# Patient Record
Sex: FEMALE | Race: WHITE | Employment: UNEMPLOYED | URBAN - METROPOLITAN AREA
[De-identification: names, ages, dates, MRNs, and addresses within clinical notes are randomized per-mention and may not be internally consistent; named-entity substitution may affect disease eponyms.]

---

## 2020-07-28 ENCOUNTER — VIRTUAL VISIT (OUTPATIENT)
Dept: PRIMARY CARE CLINIC | Age: 45
End: 2020-07-28
Payer: MEDICAID

## 2020-07-28 DIAGNOSIS — E11.9 CONTROLLED TYPE 2 DIABETES MELLITUS WITHOUT COMPLICATION, WITHOUT LONG-TERM CURRENT USE OF INSULIN (HCC): ICD-10-CM

## 2020-07-28 DIAGNOSIS — J45.909 MODERATE ASTHMA, UNSPECIFIED WHETHER COMPLICATED, UNSPECIFIED WHETHER PERSISTENT: Primary | ICD-10-CM

## 2020-07-28 DIAGNOSIS — R26.89 LOSS OF BALANCE: ICD-10-CM

## 2020-07-28 PROCEDURE — 99214 OFFICE O/P EST MOD 30 MIN: CPT | Performed by: NURSE PRACTITIONER

## 2020-07-28 RX ORDER — ALBUTEROL SULFATE 2.5 MG/.5ML
2.5 SOLUTION RESPIRATORY (INHALATION)
Qty: 30 ML | Refills: 1 | Status: SHIPPED | OUTPATIENT
Start: 2020-07-28 | End: 2022-03-29 | Stop reason: SDUPTHER

## 2020-07-28 RX ORDER — FLUTICASONE PROPIONATE AND SALMETEROL 500; 50 UG/1; UG/1
500 POWDER RESPIRATORY (INHALATION) 2 TIMES DAILY
COMMUNITY
Start: 2020-07-25

## 2020-07-28 RX ORDER — PEN NEEDLE, DIABETIC 31 GX3/16"
1 NEEDLE, DISPOSABLE MISCELLANEOUS DAILY
Qty: 90 PEN NEEDLE | Refills: 1 | Status: SHIPPED | OUTPATIENT
Start: 2020-07-28 | End: 2020-09-08 | Stop reason: SDUPTHER

## 2020-07-28 RX ORDER — LORATADINE 10 MG/1
10 TABLET ORAL ONCE
COMMUNITY
Start: 2020-07-25 | End: 2020-10-26 | Stop reason: SDUPTHER

## 2020-07-28 RX ORDER — TIOTROPIUM BROMIDE 18 UG/1
18 CAPSULE ORAL; RESPIRATORY (INHALATION) ONCE
COMMUNITY
Start: 2020-07-25

## 2020-07-28 RX ORDER — LIRAGLUTIDE 6 MG/ML
0.6 INJECTION SUBCUTANEOUS ONCE
Qty: 3 ADJUSTABLE DOSE PRE-FILLED PEN SYRINGE | Refills: 1 | Status: SHIPPED | OUTPATIENT
Start: 2020-07-28 | End: 2020-07-28

## 2020-07-28 RX ORDER — ALBUTEROL SULFATE 2.5 MG/.5ML
2.5 SOLUTION RESPIRATORY (INHALATION) ONCE
COMMUNITY
End: 2020-07-28 | Stop reason: SDUPTHER

## 2020-07-28 RX ORDER — LIRAGLUTIDE 6 MG/ML
0.6 INJECTION SUBCUTANEOUS ONCE
COMMUNITY
Start: 2020-06-07 | End: 2020-07-28 | Stop reason: SDUPTHER

## 2020-07-28 RX ORDER — PEN NEEDLE, DIABETIC 31 GX3/16"
NEEDLE, DISPOSABLE MISCELLANEOUS
COMMUNITY
End: 2020-07-28 | Stop reason: SDUPTHER

## 2020-07-28 NOTE — PROGRESS NOTES
HISTORY OF PRESENT ILLNESS  Peyman Centeno is a 39 y.o. female presents via telemedicine for medication refill. 1. Diabetes:  Patients last A1c was 7%. Patient uses [facility] for her diabetes. Patient used metformin in the past with GI side effects. She also stopped Januvia at that time, unsure it was causing GI side effects as well. Last eye exam was 2019, due this year. Note mild neuropathy. 2. Asthma:  Patient has moderate asthma, sees Dr. Aditi Garcia for this. Next appointment with him is on August 20th. Patient asking for refill of her nebulizer solution today. Patient takes spiriva and Belvin Buster from pulmonologist.      3. Loss of balance:  Patient notes that she has been having balance issues and stability issues. Patient reported that she uses a cane. Patient reported that she needs a handicap parking pass extended. Patient reported that she hasn't follow up with neurology that she was referred to by Darvin Pascal x6 months ago. She did make an appointment with ENT however and sees them next week. There were no vitals filed for this visit. Patient Active Problem List   Diagnosis Code    Moderate asthma J45.909    Controlled type 2 diabetes mellitus without complication, without long-term current use of insulin ([facility] [address] E11.9     Patient Active Problem List    Diagnosis Date Noted    Moderate asthma 07/28/2020    Controlled type 2 diabetes mellitus without complication, without long-term [facility] 07/28/2020     Current Outpatient Medications   Medication Sig Dispense Refill    Wixela Inhub 500-50 mcg/dose diskus inhaler Take 500 Puffs by mouth two (2) times a day.  loratadine (CLARITIN) 10 mg tablet Take 10 mg by mouth once.  Spiriva with HandiHaler 18 mcg inhalation capsule Take 18 mcg by mouth once.  Insulin Needles, Disposable, (Tanya Pen Needle) 32 gauge x 5/32\" ndle 1 Pen Needle by Does Not Apply route daily.  90 Pen Needle 1    Victoza 2-Pradeep 0.6 mg/0.1 mL (18 mg/3 mL) pnij 0.6 mg by SubCUTAneous route once for 1 dose. 3 Adjustable Dose Pre-filled Pen Syringe 1    albuterol sulfate (PROVENTIL;VENTOLIN) 2.5 mg/0.5 mL nebu nebulizer solution 0.5 mL by Nebulization route every four (4) hours as needed for Wheezing. 30 mL 1     Allergies   Allergen Reactions    Latex, Natural Rubber Swelling    Codeine Anxiety    Penicillins Hives     Past Medical History:   Diagnosis Date    Allergy     Asthma     Diabetes ([address]      Past Surgical History:   Procedure Laterality Date    HX  SECTION      X 2    HX GYN      novasure, csection scar revision     Family History   Problem Relation Age of Onset    Cancer Mother         cervical ca    Hypertension Mother     Diabetes Mother     Thyroid Disease Mother     Cancer Father         neck ca    Hypertension Father     Cancer Other         aunt with colon ca    Heart Attack Neg Hx     Stroke Neg Hx      Social History     Tobacco Use    Smoking status: Never Smoker    Smokeless tobacco: Never Used   Substance Use Topics    Alcohol use: No           Review of Systems   Constitutional: Negative for malaise/fatigue and weight loss. Eyes: Negative for blurred vision, double vision and pain. Respiratory: Positive for cough and shortness of breath. Cardiovascular: Negative for chest pain, palpitations, orthopnea and leg swelling. Gastrointestinal: Negative for heartburn and nausea. Genitourinary: Negative for frequency. Musculoskeletal: Negative for joint pain and myalgias. Skin: Negative for itching and rash. Neurological: Negative for dizziness, tingling, loss of consciousness, weakness and headaches. Endo/Heme/Allergies: Does not bruise/bleed easily. Physical Exam  Constitutional:       Appearance: Normal appearance. HENT:      Head: Normocephalic. Eyes:      Conjunctiva/sclera: Conjunctivae normal.      Pupils: Pupils are equal, round, and reactive to light.    Pulmonary: Effort: Pulmonary effort is normal.   Skin:     General: Skin is warm and dry. Neurological:      Mental Status: She is alert. ASSESSMENT and PLAN  Diagnoses and all orders for this visit:    1. Moderate asthma, unspecified whether complicated, unspecified whether persistent  -     CBC WITH AUTOMATED DIFF  -     albuterol sulfate (PROVENTIL;VENTOLIN) 2.5 mg/0.5 mL nebu nebulizer solution; 0.5 mL by Nebulization route every four (4) hours as needed for Wheezing. 2. Controlled type 2 diabetes mellitus without complication, without long-term current use of insulin ([facility])  -     METABOLIC PANEL, COMPREHENSIVE  -     CBC WITH AUTOMATED DIFF  -     LIPID PANEL  -     HEMOGLOBIN A1C WITH EAG  -     Insulin Needles, Disposable, (Tanya Pen Needle) 32 gauge x 5/32\" ndle; 1 Pen Needle by Does Not Apply route daily. -     Victoza 2-Pradeep 0.6 mg/0.1 mL (18 mg/3 mL) pnij; 0.6 mg by SubCUTAneous route once for 1 dose. 3. Loss of balance  Comments:  Encouraged her to follow up with neurology regarding this. Vashti Deshpande, who was evaluated through a synchronous (real-time) audio-video encounter, and/or her healthcare decision maker, is aware that it is a billable service, with coverage as determined by her insurance carrier. She provided verbal consent to proceed: Yes, and patient identification was verified. It was conducted pursuant to the emergency declaration under the [facility], [address] [place] [address] authority and the [employer] Act. A caregiver was present when appropriate. Ability to conduct physical exam was limited. I was at home. The patient was at home.         Aric Sosa NP

## 2020-07-30 DIAGNOSIS — E11.9 CONTROLLED TYPE 2 DIABETES MELLITUS WITHOUT COMPLICATION, WITHOUT LONG-TERM CURRENT USE OF INSULIN (HCC): Primary | ICD-10-CM

## 2020-07-30 LAB
ALBUMIN SERPL-MCNC: 4 G/DL (ref 3.8–4.8)
ALBUMIN/GLOB SERPL: 1.3 {RATIO} (ref 1.2–2.2)
ALP SERPL-CCNC: 160 IU/L (ref 39–117)
ALT SERPL-CCNC: 27 IU/L (ref 0–32)
AST SERPL-CCNC: 23 IU/L (ref 0–40)
BASOPHILS # BLD AUTO: 0.1 X10E3/UL (ref 0–0.2)
BASOPHILS NFR BLD AUTO: 1 %
BILIRUB SERPL-MCNC: 0.4 MG/DL (ref 0–1.2)
BUN SERPL-MCNC: 7 MG/DL (ref 6–24)
BUN/CREAT SERPL: 11 (ref 9–23)
CALCIUM SERPL-MCNC: 9.2 MG/DL (ref 8.7–10.2)
CHLORIDE SERPL-SCNC: 99 MMOL/L (ref 96–106)
CHOLEST SERPL-MCNC: 189 MG/DL (ref 100–199)
CO2 SERPL-SCNC: 27 MMOL/L (ref 20–29)
CREAT SERPL-MCNC: 0.62 MG/DL (ref 0.57–1)
EOSINOPHIL # BLD AUTO: 0.3 X10E3/UL (ref 0–0.4)
EOSINOPHIL NFR BLD AUTO: 3 %
ERYTHROCYTE [DISTWIDTH] IN BLOOD BY AUTOMATED COUNT: 15.4 % (ref 11.7–15.4)
EST. AVERAGE GLUCOSE BLD GHB EST-MCNC: 186 MG/DL
GLOBULIN SER CALC-MCNC: 3 G/DL (ref 1.5–4.5)
GLUCOSE SERPL-MCNC: 146 MG/DL (ref 65–99)
HBA1C MFR BLD: 8.1 % (ref 4.8–5.6)
HCT VFR BLD AUTO: 48.1 % (ref 34–46.6)
HDLC SERPL-MCNC: 55 MG/DL
HGB BLD-MCNC: 15.2 G/DL (ref 11.1–15.9)
IMM GRANULOCYTES # BLD AUTO: 0 X10E3/UL (ref 0–0.1)
IMM GRANULOCYTES NFR BLD AUTO: 0 %
LDLC SERPL CALC-MCNC: 105 MG/DL (ref 0–99)
LYMPHOCYTES # BLD AUTO: 3.9 X10E3/UL (ref 0.7–3.1)
LYMPHOCYTES NFR BLD AUTO: 32 %
MCH RBC QN AUTO: 27.2 PG (ref 26.6–33)
MCHC RBC AUTO-ENTMCNC: 31.6 G/DL (ref 31.5–35.7)
MCV RBC AUTO: 86 FL (ref 79–97)
MONOCYTES # BLD AUTO: 0.8 X10E3/UL (ref 0.1–0.9)
MONOCYTES NFR BLD AUTO: 7 %
NEUTROPHILS # BLD AUTO: 7.2 X10E3/UL (ref 1.4–7)
NEUTROPHILS NFR BLD AUTO: 57 %
PLATELET # BLD AUTO: 335 X10E3/UL (ref 150–450)
POTASSIUM SERPL-SCNC: 4.8 MMOL/L (ref 3.5–5.2)
PROT SERPL-MCNC: 7 G/DL (ref 6–8.5)
RBC # BLD AUTO: 5.58 X10E6/UL (ref 3.77–5.28)
SODIUM SERPL-SCNC: 144 MMOL/L (ref 134–144)
TRIGL SERPL-MCNC: 146 MG/DL (ref 0–149)
VLDLC SERPL CALC-MCNC: 29 MG/DL (ref 5–40)
WBC # BLD AUTO: 12.3 X10E3/UL (ref 3.4–10.8)

## 2020-07-30 NOTE — PROGRESS NOTES
A1c is not controlled on victoza alone. She is at 8.1%. I would like to add on a medication to the victoza to help bring this level down and then have her follow up in the office in 3 months for an a1c recheck. Rest of lab work looks good.

## 2020-09-08 ENCOUNTER — TELEPHONE (OUTPATIENT)
Dept: PRIMARY CARE CLINIC | Age: 45
End: 2020-09-08

## 2020-09-08 DIAGNOSIS — E11.9 CONTROLLED TYPE 2 DIABETES MELLITUS WITHOUT COMPLICATION, WITHOUT LONG-TERM CURRENT USE OF INSULIN (HCC): ICD-10-CM

## 2020-09-08 RX ORDER — PEN NEEDLE, DIABETIC 31 GX3/16"
1 NEEDLE, DISPOSABLE MISCELLANEOUS DAILY
Qty: 90 PEN NEEDLE | Refills: 1 | Status: SHIPPED | OUTPATIENT
Start: 2020-09-08 | End: 2020-09-21

## 2020-09-21 DIAGNOSIS — E11.9 CONTROLLED TYPE 2 DIABETES MELLITUS WITHOUT COMPLICATION, WITHOUT LONG-TERM CURRENT USE OF INSULIN (HCC): ICD-10-CM

## 2020-09-21 RX ORDER — PEN NEEDLE, DIABETIC 32GX 5/32"
NEEDLE, DISPOSABLE MISCELLANEOUS
Qty: 100 PEN NEEDLE | Refills: 1 | Status: SHIPPED | OUTPATIENT
Start: 2020-09-21 | End: 2020-10-26 | Stop reason: SDUPTHER

## 2020-10-21 PROBLEM — E66.9 OBESITY: Status: ACTIVE | Noted: 2020-10-21

## 2020-10-26 ENCOUNTER — OFFICE VISIT (OUTPATIENT)
Dept: PRIMARY CARE CLINIC | Age: 45
End: 2020-10-26
Payer: MEDICAID

## 2020-10-26 VITALS
DIASTOLIC BLOOD PRESSURE: 72 MMHG | OXYGEN SATURATION: 93 % | TEMPERATURE: 98.6 F | HEIGHT: 67 IN | BODY MASS INDEX: 42.38 KG/M2 | RESPIRATION RATE: 18 BRPM | WEIGHT: 270 LBS | SYSTOLIC BLOOD PRESSURE: 110 MMHG | HEART RATE: 73 BPM

## 2020-10-26 DIAGNOSIS — Z91.09 ENVIRONMENTAL ALLERGIES: ICD-10-CM

## 2020-10-26 DIAGNOSIS — R29.898 WEAKNESS OF BOTH LEGS: Primary | ICD-10-CM

## 2020-10-26 DIAGNOSIS — E66.01 OBESITY, MORBID (HCC): ICD-10-CM

## 2020-10-26 DIAGNOSIS — E11.9 CONTROLLED TYPE 2 DIABETES MELLITUS WITHOUT COMPLICATION, WITHOUT LONG-TERM CURRENT USE OF INSULIN (HCC): ICD-10-CM

## 2020-10-26 PROCEDURE — 99214 OFFICE O/P EST MOD 30 MIN: CPT | Performed by: NURSE PRACTITIONER

## 2020-10-26 RX ORDER — PEN NEEDLE, DIABETIC 31 GX3/16"
NEEDLE, DISPOSABLE MISCELLANEOUS
Qty: 100 PEN NEEDLE | Refills: 1 | Status: SHIPPED | OUTPATIENT
Start: 2020-10-26 | End: 2021-04-08 | Stop reason: ALTCHOICE

## 2020-10-26 RX ORDER — LIRAGLUTIDE 6 MG/ML
INJECTION SUBCUTANEOUS
COMMUNITY
Start: 2020-09-20 | End: 2020-10-26

## 2020-10-26 RX ORDER — DULAGLUTIDE 0.75 MG/.5ML
0.75 INJECTION, SOLUTION SUBCUTANEOUS
Qty: 13 PEN | Refills: 1 | Status: SHIPPED | OUTPATIENT
Start: 2020-10-26 | End: 2020-12-18 | Stop reason: ALTCHOICE

## 2020-10-26 RX ORDER — LORATADINE 10 MG/1
10 TABLET ORAL ONCE
Qty: 1 TAB | Refills: 0 | Status: SHIPPED | OUTPATIENT
Start: 2020-10-26 | End: 2020-10-26

## 2020-10-26 RX ORDER — LORATADINE 10 MG/1
10 TABLET ORAL ONCE
Qty: 90 TAB | Refills: 5 | Status: SHIPPED | OUTPATIENT
Start: 2020-10-26 | End: 2020-10-26

## 2020-10-26 NOTE — PATIENT INSTRUCTIONS
Low Back Pain: Exercises Introduction Here are some examples of exercises for you to try. The exercises may be suggested for a condition or for rehabilitation. Start each exercise slowly. Ease off the exercises if you start to have pain. You will be told when to start these exercises and which ones will work best for you. How to do the exercises Press-up 1. Lie on your stomach, supporting your body with your forearms. 2. Press your elbows down into the floor to raise your upper back. As you do this, relax your stomach muscles and allow your back to arch without using your back muscles. As your press up, do not let your hips or pelvis come off the floor. 3. Hold for 15 to 30 seconds, then relax. 4. Repeat 2 to 4 times. Alternate arm and leg (bird dog) exercise Do this exercise slowly. Try to keep your body straight at all times, and do not let one hip drop lower than the other. 1. Start on the floor, on your hands and knees. 2. Tighten your belly muscles. 3. Raise one leg off the floor, and hold it straight out behind you. Be careful not to let your hip drop down, because that will twist your trunk. 4. Hold for about 6 seconds, then lower your leg and switch to the other leg. 5. Repeat 8 to 12 times on each leg. 6. Over time, work up to holding for 10 to 30 seconds each time. 7. If you feel stable and secure with your leg raised, try raising the opposite arm straight out in front of you at the same time. Knee-to-chest exercise 1. Lie on your back with your knees bent and your feet flat on the floor. 2. Bring one knee to your chest, keeping the other foot flat on the floor (or keeping the other leg straight, whichever feels better on your lower back). 3. Keep your lower back pressed to the floor. Hold for at least 15 to 30 seconds. 4. Relax, and lower the knee to the starting position. 5. Repeat with the other leg. Repeat 2 to 4 times with each leg. 6. To get more stretch, put your other leg flat on the floor while pulling your knee to your chest.   
Curl-ups 1. Lie on the floor on your back with your knees bent at a 90-degree angle. Your feet should be flat on the floor, about 12 inches from your buttocks. 2. Cross your arms over your chest. If this bothers your neck, try putting your hands behind your neck (not your head), with your elbows spread apart. 3. Slowly tighten your belly muscles and raise your shoulder blades off the floor. 4. Keep your head in line with your body, and do not press your chin to your chest. 
5. Hold this position for 1 or 2 seconds, then slowly lower yourself back down to the floor. 6. Repeat 8 to 12 times. Pelvic tilt exercise 1. Lie on your back with your knees bent. 2. \"Brace\" your stomach. This means to tighten your muscles by pulling in and imagining your belly button moving toward your spine. You should feel like your back is pressing to the floor and your hips and pelvis are rocking back. 3. Hold for about 6 seconds while you breathe smoothly. 4. Repeat 8 to 12 times. Heel dig bridging 1. Lie on your back with both knees bent and your ankles bent so that only your heels are digging into the floor. Your knees should be bent about 90 degrees. 2. Then push your heels into the floor, squeeze your buttocks, and lift your hips off the floor until your shoulders, hips, and knees are all in a straight line. 3. Hold for about 6 seconds as you continue to breathe normally, and then slowly lower your hips back down to the floor and rest for up to 10 seconds. 4. Do 8 to 12 repetitions. Hamstring stretch in doorway 1. Lie on your back in a doorway, with one leg through the open door. 2. Slide your leg up the wall to straighten your knee. You should feel a gentle stretch down the back of your leg. 3. Hold the stretch for at least 15 to 30 seconds.  Do not arch your back, point your toes, or bend either knee. Keep one heel touching the floor and the other heel touching the wall. 4. Repeat with your other leg. 5. Do 2 to 4 times for each leg. Hip flexor stretch 1. Kneel on the floor with one knee bent and one leg behind you. Place your forward knee over your foot. Keep your other knee touching the floor. 2. Slowly push your hips forward until you feel a stretch in the upper thigh of your rear leg. 3. Hold the stretch for at least 15 to 30 seconds. Repeat with your other leg. 4. Do 2 to 4 times on each side. Wall sit 1. Stand with your back 10 to 12 inches away from a wall. 2. Lean into the wall until your back is flat against it. 3. Slowly slide down until your knees are slightly bent, pressing your lower back into the wall. 4. Hold for about 6 seconds, then slide back up the wall. 5. Repeat 8 to 12 times. Follow-up care is a key part of your treatment and safety. Be sure to make and go to all appointments, and call your doctor if you are having problems. It's also a good idea to know your test results and keep a list of the medicines you take. Where can you learn more? Go to http://www.gray.com/ Enter J783 in the search box to learn more about \"Low Back Pain: Exercises. \" Current as of: March 2, 2020               Content Version: 12.6 © 0099-4400 [employer]. Care instructions adapted under license by [employer] (which disclaims liability or warranty for this information). If you have questions about a medical condition or this instruction, always ask your healthcare professional. [place] [address] any warranty or liability for your use of this information. Diabetes Foot Health: Care Instructions Your Care Instructions When you have diabetes, your feet need extra care and attention.  Diabetes can damage the nerve endings and blood vessels in your feet, making you less likely to notice when your feet are injured. Diabetes also limits your body's ability to fight infection and get blood to areas that need it. If you get a minor foot injury, it could become an ulcer or a serious infection. With good foot care, you can prevent most of these problems. Caring for your feet can be quick and easy. Most of the care can be done when you are bathing or getting ready for bed. Follow-up care is a key part of your treatment and safety. Be sure to make and go to all appointments, and call your doctor if you are having problems. It's also a good idea to know your test results and keep a list of the medicines you take. How can you care for yourself at home? · Keep your blood sugar close to normal by watching what and how much you eat, monitoring blood sugar, taking medicines if prescribed, and getting regular exercise. · Do not smoke. Smoking affects blood flow and can make foot problems worse. If you need help quitting, talk to your doctor about stop-smoking programs and medicines. These can increase your chances of quitting for good. · Eat a diet that is low in fats. High fat intake can cause fat to build up in your blood vessels and decrease blood flow. · Inspect your feet daily for blisters, cuts, cracks, or sores. If you cannot see well, use a mirror or have someone help you. · Take care of your feet: 
? Wash your feet every day. Use warm (not hot) water. Check the water temperature with your wrists or other part of your body, not your feet. ? Dry your feet well. Pat them dry. Do not rub the skin on your feet too hard. Dry well between your toes. If the skin on your feet stays moist, bacteria or a fungus can grow, which can lead to infection. ? Keep your skin soft. Use moisturizing skin cream to keep the skin on your feet soft and prevent calluses and cracks. But do not put the cream between your toes, and stop using any cream that causes a rash. ? Clean underneath your toenails carefully. Do not use a sharp object to clean underneath your toenails. Use the blunt end of a nail file or other rounded tool. ? Trim and file your toenails straight across to prevent ingrown toenails. Use a nail clipper, not scissors. Use an emery board to smooth the edges. · Change socks daily. Socks without seams are best, because seams often rub the feet. You can find socks for people with diabetes from specialty catalogs. · Look inside your shoes every day for things like gravel or torn linings, which could cause blisters or sores. · Buy shoes that fit well: 
? Look for shoes that have plenty of space around the toes. This helps prevent bunions and blisters. ? Try on shoes while wearing the kind of socks you will usually wear with the shoes. ? Avoid plastic shoes. They may rub your feet and cause blisters. Good shoes should be made of materials that are flexible and breathable, such as leather or cloth. ? Break in new shoes slowly by wearing them for no more than an hour a day for several days. Take extra time to check your feet for red areas, blisters, or other problems after you wear new shoes. · Do not go barefoot. Do not wear sandals, and do not wear shoes with very thin soles. Thin soles are easy to puncture. They also do not protect your feet from hot pavement or cold weather. · Have your doctor check your feet during each visit. If you have a foot problem, see your doctor. Do not try to treat an early foot problem at home. Home remedies or treatments that you can buy without a prescription (such as corn removers) can be harmful. · Always get early treatment for foot problems. A minor irritation can lead to a major problem if not properly cared for early. When should you call for help?  
 Call your doctor now or seek immediate medical care if: 
  · You have a foot sore, an ulcer or break in the skin that is not healing after 4 days, bleeding corns or calluses, or an ingrown toenail.  
  · You have blue or black areas, which can mean bruising or blood flow problems.  
  · You have peeling skin or tiny blisters between your toes or cracking or oozing of the skin.  
  · You have a fever for more than 24 hours and a foot sore.  
  · You have new numbness or tingling in your feet that does not go away after you move your feet or change positions.  
  · You have unexplained or unusual swelling of the foot or ankle. Watch closely for changes in your health, and be sure to contact your doctor if: 
  · You cannot do proper foot care. Where can you learn more? Go to http://www.gray.com/ Enter A739 in the search box to learn more about \"Diabetes Foot Health: Care Instructions. \" Current as of: December 20, 2019               Content Version: 12.6 © 4553-7993 [employer]. Care instructions adapted under license by [employer] (which disclaims liability or warranty for this information). If you have questions about a medical condition or this instruction, always ask your healthcare professional. [place] [address] any warranty or liability for your use of this information. Learning About Tests When You Have Diabetes Why do you need regular tests? Diabetes can lead to other health problems if it's not well controlled. You'll need tests to monitor how well your diabetes is controlled and to check for other things like high cholesterol or kidney problems. Having tests on a regular schedule can help your doctor find problems early, when it's easier to manage them. What tests do you need? These are the tests you may need and how often you should have them. A1c blood test.  
This test shows the average level of blood sugar over the past 2 to 3 months. It helps your doctor see whether blood sugar levels have been staying within your target range. How often: Every 3 to 6 months Goal: A blood sugar level in your target range Blood pressure test.  
This test measures the pressure of blood flow in the arteries. Controlling blood pressure can help prevent damage to nerves and blood vessels. How often: Every 3 to 6 months Goal: A blood pressure level in your target range Cholesterol test.  
This test measures the amount of a type of fat in the blood. It is common for people with diabetes to also have high cholesterol. Too much cholesterol in the blood can build up inside the blood vessels and raise the risk for heart attack and stroke. How often: At the time of your diabetes diagnosis, and as often as your doctor recommends after that Goal: A cholesterol level in your target range Albumin-creatinine ratio test.  
This test checks for kidney damage by looking for the protein albumin (say \"al-BYOO-jaskaran\") in the urine. Albumin is normally found in the blood. Kidney damage can let small amounts of it (microalbumin) leak into the urine. How often: Once a year Goal: No protein in the urine Blood creatinine test/estimated glomerular filtration (eGFR). The blood creatinine (say \"tarm-QT-qs-neen\") level shows how well your kidneys are working. Creatinine is a waste product that muscles release into the blood. Blood creatinine is used to estimate the glomerular filtration rate. A high level of creatinine and/or a low eGFR may mean your kidneys are not working as well as they should. How often: Once a year Goal: Normal level of creatinine in the blood. The eGFR goal is greater than 60 mL/min/1.73 m². Complete foot exam.  
The doctor checks for foot sores and whether any sensation has been lost. 
How often: Once a year Goal: Healthy feet with no foot ulcers or loss of feeling Dental exam and cleaning. The dentist checks for gum disease and tooth decay. People with high blood sugar are more likely to have these problems. How often: Every 6 months Goal: Healthy teeth and gums Complete eye exam.  
High blood sugar levels can damage the eyes. This exam is done by an ophthalmologist or optometrist. It includes a dilated eye exam. The exam shows whether there's damage to the back of the eye (diabetic retinopathy). How often: Once a year. If you don't have any signs of diabetic retinopathy, your doctor may recommend an exam every 2 years. Goal: No damage to the back of the eye Thyroid-stimulating hormone (TSH) blood test.  
This test checks for thyroid disease. Too little thyroid hormone can cause some medicines (like insulin) to stay in the body longer. This can cause low blood sugar. You may be tested if you have high cholesterol or are a woman over 48years old. How often: As part of your diabetes diagnosis, and as often as your doctor recommends after that Goal: Normal level of TSH in the blood Follow-up care is a key part of your treatment and safety. Be sure to make and go to all appointments, and call your doctor if you are having problems. It's also a good idea to know your test results and keep a list of the medicines you take. Where can you learn more? Go to http://www.gray.com/ Enter [id number] in the search box to learn more about \"Learning About Tests When You Have Diabetes. \" Current as of: December 20, 2019               Content Version: 12.6 © 4011-4438 [employer]. Care instructions adapted under license by [employer] (which disclaims liability or warranty for this information). If you have questions about a medical condition or this instruction, always ask your healthcare professional. [place] [address] any warranty or liability for your use of this information.

## 2020-10-26 NOTE — PROGRESS NOTES
HISTORY OF PRESENT ILLNESS  Nieves Mcfadden is a 39 y.o. female presents to the office for medication refill and lab work      . Diabetes: Last A1c was   Lab Results   Component Value Date/Time    Hemoglobin A1c 8.1 (H) 07/29/2020 09:35 AM    . Diabetes well controlled on jardiance and victoza  But every time she takes victoza she gets a bad headache . Denies neuropathy. denies palpitations chest pain excessive thirst sleeping difficulties, bowel movement issues that are new    Complains of back pain (chronically) and bilateral weakness in both legs. No numbness  Vitals:    10/26/20 1020   BP: 110/72   Pulse: 73   Resp: 18   Temp: 98.6 °F (37 °C)   TempSrc: Temporal   SpO2: 93%   Weight: 270 lb (122.5 kg)   Height: 5' 7\" (1.702 m)     Patient Active Problem List   Diagnosis Code    Moderate asthma J45.909    Controlled type 2 diabetes mellitus without complication, without long-term current use of insulin ([facility]) E11.9    Obesity E66.9    Obesity, morbid ([facility]) E66.01     Patient Active Problem List    Diagnosis Date Noted    Obesity, morbid ([facility] [address] 10/26/2020    Obesity 10/21/2020    Moderate asthma 07/28/2020    Controlled type 2 diabetes mellitus without complication, without long-term current use of insulin ([facility]) 07/28/2020     Current Outpatient Medications   Medication Sig Dispense Refill    dulaglutide (Trulicity) 2.28 PX/7.4 mL sub-q pen 0.5 mL by SubCUTAneous route every seven (7) days. 13 Pen 1    empagliflozin (Jardiance) 25 mg tablet Take 1 Tab by mouth daily. 90 Tab 0    loratadine (CLARITIN) 10 mg tablet Take 1 Tab by mouth once for 1 dose. 1 Tab 0    Insulin Needles, Disposable, (Tanya Pen Needle) 32 gauge x 5/32\" ndle USE AS DIRECTED ONCE DAILY 100 Pen Needle 1    Wixela Inhub 500-50 mcg/dose diskus inhaler Take 500 Puffs by mouth two (2) times a day.  Spiriva with HandiHaler 18 mcg inhalation capsule Take 18 mcg by mouth once.       albuterol sulfate (PROVENTIL;VENTOLIN) 2.5 mg/0.5 mL nebu nebulizer solution 0.5 mL by Nebulization route every four (4) hours as needed for Wheezing. 30 mL 1     Allergies   Allergen Reactions    Latex, Natural Rubber Swelling    Codeine Anxiety    Penicillins Hives     Past Medical History:   Diagnosis Date    Allergy     Asthma     Diabetes ([address]      Past Surgical History:   Procedure Laterality Date    HX  SECTION      X 2    HX GYN      novasure, csection scar revision     Family History   Problem Relation Age of Onset    Cancer Mother         cervical ca    Hypertension Mother     Diabetes Mother     Thyroid Disease Mother     Cancer Father         neck ca    Hypertension Father     Cancer Other         aunt with colon ca    Heart Attack Neg Hx     Stroke Neg Hx      Social History     Tobacco Use    Smoking status: Never Smoker    Smokeless tobacco: Never Used   Substance Use Topics    Alcohol use: No           Review of Systems   Constitutional: Negative for fever and weight loss. HENT: Negative for sore throat and tinnitus. Eyes: Negative for blurred vision and double vision. Respiratory: Negative for shortness of breath. Cardiovascular: Negative for chest pain, palpitations and leg swelling. Gastrointestinal: Negative for constipation and diarrhea. Musculoskeletal: Positive for back pain. Skin: Negative for itching and rash. Neurological: Positive for weakness. Negative for dizziness. Endo/Heme/Allergies: Does not bruise/bleed easily. Psychiatric/Behavioral: Negative for depression. The patient is not nervous/anxious and does not have insomnia. Physical Exam  Vitals signs reviewed. Constitutional:       Appearance: Normal appearance. She is obese. HENT:      Head: Normocephalic. Nose: Nose normal.      Mouth/Throat:      Mouth: Mucous membranes are moist.   Eyes:      Extraocular Movements: Extraocular movements intact. Pupils: Pupils are equal, round, and reactive to light.    Neck: Musculoskeletal: Normal range of motion and neck supple. Cardiovascular:      Rate and Rhythm: Normal rate and regular rhythm. Pulses: Normal pulses. Heart sounds: Normal heart sounds. Pulmonary:      Effort: Pulmonary effort is normal.      Breath sounds: Normal breath sounds. Musculoskeletal: Normal range of motion. Skin:     General: Skin is warm and dry. Neurological:      General: No focal deficit present. Mental Status: She is alert and oriented to person, place, and time. Comments: Walking with cane   Psychiatric:         Attention and Perception: Attention and perception normal.         Mood and Affect: Affect normal.         Speech: Speech normal.         Behavior: Behavior normal. Behavior is cooperative. Thought Content: Thought content normal.         Cognition and Memory: Cognition and memory normal.         Judgment: Judgment normal.           ASSESSMENT and PLAN    1. Obesity, morbid ([facility] [address]  Try trulicity if we can get it through the insurance  - dulaglutide (Trulicity) 4.56 NS/5.9 mL sub-q pen; 0.5 mL by SubCUTAneous route every seven (7) days. Dispense: 13 Pen; Refill: 1  - Insulin Needles, Disposable, (Tanya Pen Needle) 32 gauge x 5/32\" ndle; USE AS DIRECTED ONCE DAILY  Dispense: 100 Pen Needle; Refill: 1  - REFERRAL TO PHYSICAL THERAPY    2. Controlled type 2 diabetes mellitus without complication, without long-term current use of insulin ([facility])  trulicity  - empagliflozin (Jardiance) 25 mg tablet; Take 1 Tab by mouth daily. Dispense: 90 Tab; Refill: 0  - Insulin Needles, Disposable, (Tanya Pen Needle) 32 gauge x 5/32\" ndle; USE AS DIRECTED ONCE DAILY  Dispense: 100 Pen Needle; Refill: 1    3. Weakness of both legs  Will likely end up at neurology  - REFERRAL TO PHYSICAL THERAPY    4. Environmental allergies  refills  - loratadine (CLARITIN) 10 mg tablet; Take 1 Tab by mouth once for 1 dose. Dispense: 1 Tab;  Refill: 0        Dave Rincon NP

## 2020-10-27 ENCOUNTER — TELEPHONE (OUTPATIENT)
Dept: PRIMARY CARE CLINIC | Age: 45
End: 2020-10-27

## 2020-12-18 ENCOUNTER — OFFICE VISIT (OUTPATIENT)
Dept: ENDOCRINOLOGY | Age: 45
End: 2020-12-18
Payer: MEDICAID

## 2020-12-18 VITALS
HEART RATE: 101 BPM | DIASTOLIC BLOOD PRESSURE: 100 MMHG | HEIGHT: 67 IN | WEIGHT: 270.4 LBS | TEMPERATURE: 96.8 F | BODY MASS INDEX: 42.44 KG/M2 | OXYGEN SATURATION: 91 % | SYSTOLIC BLOOD PRESSURE: 163 MMHG

## 2020-12-18 DIAGNOSIS — E11.65 TYPE 2 DIABETES MELLITUS WITH HYPERGLYCEMIA, WITHOUT LONG-TERM CURRENT USE OF INSULIN (HCC): Primary | ICD-10-CM

## 2020-12-18 DIAGNOSIS — E11.9 CONTROLLED TYPE 2 DIABETES MELLITUS WITHOUT COMPLICATION, WITHOUT LONG-TERM CURRENT USE OF INSULIN (HCC): ICD-10-CM

## 2020-12-18 DIAGNOSIS — R29.898 WEAKNESS OF BOTH LEGS: ICD-10-CM

## 2020-12-18 PROBLEM — R03.0 ELEVATED BP WITHOUT DIAGNOSIS OF HYPERTENSION: Status: ACTIVE | Noted: 2020-12-18

## 2020-12-18 PROBLEM — E78.2 MIXED HYPERLIPIDEMIA: Status: ACTIVE | Noted: 2020-12-18

## 2020-12-18 LAB
GLUCOSE POC: 181 MG/DL
HBA1C MFR BLD HPLC: 8.3 %

## 2020-12-18 PROCEDURE — 83036 HEMOGLOBIN GLYCOSYLATED A1C: CPT | Performed by: INTERNAL MEDICINE

## 2020-12-18 PROCEDURE — 82962 GLUCOSE BLOOD TEST: CPT | Performed by: INTERNAL MEDICINE

## 2020-12-18 PROCEDURE — 3052F HG A1C>EQUAL 8.0%<EQUAL 9.0%: CPT | Performed by: INTERNAL MEDICINE

## 2020-12-18 PROCEDURE — 99204 OFFICE O/P NEW MOD 45 MIN: CPT | Performed by: INTERNAL MEDICINE

## 2020-12-18 RX ORDER — LANCETS
EACH MISCELLANEOUS
Qty: 50 EACH | Refills: 5 | Status: SHIPPED | OUTPATIENT
Start: 2020-12-18 | End: 2021-04-08 | Stop reason: ALTCHOICE

## 2020-12-18 RX ORDER — BUDESONIDE 180 UG/1
AEROSOL, POWDER RESPIRATORY (INHALATION)
COMMUNITY
Start: 2020-12-03

## 2020-12-18 RX ORDER — LORATADINE 10 MG/1
TABLET ORAL
COMMUNITY
Start: 2020-12-16 | End: 2022-07-19

## 2020-12-18 RX ORDER — PREDNISONE 10 MG/1
TABLET ORAL
COMMUNITY
Start: 2020-12-03 | End: 2021-04-08 | Stop reason: ALTCHOICE

## 2020-12-18 NOTE — PROGRESS NOTES
History and Physical    Patient: Marti Cedillo MRN: [id number]  SSN: [id number]    YOB: 1975  Age: 39 y.o. Sex: female      Subjective:      Marti Cedillo is a 39 y.o. female with past medical history of severe asthma is here to establish care for type 2 diabetes mellitus. She is in primary care of Huma Velarde NP. Patient was diagnosed with type 2 diabetes mellitus in 2016. She was initially on Metformin but that was causing upset stomach, so she stopped taking it. She was on Victoza but it was giving her headaches so she could not tolerate it. Primary care provider sent prescription for Trulicity but her insurance did not approve it. She is currently taking Jardiance 25 mg daily. She denies any urinary tract infections or vaginal yeast infections. She eats 2 meals per day. She snacks a lot in between meals, on potato chips, snack cakes, drinks a lot of Diet Coke every day. She has weakness in her leg muscles because of which she is unable to do any exercise or go anywhere. Proximal muscle weakness: Started around 1 year back. When she stands up she feels like her body is just going to collapse. She has to use a cane to from sitting position even from a chair. She denies easy bruising, bone fractures. She has hair loss which got slowly worse. No history of hypokalemia. Currently she is on prednisone 10 mg daily started 2 weeks back by pulmonologist because of severe asthma. Next appointment is in February 2021. Prior to that she does not have history of recurrent prednisone treatments or steroid injections in her joints. Glucometer reading: Does not check blood glucose much.   Did not bring glucometer    · Diagnosis: 2016  · Current treatment: Jardiance 25 mg every day,   · Past treatment: metformin (upset stomach), Victoza (headaches), Trulicity ([place])  · Glucose checks: weekly, 160-220   · Hyperglycemia: 200s  · Hypoglycemia: no  · Meals per day: 3, breakfast: skips, lunch: sandwich, dinner: meat, veg and starch, snacks: potato chips, snack cakes, diet coke  · Exercise: no severe asthma  · DM related hospitalizations: no    Complications of DM:  · CAD: no  · CVA: no  · PVD: no  · Amputations: no   · Retinopathy: no; last exam was   · Gastropathy: no  · Nephropathy: no  · Neuropathy: yes    Medications:  · Statin: no  · ACE-I: no  · ASA: no    · Diabetes education: no    Past Medical History:   Diagnosis Date    Allergy     Asthma     Diabetes ([address]      Past Surgical History:   Procedure Laterality Date    HX  SECTION      X 2    HX GYN      novasure, csection scar revision      Family History   Problem Relation Age of Onset    Cancer Mother         cervical ca    Hypertension Mother     Diabetes Mother     Thyroid Disease Mother     Cancer Father         neck ca    Hypertension Father     Cancer Other         aunt with colon ca    Heart Attack Neg Hx     Stroke Neg Hx      Social History     Tobacco Use    Smoking status: Never Smoker    Smokeless tobacco: Never Used   Substance Use Topics    Alcohol use: No      Prior to Admission medications    Medication Sig Start Date End Date Taking? Authorizing Provider   Pulmicort Flexhaler 180 mcg/actuation aepb inhaler TAKE 1 PUFF BY MOUTH TWICE A DAY 12/3/20  Yes Provider, Historical   loratadine (CLARITIN) 10 mg tablet  20  Yes Provider, Historical   predniSONE (DELTASONE) 10 mg tablet TAKE 1 TABLET BY MOUTH EVERY DAY 12/3/20  Yes Provider, Historical   exenatide microspheres (Bydureon) 2 mg serr 2 mg by SubCUTAneous route every seven (7) days for 28 days. 12/18/20 1/15/21 Yes Alfonso Bennett MD   empagliflozin (Jardiance) 25 mg tablet Take 1 Tab by mouth daily.  20  Yes Alfonso Bennett MD   glucose blood VI test strips (ASCENSIA AUTODISC VI, ONE TOUCH ULTRA TEST VI) strip Once a day 20  Yes Alfonso Bennett MD   lancets misc Once a day 20  Yes Alfonso Bennett MD   Insulin [place], Disposable, (Tanya Pen Needle) 32 gauge x 5/32\" ndle USE AS DIRECTED ONCE DAILY 10/26/20  Yes Rui Garcia NP   Wixela Inhub 500-50 mcg/dose diskus inhaler Take 500 Puffs by mouth two (2) times a day. 7/25/20  Yes Provider, Historical   Spiriva with HandiHaler 18 mcg inhalation capsule Take 18 mcg by mouth once. 7/25/20  Yes Provider, Historical   albuterol sulfate (PROVENTIL;VENTOLIN) 2.5 mg/0.5 mL nebu nebulizer solution 0.5 mL by Nebulization route every four (4) hours as needed for Wheezing. 7/28/20  Yes Marta Lcokhart NP        Allergies   Allergen Reactions    Latex, Natural Rubber Swelling    Codeine Anxiety    Penicillins Hives       Review of Systems:  ROS    A comprehensive review of systems was preformed and it is negative except mentioned in HPI    Objective:     Vitals:    12/18/20 1247   BP: (!) 163/100   Pulse: (!) 101   Temp: 96.8 °F (36 °C)   TempSrc: Temporal   SpO2: 91%   Weight: 270 lb 6.4 oz (122.7 kg)   Height: 5' 7\" (1.702 m)        Physical Exam:    Physical Exam  Vitals signs and nursing note reviewed. Constitutional:       Appearance: She is obese. HENT:      Head: Normocephalic and atraumatic. Eyes:      Extraocular Movements: Extraocular movements intact. Neck:      Musculoskeletal: Normal range of motion and neck supple. Cardiovascular:      Rate and Rhythm: Normal rate and regular rhythm. Pulmonary:      Effort: Pulmonary effort is normal.      Breath sounds: Normal breath sounds. Abdominal:      General: Bowel sounds are normal.      Palpations: Abdomen is soft. Musculoskeletal: Normal range of motion. General: No swelling. Skin:     General: Skin is warm and dry. Comments: Pale stretch marks on the abdomen and some pink months, no bruising, sparse hair loss diffuse, no acne or hirsutism   Neurological:      General: No focal deficit present. Mental Status: She is alert and oriented to person, place, and time.    Psychiatric:         Mood and Affect: Mood normal.         Behavior: Behavior normal.          Labs and Imaging:  Results for Ara Prasad (MRN [id number]) as of 12/18/2020 13:01   Ref. Range 7/29/2020 09:35   Sodium Latest Ref Range: 134 - 144 mmol/L 144   Potassium Latest Ref Range: 3.5 - 5.2 mmol/L 4.8   Chloride Latest Ref Range: 96 - 106 mmol/L 99   CO2 Latest Ref Range: 20 - 29 mmol/L 27   Glucose Latest Ref Range: 65 - 99 mg/dL 146 (H)   BUN Latest Ref Range: 6 - 24 mg/dL 7   Creatinine Latest Ref Range: 0.57 - 1.00 mg/dL 0.62   BUN/Creatinine ratio Latest Ref Range: 9 - 23  11   Calcium Latest Ref Range: 8.7 - 10.2 mg/dL 9.2   GFR est non-AA Latest Ref Range: >59 mL/min/1.73 109   GFR est AA Latest Ref Range: >59 mL/min/1.73 126   Bilirubin, total Latest Ref Range: 0.0 - 1.2 mg/dL 0.4   Protein, total Latest Ref Range: 6.0 - 8.5 g/dL 7.0   Albumin Latest Ref Range: 3.8 - 4.8 g/dL 4.0   A-G Ratio Latest Ref Range: 1.2 - 2.2  1.3   ALT Latest Ref Range: 0 - 32 IU/L 27   AST Latest Ref Range: 0 - 40 IU/L 23   Alk.  phosphatase Latest Ref Range: 39 - 117 IU/L 160 (H)   Triglyceride Latest Ref Range: 0 - 149 mg/dL 146   Cholesterol, total Latest Ref Range: 100 - 199 mg/dL 189   HDL Cholesterol Latest Ref Range: >39 mg/dL 55   VLDL, calculated Latest Ref Range: 5 - 40 mg/dL 29   LDL, calculated Latest Ref Range: 0 - 99 mg/dL 105 (H)   Hemoglobin A1c, (calculated) Latest Ref Range: 4.8 - 5.6 % 8.1 (H)   Estimated average glucose Latest Units: mg/dL 186     Last 3 Recorded Weights in this Encounter    12/18/20 1247   Weight: 270 lb 6.4 oz (122.7 kg)        Lab Results   Component Value Date/Time    Hemoglobin A1c 8.1 (H) 07/29/2020 09:35 AM        Assessment:     Patient Active Problem List   Diagnosis Code    Moderate asthma J45.909    Controlled type 2 diabetes mellitus without complication, without long-term current use of insulin ([facility]) E11.9    Obesity E66.9    Obesity, morbid ([facility]) E66.01    Elevated BP without diagnosis of hypertension R03.0    Mixed hyperlipidemia E78.2           Plan:     Type 2 diabetes mellitus, uncontrolled:  I reviewed progress note and labs from the referring provider's office. Hemoglobin A1c is 8.3% today. It was 8.1% on 7-. Fingerstick blood glucose is 181 mg/dL in my office today. Up to date with diabetes related annual labs: Except TSH and urine microalbumin/creatinine ratio  Up to date with diabetic eye exam: Yes  Plan:  Discussed with patient importance of eating regular meals, cutting down on snacking, healthy snacking if needed. Decrease sugary beverage intake. Continue Jardiance 25 mg daily. Start Bydureon 2 mg weekly. Discussed common side effects. Start checking blood glucose once a day and bring glucometer to next visit in 2 months. Check urine microalbumin/creatinine ratio and TSH prior to next visit. Elevated blood pressure without diagnosis of hypertension:  Blood pressure is elevated today which patient is attributing to being on prednisone. She does not have a diagnosis of hypertension and generally her blood pressure runs normal according to the chart. Proximal muscle weakness:  Potassium was normal at 4.8 in July 2020. She has some symptoms of Cushing syndrome including obesity, diabetes, proximal muscle weakness, hair loss. However I am unable to check at this time because she is on prednisone, which will make the results inconclusive. I will do a work-up when she is off prednisone. Mixed hyperlipidemia:  LDL was 105 in July 2020. Patient is not on a statin. I will consider starting a statin at next visit.     Orders Placed This Encounter    TSH RFX ON ABNORMAL TO FREE T4     Standing Status:   Future     Standing Expiration Date:   6/18/2021    MICROALBUMIN, UR, RAND W/ MICROALB/CREAT RATIO     Standing Status:   Future     Standing Expiration Date:   6/18/2021    AMB POC HEMOGLOBIN A1C    AMB POC GLUCOSE BLOOD, BY GLUCOSE MONITORING DEVICE    exenatide microspheres (Bydureon) 2 mg serr     Si mg by SubCUTAneous route every seven (7) days for 28 days. Dispense:  4 Each     Refill:  3    empagliflozin (Jardiance) 25 mg tablet     Sig: Take 1 Tab by mouth daily.      Dispense:  90 Tab     Refill:  0    glucose blood VI test strips (ASCENSIA AUTODISC VI, ONE TOUCH ULTRA TEST VI) strip     Sig: Once a day     Dispense:  50 Strip     Refill:  5    lancets misc     Sig: Once a day     Dispense:  50 Each     Refill:  5        Signed By: Areli Nunes MD     2020      Return to clinic 2 months

## 2021-01-11 ENCOUNTER — TELEPHONE (OUTPATIENT)
Dept: PRIMARY CARE CLINIC | Age: 46
End: 2021-01-11

## 2021-01-13 NOTE — TELEPHONE ENCOUNTER
Called patient and informed her Helen Grew would fill out DMV form - stated she would drop off the form

## 2021-01-22 ENCOUNTER — TELEPHONE (OUTPATIENT)
Dept: PRIMARY CARE CLINIC | Age: 46
End: 2021-01-22

## 2021-02-18 DIAGNOSIS — E11.65 TYPE 2 DIABETES MELLITUS WITH HYPERGLYCEMIA, WITHOUT LONG-TERM CURRENT USE OF INSULIN (HCC): ICD-10-CM

## 2021-04-06 LAB
SPECIMEN STATUS REPORT, ROLRST: NORMAL
TSH SERPL DL<=0.005 MIU/L-ACNC: 1.86 UIU/ML (ref 0.45–4.5)

## 2021-04-08 ENCOUNTER — OFFICE VISIT (OUTPATIENT)
Dept: ENDOCRINOLOGY | Age: 46
End: 2021-04-08
Payer: MEDICAID

## 2021-04-08 VITALS
TEMPERATURE: 74 F | HEIGHT: 67 IN | WEIGHT: 272 LBS | HEART RATE: 69 BPM | OXYGEN SATURATION: 95 % | DIASTOLIC BLOOD PRESSURE: 93 MMHG | SYSTOLIC BLOOD PRESSURE: 139 MMHG | BODY MASS INDEX: 42.69 KG/M2

## 2021-04-08 DIAGNOSIS — E11.65 TYPE 2 DIABETES MELLITUS WITH HYPERGLYCEMIA, WITHOUT LONG-TERM CURRENT USE OF INSULIN (HCC): Primary | ICD-10-CM

## 2021-04-08 DIAGNOSIS — E66.01 CLASS 3 SEVERE OBESITY WITH SERIOUS COMORBIDITY AND BODY MASS INDEX (BMI) OF 40.0 TO 44.9 IN ADULT, UNSPECIFIED OBESITY TYPE (HCC): ICD-10-CM

## 2021-04-08 LAB — GLUCOSE POC: 160 MG/DL

## 2021-04-08 PROCEDURE — 82962 GLUCOSE BLOOD TEST: CPT | Performed by: INTERNAL MEDICINE

## 2021-04-08 PROCEDURE — 99214 OFFICE O/P EST MOD 30 MIN: CPT | Performed by: INTERNAL MEDICINE

## 2021-04-08 RX ORDER — LANCETS 33 GAUGE
EACH MISCELLANEOUS
Qty: 50 LANCET | Refills: 5 | Status: SHIPPED | OUTPATIENT
Start: 2021-04-08 | End: 2021-06-29

## 2021-04-08 RX ORDER — LANCETS 33 GAUGE
EACH MISCELLANEOUS
COMMUNITY
Start: 2021-03-03 | End: 2021-04-08 | Stop reason: SDUPTHER

## 2021-04-08 RX ORDER — DEXAMETHASONE 0.5 MG/1
TABLET ORAL
Qty: 2 TAB | Refills: 0 | Status: SHIPPED | OUTPATIENT
Start: 2021-04-08 | End: 2021-06-29

## 2021-04-08 RX ORDER — EXENATIDE 2 MG/.65ML
INJECTION, SUSPENSION, EXTENDED RELEASE SUBCUTANEOUS
COMMUNITY
Start: 2021-03-14 | End: 2021-04-08 | Stop reason: SDUPTHER

## 2021-04-08 RX ORDER — EXENATIDE 2 MG/.65ML
2 INJECTION, SUSPENSION, EXTENDED RELEASE SUBCUTANEOUS
Qty: 4 ADJUSTABLE DOSE PRE-FILLED PEN SYRINGE | Refills: 3 | Status: SHIPPED | OUTPATIENT
Start: 2021-04-08 | End: 2021-04-14 | Stop reason: ALTCHOICE

## 2021-04-08 NOTE — PATIENT INSTRUCTIONS
Take dexamethasone 1 mg at 11 pm and get labs drawn before 8 am next morning (fasting, OK to drink water)

## 2021-04-08 NOTE — PROGRESS NOTES
History and Physical    Patient: Nicole Flowers MRN: [id number]  SSN: [id number]    YOB: 1975  Age: 39 y.o. Sex: female      Subjective:      Nicole Flowers is a 39 y.o. female with past medical history of severe asthma is here for follow up of type 2 diabetes mellitus. She is in primary care of Amira Mart NP. At the last visit we continue Jardiance 25 mg daily, Bydureon 2 mg weekly was started. She is taking the 2 medications regularly and tolerating it well. She has made a lot of changes in her eating habits. She is not eating cakes, potato chips anymore, cutting back on soda. Checking blood glucose twice a day. She tells me her morning readings generally run in the 80s, in the evening it runs in mid 100s. She did not bring her glucometer today. .  No urinary tract infection or vaginal yeast infections. She has weakness in her leg muscles because of which she is unable to do any exercise or go anywhere. Proximal muscle weakness: Started around 1 year back. When she stands up she feels like her body is just going to collapse. She has to use a cane to from sitting position even from a chair. She denies easy bruising, bone fractures. She has hair loss which got slowly worse. No history of hypokalemia. At the last visit she was on prednisone for asthma. She has been off of prednisone for the past month and a half. Glucometer reading: Patient did not bring glucometer today.     Updated diabetes history:  · Diagnosis: 2016  · Current treatment: Jardiance 25 mg every day, Bydureon 2 mg weekly  · Past treatment: metformin (upset stomach), Victoza (headaches), Trulicity (PA)  · Glucose checks: weekly, 160-220   · Hyperglycemia: 200s  · Hypoglycemia: no  · Meals per day: 3, breakfast: skips, lunch: sandwich, dinner: meat, veg and starch, snacks: potato chips, snack cakes, diet coke  · Exercise: no severe asthma  · DM related hospitalizations: no    Complications of DM:  · CAD: no  · CVA: no  · PVD: no  · Amputations: no   · Retinopathy: no; last exam was   · Gastropathy: no  · Nephropathy: no  · Neuropathy: yes    Medications:  · Statin: no  · ACE-I: no  · ASA: no    · Diabetes education: no    Past Medical History:   Diagnosis Date    Allergy     Asthma     Diabetes ([facility] [address]      Past Surgical History:   Procedure Laterality Date    HX  SECTION      X 2    HX GYN      novasure, csection scar revision      Family History   Problem Relation Age of Onset    Cancer Mother         cervical ca    Hypertension Mother     Diabetes Mother     Thyroid Disease Mother     Cancer Father         neck ca    Hypertension Father     Cancer Other         aunt with colon ca    Heart Attack Neg Hx     Stroke Neg Hx      Social History     Tobacco Use    Smoking status: Never Smoker    Smokeless tobacco: Never Used   Substance Use Topics    Alcohol use: No      Prior to Admission medications    Medication Sig Start Date End Date Taking? Authorizing Provider   dexAMETHasone (DECADRON) 0.5 mg tablet Take 2 tabs at 11 pm the night before blood draw 21  Yes Shirlene Cano MD   Bydureon 2 mg/0.65 mL pnij 0.65 mL by SubCUTAneous route every seven (7) days for 28 days. 21 Yes Shirlene Cano MD   One Touch Delica 33 gauge misc Once a day 21  Yes Shirlene Cano MD   glucose blood VI test strips (ASCENSIA AUTODISC VI, ONE TOUCH ULTRA TEST VI) strip Once a day 21  Yes Shirlene Cano MD   empagliflozin (Jardiance) 25 mg tablet Take 1 Tab by mouth daily. 21  Yes Shirlene Cano MD   Pulmicort Flexhaler 180 mcg/actuation aepb inhaler TAKE 1 PUFF BY MOUTH TWICE A DAY 12/3/20  Yes Provider, Historical   loratadine (CLARITIN) 10 mg tablet  20  Yes Provider, Historical   Wixela Inhub 500-50 mcg/dose diskus inhaler Take 500 Puffs by mouth two (2) times a day.  20  Yes Provider, Historical   Spiriva with HandiHaler 18 mcg inhalation capsule Take 18 mcg by mouth once. 7/25/20  Yes Provider, Historical   albuterol sulfate (PROVENTIL;VENTOLIN) 2.5 mg/0.5 mL nebu nebulizer solution 0.5 mL by Nebulization route every four (4) hours as needed for Wheezing. 7/28/20  Yes Phillip Mcknightel, NP        Allergies   Allergen Reactions    Latex, Natural Rubber Swelling    Codeine Anxiety    Penicillins Hives       Review of Systems:  ROS    A comprehensive review of systems was preformed and it is negative except mentioned in HPI    Objective:     Vitals:    04/08/21 1305 04/08/21 1319   BP: (!) 139/100 (!) 139/93   Pulse: 71 69   Temp: (!) 74 °F (23.3 °C)    TempSrc: Temporal    SpO2: 95%    Weight: 272 lb (123.4 kg)    Height: 5' 7\" (1.702 m)         Physical Exam:    Physical Exam  Vitals signs and nursing note reviewed. Constitutional:       Appearance: She is obese. HENT:      Head: Normocephalic and atraumatic. Cardiovascular:      Rate and Rhythm: Normal rate and regular rhythm. Pulmonary:      Effort: Pulmonary effort is normal.      Breath sounds: Normal breath sounds. Skin:     Comments: Pale stretch marks on the abdomen and some pink months, no bruising, sparse hair loss diffuse, no acne or hirsutism   Neurological:      Mental Status: She is alert. Labs and Imaging:  Results for Jean Saldana (MRN [id number]) as of 12/18/2020 13:01   Ref.  Range 7/29/2020 09:35   Sodium Latest Ref Range: 134 - 144 mmol/L 144   Potassium Latest Ref Range: 3.5 - 5.2 mmol/L 4.8   Chloride Latest Ref Range: 96 - 106 mmol/L 99   CO2 Latest Ref Range: 20 - 29 mmol/L 27   Glucose Latest Ref Range: 65 - 99 mg/dL 146 (H)   BUN Latest Ref Range: 6 - 24 mg/dL 7   Creatinine Latest Ref Range: 0.57 - 1.00 mg/dL 0.62   BUN/Creatinine ratio Latest Ref Range: 9 - 23  11   Calcium Latest Ref Range: 8.7 - 10.2 mg/dL 9.2   GFR est non-AA Latest Ref Range: >59 mL/min/1.73 109   GFR est AA Latest Ref Range: >59 mL/min/1.73 126   Bilirubin, total Latest Ref Range: 0.0 - 1.2 mg/dL 0.4   Protein, total Latest Ref Range: 6.0 - 8.5 g/dL 7.0   Albumin Latest Ref Range: 3.8 - 4.8 g/dL 4.0   A-G Ratio Latest Ref Range: 1.2 - 2.2  1.3   ALT Latest Ref Range: 0 - 32 IU/L 27   AST Latest Ref Range: 0 - 40 IU/L 23   Alk. phosphatase Latest Ref Range: 39 - 117 IU/L 160 (H)   Triglyceride Latest Ref Range: 0 - 149 mg/dL 146   Cholesterol, total Latest Ref Range: 100 - 199 mg/dL 189   HDL Cholesterol Latest Ref Range: >39 mg/dL 55   VLDL, calculated Latest Ref Range: 5 - 40 mg/dL 29   LDL, calculated Latest Ref Range: 0 - 99 mg/dL 105 (H)   Hemoglobin A1c, (calculated) Latest Ref Range: 4.8 - 5.6 % 8.1 (H)   Estimated average glucose Latest Units: mg/dL 186   Results for Samy Spaulding (MRN [id number]) as of 4/8/2021 12:46   Ref. Range 4/5/2021 12:29   TSH Latest Ref Range: 0.450 - 4.500 uIU/mL 1.860     Last 3 Recorded Weights in this Encounter    04/08/21 1305   Weight: 272 lb (123.4 kg)        Lab Results   Component Value Date/Time    Hemoglobin A1c 8.1 (H) 07/29/2020 09:35 AM        Assessment:     Patient Active Problem List   Diagnosis Code    Moderate asthma J45.909    Controlled type 2 diabetes mellitus without complication, without long-term current use of insulin ([facility]) E11.9    Obesity E66.9    Obesity, morbid ([facility]) E66.01    Elevated BP without diagnosis of hypertension R03.0    Mixed hyperlipidemia E78.2    Type 2 diabetes mellitus with hyperglycemia, without long-term current use of insulin ([facility] E11.65           Plan:     Type 2 diabetes mellitus, uncontrolled:  Hemoglobin A1c was 8.1% on 5078763, 8.3% on 12-, 8.1% today. Fingerstick blood glucose is 160 mg/dL in my office today. Up to date with diabetes related annual labs: 7-2020  Up to date with diabetic eye exam: Yes  Plan:  Encourage patient to continue to make changes in her eating habits. Continue Jardiance 25 mg daily and Bydureon 2 mg weekly.   Start checking blood glucose once a day and bring glucometer to next visit in 3 months. Elevated blood pressure without diagnosis of hypertension:  Blood pressure was elevated at the last visit, it is much better today. I will continue to monitor. She is not on any medications, does not have a diagnosis of hypertension. Proximal muscle weakness:  Potassium was normal at 4.8 in 2020. She has some symptoms of Cushing syndrome including obesity, diabetes, proximal muscle weakness, hair loss. Screen for Cushing syndrome by checking 1 mg dexamethasone suppression test.    Mixed hyperlipidemia:  LDL was 105 in 2020. Patient is not on a statin. I will consider starting a statin at next visit. Orders Placed This Encounter    DEXAMETHASONE    CORTISOL, AM    ACTH    AMB POC GLUCOSE BLOOD, BY GLUCOSE MONITORING DEVICE    AMB POC HEMOGLOBIN A1C    dexAMETHasone (DECADRON) 0.5 mg tablet     Sig: Take 2 tabs at 11 pm the night before blood draw     Dispense:  2 Tab     Refill:  0    Bydureon 2 mg/0.65 mL pnij     Si.65 mL by SubCUTAneous route every seven (7) days for 28 days. Dispense:  4 Adjustable Dose Pre-filled Pen Syringe     Refill:  3    One Touch Delica 33 gauge misc     Sig: Once a day     Dispense:  50 Lancet     Refill:  5    glucose blood VI test strips (ASCENSIA AUTODISC VI, ONE TOUCH ULTRA TEST VI) strip     Sig: Once a day     Dispense:  50 Strip     Refill:  5    empagliflozin (Jardiance) 25 mg tablet     Sig: Take 1 Tab by mouth daily.      Dispense:  90 Tab     Refill:  0        Signed By: Ana Mccullough MD     2021      Return to clinic 3 months

## 2021-04-08 NOTE — LETTER
4/8/2021 Patient: Ivana López YOB: 1975 Date of Visit: 4/8/2021 Iraj Brown NP 
[facility] [id number] [place] [address] [address] [employer] Dear Iraj Brown NP, Thank you for referring Ms. Pierre Reddy to [address] [place] [address] for evaluation. My notes for this consultation are attached. If you have questions, please do not hesitate to call me. I look forward to following your patient along with you. Sincerely, Santos Hurtado MD

## 2021-04-14 ENCOUNTER — TELEPHONE (OUTPATIENT)
Dept: ENDOCRINOLOGY | Age: 46
End: 2021-04-14

## 2021-04-14 DIAGNOSIS — E11.65 TYPE 2 DIABETES MELLITUS WITH HYPERGLYCEMIA, WITHOUT LONG-TERM CURRENT USE OF INSULIN (HCC): Primary | ICD-10-CM

## 2021-04-14 RX ORDER — EXENATIDE 2 MG/.85ML
2 INJECTION, SUSPENSION, EXTENDED RELEASE SUBCUTANEOUS
Qty: 4 SYRINGE | Refills: 3 | Status: SHIPPED | OUTPATIENT
Start: 2021-04-14 | End: 2021-04-23 | Stop reason: ALTCHOICE

## 2021-04-14 NOTE — TELEPHONE ENCOUNTER
Patient called in stated that the Bydureon that you prescribed has been discontinued and if you could prescribe something else since that medication has been discontinued

## 2021-04-16 ENCOUNTER — TELEPHONE (OUTPATIENT)
Dept: ENDOCRINOLOGY | Age: 46
End: 2021-04-16

## 2021-04-16 NOTE — TELEPHONE ENCOUNTER
Patient needs a new medication as the bydureon is being discontinued per her [facility] pharmacy.

## 2021-04-16 NOTE — TELEPHONE ENCOUNTER
This was switched to [facility] on 4-. Did they not receive it?

## 2021-04-16 NOTE — TELEPHONE ENCOUNTER
Please inform patient that we will be doing prior authorization. She needs to give us a few days because it is already Friday.

## 2021-04-23 ENCOUNTER — TELEPHONE (OUTPATIENT)
Dept: ENDOCRINOLOGY | Age: 46
End: 2021-04-23

## 2021-04-23 DIAGNOSIS — E11.65 TYPE 2 DIABETES MELLITUS WITH HYPERGLYCEMIA, WITHOUT LONG-TERM CURRENT USE OF INSULIN (HCC): Primary | ICD-10-CM

## 2021-04-23 NOTE — TELEPHONE ENCOUNTER
Patient called in stated that her insurance denied her for the [facility], but they will cover Bygatta if you prescribe it for her

## 2021-04-23 NOTE — TELEPHONE ENCOUNTER
Zanedee is twice a day injection. If she is okay with this, I will send prescription. Otherwise we were planning to work on prior authorization for iFit.

## 2021-04-23 NOTE — TELEPHONE ENCOUNTER
It appears that insurance will not approve Bydureon Bcise until patient will try Byetta. Some sending prescription to the pharmacy. Please inform patient.

## 2021-06-03 ENCOUNTER — TELEPHONE (OUTPATIENT)
Dept: ENDOCRINOLOGY | Age: 46
End: 2021-06-03

## 2021-06-03 DIAGNOSIS — E11.65 TYPE 2 DIABETES MELLITUS WITH HYPERGLYCEMIA, WITHOUT LONG-TERM CURRENT USE OF INSULIN (HCC): Primary | ICD-10-CM

## 2021-06-03 RX ORDER — PEN NEEDLE, DIABETIC 30 GX3/16"
NEEDLE, DISPOSABLE MISCELLANEOUS
Qty: 1 PACKAGE | Refills: 3 | Status: SHIPPED | OUTPATIENT
Start: 2021-06-03 | End: 2021-06-29

## 2021-06-03 NOTE — TELEPHONE ENCOUNTER
Patient called in stated that she has ran out of her pen needles and would like for you to send in a refill prescription for them

## 2021-06-14 LAB
ACTH PLAS-MCNC: <1.5 PG/ML (ref 7.2–63.3)
CORTIS AM PEAK SERPL-MCNC: 0.8 UG/DL (ref 6.2–19.4)
DEXAMETHASONE SERPL-MCNC: 714 NG/DL

## 2021-06-29 ENCOUNTER — OFFICE VISIT (OUTPATIENT)
Dept: PRIMARY CARE CLINIC | Age: 46
End: 2021-06-29
Payer: MEDICAID

## 2021-06-29 VITALS
SYSTOLIC BLOOD PRESSURE: 134 MMHG | DIASTOLIC BLOOD PRESSURE: 74 MMHG | BODY MASS INDEX: 41.59 KG/M2 | HEART RATE: 69 BPM | TEMPERATURE: 97.8 F | WEIGHT: 265 LBS | RESPIRATION RATE: 16 BRPM | HEIGHT: 67 IN | OXYGEN SATURATION: 91 %

## 2021-06-29 DIAGNOSIS — E11.65 TYPE 2 DIABETES MELLITUS WITH HYPERGLYCEMIA, WITHOUT LONG-TERM CURRENT USE OF INSULIN (HCC): Chronic | ICD-10-CM

## 2021-06-29 DIAGNOSIS — M54.50 BILATERAL LOW BACK PAIN, UNSPECIFIED CHRONICITY, UNSPECIFIED WHETHER SCIATICA PRESENT: Chronic | ICD-10-CM

## 2021-06-29 DIAGNOSIS — F41.8 MIXED ANXIETY AND DEPRESSIVE DISORDER: Chronic | ICD-10-CM

## 2021-06-29 DIAGNOSIS — I10 ESSENTIAL HYPERTENSION: Primary | Chronic | ICD-10-CM

## 2021-06-29 DIAGNOSIS — M79.605 PAIN IN BOTH LOWER EXTREMITIES: Chronic | ICD-10-CM

## 2021-06-29 DIAGNOSIS — M79.604 PAIN IN BOTH LOWER EXTREMITIES: Chronic | ICD-10-CM

## 2021-06-29 PROCEDURE — 99214 OFFICE O/P EST MOD 30 MIN: CPT | Performed by: NURSE PRACTITIONER

## 2021-06-29 RX ORDER — LOSARTAN POTASSIUM 25 MG/1
25 TABLET ORAL DAILY
Qty: 90 TABLET | Refills: 1 | Status: SHIPPED | OUTPATIENT
Start: 2021-06-29 | End: 2022-02-07

## 2021-06-29 RX ORDER — DULOXETIN HYDROCHLORIDE 20 MG/1
20 CAPSULE, DELAYED RELEASE ORAL DAILY
Qty: 30 CAPSULE | Refills: 2 | Status: SHIPPED | OUTPATIENT
Start: 2021-06-29 | End: 2021-11-02 | Stop reason: SDUPTHER

## 2021-06-29 NOTE — PROGRESS NOTES
1. Have you been to the ER, urgent care clinic since your last visit? Hospitalized since your last visit? No    2. Have you seen or consulted any other health care providers outside of the [address] [place] [address] since your last visit? Include any pap smears or colon screening.  No  Visit Vitals  /74 (BP 1 Location: Right arm, BP Patient Position: Sitting)   Pulse 69   Temp 97.8 °F (36.6 °C) (Oral)   Resp 16   Ht 5' 7\" (1.702 m)   Wt 265 lb (120.2 kg)   SpO2 91%   BMI 41.50 kg/m²     Chief Complaint   Patient presents with    Hypertension

## 2021-06-29 NOTE — PATIENT INSTRUCTIONS
High Blood Pressure: Care Instructions  Overview     It's normal for blood pressure to go up and down throughout the day. But if it stays up, you have high blood pressure. Another name for high blood pressure is hypertension. Despite what a lot of people think, high blood pressure usually doesn't cause headaches or make you feel dizzy or lightheaded. It usually has no symptoms. But it does increase your risk of stroke, heart attack, and other problems. You and your doctor will talk about your risks of these problems based on your blood pressure. Your doctor will give you a goal for your blood pressure. Your goal will be based on your health and your age. Lifestyle changes, such as eating healthy and being active, are always important to help lower blood pressure. You might also take medicine to reach your blood pressure goal.  Follow-up care is a key part of your treatment and safety. Be sure to make and go to all appointments, and call your doctor if you are having problems. It's also a good idea to know your test results and keep a list of the medicines you take. How can you care for yourself at home? Medical treatment  · If you stop taking your medicine, your blood pressure will go back up. You may take one or more types of medicine to lower your blood pressure. Be safe with medicines. Take your medicine exactly as prescribed. Call your doctor if you think you are having a problem with your medicine. · Talk to your doctor before you start taking aspirin every day. Aspirin can help certain people lower their risk of a heart attack or stroke. But taking aspirin isn't right for everyone, because it can cause serious bleeding. · See your doctor regularly. You may need to see the doctor more often at first or until your blood pressure comes down. · If you are taking blood pressure medicine, talk to your doctor before you take decongestants or anti-inflammatory medicine, such as ibuprofen.  Some of these medicines can raise blood pressure. · Learn how to check your blood pressure at home. Lifestyle changes  · Stay at a healthy weight. This is especially important if you put on weight around the waist. Losing even 10 pounds can help you lower your blood pressure. · If your doctor recommends it, get more exercise. Walking is a good choice. Bit by bit, increase the amount you walk every day. Try for at least 30 minutes on most days of the week. You also may want to swim, bike, or do other activities. · Avoid or limit alcohol. Talk to your doctor about whether you can drink any alcohol. · Try to limit how much sodium you eat to less than 2,300 milligrams (mg) a day. Your doctor may ask you to try to eat less than 1,500 mg a day. · Eat plenty of fruits (such as bananas and oranges), vegetables, legumes, whole grains, and low-fat dairy products. · Lower the amount of saturated fat in your diet. Saturated fat is found in animal products such as milk, cheese, and meat. Limiting these foods may help you lose weight and also lower your risk for heart disease. · Do not smoke. Smoking increases your risk for heart attack and stroke. If you need help quitting, talk to your doctor about stop-smoking programs and medicines. These can increase your chances of quitting for good. When should you call for help? Call  911 anytime you think you may need emergency care. This may mean having symptoms that suggest that your blood pressure is causing a serious heart or blood vessel problem. Your blood pressure may be over 180/120. For example, call 911 if:    · You have symptoms of a heart attack. These may include:  ? Chest pain or pressure, or a strange feeling in the chest.  ? Sweating. ? Shortness of breath. ? Nausea or vomiting. ? Pain, pressure, or a strange feeling in the back, neck, jaw, or upper belly or in one or both shoulders or arms. ? Lightheadedness or sudden weakness.   ? A fast or irregular heartbeat.     · You have symptoms of a stroke. These may include:  ? Sudden numbness, tingling, weakness, or loss of movement in your face, arm, or leg, especially on only one side of your body. ? Sudden vision changes. ? Sudden trouble speaking. ? Sudden confusion or trouble understanding simple statements. ? Sudden problems with walking or balance. ? A sudden, severe headache that is different from past headaches.     · You have severe back or belly pain. Do not wait until your blood pressure comes down on its own. Get help right away. Call your doctor now or seek immediate care if:    · Your blood pressure is much higher than normal (such as 180/120 or higher), but you don't have symptoms.     · You think high blood pressure is causing symptoms, such as:  ? Severe headache.  ? Blurry vision. Watch closely for changes in your health, and be sure to contact your doctor if:    · Your blood pressure measures higher than your doctor recommends at least 2 times. That means the top number is higher or the bottom number is higher, or both.     · You think you may be having side effects from your blood pressure medicine. Where can you learn more? Go to http://www.gray.com/  Enter [id number] in the search box to learn more about \"High Blood Pressure: Care Instructions. \"  Current as of: August 31, 2020               Content Version: 12.8  © 2006-2021 [employer]. Care instructions adapted under license by [employer] (which disclaims liability or warranty for this information). If you have questions about a medical condition or this instruction, always ask your healthcare professional. [place] [address] any warranty or liability for your use of this information.

## 2021-06-29 NOTE — PROGRESS NOTES
HISTORY OF PRESENT ILLNESS  Winsome Flannery is a 55 y.o. female presents for   Chief Complaint   Patient presents with    Hypertension   several Medical visits with increased BP hydration to 160s over 100 today she presents and is here with blood pressures in the 130s over 70s patient is a diabetic and is not on a ACE or ARB    Patient also complains of depression and or anxiety due to 1 mother dying 3 months ago to brother dying 2 months ago 1. taking in nephew was 21 and autistic and mental retardation for daughter is leaving for college    Patient complains of lower leg pains low back pain all associated with possible diabetes and or neuropathy and also having trouble with balance is scheduled to go see the neuropathy center in [place] next month    Vitals:    06/29/21 1526   BP: 134/74   BP 1 Location: Right arm   BP Patient Position: Sitting   Pulse: 69   Temp: 97.8 °F (36.6 °C)   TempSrc: Oral   Resp: 16   Height: 5' 7\" (1.702 m)   Weight: 265 lb (120.2 kg)   SpO2: 91%      Patient Active Problem List   Diagnosis Code    Moderate asthma J45.909    Controlled type 2 diabetes mellitus without complication, without long-term current use of insulin ([facility]) E11.9    Obesity E66.9    Obesity, morbid ([address] E66.01    Elevated BP without diagnosis of hypertension R03.0    Mixed hyperlipidemia E78.2    Type 2 diabetes mellitus with hyperglycemia, without long-term current use of insulin ([address] E11.65    Essential hypertension I10     Patient Active Problem List    Diagnosis Date Noted    Essential hypertension 06/29/2021    Type 2 diabetes mellitus with hyperglycemia, without long-term current use of insulin ([address] 04/08/2021    Elevated BP without diagnosis of hypertension 12/18/2020    Mixed hyperlipidemia 12/18/2020    Obesity, morbid ([address] 10/26/2020    Obesity 10/21/2020    Moderate asthma 07/28/2020    Controlled type 2 diabetes mellitus without complication, without long-term current use of insulin ([facility] 2020     Current Outpatient Medications   Medication Sig Dispense Refill    losartan (COZAAR) 25 mg tablet Take 1 Tablet by mouth daily. 90 Tablet 1    DULoxetine (CYMBALTA) 20 mg capsule Take 1 Capsule by mouth daily. 30 Capsule 2    empagliflozin (Jardiance) 25 mg tablet Take 1 Tab by mouth daily. 90 Tab 0    Pulmicort Flexhaler 180 mcg/actuation aepb inhaler TAKE 1 PUFF BY MOUTH TWICE A DAY      loratadine (CLARITIN) 10 mg tablet       Wixela Inhub 500-50 mcg/dose diskus inhaler Take 500 Puffs by mouth two (2) times a day.  Spiriva with HandiHaler 18 mcg inhalation capsule Take 18 mcg by mouth once.  albuterol sulfate (PROVENTIL;VENTOLIN) 2.5 mg/0.5 mL nebu nebulizer solution 0.5 mL by Nebulization route every four (4) hours as needed for Wheezing. 30 mL 1     Allergies   Allergen Reactions    Latex, Natural Rubber Swelling    Codeine Anxiety    Penicillins Hives     Past Medical History:   Diagnosis Date    Allergy     Asthma     Diabetes ([facility]      Past Surgical History:   Procedure Laterality Date    HX  SECTION      X 2    HX GYN      novasure, csection scar revision     Family History   Problem Relation Age of Onset    Cancer Mother         cervical ca    Hypertension Mother     Diabetes Mother     Thyroid Disease Mother     Cancer Father         neck ca    Hypertension Father     Cancer Other         aunt with colon ca    Heart Attack Neg Hx     Stroke Neg Hx      Social History     Tobacco Use    Smoking status: Never Smoker    Smokeless tobacco: Never Used   Substance Use Topics    Alcohol use: No           Review of Systems   Constitutional: Positive for malaise/fatigue. Negative for fever and weight loss. HENT: Negative for sore throat and tinnitus. Eyes: Negative for blurred vision and double vision. Respiratory: Negative for shortness of breath. Cardiovascular: Negative for chest pain, palpitations and leg swelling.    Gastrointestinal: Negative for constipation and diarrhea. Musculoskeletal: Positive for back pain, joint pain and myalgias. Skin: Negative for itching and rash. Neurological: Positive for sensory change. Negative for dizziness. Balance issues   Endo/Heme/Allergies: Does not bruise/bleed easily. Psychiatric/Behavioral: Positive for depression and memory loss. The patient is nervous/anxious. The patient does not have insomnia. Physical Exam  Vitals (Central obesity) reviewed. Constitutional:       Appearance: Normal appearance. She is obese. HENT:      Head: Normocephalic. Nose: Nose normal.      Mouth/Throat:      Mouth: Mucous membranes are moist.   Eyes:      Extraocular Movements: Extraocular movements intact. Pupils: Pupils are equal, round, and reactive to light. Cardiovascular:      Rate and Rhythm: Normal rate and regular rhythm. Pulses: Normal pulses. Heart sounds: Normal heart sounds. Pulmonary:      Effort: Pulmonary effort is normal.      Breath sounds: Normal breath sounds. Musculoskeletal:         General: Normal range of motion. Cervical back: Normal range of motion and neck supple. Skin:     General: Skin is warm and dry. Neurological:      General: No focal deficit present. Mental Status: She is alert and oriented to person, place, and time. Psychiatric:         Attention and Perception: Attention and perception normal.         Mood and Affect: Affect normal.         Speech: Speech normal.         Behavior: Behavior normal. Behavior is cooperative. Thought Content: Thought content normal.         Cognition and Memory: Cognition and memory normal.         Judgment: Judgment normal.           ASSESSMENT and PLAN  Diagnoses and all orders for this visit:    1. Essential hypertension  Comments:  several reading s high. will treat today despite good BP range with ARB RE: DM  Orders:  -     losartan (COZAAR) 25 mg tablet;  Take 1 Tablet by mouth daily.    2. Mixed anxiety and depressive disorder  Comments:  stress from family  Orders:  -     DULoxetine (CYMBALTA) 20 mg capsule; Take 1 Capsule by mouth daily. 3. Pain in both lower extremities  Comments:  start cymbalta  Orders:  -     DULoxetine (CYMBALTA) 20 mg capsule; Take 1 Capsule by mouth daily. 4. Bilateral low back pain, unspecified chronicity, unspecified whether sciatica present  Comments:  cymbalta   Orders:  -     DULoxetine (CYMBALTA) 20 mg capsule; Take 1 Capsule by mouth daily. 5. Type 2 diabetes mellitus with hyperglycemia, without long-term current use of insulin ([facility])  Comments:  start losartan  Orders:  -     losartan (COZAAR) 25 mg tablet; Take 1 Tablet by mouth daily.             Thu Ibrahim NP

## 2021-09-22 ENCOUNTER — VIRTUAL VISIT (OUTPATIENT)
Dept: PRIMARY CARE CLINIC | Age: 46
End: 2021-09-22
Payer: MEDICAID

## 2021-09-22 DIAGNOSIS — J01.00 ACUTE NON-RECURRENT MAXILLARY SINUSITIS: Primary | ICD-10-CM

## 2021-09-22 PROCEDURE — 99213 OFFICE O/P EST LOW 20 MIN: CPT | Performed by: FAMILY MEDICINE

## 2021-09-22 RX ORDER — BENZONATATE 100 MG/1
CAPSULE ORAL
Qty: 40 CAPSULE | Refills: 0 | Status: SHIPPED | OUTPATIENT
Start: 2021-09-22

## 2021-09-22 RX ORDER — DOXYCYCLINE 100 MG/1
100 CAPSULE ORAL 2 TIMES DAILY
Qty: 20 CAPSULE | Refills: 0 | Status: SHIPPED | OUTPATIENT
Start: 2021-09-22 | End: 2021-10-02

## 2021-09-22 NOTE — PROGRESS NOTES
There were no vitals taken for this visit. Chief Complaint   Patient presents with    Cold Symptoms     One week, cough, sinus pressure, congestion with productive cough. 09/16/2021 (-) Covid test and Strep (-) @ [facility]      1. Have you been to the ER, urgent care clinic since your last visit? Hospitalized since your last visit? No    2. Have you seen or consulted any other health care providers outside of the [address] [place] [address] since your last visit? Include any pap smears or colon screening.  No

## 2021-09-22 NOTE — PROGRESS NOTES
HPI     Chief Complaint   Patient presents with    Cold Symptoms     One week, cough, sinus pressure, congestion with productive cough. 09/16/2021 (-) Covid test and Strep (-) @ [facility]         HPI:  Verneta Goodpasture is a 55 y.o. female who has one week of sinus congestion and pressure along with cough. She went to  and COVID and Strep negative. She has tried Mucinex DM and sudafed along with robitussin and honey which only helps minimal.  Takes claritin daily for allergies. Review of Systems   Constitutional: Negative for chills and fever. HENT: Positive for congestion and sinus pain. Respiratory: Positive for cough and sputum production. Negative for shortness of breath. Cardiovascular: Negative for chest pain and palpitations. Reviewed PmHx, FmHx, SocHx as well as meds and allergies, updated and dated in the chart. Objective     Vital Signs: (As obtained by patient/caregiver at home)  There were no vitals taken for this visit.    Patient-Reported Vitals 7/28/2020   Patient-Reported Weight 276   Patient-Reported Height 5'7       [INSTRUCTIONS:  \"[x]\" Indicates a positive item  \"[]\" Indicates a negative item  -- DELETE ALL ITEMS NOT EXAMINED]    Constitutional: [x] Appears well-developed and well-nourished [x] No apparent distress      [] Abnormal -     Mental status: [x] Alert and awake  [x] Oriented to person/place/time [x] Able to follow commands    [] Abnormal -     Eyes:   EOM    [x]  Normal    [] Abnormal -   Sclera  [x]  Normal    [] Abnormal -          Discharge [x]  None visible   [] Abnormal -     HENT: [x] Normocephalic, atraumatic  [] Abnormal -   [x] Mouth/Throat: Mucous membranes are moist    External Ears [x] Normal  [] Abnormal -    Neck: [x] No visualized mass [] Abnormal -     Pulmonary/Chest: [x] Respiratory effort normal   [x] No visualized signs of difficulty breathing or respiratory distress        [] Abnormal -      Musculoskeletal:   [] Normal gait with no signs of ataxia         [x] Normal range of motion of neck        [] Abnormal -     Neurological:        [x] No Facial Asymmetry (Cranial nerve 7 motor function) (limited exam due to video visit)          [x] No gaze palsy        [] Abnormal -          Skin:        [x] No significant exanthematous lesions or discoloration noted on facial skin         [] Abnormal -            Psychiatric:       [x] Normal Affect [] Abnormal -        [x] No Hallucinations    Other pertinent observable physical exam findings:- Talks in full sentences. Assessment and Plan     Diagnoses and all orders for this visit:    1. Acute non-recurrent maxillary sinusitis: COVID negative and vaccinated. Given duration, concerns for ABRS. Supportive care and warning signs discussed. Other orders  -     doxycycline (VIBRAMYCIN) 100 mg capsule; Take 1 Capsule by mouth two (2) times a day for 10 days. -     benzonatate (TESSALON) 100 mg capsule; Take 1-2 capsules TID prn cough. Mathieu Rolon is being evaluated by a Virtual Visit (video visit) encounter to address concerns as mentioned above. A caregiver was present when appropriate. Due to this being a TeleHealth encounter (During [facility] public health emergency), evaluation of the following organ systems was limited: Vitals/Constitutional/EENT/Resp/CV/GI//MS/Neuro/Skin/Heme-Lymph-Imm. Pursuant to the emergency declaration under the [address] [place] [address], [address] [place] [address] authority and the [employer] Act, this Virtual Visit was conducted with patient's (and/or legal guardian's) consent, to reduce the patient's risk of exposure to COVID-19 and provide necessary medical care. The patient (and/or legal guardian) has also been advised to contact this office for worsening conditions or problems, and seek emergency medical treatment and/or call 911 if deemed necessary.     Patient identification was verified at the start of the visit: YES    Services were provided through a video synchronous discussion virtually to substitute for in-person clinic visit. Patient was located at home and provider was located in office or at home.        Romayne Marshal, MD  [address] [place] [address]

## 2021-09-23 ENCOUNTER — TELEPHONE (OUTPATIENT)
Dept: PRIMARY CARE CLINIC | Age: 46
End: 2021-09-23

## 2021-10-25 DIAGNOSIS — E11.65 TYPE 2 DIABETES MELLITUS WITH HYPERGLYCEMIA, WITHOUT LONG-TERM CURRENT USE OF INSULIN (HCC): ICD-10-CM

## 2021-10-25 RX ORDER — EXENATIDE 250 UG/ML
5 INJECTION SUBCUTANEOUS 2 TIMES DAILY WITH MEALS
Qty: 3.6 ML | Refills: 3 | Status: SHIPPED | OUTPATIENT
Start: 2021-10-25 | End: 2022-01-23

## 2021-11-02 DIAGNOSIS — M54.50 BILATERAL LOW BACK PAIN, UNSPECIFIED CHRONICITY, UNSPECIFIED WHETHER SCIATICA PRESENT: Chronic | ICD-10-CM

## 2021-11-02 DIAGNOSIS — M79.604 PAIN IN BOTH LOWER EXTREMITIES: Chronic | ICD-10-CM

## 2021-11-02 DIAGNOSIS — M79.605 PAIN IN BOTH LOWER EXTREMITIES: Chronic | ICD-10-CM

## 2021-11-02 DIAGNOSIS — F41.8 MIXED ANXIETY AND DEPRESSIVE DISORDER: Chronic | ICD-10-CM

## 2021-11-03 RX ORDER — DULOXETIN HYDROCHLORIDE 20 MG/1
20 CAPSULE, DELAYED RELEASE ORAL DAILY
Qty: 30 CAPSULE | Refills: 2 | Status: SHIPPED | OUTPATIENT
Start: 2021-11-03 | End: 2022-03-11

## 2021-12-20 ENCOUNTER — OFFICE VISIT (OUTPATIENT)
Dept: PRIMARY CARE CLINIC | Age: 46
End: 2021-12-20
Payer: MEDICAID

## 2021-12-20 VITALS
WEIGHT: 254 LBS | RESPIRATION RATE: 16 BRPM | HEART RATE: 68 BPM | OXYGEN SATURATION: 95 % | DIASTOLIC BLOOD PRESSURE: 88 MMHG | SYSTOLIC BLOOD PRESSURE: 139 MMHG | BODY MASS INDEX: 39.87 KG/M2 | HEIGHT: 67 IN

## 2021-12-20 DIAGNOSIS — Z11.59 NEED FOR HEPATITIS C SCREENING TEST: ICD-10-CM

## 2021-12-20 DIAGNOSIS — Z12.11 SCREEN FOR COLON CANCER: ICD-10-CM

## 2021-12-20 DIAGNOSIS — Z23 ENCOUNTER FOR IMMUNIZATION: Primary | ICD-10-CM

## 2021-12-20 DIAGNOSIS — Z13.6 ENCOUNTER FOR LIPID SCREENING FOR CARDIOVASCULAR DISEASE: ICD-10-CM

## 2021-12-20 DIAGNOSIS — E11.9 CONTROLLED TYPE 2 DIABETES MELLITUS WITHOUT COMPLICATION, WITHOUT LONG-TERM CURRENT USE OF INSULIN (HCC): ICD-10-CM

## 2021-12-20 DIAGNOSIS — Z13.220 ENCOUNTER FOR LIPID SCREENING FOR CARDIOVASCULAR DISEASE: ICD-10-CM

## 2021-12-20 DIAGNOSIS — M54.40 ACUTE LEFT-SIDED LOW BACK PAIN WITH SCIATICA, SCIATICA LATERALITY UNSPECIFIED: ICD-10-CM

## 2021-12-20 DIAGNOSIS — Z12.4 ENCOUNTER FOR SCREENING FOR CERVICAL CANCER: ICD-10-CM

## 2021-12-20 PROBLEM — E66.9 OBESITY: Status: RESOLVED | Noted: 2020-10-21 | Resolved: 2021-12-20

## 2021-12-20 PROCEDURE — 99214 OFFICE O/P EST MOD 30 MIN: CPT

## 2021-12-20 PROCEDURE — 90715 TDAP VACCINE 7 YRS/> IM: CPT

## 2021-12-20 PROCEDURE — 90686 IIV4 VACC NO PRSV 0.5 ML IM: CPT

## 2021-12-20 RX ORDER — CYCLOBENZAPRINE HCL 10 MG
20 TABLET ORAL
Qty: 40 TABLET | Refills: 1 | Status: SHIPPED | OUTPATIENT
Start: 2021-12-20 | End: 2022-07-19

## 2021-12-20 NOTE — PATIENT INSTRUCTIONS
Learning About Obesity  What is obesity? Obesity means having an unhealthy amount of body fat. This puts your health in danger. It can lead to other health problems, such as type 2 diabetes and high blood pressure. How do you know if your weight is in the obesity range? To know if your weight is in the obesity range, your doctor looks at your body mass index (BMI) and waist size. BMI is a number that is calculated from your weight and your height. To figure out your BMI for yourself, you can use an online tool, such as http://www.[employer]X.com/ on the [employer] of [employer]. If your BMI is 30.0 or higher, it falls within the obesity range. Keep in mind that BMI and waist size are only guides. They are not tools to determine your ideal body weight. What causes obesity? When you take in more calories than you burn off, you gain weight. How you eat, how active you are, and other things affect how your body uses calories and whether you gain weight. If you have family members who have too much body fat, you may have inherited a tendency to gain weight. And your family also helps form your eating and lifestyle habits, which can lead to obesity. Also, our busy lives make it harder to plan and cook healthy meals. For many of us, it's easier to reach for prepared foods, go out to eat, or go to the drive-through. But these foods are often high in saturated fat and calories. Portions are often too large. What can you do to reach a healthy weight? Focus on health, not diets. Diets are hard to stay on and don't work in the long run. It is very hard to stay with a diet that includes lots of big changes in your eating habits. Instead of a diet, focus on lifestyle changes that will improve your health and achieve the right balance of energy and calories. To lose weight, you need to burn more calories than you take in.  You can do it by eating healthy foods in reasonable amounts and becoming more active, even a little bit every day. Making small changes over time can add up to a lot. Make a plan for change. Many people have found that naming their reasons for change and staying focused on their plan can make a big difference. Work with your doctor to create a plan that is right for you. · Ask yourself: Relda Lips are my personal, most powerful reasons for wanting this change? What will my life look like when I've made the change? \"  · Set your long-term goal. Make it specific, such as \"I will lose x pounds. \"  · Break your long-term goal into smaller, short-term goals. Make these small steps specific and within your reach, things you know you can do. These steps are what keep you going from day to day. Talk with your doctor about other weight-loss options. If you have a BMI in a certain range and have not been able to lose weight with diet and exercise, medicine or surgery may be an option for you. Before your doctor will prescribe medicines or surgery, he or she will probably want you to be more active and follow your healthy eating plan for a period of time. These habits are key lifelong changes for managing your weight, with or without other medical treatment. And these changes can help you avoid weight-related health problems. How can you stay on your plan for change? Be ready. Choose to start during a time when there are few events like holidays, social events, and high-stress periods. These events might trigger slip-ups. Decide on your first few steps. Most people have more success when they make small changes, one step at a time. For example, you might switch a daily candy bar to a piece of fruit, walk 10 minutes more, or add more vegetables to a meal.  Line up your support people. Make sure you're not going to be alone as you make this change. Connect with people who understand how important it is to you.  Ask family members and friends for help in keeping with your plan. And think about who could make it harder for you, and how to handle them. Try tracking. People who keep track of what they eat, feel, and do are better at losing weight. Try writing down things like:  · What and how much you eat. · How you feel before and after each meal.  · Details about each meal (like eating out or at home, eating alone, or with friends or family). · What you do to be active. Look and plan. As you track, look for patterns that you may want to change. Take note of:  · When you eat and whether you skip meals. · How often you eat out. · How many fruits and vegetables you eat. · When you eat beyond feeling full. · When and why you eat for reasons other than being hungry. When you stray from your plan, don't get upset. Figure out what made you slip up and how you can fix it. Can you take medicines or have surgery to lose weight? If you have a BMI in a certain range and have not been able to lose weight with diet and exercise, medicine or surgery may be an option for you. If you have a BMI of at least 30.0 (or a BMI of at least 27.0 and another health problem related to your weight), ask your doctor about weight-loss medicines. They work by making you feel less hungry, making you feel full more quickly, or changing how you digest fat. Medicines are used along with diet changes and more physical activity to help you make lasting changes. If you have a BMI of 40.0 or more (or a BMI of 35.0 or more and another health problem related to your weight), your doctor may talk with you about surgery. Weight-loss surgery has risks, and you will need to work with your doctor to compare the risk of having obesity with the risks of surgery. With any option you choose, you will still need to eat a healthy diet and get regular exercise. Follow-up care is a key part of your treatment and safety. Be sure to make and go to all appointments, and call your doctor if you are having problems.  It's also a good idea to know your test results and keep a list of the medicines you take. Where can you learn more? Go to http://www.gray.com/  Enter N111 in the search box to learn more about \"Learning About Obesity. \"  Current as of: March 17, 2021               Content Version: 13.0  © 6740-9681 Healthwise, Inc[employer]. Care instructions adapted under license by [employer] (which disclaims liability or warranty for this information). If you have questions about a medical condition or this instruction, always ask your healthcare professional. [place] [address] any warranty or liability for your use of this information.

## 2021-12-20 NOTE — PROGRESS NOTES
Piotr Hampton is a 55 y.o. female     Chief Complaint   Patient presents with    Back Pain     radiating to left leg, can not stand for long periods of time        Health Maintenance Due   Topic Date Due    Hepatitis C Screening  Never done    Foot Exam Q1  Never done    MICROALBUMIN Q1  Never done    DTaP/Tdap/Td series (2 - Td or Tdap) 03/28/2000    Cervical cancer screen  Never done    Colorectal Cancer Screening Combo  Never done    Lipid Screen  07/29/2021    Flu Vaccine (1) Never done    COVID-19 Vaccine (3 - Booster for Pfizer series) 11/19/2021    A1C test (Diabetic or Prediabetic)  12/18/2021       Visit Vitals  /88 (BP 1 Location: Left upper arm, BP Patient Position: Sitting)   Pulse 68   Resp 16   Ht 5' 7\" (1.702 m)   Wt 254 lb (115.2 kg)   SpO2 95%   BMI 39.78 kg/m²       3 most recent PHQ Screens 12/20/2021   PHQ Not Done -   Little interest or pleasure in doing things Not at all   Feeling down, depressed, irritable, or hopeless Not at all   Total Score PHQ 2 0   Trouble falling or staying asleep, or sleeping too much -   Feeling tired or having little energy -   Poor appetite, weight loss, or overeating -   Feeling bad about yourself - or that you are a failure or have let yourself or your family down -   Trouble concentrating on things such as school, work, reading, or watching TV -   Moving or speaking so slowly that other people could have noticed; or the opposite being so fidgety that others notice -   Thoughts of being better off dead, or hurting yourself in some way -   PHQ 9 Score -   How difficult have these problems made it for you to do your work, take care of your home and get along with others -       No flowsheet data found. Abuse Screening Questionnaire 12/20/2021   Do you ever feel afraid of your partner? N   Are you in a relationship with someone who physically or mentally threatens you? N   Is it safe for you to go home?  Y         1. Have you been to the ER, urgent care clinic since your last visit? Hospitalized since your last visit? yes urgent care a month ago for cough     2. Have you seen or consulted any other health care providers outside of the [address] [place] [address] since your last visit? Include any pap smears or colon screening. no

## 2021-12-20 NOTE — PROGRESS NOTES
HPI     Chief Complaint   Patient presents with    Back Pain     radiating to left leg, can not stand for long periods of time         HPI:  Willis Lacey is a 55 y.o. female who presents to the office today to establish care with a new provider. Patient complaining of left-sided lumbar sacral back pain, left leg pain. Patient has a longstanding history of the same, previously treated with Cymbalta without relief. Previously treated with physical therapy focused on primarily the knee. Patient states she has what she describes as debilitating pain in the left lumbar back area described as a tightness, denies trauma, or existing diagnosis for the same. Patient states the pain is unrelieved by NSAIDs or positioning. Patient states that the pain is to the point where she cannot walk in the grocery store, or perform ADLs at home. Controlled type 2 diabetes mellitus without complications, without long-term use of insulin: Compliant with medications. +No hypoglycemic events. Fasting blood glucose <130   No complaints of foot pain or paresthesias. ? Acute back pain: Pain described as tightness. Exacerbated by bending. Not Relieved by rest.   No assoc sx like: fever, bowel/bladder incontinence, and neurologic deficits, saddle anesthesia. Denies any recent trauma or occupational injury. Obesity: Patient monitors caloric intake unable to exercise secondary to back pain and left leg pain. Current BMI is 39.78. Hypertension: Patient taking losartan. Does not report headaches blurred vision, dizziness, chest pain, shortness of breath, or palpitations. Patient follows a low-sodium diet. Decreased exercise secondary to back pain and leg pain.         Allergies   Allergen Reactions    Latex, Natural Rubber Swelling    Codeine Anxiety    Penicillins Hives       Current Outpatient Medications   Medication Sig    cyclobenzaprine (Flexeril) 10 mg tablet Take 2 Tablets by mouth nightly as needed for Muscle Spasm(s).  DULoxetine (CYMBALTA) 20 mg capsule Take 1 Capsule by mouth daily.  exenatide (Byetta) 5 mcg/dose (250 mcg/mL) 1.2 mL injection 0.02 mL by SubCUTAneous route two (2) times daily (with meals) for 90 days.  OneTouch Ultra Test strip TEST ONCE A DAY    benzonatate (TESSALON) 100 mg capsule Take 1-2 capsules TID prn cough.  Jardiance 25 mg tablet TAKE 1 TABLET BY MOUTH EVERY DAY    losartan (COZAAR) 25 mg tablet Take 1 Tablet by mouth daily.  Pulmicort Flexhaler 180 mcg/actuation aepb inhaler TAKE 1 PUFF BY MOUTH TWICE A DAY    loratadine (CLARITIN) 10 mg tablet     Wixela Inhub 500-50 mcg/dose diskus inhaler Take 500 Puffs by mouth two (2) times a day.  Spiriva with HandiHaler 18 mcg inhalation capsule Take 18 mcg by mouth once.  albuterol sulfate (PROVENTIL;VENTOLIN) 2.5 mg/0.5 mL nebu nebulizer solution 0.5 mL by Nebulization route every four (4) hours as needed for Wheezing. No current facility-administered medications for this visit. Last PDMP [facility] as Reviewed:  Review User Review Instant Review Result   Riverlogan Seanmaximiliano 12/20/2021  7:17 PM Reviewed PDMP [1]         Review of Systems   Constitutional: Negative for malaise/fatigue and weight loss. Eyes: Negative for blurred vision and double vision. Respiratory: Negative for cough and shortness of breath. Cardiovascular: Negative for chest pain, palpitations and leg swelling. Gastrointestinal: Negative for heartburn and nausea. Musculoskeletal: Positive for back pain (Lumbar back pain radiates to the left leg, described as a tightness without tingling or neuropathy). Negative for joint pain and myalgias. Skin: Negative for itching and rash. Neurological: Negative for dizziness, tingling, loss of consciousness, weakness and headaches. Endo/Heme/Allergies: Does not bruise/bleed easily. Psychiatric/Behavioral: Negative for depression. The patient is not nervous/anxious.     All other systems reviewed and are negative. Reviewed PmHx, FmHx, SocHx as well as meds and allergies, updated and dated in the chart. Objective     Visit Vitals  /88 (BP 1 Location: Left upper arm, BP Patient Position: Sitting)   Pulse 68   Resp 16   Ht 5' 7\" (1.702 m)   Wt 254 lb (115.2 kg)   SpO2 95%   BMI 39.78 kg/m²     Physical Exam  Constitutional:       General: She is not in acute distress. Appearance: Normal appearance. She is normal weight. HENT:      Head: Normocephalic and atraumatic. Neck:      Thyroid: No thyroid mass or thyromegaly. Cardiovascular:      Rate and Rhythm: Normal rate and regular rhythm. Heart sounds: No murmur heard. Pulmonary:      Effort: Pulmonary effort is normal. No respiratory distress. Breath sounds: Normal breath sounds. No wheezing. Musculoskeletal:        Arms:       Cervical back: Neck supple. No muscular tenderness. Right lower leg: No edema. Left lower leg: No edema. Legs:       Comments: Lower left back pain without radiation to the front. No tenderness noted. Tightness over location. Feet:      Right foot:      Protective Sensation: 10 sites tested. 10 sites sensed. Skin integrity: Callus and dry skin present. No ulcer, blister, skin breakdown or fissure. Toenail Condition: Right toenails are normal.      Left foot:      Protective Sensation: 10 sites tested. 10 sites sensed. Skin integrity: Dry skin present. No ulcer, blister, skin breakdown, callus or fissure. Toenail Condition: Left toenails are normal.   Lymphadenopathy:      Cervical: No cervical adenopathy. Skin:     General: Skin is warm and dry. Neurological:      Mental Status: She is alert and oriented to person, place, and time. Mental status is at baseline.    Psychiatric:         Attention and Perception: Attention and perception normal.         Mood and Affect: Mood and affect normal.         Speech: Speech normal.         Behavior: Behavior normal. Assessment and Plan     Diagnoses and all orders for this visit:    1. Encounter for immunization  Assessment & Plan:       Orders:  -     TETANUS, DIPHTHERIA TOXOIDS AND ACELLULAR PERTUSSIS VACCINE (TDAP), IN INDIVIDS. >=7, IM  -     INFLUENZA VIRUS VAC QUAD,SPLIT,PRESV FREE SYRINGE IM    2. Encounter for screening for cervical cancer    3. Controlled type 2 diabetes mellitus without complication, without long-term current use of insulin ([facility] [address]  Assessment & Plan:   well controlled, continue current medications pending work up below    Orders:  -     [address]; Future  -     CBC W/O DIFF; Future  -     HEMOGLOBIN A1C WITH EAG; Future  -     MICROALBUMIN, UR, RAND W/ MICROALB/CREAT RATIO; Future    4. Encounter for lipid screening for cardiovascular disease  -     LIPID PANEL; Future    5. Acute left-sided low back pain with sciatica, sciatica laterality unspecified  -     cyclobenzaprine (Flexeril) 10 mg tablet; Take 2 Tablets by mouth nightly as needed for Muscle Spasm(s). -     REFERRAL TO ORTHOPEDICS    6. Need for hepatitis C screening test  -     HEPATITIS C AB    7. Screen for colon cancer  -     REFERRAL TO GASTROENTEROLOGY      Medication Side Effects and Warnings were discussed with patient. Patient Labs were reviewed and or requested. Patient Past Records were reviewed and or requested. Follow-up and Dispositions    Return in about 2 months (around 2/20/2022), or if symptoms worsen or fail to improve. I have discussed the diagnosis with the patient and the intended plan as seen in the above orders. The patient has received an after-visit summary and questions were answered concerning future plans. I have discussed medication side effects and warnings with the patient as well. Marquis Grace, [address] [facility]  [address]

## 2021-12-21 LAB
ALBUMIN SERPL-MCNC: 3.8 G/DL (ref 3.8–4.8)
ALBUMIN/CREAT UR: 12 MG/G CREAT (ref 0–29)
ALBUMIN/GLOB SERPL: 1.1 {RATIO} (ref 1.2–2.2)
ALP SERPL-CCNC: 171 IU/L (ref 44–121)
ALT SERPL-CCNC: 39 IU/L (ref 0–32)
AST SERPL-CCNC: 36 IU/L (ref 0–40)
BILIRUB SERPL-MCNC: 0.5 MG/DL (ref 0–1.2)
BUN SERPL-MCNC: 7 MG/DL (ref 6–24)
BUN/CREAT SERPL: 12 (ref 9–23)
CALCIUM SERPL-MCNC: 9.2 MG/DL (ref 8.7–10.2)
CHLORIDE SERPL-SCNC: 99 MMOL/L (ref 96–106)
CHOLEST SERPL-MCNC: 213 MG/DL (ref 100–199)
CO2 SERPL-SCNC: 28 MMOL/L (ref 20–29)
CREAT SERPL-MCNC: 0.59 MG/DL (ref 0.57–1)
CREAT UR-MCNC: 87.9 MG/DL
ERYTHROCYTE [DISTWIDTH] IN BLOOD BY AUTOMATED COUNT: 14.7 % (ref 11.7–15.4)
EST. AVERAGE GLUCOSE BLD GHB EST-MCNC: 180 MG/DL
GLOBULIN SER CALC-MCNC: 3.6 G/DL (ref 1.5–4.5)
GLUCOSE SERPL-MCNC: 136 MG/DL (ref 65–99)
HBA1C MFR BLD: 7.9 % (ref 4.8–5.6)
HCT VFR BLD AUTO: 47.3 % (ref 34–46.6)
HCV AB S/CO SERPL IA: <0.1 S/CO RATIO (ref 0–0.9)
HDLC SERPL-MCNC: 56 MG/DL
HGB BLD-MCNC: 15.3 G/DL (ref 11.1–15.9)
LDLC SERPL CALC-MCNC: 129 MG/DL (ref 0–99)
MCH RBC QN AUTO: 28.1 PG (ref 26.6–33)
MCHC RBC AUTO-ENTMCNC: 32.3 G/DL (ref 31.5–35.7)
MCV RBC AUTO: 87 FL (ref 79–97)
MICROALBUMIN UR-MCNC: 10.4 UG/ML
PLATELET # BLD AUTO: 352 X10E3/UL (ref 150–450)
POTASSIUM SERPL-SCNC: 4.5 MMOL/L (ref 3.5–5.2)
PROT SERPL-MCNC: 7.4 G/DL (ref 6–8.5)
RBC # BLD AUTO: 5.44 X10E6/UL (ref 3.77–5.28)
SODIUM SERPL-SCNC: 142 MMOL/L (ref 134–144)
TRIGL SERPL-MCNC: 161 MG/DL (ref 0–149)
VLDLC SERPL CALC-MCNC: 28 MG/DL (ref 5–40)
WBC # BLD AUTO: 11 X10E3/UL (ref 3.4–10.8)

## 2022-02-07 ENCOUNTER — TELEPHONE (OUTPATIENT)
Dept: PRIMARY CARE CLINIC | Age: 47
End: 2022-02-07

## 2022-02-07 DIAGNOSIS — I10 ESSENTIAL HYPERTENSION: Primary | ICD-10-CM

## 2022-02-07 RX ORDER — LOSARTAN POTASSIUM 25 MG/1
25 TABLET ORAL DAILY
Qty: 90 TABLET | Refills: 1 | Status: SHIPPED | OUTPATIENT
Start: 2022-02-07 | End: 2022-08-23

## 2022-03-08 DIAGNOSIS — F41.8 MIXED ANXIETY AND DEPRESSIVE DISORDER: Chronic | ICD-10-CM

## 2022-03-08 DIAGNOSIS — M79.605 PAIN IN BOTH LOWER EXTREMITIES: Chronic | ICD-10-CM

## 2022-03-08 DIAGNOSIS — E11.65 TYPE 2 DIABETES MELLITUS WITH HYPERGLYCEMIA, WITHOUT LONG-TERM CURRENT USE OF INSULIN (HCC): ICD-10-CM

## 2022-03-08 DIAGNOSIS — M54.50 BILATERAL LOW BACK PAIN, UNSPECIFIED CHRONICITY, UNSPECIFIED WHETHER SCIATICA PRESENT: Chronic | ICD-10-CM

## 2022-03-08 DIAGNOSIS — M79.604 PAIN IN BOTH LOWER EXTREMITIES: Chronic | ICD-10-CM

## 2022-03-08 RX ORDER — EMPAGLIFLOZIN 25 MG/1
TABLET, FILM COATED ORAL
Qty: 90 TABLET | Refills: 1 | Status: SHIPPED | OUTPATIENT
Start: 2022-03-08 | End: 2022-04-19 | Stop reason: SDUPTHER

## 2022-03-11 RX ORDER — DULOXETIN HYDROCHLORIDE 20 MG/1
CAPSULE, DELAYED RELEASE ORAL
Qty: 30 CAPSULE | Refills: 2 | Status: SHIPPED | OUTPATIENT
Start: 2022-03-11

## 2022-03-18 PROBLEM — E11.65 TYPE 2 DIABETES MELLITUS WITH HYPERGLYCEMIA, WITHOUT LONG-TERM CURRENT USE OF INSULIN (HCC): Status: ACTIVE | Noted: 2021-04-08

## 2022-03-19 PROBLEM — E78.2 MIXED HYPERLIPIDEMIA: Status: ACTIVE | Noted: 2020-12-18

## 2022-03-19 PROBLEM — J45.909 MODERATE ASTHMA: Status: ACTIVE | Noted: 2020-07-28

## 2022-03-19 PROBLEM — E11.9 CONTROLLED TYPE 2 DIABETES MELLITUS WITHOUT COMPLICATION, WITHOUT LONG-TERM CURRENT USE OF INSULIN (HCC): Status: ACTIVE | Noted: 2020-07-28

## 2022-03-19 PROBLEM — I10 ESSENTIAL HYPERTENSION: Status: ACTIVE | Noted: 2021-06-29

## 2022-03-19 PROBLEM — Z23 ENCOUNTER FOR IMMUNIZATION: Status: ACTIVE | Noted: 2021-12-20

## 2022-03-20 PROBLEM — E66.01 OBESITY, MORBID (HCC): Status: ACTIVE | Noted: 2020-10-26

## 2022-03-20 PROBLEM — R03.0 ELEVATED BP WITHOUT DIAGNOSIS OF HYPERTENSION: Status: ACTIVE | Noted: 2020-12-18

## 2022-03-29 DIAGNOSIS — J45.909 MODERATE ASTHMA, UNSPECIFIED WHETHER COMPLICATED, UNSPECIFIED WHETHER PERSISTENT: ICD-10-CM

## 2022-03-29 RX ORDER — ALBUTEROL SULFATE 2.5 MG/.5ML
2.5 SOLUTION RESPIRATORY (INHALATION)
Qty: 30 ML | Refills: 1 | Status: SHIPPED | OUTPATIENT
Start: 2022-03-29

## 2022-04-19 ENCOUNTER — OFFICE VISIT (OUTPATIENT)
Dept: ENDOCRINOLOGY | Age: 47
End: 2022-04-19
Payer: MEDICAID

## 2022-04-19 VITALS
RESPIRATION RATE: 18 BRPM | OXYGEN SATURATION: 93 % | HEIGHT: 67 IN | SYSTOLIC BLOOD PRESSURE: 138 MMHG | BODY MASS INDEX: 39.1 KG/M2 | TEMPERATURE: 98.9 F | WEIGHT: 249.1 LBS | HEART RATE: 67 BPM | DIASTOLIC BLOOD PRESSURE: 93 MMHG

## 2022-04-19 DIAGNOSIS — E78.2 MIXED HYPERLIPIDEMIA: ICD-10-CM

## 2022-04-19 DIAGNOSIS — L65.9 FALLING HAIR: ICD-10-CM

## 2022-04-19 DIAGNOSIS — R29.898 WEAKNESS OF BOTH LEGS: ICD-10-CM

## 2022-04-19 DIAGNOSIS — E11.65 TYPE 2 DIABETES MELLITUS WITH HYPERGLYCEMIA, WITHOUT LONG-TERM CURRENT USE OF INSULIN (HCC): Primary | ICD-10-CM

## 2022-04-19 LAB
GLUCOSE POC: 180 MG/DL
HBA1C MFR BLD HPLC: 8.3 %

## 2022-04-19 PROCEDURE — 82962 GLUCOSE BLOOD TEST: CPT | Performed by: INTERNAL MEDICINE

## 2022-04-19 PROCEDURE — 99214 OFFICE O/P EST MOD 30 MIN: CPT | Performed by: INTERNAL MEDICINE

## 2022-04-19 PROCEDURE — 83036 HEMOGLOBIN GLYCOSYLATED A1C: CPT | Performed by: INTERNAL MEDICINE

## 2022-04-19 PROCEDURE — 3052F HG A1C>EQUAL 8.0%<EQUAL 9.0%: CPT | Performed by: INTERNAL MEDICINE

## 2022-04-19 RX ORDER — DULAGLUTIDE 0.75 MG/.5ML
0.75 INJECTION, SOLUTION SUBCUTANEOUS
Qty: 2 ML | Refills: 3 | Status: SHIPPED | OUTPATIENT
Start: 2022-04-19 | End: 2022-05-19

## 2022-04-19 RX ORDER — ATORVASTATIN CALCIUM 10 MG/1
10 TABLET, FILM COATED ORAL
Qty: 30 TABLET | Refills: 3 | Status: SHIPPED | OUTPATIENT
Start: 2022-04-19 | End: 2022-07-27 | Stop reason: SDUPTHER

## 2022-04-19 RX ORDER — BLOOD SUGAR DIAGNOSTIC
STRIP MISCELLANEOUS
Qty: 50 STRIP | Refills: 5 | Status: SHIPPED | OUTPATIENT
Start: 2022-04-19 | End: 2022-07-19 | Stop reason: SDUPTHER

## 2022-04-19 RX ORDER — LANCETS 33 GAUGE
EACH MISCELLANEOUS
Qty: 50 EACH | Refills: 5 | Status: SHIPPED | OUTPATIENT
Start: 2022-04-19 | End: 2022-07-19 | Stop reason: SDUPTHER

## 2022-04-19 RX ORDER — INSULIN PUMP SYRINGE, 3 ML
EACH MISCELLANEOUS
Qty: 1 KIT | Refills: 0 | Status: SHIPPED | OUTPATIENT
Start: 2022-04-19

## 2022-04-19 NOTE — PROGRESS NOTES
History and Physical    Patient: Faisal Bond MRN: [id number]  SSN: [id number]    YOB: 1975  Age: 55 y.o. Sex: female      Subjective:      Faisal Bond is a 55 y.o. female with past medical history of severe asthma is here for follow up of type 2 diabetes mellitus. She is in primary care of Charisma Stafford NP. I am seeing this patient after 1 year. Last year her mother and brother passed away, so she was unable to come for follow-up appointment. She is taking care of 60-year-old autistic nephew. Since the last visit she has lost more than 20 pounds. Trying to avoid sugary foods. Continues to take Jardiance 25 mg daily and Bydureon 2 mg weekly. Not checking blood glucose much, needs a new glucometer. Up-to-date with diabetic eye exam, it was around May or June 2021, she was referred to a retina specialist but she did not get a chance to make appointment no urinary tract infection or vaginal yeast infections. She has weakness in her leg muscles because of which she is unable to do any exercise or go anywhere. Proximal muscle weakness: Started around 2 year back. When she stands up she feels like her body is just going to collapse. She has to use a cane to from sitting position even from a chair. She denies easy bruising, bone fractures. She has hair loss which got slowly worse. No history of hypokalemia. She continues to be off prednisone which she gets for asthma. But her asthma is poorly controlled and she is going to see her pulmonologist soon. She had menopause at 39years of age, no hormone replacement therapy. Has a lot of heat intolerance, difficulty in sleeping, moodiness, depression. She has a tender spot on the heel of left foot. Requesting referral to podiatry.     Glucometer reading: Not checking blood glucose at home    Updated diabetes history:  · Diagnosis: 2016  · Current treatment: Jardiance 25 mg every day, Bydureon 2 mg weekly  · Past treatment: metformin (upset stomach), Victoza (headaches), Trulicity (PA)  · Glucose checks: weekly, 160-220   · Hyperglycemia: 200s  · Hypoglycemia: no  · Meals per day: 3, breakfast: skips, lunch: sandwich, dinner: meat, veg and starch, snacks: potato chips, snack cakes, diet coke  · Exercise: no severe asthma  · DM related hospitalizations: no    Complications of DM:  · CAD: no  · CVA: no  · PVD: no  · Amputations: no   · Retinopathy: no; last exam was   · Gastropathy: no  · Nephropathy: no  · Neuropathy: yes    Medications:  · Statin: no  · ACE-I: no  · ASA: no    · Diabetes education: no    Past Medical History:   Diagnosis Date    Allergy     Asthma     Diabetes ([address]      Past Surgical History:   Procedure Laterality Date    HX  SECTION      X 2    HX GYN      novasure, csection scar revision      Family History   Problem Relation Age of Onset    Cancer Mother         cervical ca    Hypertension Mother     Diabetes Mother     Thyroid Disease Mother     Cancer Father         neck ca    Hypertension Father     Cancer Other         aunt with colon ca    Heart Attack Neg Hx     Stroke Neg Hx      Social History     Tobacco Use    Smoking status: Never Smoker    Smokeless tobacco: Never Used   Substance Use Topics    Alcohol use: No      Prior to Admission medications    Medication Sig Start Date End Date Taking? Authorizing Provider   dulaglutide (Trulicity) 0.87 VI/7.5 mL sub-q pen 0.5 mL by SubCUTAneous route every seven (7) days for 30 days. 22 Yes Misty Gan MD   empagliflozin (Jardiance) 25 mg tablet Take 1 Tablet by mouth daily for 90 days. 22 Yes Misty Gan MD   atorvastatin (LIPITOR) 10 mg tablet Take 1 Tablet by mouth nightly for 30 days.  22 Yes Misty Gan MD   Blood-Glucose Meter (OneTouch Ultra2 Meter) monitoring kit Check glucose once a day 22  Yes Misty Gan MD   glucose blood VI test strips (OneTouch Ultra Test) strip Check glucose once a day 4/19/22  Yes Nancy Mcgraw MD   lancets (One Touch Delica) 33 gauge misc Check glucose once a day 4/19/22  Yes Nancy Mcgraw MD   albuterol sulfate (PROVENTIL;VENTOLIN) 2.5 mg/0.5 mL nebu nebulizer solution 0.5 mL by Nebulization route every four (4) hours as needed for Wheezing. 3/29/22  Yes Billy Lancaster, CHERYL   DULoxetine (CYMBALTA) 20 mg capsule TAKE 1 CAPSULE BY MOUTH EVERY DAY 3/11/22  Yes Billy Lancaster, CHERYL   losartan (COZAAR) 25 mg tablet Take 1 Tablet by mouth daily. 2/7/22  Yes Billy Lancaster NP   cyclobenzaprine (Flexeril) 10 mg tablet Take 2 Tablets by mouth nightly as needed for Muscle Spasm(s). 12/20/21  Yes Billy Lancaster NP   OneTouch Ultra Test strip TEST ONCE A DAY 10/4/21  Yes Nancy Mcgraw MD   benzonatate (TESSALON) 100 mg capsule Take 1-2 capsules TID prn cough. 9/22/21  Yes Jewels Grace MD   Pulmicort Flexhaler 180 mcg/actuation aepb inhaler TAKE 1 PUFF BY MOUTH TWICE A DAY 12/3/20  Yes Provider, Historical   loratadine (CLARITIN) 10 mg tablet  12/16/20  Yes Provider, Historical   Wixela Inhub 500-50 mcg/dose diskus inhaler Take 500 Puffs by mouth two (2) times a day. 7/25/20  Yes Provider, Historical   Spiriva with HandiHaler 18 mcg inhalation capsule Take 18 mcg by mouth once. 7/25/20  Yes Provider, Historical        Allergies   Allergen Reactions    Latex, Natural Rubber Swelling    Codeine Anxiety    Penicillins Hives       Review of Systems:  ROS    A comprehensive review of systems was preformed and it is negative except mentioned in HPI    Objective:     Vitals:    04/19/22 1329   BP: (!) 138/93   Pulse: 67   Resp: 18   Temp: 98.9 °F (37.2 °C)   TempSrc: Oral   SpO2: 93%   Weight: 249 lb 1.6 oz (113 kg)   Height: 5' 7\" (1.702 m)        Physical Exam:    Physical Exam  Vitals and nursing note reviewed. Constitutional:       Appearance: She is obese. HENT:      Head: Normocephalic and atraumatic.    Cardiovascular:      Rate and Rhythm: Normal rate and regular rhythm. Pulmonary:      Effort: Pulmonary effort is normal.      Breath sounds: Normal breath sounds. Skin:     Comments: Pale stretch marks on the abdomen and some pink months, no bruising, sparse hair loss diffuse, no acne or hirsutism   Neurological:      Mental Status: She is alert. diabetic foot exam:  Bilateral diabetic foot exam was performed today. Dorsalis pedis pulses 2+ bilaterally. Monofilament sensation normal bilaterally. No ulcers or skin breakdown. Tender firm spot on the heel of left foot, bilateral calluses present. Labs and Imaging:  Results for Theresa Ibarra (MRN [id number]) as of 12/18/2020 13:01   Ref. Range 7/29/2020 09:35   Sodium Latest Ref Range: 134 - 144 mmol/L 144   Potassium Latest Ref Range: 3.5 - 5.2 mmol/L 4.8   Chloride Latest Ref Range: 96 - 106 mmol/L 99   CO2 Latest Ref Range: 20 - 29 mmol/L 27   Glucose Latest Ref Range: 65 - 99 mg/dL 146 (H)   BUN Latest Ref Range: 6 - 24 mg/dL 7   Creatinine Latest Ref Range: 0.57 - 1.00 mg/dL 0.62   BUN/Creatinine ratio Latest Ref Range: 9 - 23  11   Calcium Latest Ref Range: 8.7 - 10.2 mg/dL 9.2   GFR est non-AA Latest Ref Range: >59 mL/min/1.73 109   GFR est AA Latest Ref Range: >59 mL/min/1.73 126   Bilirubin, total Latest Ref Range: 0.0 - 1.2 mg/dL 0.4   Protein, total Latest Ref Range: 6.0 - 8.5 g/dL 7.0   Albumin Latest Ref Range: 3.8 - 4.8 g/dL 4.0   A-G Ratio Latest Ref Range: 1.2 - 2.2  1.3   ALT Latest Ref Range: 0 - 32 IU/L 27   AST Latest Ref Range: 0 - 40 IU/L 23   Alk.  phosphatase Latest Ref Range: 39 - 117 IU/L 160 (H)   Triglyceride Latest Ref Range: 0 - 149 mg/dL 146   Cholesterol, total Latest Ref Range: 100 - 199 mg/dL 189   HDL Cholesterol Latest Ref Range: >39 mg/dL 55   VLDL, calculated Latest Ref Range: 5 - 40 mg/dL 29   LDL, calculated Latest Ref Range: 0 - 99 mg/dL 105 (H)   Hemoglobin A1c, (calculated) Latest Ref Range: 4.8 - 5.6 % 8.1 (H)   Estimated average glucose Latest Units: mg/dL 186 Results for Hailey Self (MRN [id number]) as of 4/8/2021 12:46   Ref. Range 4/5/2021 12:29   TSH Latest Ref Range: 0.450 - 4.500 uIU/mL 1.860     Last 3 Recorded Weights in this Encounter    04/19/22 1329   Weight: 249 lb 1.6 oz (113 kg)        Lab Results   Component Value Date/Time    Hemoglobin A1c 7.9 (H) 12/20/2021 02:08 PM    Hemoglobin A1c 8.1 (H) 07/29/2020 09:35 AM        Assessment:     Patient Active Problem List   Diagnosis Code    Moderate asthma J45.909    Controlled type 2 diabetes mellitus without complication, without long-term current use of insulin ([address] E11.9    Obesity, morbid ([address] E66.01    Elevated BP without diagnosis of hypertension R03.0    Mixed hyperlipidemia E78.2    Type 2 diabetes mellitus with hyperglycemia, without long-term current use of insulin ([address] E11.65    Essential hypertension I10    Encounter for immunization Z23           Plan:     Type 2 diabetes mellitus, uncontrolled:  Hemoglobin A1c was 8.1% on 7-, 8.3% on 12-, 8.1% on 4-8-2021, 7.9% on 12-, 8.3% today. Fingerstick blood glucose is 180 mg/dL in my office today. Up to date with diabetes related annual labs: December 2021 except TSH  Up to date with diabetic eye exam: Yes May/June 2021  Plan:  Encourage patient to continue to make changes in her eating habits. Continue Jardiance 25 mg daily, switching from Bydureon to Trulicity 2.76 mg weekly for inadequate response. Start checking blood glucose once a day and bring glucometer to next visit in 3 months. Sending new glucometer to her pharmacy. Essential hypertension:  Blood pressure well controlled on current medications. No microalbuminuria last checked in December 21. Proximal muscle weakness:  Potassium was normal at 4.8 in July 2020. She has some symptoms of Cushing syndrome including obesity, diabetes, proximal muscle weakness, hair loss.    6-7-2021: 1 mg dexamethasone suppression test normal with morning cortisol suppressed at 0.8 this rules out Cushing's syndrome. Mixed hyperlipidemia:  LDL was 105 in 2020, : Total cholesterol 213, triglycerides 161, . Start atorvastatin 10 mg daily at bedtime. Hair fall:  Check testosterone, DHEA-S and 17 hydroxyprogesterone    Orders Placed This Encounter    TESTOSTERONE, FREE & TOTAL    DHEA SULFATE    17-OH PROGESTERONE LCMS    TSH RFX ON ABNORMAL TO FREE T4    REFERRAL TO PODIATRY     Referral Priority:   Routine     Referral Type:   Consultation     Referral Reason:   Specialty Services Required     Referred to Provider:   Judge Brenna DPM     Requested Specialty:   Podiatry     Number of Visits Requested:   1    AMB POC GLUCOSE BLOOD, BY GLUCOSE MONITORING DEVICE    AMB POC HEMOGLOBIN A1C    dulaglutide (Trulicity) 5.80 ZT/8.0 mL sub-q pen     Si.5 mL by SubCUTAneous route every seven (7) days for 30 days. Dispense:  2 mL     Refill:  3     DC Bydureon    empagliflozin (Jardiance) 25 mg tablet     Sig: Take 1 Tablet by mouth daily for 90 days. Dispense:  90 Tablet     Refill:  2    atorvastatin (LIPITOR) 10 mg tablet     Sig: Take 1 Tablet by mouth nightly for 30 days.      Dispense:  30 Tablet     Refill:  3    Blood-Glucose Meter (OneTouch Ultra2 Meter) monitoring kit     Sig: Check glucose once a day     Dispense:  1 Kit     Refill:  0    glucose blood VI test strips (OneTouch Ultra Test) strip     Sig: Check glucose once a day     Dispense:  50 Strip     Refill:  5    lancets (One Touch Delica) 33 gauge misc     Sig: Check glucose once a day     Dispense:  50 Each     Refill:  5        Signed By: Erma Escamilla MD     2022      Return to clinic 3 months

## 2022-04-19 NOTE — PROGRESS NOTES
1. \"Have you been to the ER, urgent care clinic since your last visit? Hospitalized since your last visit? \" No    2. \"Have you seen or consulted any other health care providers outside of the [address] [place] [address] since your last visit? \" No     3. For patients aged 39-70: Has the patient had a colonoscopy / FIT/ Cologuard? No      If the patient is female:    4. For patients aged 41-77: Has the patient had a mammogram within the past 2 years? Yes - Care Gap present. Most recent result on file      5. For patients aged 21-65: Has the patient had a pap smear?  No

## 2022-04-19 NOTE — LETTER
4/19/2022    Patient: Ignacio Pinedo   YOB: 1975   Date of Visit: 4/19/2022     Se Rojas NP  [facility] [id number]  [address] [facility] [address]    Dear Se Rojas NP,      Thank you for referring Ms. Anoop Cruz to [address] [place] [address] for evaluation. My notes for this consultation are attached. If you have questions, please do not hesitate to call me. I look forward to following your patient along with you.       Sincerely,    Suleiman Goldsmith MD

## 2022-06-07 LAB — TSH SERPL DL<=0.005 MIU/L-ACNC: 3.26 UIU/ML (ref 0.45–4.5)

## 2022-06-09 LAB
17OHP SERPL-MCNC: 52 NG/DL
DHEA-S SERPL-MCNC: 22.9 UG/DL (ref 41.2–243.7)
TESTOST FREE SERPL-MCNC: 0.8 PG/ML (ref 0–4.2)
TESTOST SERPL-MCNC: 15 NG/DL (ref 4–50)

## 2022-06-10 DIAGNOSIS — Q89.1: Primary | ICD-10-CM

## 2022-06-22 ENCOUNTER — OFFICE VISIT (OUTPATIENT)
Dept: PODIATRY | Age: 47
End: 2022-06-22
Payer: MEDICAID

## 2022-06-22 VITALS
WEIGHT: 256.4 LBS | HEIGHT: 67 IN | BODY MASS INDEX: 40.24 KG/M2 | HEART RATE: 65 BPM | RESPIRATION RATE: 16 BRPM | OXYGEN SATURATION: 98 % | TEMPERATURE: 97.1 F | SYSTOLIC BLOOD PRESSURE: 152 MMHG | DIASTOLIC BLOOD PRESSURE: 88 MMHG

## 2022-06-22 DIAGNOSIS — M79.672 LEFT FOOT PAIN: Primary | ICD-10-CM

## 2022-06-22 DIAGNOSIS — E11.9 CONTROLLED TYPE 2 DIABETES MELLITUS WITHOUT COMPLICATION, WITHOUT LONG-TERM CURRENT USE OF INSULIN (HCC): ICD-10-CM

## 2022-06-22 PROCEDURE — 3051F HG A1C>EQUAL 7.0%<8.0%: CPT | Performed by: PODIATRIST

## 2022-06-22 PROCEDURE — 99203 OFFICE O/P NEW LOW 30 MIN: CPT | Performed by: PODIATRIST

## 2022-06-22 RX ORDER — DICLOFENAC SODIUM 10 MG/G
4 GEL TOPICAL 4 TIMES DAILY
Qty: 350 G | Refills: 2 | Status: SHIPPED | OUTPATIENT
Start: 2022-06-22

## 2022-06-22 NOTE — PROGRESS NOTES
1. \"Have you been to the ER, urgent care clinic since your last visit? Hospitalized since your last visit? \" No    2. \"Have you seen or consulted any other health care providers outside of the [address] [place] [address] since your last visit? \" No     3. For patients aged 39-70: Has the patient had a colonoscopy / FIT/ Cologuard? No      If the patient is female:    4. For patients aged 41-77: Has the patient had a mammogram within the past 2 years? Yes - Care Gap present. Most recent result on file      5. For patients aged 21-65: Has the patient had a pap smear?  No       Chief Complaint   Patient presents with    Diabetic Foot Exam    Foot Pain       Visit Vitals  BP (!) 152/88 (BP 1 Location: Left upper arm, BP Patient Position: Sitting, BP Cuff Size: Adult)   Pulse 65   Temp 97.1 °F (36.2 °C) (Temporal)   Resp 16   Ht 5' 7\" (1.702 m)   Wt 256 lb 6.4 oz (116.3 kg)   SpO2 98%   BMI 40.16 kg/m²

## 2022-07-19 ENCOUNTER — OFFICE VISIT (OUTPATIENT)
Dept: ENDOCRINOLOGY | Age: 47
End: 2022-07-19
Payer: MEDICAID

## 2022-07-19 VITALS
DIASTOLIC BLOOD PRESSURE: 88 MMHG | HEART RATE: 56 BPM | BODY MASS INDEX: 39.94 KG/M2 | TEMPERATURE: 97.3 F | SYSTOLIC BLOOD PRESSURE: 134 MMHG | HEIGHT: 67 IN | WEIGHT: 254.5 LBS | OXYGEN SATURATION: 93 %

## 2022-07-19 DIAGNOSIS — E11.65 TYPE 2 DIABETES MELLITUS WITH HYPERGLYCEMIA, WITHOUT LONG-TERM CURRENT USE OF INSULIN (HCC): Primary | ICD-10-CM

## 2022-07-19 DIAGNOSIS — R29.898 WEAKNESS OF BOTH LEGS: ICD-10-CM

## 2022-07-19 DIAGNOSIS — E78.2 MIXED HYPERLIPIDEMIA: ICD-10-CM

## 2022-07-19 DIAGNOSIS — E66.01 CLASS 3 SEVERE OBESITY WITH SERIOUS COMORBIDITY AND BODY MASS INDEX (BMI) OF 40.0 TO 44.9 IN ADULT, UNSPECIFIED OBESITY TYPE (HCC): ICD-10-CM

## 2022-07-19 LAB
GLUCOSE POC: 121 MG/DL
HBA1C MFR BLD HPLC: 7.9 %

## 2022-07-19 PROCEDURE — 99214 OFFICE O/P EST MOD 30 MIN: CPT | Performed by: INTERNAL MEDICINE

## 2022-07-19 PROCEDURE — 83036 HEMOGLOBIN GLYCOSYLATED A1C: CPT | Performed by: INTERNAL MEDICINE

## 2022-07-19 PROCEDURE — 3051F HG A1C>EQUAL 7.0%<8.0%: CPT | Performed by: INTERNAL MEDICINE

## 2022-07-19 PROCEDURE — 82962 GLUCOSE BLOOD TEST: CPT | Performed by: INTERNAL MEDICINE

## 2022-07-19 RX ORDER — BLOOD SUGAR DIAGNOSTIC
STRIP MISCELLANEOUS
Qty: 50 STRIP | Refills: 5 | Status: SHIPPED | OUTPATIENT
Start: 2022-07-19

## 2022-07-19 RX ORDER — LANCETS 33 GAUGE
EACH MISCELLANEOUS
Qty: 50 EACH | Refills: 5 | Status: SHIPPED | OUTPATIENT
Start: 2022-07-19

## 2022-07-19 RX ORDER — DULAGLUTIDE 1.5 MG/.5ML
1.5 INJECTION, SOLUTION SUBCUTANEOUS
Qty: 2 ML | Refills: 5 | Status: SHIPPED | OUTPATIENT
Start: 2022-07-19 | End: 2022-08-18

## 2022-07-19 RX ORDER — ATORVASTATIN CALCIUM 10 MG/1
TABLET, FILM COATED ORAL
COMMUNITY
Start: 2022-07-16

## 2022-07-19 RX ORDER — DULAGLUTIDE 0.75 MG/.5ML
INJECTION, SOLUTION SUBCUTANEOUS
COMMUNITY
Start: 2022-07-07 | End: 2022-07-19 | Stop reason: DRUGHIGH

## 2022-07-19 RX ORDER — BLOOD-GLUCOSE METER
EACH MISCELLANEOUS
COMMUNITY
Start: 2022-04-19

## 2022-07-19 NOTE — PROGRESS NOTES
History and Physical    Patient: Junior Hammer MRN: [id number]  SSN: [id number]    YOB: 1975  Age: 52 y.o. Sex: female      Subjective:      Junior Hammer is a 52 y.o. female with past medical history of severe asthma is here for follow up of type 2 diabetes mellitus. She is in primary care of Thea Abarca NP. Since the last visit she has lost more than 20 pounds. Trying to avoid sugary foods. Last eye exam was around May or June 2021, she was referred to a retina specialist but she did not get a chance to make appointment. She has appointment with ophthalmology coming up soon for 1 year follow-up. At the last visit I switched her from Bydureon to Trulicity 0.88 mg weekly and we continue Jardiance 25 mg daily. Reporting compliance with medications. No urinary tract infection or vaginal yeast infections. She has started checking blood glucose once a day every day. Did not bring glucometer today. She tells me her readings are running high, from high 100s to low 200s. She has weakness in her leg muscles because of which she is unable to do any exercise or go anywhere. She was started on atorvastatin 10 mg at the last visit. She is taking it regularly and tolerating it well. She continues to be off of prednisone. Proximal muscle weakness:   After the last visit she had labs to screen for Cushing syndrome which came back looking normal.  But DHEA-sulfate was low, so I ordered labs to screen for adrenal insufficiency given history of being on prednisone recurrently. Patient did not get a chance to get this done. Started around 2 year back. When she stands up she feels like her body is just going to collapse. She has to use a cane to from sitting position even from a chair. She denies easy bruising, bone fractures. She has hair loss which got slowly worse. No history of hypokalemia. She continues to be off prednisone which she gets for asthma.   But her asthma is poorly controlled and she is going to see her pulmonologist soon. She had menopause at 39years of age, no hormone replacement therapy. Has a lot of heat intolerance, difficulty in sleeping, moodiness, depression. She has a tender spot on the heel of left foot. Requesting referral to podiatry. Glucometer reading: Did not bring glucometer today    Updated diabetes history:  · Diagnosis: 2016  · Current treatment: Jardiance 25 mg every day, Trulicity 7.84 mg weekly  · Past treatment: metformin (upset stomach), Victoza (headaches), Bydureon  · Glucose checks: weekly, 160-220   · Hyperglycemia: 200s  · Hypoglycemia: no  · Meals per day: 3, breakfast: skips, lunch: sandwich, dinner: meat, veg and starch, snacks: potato chips, snack cakes, diet coke  · Exercise: no severe asthma  · DM related hospitalizations: no    Complications of DM:  · CAD: no  · CVA: no  · PVD: no  · Amputations: no   · Retinopathy: no; last exam was   · Gastropathy: no  · Nephropathy: no  · Neuropathy: yes    Medications:  · Statin:  Atorvastatin 10 mg  · ACE-I: no  · ASA: no    · Diabetes education: no    Past Medical History:   Diagnosis Date    Allergy     Asthma     Diabetes ([facility] [address]      Past Surgical History:   Procedure Laterality Date    HX  SECTION      X 2    HX GYN      novasure, csection scar revision      Family History   Problem Relation Age of Onset    Cancer Mother         cervical ca    Hypertension Mother     Diabetes Mother     Thyroid Disease Mother     Cancer Father         neck ca    Hypertension Father     Cancer Other         aunt with colon ca    Heart Attack Neg Hx     Stroke Neg Hx      Social History     Tobacco Use    Smoking status: Never Smoker    Smokeless tobacco: Never Used   Substance Use Topics    Alcohol use: No      Prior to Admission medications    Medication Sig Start Date End Date Taking?  Authorizing Provider   atorvastatin (LIPITOR) 10 mg tablet  22  Yes Provider, Historical   OneTouch Ultra2 Meter misc CHECK GLUCOSE ONCE A DAY 4/19/22  Yes Provider, Historical   dulaglutide (Trulicity) 1.5 QM/4.4 mL sub-q pen 0.5 mL by SubCUTAneous route every seven (7) days for 30 days. 7/19/22 8/18/22 Yes Tabatha Carrasco MD   empagliflozin (Jardiance) 25 mg tablet Take 1 Tablet by mouth daily for 90 days. 7/19/22 10/17/22 Yes Tabatha Carrasco MD   glucose blood VI test strips (OneTouch Ultra Test) strip Check glucose once a day 7/19/22  Yes Tabatha Carrasco MD   lancets (One Touch Delica) 33 gauge misc Check glucose once a day 7/19/22  Yes Tabatha Carrasco MD   diclofenac (VOLTAREN) 1 % gel Apply 4 g to affected area four (4) times daily. 6/22/22  Yes Alexandru Baron DPM   Blood-Glucose Meter (OneTouch Ultra2 Meter) monitoring kit Check glucose once a day 4/19/22  Yes Tabatha Carrasco MD   albuterol sulfate (PROVENTIL;VENTOLIN) 2.5 mg/0.5 mL nebu nebulizer solution 0.5 mL by Nebulization route every four (4) hours as needed for Wheezing. 3/29/22  Yes Jonas Lefort, NP   DULoxetine (CYMBALTA) 20 mg capsule TAKE 1 CAPSULE BY MOUTH EVERY DAY 3/11/22  Yes Jonas Lefort, NP   losartan (COZAAR) 25 mg tablet Take 1 Tablet by mouth daily. 2/7/22  Yes Jonas Lefort, NP   OneTouch Ultra Test strip TEST ONCE A DAY 10/4/21  Yes Tabatha Carrasco MD   benzonatate (TESSALON) 100 mg capsule Take 1-2 capsules TID prn cough. 9/22/21  Yes Saint Rear, MD   Pulmicort Flexhaler 180 mcg/actuation aepb inhaler TAKE 1 PUFF BY MOUTH TWICE A DAY 12/3/20  Yes Provider, Historical   Wixela Inhub 500-50 mcg/dose diskus inhaler Take 500 Puffs by mouth two (2) times a day. 7/25/20  Yes Provider, Historical   Spiriva with HandiHaler 18 mcg inhalation capsule Take 18 mcg by mouth once.  7/25/20  Yes Provider, Historical        Allergies   Allergen Reactions    Latex, Natural Rubber Swelling    Penicillins Hives    Codeine Anxiety       Review of Systems:  ROS    A comprehensive review of systems was preformed and it is negative except mentioned in HPI    Objective:     Vitals:    07/19/22 1150   BP: 134/88   Pulse: (!) 56   Temp: 97.3 °F (36.3 °C)   TempSrc: Temporal   SpO2: 93%   Weight: 254 lb 8 oz (115.4 kg)   Height: 5' 7\" (1.702 m)        Physical Exam:    Physical Exam  Vitals and nursing note reviewed. Constitutional:       Appearance: She is obese. HENT:      Head: Normocephalic and atraumatic. Cardiovascular:      Rate and Rhythm: Normal rate and regular rhythm. Pulmonary:      Effort: Pulmonary effort is normal.      Breath sounds: Normal breath sounds. Skin:     Comments: Pale stretch marks on the abdomen and some pink months, no bruising, sparse hair loss diffuse, no acne or hirsutism   Neurological:      Mental Status: She is alert. 4-:  diabetic foot exam:  Bilateral diabetic foot exam was performed today. Dorsalis pedis pulses 2+ bilaterally. Monofilament sensation normal bilaterally. No ulcers or skin breakdown. Tender firm spot on the heel of left foot, bilateral calluses present. Labs and Imaging:  Results for Racquel Banuelos (MRN [id number]) as of 12/18/2020 13:01   Ref.  Range 7/29/2020 09:35   Sodium Latest Ref Range: 134 - 144 mmol/L 144   Potassium Latest Ref Range: 3.5 - 5.2 mmol/L 4.8   Chloride Latest Ref Range: 96 - 106 mmol/L 99   CO2 Latest Ref Range: 20 - 29 mmol/L 27   Glucose Latest Ref Range: 65 - 99 mg/dL 146 (H)   BUN Latest Ref Range: 6 - 24 mg/dL 7   Creatinine Latest Ref Range: 0.57 - 1.00 mg/dL 0.62   BUN/Creatinine ratio Latest Ref Range: 9 - 23  11   Calcium Latest Ref Range: 8.7 - 10.2 mg/dL 9.2   GFR est non-AA Latest Ref Range: >59 mL/min/1.73 109   GFR est AA Latest Ref Range: >59 mL/min/1.73 126   Bilirubin, total Latest Ref Range: 0.0 - 1.2 mg/dL 0.4   Protein, total Latest Ref Range: 6.0 - 8.5 g/dL 7.0   Albumin Latest Ref Range: 3.8 - 4.8 g/dL 4.0   A-G Ratio Latest Ref Range: 1.2 - 2.2  1.3   ALT Latest Ref Range: 0 - 32 IU/L 27   AST Latest Ref Range: 0 - 40 IU/L 23   Alk. phosphatase Latest Ref Range: 39 - 117 IU/L 160 (H)   Triglyceride Latest Ref Range: 0 - 149 mg/dL 146   Cholesterol, total Latest Ref Range: 100 - 199 mg/dL 189   HDL Cholesterol Latest Ref Range: >39 mg/dL 55   VLDL, calculated Latest Ref Range: 5 - 40 mg/dL 29   LDL, calculated Latest Ref Range: 0 - 99 mg/dL 105 (H)   Hemoglobin A1c, (calculated) Latest Ref Range: 4.8 - 5.6 % 8.1 (H)   Estimated average glucose Latest Units: mg/dL 186   Results for Alvin Carmichael (MRN [id number]) as of 4/8/2021 12:46   Ref. Range 4/5/2021 12:29   TSH Latest Ref Range: 0.450 - 4.500 uIU/mL 1.860     Last 3 Recorded Weights in this Encounter    07/19/22 1150   Weight: 254 lb 8 oz (115.4 kg)        Lab Results   Component Value Date/Time    Hemoglobin A1c 7.9 (H) 12/20/2021 02:08 PM    Hemoglobin A1c 8.1 (H) 07/29/2020 09:35 AM        Assessment:     Patient Active Problem List   Diagnosis Code    Moderate asthma J45.909    Controlled type 2 diabetes mellitus without complication, without long-term current use of insulin ([facility]) E11.9    Obesity, morbid ([facility]) E66.01    Elevated BP without diagnosis of hypertension R03.0    Mixed hyperlipidemia E78.2    Type 2 diabetes mellitus with hyperglycemia, without long-term current use of insulin [facility] E11.65    Essential hypertension I10    Encounter for immunization Z23           Plan:     Type 2 diabetes mellitus, uncontrolled:  Hemoglobin A1c was 8.1% on 7-, 8.3% on 12-, 8.1% on 4-8-2021, 7.9% on 12-, 8.3% on 4-, 7.9% today. Fingerstick blood glucose is 121 mg/dL in my office today. Up to date with diabetes related annual labs: December 2021 except TSH  Up to date with diabetic eye exam: Yes May/June 2021  Plan:  Encourage patient to continue to make changes in her eating habits. Continue Jardiance 25 mg daily, increase Trulicity to 1.5 mg weekly. Check blood glucose once a day every day and follow-up with PCP in 3 months.     Essential hypertension:  Blood pressure well controlled on current medications. No microalbuminuria last checked in . Proximal muscle weakness:  Potassium was normal at 4.8 in 2020. She has some symptoms of Cushing syndrome including obesity, diabetes, proximal muscle weakness, hair loss. 2021: 1 mg dexamethasone suppression test normal with morning cortisol suppressed at 0.8 this rules out Cushing's syndrome. Mixed hyperlipidemia:  LDL was 105 in 2020, : Total cholesterol 213, triglycerides 161, . Started atorvastatin 10 mg daily at bedtime in 2022. Check lipid profile fasting. Hair fall:  2022: TSH normal at 3.26, DHEA-sulfate low at 22.9, total testosterone normal at 15, free testosterone normal at 0.8  17 hydroxyprogesterone normal at 52. Because of low DHEA-sulfate check for adrenal insufficiency by checking 8 AM cortisol, ACTH and metabolic panel. Orders Placed This Encounter    LIPID PANEL    AMB POC GLUCOSE BLOOD, BY GLUCOSE MONITORING DEVICE    AMB POC HEMOGLOBIN A1C    dulaglutide (Trulicity) 1.5 BE/0.1 mL sub-q pen     Si.5 mL by SubCUTAneous route every seven (7) days for 30 days. Dispense:  2 mL     Refill:  5     DC trulicity 4.76 mg    empagliflozin (Jardiance) 25 mg tablet     Sig: Take 1 Tablet by mouth daily for 90 days.      Dispense:  90 Tablet     Refill:  3    glucose blood VI test strips (OneTouch Ultra Test) strip     Sig: Check glucose once a day     Dispense:  50 Strip     Refill:  5    lancets (One Touch Delica) 33 gauge misc     Sig: Check glucose once a day     Dispense:  50 Each     Refill:  5        Signed By: Lucille Shin MD     2022      Return to clinic

## 2022-07-19 NOTE — LETTER
7/19/2022    Patient: Josh Carreno   YOB: 1975   Date of Visit: 7/19/2022     Maykel Melendrez NP  [facility] [phone] [id number]  Via Fax: [phone]    Dear Maykel Melendrez NP,      Thank you for referring Ms. Toni Urena to [address] [place] [address] for evaluation. My notes for this consultation are attached. If you have questions, please do not hesitate to call me. I look forward to following your patient along with you.       Sincerely,    Jamarcus Villa MD

## 2022-07-21 NOTE — PROGRESS NOTES
[facility]    Subjective:         Patient is a 52 y.o. female who is being seen as a new pt for diabetic pedal exam and with an acute complaint of left foot pain. Patient states she has close follow-up with her endocrinologist Phuong Gross MD). She states her last office visit with her PCP was 2022. She describes her diabetes as being poorly controlled, noting her last hemoglobin A1c was 8.3%. She admits to pedal pain, rising to the level of 10 out of 10 to the left foot. She describes the pain as sharp in nature and nonradiating. She denies any recent pedal trauma. She denies any local/systemic signs of infection. She denies any other lower extremity complaints    Past Medical History:   Diagnosis Date    Allergy     Asthma     Diabetes ([address]      Past Surgical History:   Procedure Laterality Date    HX  SECTION      X 2    HX GYN      novasure, csection scar revision       Family History   Problem Relation Age of Onset    Cancer Mother         cervical ca    Hypertension Mother     Diabetes Mother     Thyroid Disease Mother     Cancer Father         neck ca    Hypertension Father     Cancer Other         aunt with colon ca    Heart Attack Neg Hx     Stroke Neg Hx       Social History     Tobacco Use    Smoking status: Never    Smokeless tobacco: Never   Substance Use Topics    Alcohol use: No     Allergies   Allergen Reactions    Latex, Natural Rubber Swelling    Penicillins Hives    Codeine Anxiety     Prior to Admission medications    Medication Sig Start Date End Date Taking? Authorizing Provider   diclofenac (VOLTAREN) 1 % gel Apply 4 g to affected area four (4) times daily.  22  Yes Norm Baron DPM   Blood-Glucose Meter (OneTouch Ultra2 Meter) monitoring kit Check glucose once a day 22  Yes Cici Devine MD   albuterol sulfate (PROVENTIL;VENTOLIN) 2.5 mg/0.5 mL nebu nebulizer solution 0.5 mL by Nebulization route every four (4) hours as needed for Wheezing. 3/29/22  Yes Calos Kirkland, CHERYL   DULoxetine (CYMBALTA) 20 mg capsule TAKE 1 CAPSULE BY MOUTH EVERY DAY 3/11/22  Yes Calos Kirkland, CHERYL   losartan (COZAAR) 25 mg tablet Take 1 Tablet by mouth daily. 2/7/22  Yes Calos Kirkland NP   OneTouch Ultra Test strip TEST ONCE A DAY 10/4/21  Yes Patricia Rust MD   benzonatate (TESSALON) 100 mg capsule Take 1-2 capsules TID prn cough. 9/22/21  Yes Daniel Blair MD   Pulmicort Flexhaler 180 mcg/actuation aepb inhaler TAKE 1 PUFF BY MOUTH TWICE A DAY 12/3/20  Yes Provider, Historical   Wixela Inhub 500-50 mcg/dose diskus inhaler Take 500 Puffs by mouth two (2) times a day. 7/25/20  Yes Provider, Historical   Spiriva with HandiHaler 18 mcg inhalation capsule Take 18 mcg by mouth once. 7/25/20  Yes Provider, Historical   atorvastatin (LIPITOR) 10 mg tablet  7/16/22   Provider, Historical   OneTouch Ultra2 Meter misc CHECK GLUCOSE ONCE A DAY 4/19/22   Provider, Historical   dulaglutide (Trulicity) 1.5 RT/7.0 mL sub-q pen 0.5 mL by SubCUTAneous route every seven (7) days for 30 days. 7/19/22 8/18/22  Patricia Rust MD   empagliflozin (Jardiance) 25 mg tablet Take 1 Tablet by mouth daily for 90 days. 7/19/22 10/17/22  Patricia Rust MD   glucose blood VI test strips (OneTouch Ultra Test) strip Check glucose once a day 7/19/22   Patricia Rust MD   lancets (One Touch Delica) 33 gauge misc Check glucose once a day 7/19/22   Patricia Rust MD       Review of Systems   Constitutional: Negative. HENT: Negative. Eyes: Negative. Respiratory: Negative. Cardiovascular: Negative. Gastrointestinal: Negative. Endocrine: Negative. Genitourinary: Negative. Musculoskeletal:  Positive for arthralgias. Skin: Negative. Allergic/Immunologic: Positive for immunocompromised state. Neurological:  Positive for numbness. Hematological: Negative. Psychiatric/Behavioral: Negative. All other systems reviewed and are negative.     Objective:     Visit Vitals  BP (!) 152/88 (BP 1 Location: Left upper arm, BP Patient Position: Sitting, BP Cuff Size: Adult)   Pulse 65   Temp 97.1 °F (36.2 °C) (Temporal)   Resp 16   Ht 5' 7\" (1.702 m)   Wt 256 lb 6.4 oz (116.3 kg)   SpO2 98%   BMI 40.16 kg/m²       Physical Exam  Vitals reviewed. Constitutional:       Appearance: She is morbidly obese. Cardiovascular:      Pulses:           Dorsalis pedis pulses are 2+ on the right side and 2+ on the left side. Posterior tibial pulses are 2+ on the right side and 2+ on the left side. Pulmonary:      Effort: Pulmonary effort is normal.   Musculoskeletal:      Right lower leg: No edema. Left lower leg: No edema. Right foot: Normal range of motion. No deformity or bunion. Left foot: Decreased range of motion. No deformity or bunion. Feet:      Right foot:      Protective Sensation: 10 sites tested. 10 sites sensed. Skin integrity: Skin integrity normal.      Toenail Condition: Right toenails are normal.      Left foot:      Protective Sensation: 10 sites tested. 10 sites sensed. Skin integrity: Skin integrity normal.      Toenail Condition: Left toenails are normal.   Lymphadenopathy:      Lower Body: No right inguinal adenopathy. No left inguinal adenopathy. Skin:     General: Skin is warm. Capillary Refill: Capillary refill takes 2 to 3 seconds. Neurological:      Mental Status: She is alert and oriented to person, place, and time. Psychiatric:         Mood and Affect: Mood and affect normal.       Data Review: No results found for this or any previous visit (from the past 24 hour(s)). Impression:       ICD-10-CM ICD-9-CM    1. Left foot pain  M79.672 729.5 XR FOOT LT MIN 3 V      2.  Controlled type 2 diabetes mellitus without complication, without long-term current use of insulin (HCC)  E11.9 250.00             Recommendation:     Patient seen and evaluated in the office  Discussed and educated patient regarding their current medical condition at it pertains to her diabetes and her overall pedal health. Instructed patient to monitor their feet daily for possible wound formation, be compliant with all medications and wear supportive shoe gear for wound prevention. Reviewed and discussed most recent lab work, specifically as it pertains to her diabetes. Pt verbalized understanding of all discussed and reviewed  Patient was given for x-rays to be performed of the left foot to interrogate the osseous structures and evaluate for deformity. She was also given a prescription for diclofenac 1% topical gel to be applied up to 4 times daily for symptomatic relief. Once the x-rays have been performed, will discuss treatment options in more depth        Henry Howell, [facility], [address] [place] [address] and [facility]  [address] [place] [address] , [facility]  O: [phone]  F: [phone]  C: [phone]

## 2022-07-27 DIAGNOSIS — E78.2 MIXED HYPERLIPIDEMIA: ICD-10-CM

## 2022-07-27 LAB
CHOLEST SERPL-MCNC: 141 MG/DL (ref 100–199)
HDLC SERPL-MCNC: 50 MG/DL
LDLC SERPL CALC-MCNC: 72 MG/DL (ref 0–99)
TRIGL SERPL-MCNC: 102 MG/DL (ref 0–149)
VLDLC SERPL CALC-MCNC: 19 MG/DL (ref 5–40)

## 2022-07-27 RX ORDER — ATORVASTATIN CALCIUM 10 MG/1
10 TABLET, FILM COATED ORAL
Qty: 90 TABLET | Refills: 3 | Status: SHIPPED | OUTPATIENT
Start: 2022-07-27 | End: 2022-10-25

## 2022-11-24 DIAGNOSIS — I10 ESSENTIAL HYPERTENSION: ICD-10-CM

## 2022-11-28 RX ORDER — LOSARTAN POTASSIUM 25 MG/1
TABLET ORAL
Qty: 90 TABLET | Refills: 0 | Status: SHIPPED | OUTPATIENT
Start: 2022-11-28

## 2023-05-25 RX ORDER — BENZONATATE 100 MG/1
CAPSULE ORAL
COMMUNITY
Start: 2021-09-22

## 2023-05-25 RX ORDER — DULOXETIN HYDROCHLORIDE 20 MG/1
1 CAPSULE, DELAYED RELEASE ORAL DAILY
COMMUNITY
Start: 2022-03-11

## 2023-05-25 RX ORDER — LOSARTAN POTASSIUM 25 MG/1
1 TABLET ORAL DAILY
COMMUNITY
Start: 2022-11-28

## 2023-05-25 RX ORDER — TIOTROPIUM BROMIDE 18 UG/1
18 CAPSULE ORAL; RESPIRATORY (INHALATION) ONCE
COMMUNITY
Start: 2020-07-25

## 2023-05-25 RX ORDER — FLUTICASONE PROPIONATE AND SALMETEROL 500; 50 UG/1; UG/1
500 POWDER RESPIRATORY (INHALATION) 2 TIMES DAILY
COMMUNITY
Start: 2020-07-25

## 2023-05-25 RX ORDER — ATORVASTATIN CALCIUM 10 MG/1
TABLET, FILM COATED ORAL
COMMUNITY
Start: 2022-07-16

## 2025-01-12 ENCOUNTER — APPOINTMENT (OUTPATIENT)
Facility: HOSPITAL | Age: 50
DRG: 004 | End: 2025-01-12
Payer: COMMERCIAL

## 2025-01-12 ENCOUNTER — HOSPITAL ENCOUNTER (INPATIENT)
Facility: HOSPITAL | Age: 50
LOS: 61 days | Discharge: NURSING, REHAB OR LONG-TERM CARE | DRG: 004 | End: 2025-03-14
Attending: STUDENT IN AN ORGANIZED HEALTH CARE EDUCATION/TRAINING PROGRAM | Admitting: STUDENT IN AN ORGANIZED HEALTH CARE EDUCATION/TRAINING PROGRAM
Payer: COMMERCIAL

## 2025-01-12 DIAGNOSIS — I48.92 ATRIAL FLUTTER, UNSPECIFIED TYPE (HCC): ICD-10-CM

## 2025-01-12 DIAGNOSIS — I48.19 PERSISTENT ATRIAL FIBRILLATION (HCC): ICD-10-CM

## 2025-01-12 DIAGNOSIS — I42.9 CARDIOMYOPATHY: ICD-10-CM

## 2025-01-12 DIAGNOSIS — I44.2 COMPLETE HEART BLOCK (HCC): ICD-10-CM

## 2025-01-12 DIAGNOSIS — J96.21 ACUTE ON CHRONIC HYPOXIC RESPIRATORY FAILURE: ICD-10-CM

## 2025-01-12 DIAGNOSIS — I50.9 CONGESTIVE HEART FAILURE, UNSPECIFIED HF CHRONICITY, UNSPECIFIED HEART FAILURE TYPE (HCC): ICD-10-CM

## 2025-01-12 DIAGNOSIS — I50.82 BIVENTRICULAR CONGESTIVE HEART FAILURE (HCC): ICD-10-CM

## 2025-01-12 DIAGNOSIS — J96.01 ACUTE HYPOXIC RESPIRATORY FAILURE: ICD-10-CM

## 2025-01-12 DIAGNOSIS — I48.91 ATRIAL FIBRILLATION, UNSPECIFIED TYPE (HCC): Primary | ICD-10-CM

## 2025-01-12 DIAGNOSIS — I21.4 NSTEMI (NON-ST ELEVATED MYOCARDIAL INFARCTION) (HCC): ICD-10-CM

## 2025-01-12 LAB
ABO + RH BLD: NORMAL
ALBUMIN SERPL-MCNC: 3 G/DL (ref 3.5–5)
ALBUMIN/GLOB SERPL: 0.8 (ref 1.1–2.2)
ALP SERPL-CCNC: 135 U/L (ref 45–117)
ALT SERPL-CCNC: 30 U/L (ref 12–78)
ANION GAP SERPL CALC-SCNC: 5 MMOL/L (ref 2–12)
APPEARANCE UR: ABNORMAL
APTT PPP: 25.8 SEC (ref 21.2–34.1)
AST SERPL W P-5'-P-CCNC: 33 U/L (ref 15–37)
BACTERIA URNS QL MICRO: ABNORMAL /HPF
BASOPHILS # BLD: 0.04 K/UL (ref 0–0.1)
BASOPHILS NFR BLD: 0.4 % (ref 0–1)
BILIRUB SERPL-MCNC: 0.9 MG/DL (ref 0.2–1)
BILIRUB UR QL: NEGATIVE
BLOOD GROUP ANTIBODIES SERPL: NEGATIVE
BNP SERPL-MCNC: 6682 PG/ML
BUN SERPL-MCNC: 20 MG/DL (ref 6–20)
BUN/CREAT SERPL: 17 (ref 12–20)
CA-I BLD-MCNC: 1.28 MMOL/L (ref 1.12–1.32)
CA-I BLD-MCNC: 9.1 MG/DL (ref 8.5–10.1)
CHLORIDE BLD-SCNC: 99 MMOL/L (ref 98–107)
CHLORIDE SERPL-SCNC: 102 MMOL/L (ref 97–108)
CO2 SERPL-SCNC: 28 MMOL/L (ref 21–32)
COLOR UR: ABNORMAL
CREAT SERPL-MCNC: 1.18 MG/DL (ref 0.55–1.02)
D DIMER PPP FEU-MCNC: 2.09 UG/ML(FEU)
DIFFERENTIAL METHOD BLD: ABNORMAL
ECHO AO ASC DIAM: 2.8 CM
ECHO AO ASCENDING AORTA INDEX: 1.27 CM/M2
ECHO AO ROOT DIAM: 2.5 CM
ECHO AO ROOT INDEX: 1.14 CM/M2
ECHO AV AREA PEAK VELOCITY: 1.3 CM2
ECHO AV AREA VTI: 1.3 CM2
ECHO AV AREA/BSA PEAK VELOCITY: 0.6 CM2/M2
ECHO AV AREA/BSA VTI: 0.6 CM2/M2
ECHO AV MEAN GRADIENT: 4 MMHG
ECHO AV MEAN VELOCITY: 0.9 M/S
ECHO AV PEAK GRADIENT: 7 MMHG
ECHO AV PEAK VELOCITY: 1.3 M/S
ECHO AV VELOCITY RATIO: 0.54
ECHO AV VTI: 25.2 CM
ECHO BSA: 2.29 M2
ECHO LA AREA 4C: 26.9 CM2
ECHO LA DIAMETER INDEX: 2.23 CM/M2
ECHO LA DIAMETER: 4.9 CM
ECHO LA MAJOR AXIS: 6.5 CM
ECHO LA TO AORTIC ROOT RATIO: 1.96
ECHO LA VOL MOD A4C: 90 ML (ref 22–52)
ECHO LA VOLUME INDEX MOD A4C: 41 ML/M2 (ref 16–34)
ECHO LV EDV A2C: 124 ML
ECHO LV EDV A4C: 144 ML
ECHO LV EDV INDEX A4C: 65 ML/M2
ECHO LV EDV NDEX A2C: 56 ML/M2
ECHO LV EJECTION FRACTION A2C: 30 %
ECHO LV EJECTION FRACTION A4C: 30 %
ECHO LV EJECTION FRACTION BIPLANE: 29 % (ref 55–100)
ECHO LV ESV A2C: 87 ML
ECHO LV ESV A4C: 101 ML
ECHO LV ESV INDEX A2C: 40 ML/M2
ECHO LV ESV INDEX A4C: 46 ML/M2
ECHO LV FRACTIONAL SHORTENING: 21 % (ref 28–44)
ECHO LV INTERNAL DIMENSION DIASTOLE INDEX: 2.41 CM/M2
ECHO LV INTERNAL DIMENSION DIASTOLIC: 5.3 CM (ref 3.9–5.3)
ECHO LV INTERNAL DIMENSION SYSTOLIC INDEX: 1.91 CM/M2
ECHO LV INTERNAL DIMENSION SYSTOLIC: 4.2 CM
ECHO LV IVSD: 1.1 CM (ref 0.6–0.9)
ECHO LV MASS 2D: 227.7 G (ref 67–162)
ECHO LV MASS INDEX 2D: 103.5 G/M2 (ref 43–95)
ECHO LV POSTERIOR WALL DIASTOLIC: 1.1 CM (ref 0.6–0.9)
ECHO LV RELATIVE WALL THICKNESS RATIO: 0.42
ECHO LVOT AREA: 2.5 CM2
ECHO LVOT AV VTI INDEX: 0.51
ECHO LVOT DIAM: 1.8 CM
ECHO LVOT MEAN GRADIENT: 1 MMHG
ECHO LVOT PEAK GRADIENT: 2 MMHG
ECHO LVOT PEAK VELOCITY: 0.7 M/S
ECHO LVOT STROKE VOLUME INDEX: 14.9 ML/M2
ECHO LVOT SV: 32.8 ML
ECHO LVOT VTI: 12.9 CM
ECHO MV AREA VTI: 0.9 CM2
ECHO MV LVOT VTI INDEX: 2.8
ECHO MV MAX VELOCITY: 0.9 M/S
ECHO MV MEAN GRADIENT: 1 MMHG
ECHO MV MEAN VELOCITY: 0.4 M/S
ECHO MV PEAK GRADIENT: 3 MMHG
ECHO MV VTI: 36.1 CM
ECHO PV MAX VELOCITY: 0.5 M/S
ECHO PV MEAN GRADIENT: 1 MMHG
ECHO PV MEAN VELOCITY: 0.4 M/S
ECHO PV PEAK GRADIENT: 1 MMHG
ECHO PV VTI: 9.5 CM
ECHO RA AREA 4C: 15.7 CM2
ECHO RA END SYSTOLIC VOLUME APICAL 4 CHAMBER INDEX BSA: 19 ML/M2
ECHO RA VOLUME: 42 ML
ECHO RV BASAL DIMENSION: 4.3 CM
ECHO RV FREE WALL PEAK S': 8.6 CM/S
ECHO RV TAPSE: 1.6 CM (ref 1.7–?)
ECHO TV REGURGITANT MAX VELOCITY: 2.58 M/S
ECHO TV REGURGITANT PEAK GRADIENT: 27 MMHG
EKG ATRIAL RATE: 256 BPM
EKG ATRIAL RATE: 258 BPM
EKG DIAGNOSIS: NORMAL
EKG DIAGNOSIS: NORMAL
EKG P AXIS: -81 DEGREES
EKG Q-T INTERVAL: 600 MS
EKG Q-T INTERVAL: 648 MS
EKG QRS DURATION: 148 MS
EKG QRS DURATION: 152 MS
EKG QTC CALCULATION (BAZETT): 536 MS
EKG QTC CALCULATION (BAZETT): 554 MS
EKG R AXIS: -49 DEGREES
EKG R AXIS: -51 DEGREES
EKG T AXIS: 134 DEGREES
EKG T AXIS: 141 DEGREES
EKG VENTRICULAR RATE: 44 BPM
EKG VENTRICULAR RATE: 48 BPM
EOSINOPHIL # BLD: 0 K/UL (ref 0–0.4)
EOSINOPHIL NFR BLD: 0 % (ref 0–7)
EPITH CASTS URNS QL MICRO: ABNORMAL /LPF
ERYTHROCYTE [DISTWIDTH] IN BLOOD BY AUTOMATED COUNT: 16.1 % (ref 11.5–14.5)
FLUAV RNA SPEC QL NAA+PROBE: NOT DETECTED
FLUBV RNA SPEC QL NAA+PROBE: NOT DETECTED
GLOBULIN SER CALC-MCNC: 4 G/DL (ref 2–4)
GLUCOSE BLD STRIP.AUTO-MCNC: 120 MG/DL (ref 65–100)
GLUCOSE BLD STRIP.AUTO-MCNC: 133 MG/DL (ref 65–100)
GLUCOSE BLD STRIP.AUTO-MCNC: 267 MG/DL (ref 65–100)
GLUCOSE SERPL-MCNC: 238 MG/DL (ref 65–100)
GLUCOSE UR STRIP.AUTO-MCNC: 50 MG/DL
HCT VFR BLD AUTO: 47.5 % (ref 35–47)
HGB BLD-MCNC: 14 G/DL (ref 11.5–16)
HGB UR QL STRIP: NEGATIVE
IMM GRANULOCYTES # BLD AUTO: 0.13 K/UL (ref 0–0.04)
IMM GRANULOCYTES NFR BLD AUTO: 1.3 % (ref 0–0.5)
INR PPP: 1.1 (ref 0.9–1.1)
KETONES UR QL STRIP.AUTO: NEGATIVE MG/DL
LACTATE SERPL-SCNC: 0.8 MMOL/L (ref 0.4–2)
LACTATE SERPL-SCNC: 1.6 MMOL/L (ref 0.4–2)
LACTATE SERPL-SCNC: 2.6 MMOL/L (ref 0.4–2)
LEUKOCYTE ESTERASE UR QL STRIP.AUTO: ABNORMAL
LYMPHOCYTES # BLD: 0.96 K/UL (ref 0.8–3.5)
LYMPHOCYTES NFR BLD: 9.4 % (ref 12–49)
MAGNESIUM SERPL-MCNC: 2.3 MG/DL (ref 1.6–2.4)
MCH RBC QN AUTO: 29 PG (ref 26–34)
MCHC RBC AUTO-ENTMCNC: 29.5 G/DL (ref 30–36.5)
MCV RBC AUTO: 98.3 FL (ref 80–99)
MONOCYTES # BLD: 0.76 K/UL (ref 0–1)
MONOCYTES NFR BLD: 7.4 % (ref 5–13)
MRSA DNA SPEC QL NAA+PROBE: NOT DETECTED
NEUTS SEG # BLD: 8.33 K/UL (ref 1.8–8)
NEUTS SEG NFR BLD: 81.5 % (ref 32–75)
NITRITE UR QL STRIP.AUTO: NEGATIVE
NRBC # BLD: 0 K/UL (ref 0–0.01)
NRBC BLD-RTO: 0 PER 100 WBC
PERFORMED BY:: ABNORMAL
PERFORMED BY:: ABNORMAL
PH UR STRIP: 5 (ref 5–8)
PLATELET # BLD AUTO: 269 K/UL (ref 150–400)
PMV BLD AUTO: 10 FL (ref 8.9–12.9)
POTASSIUM BLD-SCNC: 4.9 MMOL/L (ref 3.5–5.5)
POTASSIUM SERPL-SCNC: 5 MMOL/L (ref 3.5–5.1)
PROCALCITONIN SERPL-MCNC: 0.13 NG/ML
PROT SERPL-MCNC: 7 G/DL (ref 6.4–8.2)
PROT UR STRIP-MCNC: NEGATIVE MG/DL
PROTHROMBIN TIME: 14.2 SEC (ref 11.9–14.6)
RBC # BLD AUTO: 4.83 M/UL (ref 3.8–5.2)
RBC #/AREA URNS HPF: ABNORMAL /HPF (ref 0–5)
SARS-COV-2 RNA RESP QL NAA+PROBE: NOT DETECTED
SODIUM BLD-SCNC: 137 MMOL/L (ref 136–145)
SODIUM SERPL-SCNC: 135 MMOL/L (ref 136–145)
SP GR UR REFRACTOMETRY: 1.01 (ref 1–1.03)
SPECIMEN EXP DATE BLD: NORMAL
THERAPEUTIC RANGE: NORMAL SEC (ref 82–109)
TROPONIN I SERPL HS-MCNC: 303 NG/L (ref 0–51)
TROPONIN I SERPL HS-MCNC: 405 NG/L (ref 0–51)
TROPONIN I SERPL HS-MCNC: 555 NG/L (ref 0–51)
UFH PPP CHRO-ACNC: 0.3 IU/ML
UFH PPP CHRO-ACNC: <0.1 IU/ML
UFH PPP CHRO-ACNC: <0.1 IU/ML
URINE CULTURE IF INDICATED: ABNORMAL
UROBILINOGEN UR QL STRIP.AUTO: 0.1 EU/DL (ref 0.1–1)
WBC # BLD AUTO: 10.2 K/UL (ref 3.6–11)
WBC URNS QL MICRO: ABNORMAL /HPF (ref 0–4)

## 2025-01-12 PROCEDURE — 6370000000 HC RX 637 (ALT 250 FOR IP): Performed by: HOSPITALIST

## 2025-01-12 PROCEDURE — 86901 BLOOD TYPING SEROLOGIC RH(D): CPT

## 2025-01-12 PROCEDURE — 86850 RBC ANTIBODY SCREEN: CPT

## 2025-01-12 PROCEDURE — 93005 ELECTROCARDIOGRAM TRACING: CPT | Performed by: STUDENT IN AN ORGANIZED HEALTH CARE EDUCATION/TRAINING PROGRAM

## 2025-01-12 PROCEDURE — 6370000000 HC RX 637 (ALT 250 FOR IP): Performed by: STUDENT IN AN ORGANIZED HEALTH CARE EDUCATION/TRAINING PROGRAM

## 2025-01-12 PROCEDURE — 87636 SARSCOV2 & INF A&B AMP PRB: CPT

## 2025-01-12 PROCEDURE — 81001 URINALYSIS AUTO W/SCOPE: CPT

## 2025-01-12 PROCEDURE — 82962 GLUCOSE BLOOD TEST: CPT

## 2025-01-12 PROCEDURE — 2500000003 HC RX 250 WO HCPCS: Performed by: STUDENT IN AN ORGANIZED HEALTH CARE EDUCATION/TRAINING PROGRAM

## 2025-01-12 PROCEDURE — C8929 TTE W OR WO FOL WCON,DOPPLER: HCPCS

## 2025-01-12 PROCEDURE — 99285 EMERGENCY DEPT VISIT HI MDM: CPT

## 2025-01-12 PROCEDURE — 85610 PROTHROMBIN TIME: CPT

## 2025-01-12 PROCEDURE — 6370000000 HC RX 637 (ALT 250 FOR IP): Performed by: NURSE PRACTITIONER

## 2025-01-12 PROCEDURE — 94640 AIRWAY INHALATION TREATMENT: CPT

## 2025-01-12 PROCEDURE — 84439 ASSAY OF FREE THYROXINE: CPT

## 2025-01-12 PROCEDURE — 2000000000 HC ICU R&B

## 2025-01-12 PROCEDURE — 83735 ASSAY OF MAGNESIUM: CPT

## 2025-01-12 PROCEDURE — 83605 ASSAY OF LACTIC ACID: CPT

## 2025-01-12 PROCEDURE — 85520 HEPARIN ASSAY: CPT

## 2025-01-12 PROCEDURE — 84484 ASSAY OF TROPONIN QUANT: CPT

## 2025-01-12 PROCEDURE — 5A0945A ASSISTANCE WITH RESPIRATORY VENTILATION, 24-96 CONSECUTIVE HOURS, HIGH NASAL FLOW/VELOCITY: ICD-10-PCS | Performed by: STUDENT IN AN ORGANIZED HEALTH CARE EDUCATION/TRAINING PROGRAM

## 2025-01-12 PROCEDURE — 84443 ASSAY THYROID STIM HORMONE: CPT

## 2025-01-12 PROCEDURE — 6360000004 HC RX CONTRAST MEDICATION

## 2025-01-12 PROCEDURE — 80047 BASIC METABLC PNL IONIZED CA: CPT

## 2025-01-12 PROCEDURE — 87641 MR-STAPH DNA AMP PROBE: CPT

## 2025-01-12 PROCEDURE — 85730 THROMBOPLASTIN TIME PARTIAL: CPT

## 2025-01-12 PROCEDURE — 83880 ASSAY OF NATRIURETIC PEPTIDE: CPT

## 2025-01-12 PROCEDURE — 87040 BLOOD CULTURE FOR BACTERIA: CPT

## 2025-01-12 PROCEDURE — 96374 THER/PROPH/DIAG INJ IV PUSH: CPT

## 2025-01-12 PROCEDURE — 6360000002 HC RX W HCPCS: Performed by: HOSPITALIST

## 2025-01-12 PROCEDURE — 71045 X-RAY EXAM CHEST 1 VIEW: CPT

## 2025-01-12 PROCEDURE — 6360000002 HC RX W HCPCS: Performed by: STUDENT IN AN ORGANIZED HEALTH CARE EDUCATION/TRAINING PROGRAM

## 2025-01-12 PROCEDURE — 82533 TOTAL CORTISOL: CPT

## 2025-01-12 PROCEDURE — 86900 BLOOD TYPING SEROLOGIC ABO: CPT

## 2025-01-12 PROCEDURE — 93970 EXTREMITY STUDY: CPT

## 2025-01-12 PROCEDURE — 6360000002 HC RX W HCPCS: Performed by: NURSE PRACTITIONER

## 2025-01-12 PROCEDURE — 85025 COMPLETE CBC W/AUTO DIFF WBC: CPT

## 2025-01-12 PROCEDURE — 85379 FIBRIN DEGRADATION QUANT: CPT

## 2025-01-12 PROCEDURE — 80053 COMPREHEN METABOLIC PANEL: CPT

## 2025-01-12 PROCEDURE — 84145 PROCALCITONIN (PCT): CPT

## 2025-01-12 RX ORDER — TRAZODONE HYDROCHLORIDE 50 MG/1
50 TABLET ORAL DAILY
Status: DISCONTINUED | OUTPATIENT
Start: 2025-01-12 | End: 2025-01-24

## 2025-01-12 RX ORDER — ONDANSETRON 2 MG/ML
4 INJECTION INTRAMUSCULAR; INTRAVENOUS EVERY 6 HOURS PRN
Status: DISCONTINUED | OUTPATIENT
Start: 2025-01-12 | End: 2025-01-13

## 2025-01-12 RX ORDER — ACETAMINOPHEN 650 MG/1
650 SUPPOSITORY RECTAL EVERY 6 HOURS PRN
Status: DISCONTINUED | OUTPATIENT
Start: 2025-01-12 | End: 2025-01-20

## 2025-01-12 RX ORDER — LOSARTAN POTASSIUM 25 MG/1
25 TABLET ORAL DAILY
Status: DISCONTINUED | OUTPATIENT
Start: 2025-01-12 | End: 2025-01-12

## 2025-01-12 RX ORDER — MAGNESIUM SULFATE IN WATER 40 MG/ML
2000 INJECTION, SOLUTION INTRAVENOUS PRN
Status: DISCONTINUED | OUTPATIENT
Start: 2025-01-12 | End: 2025-03-14 | Stop reason: HOSPADM

## 2025-01-12 RX ORDER — TRAZODONE HYDROCHLORIDE 50 MG/1
1 TABLET ORAL DAILY
Status: ON HOLD | COMMUNITY
Start: 2024-11-10 | End: 2025-03-14 | Stop reason: HOSPADM

## 2025-01-12 RX ORDER — BUPROPION HYDROCHLORIDE 150 MG/1
1 TABLET ORAL DAILY
Status: ON HOLD | COMMUNITY
End: 2025-03-14 | Stop reason: HOSPADM

## 2025-01-12 RX ORDER — ASPIRIN 81 MG/1
81 TABLET ORAL DAILY
Status: DISCONTINUED | OUTPATIENT
Start: 2025-01-12 | End: 2025-01-21

## 2025-01-12 RX ORDER — DULOXETIN HYDROCHLORIDE 30 MG/1
30 CAPSULE, DELAYED RELEASE ORAL DAILY
Status: ON HOLD | COMMUNITY
Start: 2025-01-09 | End: 2025-03-14 | Stop reason: HOSPADM

## 2025-01-12 RX ORDER — SODIUM CHLORIDE 0.9 % (FLUSH) 0.9 %
5-40 SYRINGE (ML) INJECTION EVERY 12 HOURS SCHEDULED
Status: DISCONTINUED | OUTPATIENT
Start: 2025-01-12 | End: 2025-03-14 | Stop reason: HOSPADM

## 2025-01-12 RX ORDER — POTASSIUM CHLORIDE 7.45 MG/ML
10 INJECTION INTRAVENOUS PRN
Status: DISCONTINUED | OUTPATIENT
Start: 2025-01-12 | End: 2025-03-14 | Stop reason: HOSPADM

## 2025-01-12 RX ORDER — FUROSEMIDE 10 MG/ML
40 INJECTION INTRAMUSCULAR; INTRAVENOUS 2 TIMES DAILY
Status: DISCONTINUED | OUTPATIENT
Start: 2025-01-12 | End: 2025-01-13

## 2025-01-12 RX ORDER — ATORVASTATIN CALCIUM 20 MG/1
20 TABLET, FILM COATED ORAL NIGHTLY
Status: DISCONTINUED | OUTPATIENT
Start: 2025-01-12 | End: 2025-01-20

## 2025-01-12 RX ORDER — BUDESONIDE 0.5 MG/2ML
0.5 INHALANT ORAL
Status: DISCONTINUED | OUTPATIENT
Start: 2025-01-12 | End: 2025-03-14 | Stop reason: HOSPADM

## 2025-01-12 RX ORDER — HEPARIN SODIUM 1000 [USP'U]/ML
2000 INJECTION, SOLUTION INTRAVENOUS; SUBCUTANEOUS PRN
Status: DISCONTINUED | OUTPATIENT
Start: 2025-01-12 | End: 2025-01-16

## 2025-01-12 RX ORDER — ENOXAPARIN SODIUM 100 MG/ML
30 INJECTION SUBCUTANEOUS 2 TIMES DAILY
Status: DISCONTINUED | OUTPATIENT
Start: 2025-01-12 | End: 2025-01-12

## 2025-01-12 RX ORDER — INSULIN LISPRO 100 [IU]/ML
0-4 INJECTION, SOLUTION INTRAVENOUS; SUBCUTANEOUS
Status: DISCONTINUED | OUTPATIENT
Start: 2025-01-12 | End: 2025-01-14

## 2025-01-12 RX ORDER — HEPARIN SODIUM 1000 [USP'U]/ML
4000 INJECTION, SOLUTION INTRAVENOUS; SUBCUTANEOUS ONCE
Status: COMPLETED | OUTPATIENT
Start: 2025-01-12 | End: 2025-01-12

## 2025-01-12 RX ORDER — POLYETHYLENE GLYCOL 3350 17 G/17G
17 POWDER, FOR SOLUTION ORAL DAILY
Status: DISCONTINUED | OUTPATIENT
Start: 2025-01-12 | End: 2025-01-20

## 2025-01-12 RX ORDER — SENNOSIDES A AND B 8.6 MG/1
1 TABLET, FILM COATED ORAL DAILY PRN
Status: DISCONTINUED | OUTPATIENT
Start: 2025-01-12 | End: 2025-02-13

## 2025-01-12 RX ORDER — GLUCAGON 1 MG/ML
1 KIT INJECTION PRN
Status: DISCONTINUED | OUTPATIENT
Start: 2025-01-12 | End: 2025-02-08

## 2025-01-12 RX ORDER — ETODOLAC 400 MG/1
400 TABLET, FILM COATED ORAL 2 TIMES DAILY
Status: ON HOLD | COMMUNITY
End: 2025-03-14 | Stop reason: HOSPADM

## 2025-01-12 RX ORDER — SODIUM CHLORIDE 0.9 % (FLUSH) 0.9 %
5-40 SYRINGE (ML) INJECTION PRN
Status: DISCONTINUED | OUTPATIENT
Start: 2025-01-12 | End: 2025-03-14 | Stop reason: HOSPADM

## 2025-01-12 RX ORDER — LOSARTAN POTASSIUM 25 MG/1
25 TABLET ORAL DAILY
Status: DISCONTINUED | OUTPATIENT
Start: 2025-01-12 | End: 2025-02-04

## 2025-01-12 RX ORDER — HEPARIN SODIUM 10000 [USP'U]/100ML
5-30 INJECTION, SOLUTION INTRAVENOUS CONTINUOUS
Status: DISCONTINUED | OUTPATIENT
Start: 2025-01-12 | End: 2025-01-16

## 2025-01-12 RX ORDER — POTASSIUM CHLORIDE 29.8 MG/ML
20 INJECTION INTRAVENOUS PRN
Status: DISCONTINUED | OUTPATIENT
Start: 2025-01-12 | End: 2025-03-14 | Stop reason: HOSPADM

## 2025-01-12 RX ORDER — ONDANSETRON 4 MG/1
4 TABLET, ORALLY DISINTEGRATING ORAL EVERY 8 HOURS PRN
Status: DISCONTINUED | OUTPATIENT
Start: 2025-01-12 | End: 2025-01-13

## 2025-01-12 RX ORDER — DULOXETIN HYDROCHLORIDE 30 MG/1
30 CAPSULE, DELAYED RELEASE ORAL DAILY
Status: DISCONTINUED | OUTPATIENT
Start: 2025-01-13 | End: 2025-01-27

## 2025-01-12 RX ORDER — DEXTROSE MONOHYDRATE 100 MG/ML
INJECTION, SOLUTION INTRAVENOUS CONTINUOUS PRN
Status: DISCONTINUED | OUTPATIENT
Start: 2025-01-12 | End: 2025-03-14 | Stop reason: HOSPADM

## 2025-01-12 RX ORDER — BUPROPION HYDROCHLORIDE 150 MG/1
150 TABLET ORAL DAILY
Status: DISCONTINUED | OUTPATIENT
Start: 2025-01-13 | End: 2025-01-24

## 2025-01-12 RX ORDER — IOPAMIDOL 755 MG/ML
100 INJECTION, SOLUTION INTRAVASCULAR
Status: DISCONTINUED | OUTPATIENT
Start: 2025-01-12 | End: 2025-01-30 | Stop reason: HOSPADM

## 2025-01-12 RX ORDER — ACETAMINOPHEN 325 MG/1
650 TABLET ORAL EVERY 6 HOURS PRN
Status: DISCONTINUED | OUTPATIENT
Start: 2025-01-12 | End: 2025-01-20

## 2025-01-12 RX ORDER — HEPARIN SODIUM 1000 [USP'U]/ML
4000 INJECTION, SOLUTION INTRAVENOUS; SUBCUTANEOUS PRN
Status: DISCONTINUED | OUTPATIENT
Start: 2025-01-12 | End: 2025-01-16

## 2025-01-12 RX ORDER — SODIUM CHLORIDE 9 MG/ML
INJECTION, SOLUTION INTRAVENOUS PRN
Status: DISCONTINUED | OUTPATIENT
Start: 2025-01-12 | End: 2025-03-14 | Stop reason: HOSPADM

## 2025-01-12 RX ORDER — FUROSEMIDE 10 MG/ML
40 INJECTION INTRAMUSCULAR; INTRAVENOUS ONCE
Status: COMPLETED | OUTPATIENT
Start: 2025-01-12 | End: 2025-01-12

## 2025-01-12 RX ADMIN — ATORVASTATIN CALCIUM 20 MG: 20 TABLET, FILM COATED ORAL at 20:18

## 2025-01-12 RX ADMIN — FUROSEMIDE 40 MG: 10 INJECTION, SOLUTION INTRAMUSCULAR; INTRAVENOUS at 10:38

## 2025-01-12 RX ADMIN — FUROSEMIDE 40 MG: 10 INJECTION, SOLUTION INTRAMUSCULAR; INTRAVENOUS at 17:56

## 2025-01-12 RX ADMIN — ONDANSETRON 4 MG: 2 INJECTION INTRAMUSCULAR; INTRAVENOUS at 19:02

## 2025-01-12 RX ADMIN — HEPARIN SODIUM 4000 UNITS: 1000 INJECTION INTRAVENOUS; SUBCUTANEOUS at 12:31

## 2025-01-12 RX ADMIN — BUDESONIDE 500 MCG: 0.5 INHALANT ORAL at 20:32

## 2025-01-12 RX ADMIN — HEPARIN SODIUM 8 UNITS/KG/HR: 10000 INJECTION, SOLUTION INTRAVENOUS at 12:35

## 2025-01-12 RX ADMIN — IPRATROPIUM BROMIDE 0.5 MG: 0.5 SOLUTION RESPIRATORY (INHALATION) at 20:32

## 2025-01-12 RX ADMIN — PERFLUTREN 2 ML: 6.52 INJECTION, SUSPENSION INTRAVENOUS at 12:56

## 2025-01-12 RX ADMIN — SODIUM CHLORIDE, PRESERVATIVE FREE 10 ML: 5 INJECTION INTRAVENOUS at 20:19

## 2025-01-12 RX ADMIN — ASPIRIN 81 MG: 81 TABLET, COATED ORAL at 12:41

## 2025-01-12 RX ADMIN — POLYETHYLENE GLYCOL 3350 17 G: 17 POWDER, FOR SOLUTION ORAL at 12:41

## 2025-01-12 RX ADMIN — SODIUM CHLORIDE, PRESERVATIVE FREE 10 ML: 5 INJECTION INTRAVENOUS at 12:41

## 2025-01-12 RX ADMIN — LOSARTAN POTASSIUM 25 MG: 25 TABLET, FILM COATED ORAL at 17:56

## 2025-01-12 ASSESSMENT — PAIN - FUNCTIONAL ASSESSMENT: PAIN_FUNCTIONAL_ASSESSMENT: NONE - DENIES PAIN

## 2025-01-12 ASSESSMENT — PAIN SCALES - GENERAL: PAINLEVEL_OUTOF10: 0

## 2025-01-12 NOTE — ED TRIAGE NOTES
Gcs 15, ems called for sob, sats ra 59%, 6LNC applied, sats 92%, speaking in clear sentences, hx of asthma

## 2025-01-12 NOTE — CONSULTS
CARDIOLOGY CONSULTATION    REASON FOR CONSULT: Aflutter    REQUESTING PROVIDER: Tee Suarez MD     CHIEF COMPLAINT:  Shortness of breath, hypoxia    HISTORY OF PRESENT ILLNESS:  Nida Graves is a 49 y.o. year-old female with past medical history significant for diabetes, asthma who was evaluated today due to atrial flutter, SVR.  assists w/ history. Her Mounjaro was recently increased to 7.5mg, since that time has been short of breath but this has progressively worsened. Over the last couple of days has been weak, unable to get up by herself. A friend who is a nurse checked her O2 sat, noted to be 59, HR in the 40s. EMS was called. She required 6L to maintain sat 92%. In the ED, work up ongoing. Troponin 303, lactic acid 2.6, proBNP 6682, glu 238,  creat 1.18. CXR w/ small bilateral pleural effusions, bilat lower lobe atelectasis. EKG w/ atrial flutter, variable, HR 40-50s. On exam, she denies chest pain. Reports ongoing SOB.     Records from hospital admission course thus far reviewed.      Telemetry reviewed.     INPATIENT MEDICATIONS:  Home medications reviewed.    Current Facility-Administered Medications:     iopamidol (ISOVUE-370) 76 % injection 100 mL, 100 mL, IntraVENous, ONCE PRN, Tee Suarez MD    sodium chloride flush 0.9 % injection 5-40 mL, 5-40 mL, IntraVENous, 2 times per day, Álvaro, Aalok R, DO    sodium chloride flush 0.9 % injection 5-40 mL, 5-40 mL, IntraVENous, PRN, Álvaro, Aalok R, DO    0.9 % sodium chloride infusion, , IntraVENous, PRN, Álvaro, Aalok R, DO    potassium chloride 20 mEq/50 mL IVPB (Central Line), 20 mEq, IntraVENous, PRN **OR** potassium chloride 10 mEq/100 mL IVPB (Peripheral Line), 10 mEq, IntraVENous, PRN, Álvaro, Aalok R, DO    magnesium sulfate 2000 mg in 50 mL IVPB premix, 2,000 mg, IntraVENous, PRN, Álvaro, Aalok R, DO    ondansetron (ZOFRAN-ODT) disintegrating tablet 4 mg, 4 mg, Oral, Q8H PRN **OR** ondansetron (ZOFRAN) injection 4 mg, 4 mg,  involving us in the care of this patient.  Please do not hesitate to call me or Dr. Brush if additional questions arise.    XIMENA Perez  1/12/2025

## 2025-01-12 NOTE — ED PROVIDER NOTES
EMERGENCY DEPARTMENT HISTORY AND PHYSICAL EXAM      Date: 2025  Patient Name: Nida Graves  MRN: [id number]  YOB: 1975  Date of evaluation: 2025  Provider: Tee Suarez MD     History of Present Illness     Chief Complaint   Patient presents with    Shortness of Breath       History Provided By: Patient, EMS    HPI: Nida Graves, 49 y.o. female with past medical history as listed and reviewed below presenting to the ED for shortness of breath and hypoxia.  She notes worsening shortness of breath over the last few days.  Per EMS was found to be about 59% on room air.  Had improvement with 6 L nasal cannula to 92%.  Patient speaking full sentences.  She denies any significant past medical history but has poor follow-up overall.  She denies any chest pain.  He denies any recent leg swelling, denies any alcohol or drug abuse.  She denies any recent fever or chills.    Medical History     Past Medical History:  Past Medical History:   Diagnosis Date    Allergy     Asthma     Diabetes (HCC)        Past Surgical History:  Past Surgical History:   Procedure Laterality Date     SECTION      X 2    GYN      novasure, csection scar revision       Family History:  Family History   Problem Relation Age of Onset    Cancer Other         aunt with colon ca    Stroke Neg Hx     Heart Attack Neg Hx     Hypertension Father     Cancer Father         neck ca    Thyroid Disease Mother     Diabetes Mother     Hypertension Mother     Cancer Mother         cervical ca       Social History:  Social History     Tobacco Use    Smoking status: Never    Smokeless tobacco: Never   Substance Use Topics    Alcohol use: No    Drug use: No       Allergies:  Allergies   Allergen Reactions    Latex Swelling    Penicillins Hives    Codeine Anxiety       PCP: Luis Lees, APRN - CNP    Current Medications:   Current Facility-Administered Medications   Medication Dose Route Frequency Provider Last Rate Last Admin     Hematocrit 47.5 (H) 35.0 - 47.0 %    MCV 98.3 80.0 - 99.0 FL    MCH 29.0 26.0 - 34.0 PG    MCHC 29.5 (L) 30.0 - 36.5 g/dL    RDW 16.1 (H) 11.5 - 14.5 %    Platelets 269 150 - 400 K/uL    MPV 10.0 8.9 - 12.9 FL    Nucleated RBCs 0.0 0.0  WBC    nRBC 0.00 0.00 - 0.01 K/uL    Neutrophils % 81.5 (H) 32.0 - 75.0 %    Lymphocytes % 9.4 (L) 12.0 - 49.0 %    Monocytes % 7.4 5.0 - 13.0 %    Eosinophils % 0.0 0.0 - 7.0 %    Basophils % 0.4 0.0 - 1.0 %    Immature Granulocytes % 1.3 (H) 0 - 0.5 %    Neutrophils Absolute 8.33 (H) 1.80 - 8.00 K/UL    Lymphocytes Absolute 0.96 0.80 - 3.50 K/UL    Monocytes Absolute 0.76 0.00 - 1.00 K/UL    Eosinophils Absolute 0.00 0.00 - 0.40 K/UL    Basophils Absolute 0.04 0.00 - 0.10 K/UL    Immature Granulocytes Absolute 0.13 (H) 0.00 - 0.04 K/UL    Differential Type AUTOMATED     Comprehensive Metabolic Panel    Collection Time: 01/12/25  9:10 AM   Result Value Ref Range    Sodium 135 (L) 136 - 145 mmol/L    Potassium 5.0 3.5 - 5.1 mmol/L    Chloride 102 97 - 108 mmol/L    CO2 28 21 - 32 mmol/L    Anion Gap 5 2 - 12 mmol/L    Glucose 238 (H) 65 - 100 mg/dL    BUN 20 6 - 20 mg/dL    Creatinine 1.18 (H) 0.55 - 1.02 mg/dL    BUN/Creatinine Ratio 17 12 - 20      Est, Glom Filt Rate 57 (L) >60 ml/min/1.73m2    Calcium 9.1 8.5 - 10.1 mg/dL    Total Bilirubin 0.9 0.2 - 1.0 mg/dL    AST 33 15 - 37 U/L    ALT 30 12 - 78 U/L    Alk Phosphatase 135 (H) 45 - 117 U/L    Total Protein 7.0 6.4 - 8.2 g/dL    Albumin 3.0 (L) 3.5 - 5.0 g/dL    Globulin 4.0 2.0 - 4.0 g/dL    Albumin/Globulin Ratio 0.8 (L) 1.1 - 2.2     COVID-19 & Influenza Combo    Collection Time: 01/12/25  9:10 AM    Specimen: Nasopharyngeal   Result Value Ref Range    SARS-CoV-2, PCR Not Detected Not Detected      Rapid Influenza A By PCR Not Detected Not Detected      Rapid Influenza B By PCR Not Detected Not Detected     Lactate, Sepsis    Collection Time: 01/12/25  9:10 AM   Result Value Ref Range    Lactic Acid, Sepsis 2.6 (HH)

## 2025-01-13 LAB
ALBUMIN SERPL-MCNC: 3.2 G/DL (ref 3.5–5)
ALBUMIN/GLOB SERPL: 0.8 (ref 1.1–2.2)
ALP SERPL-CCNC: 128 U/L (ref 45–117)
ALT SERPL-CCNC: 31 U/L (ref 12–78)
ANION GAP SERPL CALC-SCNC: 4 MMOL/L (ref 2–12)
AST SERPL W P-5'-P-CCNC: 35 U/L (ref 15–37)
BASOPHILS # BLD: 0.05 K/UL (ref 0–0.1)
BASOPHILS NFR BLD: 0.3 % (ref 0–1)
BILIRUB SERPL-MCNC: 0.9 MG/DL (ref 0.2–1)
BUN SERPL-MCNC: 17 MG/DL (ref 6–20)
BUN/CREAT SERPL: 18 (ref 12–20)
CA-I BLD-MCNC: 8.7 MG/DL (ref 8.5–10.1)
CHLORIDE SERPL-SCNC: 101 MMOL/L (ref 97–108)
CO2 SERPL-SCNC: 34 MMOL/L (ref 21–32)
CREAT SERPL-MCNC: 0.93 MG/DL (ref 0.55–1.02)
DIFFERENTIAL METHOD BLD: ABNORMAL
ECHO BSA: 2.29 M2
EOSINOPHIL # BLD: 0.17 K/UL (ref 0–0.4)
EOSINOPHIL NFR BLD: 1.1 % (ref 0–7)
ERYTHROCYTE [DISTWIDTH] IN BLOOD BY AUTOMATED COUNT: 15.8 % (ref 11.5–14.5)
EST. AVERAGE GLUCOSE BLD GHB EST-MCNC: 123 MG/DL
GLOBULIN SER CALC-MCNC: 4 G/DL (ref 2–4)
GLUCOSE BLD STRIP.AUTO-MCNC: 141 MG/DL (ref 65–100)
GLUCOSE BLD STRIP.AUTO-MCNC: 143 MG/DL (ref 65–100)
GLUCOSE BLD STRIP.AUTO-MCNC: 145 MG/DL (ref 65–100)
GLUCOSE BLD STRIP.AUTO-MCNC: 146 MG/DL (ref 65–100)
GLUCOSE BLD STRIP.AUTO-MCNC: 147 MG/DL (ref 65–100)
GLUCOSE SERPL-MCNC: 142 MG/DL (ref 65–100)
HBA1C MFR BLD: 5.9 % (ref 4–5.6)
HCT VFR BLD AUTO: 48.4 % (ref 35–47)
HGB BLD-MCNC: 14.5 G/DL (ref 11.5–16)
IMM GRANULOCYTES # BLD AUTO: 0.09 K/UL (ref 0–0.04)
IMM GRANULOCYTES NFR BLD AUTO: 0.6 % (ref 0–0.5)
LYMPHOCYTES # BLD: 2.42 K/UL (ref 0.8–3.5)
LYMPHOCYTES NFR BLD: 15.9 % (ref 12–49)
MAGNESIUM SERPL-MCNC: 2.1 MG/DL (ref 1.6–2.4)
MCH RBC QN AUTO: 28.8 PG (ref 26–34)
MCHC RBC AUTO-ENTMCNC: 30 G/DL (ref 30–36.5)
MCV RBC AUTO: 96 FL (ref 80–99)
MONOCYTES # BLD: 1.28 K/UL (ref 0–1)
MONOCYTES NFR BLD: 8.4 % (ref 5–13)
NEUTS SEG # BLD: 11.19 K/UL (ref 1.8–8)
NEUTS SEG NFR BLD: 73.7 % (ref 32–75)
NRBC # BLD: 0 K/UL (ref 0–0.01)
NRBC BLD-RTO: 0 PER 100 WBC
PERFORMED BY:: ABNORMAL
PHOSPHATE SERPL-MCNC: 3.3 MG/DL (ref 2.6–4.7)
PLATELET # BLD AUTO: 264 K/UL (ref 150–400)
PMV BLD AUTO: 9.7 FL (ref 8.9–12.9)
POTASSIUM SERPL-SCNC: 3.8 MMOL/L (ref 3.5–5.1)
PROT SERPL-MCNC: 7.2 G/DL (ref 6.4–8.2)
RBC # BLD AUTO: 5.04 M/UL (ref 3.8–5.2)
SODIUM SERPL-SCNC: 139 MMOL/L (ref 136–145)
T4 FREE SERPL-MCNC: 1.4 NG/DL (ref 0.8–1.5)
TROPONIN I SERPL HS-MCNC: 552 NG/L (ref 0–51)
TROPONIN I SERPL HS-MCNC: 631 NG/L (ref 0–51)
TROPONIN I SERPL HS-MCNC: 632 NG/L (ref 0–51)
TSH SERPL DL<=0.05 MIU/L-ACNC: 1.04 UIU/ML (ref 0.36–3.74)
UFH PPP CHRO-ACNC: 0.15 IU/ML
UFH PPP CHRO-ACNC: 0.36 IU/ML
UFH PPP CHRO-ACNC: 0.41 IU/ML
WBC # BLD AUTO: 15.2 K/UL (ref 3.6–11)

## 2025-01-13 PROCEDURE — 84100 ASSAY OF PHOSPHORUS: CPT

## 2025-01-13 PROCEDURE — 82962 GLUCOSE BLOOD TEST: CPT

## 2025-01-13 PROCEDURE — 2500000003 HC RX 250 WO HCPCS: Performed by: STUDENT IN AN ORGANIZED HEALTH CARE EDUCATION/TRAINING PROGRAM

## 2025-01-13 PROCEDURE — 2000000000 HC ICU R&B

## 2025-01-13 PROCEDURE — 6360000002 HC RX W HCPCS: Performed by: STUDENT IN AN ORGANIZED HEALTH CARE EDUCATION/TRAINING PROGRAM

## 2025-01-13 PROCEDURE — 93970 EXTREMITY STUDY: CPT | Performed by: SURGERY

## 2025-01-13 PROCEDURE — 6370000000 HC RX 637 (ALT 250 FOR IP): Performed by: INTERNAL MEDICINE

## 2025-01-13 PROCEDURE — 6370000000 HC RX 637 (ALT 250 FOR IP): Performed by: PHYSICIAN ASSISTANT

## 2025-01-13 PROCEDURE — 80053 COMPREHEN METABOLIC PANEL: CPT

## 2025-01-13 PROCEDURE — 94761 N-INVAS EAR/PLS OXIMETRY MLT: CPT

## 2025-01-13 PROCEDURE — 83036 HEMOGLOBIN GLYCOSYLATED A1C: CPT

## 2025-01-13 PROCEDURE — 82533 TOTAL CORTISOL: CPT

## 2025-01-13 PROCEDURE — 84443 ASSAY THYROID STIM HORMONE: CPT

## 2025-01-13 PROCEDURE — 94640 AIRWAY INHALATION TREATMENT: CPT

## 2025-01-13 PROCEDURE — 6360000002 HC RX W HCPCS: Performed by: HOSPITALIST

## 2025-01-13 PROCEDURE — 2700000000 HC OXYGEN THERAPY PER DAY

## 2025-01-13 PROCEDURE — 6370000000 HC RX 637 (ALT 250 FOR IP): Performed by: STUDENT IN AN ORGANIZED HEALTH CARE EDUCATION/TRAINING PROGRAM

## 2025-01-13 PROCEDURE — 6370000000 HC RX 637 (ALT 250 FOR IP): Performed by: HOSPITALIST

## 2025-01-13 PROCEDURE — 6360000002 HC RX W HCPCS: Performed by: NURSE PRACTITIONER

## 2025-01-13 PROCEDURE — 36415 COLL VENOUS BLD VENIPUNCTURE: CPT

## 2025-01-13 PROCEDURE — 6370000000 HC RX 637 (ALT 250 FOR IP): Performed by: NURSE PRACTITIONER

## 2025-01-13 PROCEDURE — 84439 ASSAY OF FREE THYROXINE: CPT

## 2025-01-13 PROCEDURE — 85520 HEPARIN ASSAY: CPT

## 2025-01-13 PROCEDURE — 85025 COMPLETE CBC W/AUTO DIFF WBC: CPT

## 2025-01-13 PROCEDURE — 83735 ASSAY OF MAGNESIUM: CPT

## 2025-01-13 PROCEDURE — 6360000002 HC RX W HCPCS: Performed by: PHYSICIAN ASSISTANT

## 2025-01-13 RX ORDER — KETOROLAC TROMETHAMINE 15 MG/ML
15 INJECTION, SOLUTION INTRAMUSCULAR; INTRAVENOUS ONCE
Status: COMPLETED | OUTPATIENT
Start: 2025-01-13 | End: 2025-01-13

## 2025-01-13 RX ORDER — PANTOPRAZOLE SODIUM 40 MG/1
40 TABLET, DELAYED RELEASE ORAL
Status: DISCONTINUED | OUTPATIENT
Start: 2025-01-14 | End: 2025-01-20

## 2025-01-13 RX ORDER — FUROSEMIDE 10 MG/ML
80 INJECTION INTRAMUSCULAR; INTRAVENOUS 2 TIMES DAILY
Status: DISCONTINUED | OUTPATIENT
Start: 2025-01-13 | End: 2025-01-16

## 2025-01-13 RX ORDER — ALBUTEROL SULFATE 2.5 MG/.5ML
2.5 SOLUTION RESPIRATORY (INHALATION)
Status: DISCONTINUED | OUTPATIENT
Start: 2025-01-13 | End: 2025-01-13

## 2025-01-13 RX ORDER — IPRATROPIUM BROMIDE AND ALBUTEROL SULFATE 2.5; .5 MG/3ML; MG/3ML
1 SOLUTION RESPIRATORY (INHALATION)
Status: DISCONTINUED | OUTPATIENT
Start: 2025-01-13 | End: 2025-01-14

## 2025-01-13 RX ORDER — ONDANSETRON 2 MG/ML
4 INJECTION INTRAMUSCULAR; INTRAVENOUS EVERY 4 HOURS PRN
Status: DISCONTINUED | OUTPATIENT
Start: 2025-01-13 | End: 2025-03-11

## 2025-01-13 RX ORDER — SPIRONOLACTONE 25 MG/1
25 TABLET ORAL DAILY
Status: DISCONTINUED | OUTPATIENT
Start: 2025-01-13 | End: 2025-02-04

## 2025-01-13 RX ORDER — ONDANSETRON 4 MG/1
4 TABLET, ORALLY DISINTEGRATING ORAL EVERY 8 HOURS PRN
Status: DISCONTINUED | OUTPATIENT
Start: 2025-01-13 | End: 2025-03-11

## 2025-01-13 RX ADMIN — ONDANSETRON 4 MG: 2 INJECTION INTRAMUSCULAR; INTRAVENOUS at 03:54

## 2025-01-13 RX ADMIN — IPRATROPIUM BROMIDE 0.5 MG: 0.5 SOLUTION RESPIRATORY (INHALATION) at 08:43

## 2025-01-13 RX ADMIN — LOSARTAN POTASSIUM 25 MG: 50 TABLET, FILM COATED ORAL at 16:50

## 2025-01-13 RX ADMIN — IPRATROPIUM BROMIDE AND ALBUTEROL SULFATE 1 DOSE: 2.5; .5 SOLUTION RESPIRATORY (INHALATION) at 15:32

## 2025-01-13 RX ADMIN — HEPARIN SODIUM 2000 UNITS: 1000 INJECTION INTRAVENOUS; SUBCUTANEOUS at 01:02

## 2025-01-13 RX ADMIN — ASPIRIN 81 MG: 81 TABLET, COATED ORAL at 09:25

## 2025-01-13 RX ADMIN — BUDESONIDE 500 MCG: 0.5 INHALANT ORAL at 19:54

## 2025-01-13 RX ADMIN — FUROSEMIDE 80 MG: 10 INJECTION, SOLUTION INTRAMUSCULAR; INTRAVENOUS at 19:25

## 2025-01-13 RX ADMIN — DULOXETINE HYDROCHLORIDE 30 MG: 30 CAPSULE, DELAYED RELEASE ORAL at 09:26

## 2025-01-13 RX ADMIN — POLYETHYLENE GLYCOL 3350 17 G: 17 POWDER, FOR SOLUTION ORAL at 09:25

## 2025-01-13 RX ADMIN — ONDANSETRON 4 MG: 2 INJECTION INTRAMUSCULAR; INTRAVENOUS at 14:03

## 2025-01-13 RX ADMIN — ONDANSETRON 4 MG: 2 INJECTION INTRAMUSCULAR; INTRAVENOUS at 23:08

## 2025-01-13 RX ADMIN — ATORVASTATIN CALCIUM 20 MG: 20 TABLET, FILM COATED ORAL at 21:04

## 2025-01-13 RX ADMIN — BUDESONIDE 500 MCG: 0.5 INHALANT ORAL at 08:43

## 2025-01-13 RX ADMIN — ACETAMINOPHEN 650 MG: 325 TABLET ORAL at 03:13

## 2025-01-13 RX ADMIN — FUROSEMIDE 40 MG: 10 INJECTION, SOLUTION INTRAMUSCULAR; INTRAVENOUS at 09:25

## 2025-01-13 RX ADMIN — KETOROLAC TROMETHAMINE 15 MG: 15 INJECTION, SOLUTION INTRAMUSCULAR; INTRAVENOUS at 05:23

## 2025-01-13 RX ADMIN — IPRATROPIUM BROMIDE AND ALBUTEROL SULFATE 1 DOSE: 2.5; .5 SOLUTION RESPIRATORY (INHALATION) at 19:54

## 2025-01-13 RX ADMIN — HEPARIN SODIUM 10 UNITS/KG/HR: 10000 INJECTION, SOLUTION INTRAVENOUS at 13:55

## 2025-01-13 RX ADMIN — ONDANSETRON 4 MG: 2 INJECTION INTRAMUSCULAR; INTRAVENOUS at 19:25

## 2025-01-13 RX ADMIN — SODIUM CHLORIDE, PRESERVATIVE FREE 10 ML: 5 INJECTION INTRAVENOUS at 09:26

## 2025-01-13 RX ADMIN — SODIUM CHLORIDE, PRESERVATIVE FREE 10 ML: 5 INJECTION INTRAVENOUS at 21:04

## 2025-01-13 RX ADMIN — Medication 2.5 MG: at 21:04

## 2025-01-13 RX ADMIN — IPRATROPIUM BROMIDE AND ALBUTEROL SULFATE 1 DOSE: 2.5; .5 SOLUTION RESPIRATORY (INHALATION) at 11:39

## 2025-01-13 RX ADMIN — SPIRONOLACTONE 25 MG: 25 TABLET ORAL at 16:50

## 2025-01-13 RX ADMIN — BUPROPION HYDROCHLORIDE 150 MG: 150 TABLET, EXTENDED RELEASE ORAL at 12:23

## 2025-01-13 ASSESSMENT — PAIN - FUNCTIONAL ASSESSMENT
PAIN_FUNCTIONAL_ASSESSMENT: ACTIVITIES ARE NOT PREVENTED
PAIN_FUNCTIONAL_ASSESSMENT: ACTIVITIES ARE NOT PREVENTED

## 2025-01-13 ASSESSMENT — PAIN SCALES - GENERAL
PAINLEVEL_OUTOF10: 0
PAINLEVEL_OUTOF10: 6
PAINLEVEL_OUTOF10: 3
PAINLEVEL_OUTOF10: 0

## 2025-01-13 ASSESSMENT — PAIN DESCRIPTION - ORIENTATION
ORIENTATION: UPPER;POSTERIOR
ORIENTATION: UPPER;POSTERIOR

## 2025-01-13 ASSESSMENT — PAIN DESCRIPTION - DESCRIPTORS
DESCRIPTORS: ACHING
DESCRIPTORS: ACHING

## 2025-01-13 ASSESSMENT — PAIN DESCRIPTION - LOCATION
LOCATION: BACK
LOCATION: BACK

## 2025-01-13 NOTE — PROGRESS NOTES
CARDIOLOGY PROGRESS NOTE    REASON FOR CONSULT: Aflutter    REQUESTING PROVIDER: Tee Suarez MD     CHIEF COMPLAINT:  Shortness of breath, hypoxia    HISTORY OF PRESENT ILLNESS:  Nida Graves is a 49 y.o. year-old female with past medical history significant for diabetes, asthma who was evaluated today due to atrial flutter, SVR.  assists w/ history. Her Mounjaro was recently increased to 7.5mg, since that time has been short of breath but this has progressively worsened. Over the last couple of days has been weak, unable to get up by herself. A friend who is a nurse checked her O2 sat, noted to be 59, HR in the 40s. EMS was called. She required 6L to maintain sat 92%. In the ED, work up ongoing. Troponin 303, lactic acid 2.6, proBNP 6682, glu 238,  creat 1.18. CXR w/ small bilateral pleural effusions, bilat lower lobe atelectasis. EKG w/ atrial flutter, variable, HR 40-50s.     Seen and examined. Continues with SOB, on HiFlow. No CP. Slow AFL on telem      Records from hospital admission course thus far reviewed.      Telemetry reviewed.     INPATIENT MEDICATIONS:  Home medications reviewed.    Current Facility-Administered Medications:     ondansetron (ZOFRAN-ODT) disintegrating tablet 4 mg, 4 mg, Oral, Q8H PRN **OR** ondansetron (ZOFRAN) injection 4 mg, 4 mg, IntraVENous, Q4H PRN, Ted Fletcher, APRN - NP, 4 mg at 01/13/25 1403    [START ON 1/14/2025] pantoprazole (PROTONIX) tablet 40 mg, 40 mg, Oral, QAM AC, Heike Shahid MD    ipratropium 0.5 mg-albuterol 2.5 mg (DUONEB) nebulizer solution 1 Dose, 1 Dose, Inhalation, 4x Daily RT, Heike Shahid MD, 1 Dose at 01/13/25 1139    iopamidol (ISOVUE-370) 76 % injection 100 mL, 100 mL, IntraVENous, ONCE PRN, Tee Suarez MD    sodium chloride flush 0.9 % injection 5-40 mL, 5-40 mL, IntraVENous, 2 times per day, Suyapa Rea DO, 10 mL at 01/13/25 0926    sodium chloride flush 0.9 % injection 5-40 mL, 5-40 mL, IntraVENous, PRN, Álvaro,  92%.   Concern for PE, pt unable to tolerate CTA.   Ddimer 2.09. Venous dopplers negative.  Echo complete - EF 25/30%  on heparin per above.   Appreciated intensivist input, consider pulmonary consult.   Elevated troponin: 303, repeat 405  Likely myocardial injury secondary to severe hypoxia, aflutter.   Trend to peak.  Echo and heparin drip per above.   Consider ischemic eval pending clinical course. Pt unable to lay flat 2/2 SOB  Start aspirin 81mg daily  HFrEF  - EF 25/30% on echo. RV with reduced systolic function. Mod TR. LA dilation  - Elevated NT proBNP: 6682, small bilat pleural effusion.   - Continue Lasix 40mg IV BID  - will initiate GDMT - start Aldactone and resume Losartan  - avoid BB with denita  Hypertension: BP currently acceptable. Monitor closely.  Diabetes: tx plan per primary team    Thank you for involving us in the care of this patient.  Please do not hesitate to call me or Dr. Prajapati if additional questions arise.    Tony Schmitt PA-C  1/13/2025

## 2025-01-13 NOTE — PROGRESS NOTES
1000: Pt states she is unable to tolerate chest CT. States she is unable to lie flat due to back pain and shortness of breath. She states this is her baseline even at home. Questioned if pain control would help with tolerating CT, pt stated that would not help. Dr. Shahid made aware.

## 2025-01-13 NOTE — H&P
CRITICAL CARE ADMISSION NOTE      Name: Nida Graves   : 1975   MRN: [id number]   Date: 2025      Reason for ICU Admission: aflutter, respiratory failure         HPI / Hospital Course:  Ms. Graves is a 49-year-old female with PMH chronic disability and bedbound status, diabetes, asthma, obesity, who presented for shortness of breath.  Shortness of breath worsening over the past few days.  Upon presentation ED patient found to be hypoxic necessitating O2 via NC.  Otherwise workup significant for arrhythmia.  Concerns for slow conducting A-fib.  Cardiology subsequently consulted and noted overall concern for A-fib with possible complete heart block versus slow ventricular response.  Patient supplemented to ICU for further evaluation and management.    Assessment:  A flutter with slow ventricular response  Possible complete heart block  Acute hypoxic respiratory failure  Possible NSTEMI  Acute HFrEF  Asthma  Diabetes    Plan:  - Troponins mildly increasing, repeat troponin pending  - Hyperlipidemia ongoing for A-flutter  - Cardiology following, further evaluation by EP pending  - Lasix 40 mg IV twice daily initiated for mild fluid overload-increasing respiratory support, now on HFNC.  - Of note PE is on the differential and did attempt to obtain CTA chest for assessment however patient declined due to inability to lie flat due to baseline back pain and respiratory distress when lying flat.  Given patient's bedbound status, positive D-dimer, and elevated BNP and troponin, would prefer to rule out PE.  Regardless patient is on anticoagulation for a flutter.  If patient significantly decompensates low threshold for thrombolytics  - Aspirin and statin  - Continue home Wellbutrin and Cymbalta  - Budesonide and Atrovent nebulizer therapy  - Will hold home trazodone in light of arrhythmia and trazodone to arrhythmogenic nature; melatonin nightly ordered  - Home losartan currently held      ICU DAILY

## 2025-01-13 NOTE — CARE COORDINATION
01/13/25 1037   Service Assessment   Patient Orientation Alert and Oriented   Cognition Alert   History Provided By Patient   Primary Caregiver Self   Support Systems Spouse/Significant Other;Children;Family Members;Friends/Neighbors   Patient's Healthcare Decision Maker is: Legal Next of Kin   PCP Verified by CM Yes   Last Visit to PCP Within last 3 months   Prior Functional Level Independent in ADLs/IADLs   Current Functional Level Other (see comment)  (TBD)   Can patient return to prior living arrangement Unknown at present   Ability to make needs known: Good   Family able to assist with home care needs: Yes   Would you like for me to discuss the discharge plan with any other family members/significant others, and if so, who? No   Financial Resources None   Community Resources None   Social/Functional History   Lives With Spouse;Family   Home Equipment Rollator;Wheelchair - Manual   Services At/After Discharge   Mode of Transport at Discharge Other (see comment)   Confirm Follow Up Transport Family     CM met f/f with Pt and her daughter, Pt confirmed that the information on the face sheet is correct. Pt stated that she lives with her , daughter and nephew.    No HH, has a Rolator, and wheelchair and is independent with ADL.    Send RX to [facility] on the [facility]    PCP Dr. Guilherme Quach    Family will transport Pt when D/C.    CM dispo: TBD    Advance Care Planning     General Advance Care Planning (ACP) Conversation    Date of Conversation: 1/13/2025      Healthcare Decision Maker: No healthcare decision makers have been documented.       Content/Action Overview:    Reviewed DNR/DNI and patient elects Full Code (Attempt Resuscitation)

## 2025-01-13 NOTE — PROGRESS NOTES
diagnostic test intended for the qualitative detection of nucleic acids from SARS-CoV-2, Influenza A, and Influenza B in nasopharyngeal and nasal swab specimens for use under the FDA's Emergency Use Authorization (EUA) only.   Fact sheet for Patients: https://www.fda.gov/media/619969/download Fact sheet   for Healthcare Providers: https://www.fda.fov/media/964908/download                  ABG No results found for: \"LPMO2\", \"FIO2\", \"PEEP\", \"PH1\", \"PCO2\", \"PO2\", \"HCO3\", \"TCO2\", \"MODE\", \"DRAWSITE\"     Liver Enzymes Lab Results   Component Value Date    ALKPHOS 128 (H) 01/13/2025    ALT 31 01/13/2025    AST 35 01/13/2025    BILITOT 0.9 01/13/2025         Cardiac Enzymes Lab Results   Component Value Date    TROPHS 631 (HH) 01/13/2025    TROPHS 632 (HH) 01/12/2025    TROPHS 555 (HH) 01/12/2025    TROPHS 405 (HH) 01/12/2025    TROPHS 303 (HH) 01/12/2025        BNP No results found for: \"BNP\"      Coagulation Lab Results   Component Value Date    PROTIME 14.2 01/12/2025    INR 1.1 01/12/2025      Lab Results   Component Value Date    APTT 25.8 01/12/2025        Thyroid  Lab Results   Component Value Date    TSH 3.260 06/06/2022          Lipid Panel Lab Results   Component Value Date    CHOL 141 07/26/2022    HDL 50 07/26/2022    LDL 72 07/26/2022    VLDL 19 07/26/2022          Urinalysis Lab Results   Component Value Date    COLORU Yellow/Straw 01/12/2025    PHUR 5.0 01/12/2025    WBCUA 5-10 01/12/2025    RBCUA 0-5 01/12/2025    BACTERIA 1+ (A) 01/12/2025    LEUKOCYTESUR Small (A) 01/12/2025    UROBILINOGEN 0.1 01/12/2025    BILIRUBINUR Negative 01/12/2025    BLOODU Negative 01/12/2025    GLUCOSEU 50 (A) 01/12/2025        XR (Most Recent). CXR reviewed by me and compared with previous CXR XR CHEST PORTABLE 01/12/2025    Narrative  EXAM:  XR CHEST PORTABLE    INDICATION: Shortness of breath    COMPARISON: 10/21/2009    TECHNIQUE: portable chest AP view    FINDINGS: The cardiac silhouette is within normal limits. The  dilated.    Signed by: Sushil Brush MD on 1/12/2025  2:38 PM           CRITICAL CARE DOCUMENTATION  I had a face to face encounter with the patient, reviewed and interpreted patient data including clinical events, labs, images, vital signs, I/O's, and examined patient.  I have discussed the case and the plan and management of the patient's care with the consulting services, the bedside nurses and the respiratory therapist.      NOTE OF PERSONAL INVOLVEMENT IN CARE   This patient has a high probability of imminent, clinically significant deterioration, which requires the highest level of preparedness to intervene urgently. I participated in the decision-making and personally managed or directed the management of the following life and organ supporting interventions that required my frequent assessment to treat or prevent imminent deterioration.    I personally spent 40 minutes of critical care time.  This is time spent at this critically ill patient's bedside actively involved in patient care as well as the coordination of care.  This does not include any procedural time which has been billed separately.        Heike Shahid M.D.  Pulmonary Critical Care & Sleep Medicine

## 2025-01-13 NOTE — PROGRESS NOTES
1900 - patient c/o acute nausea and removed oxygen.  O2 sats decreased to 82%.  Mask reapplied and increased to 8l without noted improvement.  MD notified, patient placed on 100%NRB.  MD to beside to eval patient.  New orders received for HiFlow O2.  Patient's  updated by phone.    Patient placed on HiFlow.  Oxygen sats 94% at 1949.

## 2025-01-14 ENCOUNTER — APPOINTMENT (OUTPATIENT)
Facility: HOSPITAL | Age: 50
DRG: 004 | End: 2025-01-14
Payer: COMMERCIAL

## 2025-01-14 LAB
ALBUMIN SERPL-MCNC: 3 G/DL (ref 3.5–5)
ALBUMIN/GLOB SERPL: 0.8 (ref 1.1–2.2)
ALP SERPL-CCNC: 118 U/L (ref 45–117)
ALT SERPL-CCNC: 27 U/L (ref 12–78)
ANION GAP SERPL CALC-SCNC: 6 MMOL/L (ref 2–12)
AST SERPL W P-5'-P-CCNC: 22 U/L (ref 15–37)
BASOPHILS # BLD: 0.02 K/UL (ref 0–0.1)
BASOPHILS NFR BLD: 0.1 % (ref 0–1)
BILIRUB SERPL-MCNC: 1 MG/DL (ref 0.2–1)
BUN SERPL-MCNC: 19 MG/DL (ref 6–20)
BUN/CREAT SERPL: 22 (ref 12–20)
CA-I BLD-MCNC: 8.5 MG/DL (ref 8.5–10.1)
CHLORIDE SERPL-SCNC: 95 MMOL/L (ref 97–108)
CO2 SERPL-SCNC: 38 MMOL/L (ref 21–32)
CREAT SERPL-MCNC: 0.88 MG/DL (ref 0.55–1.02)
DIFFERENTIAL METHOD BLD: ABNORMAL
EOSINOPHIL # BLD: 0 K/UL (ref 0–0.4)
EOSINOPHIL NFR BLD: 0 % (ref 0–7)
ERYTHROCYTE [DISTWIDTH] IN BLOOD BY AUTOMATED COUNT: 15.1 % (ref 11.5–14.5)
GLOBULIN SER CALC-MCNC: 4 G/DL (ref 2–4)
GLUCOSE BLD STRIP.AUTO-MCNC: 116 MG/DL (ref 65–100)
GLUCOSE BLD STRIP.AUTO-MCNC: 128 MG/DL (ref 65–100)
GLUCOSE BLD STRIP.AUTO-MCNC: 135 MG/DL (ref 65–100)
GLUCOSE BLD STRIP.AUTO-MCNC: 136 MG/DL (ref 65–100)
GLUCOSE BLD STRIP.AUTO-MCNC: 144 MG/DL (ref 65–100)
GLUCOSE SERPL-MCNC: 162 MG/DL (ref 65–100)
HCT VFR BLD AUTO: 49.5 % (ref 35–47)
HGB BLD-MCNC: 14.9 G/DL (ref 11.5–16)
IMM GRANULOCYTES # BLD AUTO: 0.05 K/UL (ref 0–0.04)
IMM GRANULOCYTES NFR BLD AUTO: 0.4 % (ref 0–0.5)
LYMPHOCYTES # BLD: 1.26 K/UL (ref 0.8–3.5)
LYMPHOCYTES NFR BLD: 9.2 % (ref 12–49)
MAGNESIUM SERPL-MCNC: 2 MG/DL (ref 1.6–2.4)
MCH RBC QN AUTO: 28.6 PG (ref 26–34)
MCHC RBC AUTO-ENTMCNC: 30.1 G/DL (ref 30–36.5)
MCV RBC AUTO: 95 FL (ref 80–99)
MONOCYTES # BLD: 0.85 K/UL (ref 0–1)
MONOCYTES NFR BLD: 6.2 % (ref 5–13)
NEUTS SEG # BLD: 11.5 K/UL (ref 1.8–8)
NEUTS SEG NFR BLD: 84.1 % (ref 32–75)
NRBC # BLD: 0 K/UL (ref 0–0.01)
NRBC BLD-RTO: 0 PER 100 WBC
PERFORMED BY:: ABNORMAL
PHOSPHATE SERPL-MCNC: 2.7 MG/DL (ref 2.6–4.7)
PLATELET # BLD AUTO: 280 K/UL (ref 150–400)
PMV BLD AUTO: 9.6 FL (ref 8.9–12.9)
POTASSIUM SERPL-SCNC: 2.9 MMOL/L (ref 3.5–5.1)
POTASSIUM SERPL-SCNC: 4.7 MMOL/L (ref 3.5–5.1)
PROT SERPL-MCNC: 7 G/DL (ref 6.4–8.2)
RBC # BLD AUTO: 5.21 M/UL (ref 3.8–5.2)
SODIUM SERPL-SCNC: 139 MMOL/L (ref 136–145)
UFH PPP CHRO-ACNC: 0.25 IU/ML
UFH PPP CHRO-ACNC: 0.45 IU/ML
WBC # BLD AUTO: 13.7 K/UL (ref 3.6–11)

## 2025-01-14 PROCEDURE — 6360000002 HC RX W HCPCS: Performed by: PHYSICIAN ASSISTANT

## 2025-01-14 PROCEDURE — 94640 AIRWAY INHALATION TREATMENT: CPT

## 2025-01-14 PROCEDURE — 6370000000 HC RX 637 (ALT 250 FOR IP): Performed by: HOSPITALIST

## 2025-01-14 PROCEDURE — 6370000000 HC RX 637 (ALT 250 FOR IP): Performed by: INTERNAL MEDICINE

## 2025-01-14 PROCEDURE — 6370000000 HC RX 637 (ALT 250 FOR IP): Performed by: STUDENT IN AN ORGANIZED HEALTH CARE EDUCATION/TRAINING PROGRAM

## 2025-01-14 PROCEDURE — 71045 X-RAY EXAM CHEST 1 VIEW: CPT

## 2025-01-14 PROCEDURE — 2500000003 HC RX 250 WO HCPCS: Performed by: STUDENT IN AN ORGANIZED HEALTH CARE EDUCATION/TRAINING PROGRAM

## 2025-01-14 PROCEDURE — 36415 COLL VENOUS BLD VENIPUNCTURE: CPT

## 2025-01-14 PROCEDURE — 2500000003 HC RX 250 WO HCPCS: Performed by: INTERNAL MEDICINE

## 2025-01-14 PROCEDURE — 82962 GLUCOSE BLOOD TEST: CPT

## 2025-01-14 PROCEDURE — 83735 ASSAY OF MAGNESIUM: CPT

## 2025-01-14 PROCEDURE — 84100 ASSAY OF PHOSPHORUS: CPT

## 2025-01-14 PROCEDURE — 85025 COMPLETE CBC W/AUTO DIFF WBC: CPT

## 2025-01-14 PROCEDURE — 6360000002 HC RX W HCPCS: Performed by: STUDENT IN AN ORGANIZED HEALTH CARE EDUCATION/TRAINING PROGRAM

## 2025-01-14 PROCEDURE — 6370000000 HC RX 637 (ALT 250 FOR IP): Performed by: PHYSICIAN ASSISTANT

## 2025-01-14 PROCEDURE — 85520 HEPARIN ASSAY: CPT

## 2025-01-14 PROCEDURE — 94761 N-INVAS EAR/PLS OXIMETRY MLT: CPT

## 2025-01-14 PROCEDURE — 2580000003 HC RX 258: Performed by: INTERNAL MEDICINE

## 2025-01-14 PROCEDURE — 84132 ASSAY OF SERUM POTASSIUM: CPT

## 2025-01-14 PROCEDURE — 6370000000 HC RX 637 (ALT 250 FOR IP): Performed by: NURSE PRACTITIONER

## 2025-01-14 PROCEDURE — 6360000002 HC RX W HCPCS: Performed by: INTERNAL MEDICINE

## 2025-01-14 PROCEDURE — 2000000000 HC ICU R&B

## 2025-01-14 PROCEDURE — 2700000000 HC OXYGEN THERAPY PER DAY

## 2025-01-14 PROCEDURE — 6360000002 HC RX W HCPCS: Performed by: NURSE PRACTITIONER

## 2025-01-14 PROCEDURE — 80053 COMPREHEN METABOLIC PANEL: CPT

## 2025-01-14 RX ORDER — GLUCAGON 1 MG/ML
1 KIT INJECTION PRN
Status: DISCONTINUED | OUTPATIENT
Start: 2025-01-14 | End: 2025-01-14 | Stop reason: SDUPTHER

## 2025-01-14 RX ORDER — HYDROCORTISONE SODIUM SUCCINATE 100 MG/2ML
50 INJECTION INTRAMUSCULAR; INTRAVENOUS EVERY 8 HOURS
Status: DISCONTINUED | OUTPATIENT
Start: 2025-01-14 | End: 2025-01-16

## 2025-01-14 RX ORDER — IPRATROPIUM BROMIDE AND ALBUTEROL SULFATE 2.5; .5 MG/3ML; MG/3ML
1 SOLUTION RESPIRATORY (INHALATION)
Status: DISCONTINUED | OUTPATIENT
Start: 2025-01-14 | End: 2025-02-20

## 2025-01-14 RX ORDER — DEXTROSE MONOHYDRATE 100 MG/ML
INJECTION, SOLUTION INTRAVENOUS CONTINUOUS PRN
Status: DISCONTINUED | OUTPATIENT
Start: 2025-01-14 | End: 2025-01-14 | Stop reason: SDUPTHER

## 2025-01-14 RX ORDER — INSULIN LISPRO 100 [IU]/ML
0-8 INJECTION, SOLUTION INTRAVENOUS; SUBCUTANEOUS
Status: DISCONTINUED | OUTPATIENT
Start: 2025-01-14 | End: 2025-01-24

## 2025-01-14 RX ADMIN — HEPARIN SODIUM 2000 UNITS: 1000 INJECTION INTRAVENOUS; SUBCUTANEOUS at 04:52

## 2025-01-14 RX ADMIN — POTASSIUM CHLORIDE 10 MEQ: 7.45 INJECTION INTRAVENOUS at 11:43

## 2025-01-14 RX ADMIN — DULOXETINE HYDROCHLORIDE 30 MG: 30 CAPSULE, DELAYED RELEASE ORAL at 08:13

## 2025-01-14 RX ADMIN — POTASSIUM CHLORIDE 10 MEQ: 7.45 INJECTION INTRAVENOUS at 10:39

## 2025-01-14 RX ADMIN — ATORVASTATIN CALCIUM 20 MG: 20 TABLET, FILM COATED ORAL at 21:13

## 2025-01-14 RX ADMIN — ASPIRIN 81 MG: 81 TABLET, COATED ORAL at 08:13

## 2025-01-14 RX ADMIN — POTASSIUM CHLORIDE 10 MEQ: 7.45 INJECTION INTRAVENOUS at 06:21

## 2025-01-14 RX ADMIN — Medication 2.5 MG: at 21:13

## 2025-01-14 RX ADMIN — HEPARIN SODIUM 12 UNITS/KG/HR: 10000 INJECTION, SOLUTION INTRAVENOUS at 10:38

## 2025-01-14 RX ADMIN — FUROSEMIDE 80 MG: 10 INJECTION, SOLUTION INTRAMUSCULAR; INTRAVENOUS at 17:55

## 2025-01-14 RX ADMIN — IPRATROPIUM BROMIDE AND ALBUTEROL SULFATE 1 DOSE: 2.5; .5 SOLUTION RESPIRATORY (INHALATION) at 09:37

## 2025-01-14 RX ADMIN — HYDROCORTISONE SODIUM SUCCINATE 50 MG: 100 INJECTION, POWDER, FOR SOLUTION INTRAMUSCULAR; INTRAVENOUS at 20:39

## 2025-01-14 RX ADMIN — CEFTRIAXONE SODIUM 1000 MG: 1 INJECTION, POWDER, FOR SOLUTION INTRAMUSCULAR; INTRAVENOUS at 12:15

## 2025-01-14 RX ADMIN — POTASSIUM CHLORIDE 10 MEQ: 7.45 INJECTION INTRAVENOUS at 08:13

## 2025-01-14 RX ADMIN — SODIUM CHLORIDE, PRESERVATIVE FREE 10 ML: 5 INJECTION INTRAVENOUS at 21:13

## 2025-01-14 RX ADMIN — AZITHROMYCIN MONOHYDRATE 500 MG: 500 INJECTION, POWDER, LYOPHILIZED, FOR SOLUTION INTRAVENOUS at 12:14

## 2025-01-14 RX ADMIN — SPIRONOLACTONE 25 MG: 25 TABLET ORAL at 08:13

## 2025-01-14 RX ADMIN — HYDROCORTISONE SODIUM SUCCINATE 50 MG: 100 INJECTION, POWDER, FOR SOLUTION INTRAMUSCULAR; INTRAVENOUS at 12:15

## 2025-01-14 RX ADMIN — SODIUM CHLORIDE, PRESERVATIVE FREE 10 ML: 5 INJECTION INTRAVENOUS at 12:17

## 2025-01-14 RX ADMIN — BUPROPION HYDROCHLORIDE 150 MG: 150 TABLET, EXTENDED RELEASE ORAL at 08:14

## 2025-01-14 RX ADMIN — PANTOPRAZOLE SODIUM 40 MG: 40 TABLET, DELAYED RELEASE ORAL at 08:14

## 2025-01-14 RX ADMIN — IPRATROPIUM BROMIDE AND ALBUTEROL SULFATE 1 DOSE: 2.5; .5 SOLUTION RESPIRATORY (INHALATION) at 14:41

## 2025-01-14 RX ADMIN — POTASSIUM CHLORIDE 10 MEQ: 7.45 INJECTION INTRAVENOUS at 04:26

## 2025-01-14 RX ADMIN — BUDESONIDE 500 MCG: 0.5 INHALANT ORAL at 19:56

## 2025-01-14 RX ADMIN — BUDESONIDE 500 MCG: 0.5 INHALANT ORAL at 09:37

## 2025-01-14 RX ADMIN — POTASSIUM CHLORIDE 10 MEQ: 7.45 INJECTION INTRAVENOUS at 05:16

## 2025-01-14 RX ADMIN — IPRATROPIUM BROMIDE AND ALBUTEROL SULFATE 1 DOSE: 2.5; .5 SOLUTION RESPIRATORY (INHALATION) at 19:56

## 2025-01-14 ASSESSMENT — PAIN SCALES - GENERAL: PAINLEVEL_OUTOF10: 0

## 2025-01-14 NOTE — PROGRESS NOTES
CARDIOLOGY PROGRESS NOTE    REASON FOR CONSULT: Aflutter    REQUESTING PROVIDER: Tee Suarez MD     CHIEF COMPLAINT:  Shortness of breath, hypoxia    HISTORY OF PRESENT ILLNESS:  Nida Graves is a 49 y.o. year-old female with past medical history significant for diabetes, asthma who was evaluated today due to atrial flutter, SVR.  assists w/ history. Her Mounjaro was recently increased to 7.5mg, since that time has been short of breath but this has progressively worsened. Over the last couple of days has been weak, unable to get up by herself. A friend who is a nurse checked her O2 sat, noted to be 59, HR in the 40s. EMS was called. She required 6L to maintain sat 92%. In the ED, work up ongoing. Troponin 303, lactic acid 2.6, proBNP 6682, glu 238,  creat 1.18. CXR w/ small bilateral pleural effusions, bilat lower lobe atelectasis. EKG w/ atrial flutter, variable, HR 40-50s.     Seen and examined. Continues with SOB, on HiFlow. No CP. AFL on telem, HR to mid 60s at times. Pt feeling better.      Records from hospital admission course thus far reviewed.      Telemetry reviewed.     INPATIENT MEDICATIONS:  Home medications reviewed.    Current Facility-Administered Medications:     hydrocortisone sodium succinate PF (SOLU-CORTEF) injection 50 mg, 50 mg, IntraVENous, Q8H, Heike Shahid MD, 50 mg at 01/14/25 1215    insulin lispro (HUMALOG,ADMELOG) injection vial 0-8 Units, 0-8 Units, SubCUTAneous, 4x Daily AC & HS, Heike Shahid MD    cefTRIAXone (ROCEPHIN) 1,000 mg in sterile water 10 mL IV syringe, 1,000 mg, IntraVENous, Q24H, Heike Shahid MD, 1,000 mg at 01/14/25 1215    azithromycin (ZITHROMAX) 500 mg in sodium chloride 0.9 % 250 mL IVPB ([id number]), 500 mg, IntraVENous, Q24H, Heike Shahid MD, Stopped at 01/14/25 1411    ipratropium 0.5 mg-albuterol 2.5 mg (DUONEB) nebulizer solution 1 Dose, 1 Dose, Inhalation, Q6H WA RT, Heike Shahid MD    ondansetron (ZOFRAN-ODT) disintegrating tablet  negative.    PHYSICAL EXAMINATION:    General:  Chronically ill appearing, obese  Cardiovascular:  irregular rhythm, bradycardic rate, no murmur  Respiratory:  Lungs are diminished  Abdomen:  soft, nontender  Extremities:   no lower extremity edema  Skin:  Dry, warm  Psych:  Normal affect      Vitals:    01/14/25 1100   BP: 127/77   Pulse: 54   Resp: 19   Temp: 97.8 °F (36.6 °C)   SpO2: 94%       Recent labs results and imaging reviewed.     Discussed case with Dr. Prajapati and our impression and recommendations are as follows:    Atrial flutter, slow ventricular response: no known history.   CHADVASC at least 3. Continue on heparin drip.   Will be difficult to control rhythm w/ HR 40-50, consider AUGUSTIN/DCCV, once SOB improved/compensated.  HR improving with rate up to mid 60s at times  Hypoxia:   home sats 50s, requiring 6L to maintain 92%.   Concern for PE, pt unable to tolerate CTA.   Ddimer 2.09. Venous dopplers negative.  Echo complete - EF 25/30%  on heparin per above.   Appreciated intensivist input, consider pulmonary consult.   Elevated troponin: 303, repeat 405  Likely myocardial injury secondary to severe hypoxia, aflutter.   Trend to peak.  Echo and heparin drip per above.   Consider ischemic eval pending clinical course. Pt unable to lay flat 2/2 SOB  Start aspirin 81mg daily  HFrEF  - EF 25/30% on echo. RV with reduced systolic function. Mod TR. LA dilation  - Elevated NT proBNP: 6682, small bilat pleural effusion.   - Continue Lasix at 80mg IV BID - Cr stable and good UOP  - will initiate GDMT - start Aldactone and resume Losartan  - avoid BB with denita  Hypertension: BP currently acceptable. Monitor closely.  Diabetes: tx plan per primary team    Thank you for involving us in the care of this patient.  Please do not hesitate to call me or Dr. Prajapati if additional questions arise.    Tony Schmitt PA-C  1/14/2025       I have reviewed the history and exam and performed the MDM in its entirety.

## 2025-01-14 NOTE — PROGRESS NOTES
CRITICAL CARE ADMISSION NOTE      Name: Nida Graves   : 1975   MRN: [id number]   Date: 2025      Reason for ICU Admission: aflutter, respiratory failure         HPI / Hospital Course:  Ms. Graves is a 49-year-old female with PMH chronic disability and bedbound status, diabetes, asthma, obesity, who presented for shortness of breath.  Shortness of breath worsening over the past few days.  Upon presentation ED patient found to be hypoxic necessitating O2 via NC.  Otherwise workup significant for arrhythmia.  Concerns for slow conducting A-fib.  Cardiology subsequently consulted and noted overall concern for A-fib with possible complete heart block versus slow ventricular response.  Patient supplemented to ICU for further evaluation and management.    DVT study is negative    24-hour events:  2025: Doing well no major overnight event, still on high flow above 70%, labs are acceptable, troponin is mildly elevated, chest x-ray consistent with bilateral pulm pleural effusion  2025: Overnight episode of hypoxia, currently on 60 L 45% oxygen, feels better slowly getting better chest x-ray still showing significant increase in interstitial edema and lower lobe airspace disease  Potassium is 2.9 and replaced      Assessment:  A flutter with slow ventricular response-on a heparin, Lasix  Possible complete heart block  Acute hypoxic respiratory failure-Lasix plus steroids plus antibiotics  Possible aspiration pneumonia  Possible NSTEMI-cardiology planning to do workup later on  Acute HFrEF-Lasix  Asthma  Diabetes    Plan: Cardiology evaluation appreciated  -Increase Lasix to 80 twice daily by cardiology  -Further management and related to heart block per cardiology  -Concern for aspiration/aspiration pneumonia  -Will add cefepime, check sputum culture, allergic to penicillin, vancomycin 1 dose given adjust per culture  -Add hydrocortisone 50 every 8 per severe/pneumonia guidelines  -Add sliding  (Unconfirmed)  Right bundle branch block is now Present  Criteria for Lateral infarct are no longer Present  Confirmed by CELSO Christian Shaival ([id number]) on 1/12/2025 7:13:33 PM          ECHO 01/12/25    ECHO (TTE) COMPLETE (PRN CONTRAST/BUBBLE/STRAIN/3D) 01/12/2025  2:38 PM (Final)    Interpretation Summary    Image quality is technically difficult. Contrast used: Definity. Technically difficult study with poor endocardial visualization and procedure performed with the patient in a sitting position.    Left Ventricle: Reduced left ventricular systolic function with a visually estimated EF of 25 - 30%. Left ventricle size is normal. Increased wall thickness. There are regional wall motion abnormalities.    Right Ventricle: Reduced systolic function. TAPSE is 1.6 cm. RV Peak S' is 8.6 cm/s.    Tricuspid Valve: Moderate regurgitation.    Left Atrium: Left atrium is dilated.    Signed by: Sushil Brush MD on 1/12/2025  2:38 PM           CRITICAL CARE DOCUMENTATION  I had a face to face encounter with the patient, reviewed and interpreted patient data including clinical events, labs, images, vital signs, I/O's, and examined patient.  I have discussed the case and the plan and management of the patient's care with the consulting services, the bedside nurses and the respiratory therapist.      NOTE OF PERSONAL INVOLVEMENT IN CARE   This patient has a high probability of imminent, clinically significant deterioration, which requires the highest level of preparedness to intervene urgently. I participated in the decision-making and personally managed or directed the management of the following life and organ supporting interventions that required my frequent assessment to treat or prevent imminent deterioration.    I personally spent 40 minutes of critical care time.  This is time spent at this critically ill patient's bedside actively involved in patient care as well as the coordination of care.  This does not include any

## 2025-01-15 LAB
ALBUMIN SERPL-MCNC: 3.1 G/DL (ref 3.5–5)
ALBUMIN/GLOB SERPL: 0.7 (ref 1.1–2.2)
ALP SERPL-CCNC: 120 U/L (ref 45–117)
ALT SERPL-CCNC: 22 U/L (ref 12–78)
ANION GAP SERPL CALC-SCNC: 3 MMOL/L (ref 2–12)
AST SERPL W P-5'-P-CCNC: 16 U/L (ref 15–37)
BASOPHILS # BLD: 0.01 K/UL (ref 0–0.1)
BASOPHILS NFR BLD: 0.1 % (ref 0–1)
BILIRUB SERPL-MCNC: 1 MG/DL (ref 0.2–1)
BUN SERPL-MCNC: 22 MG/DL (ref 6–20)
BUN/CREAT SERPL: 25 (ref 12–20)
CA-I BLD-MCNC: 9.2 MG/DL (ref 8.5–10.1)
CHLORIDE SERPL-SCNC: 93 MMOL/L (ref 97–108)
CO2 SERPL-SCNC: 39 MMOL/L (ref 21–32)
CREAT SERPL-MCNC: 0.87 MG/DL (ref 0.55–1.02)
DIFFERENTIAL METHOD BLD: ABNORMAL
EOSINOPHIL # BLD: 0 K/UL (ref 0–0.4)
EOSINOPHIL NFR BLD: 0 % (ref 0–7)
ERYTHROCYTE [DISTWIDTH] IN BLOOD BY AUTOMATED COUNT: 15.2 % (ref 11.5–14.5)
EST. AVERAGE GLUCOSE BLD GHB EST-MCNC: 131 MG/DL
GLOBULIN SER CALC-MCNC: 4.3 G/DL (ref 2–4)
GLUCOSE BLD STRIP.AUTO-MCNC: 137 MG/DL (ref 65–100)
GLUCOSE BLD STRIP.AUTO-MCNC: 139 MG/DL (ref 65–100)
GLUCOSE BLD STRIP.AUTO-MCNC: 142 MG/DL (ref 65–100)
GLUCOSE BLD STRIP.AUTO-MCNC: 148 MG/DL (ref 65–100)
GLUCOSE SERPL-MCNC: 141 MG/DL (ref 65–100)
HBA1C MFR BLD: 6.2 % (ref 4–5.6)
HCT VFR BLD AUTO: 49.2 % (ref 35–47)
HGB BLD-MCNC: 15 G/DL (ref 11.5–16)
IMM GRANULOCYTES # BLD AUTO: 0.02 K/UL (ref 0–0.04)
IMM GRANULOCYTES NFR BLD AUTO: 0.2 % (ref 0–0.5)
LYMPHOCYTES # BLD: 1.66 K/UL (ref 0.8–3.5)
LYMPHOCYTES NFR BLD: 17.9 % (ref 12–49)
MAGNESIUM SERPL-MCNC: 2.2 MG/DL (ref 1.6–2.4)
MCH RBC QN AUTO: 29.1 PG (ref 26–34)
MCHC RBC AUTO-ENTMCNC: 30.5 G/DL (ref 30–36.5)
MCV RBC AUTO: 95.3 FL (ref 80–99)
MONOCYTES # BLD: 0.53 K/UL (ref 0–1)
MONOCYTES NFR BLD: 5.7 % (ref 5–13)
NEUTS SEG # BLD: 7.06 K/UL (ref 1.8–8)
NEUTS SEG NFR BLD: 76.1 % (ref 32–75)
NRBC # BLD: 0 K/UL (ref 0–0.01)
NRBC BLD-RTO: 0 PER 100 WBC
PERFORMED BY:: ABNORMAL
PHOSPHATE SERPL-MCNC: 3.1 MG/DL (ref 2.6–4.7)
PLATELET # BLD AUTO: 276 K/UL (ref 150–400)
PMV BLD AUTO: 9.6 FL (ref 8.9–12.9)
POTASSIUM SERPL-SCNC: 3.8 MMOL/L (ref 3.5–5.1)
PROT SERPL-MCNC: 7.4 G/DL (ref 6.4–8.2)
RBC # BLD AUTO: 5.16 M/UL (ref 3.8–5.2)
SODIUM SERPL-SCNC: 135 MMOL/L (ref 136–145)
UFH PPP CHRO-ACNC: 0.38 IU/ML
WBC # BLD AUTO: 9.3 K/UL (ref 3.6–11)

## 2025-01-15 PROCEDURE — 2700000000 HC OXYGEN THERAPY PER DAY

## 2025-01-15 PROCEDURE — 6370000000 HC RX 637 (ALT 250 FOR IP): Performed by: PHYSICIAN ASSISTANT

## 2025-01-15 PROCEDURE — 6370000000 HC RX 637 (ALT 250 FOR IP): Performed by: HOSPITALIST

## 2025-01-15 PROCEDURE — 6370000000 HC RX 637 (ALT 250 FOR IP): Performed by: INTERNAL MEDICINE

## 2025-01-15 PROCEDURE — 2500000003 HC RX 250 WO HCPCS: Performed by: INTERNAL MEDICINE

## 2025-01-15 PROCEDURE — 84100 ASSAY OF PHOSPHORUS: CPT

## 2025-01-15 PROCEDURE — 2000000000 HC ICU R&B

## 2025-01-15 PROCEDURE — 83735 ASSAY OF MAGNESIUM: CPT

## 2025-01-15 PROCEDURE — 82962 GLUCOSE BLOOD TEST: CPT

## 2025-01-15 PROCEDURE — 85520 HEPARIN ASSAY: CPT

## 2025-01-15 PROCEDURE — 6360000002 HC RX W HCPCS: Performed by: NURSE PRACTITIONER

## 2025-01-15 PROCEDURE — 83036 HEMOGLOBIN GLYCOSYLATED A1C: CPT

## 2025-01-15 PROCEDURE — 2500000003 HC RX 250 WO HCPCS: Performed by: STUDENT IN AN ORGANIZED HEALTH CARE EDUCATION/TRAINING PROGRAM

## 2025-01-15 PROCEDURE — 6360000002 HC RX W HCPCS: Performed by: PHYSICIAN ASSISTANT

## 2025-01-15 PROCEDURE — 6360000002 HC RX W HCPCS: Performed by: INTERNAL MEDICINE

## 2025-01-15 PROCEDURE — 6370000000 HC RX 637 (ALT 250 FOR IP): Performed by: STUDENT IN AN ORGANIZED HEALTH CARE EDUCATION/TRAINING PROGRAM

## 2025-01-15 PROCEDURE — 80053 COMPREHEN METABOLIC PANEL: CPT

## 2025-01-15 PROCEDURE — 94761 N-INVAS EAR/PLS OXIMETRY MLT: CPT

## 2025-01-15 PROCEDURE — 36415 COLL VENOUS BLD VENIPUNCTURE: CPT

## 2025-01-15 PROCEDURE — 85025 COMPLETE CBC W/AUTO DIFF WBC: CPT

## 2025-01-15 PROCEDURE — 94640 AIRWAY INHALATION TREATMENT: CPT

## 2025-01-15 PROCEDURE — 2580000003 HC RX 258: Performed by: INTERNAL MEDICINE

## 2025-01-15 PROCEDURE — 6370000000 HC RX 637 (ALT 250 FOR IP): Performed by: NURSE PRACTITIONER

## 2025-01-15 PROCEDURE — 6360000002 HC RX W HCPCS: Performed by: STUDENT IN AN ORGANIZED HEALTH CARE EDUCATION/TRAINING PROGRAM

## 2025-01-15 RX ADMIN — FUROSEMIDE 80 MG: 10 INJECTION, SOLUTION INTRAMUSCULAR; INTRAVENOUS at 16:55

## 2025-01-15 RX ADMIN — Medication 2.5 MG: at 20:09

## 2025-01-15 RX ADMIN — IPRATROPIUM BROMIDE AND ALBUTEROL SULFATE 1 DOSE: 2.5; .5 SOLUTION RESPIRATORY (INHALATION) at 08:29

## 2025-01-15 RX ADMIN — HYDROCORTISONE SODIUM SUCCINATE 50 MG: 100 INJECTION, POWDER, FOR SOLUTION INTRAMUSCULAR; INTRAVENOUS at 03:59

## 2025-01-15 RX ADMIN — IPRATROPIUM BROMIDE AND ALBUTEROL SULFATE 1 DOSE: 2.5; .5 SOLUTION RESPIRATORY (INHALATION) at 13:21

## 2025-01-15 RX ADMIN — HYDROCORTISONE SODIUM SUCCINATE 50 MG: 100 INJECTION, POWDER, FOR SOLUTION INTRAMUSCULAR; INTRAVENOUS at 20:06

## 2025-01-15 RX ADMIN — LOSARTAN POTASSIUM 25 MG: 50 TABLET, FILM COATED ORAL at 08:11

## 2025-01-15 RX ADMIN — SODIUM CHLORIDE, PRESERVATIVE FREE 10 ML: 5 INJECTION INTRAVENOUS at 08:17

## 2025-01-15 RX ADMIN — ATORVASTATIN CALCIUM 20 MG: 20 TABLET, FILM COATED ORAL at 20:08

## 2025-01-15 RX ADMIN — PANTOPRAZOLE SODIUM 40 MG: 40 TABLET, DELAYED RELEASE ORAL at 08:11

## 2025-01-15 RX ADMIN — CEFTRIAXONE SODIUM 1000 MG: 1 INJECTION, POWDER, FOR SOLUTION INTRAMUSCULAR; INTRAVENOUS at 11:35

## 2025-01-15 RX ADMIN — BUPROPION HYDROCHLORIDE 150 MG: 150 TABLET, EXTENDED RELEASE ORAL at 08:11

## 2025-01-15 RX ADMIN — BUDESONIDE 500 MCG: 0.5 INHALANT ORAL at 08:29

## 2025-01-15 RX ADMIN — DULOXETINE HYDROCHLORIDE 30 MG: 30 CAPSULE, DELAYED RELEASE ORAL at 08:11

## 2025-01-15 RX ADMIN — SODIUM CHLORIDE, PRESERVATIVE FREE 10 ML: 5 INJECTION INTRAVENOUS at 19:58

## 2025-01-15 RX ADMIN — SPIRONOLACTONE 25 MG: 25 TABLET ORAL at 08:11

## 2025-01-15 RX ADMIN — AZITHROMYCIN MONOHYDRATE 500 MG: 500 INJECTION, POWDER, LYOPHILIZED, FOR SOLUTION INTRAVENOUS at 11:34

## 2025-01-15 RX ADMIN — HYDROCORTISONE SODIUM SUCCINATE 50 MG: 100 INJECTION, POWDER, FOR SOLUTION INTRAMUSCULAR; INTRAVENOUS at 11:51

## 2025-01-15 RX ADMIN — BUDESONIDE 500 MCG: 0.5 INHALANT ORAL at 20:18

## 2025-01-15 RX ADMIN — HEPARIN SODIUM 12 UNITS/KG/HR: 10000 INJECTION, SOLUTION INTRAVENOUS at 05:42

## 2025-01-15 RX ADMIN — FUROSEMIDE 80 MG: 10 INJECTION, SOLUTION INTRAMUSCULAR; INTRAVENOUS at 08:11

## 2025-01-15 RX ADMIN — ASPIRIN 81 MG: 81 TABLET, COATED ORAL at 08:11

## 2025-01-15 RX ADMIN — IPRATROPIUM BROMIDE AND ALBUTEROL SULFATE 1 DOSE: 2.5; .5 SOLUTION RESPIRATORY (INHALATION) at 20:18

## 2025-01-15 ASSESSMENT — PAIN SCALES - GENERAL: PAINLEVEL_OUTOF10: 0

## 2025-01-15 NOTE — PROGRESS NOTES
CRITICAL CARE ADMISSION NOTE      Name: Nida Graves   : 1975   MRN: [id number]   Date: 1/15/2025      Reason for ICU Admission: aflutter, respiratory failure         HPI / Hospital Course:  Ms. Graves is a 49-year-old female with PMH chronic disability and bedbound status, diabetes, asthma, obesity, who presented for shortness of breath.  Shortness of breath worsening over the past few days.  Upon presentation ED patient found to be hypoxic necessitating O2 via NC.  Otherwise workup significant for arrhythmia.  Concerns for slow conducting A-fib.  Cardiology subsequently consulted and noted overall concern for A-fib with possible complete heart block versus slow ventricular response.  Patient supplemented to ICU for further evaluation and management.    DVT study is negative  Echo showed EF of 29% with moderate TR    24-hour events:  2025: Doing well no major overnight event, still on high flow above 70%, labs are acceptable, troponin is mildly elevated, chest x-ray consistent with bilateral pulm pleural effusion  2025: Started on steroids and antibiotics  1/15/2025: Still requiring high flow with 50 L 70%, feels better slowly getting better chest x-ray still showing significant increase in interstitial edema and lower lobe airspace disease        Assessment:  A flutter with slow ventricular response-on a heparin, Lasix  Possible complete heart block  Acute hypoxic respiratory failure-Lasix plus steroids plus antibiotics  Possible aspiration pneumonia  Possible NSTEMI-cardiology planning to do workup later on  Acute HFrEF-Lasix  Asthma  Diabetes    Plan: Taper oxygen as tolerated  -Mild elevation of WBC could be related to steroids  -Recheck x-ray tomorrow  -Incentive spirometry and Pep therapy  -OT and PT  -Aggressive diuresis with Lasix 80 twice daily  -Further management and related to heart block per cardiology  -Concern for aspiration/aspiration pneumonia  - Antibiotics adjusted to  is time spent at this critically ill patient's bedside actively involved in patient care as well as the coordination of care.  This does not include any procedural time which has been billed separately.        Heike Shahid M.D.  Pulmonary Critical Care & Sleep Medicine

## 2025-01-15 NOTE — CARE COORDINATION
CM reviewed Pt medicals, Pt lives with her  and daughter and Nephew.    Pt IND with ADL    PT/OT ordered, will wait for eval/recommendation.     Per IDR    Pt admitted SOB, hypoxic, no x-ray this morning.     No pain, weaning 02.    A&O    PT/OT ordered.     Pt is mostly bed bound at home

## 2025-01-15 NOTE — PROGRESS NOTES
CARDIOLOGY PROGRESS NOTE    REASON FOR CONSULT: Aflutter    REQUESTING PROVIDER: Tee Suarez MD     CHIEF COMPLAINT:  Shortness of breath, hypoxia    HISTORY OF PRESENT ILLNESS:  Nida Graves is a 49 y.o. year-old female with past medical history significant for diabetes, asthma who was evaluated today due to atrial flutter, SVR.  assists w/ history. Her Mounjaro was recently increased to 7.5mg, since that time has been short of breath but this has progressively worsened. Over the last couple of days has been weak, unable to get up by herself. A friend who is a nurse checked her O2 sat, noted to be 59, HR in the 40s. EMS was called. She required 6L to maintain sat 92%. In the ED, work up ongoing. Troponin 303, lactic acid 2.6, proBNP 6682, glu 238,  creat 1.18. CXR w/ small bilateral pleural effusions, bilat lower lobe atelectasis. EKG w/ atrial flutter, variable, HR 40-50s.     Seen and examined. Continues with SOB, on HiFlow. No CP. AFL on telem, HR upper 40s to mid 60s at times. Pt feeling better.      Records from hospital admission course thus far reviewed.      Telemetry reviewed.     INPATIENT MEDICATIONS:  Home medications reviewed.    Current Facility-Administered Medications:     hydrocortisone sodium succinate PF (SOLU-CORTEF) injection 50 mg, 50 mg, IntraVENous, Q8H, Heike Shahid MD, 50 mg at 01/15/25 1151    insulin lispro (HUMALOG,ADMELOG) injection vial 0-8 Units, 0-8 Units, SubCUTAneous, 4x Daily AC & HS, Heike Shahid MD    cefTRIAXone (ROCEPHIN) 1,000 mg in sterile water 10 mL IV syringe, 1,000 mg, IntraVENous, Q24H, Heike Shahid MD, 1,000 mg at 01/15/25 1135    azithromycin (ZITHROMAX) 500 mg in sodium chloride 0.9 % 250 mL IVPB ([id number]), 500 mg, IntraVENous, Q24H, Heike Shahid MD, Stopped at 01/15/25 1146    ipratropium 0.5 mg-albuterol 2.5 mg (DUONEB) nebulizer solution 1 Dose, 1 Dose, Inhalation, Q6H WA RT, Heike Shahid MD, 1 Dose at 01/15/25 1321    ondansetron  exam and performed the MDM in its entirety.

## 2025-01-16 ENCOUNTER — APPOINTMENT (OUTPATIENT)
Facility: HOSPITAL | Age: 50
DRG: 004 | End: 2025-01-16
Payer: COMMERCIAL

## 2025-01-16 PROBLEM — Z51.5 PALLIATIVE CARE BY SPECIALIST: Status: ACTIVE | Noted: 2025-01-16

## 2025-01-16 PROBLEM — I50.9 CONGESTIVE HEART FAILURE (HCC): Status: ACTIVE | Noted: 2025-01-16

## 2025-01-16 PROBLEM — M54.50 CHRONIC MIDLINE LOW BACK PAIN: Status: ACTIVE | Noted: 2025-01-16

## 2025-01-16 PROBLEM — G89.29 CHRONIC MIDLINE LOW BACK PAIN: Status: ACTIVE | Noted: 2025-01-16

## 2025-01-16 PROBLEM — I48.92 ATRIAL FLUTTER (HCC): Status: ACTIVE | Noted: 2025-01-16

## 2025-01-16 PROBLEM — R53.81 DEBILITY: Status: ACTIVE | Noted: 2025-01-16

## 2025-01-16 LAB
ALBUMIN SERPL-MCNC: 3.2 G/DL (ref 3.5–5)
ALBUMIN/GLOB SERPL: 0.7 (ref 1.1–2.2)
ALP SERPL-CCNC: 112 U/L (ref 45–117)
ALT SERPL-CCNC: 20 U/L (ref 12–78)
ANION GAP SERPL CALC-SCNC: 4 MMOL/L (ref 2–12)
AST SERPL W P-5'-P-CCNC: 15 U/L (ref 15–37)
BACTERIA SPEC CULT: NORMAL
BASOPHILS # BLD: 0.02 K/UL (ref 0–0.1)
BASOPHILS NFR BLD: 0.2 % (ref 0–1)
BILIRUB SERPL-MCNC: 0.9 MG/DL (ref 0.2–1)
BUN SERPL-MCNC: 23 MG/DL (ref 6–20)
BUN/CREAT SERPL: 26 (ref 12–20)
CA-I BLD-MCNC: 9.6 MG/DL (ref 8.5–10.1)
CHLORIDE SERPL-SCNC: 90 MMOL/L (ref 97–108)
CO2 SERPL-SCNC: 41 MMOL/L (ref 21–32)
CORTIS AM PEAK SERPL-MCNC: 52.1 UG/DL (ref 4.3–22.45)
CREAT SERPL-MCNC: 0.88 MG/DL (ref 0.55–1.02)
DIFFERENTIAL METHOD BLD: ABNORMAL
EOSINOPHIL # BLD: 0 K/UL (ref 0–0.4)
EOSINOPHIL NFR BLD: 0 % (ref 0–7)
ERYTHROCYTE [DISTWIDTH] IN BLOOD BY AUTOMATED COUNT: 15.3 % (ref 11.5–14.5)
GLOBULIN SER CALC-MCNC: 4.3 G/DL (ref 2–4)
GLUCOSE BLD STRIP.AUTO-MCNC: 134 MG/DL (ref 65–100)
GLUCOSE BLD STRIP.AUTO-MCNC: 135 MG/DL (ref 65–100)
GLUCOSE BLD STRIP.AUTO-MCNC: 137 MG/DL (ref 65–100)
GLUCOSE BLD STRIP.AUTO-MCNC: 142 MG/DL (ref 65–100)
GLUCOSE BLD STRIP.AUTO-MCNC: 142 MG/DL (ref 65–100)
GLUCOSE BLD STRIP.AUTO-MCNC: 149 MG/DL (ref 65–100)
GLUCOSE SERPL-MCNC: 143 MG/DL (ref 65–100)
HCT VFR BLD AUTO: 49.9 % (ref 35–47)
HGB BLD-MCNC: 15.5 G/DL (ref 11.5–16)
IMM GRANULOCYTES # BLD AUTO: 0.04 K/UL (ref 0–0.04)
IMM GRANULOCYTES NFR BLD AUTO: 0.4 % (ref 0–0.5)
LYMPHOCYTES # BLD: 1.96 K/UL (ref 0.8–3.5)
LYMPHOCYTES NFR BLD: 18.5 % (ref 12–49)
Lab: NORMAL
MAGNESIUM SERPL-MCNC: 2.3 MG/DL (ref 1.6–2.4)
MCH RBC QN AUTO: 29 PG (ref 26–34)
MCHC RBC AUTO-ENTMCNC: 31.1 G/DL (ref 30–36.5)
MCV RBC AUTO: 93.4 FL (ref 80–99)
MONOCYTES # BLD: 0.92 K/UL (ref 0–1)
MONOCYTES NFR BLD: 8.7 % (ref 5–13)
NEUTS SEG # BLD: 7.63 K/UL (ref 1.8–8)
NEUTS SEG NFR BLD: 72.2 % (ref 32–75)
NRBC # BLD: 0 K/UL (ref 0–0.01)
NRBC BLD-RTO: 0 PER 100 WBC
PERFORMED BY:: ABNORMAL
PHOSPHATE SERPL-MCNC: 3 MG/DL (ref 2.6–4.7)
PLATELET # BLD AUTO: 282 K/UL (ref 150–400)
PMV BLD AUTO: 10 FL (ref 8.9–12.9)
POTASSIUM SERPL-SCNC: 3.1 MMOL/L (ref 3.5–5.1)
PROT SERPL-MCNC: 7.5 G/DL (ref 6.4–8.2)
RBC # BLD AUTO: 5.34 M/UL (ref 3.8–5.2)
SODIUM SERPL-SCNC: 135 MMOL/L (ref 136–145)
UFH PPP CHRO-ACNC: 0.49 IU/ML
WBC # BLD AUTO: 10.6 K/UL (ref 3.6–11)

## 2025-01-16 PROCEDURE — 6360000002 HC RX W HCPCS: Performed by: NURSE PRACTITIONER

## 2025-01-16 PROCEDURE — 80053 COMPREHEN METABOLIC PANEL: CPT

## 2025-01-16 PROCEDURE — 6360000002 HC RX W HCPCS: Performed by: INTERNAL MEDICINE

## 2025-01-16 PROCEDURE — 97162 PT EVAL MOD COMPLEX 30 MIN: CPT

## 2025-01-16 PROCEDURE — 6370000000 HC RX 637 (ALT 250 FOR IP): Performed by: HOSPITALIST

## 2025-01-16 PROCEDURE — 71045 X-RAY EXAM CHEST 1 VIEW: CPT

## 2025-01-16 PROCEDURE — 82962 GLUCOSE BLOOD TEST: CPT

## 2025-01-16 PROCEDURE — 6370000000 HC RX 637 (ALT 250 FOR IP): Performed by: INTERNAL MEDICINE

## 2025-01-16 PROCEDURE — 36415 COLL VENOUS BLD VENIPUNCTURE: CPT

## 2025-01-16 PROCEDURE — 6360000002 HC RX W HCPCS: Performed by: STUDENT IN AN ORGANIZED HEALTH CARE EDUCATION/TRAINING PROGRAM

## 2025-01-16 PROCEDURE — 94761 N-INVAS EAR/PLS OXIMETRY MLT: CPT

## 2025-01-16 PROCEDURE — 6370000000 HC RX 637 (ALT 250 FOR IP): Performed by: STUDENT IN AN ORGANIZED HEALTH CARE EDUCATION/TRAINING PROGRAM

## 2025-01-16 PROCEDURE — 94640 AIRWAY INHALATION TREATMENT: CPT

## 2025-01-16 PROCEDURE — 85025 COMPLETE CBC W/AUTO DIFF WBC: CPT

## 2025-01-16 PROCEDURE — 97166 OT EVAL MOD COMPLEX 45 MIN: CPT

## 2025-01-16 PROCEDURE — 2580000003 HC RX 258: Performed by: INTERNAL MEDICINE

## 2025-01-16 PROCEDURE — 2700000000 HC OXYGEN THERAPY PER DAY

## 2025-01-16 PROCEDURE — 2500000003 HC RX 250 WO HCPCS: Performed by: STUDENT IN AN ORGANIZED HEALTH CARE EDUCATION/TRAINING PROGRAM

## 2025-01-16 PROCEDURE — 94667 MNPJ CHEST WALL 1ST: CPT

## 2025-01-16 PROCEDURE — 6370000000 HC RX 637 (ALT 250 FOR IP): Performed by: PHYSICIAN ASSISTANT

## 2025-01-16 PROCEDURE — 2500000003 HC RX 250 WO HCPCS: Performed by: INTERNAL MEDICINE

## 2025-01-16 PROCEDURE — 83735 ASSAY OF MAGNESIUM: CPT

## 2025-01-16 PROCEDURE — 6370000000 HC RX 637 (ALT 250 FOR IP): Performed by: NURSE PRACTITIONER

## 2025-01-16 PROCEDURE — 6360000002 HC RX W HCPCS: Performed by: PHYSICIAN ASSISTANT

## 2025-01-16 PROCEDURE — 97530 THERAPEUTIC ACTIVITIES: CPT

## 2025-01-16 PROCEDURE — 84100 ASSAY OF PHOSPHORUS: CPT

## 2025-01-16 PROCEDURE — 99222 1ST HOSP IP/OBS MODERATE 55: CPT | Performed by: INTERNAL MEDICINE

## 2025-01-16 PROCEDURE — 1100000000 HC RM PRIVATE

## 2025-01-16 PROCEDURE — 85520 HEPARIN ASSAY: CPT

## 2025-01-16 RX ORDER — POTASSIUM CHLORIDE 1500 MG/1
20 TABLET, EXTENDED RELEASE ORAL DAILY
Status: DISCONTINUED | OUTPATIENT
Start: 2025-01-17 | End: 2025-01-20

## 2025-01-16 RX ORDER — FUROSEMIDE 10 MG/ML
40 INJECTION INTRAMUSCULAR; INTRAVENOUS 2 TIMES DAILY
Status: DISCONTINUED | OUTPATIENT
Start: 2025-01-16 | End: 2025-01-16

## 2025-01-16 RX ORDER — BISACODYL 10 MG
10 SUPPOSITORY, RECTAL RECTAL DAILY PRN
Status: DISCONTINUED | OUTPATIENT
Start: 2025-01-16 | End: 2025-01-20

## 2025-01-16 RX ORDER — POLYETHYLENE GLYCOL 3350 17 G/17G
17 POWDER, FOR SOLUTION ORAL DAILY
Status: DISCONTINUED | OUTPATIENT
Start: 2025-01-16 | End: 2025-01-20 | Stop reason: SDUPTHER

## 2025-01-16 RX ORDER — HYDROCORTISONE SODIUM SUCCINATE 100 MG/2ML
50 INJECTION INTRAMUSCULAR; INTRAVENOUS EVERY 12 HOURS
Status: DISCONTINUED | OUTPATIENT
Start: 2025-01-17 | End: 2025-01-24

## 2025-01-16 RX ORDER — POTASSIUM CHLORIDE 1500 MG/1
40 TABLET, EXTENDED RELEASE ORAL ONCE
Status: COMPLETED | OUTPATIENT
Start: 2025-01-16 | End: 2025-01-16

## 2025-01-16 RX ORDER — FUROSEMIDE 10 MG/ML
80 INJECTION INTRAMUSCULAR; INTRAVENOUS 2 TIMES DAILY
Status: DISCONTINUED | OUTPATIENT
Start: 2025-01-16 | End: 2025-01-18

## 2025-01-16 RX ORDER — SENNOSIDES A AND B 8.6 MG/1
1 TABLET, FILM COATED ORAL 2 TIMES DAILY
Status: DISCONTINUED | OUTPATIENT
Start: 2025-01-16 | End: 2025-01-20

## 2025-01-16 RX ADMIN — PANTOPRAZOLE SODIUM 40 MG: 40 TABLET, DELAYED RELEASE ORAL at 08:17

## 2025-01-16 RX ADMIN — DULOXETINE HYDROCHLORIDE 30 MG: 30 CAPSULE, DELAYED RELEASE ORAL at 08:17

## 2025-01-16 RX ADMIN — POTASSIUM CHLORIDE 40 MEQ: 1500 TABLET, EXTENDED RELEASE ORAL at 14:52

## 2025-01-16 RX ADMIN — FUROSEMIDE 80 MG: 10 INJECTION, SOLUTION INTRAMUSCULAR; INTRAVENOUS at 08:17

## 2025-01-16 RX ADMIN — BUPROPION HYDROCHLORIDE 150 MG: 150 TABLET, EXTENDED RELEASE ORAL at 08:17

## 2025-01-16 RX ADMIN — IPRATROPIUM BROMIDE AND ALBUTEROL SULFATE 1 DOSE: 2.5; .5 SOLUTION RESPIRATORY (INHALATION) at 10:05

## 2025-01-16 RX ADMIN — IPRATROPIUM BROMIDE AND ALBUTEROL SULFATE 1 DOSE: 2.5; .5 SOLUTION RESPIRATORY (INHALATION) at 14:13

## 2025-01-16 RX ADMIN — SENNOSIDES 8.6 MG: 8.6 TABLET, FILM COATED ORAL at 12:46

## 2025-01-16 RX ADMIN — BUDESONIDE 500 MCG: 0.5 INHALANT ORAL at 20:18

## 2025-01-16 RX ADMIN — HYDROCORTISONE SODIUM SUCCINATE 50 MG: 100 INJECTION, POWDER, FOR SOLUTION INTRAMUSCULAR; INTRAVENOUS at 03:29

## 2025-01-16 RX ADMIN — ASPIRIN 81 MG: 81 TABLET, COATED ORAL at 08:17

## 2025-01-16 RX ADMIN — LOSARTAN POTASSIUM 25 MG: 50 TABLET, FILM COATED ORAL at 08:17

## 2025-01-16 RX ADMIN — AZITHROMYCIN MONOHYDRATE 500 MG: 500 INJECTION, POWDER, LYOPHILIZED, FOR SOLUTION INTRAVENOUS at 11:01

## 2025-01-16 RX ADMIN — CEFTRIAXONE SODIUM 1000 MG: 1 INJECTION, POWDER, FOR SOLUTION INTRAMUSCULAR; INTRAVENOUS at 11:05

## 2025-01-16 RX ADMIN — BUDESONIDE 500 MCG: 0.5 INHALANT ORAL at 10:05

## 2025-01-16 RX ADMIN — APIXABAN 5 MG: 5 TABLET, FILM COATED ORAL at 12:45

## 2025-01-16 RX ADMIN — HYDROCORTISONE SODIUM SUCCINATE 50 MG: 100 INJECTION, POWDER, FOR SOLUTION INTRAMUSCULAR; INTRAVENOUS at 10:59

## 2025-01-16 RX ADMIN — POTASSIUM CHLORIDE 10 MEQ: 7.45 INJECTION INTRAVENOUS at 06:12

## 2025-01-16 RX ADMIN — SPIRONOLACTONE 25 MG: 25 TABLET ORAL at 08:17

## 2025-01-16 RX ADMIN — SODIUM CHLORIDE, PRESERVATIVE FREE 10 ML: 5 INJECTION INTRAVENOUS at 08:17

## 2025-01-16 RX ADMIN — POLYETHYLENE GLYCOL 3350 17 G: 17 POWDER, FOR SOLUTION ORAL at 08:16

## 2025-01-16 RX ADMIN — HEPARIN SODIUM 12 UNITS/KG/HR: 10000 INJECTION, SOLUTION INTRAVENOUS at 00:12

## 2025-01-16 RX ADMIN — ATORVASTATIN CALCIUM 20 MG: 20 TABLET, FILM COATED ORAL at 21:25

## 2025-01-16 RX ADMIN — SENNOSIDES 8.6 MG: 8.6 TABLET, FILM COATED ORAL at 21:25

## 2025-01-16 RX ADMIN — FUROSEMIDE 80 MG: 10 INJECTION, SOLUTION INTRAMUSCULAR; INTRAVENOUS at 18:19

## 2025-01-16 RX ADMIN — IPRATROPIUM BROMIDE AND ALBUTEROL SULFATE 1 DOSE: 2.5; .5 SOLUTION RESPIRATORY (INHALATION) at 20:18

## 2025-01-16 RX ADMIN — EMPAGLIFLOZIN 10 MG: 10 TABLET, FILM COATED ORAL at 14:52

## 2025-01-16 RX ADMIN — Medication 2.5 MG: at 21:25

## 2025-01-16 RX ADMIN — SODIUM CHLORIDE, PRESERVATIVE FREE 10 ML: 5 INJECTION INTRAVENOUS at 21:26

## 2025-01-16 ASSESSMENT — PAIN SCALES - GENERAL: PAINLEVEL_OUTOF10: 0

## 2025-01-16 NOTE — CONSULTS
Palliative Medicine  Patient Name: Nida Graves  YOB: 1975  MRN: [id number]  Age: 49 y.o.  Gender: female    Date of Initial Consult: 2025  Date of Service: 2025  Time: 10:29 AM  Provider: Mee Godoy MD  Hospital Day: 5  Admit Date: 2025  Referring Provider: Dr. Shahid       Reasons for Consultation:  Goals of Care    HISTORY OF PRESENT ILLNESS (HPI):   Nida Graves is a 49 y.o. female with a past medical history of obesity was 270# now down to 240#/ BMI 37),DM with peripheral neuropathy, asthma (previously followed by Dr. Johnson with [facility]), chronic back pain, debility, who was admitted on 2025 from home  with a diagnosis of Shortness of breath, hypoxia.   CXR: LLL airspace disease, interstitial edema  ECHO : EF 25-30%, decrease RV function, mod TR, LA dilation    Psychosocial: patient is  to spouse Nannette Valle [phone]  She has a dtr age 21 who is graduating from [employer] this Spring.  Her nephew lives with her (autism, age 27)  Patient's spouse does all the cooking, cleaning, household chores and works full time.   Patient has chronic debility, ambulates with walker for past 3 years, able to get around house, watches TV., independent with dressing/ bathing but fall risk., (spouse just purchased walk in tub)   Patient's mother and brother both  in .   No AMD documents: patient would trust her spouse to make medical decisions if unable.     PALLIATIVE DIAGNOSES:    Acute on chronic respiratory failure, multifactorial  Aspiration, asthma, pulmonary edema, weak muscles/ deconditioned  Suspected PE, on tx (unable to lie flat for CTA)   HFrEF (EF 25-30%)   Aflutter with slow ventricular response  Possible NSTEMI  Obesity, diabetes, peripheral neuropathy, chronic Bilateral LE below knee leg pain  Chronic back pain  Mood disorder, on medication  Palliative medicine encounter.   Care goals discussion     ASSESSMENT AND PLAN:   Discussed with ICU  No edema  [] Other:    Wt Readings from Last 15 Encounters:   01/16/25 104.2 kg (229 lb 11.5 oz)   07/19/22 115.4 kg (254 lb 8 oz)   06/22/22 116.3 kg (256 lb 6.4 oz)   04/19/22 113 kg (249 lb 1.6 oz)   12/20/21 115.2 kg (254 lb)   06/29/21 120.2 kg (265 lb)   04/08/21 123.4 kg (272 lb)        Current Diet: ADULT DIET; Regular; 5 carb choices (75 gm/meal)       PSYCHOSOCIAL/SPIRITUAL SCREENING:   Palliative IDT has assessed this patient for cultural preferences / practices and a referral made as appropriate to needs (Cultural Services, Patient Advocacy, Ethics, etc.)    Spiritual Affiliation: Mandaeism    Any spiritual / Hindu concerns:  [] Yes /  [x] No   If \"Yes\" to discuss with pastoral care during IDT     Does caregiver feel burdened by caring for their loved one:   [] Yes /  [x] No /  [] No Caregiver Present/Available [] No Caregiver [] Pt Lives at Facility  If \"Yes\" to discuss with social work during IDT    Anticipatory grief assessment:   [x] Normal  / [] Maladaptive     If \"Maladaptive\" to discuss with social work during IDT    ESAS Anxiety: Anxiety Score: 3    ESAS Depression: Depression Score: Not depressed        LAB AND IMAGING FINDINGS:   Objective data reviewed:  labs, images, records, medication use, vitals, and chart     FINAL COMMENTS   Thank you for allowing Palliative Medicine to participate in the care of Nida Graves.    Only check if applicable and billing time based rather than MDM  [] The total encounter time on this service date was ____ minutes which was spent performing a face-to-face encounter and personally completing the provider-level activities documented in the note. This includes time spent prior to the visit and after the visit in direct care of the patient. This time does not include time spent in any separately reportable services.    Electronically signed by   Mee Godoy MD  Palliative Care Team  on 1/16/2025 at 10:29 AM

## 2025-01-16 NOTE — PLAN OF CARE
OCCUPATIONAL THERAPY EVALUATION  Patient: Nida Graves (49 y.o. female)  Date: 1/16/2025  Primary Diagnosis: Atrial fibrillation, unspecified type (HCC) [I48.91]  Atrial flutter, unspecified type (HCC) [I48.92]  Congestive heart failure, unspecified HF chronicity, unspecified heart failure type (HCC) [I50.9]  Acute hypoxic respiratory failure [J96.01]       Precautions: Fall Risk, General Precautions                Recommendations for nursing mobility: Frequent repositioning to prevent skin breakdown, Use of BSC for toileting , Assist x2, and when assisting OOB    In place during session:Peripheral IV, High Flow 45L/min 45%, External Catheter, EKG/telemetry , and Pulse ox  ASSESSMENT  Ms Graves is a 49 y.o. female was admitted 1/14/2025 and currently being treated for Afib.She was received semi-supine in bed upon arrival, A & O x 4, and agreeable to OT/PT evaluation.     Based on current observations, she presents with decreased  functional mobility, independence in ADLs, endurance, balance (see below for objective details and assist levels).     Overall, she tolerated session with assistance X 2 for standing activities, rest breaks, and adhering to verbal cues. She was cooperative and engaged from start to completion and currently requires assistance for the following: Bed Mobility, Transfers, Lower Extremity Dressing, and Toileting. She will benefit from continued skilled OT services to address current impairments and improve IND and safety with self cares and functional transfers/mobility. Current OT d/c recommendation High intensity and comprehensive skilled occupational therapy in a multidisciplinary setting as patient is working towards tolerating up to 3 hours of therapy/day x 5-7 days/week once medically appropriate.     Start of Session End of Session   SPO2 (%) 92 91   Heart Rate (BPM) 58 59     GOALS:    Problem: Occupational Therapy - Adult  Goal: By Discharge: Performs self-care activities at highest

## 2025-01-16 NOTE — PLAN OF CARE
PHYSICAL THERAPY EVALUATION  Patient: Nida Graves (49 y.o. female)  Date: 1/16/2025  Primary Diagnosis: Atrial fibrillation, unspecified type (HCC) [I48.91]  Atrial flutter, unspecified type (HCC) [I48.92]  Congestive heart failure, unspecified HF chronicity, unspecified heart failure type (HCC) [I50.9]  Acute hypoxic respiratory failure [J96.01]       Precautions: Fall Risk, General Precautions                      Recommendations for nursing mobility: Encourage HEP in prep for ADLs/mobility; see handout for details, Frequent repositioning to prevent skin breakdown, Use of bed/chair alarm for safety, and LE elevation for management of edema    In place during session: Peripheral IV and EKG/telemetry     ASSESSMENT  Pt is a 49 y.o. female admitted on 1/12/2025 for Afib; pt currently being treated for Acute hypoxic resp failure . Pt sitting at eob with OT working with the patient, upon PT arrival, agreeable to evaluation. Pt A&O x 4.      Based on the objective data described below, the patient currently presents with impaired ability to perform high-level IADLs, impaired strength, poor body mechanics, decreased activity tolerance, poor safety awareness, impaired balance, and impaired posture. (See below for objective details and assist levels).     Overall pt tolerated session fair today with therapy. Pt required mod A for bed mobility and sit to supine transfers. Attempted sitto stand ,patient unable to stand with max assist of 2,gait belt and RW, demonstrates fear of falling,resistive with sit to stand due to fear.Patient reported she has a lift chair at home,so it is difficult to get up from bed. Pt will benefit from continued skilled PT to address above deficits and return to PLOF. Current PT DC recommendation Continue to assess pending progress once medically appropriate.      GOALS:    Problem: Physical Therapy - Adult  Goal: By Discharge: Performs mobility at highest level of function for planned discharge  assistance;Additional time  Supine to Sit: Minimum assistance;Additional time;Assist X1  Sit to Supine: Additional time;Assist X1;Moderate assistance;Minimum assistance;Assist X2  Scooting: Additional time;Assist X1;Moderate assistance;Assist X2  Transfers:     Transfer Training  Transfer Training: Yes  Overall Level of Assistance: Minimum assistance;Adaptive equipment;Additional time;Assist X2;Moderate assistance  Interventions: Verbal cues;Tactile cues;Manual cues  Sit to Stand: Adaptive equipment;Additional time;Assist X2;Maximum assistance  Stand to Sit: Adaptive equipment;Assist X2;Moderate assistance  Balance:               Balance  Sitting: Intact  Standing: Impaired  Standing - Static: Poor;Constant support;Other (comment) (unable to acquire full standing/clear the bed surface)  Wheelchair Mobility:        Ambulation/Gait Training:                                Gait  Gait Training: No                   Therapeutic Intervention provided:   bed mobility , EOB transfers, OOB transfers, amb with AD, LE therapeutic exercises, seated dynamic balance, and standing dynamic balance    Functional Measure:  [facility]-PAC™ “6 Clicks”         Basic Mobility Inpatient Short Form  How much difficulty does the patient currently have... Unable A Lot A Little None   1.  Turning over in bed (including adjusting bedclothes, sheets and blankets)?   [] 1   [] 2   [x] 3   [] 4   2.  Sitting down on and standing up from a chair with arms ( e.g., wheelchair, bedside commode, etc.)   [x] 1   [] 2   [] 3   [] 4   3.  Moving from lying on back to sitting on the side of the bed?   [] 1   [x] 2   [] 3   [] 4          How much help from another person does the patient currently need... Total A Lot A Little None   4.  Moving to and from a bed to a chair (including a wheelchair)?   [x] 1   [] 2   [] 3   [] 4   5.  Need to walk in hospital room?   [x] 1   [] 2   [] 3   [] 4   6.  Climbing 3-5 steps with a railing?   9 1   [] 2

## 2025-01-16 NOTE — PROGRESS NOTES
CARDIOLOGY PROGRESS NOTE    REASON FOR CONSULT: Aflutter    REQUESTING PROVIDER: Tee Suarez MD     CHIEF COMPLAINT:  Shortness of breath, hypoxia    HISTORY OF PRESENT ILLNESS:  Nida Graves is a 49 y.o. year-old female with past medical history significant for diabetes, asthma who was evaluated today due to atrial flutter, SVR.  assists w/ history. Her Mounjaro was recently increased to 7.5mg, since that time has been short of breath but this has progressively worsened. Over the last couple of days has been weak, unable to get up by herself. A friend who is a nurse checked her O2 sat, noted to be 59, HR in the 40s. EMS was called. She required 6L to maintain sat 92%. In the ED, work up ongoing. Troponin 303, lactic acid 2.6, proBNP 6682, glu 238,  creat 1.18. CXR w/ small bilateral pleural effusions, bilat lower lobe atelectasis. EKG w/ atrial flutter, variable, HR 40-50s.     Seen and examined. Continues with SOB, primarily on exertion, on regular nasal cannula. No CP. AFL on telem, HR upper 40s to mid 60s at times. Pt feeling better.      Records from hospital admission course thus far reviewed.      Telemetry reviewed.     INPATIENT MEDICATIONS:  Home medications reviewed.    Current Facility-Administered Medications:     [Held by provider] apixaban (ELIQUIS) tablet 5 mg, 5 mg, Oral, BID, Heike Shahid MD, 5 mg at 01/16/25 1245    polyethylene glycol (GLYCOLAX) packet 17 g, 17 g, Oral, Daily, Heike Shahid MD    senna (SENOKOT) tablet 8.6 mg, 1 tablet, Oral, BID, Heike Shahid MD, 8.6 mg at 01/16/25 1246    magnesium hydroxide (MILK OF MAGNESIA) 400 MG/5ML suspension 30 mL, 30 mL, Oral, Q6H PRN, Heike Shahid MD    bisacodyl (DULCOLAX) suppository 10 mg, 10 mg, Rectal, Daily PRN, Heike Shahid MD    empagliflozin (JARDIANCE) tablet 10 mg, 10 mg, Oral, Daily, Shanice Maloney, FNP, 10 mg at 01/16/25 1452    furosemide (LASIX) injection 80 mg, 80 mg, IntraVENous, BID, Shanice Maloney,    Volume improved. Reduce lasix IV to 40mg BID  GDMT continue Aldactone and Losartan. Avoid BB with denita rates. Start Jardiance.  Daily wt and strict I&O  Hypertension: BP currently acceptable. Monitor closely.  Diabetes: tx plan per primary team    Thank you for involving us in the care of this patient.  Please do not hesitate to call me or Dr. Prajapati if additional questions arise.    Shanice Maloney, XIMENA  1/16/2025       I have reviewed the history and exam and performed the MDM in its entirety.

## 2025-01-16 NOTE — PROGRESS NOTES
Received Order for Telemetry     Nida Graves   1975   [id number]   Atrial fibrillation, unspecified type (HCC) [I48.91]  Atrial flutter, unspecified type (HCC) [I48.92]  Congestive heart failure, unspecified HF chronicity, unspecified heart failure type (HCC) [I50.9]  Acute hypoxic respiratory failure [J96.01]   Conner Dave MD     Tele Box # [id number] placed on patient at  1611 pm  ER Room # 283  Admitting to Room 413  Verified with Primary Nurse MICHELLE at  1611 pm

## 2025-01-16 NOTE — CARE COORDINATION
CM reviewed Pt medicals, Pt lives with her  and daughter and Nephew.     Pt IND with ADL          PT/OT ordered.    Per IDR    Pt did work with PT/OT, recommendation a IRF    New onset heart failure.     Mainly bed boud at home, not sure why Pt is so weak      Pt will transfer to the floor.    2:00  CM met f/f with Pt and her daughter, discussed D/C planning with them.     Pt and Pt daughter asked CM to send a referral to [facility].

## 2025-01-16 NOTE — PLAN OF CARE
Problem: Skin/Tissue Integrity  Goal: Absence of new skin breakdown  Description: 1.  Monitor for areas of redness and/or skin breakdown  2.  Assess vascular access sites hourly  3.  Every 4-6 hours minimum:  Change oxygen saturation probe site  4.  Every 4-6 hours:  If on nasal continuous positive airway pressure, respiratory therapy assess nares and determine need for appliance change or resting period.  Outcome: Progressing     Problem: Safety - Adult  Goal: Free from fall injury  Outcome: Progressing     Problem: Respiratory - Adult  Goal: Achieves optimal ventilation and oxygenation  Outcome: Progressing     Problem: Cardiovascular - Adult  Goal: Maintains optimal cardiac output and hemodynamic stability  Outcome: Progressing     Problem: Genitourinary - Adult  Goal: Absence of urinary retention  Outcome: Progressing     Problem: Skin/Tissue Integrity - Adult  Goal: Skin integrity remains intact  Outcome: Progressing     Problem: Pain  Goal: Verbalizes/displays adequate comfort level or baseline comfort level  Outcome: Progressing

## 2025-01-16 NOTE — PROGRESS NOTES
CRITICAL CARE ADMISSION NOTE      Name: Nida Graves   : 1975   MRN: [id number]   Date: 2025      Reason for ICU Admission: aflutter, respiratory failure         HPI / Hospital Course:  Ms. Graves is a 49-year-old female with PMH chronic disability and bedbound status, diabetes, asthma, obesity, who presented for shortness of breath.  Shortness of breath worsening over the past few days.  Upon presentation ED patient found to be hypoxic necessitating O2 via NC.  Otherwise workup significant for arrhythmia.  Concerns for slow conducting A-fib.  Cardiology subsequently consulted and noted overall concern for A-fib with possible complete heart block versus slow ventricular response.  Patient supplemented to ICU for further evaluation and management.    DVT study is negative  Echo showed EF of 29% with moderate TR    24-hour events:  2025: Doing well no major overnight event, still on high flow above 70%, labs are acceptable, troponin is mildly elevated, chest x-ray consistent with bilateral pulm pleural effusion  2025: Started on steroids and antibiotics  1/15/2025: Still requiring high flow with 50 L 70%, feels better slowly getting better chest x-ray still showing significant increase in interstitial edema and lower lobe airspace disease  2025 patient continued to do well with aggressive diuresis steroids and antibiotics, was transitioned to 35 L 45% high flow, now on 6 L oxygen and doing well, cardiology and palliative care following      Assessment:  A flutter with slow ventricular response-on a heparin, Lasix  Possible complete heart block  Acute hypoxic respiratory failure-Lasix plus steroids plus antibiotics  Possible aspiration pneumonia  Possible NSTEMI-cardiology planning to do workup later on  Acute HFrEF-Lasix  Asthma  Diabetes    Plan: Doing well on 6 L oxygen continue taper as tolerated  -Mild elevation of WBC could be related to steroids  - X-ray showing effusion on  Exam  Vitals reviewed.   Constitutional:       General: She is in acute distress.      Appearance: She is obese. She is ill-appearing.   HENT:      Head: Normocephalic and atraumatic.      Nose: Nose normal.      Mouth/Throat:      Mouth: Mucous membranes are dry.   Eyes:      Extraocular Movements: Extraocular movements intact.      Pupils: Pupils are equal, round, and reactive to light.   Cardiovascular:      Rate and Rhythm: Normal rate and regular rhythm.      Pulses: Normal pulses.      Heart sounds: Normal heart sounds. No murmur heard.  Pulmonary:      Effort: Respiratory distress present.      Breath sounds: Normal breath sounds. No wheezing, rhonchi or rales.   Abdominal:      General: Abdomen is flat. Bowel sounds are normal.      Palpations: Abdomen is soft.   Musculoskeletal:         General: No deformity or signs of injury. Normal range of motion.      Cervical back: Normal range of motion. No rigidity.   Skin:     General: Skin is warm and dry.   Neurological:      Mental Status: She is alert and oriented to person, place, and time. Mental status is at baseline.   Psychiatric:         Mood and Affect: Mood normal.         Behavior: Behavior normal.         /80   Pulse 56   Temp 98.2 °F (36.8 °C) (Oral)   Resp 18   Ht 1.702 m (5' 7\")   Wt 104.2 kg (229 lb 11.5 oz)   SpO2 93%   BMI 35.98 kg/m²      Temp (24hrs), Av.2 °F (36.8 °C), Min:97.9 °F (36.6 °C), Max:98.8 °F (37.1 °C)           Intake/Output:     Intake/Output Summary (Last 24 hours) at 2025 1419  Last data filed at 2025 1400  Gross per 24 hour   Intake 572 ml   Output 2100 ml   Net -1528 ml       Imaging    No valid procedures specified.     CBC w/Diff Recent Labs     25  0245 01/15/25  0400 25  0315   WBC 13.7* 9.3 10.6   RBC 5.21* 5.16 5.34*   HGB 14.9 15.0 15.5   HCT 49.5* 49.2* 49.9*    276 282           Chemistry Recent Labs     25  0245 25  1800 01/15/25  0400 25  0315

## 2025-01-16 NOTE — PROGRESS NOTES
Patient and all belongings transferred to room 413, on telebox [id number]. , Nannette, called and updated with patient status and notified of transfer.

## 2025-01-16 NOTE — PROGRESS NOTES
4 Eyes Skin Assessment     NAME:  Nida Graves  YOB: 1975  MEDICAL RECORD NUMBER:  [id number]    The patient is being assessed for  Transfer to New Unit    I agree that at least one RN has performed a thorough Head to Toe Skin Assessment on the patient. ALL assessment sites listed below have been assessed.      Areas assessed by both nurses:    Head, Face, Ears, Shoulders, Back, Chest, Arms, Elbows, Hands, Sacrum. Buttock, Coccyx, Ischium, and Legs. Feet and Heels        Does the Patient have a Wound? No noted wound(s)       Quintin Prevention initiated by RN: Yes  Wound Care Orders initiated by RN: No    Pressure Injury (Stage 3,4, Unstageable, DTI, NWPT, and Complex wounds) if present, place Wound referral order by RN under : No    New Ostomies, if present place, Ostomy referral order under : No     Nurse 1 eSignature: Electronically signed by Alba Morris RN on 1/16/25 at 4:54 PM EST    **SHARE this note so that the co-signing nurse can place an eSignature**    Nurse 2 eSignature: Electronically signed by Rosenda Cleveland RN on 1/16/25 at 5:24 PM EST

## 2025-01-17 LAB
ALBUMIN SERPL-MCNC: 3.6 G/DL (ref 3.5–5)
ALBUMIN/GLOB SERPL: 0.8 (ref 1.1–2.2)
ALP SERPL-CCNC: 114 U/L (ref 45–117)
ALT SERPL-CCNC: 16 U/L (ref 12–78)
ANION GAP SERPL CALC-SCNC: 8 MMOL/L (ref 2–12)
ANION GAP SERPL CALC-SCNC: 8 MMOL/L (ref 2–12)
AST SERPL W P-5'-P-CCNC: 15 U/L (ref 15–37)
BASOPHILS # BLD: 0.02 K/UL (ref 0–0.1)
BASOPHILS NFR BLD: 0.1 % (ref 0–1)
BILIRUB SERPL-MCNC: 1.2 MG/DL (ref 0.2–1)
BUN SERPL-MCNC: 24 MG/DL (ref 6–20)
BUN SERPL-MCNC: 26 MG/DL (ref 6–20)
BUN/CREAT SERPL: 28 (ref 12–20)
BUN/CREAT SERPL: 32 (ref 12–20)
CA-I BLD-MCNC: 10 MG/DL (ref 8.5–10.1)
CA-I BLD-MCNC: 10.2 MG/DL (ref 8.5–10.1)
CHLORIDE SERPL-SCNC: 90 MMOL/L (ref 97–108)
CHLORIDE SERPL-SCNC: 93 MMOL/L (ref 97–108)
CO2 SERPL-SCNC: 39 MMOL/L (ref 21–32)
CO2 SERPL-SCNC: 41 MMOL/L (ref 21–32)
CREAT SERPL-MCNC: 0.81 MG/DL (ref 0.55–1.02)
CREAT SERPL-MCNC: 0.86 MG/DL (ref 0.55–1.02)
DIFFERENTIAL METHOD BLD: ABNORMAL
EOSINOPHIL # BLD: 0.01 K/UL (ref 0–0.4)
EOSINOPHIL NFR BLD: 0.1 % (ref 0–7)
ERYTHROCYTE [DISTWIDTH] IN BLOOD BY AUTOMATED COUNT: 15.5 % (ref 11.5–14.5)
GLOBULIN SER CALC-MCNC: 4.3 G/DL (ref 2–4)
GLUCOSE BLD STRIP.AUTO-MCNC: 116 MG/DL (ref 65–100)
GLUCOSE BLD STRIP.AUTO-MCNC: 129 MG/DL (ref 65–100)
GLUCOSE BLD STRIP.AUTO-MCNC: 149 MG/DL (ref 65–100)
GLUCOSE SERPL-MCNC: 127 MG/DL (ref 65–100)
GLUCOSE SERPL-MCNC: 151 MG/DL (ref 65–100)
HCT VFR BLD AUTO: 55.8 % (ref 35–47)
HGB BLD-MCNC: 16.9 G/DL (ref 11.5–16)
IMM GRANULOCYTES # BLD AUTO: 0.09 K/UL (ref 0–0.04)
IMM GRANULOCYTES NFR BLD AUTO: 0.7 % (ref 0–0.5)
LYMPHOCYTES # BLD: 1.03 K/UL (ref 0.8–3.5)
LYMPHOCYTES NFR BLD: 7.5 % (ref 12–49)
MAGNESIUM SERPL-MCNC: 2.3 MG/DL (ref 1.6–2.4)
MCH RBC QN AUTO: 28.7 PG (ref 26–34)
MCHC RBC AUTO-ENTMCNC: 30.3 G/DL (ref 30–36.5)
MCV RBC AUTO: 94.7 FL (ref 80–99)
MONOCYTES # BLD: 1.15 K/UL (ref 0–1)
MONOCYTES NFR BLD: 8.4 % (ref 5–13)
NEUTS SEG # BLD: 11.39 K/UL (ref 1.8–8)
NEUTS SEG NFR BLD: 83.2 % (ref 32–75)
NRBC # BLD: 0 K/UL (ref 0–0.01)
NRBC BLD-RTO: 0 PER 100 WBC
PERFORMED BY:: ABNORMAL
PHOSPHATE SERPL-MCNC: 3.5 MG/DL (ref 2.6–4.7)
PLATELET # BLD AUTO: 255 K/UL (ref 150–400)
PMV BLD AUTO: 10 FL (ref 8.9–12.9)
POTASSIUM SERPL-SCNC: 3 MMOL/L (ref 3.5–5.1)
POTASSIUM SERPL-SCNC: 3.7 MMOL/L (ref 3.5–5.1)
PROT SERPL-MCNC: 7.9 G/DL (ref 6.4–8.2)
RBC # BLD AUTO: 5.89 M/UL (ref 3.8–5.2)
SODIUM SERPL-SCNC: 139 MMOL/L (ref 136–145)
SODIUM SERPL-SCNC: 140 MMOL/L (ref 136–145)
WBC # BLD AUTO: 13.7 K/UL (ref 3.6–11)

## 2025-01-17 PROCEDURE — 6370000000 HC RX 637 (ALT 250 FOR IP): Performed by: NURSE PRACTITIONER

## 2025-01-17 PROCEDURE — 94640 AIRWAY INHALATION TREATMENT: CPT

## 2025-01-17 PROCEDURE — 85025 COMPLETE CBC W/AUTO DIFF WBC: CPT

## 2025-01-17 PROCEDURE — 6360000002 HC RX W HCPCS

## 2025-01-17 PROCEDURE — 6370000000 HC RX 637 (ALT 250 FOR IP): Performed by: INTERNAL MEDICINE

## 2025-01-17 PROCEDURE — 36415 COLL VENOUS BLD VENIPUNCTURE: CPT

## 2025-01-17 PROCEDURE — 80048 BASIC METABOLIC PNL TOTAL CA: CPT

## 2025-01-17 PROCEDURE — 94761 N-INVAS EAR/PLS OXIMETRY MLT: CPT

## 2025-01-17 PROCEDURE — 6360000002 HC RX W HCPCS: Performed by: NURSE PRACTITIONER

## 2025-01-17 PROCEDURE — 84100 ASSAY OF PHOSPHORUS: CPT

## 2025-01-17 PROCEDURE — 2500000003 HC RX 250 WO HCPCS: Performed by: STUDENT IN AN ORGANIZED HEALTH CARE EDUCATION/TRAINING PROGRAM

## 2025-01-17 PROCEDURE — 6360000002 HC RX W HCPCS: Performed by: INTERNAL MEDICINE

## 2025-01-17 PROCEDURE — 6370000000 HC RX 637 (ALT 250 FOR IP): Performed by: STUDENT IN AN ORGANIZED HEALTH CARE EDUCATION/TRAINING PROGRAM

## 2025-01-17 PROCEDURE — 6370000000 HC RX 637 (ALT 250 FOR IP): Performed by: HOSPITALIST

## 2025-01-17 PROCEDURE — 6360000002 HC RX W HCPCS: Performed by: STUDENT IN AN ORGANIZED HEALTH CARE EDUCATION/TRAINING PROGRAM

## 2025-01-17 PROCEDURE — 80053 COMPREHEN METABOLIC PANEL: CPT

## 2025-01-17 PROCEDURE — 2580000003 HC RX 258: Performed by: INTERNAL MEDICINE

## 2025-01-17 PROCEDURE — 83735 ASSAY OF MAGNESIUM: CPT

## 2025-01-17 PROCEDURE — 2500000003 HC RX 250 WO HCPCS: Performed by: INTERNAL MEDICINE

## 2025-01-17 PROCEDURE — 2700000000 HC OXYGEN THERAPY PER DAY

## 2025-01-17 PROCEDURE — 82962 GLUCOSE BLOOD TEST: CPT

## 2025-01-17 PROCEDURE — 2060000000 HC ICU INTERMEDIATE R&B

## 2025-01-17 PROCEDURE — 6370000000 HC RX 637 (ALT 250 FOR IP): Performed by: PHYSICIAN ASSISTANT

## 2025-01-17 RX ORDER — ENOXAPARIN SODIUM 150 MG/ML
1 INJECTION SUBCUTANEOUS 2 TIMES DAILY
Status: DISCONTINUED | OUTPATIENT
Start: 2025-01-17 | End: 2025-01-21

## 2025-01-17 RX ADMIN — SODIUM CHLORIDE, PRESERVATIVE FREE 10 ML: 5 INJECTION INTRAVENOUS at 22:04

## 2025-01-17 RX ADMIN — POLYETHYLENE GLYCOL 3350 17 G: 17 POWDER, FOR SOLUTION ORAL at 08:57

## 2025-01-17 RX ADMIN — FUROSEMIDE 80 MG: 10 INJECTION, SOLUTION INTRAMUSCULAR; INTRAVENOUS at 18:06

## 2025-01-17 RX ADMIN — SENNOSIDES 8.6 MG: 8.6 TABLET, FILM COATED ORAL at 08:56

## 2025-01-17 RX ADMIN — BUPROPION HYDROCHLORIDE 150 MG: 150 TABLET, EXTENDED RELEASE ORAL at 08:56

## 2025-01-17 RX ADMIN — IPRATROPIUM BROMIDE AND ALBUTEROL SULFATE 1 DOSE: 2.5; .5 SOLUTION RESPIRATORY (INHALATION) at 08:40

## 2025-01-17 RX ADMIN — EMPAGLIFLOZIN 10 MG: 10 TABLET, FILM COATED ORAL at 08:57

## 2025-01-17 RX ADMIN — LOSARTAN POTASSIUM 25 MG: 50 TABLET, FILM COATED ORAL at 08:57

## 2025-01-17 RX ADMIN — POTASSIUM CHLORIDE 10 MEQ: 7.45 INJECTION INTRAVENOUS at 16:05

## 2025-01-17 RX ADMIN — HYDROCORTISONE SODIUM SUCCINATE 50 MG: 100 INJECTION, POWDER, FOR SOLUTION INTRAMUSCULAR; INTRAVENOUS at 00:08

## 2025-01-17 RX ADMIN — AZITHROMYCIN MONOHYDRATE 500 MG: 500 INJECTION, POWDER, LYOPHILIZED, FOR SOLUTION INTRAVENOUS at 13:40

## 2025-01-17 RX ADMIN — ATORVASTATIN CALCIUM 20 MG: 20 TABLET, FILM COATED ORAL at 22:03

## 2025-01-17 RX ADMIN — ENOXAPARIN SODIUM 105 MG: 150 INJECTION SUBCUTANEOUS at 22:04

## 2025-01-17 RX ADMIN — POTASSIUM CHLORIDE 10 MEQ: 7.45 INJECTION INTRAVENOUS at 14:19

## 2025-01-17 RX ADMIN — POTASSIUM CHLORIDE 10 MEQ: 7.45 INJECTION INTRAVENOUS at 09:07

## 2025-01-17 RX ADMIN — SODIUM CHLORIDE, PRESERVATIVE FREE 10 ML: 5 INJECTION INTRAVENOUS at 08:57

## 2025-01-17 RX ADMIN — BUDESONIDE 500 MCG: 0.5 INHALANT ORAL at 21:07

## 2025-01-17 RX ADMIN — HYDROCORTISONE SODIUM SUCCINATE 50 MG: 100 INJECTION, POWDER, FOR SOLUTION INTRAMUSCULAR; INTRAVENOUS at 13:23

## 2025-01-17 RX ADMIN — CEFTRIAXONE SODIUM 1000 MG: 1 INJECTION, POWDER, FOR SOLUTION INTRAMUSCULAR; INTRAVENOUS at 13:29

## 2025-01-17 RX ADMIN — ASPIRIN 81 MG: 81 TABLET, COATED ORAL at 08:56

## 2025-01-17 RX ADMIN — BUDESONIDE 500 MCG: 0.5 INHALANT ORAL at 08:40

## 2025-01-17 RX ADMIN — FUROSEMIDE 80 MG: 10 INJECTION, SOLUTION INTRAMUSCULAR; INTRAVENOUS at 08:57

## 2025-01-17 RX ADMIN — HYDROCORTISONE SODIUM SUCCINATE 50 MG: 100 INJECTION, POWDER, FOR SOLUTION INTRAMUSCULAR; INTRAVENOUS at 22:05

## 2025-01-17 RX ADMIN — POTASSIUM CHLORIDE 10 MEQ: 7.45 INJECTION INTRAVENOUS at 10:58

## 2025-01-17 RX ADMIN — PANTOPRAZOLE SODIUM 40 MG: 40 TABLET, DELAYED RELEASE ORAL at 05:19

## 2025-01-17 RX ADMIN — IPRATROPIUM BROMIDE AND ALBUTEROL SULFATE 1 DOSE: 2.5; .5 SOLUTION RESPIRATORY (INHALATION) at 13:42

## 2025-01-17 RX ADMIN — Medication 2.5 MG: at 22:03

## 2025-01-17 RX ADMIN — IPRATROPIUM BROMIDE AND ALBUTEROL SULFATE 1 DOSE: 2.5; .5 SOLUTION RESPIRATORY (INHALATION) at 21:08

## 2025-01-17 RX ADMIN — SENNOSIDES 8.6 MG: 8.6 TABLET, FILM COATED ORAL at 22:03

## 2025-01-17 RX ADMIN — SPIRONOLACTONE 25 MG: 25 TABLET ORAL at 08:56

## 2025-01-17 RX ADMIN — ENOXAPARIN SODIUM 105 MG: 150 INJECTION SUBCUTANEOUS at 13:30

## 2025-01-17 RX ADMIN — POTASSIUM CHLORIDE 10 MEQ: 7.45 INJECTION INTRAVENOUS at 06:31

## 2025-01-17 RX ADMIN — DULOXETINE HYDROCHLORIDE 30 MG: 30 CAPSULE, DELAYED RELEASE ORAL at 08:56

## 2025-01-17 RX ADMIN — POTASSIUM CHLORIDE 10 MEQ: 7.45 INJECTION INTRAVENOUS at 13:23

## 2025-01-17 ASSESSMENT — PAIN SCALES - GENERAL: PAINLEVEL_OUTOF10: 0

## 2025-01-17 NOTE — PROGRESS NOTES
CARDIOLOGY PROGRESS NOTE    REASON FOR CONSULT: Aflutter    REQUESTING PROVIDER: Tee Suarez MD     CHIEF COMPLAINT:  Shortness of breath, hypoxia    HISTORY OF PRESENT ILLNESS:  Nida Graves is a 49 y.o. year-old female with past medical history significant for diabetes, asthma who was evaluated today due to atrial flutter, SVR.  assists w/ history. Her Mounjaro was recently increased to 7.5mg, since that time has been short of breath but this has progressively worsened. Over the last couple of days has been weak, unable to get up by herself. A friend who is a nurse checked her O2 sat, noted to be 59, HR in the 40s. EMS was called. She required 6L to maintain sat 92%. In the ED, work up ongoing. Troponin 303, lactic acid 2.6, proBNP 6682, glu 238,  creat 1.18. CXR w/ small bilateral pleural effusions, bilat lower lobe atelectasis. EKG w/ atrial flutter, variable, HR 40-50s.     Seen and examined. Continues with SOB, primarily on exertion, on regular nasal cannula. No CP. AFL on telem, HR upper 40s to mid 60s at times. Pt feeling better.    Records from hospital admission course thus far reviewed.      Telemetry reviewed.     INPATIENT MEDICATIONS:  Home medications reviewed.    Current Facility-Administered Medications:     [Held by provider] apixaban (ELIQUIS) tablet 5 mg, 5 mg, Oral, BID, Heike Shahid MD, 5 mg at 01/16/25 1245    polyethylene glycol (GLYCOLAX) packet 17 g, 17 g, Oral, Daily, Heike Shahid MD, 17 g at 01/17/25 0857    senna (SENOKOT) tablet 8.6 mg, 1 tablet, Oral, BID, Heike Shahid MD, 8.6 mg at 01/17/25 0856    magnesium hydroxide (MILK OF MAGNESIA) 400 MG/5ML suspension 30 mL, 30 mL, Oral, Q6H PRN, Heike Shahid MD    bisacodyl (DULCOLAX) suppository 10 mg, 10 mg, Rectal, Daily PRN, Heike Shahid MD    empagliflozin (JARDIANCE) tablet 10 mg, 10 mg, Oral, Daily, Shanice Maloney FNP, 10 mg at 01/17/25 0857    furosemide (LASIX) injection 80 mg, 80 mg, IntraVENous, BID,  Jardiance.  Avoid BB with denita rates.   Daily wt and strict I&O  Hypertension: BP currently acceptable. Monitor closely.  Diabetes: tx plan per primary team    Thank you for involving us in the care of this patient.  Please do not hesitate to call me or Dr. Prajapati if additional questions arise.    KEENAN MUNOZ, APRN - NP  1/17/2025

## 2025-01-17 NOTE — PROGRESS NOTES
Hospitalist Progress Note               Daily Progress Note: 1/17/2025      Hospital Day: 6     Chief complaint:   Chief Complaint   Patient presents with    Shortness of Breath        Subjective:   Hospital course to date:  49-year-old female with PMH significant for anxiety, chronic disability and bedbound status, diabetes, insomnia, hyperlipidemia, asthma presented with shortness of breath.  In ED patient was found to be hypoxic requiring oxygen via nasal cannula.  Workup in ER was significant for arrhythmia, concerning for slowing conducting A-fib.  Cardiology was consulted as there was concern for A-fib with complete heart block versus slow ventricular response.  DVT study negative, echo EF 29% with moderate TR.    1/13 patient's chest x-ray consistent with bilateral pulmonary pleural effusion  1/14 initiated on steroids and antibiotics  1/16 patient transition from 45% high flow to 6 L oxygen, cardiology and palliative care following and transferred to floor service.  1/17 Cardiac cath expected on Monday 1/20.       Medications reviewed  Current Facility-Administered Medications   Medication Dose Route Frequency    [Held by provider] apixaban (ELIQUIS) tablet 5 mg  5 mg Oral BID    polyethylene glycol (GLYCOLAX) packet 17 g  17 g Oral Daily    senna (SENOKOT) tablet 8.6 mg  1 tablet Oral BID    magnesium hydroxide (MILK OF MAGNESIA) 400 MG/5ML suspension 30 mL  30 mL Oral Q6H PRN    bisacodyl (DULCOLAX) suppository 10 mg  10 mg Rectal Daily PRN    empagliflozin (JARDIANCE) tablet 10 mg  10 mg Oral Daily    furosemide (LASIX) injection 80 mg  80 mg IntraVENous BID    potassium chloride (KLOR-CON M) extended release tablet 20 mEq  20 mEq Oral Daily    hydrocortisone sodium succinate PF (SOLU-CORTEF) injection 50 mg  50 mg IntraVENous Q12H    azithromycin (ZITHROMAX) 500 mg in sodium chloride 0.9 % 250 mL IVPB ([id number])  500 mg IntraVENous Q24H    insulin lispro (HUMALOG,ADMELOG) injection vial 0-8 Units   adverse drug reaction  [] Other -   [] Change in code status:    [] Decision to escalate care:    [] Major surgery/procedure with associated risk factors:    ----------------------------------------------------------------------  C. Data (any 2)  [] Discussed current management and discharge planning options with Case Management.  [] Discussed management of the case with:    [] Telemetry personally reviewed and interpreted as documented above    [] Imaging personally reviewed and interpreted, includes:    [x] Data Review (any 3)  [x] All available Consultant notes from yesterday/today were reviewed  [x] All current labs were reviewed and interpreted for clinical significance   [x] Appropriate follow-up labs were ordered  [] Collateral history obtained from:               Assessment and Plan:    Acute hypoxic respiratory failure likely 2/2 pneumonia and HFrEF  Possible aspiration pneumonia left-sided pleural effusion  History of asthma  X-ray significant for left side lower lobe airspace disease  Status post thoracentesis  Incentive spirometry  On IV ceftriaxone and azithromycin  On hydrocortisone 50 mg every 12 hourly for pneumonia guideline(ICU), high-dose of steroid for 7 days followed by tapering dose 8 to 14 days  Pulmonology consult    Acute HFrEF  NSTEMI  Possible complete heart block  A flutter with slow ventricular response  Status post heparin drip, currently on Eliquis which is currently held  IV Lasix 80 mg twice daily  Cardiology/EP follow-up  Cardiology on board  I/O: -1.03L    Chronic medical conditions  Anxiety  Insomnia  Peripheral neuropathy  Hypertension  On bupropion, trazodone, duloxetine and losartan      Conner Dave MD

## 2025-01-17 NOTE — PROGRESS NOTES
Hospitalist Progress Note               Daily Progress Note: 1/17/2025      Hospital Day: 6     Chief complaint:   Chief Complaint   Patient presents with    Shortness of Breath        Subjective:   Hospital course to date:  49-year-old female with PMH significant for anxiety, chronic disability and bedbound status, diabetes, insomnia, hyperlipidemia, asthma presented with shortness of breath.  In ED patient was found to be hypoxic requiring oxygen via nasal cannula.  Workup in ER was significant for arrhythmia, concerning for slowing conducting A-fib.  Cardiology was consulted as there was concern for A-fib with complete heart block versus slow ventricular response.  DVT study negative, echo EF 29% with moderate TR.    1/13 patient's chest x-ray consistent with bilateral pulmonary pleural effusion  1/14 initiated on steroids and antibiotics  1/16 patient transition from 45% high flow to 6 L oxygen, cardiology and palliative care following and transferred to floor service.        Medications reviewed  Current Facility-Administered Medications   Medication Dose Route Frequency    [Held by provider] apixaban (ELIQUIS) tablet 5 mg  5 mg Oral BID    polyethylene glycol (GLYCOLAX) packet 17 g  17 g Oral Daily    senna (SENOKOT) tablet 8.6 mg  1 tablet Oral BID    magnesium hydroxide (MILK OF MAGNESIA) 400 MG/5ML suspension 30 mL  30 mL Oral Q6H PRN    bisacodyl (DULCOLAX) suppository 10 mg  10 mg Rectal Daily PRN    empagliflozin (JARDIANCE) tablet 10 mg  10 mg Oral Daily    furosemide (LASIX) injection 80 mg  80 mg IntraVENous BID    potassium chloride (KLOR-CON M) extended release tablet 20 mEq  20 mEq Oral Daily    hydrocortisone sodium succinate PF (SOLU-CORTEF) injection 50 mg  50 mg IntraVENous Q12H    azithromycin (ZITHROMAX) 500 mg in sodium chloride 0.9 % 250 mL IVPB ([id number])  500 mg IntraVENous Q24H    insulin lispro (HUMALOG,ADMELOG) injection vial 0-8 Units  0-8 Units SubCUTAneous 4x Daily AC & HS     Change in code status:    [] Decision to escalate care:    [] Major surgery/procedure with associated risk factors:    ----------------------------------------------------------------------  C. Data (any 2)  [] Discussed current management and discharge planning options with Case Management.  [] Discussed management of the case with:    [] Telemetry personally reviewed and interpreted as documented above    [] Imaging personally reviewed and interpreted, includes:    [x] Data Review (any 3)  [x] All available Consultant notes from yesterday/today were reviewed  [x] All current labs were reviewed and interpreted for clinical significance   [x] Appropriate follow-up labs were ordered  [] Collateral history obtained from:               Assessment and Plan:    Acute hypoxic respiratory failure likely 2/2 pneumonia and HFrEF  Possible aspiration pneumonia left-sided pleural effusion  History of asthma  X-ray significant for left side lower lobe airspace disease  Status post thoracentesis  Incentive spirometry  On IV ceftriaxone and azithromycin  On hydrocortisone 50 mg every 12 hourly for pneumonia guideline(ICU), high-dose of steroid for 7 days followed by tapering dose 8 to 14 days  Pulmonology consult    Acute HFrEF  NSTEMI  Possible complete heart block  A flutter with slow ventricular response  Status post heparin drip, currently on Eliquis  IV Lasix 80 mg twice daily  Cardiology/EP follow-up  Cardiology on board    Chronic medical conditions  Anxiety  Insomnia  Peripheral neuropathy  Hypertension  On bupropion, trazodone, duloxetine and losartan      Conner Dave MD

## 2025-01-17 NOTE — PLAN OF CARE
Patient in bed, presenting with general malaise, still on o2, awaits cardiac review.  Problem: ABCDS Injury Assessment  Goal: Absence of physical injury  1/17/2025 0922 by Alba Morris RN  Outcome: Progressing  1/17/2025 0057 by Niecy Mast RN  Outcome: Progressing     Problem: Skin/Tissue Integrity  Goal: Absence of new skin breakdown  Description: 1.  Monitor for areas of redness and/or skin breakdown  2.  Assess vascular access sites hourly  3.  Every 4-6 hours minimum:  Change oxygen saturation probe site  4.  Every 4-6 hours:  If on nasal continuous positive airway pressure, respiratory therapy assess nares and determine need for appliance change or resting period.  1/17/2025 0922 by Alba Morris RN  Outcome: Progressing  1/17/2025 0057 by Niecy Mast RN  Outcome: Progressing     Problem: Chronic Conditions and Co-morbidities  Goal: Patient's chronic conditions and co-morbidity symptoms are monitored and maintained or improved  1/17/2025 0922 by Alba Morris RN  Outcome: Progressing  1/17/2025 0057 by Niecy Mast RN  Outcome: Progressing     Problem: Discharge Planning  Goal: Discharge to home or other facility with appropriate resources  Outcome: Progressing     Problem: Safety - Adult  Goal: Free from fall injury  Outcome: Progressing

## 2025-01-17 NOTE — CARE COORDINATION
DCP: [facility] reviewing  SELAM: >48 hours    Pt awaiting cardiology clearance, cardiac cath on Monday, wean O2, and on IV abx. CM will continue following.

## 2025-01-18 LAB
ALBUMIN SERPL-MCNC: 3.3 G/DL (ref 3.5–5)
ALBUMIN/GLOB SERPL: 0.7 (ref 1.1–2.2)
ALP SERPL-CCNC: 107 U/L (ref 45–117)
ALT SERPL-CCNC: 14 U/L (ref 12–78)
ANION GAP SERPL CALC-SCNC: 8 MMOL/L (ref 2–12)
AST SERPL W P-5'-P-CCNC: 13 U/L (ref 15–37)
BACTERIA SPEC CULT: NORMAL
BACTERIA SPEC CULT: NORMAL
BASOPHILS # BLD: 0.03 K/UL (ref 0–0.1)
BASOPHILS NFR BLD: 0.2 % (ref 0–1)
BILIRUB SERPL-MCNC: 1.1 MG/DL (ref 0.2–1)
BUN SERPL-MCNC: 35 MG/DL (ref 6–20)
BUN/CREAT SERPL: 38 (ref 12–20)
CA-I BLD-MCNC: 10.4 MG/DL (ref 8.5–10.1)
CHLORIDE SERPL-SCNC: 93 MMOL/L (ref 97–108)
CHOLEST SERPL-MCNC: 188 MG/DL
CO2 SERPL-SCNC: 41 MMOL/L (ref 21–32)
CORTIS AM PEAK SERPL-MCNC: 37 UG/DL (ref 4.3–22.45)
CREAT SERPL-MCNC: 0.91 MG/DL (ref 0.55–1.02)
DIFFERENTIAL METHOD BLD: ABNORMAL
EOSINOPHIL # BLD: 0.01 K/UL (ref 0–0.4)
EOSINOPHIL NFR BLD: 0.1 % (ref 0–7)
ERYTHROCYTE [DISTWIDTH] IN BLOOD BY AUTOMATED COUNT: 15.8 % (ref 11.5–14.5)
GLOBULIN SER CALC-MCNC: 4.8 G/DL (ref 2–4)
GLUCOSE BLD STRIP.AUTO-MCNC: 125 MG/DL (ref 65–100)
GLUCOSE BLD STRIP.AUTO-MCNC: 129 MG/DL (ref 65–100)
GLUCOSE BLD STRIP.AUTO-MCNC: 136 MG/DL (ref 65–100)
GLUCOSE SERPL-MCNC: 149 MG/DL (ref 65–100)
HCT VFR BLD AUTO: 55.1 % (ref 35–47)
HDLC SERPL-MCNC: 86 MG/DL
HDLC SERPL: 2.2 (ref 0–5)
HGB BLD-MCNC: 16.6 G/DL (ref 11.5–16)
IMM GRANULOCYTES # BLD AUTO: 0.11 K/UL (ref 0–0.04)
IMM GRANULOCYTES NFR BLD AUTO: 0.6 % (ref 0–0.5)
LDLC SERPL CALC-MCNC: 82.6 MG/DL (ref 0–100)
LIPID PANEL: NORMAL
LYMPHOCYTES # BLD: 1.36 K/UL (ref 0.8–3.5)
LYMPHOCYTES NFR BLD: 7.7 % (ref 12–49)
Lab: NORMAL
Lab: NORMAL
MAGNESIUM SERPL-MCNC: 2.6 MG/DL (ref 1.6–2.4)
MCH RBC QN AUTO: 28.9 PG (ref 26–34)
MCHC RBC AUTO-ENTMCNC: 30.1 G/DL (ref 30–36.5)
MCV RBC AUTO: 95.8 FL (ref 80–99)
MONOCYTES # BLD: 1.19 K/UL (ref 0–1)
MONOCYTES NFR BLD: 6.7 % (ref 5–13)
NEUTS SEG # BLD: 14.96 K/UL (ref 1.8–8)
NEUTS SEG NFR BLD: 84.7 % (ref 32–75)
NRBC # BLD: 0 K/UL (ref 0–0.01)
NRBC BLD-RTO: 0 PER 100 WBC
PERFORMED BY:: ABNORMAL
PHOSPHATE SERPL-MCNC: 2.8 MG/DL (ref 2.6–4.7)
PLATELET # BLD AUTO: 245 K/UL (ref 150–400)
PMV BLD AUTO: 10.6 FL (ref 8.9–12.9)
POTASSIUM SERPL-SCNC: 3.1 MMOL/L (ref 3.5–5.1)
PROT SERPL-MCNC: 8.1 G/DL (ref 6.4–8.2)
RBC # BLD AUTO: 5.75 M/UL (ref 3.8–5.2)
SODIUM SERPL-SCNC: 142 MMOL/L (ref 136–145)
TRIGL SERPL-MCNC: 97 MG/DL
VLDLC SERPL CALC-MCNC: 19.4 MG/DL
WBC # BLD AUTO: 17.7 K/UL (ref 3.6–11)

## 2025-01-18 PROCEDURE — 2500000003 HC RX 250 WO HCPCS: Performed by: INTERNAL MEDICINE

## 2025-01-18 PROCEDURE — 6370000000 HC RX 637 (ALT 250 FOR IP): Performed by: STUDENT IN AN ORGANIZED HEALTH CARE EDUCATION/TRAINING PROGRAM

## 2025-01-18 PROCEDURE — 6360000002 HC RX W HCPCS: Performed by: INTERNAL MEDICINE

## 2025-01-18 PROCEDURE — 6370000000 HC RX 637 (ALT 250 FOR IP): Performed by: INTERNAL MEDICINE

## 2025-01-18 PROCEDURE — 84100 ASSAY OF PHOSPHORUS: CPT

## 2025-01-18 PROCEDURE — 80061 LIPID PANEL: CPT

## 2025-01-18 PROCEDURE — 94669 MECHANICAL CHEST WALL OSCILL: CPT

## 2025-01-18 PROCEDURE — 82962 GLUCOSE BLOOD TEST: CPT

## 2025-01-18 PROCEDURE — 85025 COMPLETE CBC W/AUTO DIFF WBC: CPT

## 2025-01-18 PROCEDURE — 2700000000 HC OXYGEN THERAPY PER DAY

## 2025-01-18 PROCEDURE — 80053 COMPREHEN METABOLIC PANEL: CPT

## 2025-01-18 PROCEDURE — 2500000003 HC RX 250 WO HCPCS: Performed by: STUDENT IN AN ORGANIZED HEALTH CARE EDUCATION/TRAINING PROGRAM

## 2025-01-18 PROCEDURE — 6360000002 HC RX W HCPCS: Performed by: STUDENT IN AN ORGANIZED HEALTH CARE EDUCATION/TRAINING PROGRAM

## 2025-01-18 PROCEDURE — 6370000000 HC RX 637 (ALT 250 FOR IP): Performed by: NURSE PRACTITIONER

## 2025-01-18 PROCEDURE — 2060000000 HC ICU INTERMEDIATE R&B

## 2025-01-18 PROCEDURE — 6360000002 HC RX W HCPCS: Performed by: NURSE PRACTITIONER

## 2025-01-18 PROCEDURE — 94664 DEMO&/EVAL PT USE INHALER: CPT

## 2025-01-18 PROCEDURE — 6360000002 HC RX W HCPCS

## 2025-01-18 PROCEDURE — 94640 AIRWAY INHALATION TREATMENT: CPT

## 2025-01-18 PROCEDURE — 2580000003 HC RX 258: Performed by: INTERNAL MEDICINE

## 2025-01-18 PROCEDURE — 94761 N-INVAS EAR/PLS OXIMETRY MLT: CPT

## 2025-01-18 PROCEDURE — 36415 COLL VENOUS BLD VENIPUNCTURE: CPT

## 2025-01-18 PROCEDURE — 6370000000 HC RX 637 (ALT 250 FOR IP): Performed by: HOSPITALIST

## 2025-01-18 PROCEDURE — 6370000000 HC RX 637 (ALT 250 FOR IP): Performed by: PHYSICIAN ASSISTANT

## 2025-01-18 PROCEDURE — 83735 ASSAY OF MAGNESIUM: CPT

## 2025-01-18 RX ORDER — FUROSEMIDE 10 MG/ML
40 INJECTION INTRAMUSCULAR; INTRAVENOUS 2 TIMES DAILY
Status: DISCONTINUED | OUTPATIENT
Start: 2025-01-19 | End: 2025-01-24

## 2025-01-18 RX ADMIN — SODIUM CHLORIDE, PRESERVATIVE FREE 10 ML: 5 INJECTION INTRAVENOUS at 10:37

## 2025-01-18 RX ADMIN — POLYETHYLENE GLYCOL 3350 17 G: 17 POWDER, FOR SOLUTION ORAL at 10:35

## 2025-01-18 RX ADMIN — Medication 2.5 MG: at 21:55

## 2025-01-18 RX ADMIN — BUDESONIDE 500 MCG: 0.5 INHALANT ORAL at 08:52

## 2025-01-18 RX ADMIN — ASPIRIN 81 MG: 81 TABLET, COATED ORAL at 10:36

## 2025-01-18 RX ADMIN — ATORVASTATIN CALCIUM 20 MG: 20 TABLET, FILM COATED ORAL at 21:55

## 2025-01-18 RX ADMIN — POTASSIUM BICARBONATE 40 MEQ: 782 TABLET, EFFERVESCENT ORAL at 21:56

## 2025-01-18 RX ADMIN — FUROSEMIDE 80 MG: 10 INJECTION, SOLUTION INTRAMUSCULAR; INTRAVENOUS at 10:36

## 2025-01-18 RX ADMIN — SODIUM CHLORIDE, PRESERVATIVE FREE 10 ML: 5 INJECTION INTRAVENOUS at 23:58

## 2025-01-18 RX ADMIN — ACETAMINOPHEN 650 MG: 325 TABLET ORAL at 21:54

## 2025-01-18 RX ADMIN — SPIRONOLACTONE 25 MG: 25 TABLET ORAL at 10:36

## 2025-01-18 RX ADMIN — SENNOSIDES 8.6 MG: 8.6 TABLET, FILM COATED ORAL at 21:55

## 2025-01-18 RX ADMIN — AZITHROMYCIN MONOHYDRATE 500 MG: 500 INJECTION, POWDER, LYOPHILIZED, FOR SOLUTION INTRAVENOUS at 11:59

## 2025-01-18 RX ADMIN — LOSARTAN POTASSIUM 25 MG: 50 TABLET, FILM COATED ORAL at 10:36

## 2025-01-18 RX ADMIN — BUPROPION HYDROCHLORIDE 150 MG: 150 TABLET, EXTENDED RELEASE ORAL at 10:36

## 2025-01-18 RX ADMIN — HYDROCORTISONE SODIUM SUCCINATE 50 MG: 100 INJECTION, POWDER, FOR SOLUTION INTRAMUSCULAR; INTRAVENOUS at 23:58

## 2025-01-18 RX ADMIN — POTASSIUM CHLORIDE 20 MEQ: 1500 TABLET, EXTENDED RELEASE ORAL at 10:36

## 2025-01-18 RX ADMIN — FUROSEMIDE 80 MG: 10 INJECTION, SOLUTION INTRAMUSCULAR; INTRAVENOUS at 17:21

## 2025-01-18 RX ADMIN — SENNOSIDES 8.6 MG: 8.6 TABLET, FILM COATED ORAL at 10:36

## 2025-01-18 RX ADMIN — EMPAGLIFLOZIN 10 MG: 10 TABLET, FILM COATED ORAL at 10:36

## 2025-01-18 RX ADMIN — IPRATROPIUM BROMIDE AND ALBUTEROL SULFATE 1 DOSE: 2.5; .5 SOLUTION RESPIRATORY (INHALATION) at 08:52

## 2025-01-18 RX ADMIN — PANTOPRAZOLE SODIUM 40 MG: 40 TABLET, DELAYED RELEASE ORAL at 06:07

## 2025-01-18 RX ADMIN — BUDESONIDE 500 MCG: 0.5 INHALANT ORAL at 19:47

## 2025-01-18 RX ADMIN — ENOXAPARIN SODIUM 105 MG: 150 INJECTION SUBCUTANEOUS at 10:37

## 2025-01-18 RX ADMIN — HYDROCORTISONE SODIUM SUCCINATE 50 MG: 100 INJECTION, POWDER, FOR SOLUTION INTRAMUSCULAR; INTRAVENOUS at 10:39

## 2025-01-18 RX ADMIN — POTASSIUM BICARBONATE 40 MEQ: 782 TABLET, EFFERVESCENT ORAL at 21:54

## 2025-01-18 RX ADMIN — CEFTRIAXONE SODIUM 1000 MG: 1 INJECTION, POWDER, FOR SOLUTION INTRAMUSCULAR; INTRAVENOUS at 11:55

## 2025-01-18 RX ADMIN — IPRATROPIUM BROMIDE AND ALBUTEROL SULFATE 1 DOSE: 2.5; .5 SOLUTION RESPIRATORY (INHALATION) at 13:53

## 2025-01-18 RX ADMIN — ENOXAPARIN SODIUM 105 MG: 150 INJECTION SUBCUTANEOUS at 21:55

## 2025-01-18 RX ADMIN — IPRATROPIUM BROMIDE AND ALBUTEROL SULFATE 1 DOSE: 2.5; .5 SOLUTION RESPIRATORY (INHALATION) at 19:47

## 2025-01-18 RX ADMIN — DULOXETINE HYDROCHLORIDE 30 MG: 30 CAPSULE, DELAYED RELEASE ORAL at 10:36

## 2025-01-18 NOTE — PROGRESS NOTES
CARDIOLOGY PROGRESS NOTE    REASON FOR CONSULT: Aflutter    REQUESTING PROVIDER: Tee Suarez MD     CHIEF COMPLAINT:  Shortness of breath, hypoxia    HISTORY OF PRESENT ILLNESS:  Nida Graves is a 49 y.o. year-old female with past medical history significant for diabetes, asthma who was evaluated today due to atrial flutter, SVR.  assists w/ history. Her Mounjaro was recently increased to 7.5mg, since that time has been short of breath but this has progressively worsened. Over the last couple of days has been weak, unable to get up by herself. A friend who is a nurse checked her O2 sat, noted to be 59, HR in the 40s. EMS was called. She required 6L to maintain sat 92%. In the ED, work up ongoing. Troponin 303, lactic acid 2.6, proBNP 6682, glu 238,  creat 1.18. CXR w/ small bilateral pleural effusions, bilat lower lobe atelectasis. EKG w/ atrial flutter, variable, HR 40-50s.     Seen and examined. Continues with SOB, primarily on exertion, on regular nasal cannula. No CP. AFL on telem, HR upper 40s to mid 60s at times. Pt feeling better.    1/18: pt seen and examined, still requiring supplemental oxygen on 5L via NC, needs continued diuresis, unable to lay flat. She appears decompensated on exam. Labs notable for K 3.1, recommend repletion. Obtain labs post IV KCl. She is without acute complaints, remains short of breath. Denies chest pain, reports chest pressure with inspiration.      Records from hospital admission course thus far reviewed.      Telemetry reviewed.     INPATIENT MEDICATIONS:  Home medications reviewed.    Current Facility-Administered Medications:     enoxaparin (LOVENOX) injection 105 mg, 1 mg/kg, SubCUTAneous, BID, Sridhar Noel APRN - NP, 105 mg at 01/18/25 1037    polyethylene glycol (GLYCOLAX) packet 17 g, 17 g, Oral, Daily, Heike Shahid MD, 17 g at 01/18/25 1035    senna (SENOKOT) tablet 8.6 mg, 1 tablet, Oral, BID, Heike Shahid MD, 8.6 mg at 01/18/25 1036     time. Possible cath once closer to euvolemia  Optimize GDMT, continue Aldactone, Losartan, and Jardiance  Ideally transition to entresto pending discharge   Avoid BB given bradycardia   Daily wt and strict I&O  Hypokalemia   K 3.1, give 2 runs 10 mEq/100 mLs IV KCl   Monitor tele  Obtain mag  Keep K >4, Mg>2  Hypertension: BP currently acceptable. Monitor closely.  HLD  Obtain lipid panel  Continue atorva 20 mg/qhs   Diabetes: tx plan per primary team    Thank you for involving us in the care of this patient.  Please do not hesitate to call me or Dr. Prajapati if additional questions arise.    Brittany Allen, APRN - CNP  1/18/2025

## 2025-01-18 NOTE — PLAN OF CARE
Problem: ABCDS Injury Assessment  Goal: Absence of physical injury  Outcome: Progressing  Flowsheets (Taken 1/18/2025 1500)  Absence of Physical Injury: Implement safety measures based on patient assessment     Problem: Skin/Tissue Integrity  Goal: Absence of new skin breakdown  Description: 1.  Monitor for areas of redness and/or skin breakdown  2.  Assess vascular access sites hourly  3.  Every 4-6 hours minimum:  Change oxygen saturation probe site  4.  Every 4-6 hours:  If on nasal continuous positive airway pressure, respiratory therapy assess nares and determine need for appliance change or resting period.  Outcome: Progressing     Problem: Chronic Conditions and Co-morbidities  Goal: Patient's chronic conditions and co-morbidity symptoms are monitored and maintained or improved  Outcome: Progressing  Flowsheets (Taken 1/18/2025 1500)  Care Plan - Patient's Chronic Conditions and Co-Morbidity Symptoms are Monitored and Maintained or Improved:   Collaborate with multidisciplinary team to address chronic and comorbid conditions and prevent exacerbation or deterioration   Monitor and assess patient's chronic conditions and comorbid symptoms for stability, deterioration, or improvement   Update acute care plan with appropriate goals if chronic or comorbid symptoms are exacerbated and prevent overall improvement and discharge     Problem: Discharge Planning  Goal: Discharge to home or other facility with appropriate resources  Outcome: Progressing  Flowsheets (Taken 1/18/2025 1500)  Discharge to home or other facility with appropriate resources:   Identify barriers to discharge with patient and caregiver   Arrange for interpreters to assist at discharge as needed   Arrange for needed discharge resources and transportation as appropriate     Problem: Safety - Adult  Goal: Free from fall injury  Outcome: Progressing  Flowsheets (Taken 1/18/2025 1500)  Free From Fall Injury:   Instruct family/caregiver on patient  Progressing  Goal: Maintains or returns to baseline bowel function  Outcome: Progressing  Goal: Maintains adequate nutritional intake  Outcome: Progressing     Problem: Genitourinary - Adult  Goal: Absence of urinary retention  Outcome: Progressing     Problem: Metabolic/Fluid and Electrolytes - Adult  Goal: Electrolytes maintained within normal limits  Outcome: Progressing  Goal: Hemodynamic stability and optimal renal function maintained  Outcome: Progressing     Problem: Musculoskeletal - Adult  Goal: Return mobility to safest level of function  Outcome: Progressing  Goal: Maintain proper alignment of affected body part  Outcome: Progressing  Goal: Return ADL status to a safe level of function  Outcome: Progressing     Problem: Spiritual Care  Goal: Pt/Family able to move forward in process of forgiving self, others, and/or higher power  Description: INTERVENTIONS:  1. Assist patient/family to overcome blocks to healing by use of spiritual practices (prayer, meditation, guided imagery, reiki, breath work, etc).  2. De-myth guilt and help patient/family identify possible irrational spiritual/cultural beliefs and values.  3. Explore possibilities of making amends & reconciliation with self, others, and/or a greater power.  4. Guide patient/family in identifying painful feelings of guilt.  5. Help patient/famiy explore and identify spiritual beliefs, cultural understandings or values that may help or hinder letting go of issue.  6. Help patient/family explore feelings of anger, bitterness, resentment.  7. Help patient/family identify and examine the situation in which these feelings are experienced.  8. Help patient/family identify destructive displacement of feelings onto other individuals.  9. Invite use of sacraments/rituals/ceremonies as appropriate (e.g. - confession, anointing, smudging).  10. Refer patient/family to formal counseling and/or to [facility] community for further support work.  Outcome: Progressing

## 2025-01-18 NOTE — PROGRESS NOTES
Hospitalist Progress Note               Daily Progress Note: 1/18/2025      Hospital Day: 7     Chief complaint:   Chief Complaint   Patient presents with    Shortness of Breath        Subjective:   Hospital course to date:  49-year-old female with PMH significant for anxiety, chronic disability and bedbound status, diabetes, insomnia, hyperlipidemia, asthma presented with shortness of breath.  In ED patient was found to be hypoxic requiring oxygen via nasal cannula.  Workup in ER was significant for arrhythmia, concerning for slowing conducting A-fib.  Cardiology was consulted as there was concern for A-fib with complete heart block versus slow ventricular response.  DVT study negative, echo EF 29% with moderate TR.    1/13 patient's chest x-ray consistent with bilateral pulmonary pleural effusion  1/14 initiated on steroids and antibiotics  1/16 patient transition from 45% high flow to 6 L oxygen, cardiology and palliative care following and transferred to floor service.  1/17-18 Cardiac cath expected on Monday 1/20.       Medications reviewed  Current Facility-Administered Medications   Medication Dose Route Frequency    enoxaparin (LOVENOX) injection 105 mg  1 mg/kg SubCUTAneous BID    polyethylene glycol (GLYCOLAX) packet 17 g  17 g Oral Daily    senna (SENOKOT) tablet 8.6 mg  1 tablet Oral BID    magnesium hydroxide (MILK OF MAGNESIA) 400 MG/5ML suspension 30 mL  30 mL Oral Q6H PRN    bisacodyl (DULCOLAX) suppository 10 mg  10 mg Rectal Daily PRN    empagliflozin (JARDIANCE) tablet 10 mg  10 mg Oral Daily    furosemide (LASIX) injection 80 mg  80 mg IntraVENous BID    potassium chloride (KLOR-CON M) extended release tablet 20 mEq  20 mEq Oral Daily    hydrocortisone sodium succinate PF (SOLU-CORTEF) injection 50 mg  50 mg IntraVENous Q12H    insulin lispro (HUMALOG,ADMELOG) injection vial 0-8 Units  0-8 Units SubCUTAneous 4x Daily AC & HS    cefTRIAXone (ROCEPHIN) 1,000 mg in sterile water 10 mL IV syringe  by provider] traZODone (DESYREL) tablet 50 mg  50 mg Oral Daily    melatonin tablet 2.5 mg  2.5 mg Oral Nightly    budesonide (PULMICORT) nebulizer suspension 500 mcg  0.5 mg Nebulization BID RT       Review of Systems:   A comprehensive review of systems was negative.    Objective:   Physical Exam:     BP (!) 110/99   Pulse 62   Temp 97.5 °F (36.4 °C)   Resp 18   Ht 1.702 m (5' 7\")   Wt 98.8 kg (217 lb 13 oz)   SpO2 93%   BMI 34.11 kg/m²  O2 Flow Rate (L/min): 5 L/min O2 Device: Nasal cannula    Temp (24hrs), Av.1 °F (36.7 °C), Min:97.5 °F (36.4 °C), Max:99 °F (37.2 °C)    701 - 1900  In: -   Out: 800 [Urine:800]   1901 -  0700  In: 180 [P.O.:170; I.V.:10]  Out: 2100 [Urine:2100]    Mode Rate TV Press PEEP FiO2 PIP Min. Vent               45 %              General Alert  Cardiovascular RRR on 6 L oxygen via nasal cannula  Respiratory normal chest expansion bilaterally  Abdomen soft, nontender nondistended  Extremities no pedal edema  Neuro alert, speech intact          Data Review:       Recent Days:  Recent Labs     25  0315 25  0430 25  0328   WBC 10.6 13.7* 17.7*   HGB 15.5 16.9* 16.6*   HCT 49.9* 55.8* 55.1*    255 245     Recent Labs     25  0315 25  0430 25  1810 25  0328   * 139 140 142   K 3.1* 3.0* 3.7 3.1*   CL 90* 90* 93* 93*   CO2 41* 41* 39* 41*   GLUCOSE 143* 151* 127* 149*   BUN 23* 24* 26* 35*   CREATININE 0.88 0.86 0.81 0.91   CALCIUM 9.6 10.2* 10.0 10.4*   MG 2.3 2.3  --  2.6*   PHOS 3.0 3.5  --  2.8   BILITOT 0.9 1.2*  --  1.1*   AST 15 15  --  13*   ALT 20 16  --  14     No results for input(s): \"PHART\", \"SUQ3CNQ\", \"PO2ART\", \"HHB1AGP\", \"BEART\", \"REN2RYET\", \"HGBART\", \"QM4ZAHZPI\", \"FIO2A\", \"I1DMOVVX\", \"OXYHEM\", \"CARBOXHGBART\", \"METHGBART\", \"Z3CYELGZY\", \"PHCORART\", \"TEMP\" in the last 72 hours.    Invalid input(s): \"BXW1RHPLM\"    24 Hour Results:  Recent Results (from the past 24 hour(s))   Basic Metabolic

## 2025-01-19 ENCOUNTER — APPOINTMENT (OUTPATIENT)
Facility: HOSPITAL | Age: 50
DRG: 004 | End: 2025-01-19
Payer: COMMERCIAL

## 2025-01-19 LAB
ALBUMIN SERPL-MCNC: 3.1 G/DL (ref 3.5–5)
ALBUMIN/GLOB SERPL: 0.6 (ref 1.1–2.2)
ALP SERPL-CCNC: 99 U/L (ref 45–117)
ALT SERPL-CCNC: 12 U/L (ref 12–78)
ANION GAP SERPL CALC-SCNC: ABNORMAL MMOL/L (ref 2–12)
APPEARANCE UR: ABNORMAL
ARTERIAL PATENCY WRIST A: YES
AST SERPL W P-5'-P-CCNC: 14 U/L (ref 15–37)
BACTERIA URNS QL MICRO: ABNORMAL /HPF
BASE EXCESS BLDA CALC-SCNC: 16.2 MMOL/L (ref 0–3)
BASE EXCESS BLDA CALC-SCNC: 16.4 MMOL/L (ref 0–3)
BASE EXCESS BLDA CALC-SCNC: 17.7 MMOL/L (ref 0–3)
BASE EXCESS BLDA CALC-SCNC: 18.3 MMOL/L (ref 0–3)
BASE EXCESS BLDA CALC-SCNC: 19.3 MMOL/L (ref 0–3)
BASOPHILS # BLD: 0.04 K/UL (ref 0–0.1)
BASOPHILS NFR BLD: 0.2 % (ref 0–1)
BDY SITE: ABNORMAL
BILIRUB SERPL-MCNC: 0.9 MG/DL (ref 0.2–1)
BILIRUB UR QL: NEGATIVE
BODY TEMPERATURE: 98
BODY TEMPERATURE: 98.9
BODY TEMPERATURE: 99.1
BUN SERPL-MCNC: 60 MG/DL (ref 6–20)
BUN/CREAT SERPL: 61 (ref 12–20)
CA-I BLD-MCNC: 10.7 MG/DL (ref 8.5–10.1)
CHLORIDE SERPL-SCNC: 96 MMOL/L (ref 97–108)
CO2 SERPL-SCNC: >45 MMOL/L (ref 21–32)
COHGB MFR BLD: 0.4 % (ref 1–2)
COHGB MFR BLD: 0.4 % (ref 1–2)
COHGB MFR BLD: 0.5 % (ref 1–2)
COHGB MFR BLD: 0.6 % (ref 1–2)
COHGB MFR BLD: 0.8 % (ref 1–2)
COLOR UR: ABNORMAL
CREAT SERPL-MCNC: 0.98 MG/DL (ref 0.55–1.02)
DIFFERENTIAL METHOD BLD: ABNORMAL
EOSINOPHIL # BLD: 0.17 K/UL (ref 0–0.4)
EOSINOPHIL NFR BLD: 0.8 % (ref 0–7)
EPITH CASTS URNS QL MICRO: ABNORMAL /LPF
ERYTHROCYTE [DISTWIDTH] IN BLOOD BY AUTOMATED COUNT: 15.4 % (ref 11.5–14.5)
FIO2 ON VENT: 100 %
FIO2 ON VENT: 44 %
FIO2 ON VENT: 60 %
GAS FLOW.O2 O2 DELIVERY SYS: 6 L/MIN
GAS FLOW.O2 SETTING OXYMISER: 12
GAS FLOW.O2 SETTING OXYMISER: 16
GLOBULIN SER CALC-MCNC: 4.9 G/DL (ref 2–4)
GLUCOSE BLD STRIP.AUTO-MCNC: 143 MG/DL (ref 65–100)
GLUCOSE BLD STRIP.AUTO-MCNC: 153 MG/DL (ref 65–100)
GLUCOSE BLD STRIP.AUTO-MCNC: 161 MG/DL (ref 65–100)
GLUCOSE BLD STRIP.AUTO-MCNC: 172 MG/DL (ref 65–100)
GLUCOSE BLD STRIP.AUTO-MCNC: 182 MG/DL (ref 65–100)
GLUCOSE SERPL-MCNC: 148 MG/DL (ref 65–100)
GLUCOSE UR STRIP.AUTO-MCNC: >500 MG/DL
HCO3 BLDA-SCNC: 38 MMOL/L (ref 22–26)
HCO3 BLDA-SCNC: 41 MMOL/L (ref 22–26)
HCO3 BLDA-SCNC: 42 MMOL/L (ref 22–26)
HCO3 BLDA-SCNC: 42 MMOL/L (ref 22–26)
HCO3 BLDA-SCNC: 46 MMOL/L (ref 22–26)
HCT VFR BLD AUTO: 55 % (ref 35–47)
HGB BLD-MCNC: 16.3 G/DL (ref 11.5–16)
HGB UR QL STRIP: ABNORMAL
IMM GRANULOCYTES # BLD AUTO: 0.11 K/UL (ref 0–0.04)
IMM GRANULOCYTES NFR BLD AUTO: 0.5 % (ref 0–0.5)
IPAP/PIP: 16
IPAP/PIP: 16
KETONES UR QL STRIP.AUTO: 5 MG/DL
LACTATE SERPL-SCNC: 1.8 MMOL/L (ref 0.4–2)
LACTATE SERPL-SCNC: 2 MMOL/L (ref 0.4–2)
LACTATE SERPL-SCNC: 2.3 MMOL/L (ref 0.4–2)
LACTATE SERPL-SCNC: 2.8 MMOL/L (ref 0.4–2)
LEUKOCYTE ESTERASE UR QL STRIP.AUTO: NEGATIVE
LYMPHOCYTES # BLD: 2.06 K/UL (ref 0.8–3.5)
LYMPHOCYTES NFR BLD: 10.1 % (ref 12–49)
MAGNESIUM SERPL-MCNC: 2.8 MG/DL (ref 1.6–2.4)
MCH RBC QN AUTO: 28.8 PG (ref 26–34)
MCHC RBC AUTO-ENTMCNC: 29.6 G/DL (ref 30–36.5)
MCV RBC AUTO: 97.2 FL (ref 80–99)
METHGB MFR BLD: 0.1 % (ref 0–1.4)
METHGB MFR BLD: 0.2 % (ref 0–1.4)
METHGB MFR BLD: 0.2 % (ref 0–1.4)
METHGB MFR BLD: 0.3 % (ref 0–1.4)
METHGB MFR BLD: 0.4 % (ref 0–1.4)
MONOCYTES # BLD: 1.77 K/UL (ref 0–1)
MONOCYTES NFR BLD: 8.7 % (ref 5–13)
MUCOUS THREADS URNS QL MICRO: ABNORMAL /LPF
NEUTS SEG # BLD: 16.19 K/UL (ref 1.8–8)
NEUTS SEG NFR BLD: 79.7 % (ref 32–75)
NITRITE UR QL STRIP.AUTO: NEGATIVE
NRBC # BLD: 0 K/UL (ref 0–0.01)
NRBC BLD-RTO: 0 PER 100 WBC
OXYHGB MFR BLD: 78 % (ref 95–99)
OXYHGB MFR BLD: 87.2 % (ref 95–99)
OXYHGB MFR BLD: 89.1 % (ref 95–99)
OXYHGB MFR BLD: 91.5 % (ref 95–99)
OXYHGB MFR BLD: 93.4 % (ref 95–99)
PCO2 BLDA: 35 MMHG (ref 35–45)
PCO2 BLDA: 35 MMHG (ref 35–45)
PCO2 BLDA: 43 MMHG (ref 35–45)
PCO2 BLDA: 45 MMHG (ref 35–45)
PCO2 BLDA: 75 MMHG (ref 35–45)
PEEP RESPIRATORY: 5
PEEP RESPIRATORY: 6
PEEP RESPIRATORY: 6
PEEP RESPIRATORY: 7
PERFORMED BY:: ABNORMAL
PH BLDA: 7.41 (ref 7.35–7.45)
PH BLDA: 7.59 (ref 7.35–7.45)
PH BLDA: 7.61 (ref 7.35–7.45)
PH BLDA: 7.65 (ref 7.35–7.45)
PH BLDA: 7.69 (ref 7.35–7.45)
PH UR STRIP: 5 (ref 5–8)
PHOSPHATE SERPL-MCNC: 2.1 MG/DL (ref 2.6–4.7)
PLATELET # BLD AUTO: 264 K/UL (ref 150–400)
PMV BLD AUTO: 10.8 FL (ref 8.9–12.9)
PO2 BLDA: 35 MMHG (ref 80–100)
PO2 BLDA: 44 MMHG (ref 80–100)
PO2 BLDA: 49 MMHG (ref 80–100)
PO2 BLDA: 56 MMHG (ref 80–100)
PO2 BLDA: 58 MMHG (ref 80–100)
POTASSIUM SERPL-SCNC: 5 MMOL/L (ref 3.5–5.1)
PROCALCITONIN SERPL-MCNC: 0.91 NG/ML
PROT SERPL-MCNC: 8 G/DL (ref 6.4–8.2)
PROT UR STRIP-MCNC: NEGATIVE MG/DL
RBC # BLD AUTO: 5.66 M/UL (ref 3.8–5.2)
RBC #/AREA URNS HPF: ABNORMAL /HPF (ref 0–5)
SAO2 % BLD: 79 % (ref 95–99)
SAO2 % BLD: 88 % (ref 95–99)
SAO2 % BLD: 90 % (ref 95–99)
SAO2 % BLD: 92 % (ref 95–99)
SAO2 % BLD: 94 % (ref 95–99)
SAO2% DEVICE SAO2% SENSOR NAME: ABNORMAL
SERVICE CMNT-IMP: ABNORMAL
SODIUM SERPL-SCNC: 144 MMOL/L (ref 136–145)
SP GR UR REFRACTOMETRY: 1.02 (ref 1–1.03)
SPECIMEN SITE: ABNORMAL
TROPONIN I SERPL HS-MCNC: 293 NG/L (ref 0–51)
URINE CULTURE IF INDICATED: ABNORMAL
UROBILINOGEN UR QL STRIP.AUTO: 0.1 EU/DL (ref 0.1–1)
VENTILATION MODE VENT: ABNORMAL
VT SETTING VENT: 450
VT SETTING VENT: 450
WBC # BLD AUTO: 20.3 K/UL (ref 3.6–11)
WBC URNS QL MICRO: ABNORMAL /HPF (ref 0–4)

## 2025-01-19 PROCEDURE — 36556 INSERT NON-TUNNEL CV CATH: CPT

## 2025-01-19 PROCEDURE — 83605 ASSAY OF LACTIC ACID: CPT

## 2025-01-19 PROCEDURE — 84484 ASSAY OF TROPONIN QUANT: CPT

## 2025-01-19 PROCEDURE — 6370000000 HC RX 637 (ALT 250 FOR IP): Performed by: STUDENT IN AN ORGANIZED HEALTH CARE EDUCATION/TRAINING PROGRAM

## 2025-01-19 PROCEDURE — 2500000003 HC RX 250 WO HCPCS: Performed by: STUDENT IN AN ORGANIZED HEALTH CARE EDUCATION/TRAINING PROGRAM

## 2025-01-19 PROCEDURE — 94761 N-INVAS EAR/PLS OXIMETRY MLT: CPT

## 2025-01-19 PROCEDURE — 5A1955Z RESPIRATORY VENTILATION, GREATER THAN 96 CONSECUTIVE HOURS: ICD-10-PCS | Performed by: STUDENT IN AN ORGANIZED HEALTH CARE EDUCATION/TRAINING PROGRAM

## 2025-01-19 PROCEDURE — 36600 WITHDRAWAL OF ARTERIAL BLOOD: CPT

## 2025-01-19 PROCEDURE — 2000000000 HC ICU R&B

## 2025-01-19 PROCEDURE — 82962 GLUCOSE BLOOD TEST: CPT

## 2025-01-19 PROCEDURE — 83735 ASSAY OF MAGNESIUM: CPT

## 2025-01-19 PROCEDURE — 94660 CPAP INITIATION&MGMT: CPT

## 2025-01-19 PROCEDURE — 0BH17EZ INSERTION OF ENDOTRACHEAL AIRWAY INTO TRACHEA, VIA NATURAL OR ARTIFICIAL OPENING: ICD-10-PCS | Performed by: STUDENT IN AN ORGANIZED HEALTH CARE EDUCATION/TRAINING PROGRAM

## 2025-01-19 PROCEDURE — 80053 COMPREHEN METABOLIC PANEL: CPT

## 2025-01-19 PROCEDURE — 84145 PROCALCITONIN (PCT): CPT

## 2025-01-19 PROCEDURE — 93005 ELECTROCARDIOGRAM TRACING: CPT | Performed by: STUDENT IN AN ORGANIZED HEALTH CARE EDUCATION/TRAINING PROGRAM

## 2025-01-19 PROCEDURE — 2580000003 HC RX 258: Performed by: NURSE PRACTITIONER

## 2025-01-19 PROCEDURE — 85025 COMPLETE CBC W/AUTO DIFF WBC: CPT

## 2025-01-19 PROCEDURE — 82803 BLOOD GASES ANY COMBINATION: CPT

## 2025-01-19 PROCEDURE — 6360000002 HC RX W HCPCS: Performed by: STUDENT IN AN ORGANIZED HEALTH CARE EDUCATION/TRAINING PROGRAM

## 2025-01-19 PROCEDURE — 6360000002 HC RX W HCPCS: Performed by: INTERNAL MEDICINE

## 2025-01-19 PROCEDURE — 6360000002 HC RX W HCPCS

## 2025-01-19 PROCEDURE — 81001 URINALYSIS AUTO W/SCOPE: CPT

## 2025-01-19 PROCEDURE — 6360000002 HC RX W HCPCS: Performed by: NURSE PRACTITIONER

## 2025-01-19 PROCEDURE — 6370000000 HC RX 637 (ALT 250 FOR IP): Performed by: INTERNAL MEDICINE

## 2025-01-19 PROCEDURE — 71045 X-RAY EXAM CHEST 1 VIEW: CPT

## 2025-01-19 PROCEDURE — 94002 VENT MGMT INPAT INIT DAY: CPT

## 2025-01-19 PROCEDURE — 2700000000 HC OXYGEN THERAPY PER DAY

## 2025-01-19 PROCEDURE — 5A09357 ASSISTANCE WITH RESPIRATORY VENTILATION, LESS THAN 24 CONSECUTIVE HOURS, CONTINUOUS POSITIVE AIRWAY PRESSURE: ICD-10-PCS | Performed by: NURSE PRACTITIONER

## 2025-01-19 PROCEDURE — 94668 MNPJ CHEST WALL SBSQ: CPT

## 2025-01-19 PROCEDURE — 03HY32Z INSERTION OF MONITORING DEVICE INTO UPPER ARTERY, PERCUTANEOUS APPROACH: ICD-10-PCS | Performed by: STUDENT IN AN ORGANIZED HEALTH CARE EDUCATION/TRAINING PROGRAM

## 2025-01-19 PROCEDURE — 02HV33Z INSERTION OF INFUSION DEVICE INTO SUPERIOR VENA CAVA, PERCUTANEOUS APPROACH: ICD-10-PCS | Performed by: STUDENT IN AN ORGANIZED HEALTH CARE EDUCATION/TRAINING PROGRAM

## 2025-01-19 PROCEDURE — 94640 AIRWAY INHALATION TREATMENT: CPT

## 2025-01-19 PROCEDURE — 87040 BLOOD CULTURE FOR BACTERIA: CPT

## 2025-01-19 PROCEDURE — P9047 ALBUMIN (HUMAN), 25%, 50ML: HCPCS | Performed by: STUDENT IN AN ORGANIZED HEALTH CARE EDUCATION/TRAINING PROGRAM

## 2025-01-19 PROCEDURE — 36415 COLL VENOUS BLD VENIPUNCTURE: CPT

## 2025-01-19 PROCEDURE — 2500000003 HC RX 250 WO HCPCS

## 2025-01-19 PROCEDURE — 2580000003 HC RX 258: Performed by: STUDENT IN AN ORGANIZED HEALTH CARE EDUCATION/TRAINING PROGRAM

## 2025-01-19 PROCEDURE — 84100 ASSAY OF PHOSPHORUS: CPT

## 2025-01-19 PROCEDURE — 87086 URINE CULTURE/COLONY COUNT: CPT

## 2025-01-19 PROCEDURE — 6370000000 HC RX 637 (ALT 250 FOR IP): Performed by: HOSPITALIST

## 2025-01-19 PROCEDURE — 5A0935A ASSISTANCE WITH RESPIRATORY VENTILATION, LESS THAN 24 CONSECUTIVE HOURS, HIGH NASAL FLOW/VELOCITY: ICD-10-PCS | Performed by: NURSE PRACTITIONER

## 2025-01-19 RX ORDER — LEVOFLOXACIN 5 MG/ML
750 INJECTION, SOLUTION INTRAVENOUS EVERY 24 HOURS
Status: DISCONTINUED | OUTPATIENT
Start: 2025-01-19 | End: 2025-01-19

## 2025-01-19 RX ORDER — ALBUMIN (HUMAN) 12.5 G/50ML
25 SOLUTION INTRAVENOUS ONCE
Status: COMPLETED | OUTPATIENT
Start: 2025-01-19 | End: 2025-01-19

## 2025-01-19 RX ORDER — 0.9 % SODIUM CHLORIDE 0.9 %
1000 INTRAVENOUS SOLUTION INTRAVENOUS ONCE
Status: COMPLETED | OUTPATIENT
Start: 2025-01-19 | End: 2025-01-19

## 2025-01-19 RX ORDER — SODIUM CHLORIDE 9 MG/ML
INJECTION, SOLUTION INTRAVENOUS CONTINUOUS
Status: DISCONTINUED | OUTPATIENT
Start: 2025-01-19 | End: 2025-01-22

## 2025-01-19 RX ORDER — ROCURONIUM BROMIDE 10 MG/ML
1 INJECTION, SOLUTION INTRAVENOUS ONCE
Status: COMPLETED | OUTPATIENT
Start: 2025-01-19 | End: 2025-01-19

## 2025-01-19 RX ORDER — PROPOFOL 10 MG/ML
5-50 INJECTION, EMULSION INTRAVENOUS CONTINUOUS
Status: DISCONTINUED | OUTPATIENT
Start: 2025-01-19 | End: 2025-01-26

## 2025-01-19 RX ORDER — NOREPINEPHRINE BITARTRATE 0.06 MG/ML
1-100 INJECTION, SOLUTION INTRAVENOUS CONTINUOUS
Status: DISCONTINUED | OUTPATIENT
Start: 2025-01-19 | End: 2025-01-24

## 2025-01-19 RX ORDER — ETOMIDATE 2 MG/ML
0.3 INJECTION INTRAVENOUS ONCE
Status: COMPLETED | OUTPATIENT
Start: 2025-01-19 | End: 2025-01-19

## 2025-01-19 RX ORDER — NOREPINEPHRINE BITARTRATE 0.06 MG/ML
INJECTION, SOLUTION INTRAVENOUS
Status: COMPLETED
Start: 2025-01-19 | End: 2025-01-19

## 2025-01-19 RX ADMIN — ATORVASTATIN CALCIUM 20 MG: 20 TABLET, FILM COATED ORAL at 20:40

## 2025-01-19 RX ADMIN — SODIUM CHLORIDE 1000 ML: 9 INJECTION, SOLUTION INTRAVENOUS at 12:43

## 2025-01-19 RX ADMIN — VANCOMYCIN HYDROCHLORIDE 2500 MG: 1.25 INJECTION, POWDER, LYOPHILIZED, FOR SOLUTION INTRAVENOUS at 14:57

## 2025-01-19 RX ADMIN — ENOXAPARIN SODIUM 105 MG: 150 INJECTION SUBCUTANEOUS at 08:52

## 2025-01-19 RX ADMIN — SODIUM CHLORIDE, PRESERVATIVE FREE 10 ML: 5 INJECTION INTRAVENOUS at 08:42

## 2025-01-19 RX ADMIN — PIPERACILLIN AND TAZOBACTAM 3375 MG: 3; .375 INJECTION, POWDER, LYOPHILIZED, FOR SOLUTION INTRAVENOUS at 13:57

## 2025-01-19 RX ADMIN — ROCURONIUM BROMIDE 99 MG: 10 INJECTION, SOLUTION INTRAVENOUS at 10:00

## 2025-01-19 RX ADMIN — BUDESONIDE 500 MCG: 0.5 INHALANT ORAL at 08:10

## 2025-01-19 RX ADMIN — PROPOFOL 30 MCG/KG/MIN: 10 INJECTION, EMULSION INTRAVENOUS at 22:14

## 2025-01-19 RX ADMIN — HYDROCORTISONE SODIUM SUCCINATE 50 MG: 100 INJECTION, POWDER, FOR SOLUTION INTRAMUSCULAR; INTRAVENOUS at 13:07

## 2025-01-19 RX ADMIN — SENNOSIDES 8.6 MG: 8.6 TABLET, FILM COATED ORAL at 20:40

## 2025-01-19 RX ADMIN — SODIUM CHLORIDE, PRESERVATIVE FREE 10 ML: 5 INJECTION INTRAVENOUS at 22:00

## 2025-01-19 RX ADMIN — ENOXAPARIN SODIUM 105 MG: 150 INJECTION SUBCUTANEOUS at 20:43

## 2025-01-19 RX ADMIN — SODIUM CHLORIDE: 9 INJECTION, SOLUTION INTRAVENOUS at 16:43

## 2025-01-19 RX ADMIN — Medication 5 MCG/MIN: at 14:47

## 2025-01-19 RX ADMIN — ALBUMIN (HUMAN) 25 G: 0.25 INJECTION, SOLUTION INTRAVENOUS at 09:35

## 2025-01-19 RX ADMIN — PROPOFOL 20 MCG/KG/MIN: 10 INJECTION, EMULSION INTRAVENOUS at 11:10

## 2025-01-19 RX ADMIN — BUDESONIDE 500 MCG: 0.5 INHALANT ORAL at 19:49

## 2025-01-19 RX ADMIN — PIPERACILLIN AND TAZOBACTAM 3375 MG: 3; .375 INJECTION, POWDER, LYOPHILIZED, FOR SOLUTION INTRAVENOUS at 20:42

## 2025-01-19 RX ADMIN — INSULIN LISPRO 2 UNITS: 100 INJECTION, SOLUTION INTRAVENOUS; SUBCUTANEOUS at 16:45

## 2025-01-19 RX ADMIN — ETOMIDATE 29.6 MG: 2 INJECTION, SOLUTION INTRAVENOUS at 09:59

## 2025-01-19 RX ADMIN — DEXTROSE AND SODIUM CHLORIDE 500 ML: 5; 450 INJECTION, SOLUTION INTRAVENOUS at 04:08

## 2025-01-19 RX ADMIN — LEVOFLOXACIN 750 MG: 750 INJECTION, SOLUTION INTRAVENOUS at 04:09

## 2025-01-19 RX ADMIN — IPRATROPIUM BROMIDE AND ALBUTEROL SULFATE 1 DOSE: 2.5; .5 SOLUTION RESPIRATORY (INHALATION) at 19:49

## 2025-01-19 RX ADMIN — IPRATROPIUM BROMIDE AND ALBUTEROL SULFATE 1 DOSE: 2.5; .5 SOLUTION RESPIRATORY (INHALATION) at 13:42

## 2025-01-19 RX ADMIN — IPRATROPIUM BROMIDE AND ALBUTEROL SULFATE 1 DOSE: 2.5; .5 SOLUTION RESPIRATORY (INHALATION) at 08:10

## 2025-01-19 ASSESSMENT — PAIN SCALES - GENERAL
PAINLEVEL_OUTOF10: 0

## 2025-01-19 NOTE — PLAN OF CARE
Problem: ABCDS Injury Assessment  Goal: Absence of physical injury  1/19/2025 0449 by Sj Huffman RN  Outcome: Progressing  1/18/2025 1500 by Octavio Morales RN  Outcome: Progressing  Flowsheets (Taken 1/18/2025 1500)  Absence of Physical Injury: Implement safety measures based on patient assessment     Problem: Skin/Tissue Integrity  Goal: Absence of new skin breakdown  Description: 1.  Monitor for areas of redness and/or skin breakdown  2.  Assess vascular access sites hourly  3.  Every 4-6 hours minimum:  Change oxygen saturation probe site  4.  Every 4-6 hours:  If on nasal continuous positive airway pressure, respiratory therapy assess nares and determine need for appliance change or resting period.  1/19/2025 0449 by Sj Huffman RN  Outcome: Progressing  1/18/2025 1500 by Octavio Morales RN  Outcome: Progressing     Problem: Chronic Conditions and Co-morbidities  Goal: Patient's chronic conditions and co-morbidity symptoms are monitored and maintained or improved  1/19/2025 0449 by Sj Huffman, RN  Outcome: Progressing  1/18/2025 1500 by Octavio Morales RN  Outcome: Progressing  Flowsheets (Taken 1/18/2025 1500)  Care Plan - Patient's Chronic Conditions and Co-Morbidity Symptoms are Monitored and Maintained or Improved:   Collaborate with multidisciplinary team to address chronic and comorbid conditions and prevent exacerbation or deterioration   Monitor and assess patient's chronic conditions and comorbid symptoms for stability, deterioration, or improvement   Update acute care plan with appropriate goals if chronic or comorbid symptoms are exacerbated and prevent overall improvement and discharge     Problem: Discharge Planning  Goal: Discharge to home or other facility with appropriate resources  1/19/2025 0449 by Sj Huffman, RN  Outcome: Progressing  1/18/2025 1500 by Octavio Morales RN  Outcome: Progressing  Flowsheets (Taken 1/18/2025 1500)  Discharge to home or other facility with

## 2025-01-19 NOTE — PROCEDURES
PROCEDURE NOTE  Date: 1/19/2025   Name: Nida Graves  YOB: 1975    Intubation    Date/Time: 1/19/2025 11:10 AM    Performed by: Mary Estevez MD  Authorized by: Mary Estevez MD  Consent: Verbal consent obtained. Written consent obtained.  Risks and benefits: risks, benefits and alternatives were discussed  Consent given by: spouse  Patient understanding: patient states understanding of the procedure being performed  Patient consent: the patient's understanding of the procedure matches consent given  Procedure consent: procedure consent matches procedure scheduled  Relevant documents: relevant documents present and verified  Test results: test results available and properly labeled  Site marked: the operative site was marked  Imaging studies: imaging studies available  Required items: required blood products, implants, devices, and special equipment available  Patient identity confirmed: arm band and hospital-assigned identification number  Time out: Immediately prior to procedure a \"time out\" was called to verify the correct patient, procedure, equipment, support staff and site/side marked as required.  Indications: respiratory distress, respiratory failure, hypoxemia and hypercapnia  Intubation method: direct  Patient status: paralyzed (RSI)  Preoxygenation: BVM  Pretreatment medications: none  Sedatives: etomidate  Paralytic: rocuronium  Laryngoscope size: Guerrero 3  Tube size: 7.5 mm  Tube type: cuffed  Number of attempts: 1  Cricoid pressure: no  Cords visualized: yes  Post-procedure assessment: chest rise and ETCO2 monitor  Breath sounds: equal  Cuff inflated: yes  ETT to lip: 23 cm  ETT to teeth: 22 cm  Tube secured with: ETT chicas  Chest x-ray interpreted by me.  Chest x-ray findings: endotracheal tube in appropriate position  Patient tolerance: patient tolerated the procedure well with no immediate complications

## 2025-01-19 NOTE — PROGRESS NOTES
Orders received for bedside swallow evaluation. Per nsg, patient not appropriate for eval at this time. Will attempt on a later date.     Update: patient intubated. Will d/c ST orders. Please re-consult if/as medically indicated.

## 2025-01-19 NOTE — SIGNIFICANT EVENT
Patient is a bedbound 49-year-old female with a past medical history of anxiety, type II DM, insomnia, hyperlipidemia and asthma who was admitted on 1/12/2025 for shortness of breath.  Notified by nursing staff patient extremely lethargic and not acting herself.  Evaluated patient at bedside and as nursing staff reported patient is lethargic and unable to participate much in conversation.  Also due to patient's lungs and experiencing decreased breath sounds bilaterally so stat ABG complete revealing pH 7.41, pCO2 75, pO2 58 and bicarb 46.  Also, stat CXR revealed low lung volumes with left basilar atelectasis.  Spoke with ICU provider and ask if they would evaluated patient as she would likely benefit from a high flow and/or BiPAP, which they have agreed to accept patient at this time and will be transferred for further monitoring and management.

## 2025-01-19 NOTE — PROCEDURES
PROCEDURE NOTE  Date: 1/19/2025   Name: Nida Graves  YOB: 1975    Insert Arterial Line    Date/Time: 1/19/2025 10:06 AM    Performed by: Mary Estevez MD  Authorized by: Mary Estevez MD  Consent: Verbal consent obtained. Written consent obtained.  Risks and benefits: risks, benefits and alternatives were discussed  Consent given by: spouse  Patient understanding: patient states understanding of the procedure being performed  Patient consent: the patient's understanding of the procedure matches consent given  Procedure consent: procedure consent matches procedure scheduled  Relevant documents: relevant documents present and verified  Test results: test results available and properly labeled  Site marked: the operative site was marked  Imaging studies: imaging studies available  Required items: required blood products, implants, devices, and special equipment available  Patient identity confirmed: arm band and hospital-assigned identification number  Time out: Immediately prior to procedure a \"time out\" was called to verify the correct patient, procedure, equipment, support staff and site/side marked as required.  Preparation: Patient was prepped and draped in the usual sterile fashion.  Indications: multiple ABGs, respiratory failure and hemodynamic monitoring  Location: right radial    Sedation:  Patient sedated: yes  Sedatives: see MAR for details  Analgesia: see MAR for details  Vitals: Vital signs were monitored during sedation.    Sarabjit's test normal: yes  Needle gauge: 22  Seldinger technique: Seldinger technique used  Number of attempts: 1  Post-procedure: line sutured and dressing applied  Post-procedure CMS: normal  Patient tolerance: patient tolerated the procedure well with no immediate complications

## 2025-01-19 NOTE — PROGRESS NOTES
CRITICAL CARE ADMISSION NOTE    Name: Nida Graves   : 1975   MRN: [id number]   Date: 2025        HISTORY OF PRESENT ILLNESS:   Ms. Graves is a 49-year-old female with PMH chronic disability and bedbound status, diabetes, asthma, obesity, who presented for shortness of breath.  Shortness of breath worsening over the past few days.  Upon presentation ED patient found to be hypoxic necessitating O2 via NC.  Otherwise workup significant for arrhythmia.  Concerns for slow conducting A-fib.  Cardiology subsequently consulted and noted overall concern for A-fib with possible complete heart block versus slow ventricular response.  Patient supplemented to ICU for further evaluation and management. She was transferred out of ICU on . Early morning  ICU contacted for hypoxia, hypercarbia, and encephalopathy. She was hypoxic on 6L NC, had fever 100.5, and ABG revealed pCO2 in the 70's. Patient transferred back to ICU for further evaluation and management.      DVT study is negative  Echo showed EF of 29% with moderate TR     24-hour events:  2025: Doing well no major overnight event, still on high flow above 70%, labs are acceptable, troponin is mildly elevated, chest x-ray consistent with bilateral pulm pleural effusion  2025: Started on steroids and antibiotics  1/15/2025: Still requiring high flow with 50 L 70%, feels better slowly getting better chest x-ray still showing significant increase in interstitial edema and lower lobe airspace disease  2025 patient continued to do well with aggressive diuresis steroids and antibiotics, was transitioned to 35 L 45% high flow, now on 6 L oxygen and doing well, cardiology and palliative care following  : transferred to ICU, placed on NIV. Abx broadened to levaquin, blood cx drawn, 500 mL bolus given due to concern volume depletion/contraction alkalosis in the setting of diuresis and decreased po intake.         Assessment:  A flutter

## 2025-01-19 NOTE — PROGRESS NOTES
blood cx drawn, 500 mL bolus given due to concern volume depletion/contraction alkalosis in the setting of diuresis and decreased po intake.     1/19 Day updates: Patient continued to have increased agitation, hypoxia and hypercapnia despite BIPAP increased support. Decision made to intubate, place a central line for CVP monitoring and arterial line for hemodynamic monitoring and frequent ABGs    ROS negative except as otherwise documented.     Assessment and plan:     NEUROLOGICAL:    Analgesia / sedation  Anxiety  - Intubated sedated  - RASS goal 0 negative 1  - propofol infusion    PULMONOLOGY:   Acute hypoxemic respiratory failure  -Goal SpO2>=92, pH>=7.30  -Full mechanical support  -Daily SBT  -VAP Px  -Trend ABG / CXR  -Albuterol / Atrovent Q4hr    CARDIOVASCULAR:   HFrEF  Atrial flutter  Pulmonary edema  -Goal MAP>=65  -Trend troponin / lactate  - LVEF 20-30%, right ventricle overload, moderate TR   -EKG PRN    GASTROINTESTINAL:   Nutrition: NPO  OGT  Bowel regimen  -Colace 100mg po daily  -bisacodyl PRN  GI Px  -Pepcid 20mg IV Q12hr (D/c once taking po or TF at goal)     RENAL/ELECTROLYTE:   No acute renal dysfunction  -Goal K>=4, Mg>=2, Phos >3  -Trend BMP, Mg, P  -Replace lytes per protocol  - NS@75ml/hr    ENDOCRINE:   Diabetes mellitus  -Glucose POCT  -ISS    HEMATOLOGY/ONCOLOGY:   No acute concerns  VTE Px  -Goal Hb>=7  -Trend CBC,PTT/INR  -Enoxaparin, AC/AP   -SCDs    ID/MICRO:   Leukocytosis  -Trend fevers, WBC  -Blood cx x2, Urine Cx, Sputum Cx done  -Vanco, zosyn  -discontinued levaquin in setting of worsening hypotension and hypoxia    ICU DAILY CHECKLIST     Code Status:full  DVT Prophylaxis:lovenox  T/L/D: PIV, RIJ TLC, right radial  Diet: NPO, NGT  Activity Level:bedrest  ABCDEF Bundle/Checklist Completed:Yes  Disposition: Stay in ICU  Multidisciplinary Rounds Completed:  Yes    OBJECTIVE:     Blood pressure 90/66, pulse 63, temperature 98.8 °F (37.1 °C), temperature source Axillary, resp. rate  12, height 1.702 m (5' 7\"), weight 98.8 kg (217 lb 13 oz), SpO2 96%.  Physical Exam  Gen:intubated, sedated  HEENT: NC/AT, supple  Cardiac: Regular rate and rhythm, no murmurs  Pulm: coarse breath sounds throughout  ABD: Soft, mildly distended, non tender, normal bowel sounds. Morbid obesity  Extremities: edema to lower extremities bilaterally  Neuro: intubated, sedated    Labs and Data: Reviewed 01/19/25  Medications: Reviewed 01/19/25  Imaging: Reviewed 01/19/25    Intake/Output:     Intake/Output Summary (Last 24 hours) at 1/19/2025 1236  Last data filed at 1/18/2025 2234  Gross per 24 hour   Intake --   Output 1000 ml   Net -1000 ml       CRITICAL CARE DOCUMENTATION  I had a face to face encounter with the patient, reviewed and interpreted patient data including clinical events, labs, images, vital signs, I/O's, and examined patient.  I have discussed the case and the plan and management of the patient's care with the consulting services, the bedside nurses and the respiratory therapist.      NOTE OF PERSONAL INVOLVEMENT IN CARE   This patient has a high probability of imminent, clinically significant deterioration, which requires the highest level of preparedness to intervene urgently. I participated in the decision-making and personally managed or directed the management of the following life and organ supporting interventions that required my frequent assessment to treat or prevent imminent deterioration.    I personally spent 65 minutes of critical care time.  This is time spent at this critically ill patient's bedside actively involved in patient care as well as the coordination of care.  This does not include any procedural time which has been billed separately.    Shilpa Estevez MD  Critical Care Medicine  [facility], [place] [facility]

## 2025-01-19 NOTE — PROGRESS NOTES
Vancomycin Dosing Consult  Nida Graves is a 49 y.o. female with pneumonia. Pharmacy was consulted by Dr. Mary Estevez to dose and monitor vancomycin. Today is day 1.    Antibiotic regimen: Vancomycin + Zosyn    Temp (24hrs), Av.5 °F (37.5 °C), Min:98.8 °F (37.1 °C), Max:100.8 °F (38.2 °C)    Recent Labs     25  0430 25  1810 25  0328 25  0146   WBC 13.7*  --  17.7* 20.3*   CREATININE 0.86 0.81 0.91 0.98   BUN 24* 26* 35* 60*     Est CrCl: 84 mL/min  Concomitant nephrotoxic drugs: NSAIDs and Loop diuretics    Cultures:    Blood x 2: NGTD   Blood x 2: in process    MRSA Swab: Not detected 25    Target range: AUC/DUNG 400-600    Recent level history:  Date/Time Dose & Interval Measured Level (mcg/mL) Associated AUC/DUNG              Assessment/Plan:   Febrile, current leukocytosis; empiric vancomycin + zosyn   Renally stable; proceed with AUC based dosing at this time  LD vancomycin 2500 mg x once . MD dosing 1000 mg Q12H (associated ) starting  @0300.   Level scheduled for  @1300  Antimicrobial stop date 7 days

## 2025-01-19 NOTE — PROGRESS NOTES
CARDIOLOGY PROGRESS NOTE    REASON FOR CONSULT: Aflutter    REQUESTING PROVIDER: Tee Suarez MD     CHIEF COMPLAINT:  Shortness of breath, hypoxia    HISTORY OF PRESENT ILLNESS:  Nida Graves is a 49 y.o. year-old female with past medical history significant for diabetes, asthma who was evaluated today due to atrial flutter, SVR.  assists w/ history. Her Mounjaro was recently increased to 7.5mg, since that time has been short of breath but this has progressively worsened. Over the last couple of days has been weak, unable to get up by herself. A friend who is a nurse checked her O2 sat, noted to be 59, HR in the 40s. EMS was called. She required 6L to maintain sat 92%. In the ED, work up ongoing. Troponin 303, lactic acid 2.6, proBNP 6682, glu 238,  creat 1.18. CXR w/ small bilateral pleural effusions, bilat lower lobe atelectasis. EKG w/ atrial flutter, variable, HR 40-50s.     Worsening respiratory status after initial improvement with diuresis. Hypoxic hypercarbic respiratory failure. Initially successfully weaned from HFNC to NC and stable, but progressive worsening yesterday, with reduced respiratory effort leading to hypoxemic hypercapnic respiratory failure. Chest X ray with poor inflation bilaterally.  Moved to ICU, intubated.    Telemetry reviewed.     INPATIENT MEDICATIONS:  Home medications reviewed.    Current Facility-Administered Medications:     propofol infusion, 5-50 mcg/kg/min, IntraVENous, Continuous, Mary Estevez MD, Last Rate: 17.8 mL/hr at 01/19/25 1306, 30 mcg/kg/min at 01/19/25 1306    sodium chloride 0.9 % bolus 1,000 mL, 1,000 mL, IntraVENous, Once, Mary Estevez MD, Last Rate: 495.9 mL/hr at 01/19/25 1243, 1,000 mL at 01/19/25 1243    piperacillin-tazobactam (ZOSYN) 3,375 mg in sodium chloride 0.9 % 50 mL IVPB (mini-bag), 3,375 mg, IntraVENous, Q8H, Mary Estevez MD    [Held by provider] furosemide (LASIX) injection 40 mg, 40 mg, IntraVENous, BID,  primarily respiratory failure    Thank you for involving us in the care of this patient.  Please do not hesitate to call me if additional questions arise.    Joyce Prajapati MD  1/19/2025

## 2025-01-19 NOTE — PLAN OF CARE
Problem: Skin/Tissue Integrity  Goal: Absence of new skin breakdown  Description: 1.  Monitor for areas of redness and/or skin breakdown  2.  Assess vascular access sites hourly  3.  Every 4-6 hours minimum:  Change oxygen saturation probe site  4.  Every 4-6 hours:  If on nasal continuous positive airway pressure, respiratory therapy assess nares and determine need for appliance change or resting period.  1/19/2025 1803 by Gracy London RN  Outcome: Progressing  1/19/2025 0449 by Sj Huffman RN  Outcome: Progressing     Problem: Skin/Tissue Integrity - Adult  Goal: Skin integrity remains intact  1/19/2025 1803 by Gracy London RN  Outcome: Progressing  Flowsheets  Taken 1/19/2025 1200  Skin Integrity Remains Intact: Monitor for areas of redness and/or skin breakdown  Taken 1/19/2025 0800  Skin Integrity Remains Intact: Monitor for areas of redness and/or skin breakdown  1/19/2025 0449 by Sj Huffman RN  Outcome: Progressing  Flowsheets (Taken 1/18/2025 2000 by Jovana Man RN)  Skin Integrity Remains Intact: Monitor for areas of redness and/or skin breakdown  Goal: Incisions, wounds, or drain sites healing without S/S of infection  Recent Flowsheet Documentation  Taken 1/19/2025 0800 by Gracy London RN  Incisions, Wounds, or Drain Sites Healing Without Sign and Symptoms of Infection: ADMISSION and DAILY: Assess and document risk factors for pressure ulcer development  1/19/2025 0449 by Sj Huffman RN  Outcome: Progressing  Goal: Oral mucous membranes remain intact  Recent Flowsheet Documentation  Taken 1/19/2025 0800 by Gracy London RN  Oral Mucous Membranes Remain Intact: Assess oral mucosa and hygiene practices  1/19/2025 0449 by Sj Huffman RN  Outcome: Progressing     Problem: Safety - Medical Restraint  Goal: Remains free of injury from restraints (Restraint for Interference with Medical Device)  Description: INTERVENTIONS:  1. Determine  that other, less restrictive measures have been tried or would not be effective before applying the restraint  2. Evaluate the patient's condition at the time of restraint application  3. Inform patient/family regarding the reason for restraint  4. Q2H: Monitor safety, psychosocial status, comfort, nutrition and hydration  Outcome: Progressing  Flowsheets (Taken 1/19/2025 1300)  Remains free of injury from restraints (restraint for interference with medical device): Determine that other, less restrictive measures have been tried or would not be effective before applying the restraint

## 2025-01-19 NOTE — PROCEDURES
PROCEDURE NOTE  Date: 1/19/2025   Name: Nida Graves  YOB: 1975    CENTRAL LINE    Date/Time: 1/19/2025 10:15 AM    Performed by: Mary Estevez MD  Authorized by: Mary Estevez MD  Consent: Verbal consent obtained. Written consent obtained.  Risks and benefits: risks, benefits and alternatives were discussed  Consent given by: spouse  Patient understanding: patient states understanding of the procedure being performed  Patient consent: the patient's understanding of the procedure matches consent given  Procedure consent: procedure consent matches procedure scheduled  Relevant documents: relevant documents present and verified  Test results: test results available and properly labeled  Site marked: the operative site was marked  Imaging studies: imaging studies available  Required items: required blood products, implants, devices, and special equipment available  Patient identity confirmed: arm band and hospital-assigned identification number  Time out: Immediately prior to procedure a \"time out\" was called to verify the correct patient, procedure, equipment, support staff and site/side marked as required.  Indications: central pressure monitoring and vascular access    Sedation:  Patient sedated: yes  Sedatives: see MAR for details  Analgesia: see MAR for details  Vitals: Vital signs were monitored during sedation.    Preparation: skin prepped with 2% chlorhexidine  Skin prep agent dried: skin prep agent completely dried prior to procedure  Sterile barriers: all five maximum sterile barriers used - cap, mask, sterile gown, sterile gloves, and large sterile sheet  Hand hygiene: hand hygiene performed prior to central venous catheter insertion  Location details: right internal jugular  Patient position: Trendelenburg  Catheter type: triple lumen  Catheter size: 8.5 Fr  Pre-procedure: landmarks identified  Ultrasound guidance: yes  Sterile ultrasound techniques: sterile gel and sterile

## 2025-01-20 LAB
ANION GAP SERPL CALC-SCNC: 6 MMOL/L (ref 2–12)
ARTERIAL PATENCY WRIST A: ABNORMAL
ARTERIAL PATENCY WRIST A: YES
ARTERIAL PATENCY WRIST A: YES
BASE EXCESS BLDA CALC-SCNC: 14.2 MMOL/L (ref 0–3)
BASE EXCESS BLDA CALC-SCNC: 7.1 MMOL/L (ref 0–3)
BASE EXCESS BLDA CALC-SCNC: 9.2 MMOL/L (ref 0–3)
BASOPHILS # BLD: 0.04 K/UL (ref 0–0.1)
BASOPHILS NFR BLD: 0.2 % (ref 0–1)
BDY SITE: ABNORMAL
BODY TEMPERATURE: 101
BODY TEMPERATURE: 99
BODY TEMPERATURE: 99.3
BUN SERPL-MCNC: 70 MG/DL (ref 6–20)
BUN/CREAT SERPL: 45 (ref 12–20)
CA-I BLD-MCNC: 10.2 MG/DL (ref 8.5–10.1)
CHLORIDE SERPL-SCNC: 107 MMOL/L (ref 97–108)
CO2 SERPL-SCNC: 35 MMOL/L (ref 21–32)
COHGB MFR BLD: 0.3 % (ref 1–2)
CREAT SERPL-MCNC: 1.54 MG/DL (ref 0.55–1.02)
DATE LAST DOSE: NORMAL
DIFFERENTIAL METHOD BLD: ABNORMAL
DOSE AMOUNT: NORMAL UNITS
EKG ATRIAL RATE: 308 BPM
EKG DIAGNOSIS: NORMAL
EKG P AXIS: 81 DEGREES
EKG Q-T INTERVAL: 574 MS
EKG QRS DURATION: 128 MS
EKG QTC CALCULATION (BAZETT): 623 MS
EKG R AXIS: -54 DEGREES
EKG T AXIS: 113 DEGREES
EKG VENTRICULAR RATE: 71 BPM
EOSINOPHIL # BLD: 0.38 K/UL (ref 0–0.4)
EOSINOPHIL NFR BLD: 2.1 % (ref 0–7)
ERYTHROCYTE [DISTWIDTH] IN BLOOD BY AUTOMATED COUNT: 15.5 % (ref 11.5–14.5)
FIO2 ON VENT: 70 %
FIO2 ON VENT: 70 %
FIO2 ON VENT: 80 %
GAS FLOW.O2 SETTING OXYMISER: 12
GLUCOSE BLD STRIP.AUTO-MCNC: 158 MG/DL (ref 65–100)
GLUCOSE BLD STRIP.AUTO-MCNC: 172 MG/DL (ref 65–100)
GLUCOSE BLD STRIP.AUTO-MCNC: 175 MG/DL (ref 65–100)
GLUCOSE BLD STRIP.AUTO-MCNC: 196 MG/DL (ref 65–100)
GLUCOSE SERPL-MCNC: 203 MG/DL (ref 65–100)
HCO3 BLDA-SCNC: 30 MMOL/L (ref 22–26)
HCO3 BLDA-SCNC: 32 MMOL/L (ref 22–26)
HCO3 BLDA-SCNC: 37 MMOL/L (ref 22–26)
HCT VFR BLD AUTO: 42.4 % (ref 35–47)
HGB BLD-MCNC: 13.6 G/DL (ref 11.5–16)
IMM GRANULOCYTES # BLD AUTO: 0.13 K/UL (ref 0–0.04)
IMM GRANULOCYTES NFR BLD AUTO: 0.7 % (ref 0–0.5)
LACTATE SERPL-SCNC: 1.9 MMOL/L (ref 0.4–2)
LYMPHOCYTES # BLD: 2.7 K/UL (ref 0.8–3.5)
LYMPHOCYTES NFR BLD: 15 % (ref 12–49)
MAGNESIUM SERPL-MCNC: 2.3 MG/DL (ref 1.6–2.4)
MCH RBC QN AUTO: 30.6 PG (ref 26–34)
MCHC RBC AUTO-ENTMCNC: 32.1 G/DL (ref 30–36.5)
MCV RBC AUTO: 95.5 FL (ref 80–99)
METHGB MFR BLD: 0.3 % (ref 0–1.4)
METHGB MFR BLD: 0.4 % (ref 0–1.4)
METHGB MFR BLD: 0.5 % (ref 0–1.4)
MONOCYTES # BLD: 2.16 K/UL (ref 0–1)
MONOCYTES NFR BLD: 12 % (ref 5–13)
NEUTS SEG # BLD: 12.6 K/UL (ref 1.8–8)
NEUTS SEG NFR BLD: 70 % (ref 32–75)
NRBC # BLD: 0 K/UL (ref 0–0.01)
NRBC BLD-RTO: 0 PER 100 WBC
OXYHGB MFR BLD: 94.4 % (ref 95–99)
OXYHGB MFR BLD: 94.5 % (ref 95–99)
OXYHGB MFR BLD: 95.2 % (ref 95–99)
PCO2 BLDA: 38 MMHG (ref 35–45)
PCO2 BLDA: 39 MMHG (ref 35–45)
PCO2 BLDA: 40 MMHG (ref 35–45)
PEEP RESPIRATORY: 7
PERFORMED BY:: ABNORMAL
PH BLDA: 7.52 (ref 7.35–7.45)
PH BLDA: 7.54 (ref 7.35–7.45)
PH BLDA: 7.59 (ref 7.35–7.45)
PHOSPHATE SERPL-MCNC: 2.4 MG/DL (ref 2.6–4.7)
PHOSPHATE SERPL-MCNC: 3.3 MG/DL (ref 2.6–4.7)
PHOSPHATE SERPL-MCNC: <0.5 MG/DL (ref 2.6–4.7)
PLATELET # BLD AUTO: 291 K/UL (ref 150–400)
PMV BLD AUTO: 11.3 FL (ref 8.9–12.9)
PO2 BLDA: 68 MMHG (ref 80–100)
PO2 BLDA: 73 MMHG (ref 80–100)
PO2 BLDA: 88 MMHG (ref 80–100)
POTASSIUM SERPL-SCNC: 3 MMOL/L (ref 3.5–5.1)
POTASSIUM SERPL-SCNC: 3.4 MMOL/L (ref 3.5–5.1)
POTASSIUM SERPL-SCNC: 4.2 MMOL/L (ref 3.5–5.1)
RBC # BLD AUTO: 4.44 M/UL (ref 3.8–5.2)
RBC MORPH BLD: ABNORMAL
SAO2 % BLD: 95 % (ref 95–99)
SAO2 % BLD: 95 % (ref 95–99)
SAO2 % BLD: 96 % (ref 95–99)
SAO2% DEVICE SAO2% SENSOR NAME: ABNORMAL
SERVICE CMNT-IMP: ABNORMAL
SODIUM SERPL-SCNC: 148 MMOL/L (ref 136–145)
SPECIMEN SITE: ABNORMAL
VANCOMYCIN SERPL-MCNC: 29.8 UG/ML
VENTILATION MODE VENT: ABNORMAL
VENTILATION MODE VENT: ABNORMAL
VT SETTING VENT: 450
WBC # BLD AUTO: 18 K/UL (ref 3.6–11)

## 2025-01-20 PROCEDURE — 6370000000 HC RX 637 (ALT 250 FOR IP): Performed by: STUDENT IN AN ORGANIZED HEALTH CARE EDUCATION/TRAINING PROGRAM

## 2025-01-20 PROCEDURE — 36415 COLL VENOUS BLD VENIPUNCTURE: CPT

## 2025-01-20 PROCEDURE — 2000000000 HC ICU R&B

## 2025-01-20 PROCEDURE — 83735 ASSAY OF MAGNESIUM: CPT

## 2025-01-20 PROCEDURE — 6360000002 HC RX W HCPCS: Performed by: STUDENT IN AN ORGANIZED HEALTH CARE EDUCATION/TRAINING PROGRAM

## 2025-01-20 PROCEDURE — 6370000000 HC RX 637 (ALT 250 FOR IP): Performed by: INTERNAL MEDICINE

## 2025-01-20 PROCEDURE — 82803 BLOOD GASES ANY COMBINATION: CPT

## 2025-01-20 PROCEDURE — 84132 ASSAY OF SERUM POTASSIUM: CPT

## 2025-01-20 PROCEDURE — 85025 COMPLETE CBC W/AUTO DIFF WBC: CPT

## 2025-01-20 PROCEDURE — 2500000003 HC RX 250 WO HCPCS: Performed by: STUDENT IN AN ORGANIZED HEALTH CARE EDUCATION/TRAINING PROGRAM

## 2025-01-20 PROCEDURE — 82962 GLUCOSE BLOOD TEST: CPT

## 2025-01-20 PROCEDURE — 94668 MNPJ CHEST WALL SBSQ: CPT

## 2025-01-20 PROCEDURE — 2580000003 HC RX 258: Performed by: STUDENT IN AN ORGANIZED HEALTH CARE EDUCATION/TRAINING PROGRAM

## 2025-01-20 PROCEDURE — 2580000003 HC RX 258: Performed by: NURSE PRACTITIONER

## 2025-01-20 PROCEDURE — 2700000000 HC OXYGEN THERAPY PER DAY

## 2025-01-20 PROCEDURE — 6360000002 HC RX W HCPCS

## 2025-01-20 PROCEDURE — 6360000002 HC RX W HCPCS: Performed by: NURSE PRACTITIONER

## 2025-01-20 PROCEDURE — 2500000003 HC RX 250 WO HCPCS: Performed by: NURSE PRACTITIONER

## 2025-01-20 PROCEDURE — 94761 N-INVAS EAR/PLS OXIMETRY MLT: CPT

## 2025-01-20 PROCEDURE — 84100 ASSAY OF PHOSPHORUS: CPT

## 2025-01-20 PROCEDURE — 36600 WITHDRAWAL OF ARTERIAL BLOOD: CPT

## 2025-01-20 PROCEDURE — 94003 VENT MGMT INPAT SUBQ DAY: CPT

## 2025-01-20 PROCEDURE — 6360000002 HC RX W HCPCS: Performed by: INTERNAL MEDICINE

## 2025-01-20 PROCEDURE — 80202 ASSAY OF VANCOMYCIN: CPT

## 2025-01-20 PROCEDURE — 6370000000 HC RX 637 (ALT 250 FOR IP): Performed by: NURSE PRACTITIONER

## 2025-01-20 PROCEDURE — 94640 AIRWAY INHALATION TREATMENT: CPT

## 2025-01-20 PROCEDURE — 99233 SBSQ HOSP IP/OBS HIGH 50: CPT

## 2025-01-20 PROCEDURE — 80048 BASIC METABOLIC PNL TOTAL CA: CPT

## 2025-01-20 RX ORDER — ACETAMINOPHEN 160 MG/5ML
650 LIQUID ORAL EVERY 6 HOURS PRN
Status: DISCONTINUED | OUTPATIENT
Start: 2025-01-20 | End: 2025-02-13

## 2025-01-20 RX ORDER — PANTOPRAZOLE SODIUM 40 MG/10ML
40 INJECTION, POWDER, LYOPHILIZED, FOR SOLUTION INTRAVENOUS 2 TIMES DAILY
Status: DISCONTINUED | OUTPATIENT
Start: 2025-01-20 | End: 2025-01-22

## 2025-01-20 RX ORDER — BUPROPION HYDROCHLORIDE 75 MG/1
75 TABLET ORAL 2 TIMES DAILY
Status: DISCONTINUED | OUTPATIENT
Start: 2025-01-21 | End: 2025-02-13

## 2025-01-20 RX ORDER — POTASSIUM CHLORIDE 29.8 MG/ML
20 INJECTION INTRAVENOUS
Status: ACTIVE | OUTPATIENT
Start: 2025-01-20 | End: 2025-01-20

## 2025-01-20 RX ORDER — ASPIRIN 81 MG/1
81 TABLET, CHEWABLE ORAL DAILY
Status: DISCONTINUED | OUTPATIENT
Start: 2025-01-21 | End: 2025-02-13

## 2025-01-20 RX ORDER — BISACODYL 10 MG
10 SUPPOSITORY, RECTAL RECTAL DAILY PRN
Status: DISCONTINUED | OUTPATIENT
Start: 2025-01-20 | End: 2025-03-14 | Stop reason: HOSPADM

## 2025-01-20 RX ORDER — ACETAMINOPHEN 650 MG/1
650 SUPPOSITORY RECTAL EVERY 6 HOURS PRN
Status: DISCONTINUED | OUTPATIENT
Start: 2025-01-20 | End: 2025-02-13

## 2025-01-20 RX ORDER — POTASSIUM CHLORIDE 29.8 MG/ML
20 INJECTION INTRAVENOUS
Status: COMPLETED | OUTPATIENT
Start: 2025-01-20 | End: 2025-01-20

## 2025-01-20 RX ORDER — ATORVASTATIN CALCIUM 20 MG/1
20 TABLET, FILM COATED ORAL NIGHTLY
Status: DISCONTINUED | OUTPATIENT
Start: 2025-01-20 | End: 2025-02-13

## 2025-01-20 RX ORDER — POLYETHYLENE GLYCOL 3350 17 G/17G
17 POWDER, FOR SOLUTION ORAL DAILY
Status: DISCONTINUED | OUTPATIENT
Start: 2025-01-21 | End: 2025-02-04

## 2025-01-20 RX ORDER — SENNOSIDES A AND B 8.6 MG/1
1 TABLET, FILM COATED ORAL 2 TIMES DAILY
Status: DISCONTINUED | OUTPATIENT
Start: 2025-01-20 | End: 2025-02-04

## 2025-01-20 RX ADMIN — ENOXAPARIN SODIUM 105 MG: 150 INJECTION SUBCUTANEOUS at 21:02

## 2025-01-20 RX ADMIN — PROPOFOL 30 MCG/KG/MIN: 10 INJECTION, EMULSION INTRAVENOUS at 18:20

## 2025-01-20 RX ADMIN — SODIUM CHLORIDE, PRESERVATIVE FREE 10 ML: 5 INJECTION INTRAVENOUS at 21:20

## 2025-01-20 RX ADMIN — IPRATROPIUM BROMIDE AND ALBUTEROL SULFATE 1 DOSE: 2.5; .5 SOLUTION RESPIRATORY (INHALATION) at 19:19

## 2025-01-20 RX ADMIN — SODIUM PHOSPHATE, MONOBASIC, MONOHYDRATE AND SODIUM PHOSPHATE, DIBASIC, ANHYDROUS 30 MMOL: 276; 142 INJECTION, SOLUTION INTRAVENOUS at 04:38

## 2025-01-20 RX ADMIN — POTASSIUM CHLORIDE 20 MEQ: 29.8 INJECTION, SOLUTION INTRAVENOUS at 05:09

## 2025-01-20 RX ADMIN — POTASSIUM CHLORIDE 20 MEQ: 29.8 INJECTION, SOLUTION INTRAVENOUS at 06:22

## 2025-01-20 RX ADMIN — POTASSIUM CHLORIDE 20 MEQ: 29.8 INJECTION, SOLUTION INTRAVENOUS at 13:30

## 2025-01-20 RX ADMIN — PROPOFOL 30 MCG/KG/MIN: 10 INJECTION, EMULSION INTRAVENOUS at 13:00

## 2025-01-20 RX ADMIN — INSULIN LISPRO 2 UNITS: 100 INJECTION, SOLUTION INTRAVENOUS; SUBCUTANEOUS at 18:01

## 2025-01-20 RX ADMIN — SODIUM CHLORIDE, PRESERVATIVE FREE 10 ML: 5 INJECTION INTRAVENOUS at 07:59

## 2025-01-20 RX ADMIN — POTASSIUM PHOSPHATE, MONOBASIC POTASSIUM PHOSPHATE, DIBASIC 15 MMOL: 224; 236 INJECTION, SOLUTION, CONCENTRATE INTRAVENOUS at 13:45

## 2025-01-20 RX ADMIN — PROPOFOL 30 MCG/KG/MIN: 10 INJECTION, EMULSION INTRAVENOUS at 23:46

## 2025-01-20 RX ADMIN — ACETAMINOPHEN 650 MG: 325 TABLET ORAL at 14:11

## 2025-01-20 RX ADMIN — HYDROCORTISONE SODIUM SUCCINATE 50 MG: 100 INJECTION, POWDER, FOR SOLUTION INTRAMUSCULAR; INTRAVENOUS at 11:25

## 2025-01-20 RX ADMIN — IPRATROPIUM BROMIDE AND ALBUTEROL SULFATE 1 DOSE: 2.5; .5 SOLUTION RESPIRATORY (INHALATION) at 07:04

## 2025-01-20 RX ADMIN — POLYETHYLENE GLYCOL 3350 17 G: 17 POWDER, FOR SOLUTION ORAL at 07:58

## 2025-01-20 RX ADMIN — PIPERACILLIN AND TAZOBACTAM 3375 MG: 3; .375 INJECTION, POWDER, LYOPHILIZED, FOR SOLUTION INTRAVENOUS at 13:40

## 2025-01-20 RX ADMIN — HYDROCORTISONE SODIUM SUCCINATE 50 MG: 100 INJECTION, POWDER, FOR SOLUTION INTRAMUSCULAR; INTRAVENOUS at 23:48

## 2025-01-20 RX ADMIN — PROPOFOL 30 MCG/KG/MIN: 10 INJECTION, EMULSION INTRAVENOUS at 07:33

## 2025-01-20 RX ADMIN — SODIUM CHLORIDE: 9 INJECTION, SOLUTION INTRAVENOUS at 09:30

## 2025-01-20 RX ADMIN — ATORVASTATIN CALCIUM 20 MG: 20 TABLET, FILM COATED ORAL at 21:02

## 2025-01-20 RX ADMIN — SODIUM CHLORIDE: 9 INJECTION, SOLUTION INTRAVENOUS at 10:00

## 2025-01-20 RX ADMIN — BUDESONIDE 500 MCG: 0.5 INHALANT ORAL at 07:04

## 2025-01-20 RX ADMIN — PIPERACILLIN AND TAZOBACTAM 3375 MG: 3; .375 INJECTION, POWDER, LYOPHILIZED, FOR SOLUTION INTRAVENOUS at 06:21

## 2025-01-20 RX ADMIN — POTASSIUM CHLORIDE 20 MEQ: 29.8 INJECTION, SOLUTION INTRAVENOUS at 04:06

## 2025-01-20 RX ADMIN — HYDROCORTISONE SODIUM SUCCINATE 50 MG: 100 INJECTION, POWDER, FOR SOLUTION INTRAMUSCULAR; INTRAVENOUS at 00:28

## 2025-01-20 RX ADMIN — SENNOSIDES 8.6 MG: 8.6 TABLET, FILM COATED ORAL at 08:07

## 2025-01-20 RX ADMIN — VANCOMYCIN HYDROCHLORIDE 1000 MG: 1 INJECTION, POWDER, LYOPHILIZED, FOR SOLUTION INTRAVENOUS at 03:42

## 2025-01-20 RX ADMIN — PROPOFOL 30 MCG/KG/MIN: 10 INJECTION, EMULSION INTRAVENOUS at 02:01

## 2025-01-20 RX ADMIN — PANTOPRAZOLE SODIUM 40 MG: 40 INJECTION, POWDER, FOR SOLUTION INTRAVENOUS at 21:02

## 2025-01-20 RX ADMIN — ENOXAPARIN SODIUM 105 MG: 150 INJECTION SUBCUTANEOUS at 07:57

## 2025-01-20 RX ADMIN — POTASSIUM CHLORIDE 20 MEQ: 29.8 INJECTION, SOLUTION INTRAVENOUS at 11:50

## 2025-01-20 RX ADMIN — IPRATROPIUM BROMIDE AND ALBUTEROL SULFATE 1 DOSE: 2.5; .5 SOLUTION RESPIRATORY (INHALATION) at 13:12

## 2025-01-20 RX ADMIN — SENNOSIDES 8.6 MG: 8.6 TABLET, COATED ORAL at 21:02

## 2025-01-20 RX ADMIN — BUPROPION HYDROCHLORIDE 150 MG: 150 TABLET, EXTENDED RELEASE ORAL at 07:57

## 2025-01-20 RX ADMIN — PIPERACILLIN AND TAZOBACTAM 3375 MG: 3; .375 INJECTION, POWDER, LYOPHILIZED, FOR SOLUTION INTRAVENOUS at 21:03

## 2025-01-20 RX ADMIN — PANTOPRAZOLE SODIUM 40 MG: 40 INJECTION, POWDER, FOR SOLUTION INTRAVENOUS at 11:22

## 2025-01-20 RX ADMIN — ASPIRIN 81 MG: 81 TABLET, COATED ORAL at 07:57

## 2025-01-20 RX ADMIN — BUDESONIDE 500 MCG: 0.5 INHALANT ORAL at 19:19

## 2025-01-20 ASSESSMENT — PULMONARY FUNCTION TESTS
PIF_VALUE: 21
PIF_VALUE: 21
PIF_VALUE: 20
PIF_VALUE: 24
PIF_VALUE: 21
PIF_VALUE: 22
PIF_VALUE: 20
PIF_VALUE: 21
PIF_VALUE: 20
PIF_VALUE: 21
PIF_VALUE: 20
PIF_VALUE: 19
PIF_VALUE: 20
PIF_VALUE: 21
PIF_VALUE: 20
PIF_VALUE: 20
PIF_VALUE: 19
PIF_VALUE: 21
PIF_VALUE: 19
PIF_VALUE: 21
PIF_VALUE: 20

## 2025-01-20 ASSESSMENT — PAIN SCALES - GENERAL
PAINLEVEL_OUTOF10: 0

## 2025-01-20 NOTE — CARE COORDINATION
Transferred from 4E to ICU. Vented. SBT when medically appropriate.    Cardiac cath post-poned.  Currently on IV ABX (Vanc & Zosyn). Echo pending. IV ABX at d/c TBD.      Palliative following, and seen earlier today.     Pending PT/OT re-eval d/t transfer to ICU.  Initial recommendation was for high intensity and comprehensive skilled occupational therapy in a multidisciplinary setting as patient is working towards tolerating up to 3 hours of therapy/day x 5-7 days/week.  Offered IRF while on the floor. [facility] is following the patient.    Will need cardiology clearance.      Patient is from home w/spouse.      CARLOS Grissom

## 2025-01-20 NOTE — PROGRESS NOTES
CARDIOLOGY PROGRESS NOTE    REASON FOR CONSULT: Aflutter    REQUESTING PROVIDER: Tee Suarez MD     CHIEF COMPLAINT:  Shortness of breath, hypoxia    HISTORY OF PRESENT ILLNESS:  Nida Graves is a 49 y.o. year-old female with past medical history significant for diabetes, asthma who was evaluated today due to atrial flutter, SVR.  assists w/ history. Her Mounjaro was recently increased to 7.5mg, since that time has been short of breath but this has progressively worsened. Over the last couple of days has been weak, unable to get up by herself. A friend who is a nurse checked her O2 sat, noted to be 59, HR in the 40s. EMS was called. She required 6L to maintain sat 92%. In the ED, work up ongoing. Troponin 303, lactic acid 2.6, proBNP 6682, glu 238,  creat 1.18. CXR w/ small bilateral pleural effusions, bilat lower lobe atelectasis. EKG w/ atrial flutter, variable, HR 40-50s.     1/19: Worsening respiratory status after initial improvement with diuresis. Hypoxic hypercarbic respiratory failure. Initially successfully weaned from HFNC to NC and stable, but progressive worsening yesterday, with reduced respiratory effort leading to hypoxemic hypercapnic respiratory failure. Chest X ray with poor inflation bilaterally.  Moved to ICU, intubated.    1/20: Remains in ICU, intubated.  at bedside.     Telemetry reviewed.     INPATIENT MEDICATIONS:  Home medications reviewed.    Current Facility-Administered Medications:     pantoprazole (PROTONIX) injection 40 mg, 40 mg, IntraVENous, BID, Mary Estevez MD, 40 mg at 01/20/25 1122    bisacodyl (DULCOLAX) suppository 10 mg, 10 mg, Rectal, Daily PRN, Mary Estevez MD    potassium phosphate 15 mmol in sodium chloride 0.9 % 250 mL IVPB, 15 mmol, IntraVENous, Once, Mary Estevez MD, Last Rate: 62.5 mL/hr at 01/20/25 1345, 15 mmol at 01/20/25 1345    propofol infusion, 5-50 mcg/kg/min, IntraVENous, Continuous, Mary Estevez MD,

## 2025-01-20 NOTE — PROGRESS NOTES
Vancomycin Dosing Consult  Nida Graves is a 49 y.o. female with pneumonia. Pharmacy was consulted by Dr. Mary Estevez to dose and monitor vancomycin. Today is day 2.    Antibiotic regimen: Vancomycin + pip/tazo    Temp (24hrs), Av.4 °F (38 °C), Min:99 °F (37.2 °C), Max:102 °F (38.9 °C)    Recent Labs     25  0328 25  0146 25  0255   WBC 17.7* 20.3* 18.0*   CREATININE 0.91 0.98 1.54*   BUN 35* 60* 70*     Est CrCl: 53 mL/min  Concomitant nephrotoxic drugs: None    Cultures:    blood x 2: NGTD, prelim   blood: NGTD, prelim   urine: pending    MRSA Swab: Not detected 25    Target range: AUC/DUNG 400-600    Recent level history:  Date/Time Dose & Interval Measured Level (mcg/mL) Associated AUC/DUNG    1325 2500 mg x 1  1000 mg q12h (only rec'd 1 dose) 28.9  >600        Assessment/Plan:   Patient with decreasing pressor requirement and improvement in fever curve.  Creatinine elevated today. Switch to pulse dosing.  Vancomycin level is not ready for re-dosing. Repeat level is scheduled for  with AM labs.  Antimicrobial stop date      Brittany Ley, PharmD, BCPS, BCCCP  Clinical Pharmacist   [phone]

## 2025-01-20 NOTE — PROGRESS NOTES
CRITICAL CARE PROGRESS NOTE    Name: Nida Graves   : 1975   MRN: [id number]   Date: 2025      Diagnoses/problem list:   Acute hypoxic and hypercapnic respiratory failure  Atrial flutter  Possible NSTEMI  Acute HFrEF  Diabetes  Morbid obesity  Acute metabolic/septic encephalopathy    24-hour events:   Ms. Graves is a 49-year-old female with PMH chronic disability and bedbound status, diabetes, asthma, obesity, who presented for shortness of breath.  Shortness of breath worsening over the past few days.  Upon presentation ED patient found to be hypoxic necessitating O2 via NC.  Otherwise workup significant for arrhythmia.  Concerns for slow conducting A-fib.  Cardiology subsequently consulted and noted overall concern for A-fib with possible complete heart block versus slow ventricular response.  Patient supplemented to ICU for further evaluation and management. She was transferred out of ICU on . Early morning  ICU contacted for hypoxia, hypercarbia, and encephalopathy. She was hypoxic on 6L NC, had fever 100.5, and ABG revealed pCO2 in the 70's. Patient transferred back to ICU for further evaluation and management.      DVT study is negative  Echo showed EF of 29% with moderate TR     24-hour events:  2025: Doing well no major overnight event, still on high flow above 70%, labs are acceptable, troponin is mildly elevated, chest x-ray consistent with bilateral pulm pleural effusion  2025: Started on steroids and antibiotics  1/15/2025: Still requiring high flow with 50 L 70%, feels better slowly getting better chest x-ray still showing significant increase in interstitial edema and lower lobe airspace disease  2025 patient continued to do well with aggressive diuresis steroids and antibiotics, was transitioned to 35 L 45% high flow, now on 6 L oxygen and doing well, cardiology and palliative care following  : transferred to ICU, placed on NIV. Abx broadened to levaquin,

## 2025-01-20 NOTE — PROGRESS NOTES
Palliative Medicine      Code Status: Full Code    Advance Care Planning:    Primary Decision Maker: Nannette Valle - Spouse - [phone]    Pt does not have AMD on file, is currently unable to complete.  In absence of verified Medical POA,  Nannette Valle is legal NOK and surrogate decision maker.      Patient / Family Encounter Documentation    Participants (names):   Nannette, Palliative Medicine (NP Deyvi, LCSW Margo)    Narrative: Met with  at bedside; pt is currently intubated/sedated, unable to engage.   shared that he and pt have been together/ since , reports pt has two children from her first marriage.  Pt's son lives out of state, does not have contact with pt.  Dtr Sabra lives in the home, as does pt's adult nephew with special needs (Rolando).  Pt's mother Kelly and brother Otis  within a week of each other in ; father is also .  shared that pt's brother  at 49 yrs of age, had known diagnosis of Lupus but  shared that mother and brother were both secretive re: their medical conditions.   shared that pt has been sedentary for years despite his encouragement to be more mobile, did not like to leave the house, has had numerous falls, attributed all of her issues to asthma.        Psychosocial Issues Identified/ Resilience Factors: Coping with pt's medical issues and related stressors, including how to care for nephew moving forward (can't be home alone), shared that dtr has placed a call to Dept of  for guidance.  No spiritual concerns identified; Chaplains are available for support as needed.     Caregiver [place]: Low to moderate  Does the caregiver feel confident administering medication? Pt was able to self administer meds prior to hospitalization.   Does the caregiver need any help connecting with community resources? Not at this time  Does the caregiver feel confident assisting with activities of daily

## 2025-01-20 NOTE — CONSULTS
Palliative Medicine  Patient Name: Nida Graves  YOB: 1975  MRN: [id number]  Age: 49 y.o.  Gender: female    Date of Initial Consult: 2025  Date of Service: 2025  Time: 11:39 AM  Provider: GINNY Chaparro CNP  Hospital Day: 9  Admit Date: 2025  Referring Provider: Dr. Shahid       Reasons for Consultation:  Goals of Care    HISTORY OF PRESENT ILLNESS (HPI):   Nida Graves is a 49 y.o. female with a past medical history of obesity was 270# now down to 240#/ BMI 37),DM with peripheral neuropathy, asthma (previously followed by Dr. [facility] with [facility]), chronic back pain, debility, who was admitted on 2025 from home  with a diagnosis of Shortness of breath, hypoxia.   CXR: LLL airspace disease, interstitial edema  ECHO : EF 25-30%, decrease RV function, mod TR, LA dilation.     25:   Assessment midmorning. Pt with significant decline within the last 24 hours - now intubated given respiratory distress and increasing oxygen requirements, hypotensive and needed CVC and arterial line placement, on levophed gtt. , Nannette, at bedside.     Psychosocial: patient is  to spouse Nannette Valle [phone]  She has a dtr age 21 who is graduating from [employer] this Spring.  Her nephew lives with her (autism, age 27)  Patient's spouse does all the cooking, cleaning, household chores and works full time.   Patient has chronic debility, ambulates with walker for past 3 years, able to get around house, watches TV., independent with dressing/ bathing but fall risk., (spouse just purchased walk in tub)   Patient's mother and brother both  in .   No AMD documents: patient would trust her spouse to make medical decisions if unable.     PALLIATIVE DIAGNOSES:    Acute on chronic respiratory failure, multifactorial  Aspiration, asthma, pulmonary edema, weak muscles/ deconditioned  Suspected PE, on tx (unable to lie flat for CTA)   HFrEF (EF 25-30%)   Aflutter with  performing a face-to-face encounter and personally completing the provider-level activities documented in the note. This includes time spent prior to the visit and after the visit in direct care of the patient. This time does not include time spent in any separately reportable services.    Electronically signed by   GINNY Chaparro - CNP  Palliative Care Team  on 1/20/2025 at 11:39 AM

## 2025-01-20 NOTE — PLAN OF CARE
Problem: ABCDS Injury Assessment  Goal: Absence of physical injury  Outcome: Progressing     Problem: Skin/Tissue Integrity  Goal: Absence of new skin breakdown  Description: 1.  Monitor for areas of redness and/or skin breakdown  2.  Assess vascular access sites hourly  3.  Every 4-6 hours minimum:  Change oxygen saturation probe site  4.  Every 4-6 hours:  If on nasal continuous positive airway pressure, respiratory therapy assess nares and determine need for appliance change or resting period.  1/20/2025 0022 by Sj Huffman RN  Outcome: Progressing  1/19/2025 1803 by Gracy London RN  Outcome: Progressing     Problem: Chronic Conditions and Co-morbidities  Goal: Patient's chronic conditions and co-morbidity symptoms are monitored and maintained or improved  Outcome: Progressing     Problem: Discharge Planning  Goal: Discharge to home or other facility with appropriate resources  Outcome: Progressing     Problem: Safety - Adult  Goal: Free from fall injury  1/20/2025 0022 by Sj Huffman RN  Outcome: Progressing  1/19/2025 1803 by Gracy London RN  Outcome: Progressing     Problem: Neurosensory - Adult  Goal: Achieves stable or improved neurological status  Outcome: Progressing  Goal: Achieves maximal functionality and self care  Outcome: Progressing     Problem: Respiratory - Adult  Goal: Achieves optimal ventilation and oxygenation  Outcome: Progressing     Problem: Cardiovascular - Adult  Goal: Maintains optimal cardiac output and hemodynamic stability  Outcome: Progressing  Flowsheets (Taken 1/19/2025 1200 by Gracy London, RN)  Maintains optimal cardiac output and hemodynamic stability:   Monitor blood pressure and heart rate   Monitor urine output and notify Licensed Independent Practitioner for values outside of normal range  Goal: Absence of cardiac dysrhythmias or at baseline  Outcome: Progressing  Flowsheets (Taken 1/19/2025 1200 by Gracy [place]od[place],

## 2025-01-20 NOTE — ACP (ADVANCE CARE PLANNING)
Code Status: Full Code     Advance Care Planning:    Primary Decision Maker: Nannette Valle - Spouse - [phone]     Pt does not have AMD on file, is currently unable to complete.  In absence of verified Medical POA,  Nannette Valle is legal NOK and surrogate decision maker.

## 2025-01-20 NOTE — PROGRESS NOTES
Patient currently on PT/OT caseload, however patient transferred to ICU from 4W due to medical decline. Pt currently placed on hold and current PT/OT orders will be discontinued at this time. Will need new PT/OT evaluation order once pt medically stable for (re)assessment. Thank you.

## 2025-01-21 LAB
ANION GAP SERPL CALC-SCNC: 4 MMOL/L (ref 2–12)
ARTERIAL PATENCY WRIST A: NO
ARTERIAL PATENCY WRIST A: NO
ARTERIAL PATENCY WRIST A: YES
ARTERIAL PATENCY WRIST A: YES
BACTERIA SPEC CULT: NORMAL
BASE EXCESS BLDA CALC-SCNC: 4.7 MMOL/L (ref 0–3)
BASE EXCESS BLDA CALC-SCNC: 6.2 MMOL/L (ref 0–3)
BASE EXCESS BLDA CALC-SCNC: 7.4 MMOL/L (ref 0–3)
BASE EXCESS BLDA CALC-SCNC: 7.7 MMOL/L (ref 0–3)
BASOPHILS # BLD: 0.04 K/UL (ref 0–0.1)
BASOPHILS NFR BLD: 0.2 % (ref 0–1)
BDY SITE: ABNORMAL
BODY TEMPERATURE: 101.4
BODY TEMPERATURE: 101.6
BODY TEMPERATURE: 98.7
BODY TEMPERATURE: 99
BUN SERPL-MCNC: 68 MG/DL (ref 6–20)
BUN/CREAT SERPL: 48 (ref 12–20)
CA-I BLD-MCNC: 9.2 MG/DL (ref 8.5–10.1)
CHLORIDE SERPL-SCNC: 120 MMOL/L (ref 97–108)
CO2 SERPL-SCNC: 30 MMOL/L (ref 21–32)
COHGB MFR BLD: 0 % (ref 1–2)
COHGB MFR BLD: 0.2 % (ref 1–2)
COHGB MFR BLD: 0.3 % (ref 1–2)
COHGB MFR BLD: 0.3 % (ref 1–2)
CREAT SERPL-MCNC: 1.41 MG/DL (ref 0.55–1.02)
DATE LAST DOSE: NORMAL
DIFFERENTIAL METHOD BLD: ABNORMAL
DOSE AMOUNT: NORMAL UNITS
EOSINOPHIL # BLD: 0.27 K/UL (ref 0–0.4)
EOSINOPHIL NFR BLD: 1.7 % (ref 0–7)
ERYTHROCYTE [DISTWIDTH] IN BLOOD BY AUTOMATED COUNT: 16.5 % (ref 11.5–14.5)
FIO2 ON VENT: 45 %
FIO2 ON VENT: 70 %
GAS FLOW.O2 SETTING OXYMISER: 12
GAS FLOW.O2 SETTING OXYMISER: 12
GAS FLOW.O2 SETTING OXYMISER: 14
GLUCOSE BLD STRIP.AUTO-MCNC: 156 MG/DL (ref 65–100)
GLUCOSE BLD STRIP.AUTO-MCNC: 193 MG/DL (ref 65–100)
GLUCOSE BLD STRIP.AUTO-MCNC: 205 MG/DL (ref 65–100)
GLUCOSE BLD STRIP.AUTO-MCNC: 242 MG/DL (ref 65–100)
GLUCOSE SERPL-MCNC: 243 MG/DL (ref 65–100)
HCO3 BLDA-SCNC: 28 MMOL/L (ref 22–26)
HCO3 BLDA-SCNC: 29 MMOL/L (ref 22–26)
HCO3 BLDA-SCNC: 30 MMOL/L (ref 22–26)
HCO3 BLDA-SCNC: 31 MMOL/L (ref 22–26)
HCT VFR BLD AUTO: 33.5 % (ref 35–47)
HGB BLD-MCNC: 10.1 G/DL (ref 11.5–16)
IMM GRANULOCYTES # BLD AUTO: 0.18 K/UL (ref 0–0.04)
IMM GRANULOCYTES NFR BLD AUTO: 1.1 % (ref 0–0.5)
LYMPHOCYTES # BLD: 2.99 K/UL (ref 0.8–3.5)
LYMPHOCYTES NFR BLD: 18.7 % (ref 12–49)
Lab: NORMAL
MAGNESIUM SERPL-MCNC: 2.2 MG/DL (ref 1.6–2.4)
MCH RBC QN AUTO: 28.9 PG (ref 26–34)
MCHC RBC AUTO-ENTMCNC: 30.1 G/DL (ref 30–36.5)
MCV RBC AUTO: 95.7 FL (ref 80–99)
METHGB MFR BLD: 0.3 % (ref 0–1.4)
METHGB MFR BLD: 0.4 % (ref 0–1.4)
METHGB MFR BLD: 0.5 % (ref 0–1.4)
METHGB MFR BLD: 0.7 % (ref 0–1.4)
MONOCYTES # BLD: 1.6 K/UL (ref 0–1)
MONOCYTES NFR BLD: 10 % (ref 5–13)
NEUTS SEG # BLD: 10.94 K/UL (ref 1.8–8)
NEUTS SEG NFR BLD: 68.3 % (ref 32–75)
NRBC # BLD: 0 K/UL (ref 0–0.01)
NRBC BLD-RTO: 0 PER 100 WBC
OXYHGB MFR BLD: 91.9 % (ref 95–99)
OXYHGB MFR BLD: 92.1 % (ref 95–99)
OXYHGB MFR BLD: 95.7 % (ref 95–99)
OXYHGB MFR BLD: 96.7 % (ref 95–99)
PCO2 BLDA: 35 MMHG (ref 35–45)
PCO2 BLDA: 37 MMHG (ref 35–45)
PCO2 BLDA: 38 MMHG (ref 35–45)
PCO2 BLDA: 41 MMHG (ref 35–45)
PEEP RESPIRATORY: 7
PERFORMED BY:: ABNORMAL
PH BLDA: 7.5 (ref 7.35–7.45)
PH BLDA: 7.5 (ref 7.35–7.45)
PH BLDA: 7.52 (ref 7.35–7.45)
PH BLDA: 7.54 (ref 7.35–7.45)
PHOSPHATE SERPL-MCNC: 2.8 MG/DL (ref 2.6–4.7)
PLATELET # BLD AUTO: 225 K/UL (ref 150–400)
PMV BLD AUTO: 11.5 FL (ref 8.9–12.9)
PO2 BLDA: 114 MMHG (ref 80–100)
PO2 BLDA: 67 MMHG (ref 80–100)
PO2 BLDA: 68 MMHG (ref 80–100)
PO2 BLDA: 92 MMHG (ref 80–100)
POTASSIUM SERPL-SCNC: 3.6 MMOL/L (ref 3.5–5.1)
RBC # BLD AUTO: 3.5 M/UL (ref 3.8–5.2)
SAO2 % BLD: 93 % (ref 95–99)
SAO2 % BLD: 93 % (ref 95–99)
SAO2 % BLD: 96 % (ref 95–99)
SAO2 % BLD: 97 % (ref 95–99)
SAO2% DEVICE SAO2% SENSOR NAME: ABNORMAL
SODIUM SERPL-SCNC: 154 MMOL/L (ref 136–145)
SPECIMEN SITE: ABNORMAL
VANCOMYCIN SERPL-MCNC: 16.6 UG/ML
VENTILATION MODE VENT: ABNORMAL
VT SETTING VENT: 450
WBC # BLD AUTO: 16 K/UL (ref 3.6–11)

## 2025-01-21 PROCEDURE — 82803 BLOOD GASES ANY COMBINATION: CPT

## 2025-01-21 PROCEDURE — 84100 ASSAY OF PHOSPHORUS: CPT

## 2025-01-21 PROCEDURE — 94761 N-INVAS EAR/PLS OXIMETRY MLT: CPT

## 2025-01-21 PROCEDURE — 2500000003 HC RX 250 WO HCPCS: Performed by: STUDENT IN AN ORGANIZED HEALTH CARE EDUCATION/TRAINING PROGRAM

## 2025-01-21 PROCEDURE — 94640 AIRWAY INHALATION TREATMENT: CPT

## 2025-01-21 PROCEDURE — 6370000000 HC RX 637 (ALT 250 FOR IP): Performed by: STUDENT IN AN ORGANIZED HEALTH CARE EDUCATION/TRAINING PROGRAM

## 2025-01-21 PROCEDURE — 6370000000 HC RX 637 (ALT 250 FOR IP): Performed by: INTERNAL MEDICINE

## 2025-01-21 PROCEDURE — 83735 ASSAY OF MAGNESIUM: CPT

## 2025-01-21 PROCEDURE — 94668 MNPJ CHEST WALL SBSQ: CPT

## 2025-01-21 PROCEDURE — 6360000002 HC RX W HCPCS: Performed by: STUDENT IN AN ORGANIZED HEALTH CARE EDUCATION/TRAINING PROGRAM

## 2025-01-21 PROCEDURE — 2580000003 HC RX 258: Performed by: STUDENT IN AN ORGANIZED HEALTH CARE EDUCATION/TRAINING PROGRAM

## 2025-01-21 PROCEDURE — 6360000002 HC RX W HCPCS: Performed by: INTERNAL MEDICINE

## 2025-01-21 PROCEDURE — 6360000002 HC RX W HCPCS

## 2025-01-21 PROCEDURE — 2000000000 HC ICU R&B

## 2025-01-21 PROCEDURE — 80048 BASIC METABOLIC PNL TOTAL CA: CPT

## 2025-01-21 PROCEDURE — 82962 GLUCOSE BLOOD TEST: CPT

## 2025-01-21 PROCEDURE — 80202 ASSAY OF VANCOMYCIN: CPT

## 2025-01-21 PROCEDURE — 94003 VENT MGMT INPAT SUBQ DAY: CPT

## 2025-01-21 PROCEDURE — 85025 COMPLETE CBC W/AUTO DIFF WBC: CPT

## 2025-01-21 PROCEDURE — 36415 COLL VENOUS BLD VENIPUNCTURE: CPT

## 2025-01-21 PROCEDURE — 2700000000 HC OXYGEN THERAPY PER DAY

## 2025-01-21 PROCEDURE — 36600 WITHDRAWAL OF ARTERIAL BLOOD: CPT

## 2025-01-21 RX ORDER — ENOXAPARIN SODIUM 100 MG/ML
1 INJECTION SUBCUTANEOUS 2 TIMES DAILY
Status: DISCONTINUED | OUTPATIENT
Start: 2025-01-21 | End: 2025-02-04

## 2025-01-21 RX ORDER — FUROSEMIDE 10 MG/ML
40 INJECTION INTRAMUSCULAR; INTRAVENOUS ONCE
Status: COMPLETED | OUTPATIENT
Start: 2025-01-21 | End: 2025-01-21

## 2025-01-21 RX ADMIN — SODIUM CHLORIDE: 9 INJECTION, SOLUTION INTRAVENOUS at 01:56

## 2025-01-21 RX ADMIN — PROPOFOL 25 MCG/KG/MIN: 10 INJECTION, EMULSION INTRAVENOUS at 21:49

## 2025-01-21 RX ADMIN — PIPERACILLIN AND TAZOBACTAM 3375 MG: 3; .375 INJECTION, POWDER, LYOPHILIZED, FOR SOLUTION INTRAVENOUS at 05:30

## 2025-01-21 RX ADMIN — INSULIN LISPRO 2 UNITS: 100 INJECTION, SOLUTION INTRAVENOUS; SUBCUTANEOUS at 07:41

## 2025-01-21 RX ADMIN — SODIUM CHLORIDE: 9 INJECTION, SOLUTION INTRAVENOUS at 14:43

## 2025-01-21 RX ADMIN — SENNOSIDES 8.6 MG: 8.6 TABLET, COATED ORAL at 07:37

## 2025-01-21 RX ADMIN — SODIUM CHLORIDE, PRESERVATIVE FREE 10 ML: 5 INJECTION INTRAVENOUS at 20:41

## 2025-01-21 RX ADMIN — PIPERACILLIN AND TAZOBACTAM 3375 MG: 3; .375 INJECTION, POWDER, LYOPHILIZED, FOR SOLUTION INTRAVENOUS at 21:09

## 2025-01-21 RX ADMIN — ACETAMINOPHEN 650 MG: 650 SOLUTION ORAL at 00:16

## 2025-01-21 RX ADMIN — BUDESONIDE 500 MCG: 0.5 INHALANT ORAL at 19:47

## 2025-01-21 RX ADMIN — PROPOFOL 30 MCG/KG/MIN: 10 INJECTION, EMULSION INTRAVENOUS at 04:00

## 2025-01-21 RX ADMIN — PROPOFOL 30 MCG/KG/MIN: 10 INJECTION, EMULSION INTRAVENOUS at 09:47

## 2025-01-21 RX ADMIN — POLYETHYLENE GLYCOL 3350 17 G: 17 POWDER, FOR SOLUTION ORAL at 07:53

## 2025-01-21 RX ADMIN — IPRATROPIUM BROMIDE AND ALBUTEROL SULFATE 1 DOSE: 2.5; .5 SOLUTION RESPIRATORY (INHALATION) at 07:30

## 2025-01-21 RX ADMIN — PANTOPRAZOLE SODIUM 40 MG: 40 INJECTION, POWDER, FOR SOLUTION INTRAVENOUS at 20:40

## 2025-01-21 RX ADMIN — IPRATROPIUM BROMIDE AND ALBUTEROL SULFATE 1 DOSE: 2.5; .5 SOLUTION RESPIRATORY (INHALATION) at 19:47

## 2025-01-21 RX ADMIN — INSULIN LISPRO 2 UNITS: 100 INJECTION, SOLUTION INTRAVENOUS; SUBCUTANEOUS at 17:21

## 2025-01-21 RX ADMIN — ENOXAPARIN SODIUM 100 MG: 100 INJECTION SUBCUTANEOUS at 20:41

## 2025-01-21 RX ADMIN — BUDESONIDE 500 MCG: 0.5 INHALANT ORAL at 07:30

## 2025-01-21 RX ADMIN — INSULIN LISPRO 2 UNITS: 100 INJECTION, SOLUTION INTRAVENOUS; SUBCUTANEOUS at 20:40

## 2025-01-21 RX ADMIN — HYDROCORTISONE SODIUM SUCCINATE 50 MG: 100 INJECTION, POWDER, FOR SOLUTION INTRAMUSCULAR; INTRAVENOUS at 11:29

## 2025-01-21 RX ADMIN — SODIUM CHLORIDE, PRESERVATIVE FREE 10 ML: 5 INJECTION INTRAVENOUS at 07:39

## 2025-01-21 RX ADMIN — SODIUM CHLORIDE: 9 INJECTION, SOLUTION INTRAVENOUS at 01:58

## 2025-01-21 RX ADMIN — FUROSEMIDE 40 MG: 10 INJECTION, SOLUTION INTRAMUSCULAR; INTRAVENOUS at 10:57

## 2025-01-21 RX ADMIN — ENOXAPARIN SODIUM 105 MG: 150 INJECTION SUBCUTANEOUS at 07:37

## 2025-01-21 RX ADMIN — BUPROPION HYDROCHLORIDE 75 MG: 75 TABLET, FILM COATED ORAL at 07:37

## 2025-01-21 RX ADMIN — PIPERACILLIN AND TAZOBACTAM 3375 MG: 3; .375 INJECTION, POWDER, LYOPHILIZED, FOR SOLUTION INTRAVENOUS at 13:08

## 2025-01-21 RX ADMIN — VANCOMYCIN HYDROCHLORIDE 1500 MG: 750 INJECTION, POWDER, LYOPHILIZED, FOR SOLUTION INTRAVENOUS at 18:29

## 2025-01-21 RX ADMIN — ATORVASTATIN CALCIUM 20 MG: 20 TABLET, FILM COATED ORAL at 20:40

## 2025-01-21 RX ADMIN — PANTOPRAZOLE SODIUM 40 MG: 40 INJECTION, POWDER, FOR SOLUTION INTRAVENOUS at 07:37

## 2025-01-21 RX ADMIN — IPRATROPIUM BROMIDE AND ALBUTEROL SULFATE 1 DOSE: 2.5; .5 SOLUTION RESPIRATORY (INHALATION) at 13:18

## 2025-01-21 RX ADMIN — BUPROPION HYDROCHLORIDE 75 MG: 75 TABLET, FILM COATED ORAL at 20:40

## 2025-01-21 RX ADMIN — ASPIRIN 81 MG 81 MG: 81 TABLET ORAL at 07:37

## 2025-01-21 RX ADMIN — PROPOFOL 25 MCG/KG/MIN: 10 INJECTION, EMULSION INTRAVENOUS at 15:57

## 2025-01-21 RX ADMIN — Medication 6 MCG/MIN: at 02:00

## 2025-01-21 RX ADMIN — SENNOSIDES 8.6 MG: 8.6 TABLET, COATED ORAL at 20:40

## 2025-01-21 ASSESSMENT — PULMONARY FUNCTION TESTS
PIF_VALUE: 21
PIF_VALUE: 21
PIF_VALUE: 20
PIF_VALUE: 27
PIF_VALUE: 21
PIF_VALUE: 23
PIF_VALUE: 20
PIF_VALUE: 22
PIF_VALUE: 21
PIF_VALUE: 20
PIF_VALUE: 20
PIF_VALUE: 21
PIF_VALUE: 19
PIF_VALUE: 21
PIF_VALUE: 36
PIF_VALUE: 20
PIF_VALUE: 19
PIF_VALUE: 20
PIF_VALUE: 21
PIF_VALUE: 21
PIF_VALUE: 20
PIF_VALUE: 19
PIF_VALUE: 22
PIF_VALUE: 28
PIF_VALUE: 22
PIF_VALUE: 30
PIF_VALUE: 20
PIF_VALUE: 25
PIF_VALUE: 21
PIF_VALUE: 20
PIF_VALUE: 20
PIF_VALUE: 22
PIF_VALUE: 21
PIF_VALUE: 20
PIF_VALUE: 20
PIF_VALUE: 21
PIF_VALUE: 21
PIF_VALUE: 22
PIF_VALUE: 20
PIF_VALUE: 21
PIF_VALUE: 22
PIF_VALUE: 20
PIF_VALUE: 21
PIF_VALUE: 22
PIF_VALUE: 20

## 2025-01-21 ASSESSMENT — PAIN SCALES - GENERAL
PAINLEVEL_OUTOF10: 0

## 2025-01-21 NOTE — PROGRESS NOTES
25 mg at 01/18/25 1036    iopamidol (ISOVUE-370) 76 % injection 100 mL, 100 mL, IntraVENous, ONCE PRN, Tee Suarez MD    sodium chloride flush 0.9 % injection 5-40 mL, 5-40 mL, IntraVENous, 2 times per day, Álvaro, Aalok R, DO, 10 mL at 01/21/25 0739    sodium chloride flush 0.9 % injection 5-40 mL, 5-40 mL, IntraVENous, PRN, Álvaro, Aalok R, DO    0.9 % sodium chloride infusion, , IntraVENous, PRN, Álvaro, Aalok R, DO, Stopped at 01/21/25 0530    potassium chloride 20 mEq/50 mL IVPB (Central Line), 20 mEq, IntraVENous, PRN, Last Rate: 50 mL/hr at 01/20/25 1330, 20 mEq at 01/20/25 1330 **OR** potassium chloride 10 mEq/100 mL IVPB (Peripheral Line), 10 mEq, IntraVENous, PRN, Álvaro, Aalok R, DO, Stopped at 01/17/25 1608    magnesium sulfate 2000 mg in 50 mL IVPB premix, 2,000 mg, IntraVENous, PRN, Álvaro, Aalok R, DO    senna (SENOKOT) tablet 8.6 mg, 1 tablet, Oral, Daily PRN, Álvaro, Aalok R, DO    ipratropium (ATROVENT) 0.02 % nebulizer solution 0.5 mg, 0.5 mg, Nebulization, Q6H PRN, Álvaro, Aalok R, DO    glucose chewable tablet 16 g, 4 tablet, Oral, PRN, Álvaro, Aalok R, DO    dextrose bolus 10% 125 mL, 125 mL, IntraVENous, PRN **OR** dextrose bolus 10% 250 mL, 250 mL, IntraVENous, PRN, Álvaro, Aalok R, DO    glucagon injection 1 mg, 1 mg, SubCUTAneous, PRN, Álvaro, Aalok R, DO    dextrose 10 % infusion, , IntraVENous, Continuous PRN, Álvaro, Aalok R, DO    [Held by provider] losartan (COZAAR) tablet 25 mg, 25 mg, Oral, Daily, Tony Schmitt PA-C, 25 mg at 01/18/25 1036    [Held by provider] buPROPion (WELLBUTRIN XL) extended release tablet 150 mg, 150 mg, Oral, Daily, Suyapa Rea DO, 150 mg at 01/20/25 0757    [Held by provider] DULoxetine (CYMBALTA) extended release capsule 30 mg, 30 mg, Oral, Daily, Suyapa Rea DO, 30 mg at 01/18/25 1036    [Held by provider] traZODone (DESYREL) tablet 50 mg, 50 mg, Oral, Daily, Suyapa Rea DO    budesonide (PULMICORT) nebulizer suspension 500 mcg, 0.5  mg, Nebulization, BID RT, Suyapa Rea R, DO, 500 mcg at 01/21/25 0730     ALLERGIES:  Allergies reviewed with the patient,  Allergies   Allergen Reactions    Latex Swelling    Penicillins Hives    Codeine Anxiety    .      FAMILY HISTORY:  Family history reviewed.       SOCIAL HISTORY:  Notable for no tobacco use, no heavy alcohol or illicit drug use.      REVIEW OF SYSTEMS:  Complete review of systems performed, pertinents noted above, all other systems are negative.    PHYSICAL EXAMINATION:    General:  Chronically ill appearing, obese  Cardiovascular:  irregular rhythm, regular rate, no murmur  Respiratory:  Lung sounds are diminished, bibasilar rales   Abdomen:  soft, nontender  Extremities:   no lower extremity edema  Skin:  Dry, warm      Vitals:    01/21/25 1324   BP:    Pulse: 68   Resp: 11   Temp:    SpO2: 98%       Recent labs results and imaging reviewed.     Case discussed with Dr. Prajapati and our impression and recommendations are as follows:  Acute hypoxic hypercapnic respiratory failure  Atrial flutter with variable AV block.   Acute combined systolic and diastolic heart failure with reduced ejection fraction, unknown etiology  EF 25-30%  Hypokalemia   Hypertension, acceptable blood pressure  Hyperlipidemia, on statin therapy  Diabetes mellitus: tx plan per primary team    Hypoxic hypercapnic respiratory failure appears to be more likely from respiratory fatigue/poor effort   CVP measurements today 14-16, agree with gentle diuresis   Continue anticoagulation as able,currently on full dose Lovenox   Will initiate GDMT once able, currently requiring pressor support     Thank you for involving us in the care of this patient.  Please do not hesitate to call me if additional questions arise.    KEENAN MUNOZ, APRN - NP  1/21/2025

## 2025-01-21 NOTE — PROGRESS NOTES
Comprehensive Nutrition Assessment    Type and Reason for Visit:  Initial, Nutrition support    Nutrition Recommendations/Plan:   Once OGT position confirmed appropriate, initiate TF w/ Promote (Peptide-based HP Substitute) @ 10 ml/hr and advance 10 ml/hr every 6 hours to a goal rate of 30 ml/hr + 2 ProSource BID. Administer 200 ml free water every 4 hours.  TF @ goal provides: 720 kcals, 46 g protein, and 598 ml free water  ProSource provides: +320 kcals, +80 g protein   Propofol @ 17.8ml/hr: +470 kcals  TOTAL: 1510 kcals (25 kcals/kgIBW/74% EEE), 126 g protein (2.1 g/kg IBW/100%), 1798 ml free water  Monitor lytes and replete as needed  Monitor TF admin, weight, fluid status, labs, and bowel fxn/GI status - rec escalate bowel regimen if no BM today     Malnutrition Assessment:  Malnutrition Status:  Mild malnutrition (01/21/25 1213)    Context:  Chronic Illness     Findings of the 6 clinical characteristics of malnutrition:  Energy Intake:  Unable to assess  Weight Loss:  Mild weight loss (hx significant wt loss on mounjaro (-10%x4mos))     Body Fat Loss:  No body fat loss     Muscle Mass Loss:  Mild muscle mass loss (vs) Temples (temporalis)  Fluid Accumulation:  Mild (chronic illness > nutr status)     Strength:  Not Performed    Nutrition Assessment:    Presented w/ SOB, arrhythmia. Admitted to ICU 1/13-1/16. Returned from floor 1/19 d/t hypoxia, hypercarbia, and encephalopathy. Persistent hypoxia on biPAP, intubated. Pt currently on vent w/ sedation. Per discussion in MDRs, plans to initiate TF today, wean propofol as able, and continue diuresing. EF 30%. Labs: , Na 154, BUN 8, Cr 1.41, h/h 10.1/33.5. Meds: aspirin, lipitor, pulmicort, wellbutrin, lovenox, solu-cortef, SSI, duoneb, melatonin, protonix, zosyn, glycolax, senna, vacnomycin, NS @ 75 ml/hr, levo @ 4 mcg/min (VDE=4), propofol @ 17.8 ml/hr (470 kcals/day)    Nutrition Related Findings:    NFPE w/ possible mild muscle wasting. No reproted

## 2025-01-21 NOTE — PLAN OF CARE
Problem: Skin/Tissue Integrity  Goal: Absence of new skin breakdown  Description: 1.  Monitor for areas of redness and/or skin breakdown  2.  Assess vascular access sites hourly  3.  Every 4-6 hours minimum:  Change oxygen saturation probe site  4.  Every 4-6 hours:  If on nasal continuous positive airway pressure, respiratory therapy assess nares and determine need for appliance change or resting period.  1/21/2025 1139 by Gracy London RN  Outcome: Progressing  1/20/2025 2311 by Sj Huffman RN  Outcome: Progressing     Problem: Safety - Adult  Goal: Free from fall injury  1/21/2025 1139 by Gracy London RN  Outcome: Progressing  1/20/2025 2311 by Sj Huffman RN  Outcome: Progressing     Problem: Skin/Tissue Integrity - Adult  Goal: Skin integrity remains intact  1/21/2025 1139 by Gracy London RN  Outcome: Progressing  Flowsheets  Taken 1/21/2025 1000  Skin Integrity Remains Intact: Monitor for areas of redness and/or skin breakdown  Taken 1/21/2025 0839  Skin Integrity Remains Intact: Monitor for areas of redness and/or skin breakdown  Taken 1/21/2025 0800  Skin Integrity Remains Intact:   Monitor for areas of redness and/or skin breakdown   Assess vascular access sites hourly  1/20/2025 2311 by Sj Huffman RN  Outcome: Progressing  Goal: Incisions, wounds, or drain sites healing without S/S of infection  Recent Flowsheet Documentation  Taken 1/21/2025 1000 by Gracy London RN  Incisions, Wounds, or Drain Sites Healing Without Sign and Symptoms of Infection: ADMISSION and DAILY: Assess and document risk factors for pressure ulcer development  Taken 1/21/2025 0839 by Gracy London RN  Incisions, Wounds, or Drain Sites Healing Without Sign and Symptoms of Infection: ADMISSION and DAILY: Assess and document risk factors for pressure ulcer development  Taken 1/21/2025 0800 by Gracy London RN  Incisions, Wounds, or Drain Sites  Healing Without Sign and Symptoms of Infection: ADMISSION and DAILY: Assess and document risk factors for pressure ulcer development  1/20/2025 2311 by Sj Huffman RN  Outcome: Progressing  Goal: Oral mucous membranes remain intact  Recent Flowsheet Documentation  Taken 1/21/2025 1000 by Gracy London RN  Oral Mucous Membranes Remain Intact: Assess oral mucosa and hygiene practices  Taken 1/21/2025 0839 by Gracy London RN  Oral Mucous Membranes Remain Intact: Assess oral mucosa and hygiene practices  Taken 1/21/2025 0800 by Gracy London RN  Oral Mucous Membranes Remain Intact: Assess oral mucosa and hygiene practices  1/20/2025 2311 by Sj Huffman RN  Outcome: Progressing     Problem: Safety - Medical Restraint  Goal: Remains free of injury from restraints (Restraint for Interference with Medical Device)  Description: INTERVENTIONS:  1. Determine that other, less restrictive measures have been tried or would not be effective before applying the restraint  2. Evaluate the patient's condition at the time of restraint application  3. Inform patient/family regarding the reason for restraint  4. Q2H: Monitor safety, psychosocial status, comfort, nutrition and hydration  1/21/2025 1139 by Gracy London RN  Outcome: Progressing  1/20/2025 2311 by Sj Huffman RN  Outcome: Progressing  Flowsheets  Taken 1/20/2025 1400 by Gracy London RN  Remains free of injury from restraints (restraint for interference with medical device): Determine that other, less restrictive measures have been tried or would not be effective before applying the restraint  Taken 1/20/2025 1200 by Gracy London RN  Remains free of injury from restraints (restraint for interference with medical device): Determine that other, less restrictive measures have been tried or would not be effective before applying the restraint

## 2025-01-21 NOTE — PROGRESS NOTES
CRITICAL CARE PROGRESS NOTE    Name: Nida Graves   : 1975   MRN: [id number]   Date: 2025      Diagnoses/problem list:   Acute hypoxic and hypercapnic respiratory failure  Atrial flutter  Possible NSTEMI  Acute HFrEF  Diabetes  Morbid obesity  Acute metabolic/septic encephalopathy    24-hour events:   Ms. Graves is a 49-year-old female with PMH chronic disability and bedbound status, diabetes, asthma, obesity, who presented for shortness of breath.  Shortness of breath worsening over the past few days.  Upon presentation ED patient found to be hypoxic necessitating O2 via NC.  Otherwise workup significant for arrhythmia.  Concerns for slow conducting A-fib.  Cardiology subsequently consulted and noted overall concern for A-fib with possible complete heart block versus slow ventricular response.  Patient supplemented to ICU for further evaluation and management. She was transferred out of ICU on . Early morning  ICU contacted for hypoxia, hypercarbia, and encephalopathy. She was hypoxic on 6L NC, had fever 100.5, and ABG revealed pCO2 in the 70's. Patient transferred back to ICU for further evaluation and management.      DVT study is negative  Echo showed EF of 29% with moderate TR     24-hour events:  2025: Doing well no major overnight event, still on high flow above 70%, labs are acceptable, troponin is mildly elevated, chest x-ray consistent with bilateral pulm pleural effusion  2025: Started on steroids and antibiotics  1/15/2025: Still requiring high flow with 50 L 70%, feels better slowly getting better chest x-ray still showing significant increase in interstitial edema and lower lobe airspace disease  2025 patient continued to do well with aggressive diuresis steroids and antibiotics, was transitioned to 35 L 45% high flow, now on 6 L oxygen and doing well, cardiology and palliative care following  : transferred to ICU, placed on NIV. Abx broadened to levaquin,

## 2025-01-21 NOTE — PROGRESS NOTES
Vancomycin Dosing Consult  Nida Graves is a 49 y.o. female with pneumonia. Pharmacy was consulted by Dr. Mary Estevez to dose and monitor vancomycin. Today is day 3.    Antibiotic regimen: Vancomycin + pip/tazo    Temp (24hrs), Av.5 °F (38.6 °C), Min:100.7 °F (38.2 °C), Max:102 °F (38.9 °C)    Recent Labs     25  0146 25  0255 25  0240   WBC 20.3* 18.0* 16.0*   CREATININE 0.98 1.54* 1.41*   BUN 60* 70* 68*     Est CrCl: 58 mL/min  Concomitant nephrotoxic drugs: None    Cultures:    blood x 2: negative, final   blood: NGTD, prelim   urine: negative, final    MRSA Swab: Not detected 25    Target range: AUC/DUNG 400-600    Recent level history:  Date/Time Dose & Interval Measured Level (mcg/mL) Associated AUC/DUNG    1325 2500 mg x 1  1000 mg q12h (only rec'd 1 dose) 28.9  >600    0945 held 16.6 N/a        Assessment/Plan:   Patient was febrile overnight. Continue current antibiotics.   Creatinine stable but above baseline. Continue pulse dosing.  Level is anticipated to be ready for re-dosing this evening. Vancomycin 1500 mg x 1 ordered.  Repeat level is scheduled for  with AM labs.  Antimicrobial stop date      Brittany Ley, PharmD, BCPS, BCCCP  Clinical Pharmacist   [phone]

## 2025-01-21 NOTE — PLAN OF CARE
Problem: ABCDS Injury Assessment  Goal: Absence of physical injury  Outcome: Progressing     Problem: Skin/Tissue Integrity  Goal: Absence of new skin breakdown  Description: 1.  Monitor for areas of redness and/or skin breakdown  2.  Assess vascular access sites hourly  3.  Every 4-6 hours minimum:  Change oxygen saturation probe site  4.  Every 4-6 hours:  If on nasal continuous positive airway pressure, respiratory therapy assess nares and determine need for appliance change or resting period.  Outcome: Progressing     Problem: Chronic Conditions and Co-morbidities  Goal: Patient's chronic conditions and co-morbidity symptoms are monitored and maintained or improved  Outcome: Progressing     Problem: Discharge Planning  Goal: Discharge to home or other facility with appropriate resources  Outcome: Progressing     Problem: Safety - Adult  Goal: Free from fall injury  Outcome: Progressing     Problem: Neurosensory - Adult  Goal: Achieves stable or improved neurological status  Outcome: Progressing  Goal: Achieves maximal functionality and self care  Outcome: Progressing     Problem: Respiratory - Adult  Goal: Achieves optimal ventilation and oxygenation  Outcome: Progressing     Problem: Cardiovascular - Adult  Goal: Maintains optimal cardiac output and hemodynamic stability  Outcome: Progressing  Goal: Absence of cardiac dysrhythmias or at baseline  Outcome: Progressing     Problem: Gastrointestinal - Adult  Goal: Minimal or absence of nausea and vomiting  Outcome: Progressing  Goal: Maintains or returns to baseline bowel function  Outcome: Progressing  Goal: Maintains adequate nutritional intake  Outcome: Progressing     Problem: Genitourinary - Adult  Goal: Absence of urinary retention  Outcome: Progressing     Problem: Metabolic/Fluid and Electrolytes - Adult  Goal: Electrolytes maintained within normal limits  Outcome: Progressing  Goal: Hemodynamic stability and optimal renal function maintained  Outcome:

## 2025-01-22 ENCOUNTER — APPOINTMENT (OUTPATIENT)
Facility: HOSPITAL | Age: 50
DRG: 004 | End: 2025-01-22
Payer: COMMERCIAL

## 2025-01-22 LAB
ALBUMIN SERPL-MCNC: 2.2 G/DL (ref 3.5–5)
ALBUMIN/GLOB SERPL: 0.7 (ref 1.1–2.2)
ALP SERPL-CCNC: 60 U/L (ref 45–117)
ALT SERPL-CCNC: 10 U/L (ref 12–78)
ANION GAP SERPL CALC-SCNC: 4 MMOL/L (ref 2–12)
ANION GAP SERPL CALC-SCNC: 5 MMOL/L (ref 2–12)
ANION GAP SERPL CALC-SCNC: 5 MMOL/L (ref 2–12)
ARTERIAL PATENCY WRIST A: ABNORMAL
ARTERIAL PATENCY WRIST A: NO
ARTERIAL PATENCY WRIST A: NO
ARTERIAL PATENCY WRIST A: YES
AST SERPL W P-5'-P-CCNC: 19 U/L (ref 15–37)
BASE EXCESS BLDA CALC-SCNC: 1.1 MMOL/L (ref 0–3)
BASE EXCESS BLDA CALC-SCNC: 4.1 MMOL/L (ref 0–3)
BASE EXCESS BLDA CALC-SCNC: 4.8 MMOL/L (ref 0–3)
BASE EXCESS BLDA CALC-SCNC: 5.5 MMOL/L (ref 0–3)
BASOPHILS # BLD: 0 K/UL (ref 0–0.1)
BASOPHILS # BLD: 0.03 K/UL (ref 0–0.1)
BASOPHILS NFR BLD: 0 % (ref 0–1)
BASOPHILS NFR BLD: 0.2 % (ref 0–1)
BDY SITE: ABNORMAL
BILIRUB SERPL-MCNC: 0.5 MG/DL (ref 0.2–1)
BODY TEMPERATURE: 97.9
BODY TEMPERATURE: 98
BODY TEMPERATURE: 99.1
BODY TEMPERATURE: 99.8
BUN SERPL-MCNC: 54 MG/DL (ref 6–20)
BUN SERPL-MCNC: 55 MG/DL (ref 6–20)
BUN SERPL-MCNC: 59 MG/DL (ref 6–20)
BUN/CREAT SERPL: 42 (ref 12–20)
BUN/CREAT SERPL: 42 (ref 12–20)
BUN/CREAT SERPL: 43 (ref 12–20)
CA-I BLD-MCNC: 1.24 MMOL/L (ref 1.13–1.32)
CA-I BLD-MCNC: 8.4 MG/DL (ref 8.5–10.1)
CA-I BLD-MCNC: 8.7 MG/DL (ref 8.5–10.1)
CA-I BLD-MCNC: 9.4 MG/DL (ref 8.5–10.1)
CHLORIDE SERPL-SCNC: 125 MMOL/L (ref 97–108)
CHLORIDE SERPL-SCNC: 126 MMOL/L (ref 97–108)
CHLORIDE SERPL-SCNC: 127 MMOL/L (ref 97–108)
CO2 SERPL-SCNC: 28 MMOL/L (ref 21–32)
CO2 SERPL-SCNC: 28 MMOL/L (ref 21–32)
CO2 SERPL-SCNC: 29 MMOL/L (ref 21–32)
COHGB MFR BLD: 0.2 % (ref 1–2)
COHGB MFR BLD: 0.3 % (ref 1–2)
COLLECT DATE STL: ABNORMAL
CREAT SERPL-MCNC: 1.3 MG/DL (ref 0.55–1.02)
CREAT SERPL-MCNC: 1.31 MG/DL (ref 0.55–1.02)
CREAT SERPL-MCNC: 1.36 MG/DL (ref 0.55–1.02)
DIFFERENTIAL METHOD BLD: ABNORMAL
DIFFERENTIAL METHOD BLD: ABNORMAL
EOSINOPHIL # BLD: 0 K/UL (ref 0–0.4)
EOSINOPHIL # BLD: 0.45 K/UL (ref 0–0.4)
EOSINOPHIL NFR BLD: 0 % (ref 0–7)
EOSINOPHIL NFR BLD: 2.4 % (ref 0–7)
ERYTHROCYTE [DISTWIDTH] IN BLOOD BY AUTOMATED COUNT: 16.4 % (ref 11.5–14.5)
ERYTHROCYTE [DISTWIDTH] IN BLOOD BY AUTOMATED COUNT: 16.7 % (ref 11.5–14.5)
ERYTHROCYTE [DISTWIDTH] IN BLOOD BY AUTOMATED COUNT: 16.9 % (ref 11.5–14.5)
FIO2 ON VENT: 45 %
FIO2 ON VENT: 55 %
FIO2 ON VENT: 70 %
FIO2 ON VENT: 70 %
GAS FLOW.O2 SETTING OXYMISER: 12
GLOBULIN SER CALC-MCNC: 3.1 G/DL (ref 2–4)
GLUCOSE BLD STRIP.AUTO-MCNC: 207 MG/DL (ref 65–100)
GLUCOSE BLD STRIP.AUTO-MCNC: 246 MG/DL (ref 65–100)
GLUCOSE BLD STRIP.AUTO-MCNC: 272 MG/DL (ref 65–100)
GLUCOSE BLD STRIP.AUTO-MCNC: 287 MG/DL (ref 65–100)
GLUCOSE SERPL-MCNC: 204 MG/DL (ref 65–100)
GLUCOSE SERPL-MCNC: 220 MG/DL (ref 65–100)
GLUCOSE SERPL-MCNC: 299 MG/DL (ref 65–100)
HCO3 BLDA-SCNC: 24 MMOL/L (ref 22–26)
HCO3 BLDA-SCNC: 28 MMOL/L (ref 22–26)
HCO3 BLDA-SCNC: 28 MMOL/L (ref 22–26)
HCO3 BLDA-SCNC: 29 MMOL/L (ref 22–26)
HCT VFR BLD AUTO: 23.9 % (ref 35–47)
HCT VFR BLD AUTO: 25.1 % (ref 35–47)
HCT VFR BLD AUTO: 26.6 % (ref 35–47)
HCT VFR BLD AUTO: 26.7 % (ref 35–47)
HEMOCCULT SP1 STL QL: POSITIVE
HGB BLD-MCNC: 7.2 G/DL (ref 11.5–16)
HGB BLD-MCNC: 7.3 G/DL (ref 11.5–16)
HGB BLD-MCNC: 7.9 G/DL (ref 11.5–16)
HGB BLD-MCNC: 8 G/DL (ref 11.5–16)
IMM GRANULOCYTES # BLD AUTO: 0 K/UL
IMM GRANULOCYTES # BLD AUTO: 0.32 K/UL (ref 0–0.04)
IMM GRANULOCYTES NFR BLD AUTO: 0 %
IMM GRANULOCYTES NFR BLD AUTO: 1.7 % (ref 0–0.5)
LYMPHOCYTES # BLD: 2.84 K/UL (ref 0.8–3.5)
LYMPHOCYTES # BLD: 5.16 K/UL (ref 0.8–3.5)
LYMPHOCYTES NFR BLD: 18 % (ref 12–49)
LYMPHOCYTES NFR BLD: 27.5 % (ref 12–49)
MAGNESIUM SERPL-MCNC: 1.8 MG/DL (ref 1.6–2.4)
MAGNESIUM SERPL-MCNC: 2.9 MG/DL (ref 1.6–2.4)
MCH RBC QN AUTO: 28.2 PG (ref 26–34)
MCH RBC QN AUTO: 28.8 PG (ref 26–34)
MCH RBC QN AUTO: 29.3 PG (ref 26–34)
MCHC RBC AUTO-ENTMCNC: 29.1 G/DL (ref 30–36.5)
MCHC RBC AUTO-ENTMCNC: 30.1 G/DL (ref 30–36.5)
MCHC RBC AUTO-ENTMCNC: 30.1 G/DL (ref 30–36.5)
MCV RBC AUTO: 95.7 FL (ref 80–99)
MCV RBC AUTO: 96.9 FL (ref 80–99)
MCV RBC AUTO: 97.2 FL (ref 80–99)
METAMYELOCYTES NFR BLD MANUAL: 4 %
METHGB MFR BLD: 0.4 % (ref 0–1.4)
METHGB MFR BLD: 0.5 % (ref 0–1.4)
METHGB MFR BLD: 0.7 % (ref 0–1.4)
METHGB MFR BLD: 0.7 % (ref 0–1.4)
MONOCYTES # BLD: 0 K/UL (ref 0–1)
MONOCYTES # BLD: 1.82 K/UL (ref 0–1)
MONOCYTES NFR BLD: 0 % (ref 5–13)
MONOCYTES NFR BLD: 9.7 % (ref 5–13)
NEUTS BAND NFR BLD MANUAL: 1 % (ref 0–6)
NEUTS SEG # BLD: 10.99 K/UL (ref 1.8–8)
NEUTS SEG # BLD: 12.32 K/UL (ref 1.8–8)
NEUTS SEG NFR BLD: 58.5 % (ref 32–75)
NEUTS SEG NFR BLD: 77 % (ref 32–75)
NRBC # BLD: 0.06 K/UL (ref 0–0.01)
NRBC # BLD: 0.1 K/UL (ref 0–0.01)
NRBC # BLD: 0.11 K/UL (ref 0–0.01)
NRBC BLD-RTO: 0.3 PER 100 WBC
NRBC BLD-RTO: 0.5 PER 100 WBC
NRBC BLD-RTO: 0.7 PER 100 WBC
OXYHGB MFR BLD: 94.1 % (ref 95–99)
OXYHGB MFR BLD: 95.3 % (ref 95–99)
OXYHGB MFR BLD: 95.4 % (ref 95–99)
OXYHGB MFR BLD: 96 % (ref 95–99)
PCO2 BLDA: 34 MMHG (ref 35–45)
PCO2 BLDA: 36 MMHG (ref 35–45)
PCO2 BLDA: 37 MMHG (ref 35–45)
PCO2 BLDA: 38 MMHG (ref 35–45)
PEEP RESPIRATORY: 7
PERFORMED BY:: ABNORMAL
PH BLDA: 7.47 (ref 7.35–7.45)
PH BLDA: 7.49 (ref 7.35–7.45)
PH BLDA: 7.5 (ref 7.35–7.45)
PH BLDA: 7.51 (ref 7.35–7.45)
PHOSPHATE SERPL-MCNC: 2.4 MG/DL (ref 2.6–4.7)
PLATELET # BLD AUTO: 209 K/UL (ref 150–400)
PLATELET # BLD AUTO: 215 K/UL (ref 150–400)
PLATELET # BLD AUTO: 220 K/UL (ref 150–400)
PMV BLD AUTO: 11.8 FL (ref 8.9–12.9)
PMV BLD AUTO: 12.4 FL (ref 8.9–12.9)
PMV BLD AUTO: 12.4 FL (ref 8.9–12.9)
PO2 BLDA: 102 MMHG (ref 80–100)
PO2 BLDA: 79 MMHG (ref 80–100)
PO2 BLDA: 89 MMHG (ref 80–100)
PO2 BLDA: 94 MMHG (ref 80–100)
POTASSIUM SERPL-SCNC: 2.8 MMOL/L (ref 3.5–5.1)
POTASSIUM SERPL-SCNC: 3.8 MMOL/L (ref 3.5–5.1)
POTASSIUM SERPL-SCNC: 3.9 MMOL/L (ref 3.5–5.1)
POTASSIUM SERPL-SCNC: 4.3 MMOL/L (ref 3.5–5.1)
PROT SERPL-MCNC: 5.3 G/DL (ref 6.4–8.2)
RBC # BLD AUTO: 2.46 M/UL (ref 3.8–5.2)
RBC # BLD AUTO: 2.59 M/UL (ref 3.8–5.2)
RBC # BLD AUTO: 2.78 M/UL (ref 3.8–5.2)
RBC MORPH BLD: ABNORMAL
SAO2 % BLD: 95 % (ref 95–99)
SAO2 % BLD: 96 % (ref 95–99)
SAO2 % BLD: 96 % (ref 95–99)
SAO2 % BLD: 97 % (ref 95–99)
SAO2% DEVICE SAO2% SENSOR NAME: ABNORMAL
SODIUM SERPL-SCNC: 158 MMOL/L (ref 136–145)
SODIUM SERPL-SCNC: 159 MMOL/L (ref 136–145)
SODIUM SERPL-SCNC: 160 MMOL/L (ref 136–145)
SPECIMEN SITE: ABNORMAL
VENTILATION MODE VENT: ABNORMAL
VT SETTING VENT: 450
WBC # BLD AUTO: 15.8 K/UL (ref 3.6–11)
WBC # BLD AUTO: 18.8 K/UL (ref 3.6–11)
WBC # BLD AUTO: 19.5 K/UL (ref 3.6–11)

## 2025-01-22 PROCEDURE — 86850 RBC ANTIBODY SCREEN: CPT

## 2025-01-22 PROCEDURE — 2500000003 HC RX 250 WO HCPCS: Performed by: STUDENT IN AN ORGANIZED HEALTH CARE EDUCATION/TRAINING PROGRAM

## 2025-01-22 PROCEDURE — 84100 ASSAY OF PHOSPHORUS: CPT

## 2025-01-22 PROCEDURE — 85025 COMPLETE CBC W/AUTO DIFF WBC: CPT

## 2025-01-22 PROCEDURE — 2700000000 HC OXYGEN THERAPY PER DAY

## 2025-01-22 PROCEDURE — 94640 AIRWAY INHALATION TREATMENT: CPT

## 2025-01-22 PROCEDURE — 6360000002 HC RX W HCPCS: Performed by: STUDENT IN AN ORGANIZED HEALTH CARE EDUCATION/TRAINING PROGRAM

## 2025-01-22 PROCEDURE — 85014 HEMATOCRIT: CPT

## 2025-01-22 PROCEDURE — 6360000002 HC RX W HCPCS: Performed by: NURSE PRACTITIONER

## 2025-01-22 PROCEDURE — 6370000000 HC RX 637 (ALT 250 FOR IP): Performed by: INTERNAL MEDICINE

## 2025-01-22 PROCEDURE — 2000000000 HC ICU R&B

## 2025-01-22 PROCEDURE — 2580000003 HC RX 258: Performed by: STUDENT IN AN ORGANIZED HEALTH CARE EDUCATION/TRAINING PROGRAM

## 2025-01-22 PROCEDURE — 6370000000 HC RX 637 (ALT 250 FOR IP): Performed by: STUDENT IN AN ORGANIZED HEALTH CARE EDUCATION/TRAINING PROGRAM

## 2025-01-22 PROCEDURE — 2500000003 HC RX 250 WO HCPCS

## 2025-01-22 PROCEDURE — 80053 COMPREHEN METABOLIC PANEL: CPT

## 2025-01-22 PROCEDURE — 94761 N-INVAS EAR/PLS OXIMETRY MLT: CPT

## 2025-01-22 PROCEDURE — 6360000004 HC RX CONTRAST MEDICATION: Performed by: STUDENT IN AN ORGANIZED HEALTH CARE EDUCATION/TRAINING PROGRAM

## 2025-01-22 PROCEDURE — 94003 VENT MGMT INPAT SUBQ DAY: CPT

## 2025-01-22 PROCEDURE — 82803 BLOOD GASES ANY COMBINATION: CPT

## 2025-01-22 PROCEDURE — 86923 COMPATIBILITY TEST ELECTRIC: CPT

## 2025-01-22 PROCEDURE — 86901 BLOOD TYPING SEROLOGIC RH(D): CPT

## 2025-01-22 PROCEDURE — 80048 BASIC METABOLIC PNL TOTAL CA: CPT

## 2025-01-22 PROCEDURE — 76856 US EXAM PELVIC COMPLETE: CPT

## 2025-01-22 PROCEDURE — 85027 COMPLETE CBC AUTOMATED: CPT

## 2025-01-22 PROCEDURE — 74174 CTA ABD&PLVS W/CONTRAST: CPT

## 2025-01-22 PROCEDURE — 86900 BLOOD TYPING SEROLOGIC ABO: CPT

## 2025-01-22 PROCEDURE — 84132 ASSAY OF SERUM POTASSIUM: CPT

## 2025-01-22 PROCEDURE — 85018 HEMOGLOBIN: CPT

## 2025-01-22 PROCEDURE — 83735 ASSAY OF MAGNESIUM: CPT

## 2025-01-22 PROCEDURE — 71250 CT THORAX DX C-: CPT

## 2025-01-22 PROCEDURE — 82962 GLUCOSE BLOOD TEST: CPT

## 2025-01-22 PROCEDURE — 82330 ASSAY OF CALCIUM: CPT

## 2025-01-22 PROCEDURE — 82272 OCCULT BLD FECES 1-3 TESTS: CPT

## 2025-01-22 PROCEDURE — 6360000002 HC RX W HCPCS: Performed by: INTERNAL MEDICINE

## 2025-01-22 RX ORDER — MAGNESIUM SULFATE IN WATER 40 MG/ML
2000 INJECTION, SOLUTION INTRAVENOUS ONCE
Status: COMPLETED | OUTPATIENT
Start: 2025-01-22 | End: 2025-01-22

## 2025-01-22 RX ORDER — IOPAMIDOL 755 MG/ML
100 INJECTION, SOLUTION INTRAVASCULAR
Status: COMPLETED | OUTPATIENT
Start: 2025-01-22 | End: 2025-01-22

## 2025-01-22 RX ORDER — CALCIUM GLUCONATE 20 MG/ML
2000 INJECTION, SOLUTION INTRAVENOUS ONCE
Status: COMPLETED | OUTPATIENT
Start: 2025-01-22 | End: 2025-01-22

## 2025-01-22 RX ORDER — FUROSEMIDE 10 MG/ML
40 INJECTION INTRAMUSCULAR; INTRAVENOUS ONCE
Status: COMPLETED | OUTPATIENT
Start: 2025-01-22 | End: 2025-01-22

## 2025-01-22 RX ORDER — DEXTROSE MONOHYDRATE, SODIUM CHLORIDE, AND POTASSIUM CHLORIDE 50; 1.49; 4.5 G/1000ML; G/1000ML; G/1000ML
INJECTION, SOLUTION INTRAVENOUS CONTINUOUS
Status: DISCONTINUED | OUTPATIENT
Start: 2025-01-22 | End: 2025-01-24

## 2025-01-22 RX ORDER — SODIUM CHLORIDE 9 MG/ML
INJECTION, SOLUTION INTRAVENOUS PRN
Status: DISCONTINUED | OUTPATIENT
Start: 2025-01-22 | End: 2025-02-18 | Stop reason: ALTCHOICE

## 2025-01-22 RX ORDER — PANTOPRAZOLE SODIUM 40 MG/10ML
40 INJECTION, POWDER, LYOPHILIZED, FOR SOLUTION INTRAVENOUS 2 TIMES DAILY
Status: COMPLETED | OUTPATIENT
Start: 2025-01-22 | End: 2025-01-24

## 2025-01-22 RX ADMIN — PANTOPRAZOLE SODIUM 40 MG: 40 INJECTION, POWDER, FOR SOLUTION INTRAVENOUS at 02:56

## 2025-01-22 RX ADMIN — HYDROCORTISONE SODIUM SUCCINATE 50 MG: 100 INJECTION, POWDER, FOR SOLUTION INTRAMUSCULAR; INTRAVENOUS at 12:49

## 2025-01-22 RX ADMIN — PROPOFOL 30 MCG/KG/MIN: 10 INJECTION, EMULSION INTRAVENOUS at 08:16

## 2025-01-22 RX ADMIN — BUPROPION HYDROCHLORIDE 75 MG: 75 TABLET, FILM COATED ORAL at 08:19

## 2025-01-22 RX ADMIN — BUDESONIDE 500 MCG: 0.5 INHALANT ORAL at 19:26

## 2025-01-22 RX ADMIN — SODIUM CHLORIDE, PRESERVATIVE FREE 10 ML: 5 INJECTION INTRAVENOUS at 08:20

## 2025-01-22 RX ADMIN — PANTOPRAZOLE SODIUM 40 MG: 40 INJECTION, POWDER, FOR SOLUTION INTRAVENOUS at 08:19

## 2025-01-22 RX ADMIN — BUPROPION HYDROCHLORIDE 75 MG: 75 TABLET, FILM COATED ORAL at 20:34

## 2025-01-22 RX ADMIN — AMIODARONE HYDROCHLORIDE 0.5 MG/MIN: 1.8 INJECTION, SOLUTION INTRAVENOUS at 21:44

## 2025-01-22 RX ADMIN — Medication 5 MCG/MIN: at 15:52

## 2025-01-22 RX ADMIN — POTASSIUM BICARBONATE 20 MEQ: 782 TABLET, EFFERVESCENT ORAL at 10:38

## 2025-01-22 RX ADMIN — HYDROCORTISONE SODIUM SUCCINATE 50 MG: 100 INJECTION, POWDER, FOR SOLUTION INTRAMUSCULAR; INTRAVENOUS at 00:20

## 2025-01-22 RX ADMIN — AMIODARONE HYDROCHLORIDE 150 MG: 1.5 INJECTION, SOLUTION INTRAVENOUS at 15:42

## 2025-01-22 RX ADMIN — ASPIRIN 81 MG 81 MG: 81 TABLET ORAL at 08:55

## 2025-01-22 RX ADMIN — IPRATROPIUM BROMIDE AND ALBUTEROL SULFATE 1 DOSE: 2.5; .5 SOLUTION RESPIRATORY (INHALATION) at 19:26

## 2025-01-22 RX ADMIN — ATORVASTATIN CALCIUM 20 MG: 20 TABLET, FILM COATED ORAL at 20:34

## 2025-01-22 RX ADMIN — POTASSIUM CHLORIDE 20 MEQ: 29.8 INJECTION, SOLUTION INTRAVENOUS at 03:13

## 2025-01-22 RX ADMIN — POTASSIUM CHLORIDE 20 MEQ: 29.8 INJECTION, SOLUTION INTRAVENOUS at 04:21

## 2025-01-22 RX ADMIN — AMIODARONE HYDROCHLORIDE 1 MG/MIN: 1.8 INJECTION, SOLUTION INTRAVENOUS at 16:00

## 2025-01-22 RX ADMIN — SODIUM CHLORIDE: 9 INJECTION, SOLUTION INTRAVENOUS at 21:10

## 2025-01-22 RX ADMIN — IPRATROPIUM BROMIDE AND ALBUTEROL SULFATE 1 DOSE: 2.5; .5 SOLUTION RESPIRATORY (INHALATION) at 08:01

## 2025-01-22 RX ADMIN — INSULIN LISPRO 2 UNITS: 100 INJECTION, SOLUTION INTRAVENOUS; SUBCUTANEOUS at 08:51

## 2025-01-22 RX ADMIN — INSULIN LISPRO 2 UNITS: 100 INJECTION, SOLUTION INTRAVENOUS; SUBCUTANEOUS at 13:04

## 2025-01-22 RX ADMIN — PIPERACILLIN AND TAZOBACTAM 3375 MG: 3; .375 INJECTION, POWDER, LYOPHILIZED, FOR SOLUTION INTRAVENOUS at 12:46

## 2025-01-22 RX ADMIN — BUDESONIDE 500 MCG: 0.5 INHALANT ORAL at 08:01

## 2025-01-22 RX ADMIN — PROPOFOL 30 MCG/KG/MIN: 10 INJECTION, EMULSION INTRAVENOUS at 13:33

## 2025-01-22 RX ADMIN — FUROSEMIDE 40 MG: 10 INJECTION, SOLUTION INTRAMUSCULAR; INTRAVENOUS at 11:30

## 2025-01-22 RX ADMIN — POTASSIUM BICARBONATE 40 MEQ: 782 TABLET, EFFERVESCENT ORAL at 09:23

## 2025-01-22 RX ADMIN — SODIUM CHLORIDE: 9 INJECTION, SOLUTION INTRAVENOUS at 07:23

## 2025-01-22 RX ADMIN — PIPERACILLIN AND TAZOBACTAM 3375 MG: 3; .375 INJECTION, POWDER, LYOPHILIZED, FOR SOLUTION INTRAVENOUS at 21:11

## 2025-01-22 RX ADMIN — PANTOPRAZOLE SODIUM 40 MG: 40 INJECTION, POWDER, FOR SOLUTION INTRAVENOUS at 20:34

## 2025-01-22 RX ADMIN — PIPERACILLIN AND TAZOBACTAM 3375 MG: 3; .375 INJECTION, POWDER, LYOPHILIZED, FOR SOLUTION INTRAVENOUS at 05:53

## 2025-01-22 RX ADMIN — IOPAMIDOL 100 ML: 755 INJECTION, SOLUTION INTRAVENOUS at 17:50

## 2025-01-22 RX ADMIN — INSULIN LISPRO 4 UNITS: 100 INJECTION, SOLUTION INTRAVENOUS; SUBCUTANEOUS at 21:02

## 2025-01-22 RX ADMIN — SODIUM CHLORIDE, PRESERVATIVE FREE 10 ML: 5 INJECTION INTRAVENOUS at 20:34

## 2025-01-22 RX ADMIN — IPRATROPIUM BROMIDE AND ALBUTEROL SULFATE 1 DOSE: 2.5; .5 SOLUTION RESPIRATORY (INHALATION) at 14:26

## 2025-01-22 RX ADMIN — POTASSIUM CHLORIDE 20 MEQ: 29.8 INJECTION, SOLUTION INTRAVENOUS at 02:07

## 2025-01-22 RX ADMIN — INSULIN LISPRO 4 UNITS: 100 INJECTION, SOLUTION INTRAVENOUS; SUBCUTANEOUS at 17:26

## 2025-01-22 RX ADMIN — CALCIUM GLUCONATE 2000 MG: 20 INJECTION, SOLUTION INTRAVENOUS at 09:27

## 2025-01-22 RX ADMIN — PROPOFOL 25 MCG/KG/MIN: 10 INJECTION, EMULSION INTRAVENOUS at 01:57

## 2025-01-22 RX ADMIN — POTASSIUM CHLORIDE, DEXTROSE MONOHYDRATE AND SODIUM CHLORIDE: 150; 5; 450 INJECTION, SOLUTION INTRAVENOUS at 11:38

## 2025-01-22 RX ADMIN — MAGNESIUM SULFATE HEPTAHYDRATE 2000 MG: 40 INJECTION, SOLUTION INTRAVENOUS at 02:40

## 2025-01-22 ASSESSMENT — PAIN SCALES - GENERAL
PAINLEVEL_OUTOF10: 0

## 2025-01-22 ASSESSMENT — PULMONARY FUNCTION TESTS
PIF_VALUE: 19
PIF_VALUE: 19
PIF_VALUE: 21
PIF_VALUE: 20
PIF_VALUE: 21
PIF_VALUE: 20
PIF_VALUE: 19
PIF_VALUE: 20
PIF_VALUE: 19
PIF_VALUE: 20
PIF_VALUE: 20
PIF_VALUE: 21
PIF_VALUE: 19
PIF_VALUE: 21
PIF_VALUE: 20
PIF_VALUE: 21
PIF_VALUE: 20
PIF_VALUE: 21
PIF_VALUE: 20
PIF_VALUE: 20
PIF_VALUE: 22
PIF_VALUE: 19
PIF_VALUE: 20
PIF_VALUE: 20
PIF_VALUE: 22
PIF_VALUE: 20
PIF_VALUE: 21
PIF_VALUE: 20
PIF_VALUE: 19
PIF_VALUE: 20
PIF_VALUE: 19
PIF_VALUE: 20
PIF_VALUE: 21
PIF_VALUE: 19
PIF_VALUE: 20
PIF_VALUE: 20
PIF_VALUE: 21
PIF_VALUE: 19
PIF_VALUE: 20

## 2025-01-22 NOTE — PLAN OF CARE
Problem: Skin/Tissue Integrity  Goal: Absence of new skin breakdown  Description: 1.  Monitor for areas of redness and/or skin breakdown  2.  Assess vascular access sites hourly  3.  Every 4-6 hours minimum:  Change oxygen saturation probe site  4.  Every 4-6 hours:  If on nasal continuous positive airway pressure, respiratory therapy assess nares and determine need for appliance change or resting period.  1/21/2025 2302 by Sj Huffman RN  Outcome: Progressing     Problem: Safety - Adult  Goal: Free from fall injury  1/22/2025 1259 by Gracy London RN  Outcome: Progressing  1/21/2025 2302 by Sj Huffman RN  Outcome: Progressing     Problem: Skin/Tissue Integrity - Adult  Goal: Skin integrity remains intact  Recent Flowsheet Documentation  Taken 1/22/2025 1257 by Gracy London RN  Skin Integrity Remains Intact: Monitor for areas of redness and/or skin breakdown  Taken 1/22/2025 0800 by Gracy London RN  Skin Integrity Remains Intact: Monitor for areas of redness and/or skin breakdown  1/21/2025 2302 by Sj Huffman RN  Outcome: Progressing  Goal: Incisions, wounds, or drain sites healing without S/S of infection  Recent Flowsheet Documentation  Taken 1/22/2025 1257 by Gracy London RN  Incisions, Wounds, or Drain Sites Healing Without Sign and Symptoms of Infection: ADMISSION and DAILY: Assess and document risk factors for pressure ulcer development  1/21/2025 2302 by Sj Huffman RN  Outcome: Progressing  Flowsheets (Taken 1/21/2025 1000 by Gracy London RN)  Incisions, Wounds, or Drain Sites Healing Without Sign and Symptoms of Infection: ADMISSION and DAILY: Assess and document risk factors for pressure ulcer development  Goal: Oral mucous membranes remain intact  Recent Flowsheet Documentation  Taken 1/22/2025 1257 by Gracy London RN  Oral Mucous Membranes Remain Intact: Assess oral mucosa and hygiene practices  1/21/2025  2302 by Sj Huffman, RN  Outcome: Progressing  Flowsheets (Taken 1/21/2025 1000 by Gracy London, RN)  Oral Mucous Membranes Remain Intact: Assess oral mucosa and hygiene practices     Problem: Safety - Medical Restraint  Goal: Remains free of injury from restraints (Restraint for Interference with Medical Device)  Description: INTERVENTIONS:  1. Determine that other, less restrictive measures have been tried or would not be effective before applying the restraint  2. Evaluate the patient's condition at the time of restraint application  3. Inform patient/family regarding the reason for restraint  4. Q2H: Monitor safety, psychosocial status, comfort, nutrition and hydration  1/22/2025 1259 by Gracy London, RN  Outcome: Progressing  Flowsheets (Taken 1/22/2025 0800)  Remains free of injury from restraints (restraint for interference with medical device): Determine that other, less restrictive measures have been tried or would not be effective before applying the restraint  1/21/2025 2302 by Sj Huffman, RN  Outcome: Progressing

## 2025-01-22 NOTE — CARE COORDINATION
0715: Chart reviewed.    Per notes; patient vented and sedated with sim on IV ABX followed via cardiology and dietitian for tube feeding.    [facility] is following patient.    New therapy orders will be needed when medically appropriate.    Insurance will require AUTH for IRF.    CM will continue to follow patient and recs of medical team.

## 2025-01-22 NOTE — PROGRESS NOTES
CRITICAL CARE PROGRESS NOTE    Name: Nida Graves   : 1975   MRN: [id number]   Date: 2025      Diagnoses/problem list:   Acute hypoxic and hypercapnic respiratory failure  Atrial flutter  Possible NSTEMI  Acute HFrEF  Diabetes  Morbid obesity  Acute metabolic/septic encephalopathy    24-hour events:   Ms. Graves is a 49-year-old female with PMH chronic disability and bedbound status, diabetes, asthma, obesity, who presented for shortness of breath.  Shortness of breath worsening over the past few days.  Upon presentation ED patient found to be hypoxic necessitating O2 via NC.  Otherwise workup significant for arrhythmia.  Concerns for slow conducting A-fib.  Cardiology subsequently consulted and noted overall concern for A-fib with possible complete heart block versus slow ventricular response.  Patient supplemented to ICU for further evaluation and management. She was transferred out of ICU on . Early morning  ICU contacted for hypoxia, hypercarbia, and encephalopathy. She was hypoxic on 6L NC, had fever 100.5, and ABG revealed pCO2 in the 70's. Patient transferred back to ICU for further evaluation and management.      DVT study is negative  Echo showed EF of 29% with moderate TR     24-hour events:  2025: Doing well no major overnight event, still on high flow above 70%, labs are acceptable, troponin is mildly elevated, chest x-ray consistent with bilateral pulm pleural effusion  2025: Started on steroids and antibiotics  1/15/2025: Still requiring high flow with 50 L 70%, feels better slowly getting better chest x-ray still showing significant increase in interstitial edema and lower lobe airspace disease  2025 patient continued to do well with aggressive diuresis steroids and antibiotics, was transitioned to 35 L 45% high flow, now on 6 L oxygen and doing well, cardiology and palliative care following  : transferred to ICU, placed on NIV. Abx broadened to levaquin,

## 2025-01-22 NOTE — PROGRESS NOTES
CARDIOLOGY PROGRESS NOTE      Patient Name: Nida Graves  Age: 49 y.o.  Gender:female  :1975  MRN: [id number]    Patient seen and examined. This is a patient with a history of diabetes, asthma who presented with shortness of breath and hypoxia now being followed for atrial flutter and CHF. Remains in ICU, intubated, sedated. CVP 14-18 today. Continues with high vent support. Working to wean pressor support. No other complaints reported.     Telemetry reviewed, there were no events noted in the past 24 hours.    Pertinent review of systems items noted above, all other systems are negative. Current medications reviewed.    Physical Examination    Allergies   Allergen Reactions    Latex Swelling    Penicillins Hives    Codeine Anxiety     Vitals:    25 1400   BP: (!) 114/59   Pulse: 82   Resp: 12   Temp: 98.4 °F (36.9 °C)   SpO2: 100%     General:  Chronically ill appearing, obese, intubated  Cardiovascular:  irregular rhythm, regular rate, no murmur  Respiratory:  Lung sounds are diminished, bibasilar rales   Abdomen:  soft, nontender  Extremities:   no lower extremity edema  Skin:  Dry, warm    Labs reviewed:  Recent Results (from the past 12 hour(s))   Hemoglobin and Hematocrit    Collection Time: 25  6:00 AM   Result Value Ref Range    Hemoglobin 7.9 (L) 11.5 - 16.0 g/dL    Hematocrit 26.7 (L) 35.0 - 47.0 %   Potassium    Collection Time: 25  6:00 AM   Result Value Ref Range    Potassium 3.9 3.5 - 5.1 mmol/L   Magnesium    Collection Time: 25  6:00 AM   Result Value Ref Range    Magnesium 2.9 (H) 1.6 - 2.4 mg/dL   Blood Gas, Arterial    Collection Time: 25  6:15 AM   Result Value Ref Range    pH, Arterial 7.47 (H) 7.35 - 7.45      pCO2, Arterial 34 (L) 35 - 45 mmHg    pO2, Arterial 102 (H) 80 - 100 mmHg    O2 Sat, Arterial 97 95 - 99 %    HCO3, Arterial 24 22 - 26 mmol/L    Base Excess, Arterial 1.1 0 - 3 mmol/L    O2 Method VENT      FIO2 Arterial 70 %    Mode ASSIST

## 2025-01-22 NOTE — PLAN OF CARE
Problem: ABCDS Injury Assessment  Goal: Absence of physical injury  Outcome: Progressing  Flowsheets (Taken 1/21/2025 1000 by Gracy London, RN)  Absence of Physical Injury: Implement safety measures based on patient assessment     Problem: Skin/Tissue Integrity  Goal: Absence of new skin breakdown  Description: 1.  Monitor for areas of redness and/or skin breakdown  2.  Assess vascular access sites hourly  3.  Every 4-6 hours minimum:  Change oxygen saturation probe site  4.  Every 4-6 hours:  If on nasal continuous positive airway pressure, respiratory therapy assess nares and determine need for appliance change or resting period.  1/21/2025 2302 by Sj Huffman, RN  Outcome: Progressing  1/21/2025 1139 by Gracy London, RN  Outcome: Progressing     Problem: Chronic Conditions and Co-morbidities  Goal: Patient's chronic conditions and co-morbidity symptoms are monitored and maintained or improved  Outcome: Progressing     Problem: Discharge Planning  Goal: Discharge to home or other facility with appropriate resources  Outcome: Progressing     Problem: Safety - Adult  Goal: Free from fall injury  1/21/2025 2302 by Sj Huffman, RN  Outcome: Progressing  1/21/2025 1139 by Gracy London, RN  Outcome: Progressing     Problem: Neurosensory - Adult  Goal: Achieves stable or improved neurological status  Outcome: Progressing  Goal: Achieves maximal functionality and self care  Outcome: Progressing     Problem: Respiratory - Adult  Goal: Achieves optimal ventilation and oxygenation  Outcome: Progressing     Problem: Cardiovascular - Adult  Goal: Maintains optimal cardiac output and hemodynamic stability  Outcome: Progressing  Goal: Absence of cardiac dysrhythmias or at baseline  Outcome: Progressing     Problem: Gastrointestinal - Adult  Goal: Minimal or absence of nausea and vomiting  Outcome: Progressing  Goal: Maintains or returns to baseline bowel function  Outcome:  patient/family regarding the reason for restraint  4. Q2H: Monitor safety, psychosocial status, comfort, nutrition and hydration  1/21/2025 2302 by Sj Huffman, RN  Outcome: Progressing  1/21/2025 1139 by Gracy London, RN  Outcome: Progressing     Problem: Nutrition Deficit:  Goal: Optimize nutritional status  Outcome: Progressing

## 2025-01-23 LAB
ALBUMIN SERPL-MCNC: 2 G/DL (ref 3.5–5)
ALBUMIN/GLOB SERPL: 0.7 (ref 1.1–2.2)
ALP SERPL-CCNC: 69 U/L (ref 45–117)
ALT SERPL-CCNC: 12 U/L (ref 12–78)
ANION GAP SERPL CALC-SCNC: 3 MMOL/L (ref 2–12)
ANION GAP SERPL CALC-SCNC: 5 MMOL/L (ref 2–12)
AST SERPL W P-5'-P-CCNC: 16 U/L (ref 15–37)
BASOPHILS # BLD: 0 K/UL (ref 0–0.1)
BASOPHILS # BLD: 0.04 K/UL (ref 0–0.1)
BASOPHILS NFR BLD: 0 % (ref 0–1)
BASOPHILS NFR BLD: 0.2 % (ref 0–1)
BILIRUB SERPL-MCNC: 0.4 MG/DL (ref 0.2–1)
BUN SERPL-MCNC: 38 MG/DL (ref 6–20)
BUN SERPL-MCNC: 49 MG/DL (ref 6–20)
BUN/CREAT SERPL: 39 (ref 12–20)
BUN/CREAT SERPL: 39 (ref 12–20)
CA-I BLD-MCNC: 8.4 MG/DL (ref 8.5–10.1)
CA-I BLD-MCNC: 8.4 MG/DL (ref 8.5–10.1)
CHLORIDE SERPL-SCNC: 121 MMOL/L (ref 97–108)
CHLORIDE SERPL-SCNC: 122 MMOL/L (ref 97–108)
CO2 SERPL-SCNC: 26 MMOL/L (ref 21–32)
CO2 SERPL-SCNC: 28 MMOL/L (ref 21–32)
CREAT SERPL-MCNC: 0.98 MG/DL (ref 0.55–1.02)
CREAT SERPL-MCNC: 1.25 MG/DL (ref 0.55–1.02)
DATE LAST DOSE: NORMAL
DIFFERENTIAL METHOD BLD: ABNORMAL
DIFFERENTIAL METHOD BLD: ABNORMAL
DOSE AMOUNT: NORMAL UNITS
EKG ATRIAL RATE: 258 BPM
EKG DIAGNOSIS: NORMAL
EKG Q-T INTERVAL: 598 MS
EKG QRS DURATION: 130 MS
EKG QTC CALCULATION (BAZETT): 649 MS
EKG R AXIS: -47 DEGREES
EKG T AXIS: 142 DEGREES
EKG VENTRICULAR RATE: 71 BPM
EOSINOPHIL # BLD: 0.14 K/UL (ref 0–0.4)
EOSINOPHIL # BLD: 0.52 K/UL (ref 0–0.4)
EOSINOPHIL NFR BLD: 1 % (ref 0–7)
EOSINOPHIL NFR BLD: 2.7 % (ref 0–7)
ERYTHROCYTE [DISTWIDTH] IN BLOOD BY AUTOMATED COUNT: 16.3 % (ref 11.5–14.5)
ERYTHROCYTE [DISTWIDTH] IN BLOOD BY AUTOMATED COUNT: 16.7 % (ref 11.5–14.5)
GLOBULIN SER CALC-MCNC: 2.9 G/DL (ref 2–4)
GLUCOSE BLD STRIP.AUTO-MCNC: 283 MG/DL (ref 65–100)
GLUCOSE BLD STRIP.AUTO-MCNC: 293 MG/DL (ref 65–100)
GLUCOSE BLD STRIP.AUTO-MCNC: 295 MG/DL (ref 65–100)
GLUCOSE BLD STRIP.AUTO-MCNC: 300 MG/DL (ref 65–100)
GLUCOSE BLD STRIP.AUTO-MCNC: 301 MG/DL (ref 65–100)
GLUCOSE BLD STRIP.AUTO-MCNC: 303 MG/DL (ref 65–100)
GLUCOSE BLD STRIP.AUTO-MCNC: 308 MG/DL (ref 65–100)
GLUCOSE SERPL-MCNC: 250 MG/DL (ref 65–100)
GLUCOSE SERPL-MCNC: 274 MG/DL (ref 65–100)
HCT VFR BLD AUTO: 20.8 % (ref 35–47)
HCT VFR BLD AUTO: 21.5 % (ref 35–47)
HCT VFR BLD AUTO: 25.2 % (ref 35–47)
HGB BLD-MCNC: 6.4 G/DL (ref 11.5–16)
HGB BLD-MCNC: 6.5 G/DL (ref 11.5–16)
HGB BLD-MCNC: 8 G/DL (ref 11.5–16)
IMM GRANULOCYTES # BLD AUTO: 0 K/UL
IMM GRANULOCYTES # BLD AUTO: 0.63 K/UL (ref 0–0.04)
IMM GRANULOCYTES NFR BLD AUTO: 0 %
IMM GRANULOCYTES NFR BLD AUTO: 3.3 % (ref 0–0.5)
LYMPHOCYTES # BLD: 1.55 K/UL (ref 0.8–3.5)
LYMPHOCYTES # BLD: 4.22 K/UL (ref 0.8–3.5)
LYMPHOCYTES NFR BLD: 11 % (ref 12–49)
LYMPHOCYTES NFR BLD: 22.1 % (ref 12–49)
MAGNESIUM SERPL-MCNC: 2.6 MG/DL (ref 1.6–2.4)
MCH RBC QN AUTO: 29.4 PG (ref 26–34)
MCH RBC QN AUTO: 29.6 PG (ref 26–34)
MCHC RBC AUTO-ENTMCNC: 30.8 G/DL (ref 30–36.5)
MCHC RBC AUTO-ENTMCNC: 31.7 G/DL (ref 30–36.5)
MCV RBC AUTO: 92.6 FL (ref 80–99)
MCV RBC AUTO: 96.3 FL (ref 80–99)
METAMYELOCYTES NFR BLD MANUAL: 1 %
MONOCYTES # BLD: 0.71 K/UL (ref 0–1)
MONOCYTES # BLD: 1.51 K/UL (ref 0–1)
MONOCYTES NFR BLD: 5 % (ref 5–13)
MONOCYTES NFR BLD: 7.9 % (ref 5–13)
MYELOCYTES NFR BLD MANUAL: 2 %
NEUTS SEG # BLD: 11.28 K/UL (ref 1.8–8)
NEUTS SEG # BLD: 12.18 K/UL (ref 1.8–8)
NEUTS SEG NFR BLD: 63.8 % (ref 32–75)
NEUTS SEG NFR BLD: 80 % (ref 32–75)
NRBC # BLD: 0.1 K/UL (ref 0–0.01)
NRBC # BLD: 0.15 K/UL (ref 0–0.01)
NRBC BLD-RTO: 0.7 PER 100 WBC
NRBC BLD-RTO: 0.8 PER 100 WBC
PERFORMED BY:: ABNORMAL
PHOSPHATE SERPL-MCNC: 2.8 MG/DL (ref 2.6–4.7)
PLATELET # BLD AUTO: 168 K/UL (ref 150–400)
PLATELET # BLD AUTO: 214 K/UL (ref 150–400)
PMV BLD AUTO: 12.3 FL (ref 8.9–12.9)
PMV BLD AUTO: 12.5 FL (ref 8.9–12.9)
POTASSIUM SERPL-SCNC: 3.3 MMOL/L (ref 3.5–5.1)
POTASSIUM SERPL-SCNC: 3.4 MMOL/L (ref 3.5–5.1)
PROT SERPL-MCNC: 4.9 G/DL (ref 6.4–8.2)
RBC # BLD AUTO: 2.16 M/UL (ref 3.8–5.2)
RBC # BLD AUTO: 2.72 M/UL (ref 3.8–5.2)
RBC MORPH BLD: ABNORMAL
SODIUM SERPL-SCNC: 150 MMOL/L (ref 136–145)
SODIUM SERPL-SCNC: 155 MMOL/L (ref 136–145)
VANCOMYCIN SERPL-MCNC: 14 UG/ML
WBC # BLD AUTO: 14.1 K/UL (ref 3.6–11)
WBC # BLD AUTO: 19.1 K/UL (ref 3.6–11)

## 2025-01-23 PROCEDURE — 6360000002 HC RX W HCPCS: Performed by: INTERNAL MEDICINE

## 2025-01-23 PROCEDURE — 94003 VENT MGMT INPAT SUBQ DAY: CPT

## 2025-01-23 PROCEDURE — P9016 RBC LEUKOCYTES REDUCED: HCPCS

## 2025-01-23 PROCEDURE — 36430 TRANSFUSION BLD/BLD COMPNT: CPT

## 2025-01-23 PROCEDURE — 83735 ASSAY OF MAGNESIUM: CPT

## 2025-01-23 PROCEDURE — 80202 ASSAY OF VANCOMYCIN: CPT

## 2025-01-23 PROCEDURE — 2580000003 HC RX 258: Performed by: STUDENT IN AN ORGANIZED HEALTH CARE EDUCATION/TRAINING PROGRAM

## 2025-01-23 PROCEDURE — 94761 N-INVAS EAR/PLS OXIMETRY MLT: CPT

## 2025-01-23 PROCEDURE — 93005 ELECTROCARDIOGRAM TRACING: CPT

## 2025-01-23 PROCEDURE — 6370000000 HC RX 637 (ALT 250 FOR IP): Performed by: INTERNAL MEDICINE

## 2025-01-23 PROCEDURE — 2000000000 HC ICU R&B

## 2025-01-23 PROCEDURE — 6360000002 HC RX W HCPCS: Performed by: STUDENT IN AN ORGANIZED HEALTH CARE EDUCATION/TRAINING PROGRAM

## 2025-01-23 PROCEDURE — 6360000002 HC RX W HCPCS: Performed by: NURSE PRACTITIONER

## 2025-01-23 PROCEDURE — 2500000003 HC RX 250 WO HCPCS: Performed by: STUDENT IN AN ORGANIZED HEALTH CARE EDUCATION/TRAINING PROGRAM

## 2025-01-23 PROCEDURE — 80053 COMPREHEN METABOLIC PANEL: CPT

## 2025-01-23 PROCEDURE — 85018 HEMOGLOBIN: CPT

## 2025-01-23 PROCEDURE — 80048 BASIC METABOLIC PNL TOTAL CA: CPT

## 2025-01-23 PROCEDURE — 30233N1 TRANSFUSION OF NONAUTOLOGOUS RED BLOOD CELLS INTO PERIPHERAL VEIN, PERCUTANEOUS APPROACH: ICD-10-PCS | Performed by: NURSE PRACTITIONER

## 2025-01-23 PROCEDURE — 84100 ASSAY OF PHOSPHORUS: CPT

## 2025-01-23 PROCEDURE — 36415 COLL VENOUS BLD VENIPUNCTURE: CPT

## 2025-01-23 PROCEDURE — 85014 HEMATOCRIT: CPT

## 2025-01-23 PROCEDURE — 2700000000 HC OXYGEN THERAPY PER DAY

## 2025-01-23 PROCEDURE — 82962 GLUCOSE BLOOD TEST: CPT

## 2025-01-23 PROCEDURE — 94640 AIRWAY INHALATION TREATMENT: CPT

## 2025-01-23 PROCEDURE — 85025 COMPLETE CBC W/AUTO DIFF WBC: CPT

## 2025-01-23 PROCEDURE — 6370000000 HC RX 637 (ALT 250 FOR IP): Performed by: STUDENT IN AN ORGANIZED HEALTH CARE EDUCATION/TRAINING PROGRAM

## 2025-01-23 RX ORDER — SODIUM CHLORIDE 9 MG/ML
INJECTION, SOLUTION INTRAVENOUS PRN
Status: DISCONTINUED | OUTPATIENT
Start: 2025-01-23 | End: 2025-01-24

## 2025-01-23 RX ADMIN — SODIUM CHLORIDE, PRESERVATIVE FREE 10 ML: 5 INJECTION INTRAVENOUS at 21:13

## 2025-01-23 RX ADMIN — HYDROCORTISONE SODIUM SUCCINATE 50 MG: 100 INJECTION, POWDER, FOR SOLUTION INTRAMUSCULAR; INTRAVENOUS at 00:58

## 2025-01-23 RX ADMIN — PROPOFOL 35 MCG/KG/MIN: 10 INJECTION, EMULSION INTRAVENOUS at 22:41

## 2025-01-23 RX ADMIN — AMIODARONE HYDROCHLORIDE 0.5 MG/MIN: 1.8 INJECTION, SOLUTION INTRAVENOUS at 09:56

## 2025-01-23 RX ADMIN — PIPERACILLIN AND TAZOBACTAM 3375 MG: 3; .375 INJECTION, POWDER, LYOPHILIZED, FOR SOLUTION INTRAVENOUS at 05:25

## 2025-01-23 RX ADMIN — PIPERACILLIN AND TAZOBACTAM 3375 MG: 3; .375 INJECTION, POWDER, LYOPHILIZED, FOR SOLUTION INTRAVENOUS at 21:08

## 2025-01-23 RX ADMIN — ASPIRIN 81 MG 81 MG: 81 TABLET ORAL at 09:03

## 2025-01-23 RX ADMIN — BUPROPION HYDROCHLORIDE 75 MG: 75 TABLET, FILM COATED ORAL at 21:11

## 2025-01-23 RX ADMIN — INSULIN LISPRO 6 UNITS: 100 INJECTION, SOLUTION INTRAVENOUS; SUBCUTANEOUS at 21:32

## 2025-01-23 RX ADMIN — BUPROPION HYDROCHLORIDE 75 MG: 75 TABLET, FILM COATED ORAL at 09:03

## 2025-01-23 RX ADMIN — POTASSIUM BICARBONATE 60 MEQ: 782 TABLET, EFFERVESCENT ORAL at 16:51

## 2025-01-23 RX ADMIN — POTASSIUM CHLORIDE 20 MEQ: 29.8 INJECTION, SOLUTION INTRAVENOUS at 02:16

## 2025-01-23 RX ADMIN — PROPOFOL 35 MCG/KG/MIN: 10 INJECTION, EMULSION INTRAVENOUS at 08:33

## 2025-01-23 RX ADMIN — PANTOPRAZOLE SODIUM 40 MG: 40 INJECTION, POWDER, FOR SOLUTION INTRAVENOUS at 21:02

## 2025-01-23 RX ADMIN — POTASSIUM CHLORIDE 20 MEQ: 29.8 INJECTION, SOLUTION INTRAVENOUS at 03:19

## 2025-01-23 RX ADMIN — HYDROCORTISONE SODIUM SUCCINATE 50 MG: 100 INJECTION, POWDER, FOR SOLUTION INTRAMUSCULAR; INTRAVENOUS at 12:56

## 2025-01-23 RX ADMIN — INSULIN LISPRO 6 UNITS: 100 INJECTION, SOLUTION INTRAVENOUS; SUBCUTANEOUS at 16:51

## 2025-01-23 RX ADMIN — BUDESONIDE 500 MCG: 0.5 INHALANT ORAL at 07:52

## 2025-01-23 RX ADMIN — PROPOFOL 35 MCG/KG/MIN: 10 INJECTION, EMULSION INTRAVENOUS at 03:18

## 2025-01-23 RX ADMIN — INSULIN LISPRO 4 UNITS: 100 INJECTION, SOLUTION INTRAVENOUS; SUBCUTANEOUS at 06:31

## 2025-01-23 RX ADMIN — IPRATROPIUM BROMIDE AND ALBUTEROL SULFATE 1 DOSE: 2.5; .5 SOLUTION RESPIRATORY (INHALATION) at 20:25

## 2025-01-23 RX ADMIN — IPRATROPIUM BROMIDE AND ALBUTEROL SULFATE 1 DOSE: 2.5; .5 SOLUTION RESPIRATORY (INHALATION) at 07:52

## 2025-01-23 RX ADMIN — POTASSIUM CHLORIDE, DEXTROSE MONOHYDRATE AND SODIUM CHLORIDE: 150; 5; 450 INJECTION, SOLUTION INTRAVENOUS at 10:01

## 2025-01-23 RX ADMIN — PROPOFOL 35 MCG/KG/MIN: 10 INJECTION, EMULSION INTRAVENOUS at 13:45

## 2025-01-23 RX ADMIN — PIPERACILLIN AND TAZOBACTAM 3375 MG: 3; .375 INJECTION, POWDER, LYOPHILIZED, FOR SOLUTION INTRAVENOUS at 13:46

## 2025-01-23 RX ADMIN — INSULIN LISPRO 6 UNITS: 100 INJECTION, SOLUTION INTRAVENOUS; SUBCUTANEOUS at 12:56

## 2025-01-23 RX ADMIN — BUDESONIDE 500 MCG: 0.5 INHALANT ORAL at 20:25

## 2025-01-23 RX ADMIN — IPRATROPIUM BROMIDE AND ALBUTEROL SULFATE 1 DOSE: 2.5; .5 SOLUTION RESPIRATORY (INHALATION) at 13:24

## 2025-01-23 RX ADMIN — ATORVASTATIN CALCIUM 20 MG: 20 TABLET, FILM COATED ORAL at 21:10

## 2025-01-23 RX ADMIN — PANTOPRAZOLE SODIUM 40 MG: 40 INJECTION, POWDER, FOR SOLUTION INTRAVENOUS at 09:03

## 2025-01-23 RX ADMIN — VANCOMYCIN HYDROCHLORIDE 1000 MG: 1 INJECTION, POWDER, LYOPHILIZED, FOR SOLUTION INTRAVENOUS at 09:59

## 2025-01-23 RX ADMIN — PROPOFOL 35 MCG/KG/MIN: 10 INJECTION, EMULSION INTRAVENOUS at 00:13

## 2025-01-23 ASSESSMENT — PULMONARY FUNCTION TESTS
PIF_VALUE: 21
PIF_VALUE: 20
PIF_VALUE: 21
PIF_VALUE: 24
PIF_VALUE: 21
PIF_VALUE: 20
PIF_VALUE: 21
PIF_VALUE: 17
PIF_VALUE: 17
PIF_VALUE: 21
PIF_VALUE: 17
PIF_VALUE: 25
PIF_VALUE: 21
PIF_VALUE: 20
PIF_VALUE: 19
PIF_VALUE: 21
PIF_VALUE: 22
PIF_VALUE: 18
PIF_VALUE: 20
PIF_VALUE: 21
PIF_VALUE: 24
PIF_VALUE: 21

## 2025-01-23 ASSESSMENT — PAIN SCALES - GENERAL
PAINLEVEL_OUTOF10: 0
PAINLEVEL_OUTOF10: 0

## 2025-01-23 NOTE — CONSENT
Informed Consent for Blood Component Transfusion Note    I have discussed with the spouse the rationale for blood component transfusion; its benefits in treating or preventing fatigue, organ damage, or death; and its risk which includes mild transfusion reactions, rare risk of blood borne infection, or more serious but rare reactions. I have discussed the alternatives to transfusion, including the risk and consequences of not receiving transfusion. The spouse had an opportunity to ask questions and had agreed to proceed with transfusion of blood components.    Electronically signed by Mary Estevez MD on 1/23/25 at 7:01 AM EST

## 2025-01-23 NOTE — PLAN OF CARE
Problem: ABCDS Injury Assessment  Goal: Absence of physical injury  Outcome: Progressing  Flowsheets (Taken 1/22/2025 1257 by Gracy London, RN)  Absence of Physical Injury: Implement safety measures based on patient assessment     Problem: Skin/Tissue Integrity  Goal: Absence of new skin breakdown  Description: 1.  Monitor for areas of redness and/or skin breakdown  2.  Assess vascular access sites hourly  3.  Every 4-6 hours minimum:  Change oxygen saturation probe site  4.  Every 4-6 hours:  If on nasal continuous positive airway pressure, respiratory therapy assess nares and determine need for appliance change or resting period.  Outcome: Progressing     Problem: Chronic Conditions and Co-morbidities  Goal: Patient's chronic conditions and co-morbidity symptoms are monitored and maintained or improved  Outcome: Progressing     Problem: Discharge Planning  Goal: Discharge to home or other facility with appropriate resources  Outcome: Progressing     Problem: Safety - Adult  Goal: Free from fall injury  1/22/2025 2312 by Sj Huffman, RN  Outcome: Progressing  1/22/2025 1259 by Gracy London, RN  Outcome: Progressing     Problem: Neurosensory - Adult  Goal: Achieves stable or improved neurological status  Outcome: Progressing  Goal: Achieves maximal functionality and self care  Outcome: Progressing     Problem: Respiratory - Adult  Goal: Achieves optimal ventilation and oxygenation  Outcome: Progressing     Problem: Cardiovascular - Adult  Goal: Maintains optimal cardiac output and hemodynamic stability  Outcome: Progressing  Goal: Absence of cardiac dysrhythmias or at baseline  Outcome: Progressing     Problem: Gastrointestinal - Adult  Goal: Minimal or absence of nausea and vomiting  Outcome: Progressing  Goal: Maintains or returns to baseline bowel function  Outcome: Progressing  Goal: Maintains adequate nutritional intake  Outcome: Progressing     Problem: Genitourinary - Adult  Goal:

## 2025-01-23 NOTE — PROGRESS NOTES
CARDIOLOGY PROGRESS NOTE    Patient Name: Nida Graves  Age: 49 y.o.  Gender:female  :1975  MRN: [id number]    Patient seen and examined. This is a patient with a history of diabetes, asthma who presented with shortness of breath and hypoxia now being followed for atrial flutter and CHF. Remains in ICU, intubated, sedated. CVP 1 today. Working to wean pressor support. No other complaints reported.    Telemetry reviewed.     INPATIENT MEDICATIONS:  Home medications reviewed.    Current Facility-Administered Medications:     [START ON 2025] Vancomycin Level: Please draw level on  at 0800, 1 each, Other, Once, Mary Estevez MD    0.9 % sodium chloride infusion, , IntraVENous, PRN, Mary Estevez MD    pantoprazole (PROTONIX) injection 40 mg, 40 mg, IntraVENous, BID, Frieda Alvarado APRN - CNP, 40 mg at 25 0903    0.9 % sodium chloride infusion, , IntraVENous, PRN, Frieda Alvarado APRN - CNP    dextrose 5 % and 0.45 % NaCl with KCl 20 mEq infusion, , IntraVENous, Continuous, Mary Estevez MD, Last Rate: 50 mL/hr at 25 1001, New Bag at 25 1001    [COMPLETED] amiodarone (NEXTERONE) 150 mg in dextrose 5% 100 ml, 150 mg, IntraVENous, Once, Stopped at 25 1543 **FOLLOWED BY** [] amiodarone (NEXTERONE) 360 mg in dextrose 5% 200 ml, 1 mg/min, IntraVENous, Continuous, Paused at 25 1823 **FOLLOWED BY** amiodarone (NEXTERONE) 360 mg in dextrose 5% 200 ml, 0.5 mg/min, IntraVENous, Continuous, Mary Estevez MD, Last Rate: 16.7 mL/hr at 25 0956, 0.5 mg/min at 25 0956    [Held by provider] enoxaparin (LOVENOX) injection 100 mg, 1 mg/kg, SubCUTAneous, BID, Mary Estevez MD, 100 mg at 01/21/25 2041    bisacodyl (DULCOLAX) suppository 10 mg, 10 mg, Rectal, Daily PRN, Mary Estevez MD    atorvastatin (LIPITOR) tablet 20 mg, 20 mg, Per NG tube, Nightly, Mary Estevez MD, 20 mg at 25    buPROPion  mg, Oral, Daily, Tony Schmitt PA-C, 25 mg at 01/18/25 1036    [Held by provider] buPROPion (WELLBUTRIN XL) extended release tablet 150 mg, 150 mg, Oral, Daily, Suyapa Rea, , 150 mg at 01/20/25 0757    [Held by provider] DULoxetine (CYMBALTA) extended release capsule 30 mg, 30 mg, Oral, Daily, Suyapa Rea, DO, 30 mg at 01/18/25 1036    [Held by provider] traZODone (DESYREL) tablet 50 mg, 50 mg, Oral, Daily, Suyapa Rea, DO    budesonide (PULMICORT) nebulizer suspension 500 mcg, 0.5 mg, Nebulization, BID RT, Suyapa Rea, , 500 mcg at 01/23/25 0752     ALLERGIES:  Allergies reviewed with the patient,  Allergies   Allergen Reactions    Latex Swelling    Penicillins Hives    Codeine Anxiety    .      FAMILY HISTORY:  Family history reviewed.       SOCIAL HISTORY:  Notable for no tobacco use, no heavy alcohol or illicit drug use.      REVIEW OF SYSTEMS:  Complete review of systems performed, pertinents noted above, all other systems are negative.    PHYSICAL EXAMINATION:    General:  Chronically ill appearing, obese  Cardiovascular:  irregular rhythm, regular rate, no murmur  Respiratory:  Lung sounds are diminished, bibasilar rales   Abdomen:  soft, nontender  Extremities:   no lower extremity edema  Skin:  Dry, warm      Vitals:    01/23/25 1442   BP: (!) 128/48   Pulse: 71   Resp: 12   Temp: 97.8 °F (36.6 °C)   SpO2: 97%       Recent labs results and imaging reviewed.     Case discussed with Dr. Coburn and our impression and recommendations are as follows:  Acute hypoxic hypercapnic respiratory failure  Atrial flutter with variable AV block.   Acute combined systolic and diastolic heart failure with reduced ejection fraction, unknown etiology  EF 25-30%  Hypokalemia   Hypertension, acceptable blood pressure  Hyperlipidemia, on statin therapy  Diabetes mellitus: tx plan per primary team    Hypoxic hypercapnic respiratory failure appears to be more likely from respiratory fatigue/poor effort   CVP

## 2025-01-23 NOTE — CARE COORDINATION
CM reviewed chart.     Per provider patient is on vent.    Patient is currently followed by cards and dietician for tube feeding needs.     PT/OT recs needed once patient is appropriate.     [facility] is following for possible IRF, auth will be needed.    CM will continue to follow.

## 2025-01-23 NOTE — PROGRESS NOTES
Vancomycin Dosing Consult  Nida Graves is a 49 y.o. female with pneumonia. Pharmacy was consulted by Dr. Mary Estevez to dose and monitor vancomycin. Today is day 5.    Antibiotic regimen: Vancomycin + pip/tazo    Temp (24hrs), Av.4 °F (36.9 °C), Min:97.7 °F (36.5 °C), Max:100.2 °F (37.9 °C)    Recent Labs     25  1200 25  1645 25  0100   WBC 19.5* 15.8* 19.1*   CREATININE 1.30* 1.31* 1.25*   BUN 54* 55* 49*     Est CrCl: 66 mL/min  Concomitant nephrotoxic drugs: None    Cultures:    blood x 2: NGTD x 6 days, final   blood: NGTD x 2 days   urine: negative, final    MRSA Swab: Not detected 25    Target range: AUC/DUNG 400-600    Recent level history:  Date/Time Dose & Interval Measured Level (mcg/mL) Associated AUC/DUNG    1325 2500 mg x 1  1000 mg q12h (only rec'd 1 dose) 28.9  >600    0945 held 16.6 N/a    @ 0620 1500mg x 1 dose () 14.0 N/A        Assessment/Plan:   Patient being managed for CAP on vancomycin and Zosyn for empiric ABX therapy. MRSA nares screen on . Blood cultures NGTD. WBC remains elevated, but elevation trend seems to correlate with initiation of steroids. Remains on pressors and MV maintained on 40% FiO2. Could consider de-escalating vancomycin depending on overall clinical picture, but with borderline fever overnight, may be prudent to continue current therapy.   Renal function with slow improvement; continue pulse dosing for now until Scr approaches closer to baseline (Scr ~ 0.8)  Level resulted this AM at 14.0 (within goal trough range of 10-15)  Redose with vancomycin 1000mg x 1 dose today  Next level scheduled for  @ 0800  BMP ordered through   Antimicrobial stop date

## 2025-01-23 NOTE — PROGRESS NOTES
Comprehensive Nutrition Assessment    Type and Reason for Visit:  Reassess (Interim)    Nutrition Recommendations/Plan:   Continue current TF: Promote (peptide-based HP substitute) @ 30 ml/hr + 2 Prosource BID. 400 ml free water every 3 hours per MD.  TF @ goal provides: 720 kcals, 46 g protein, 598 ml free water  Prosource: +240 kcals, 60 g protein  Propofol: +552 kcals  TOTAL: 1512 kcals (25 kcals/kg IBW/81% EN), 106 g protein (87% EN), and 3798 ml free water   Monitor TF admin, weight, lab values, and bowel fxn/GI status     Malnutrition Assessment:  Malnutrition Status:  Mild malnutrition (01/21/25 1213)    Context:  Chronic Illness       Nutrition Assessment:    Presented w/ SOB, arrhythmia. Admitted to ICU 1/13-1/16. Returned from floor 1/19 d/t hypoxia, hypercarbia, and encephalopathy. Persistent hypoxia on biPAP, intubated. Pt currently on vent w/ sedation. Per discussion in MDRs, plans to initiate TF today, wean propofol as able, and continue diuresing. EF 30%. Pt w/ % intake documented 1/14, 0% later in admission. (1/23) TF initiated 1/21 - Promote @30 + 4PS. Concern for GI bleed w/ dark stools, TF held 1/22 for CTA, neg, restarted and now @ goal. Labs: , hgb 6.5, transfusion pending, Mg 2.6, Na 155, K 3.3, BUN 49, Cr 1.25. Meds: aspirin, lipitor, pulmicort, wellbutrin, lovenox, solu-cortef, SSI, duoneb, melatonin, protonix, zosyn, glycolax, senna, vacnomycin, NS @ 75 ml/hr, levo @ 2 mcg/min, propofol @ 20.9ml/hr (552 kcals/day)    Nutrition Related Findings:    NFPE w/ possible mild muscle wasting. No reported n/v/d, last BM 1/22, unsure of accuracy, senna/glycolax given. No noted hx of dysphagia. Non-pitting BLE edema. Wound Type: None       Current Nutrition Intake & Therapies:    Average Meal Intake: NPO  Average Supplements Intake: NPO  Diet NPO  ADULT TUBE FEEDING; Orogastric; Peptide Based High Protein; Continuous; 10; Yes; 10; Q 6 hours; 30; 400; Q 3 hours; Protein; 2 Doses;

## 2025-01-23 NOTE — PROGRESS NOTES
CRITICAL CARE PROGRESS NOTE    Name: Nida Graves   : 1975   MRN: [id number]   Date: 2025      Diagnoses/problem list:   Acute hypoxic and hypercapnic respiratory failure  Atrial flutter  Possible NSTEMI  Acute HFrEF  Diabetes  Morbid obesity  Acute metabolic/septic encephalopathy    24-hour events:   Ms. Graves is a 49-year-old female with PMH chronic disability and bedbound status, diabetes, asthma, obesity, who presented for shortness of breath.  Shortness of breath worsening over the past few days.  Upon presentation ED patient found to be hypoxic necessitating O2 via NC.  Otherwise workup significant for arrhythmia.  Concerns for slow conducting A-fib.  Cardiology subsequently consulted and noted overall concern for A-fib with possible complete heart block versus slow ventricular response.  Patient supplemented to ICU for further evaluation and management. She was transferred out of ICU on . Early morning  ICU contacted for hypoxia, hypercarbia, and encephalopathy. She was hypoxic on 6L NC, had fever 100.5, and ABG revealed pCO2 in the 70's. Patient transferred back to ICU for further evaluation and management.      DVT study is negative  Echo showed EF of 29% with moderate TR     24-hour events:  2025: Doing well no major overnight event, still on high flow above 70%, labs are acceptable, troponin is mildly elevated, chest x-ray consistent with bilateral pulm pleural effusion  2025: Started on steroids and antibiotics  1/15/2025: Still requiring high flow with 50 L 70%, feels better slowly getting better chest x-ray still showing significant increase in interstitial edema and lower lobe airspace disease  2025 patient continued to do well with aggressive diuresis steroids and antibiotics, was transitioned to 35 L 45% high flow, now on 6 L oxygen and doing well, cardiology and palliative care following  : transferred to ICU, placed on NIV. Abx broadened to levaquin,

## 2025-01-24 LAB
ABO + RH BLD: NORMAL
ANION GAP SERPL CALC-SCNC: 3 MMOL/L (ref 2–12)
ANION GAP SERPL CALC-SCNC: 5 MMOL/L (ref 2–12)
BASOPHILS # BLD: 0 K/UL (ref 0–0.1)
BASOPHILS # BLD: 0 K/UL (ref 0–0.1)
BASOPHILS NFR BLD: 0 % (ref 0–1)
BASOPHILS NFR BLD: 0 % (ref 0–1)
BLD PROD TYP BPU: NORMAL
BLD PROD TYP BPU: NORMAL
BLOOD BANK BLOOD PRODUCT EXPIRATION DATE: NORMAL
BLOOD BANK BLOOD PRODUCT EXPIRATION DATE: NORMAL
BLOOD BANK DISPENSE STATUS: NORMAL
BLOOD BANK DISPENSE STATUS: NORMAL
BLOOD BANK ISBT PRODUCT BLOOD TYPE: 5100
BLOOD BANK ISBT PRODUCT BLOOD TYPE: 5100
BLOOD BANK PRODUCT CODE: NORMAL
BLOOD BANK PRODUCT CODE: NORMAL
BLOOD BANK UNIT TYPE AND RH: NORMAL
BLOOD BANK UNIT TYPE AND RH: NORMAL
BLOOD GROUP ANTIBODIES SERPL: NEGATIVE
BPU ID: NORMAL
BPU ID: NORMAL
BUN SERPL-MCNC: 28 MG/DL (ref 6–20)
BUN SERPL-MCNC: 35 MG/DL (ref 6–20)
BUN/CREAT SERPL: 34 (ref 12–20)
BUN/CREAT SERPL: 38 (ref 12–20)
CA-I BLD-MCNC: 8.2 MG/DL (ref 8.5–10.1)
CA-I BLD-MCNC: 8.3 MG/DL (ref 8.5–10.1)
CHLORIDE SERPL-SCNC: 115 MMOL/L (ref 97–108)
CHLORIDE SERPL-SCNC: 116 MMOL/L (ref 97–108)
CO2 SERPL-SCNC: 24 MMOL/L (ref 21–32)
CO2 SERPL-SCNC: 27 MMOL/L (ref 21–32)
CREAT SERPL-MCNC: 0.83 MG/DL (ref 0.55–1.02)
CREAT SERPL-MCNC: 0.91 MG/DL (ref 0.55–1.02)
CROSSMATCH RESULT: NORMAL
CROSSMATCH RESULT: NORMAL
DATE LAST DOSE: NORMAL
DIFFERENTIAL METHOD BLD: ABNORMAL
DIFFERENTIAL METHOD BLD: ABNORMAL
DOSE AMOUNT: NORMAL UNITS
EKG ATRIAL RATE: 250 BPM
EKG DIAGNOSIS: NORMAL
EKG P AXIS: 270 DEGREES
EKG Q-T INTERVAL: 478 MS
EKG QRS DURATION: 166 MS
EKG QTC CALCULATION (BAZETT): 431 MS
EKG R AXIS: -47 DEGREES
EKG T AXIS: 139 DEGREES
EKG VENTRICULAR RATE: 49 BPM
EOSINOPHIL # BLD: 0 K/UL (ref 0–0.4)
EOSINOPHIL # BLD: 0.14 K/UL (ref 0–0.4)
EOSINOPHIL NFR BLD: 0 % (ref 0–7)
EOSINOPHIL NFR BLD: 1 % (ref 0–7)
ERYTHROCYTE [DISTWIDTH] IN BLOOD BY AUTOMATED COUNT: 16.3 % (ref 11.5–14.5)
ERYTHROCYTE [DISTWIDTH] IN BLOOD BY AUTOMATED COUNT: 16.4 % (ref 11.5–14.5)
GLUCOSE BLD STRIP.AUTO-MCNC: 169 MG/DL (ref 65–100)
GLUCOSE BLD STRIP.AUTO-MCNC: 180 MG/DL (ref 65–100)
GLUCOSE BLD STRIP.AUTO-MCNC: 185 MG/DL (ref 65–100)
GLUCOSE BLD STRIP.AUTO-MCNC: 259 MG/DL (ref 65–100)
GLUCOSE BLD STRIP.AUTO-MCNC: 270 MG/DL (ref 65–100)
GLUCOSE BLD STRIP.AUTO-MCNC: 305 MG/DL (ref 65–100)
GLUCOSE SERPL-MCNC: 164 MG/DL (ref 65–100)
GLUCOSE SERPL-MCNC: 269 MG/DL (ref 65–100)
HCT VFR BLD AUTO: 22.8 % (ref 35–47)
HCT VFR BLD AUTO: 24 % (ref 35–47)
HGB BLD-MCNC: 7.3 G/DL (ref 11.5–16)
HGB BLD-MCNC: 7.6 G/DL (ref 11.5–16)
IMM GRANULOCYTES # BLD AUTO: 0 K/UL
IMM GRANULOCYTES # BLD AUTO: 0 K/UL
IMM GRANULOCYTES NFR BLD AUTO: 0 %
IMM GRANULOCYTES NFR BLD AUTO: 0 %
LYMPHOCYTES # BLD: 1.51 K/UL (ref 0.8–3.5)
LYMPHOCYTES # BLD: 2.28 K/UL (ref 0.8–3.5)
LYMPHOCYTES NFR BLD: 11 % (ref 12–49)
LYMPHOCYTES NFR BLD: 13 % (ref 12–49)
MAGNESIUM SERPL-MCNC: 2.1 MG/DL (ref 1.6–2.4)
MCH RBC QN AUTO: 29.3 PG (ref 26–34)
MCH RBC QN AUTO: 29.4 PG (ref 26–34)
MCHC RBC AUTO-ENTMCNC: 31.7 G/DL (ref 30–36.5)
MCHC RBC AUTO-ENTMCNC: 32 G/DL (ref 30–36.5)
MCV RBC AUTO: 91.9 FL (ref 80–99)
MCV RBC AUTO: 92.7 FL (ref 80–99)
METAMYELOCYTES NFR BLD MANUAL: 1 %
METAMYELOCYTES NFR BLD MANUAL: 1 %
MONOCYTES # BLD: 0.53 K/UL (ref 0–1)
MONOCYTES # BLD: 0.96 K/UL (ref 0–1)
MONOCYTES NFR BLD: 3 % (ref 5–13)
MONOCYTES NFR BLD: 7 % (ref 5–13)
NEUTS BAND NFR BLD MANUAL: 1 % (ref 0–6)
NEUTS SEG # BLD: 10.96 K/UL (ref 1.8–8)
NEUTS SEG # BLD: 14.53 K/UL (ref 1.8–8)
NEUTS SEG NFR BLD: 80 % (ref 32–75)
NEUTS SEG NFR BLD: 82 % (ref 32–75)
NRBC # BLD: 0.06 K/UL (ref 0–0.01)
NRBC # BLD: 0.08 K/UL (ref 0–0.01)
NRBC BLD-RTO: 0.4 PER 100 WBC
NRBC BLD-RTO: 0.5 PER 100 WBC
PERFORMED BY:: ABNORMAL
PHOSPHATE SERPL-MCNC: 2.3 MG/DL (ref 2.6–4.7)
PLATELET # BLD AUTO: 156 K/UL (ref 150–400)
PLATELET # BLD AUTO: 179 K/UL (ref 150–400)
PMV BLD AUTO: 12.6 FL (ref 8.9–12.9)
PMV BLD AUTO: 12.9 FL (ref 8.9–12.9)
POTASSIUM SERPL-SCNC: 3.6 MMOL/L (ref 3.5–5.1)
POTASSIUM SERPL-SCNC: 4.1 MMOL/L (ref 3.5–5.1)
RBC # BLD AUTO: 2.48 M/UL (ref 3.8–5.2)
RBC # BLD AUTO: 2.59 M/UL (ref 3.8–5.2)
RBC MORPH BLD: ABNORMAL
RBC MORPH BLD: ABNORMAL
SODIUM SERPL-SCNC: 145 MMOL/L (ref 136–145)
SODIUM SERPL-SCNC: 145 MMOL/L (ref 136–145)
SPECIMEN EXP DATE BLD: NORMAL
TRANSFUSION STATUS PATIENT QL: NORMAL
TRANSFUSION STATUS PATIENT QL: NORMAL
TROPONIN I SERPL HS-MCNC: 81 NG/L (ref 0–51)
UNIT DIVISION: 0
UNIT DIVISION: 0
UNIT ISSUE DATE/TIME: NORMAL
UNIT ISSUE DATE/TIME: NORMAL
VANCOMYCIN SERPL-MCNC: 14.3 UG/ML
WBC # BLD AUTO: 13.7 K/UL (ref 3.6–11)
WBC # BLD AUTO: 17.5 K/UL (ref 3.6–11)

## 2025-01-24 PROCEDURE — 93005 ELECTROCARDIOGRAM TRACING: CPT | Performed by: STUDENT IN AN ORGANIZED HEALTH CARE EDUCATION/TRAINING PROGRAM

## 2025-01-24 PROCEDURE — 84100 ASSAY OF PHOSPHORUS: CPT

## 2025-01-24 PROCEDURE — 80048 BASIC METABOLIC PNL TOTAL CA: CPT

## 2025-01-24 PROCEDURE — 94640 AIRWAY INHALATION TREATMENT: CPT

## 2025-01-24 PROCEDURE — 2000000000 HC ICU R&B

## 2025-01-24 PROCEDURE — 6370000000 HC RX 637 (ALT 250 FOR IP): Performed by: INTERNAL MEDICINE

## 2025-01-24 PROCEDURE — 6360000002 HC RX W HCPCS: Performed by: NURSE PRACTITIONER

## 2025-01-24 PROCEDURE — 2500000003 HC RX 250 WO HCPCS: Performed by: STUDENT IN AN ORGANIZED HEALTH CARE EDUCATION/TRAINING PROGRAM

## 2025-01-24 PROCEDURE — 6360000002 HC RX W HCPCS: Performed by: STUDENT IN AN ORGANIZED HEALTH CARE EDUCATION/TRAINING PROGRAM

## 2025-01-24 PROCEDURE — 84484 ASSAY OF TROPONIN QUANT: CPT

## 2025-01-24 PROCEDURE — 83735 ASSAY OF MAGNESIUM: CPT

## 2025-01-24 PROCEDURE — 36415 COLL VENOUS BLD VENIPUNCTURE: CPT

## 2025-01-24 PROCEDURE — 6370000000 HC RX 637 (ALT 250 FOR IP): Performed by: STUDENT IN AN ORGANIZED HEALTH CARE EDUCATION/TRAINING PROGRAM

## 2025-01-24 PROCEDURE — 82962 GLUCOSE BLOOD TEST: CPT

## 2025-01-24 PROCEDURE — 94003 VENT MGMT INPAT SUBQ DAY: CPT

## 2025-01-24 PROCEDURE — 80202 ASSAY OF VANCOMYCIN: CPT

## 2025-01-24 PROCEDURE — 85025 COMPLETE CBC W/AUTO DIFF WBC: CPT

## 2025-01-24 PROCEDURE — 94761 N-INVAS EAR/PLS OXIMETRY MLT: CPT

## 2025-01-24 PROCEDURE — 2700000000 HC OXYGEN THERAPY PER DAY

## 2025-01-24 PROCEDURE — 93005 ELECTROCARDIOGRAM TRACING: CPT | Performed by: NURSE PRACTITIONER

## 2025-01-24 PROCEDURE — 2580000003 HC RX 258: Performed by: STUDENT IN AN ORGANIZED HEALTH CARE EDUCATION/TRAINING PROGRAM

## 2025-01-24 PROCEDURE — 6360000002 HC RX W HCPCS: Performed by: INTERNAL MEDICINE

## 2025-01-24 RX ORDER — TRAZODONE HYDROCHLORIDE 50 MG/1
25 TABLET ORAL DAILY
Status: DISCONTINUED | OUTPATIENT
Start: 2025-01-24 | End: 2025-02-04

## 2025-01-24 RX ORDER — INSULIN GLARGINE 100 [IU]/ML
10 INJECTION, SOLUTION SUBCUTANEOUS 2 TIMES DAILY
Status: DISCONTINUED | OUTPATIENT
Start: 2025-01-24 | End: 2025-01-26

## 2025-01-24 RX ORDER — INSULIN LISPRO 100 [IU]/ML
0-8 INJECTION, SOLUTION INTRAVENOUS; SUBCUTANEOUS EVERY 6 HOURS SCHEDULED
Status: DISCONTINUED | OUTPATIENT
Start: 2025-01-25 | End: 2025-01-27

## 2025-01-24 RX ORDER — ACETYLCYSTEINE 200 MG/ML
600 SOLUTION ORAL; RESPIRATORY (INHALATION)
Status: DISCONTINUED | OUTPATIENT
Start: 2025-01-24 | End: 2025-01-26

## 2025-01-24 RX ORDER — PANTOPRAZOLE SODIUM 40 MG/10ML
40 INJECTION, POWDER, LYOPHILIZED, FOR SOLUTION INTRAVENOUS 2 TIMES DAILY
Status: COMPLETED | OUTPATIENT
Start: 2025-01-25 | End: 2025-01-27

## 2025-01-24 RX ORDER — HYDROCORTISONE SODIUM SUCCINATE 100 MG/2ML
25 INJECTION INTRAMUSCULAR; INTRAVENOUS EVERY 12 HOURS
Status: COMPLETED | OUTPATIENT
Start: 2025-01-24 | End: 2025-01-25

## 2025-01-24 RX ADMIN — ACETYLCYSTEINE 600 MG: 200 SOLUTION ORAL; RESPIRATORY (INHALATION) at 19:31

## 2025-01-24 RX ADMIN — ACETYLCYSTEINE 600 MG: 200 SOLUTION ORAL; RESPIRATORY (INHALATION) at 08:44

## 2025-01-24 RX ADMIN — ASPIRIN 81 MG 81 MG: 81 TABLET ORAL at 08:13

## 2025-01-24 RX ADMIN — INSULIN GLARGINE 10 UNITS: 100 INJECTION, SOLUTION SUBCUTANEOUS at 13:54

## 2025-01-24 RX ADMIN — SODIUM CHLORIDE, PRESERVATIVE FREE 10 ML: 5 INJECTION INTRAVENOUS at 08:14

## 2025-01-24 RX ADMIN — ATORVASTATIN CALCIUM 20 MG: 20 TABLET, FILM COATED ORAL at 21:27

## 2025-01-24 RX ADMIN — INSULIN LISPRO 2 UNITS: 100 INJECTION, SOLUTION INTRAVENOUS; SUBCUTANEOUS at 23:54

## 2025-01-24 RX ADMIN — PIPERACILLIN AND TAZOBACTAM 3375 MG: 3; .375 INJECTION, POWDER, LYOPHILIZED, FOR SOLUTION INTRAVENOUS at 21:28

## 2025-01-24 RX ADMIN — PROPOFOL 35 MCG/KG/MIN: 10 INJECTION, EMULSION INTRAVENOUS at 07:06

## 2025-01-24 RX ADMIN — Medication 3 MG: at 21:27

## 2025-01-24 RX ADMIN — PANTOPRAZOLE SODIUM 40 MG: 40 INJECTION, POWDER, FOR SOLUTION INTRAVENOUS at 08:02

## 2025-01-24 RX ADMIN — BUPROPION HYDROCHLORIDE 75 MG: 75 TABLET, FILM COATED ORAL at 08:13

## 2025-01-24 RX ADMIN — INSULIN LISPRO 4 UNITS: 100 INJECTION, SOLUTION INTRAVENOUS; SUBCUTANEOUS at 13:54

## 2025-01-24 RX ADMIN — BUPROPION HYDROCHLORIDE 75 MG: 75 TABLET, FILM COATED ORAL at 21:27

## 2025-01-24 RX ADMIN — PROPOFOL 35 MCG/KG/MIN: 10 INJECTION, EMULSION INTRAVENOUS at 03:49

## 2025-01-24 RX ADMIN — SENNOSIDES 8.6 MG: 8.6 TABLET, COATED ORAL at 08:13

## 2025-01-24 RX ADMIN — SODIUM CHLORIDE, PRESERVATIVE FREE 10 ML: 5 INJECTION INTRAVENOUS at 21:27

## 2025-01-24 RX ADMIN — INSULIN LISPRO 4 UNITS: 100 INJECTION, SOLUTION INTRAVENOUS; SUBCUTANEOUS at 08:01

## 2025-01-24 RX ADMIN — SENNOSIDES 8.6 MG: 8.6 TABLET, COATED ORAL at 21:27

## 2025-01-24 RX ADMIN — PIPERACILLIN AND TAZOBACTAM 3375 MG: 3; .375 INJECTION, POWDER, LYOPHILIZED, FOR SOLUTION INTRAVENOUS at 13:56

## 2025-01-24 RX ADMIN — BUDESONIDE 500 MCG: 0.5 INHALANT ORAL at 08:44

## 2025-01-24 RX ADMIN — IPRATROPIUM BROMIDE AND ALBUTEROL SULFATE 1 DOSE: 2.5; .5 SOLUTION RESPIRATORY (INHALATION) at 08:44

## 2025-01-24 RX ADMIN — AMIODARONE HYDROCHLORIDE 0.5 MG/MIN: 1.8 INJECTION, SOLUTION INTRAVENOUS at 01:32

## 2025-01-24 RX ADMIN — PIPERACILLIN AND TAZOBACTAM 3375 MG: 3; .375 INJECTION, POWDER, LYOPHILIZED, FOR SOLUTION INTRAVENOUS at 06:16

## 2025-01-24 RX ADMIN — POLYETHYLENE GLYCOL 3350 17 G: 17 POWDER, FOR SOLUTION ORAL at 08:13

## 2025-01-24 RX ADMIN — IPRATROPIUM BROMIDE AND ALBUTEROL SULFATE 1 DOSE: 2.5; .5 SOLUTION RESPIRATORY (INHALATION) at 14:52

## 2025-01-24 RX ADMIN — IPRATROPIUM BROMIDE AND ALBUTEROL SULFATE 1 DOSE: 2.5; .5 SOLUTION RESPIRATORY (INHALATION) at 19:31

## 2025-01-24 RX ADMIN — POTASSIUM PHOSPHATE, MONOBASIC POTASSIUM PHOSPHATE, DIBASIC 15 MMOL: 224; 236 INJECTION, SOLUTION, CONCENTRATE INTRAVENOUS at 10:02

## 2025-01-24 RX ADMIN — HYDROCORTISONE SODIUM SUCCINATE 50 MG: 100 INJECTION, POWDER, FOR SOLUTION INTRAMUSCULAR; INTRAVENOUS at 00:00

## 2025-01-24 RX ADMIN — INSULIN GLARGINE 10 UNITS: 100 INJECTION, SOLUTION SUBCUTANEOUS at 21:26

## 2025-01-24 RX ADMIN — HYDROCORTISONE SODIUM SUCCINATE 25 MG: 100 INJECTION, POWDER, FOR SOLUTION INTRAMUSCULAR; INTRAVENOUS at 21:26

## 2025-01-24 RX ADMIN — INSULIN LISPRO 2 UNITS: 100 INJECTION, SOLUTION INTRAVENOUS; SUBCUTANEOUS at 19:00

## 2025-01-24 RX ADMIN — POTASSIUM CHLORIDE, DEXTROSE MONOHYDRATE AND SODIUM CHLORIDE: 150; 5; 450 INJECTION, SOLUTION INTRAVENOUS at 06:20

## 2025-01-24 RX ADMIN — BUDESONIDE 500 MCG: 0.5 INHALANT ORAL at 19:31

## 2025-01-24 ASSESSMENT — PULMONARY FUNCTION TESTS
PIF_VALUE: 16
PIF_VALUE: 19
PIF_VALUE: 26
PIF_VALUE: 20
PIF_VALUE: 20
PIF_VALUE: 22
PIF_VALUE: 19
PIF_VALUE: 15
PIF_VALUE: 19

## 2025-01-24 ASSESSMENT — PAIN SCALES - GENERAL: PAINLEVEL_OUTOF10: 0

## 2025-01-24 NOTE — PLAN OF CARE
Problem: Skin/Tissue Integrity  Goal: Absence of new skin breakdown  Description: 1.  Monitor for areas of redness and/or skin breakdown  2.  Assess vascular access sites hourly  3.  Every 4-6 hours minimum:  Change oxygen saturation probe site  4.  Every 4-6 hours:  If on nasal continuous positive airway pressure, respiratory therapy assess nares and determine need for appliance change or resting period.  Outcome: Progressing     Problem: Safety - Adult  Goal: Free from fall injury  Outcome: Progressing     Problem: Respiratory - Adult  Goal: Achieves optimal ventilation and oxygenation  Outcome: Progressing     Problem: Cardiovascular - Adult  Goal: Maintains optimal cardiac output and hemodynamic stability  Outcome: Progressing     Problem: Gastrointestinal - Adult  Goal: Minimal or absence of nausea and vomiting  Outcome: Progressing     Problem: Skin/Tissue Integrity - Adult  Goal: Skin integrity remains intact  Outcome: Progressing     Problem: Pain  Goal: Verbalizes/displays adequate comfort level or baseline comfort level  Outcome: Progressing     Problem: Safety - Medical Restraint  Goal: Remains free of injury from restraints (Restraint for Interference with Medical Device)  Description: INTERVENTIONS:  1. Determine that other, less restrictive measures have been tried or would not be effective before applying the restraint  2. Evaluate the patient's condition at the time of restraint application  3. Inform patient/family regarding the reason for restraint  4. Q2H: Monitor safety, psychosocial status, comfort, nutrition and hydration  Outcome: Progressing     Problem: Nutrition Deficit:  Goal: Optimize nutritional status  Recent Flowsheet Documentation  Taken 1/23/2025 1256 by Winnie Garrison RD  Nutrient intake appropriate for improving, restoring, or maintaining nutritional needs: Recommend, monitor, and adjust tube feedings and TPN/PPN based on assessed needs

## 2025-01-24 NOTE — PROGRESS NOTES
CARDIOLOGY PROGRESS NOTE    Patient Name: Nida Graves  Age: 49 y.o.  Gender:female  :1975  MRN: [id number]    Patient seen and examined. This is a patient with a history of diabetes, asthma who presented with shortness of breath and hypoxia now being followed for atrial flutter and CHF. Remains in ICU, intubated. Sedation held for weaning trials. Off pressor support.     Telemetry reviewed. Remains in atrial flutter, HR primarily 60s, occasional drops to 40s.     INPATIENT MEDICATIONS:  Home medications reviewed.    Current Facility-Administered Medications:     acetylcysteine (MUCOMYST) 20 % solution 600 mg, 600 mg, Inhalation, BID RT, Mary Estevez MD, 600 mg at 25 0844    [Held by provider] traZODone (DESYREL) tablet 25 mg, 25 mg, Oral, Daily, Mary Estevez MD    hydrocortisone sodium succinate PF (SOLU-CORTEF) injection 25 mg, 25 mg, IntraVENous, Q12H, Mary Estevez MD    insulin glargine (LANTUS) injection vial 10 Units, 10 Units, SubCUTAneous, BID, Mary Estevez MD, 10 Units at 25 1354    0.9 % sodium chloride infusion, , IntraVENous, PRN, Frieda Alvarado, GINNY - CNP    [Held by provider] enoxaparin (LOVENOX) injection 100 mg, 1 mg/kg, SubCUTAneous, BID, Mary Estevez MD, 100 mg at 25    bisacodyl (DULCOLAX) suppository 10 mg, 10 mg, Rectal, Daily PRN, Mary Estevez MD    atorvastatin (LIPITOR) tablet 20 mg, 20 mg, Per NG tube, Nightly, Mary Estevez MD, 20 mg at 25    buPROPion (WELLBUTRIN) tablet 75 mg, 75 mg, Per NG tube, BID, Mary Estevez MD, 75 mg at 25 0813    melatonin tablet 3 mg, 3 mg, Per NG tube, QHS, Mary Estevez MD    polyethylene glycol (GLYCOLAX) packet 17 g, 17 g, Per NG tube, Daily, Mary Estevez MD, 17 g at 25 0813    senna (SENOKOT) tablet 8.6 mg, 1 tablet, Per NG tube, BID, Mary Estevez MD, 8.6 mg at 25 0813    acetaminophen (TYLENOL) 160 MG/5ML    BP:    Pulse:    Resp:    Temp: 98.1 °F (36.7 °C)   SpO2:        Recent labs results and imaging reviewed.     Case discussed with Dr. Coburn and our impression and recommendations are as follows:  Atrial flutter with variable AV block:   overall rate controlled, SVR down to 40s at times. Now off amiodarone drip.   Consider AUGUSTIN/DCCV prior to discharge.   Continue therapuetic lovenox for now with plan to transition to eliquis prior to discharge.   Elevated troponin: peak of 639  likely myocardial injury secondary to acute hypoxia, aflutter   Continue aspirin.  Likely cath next week due to new CMP   Acute combined systolic and diastolic heart failure with reduced ejection fraction, unknown etiology  EF 25-30%  CVP stable, can hold diuretics  Initiate GDMT as BP allows  LHC once extubated, likely early next week.  Acute hypoxic hypercapnic respiratory failure:   likely from respiratory fatigue/poor effort/  CVP stable.  Hypertension, acceptable blood pressure  Hyperlipidemia, on statin therapy  Diabetes mellitus: tx plan per primary team      Thank you for involving us in the care of this patient.  Please do not hesitate to call me if additional questions arise.    Shanice Maloney, RISHABHP  1/24/2025

## 2025-01-24 NOTE — PROGRESS NOTES
Pt desat while trying to clean patient up at 1920, had to increase propofol faster than order dosage per Winnie NP.  Patient was temporarily increased fio2 to 100% until recovered.  Amio turned off due to bradycardia.

## 2025-01-24 NOTE — PROGRESS NOTES
CRITICAL CARE PROGRESS NOTE    Name: Nida Graves   : 1975   MRN: [id number]   Date: 2025      Diagnoses/problem list:   Acute hypoxic and hypercapnic respiratory failure  Atrial flutter  Possible NSTEMI  Acute HFrEF  Diabetes  Morbid obesity  Acute metabolic/septic encephalopathy    24-hour events:   Ms. Graves is a 49-year-old female with PMH chronic disability and bedbound status, diabetes, asthma, obesity, who presented for shortness of breath.  Shortness of breath worsening over the past few days.  Upon presentation ED patient found to be hypoxic necessitating O2 via NC.  Otherwise workup significant for arrhythmia.  Concerns for slow conducting A-fib.  Cardiology subsequently consulted and noted overall concern for A-fib with possible complete heart block versus slow ventricular response.  Patient supplemented to ICU for further evaluation and management. She was transferred out of ICU on . Early morning  ICU contacted for hypoxia, hypercarbia, and encephalopathy. She was hypoxic on 6L NC, had fever 100.5, and ABG revealed pCO2 in the 70's. Patient transferred back to ICU for further evaluation and management.      DVT study is negative  Echo showed EF of 29% with moderate TR     24-hour events:  2025: Doing well no major overnight event, still on high flow above 70%, labs are acceptable, troponin is mildly elevated, chest x-ray consistent with bilateral pulm pleural effusion  2025: Started on steroids and antibiotics  1/15/2025: Still requiring high flow with 50 L 70%, feels better slowly getting better chest x-ray still showing significant increase in interstitial edema and lower lobe airspace disease  2025 patient continued to do well with aggressive diuresis steroids and antibiotics, was transitioned to 35 L 45% high flow, now on 6 L oxygen and doing well, cardiology and palliative care following  : transferred to ICU, placed on NIV. Abx broadened to levaquin,  patient.  I have discussed the case and the plan and management of the patient's care with the consulting services, the bedside nurses and the respiratory therapist.      NOTE OF PERSONAL INVOLVEMENT IN CARE   This patient has a high probability of imminent, clinically significant deterioration, which requires the highest level of preparedness to intervene urgently. I participated in the decision-making and personally managed or directed the management of the following life and organ supporting interventions that required my frequent assessment to treat or prevent imminent deterioration.    I personally spent 45 minutes of critical care time.  This is time spent at this critically ill patient's bedside actively involved in patient care as well as the coordination of care.  This does not include any procedural time which has been billed separately.    Shilpa Estevez MD  Critical Care Medicine  [facility]

## 2025-01-25 LAB
ANION GAP SERPL CALC-SCNC: 3 MMOL/L (ref 2–12)
BACTERIA SPEC CULT: NORMAL
BASOPHILS # BLD: 0.02 K/UL (ref 0–0.1)
BASOPHILS NFR BLD: 0.1 % (ref 0–1)
BUN SERPL-MCNC: 26 MG/DL (ref 6–20)
BUN/CREAT SERPL: 30 (ref 12–20)
CA-I BLD-MCNC: 8.4 MG/DL (ref 8.5–10.1)
CHLORIDE SERPL-SCNC: 114 MMOL/L (ref 97–108)
CO2 SERPL-SCNC: 27 MMOL/L (ref 21–32)
CREAT SERPL-MCNC: 0.87 MG/DL (ref 0.55–1.02)
DIFFERENTIAL METHOD BLD: ABNORMAL
EKG ATRIAL RATE: 256 BPM
EKG ATRIAL RATE: 258 BPM
EKG DIAGNOSIS: NORMAL
EKG DIAGNOSIS: NORMAL
EKG P AXIS: -76 DEGREES
EKG P AXIS: 269 DEGREES
EKG Q-T INTERVAL: 438 MS
EKG Q-T INTERVAL: 654 MS
EKG QRS DURATION: 154 MS
EKG QRS DURATION: 160 MS
EKG QTC CALCULATION (BAZETT): 469 MS
EKG QTC CALCULATION (BAZETT): 552 MS
EKG R AXIS: -47 DEGREES
EKG R AXIS: -51 DEGREES
EKG T AXIS: -80 DEGREES
EKG T AXIS: 132 DEGREES
EKG VENTRICULAR RATE: 43 BPM
EKG VENTRICULAR RATE: 69 BPM
EOSINOPHIL # BLD: 0.48 K/UL (ref 0–0.4)
EOSINOPHIL NFR BLD: 3.1 % (ref 0–7)
ERYTHROCYTE [DISTWIDTH] IN BLOOD BY AUTOMATED COUNT: 15.9 % (ref 11.5–14.5)
GLUCOSE BLD STRIP.AUTO-MCNC: 182 MG/DL (ref 65–100)
GLUCOSE BLD STRIP.AUTO-MCNC: 194 MG/DL (ref 65–100)
GLUCOSE BLD STRIP.AUTO-MCNC: 203 MG/DL (ref 65–100)
GLUCOSE BLD STRIP.AUTO-MCNC: 206 MG/DL (ref 65–100)
GLUCOSE BLD STRIP.AUTO-MCNC: 229 MG/DL (ref 65–100)
GLUCOSE SERPL-MCNC: 177 MG/DL (ref 65–100)
HCT VFR BLD AUTO: 23.2 % (ref 35–47)
HGB BLD-MCNC: 7.2 G/DL (ref 11.5–16)
IMM GRANULOCYTES # BLD AUTO: 0.35 K/UL (ref 0–0.04)
IMM GRANULOCYTES NFR BLD AUTO: 2.3 % (ref 0–0.5)
LYMPHOCYTES # BLD: 1.82 K/UL (ref 0.8–3.5)
LYMPHOCYTES NFR BLD: 11.8 % (ref 12–49)
Lab: NORMAL
MAGNESIUM SERPL-MCNC: 2.3 MG/DL (ref 1.6–2.4)
MCH RBC QN AUTO: 29.1 PG (ref 26–34)
MCHC RBC AUTO-ENTMCNC: 31 G/DL (ref 30–36.5)
MCV RBC AUTO: 93.9 FL (ref 80–99)
MONOCYTES # BLD: 1.11 K/UL (ref 0–1)
MONOCYTES NFR BLD: 7.2 % (ref 5–13)
NEUTS SEG # BLD: 11.62 K/UL (ref 1.8–8)
NEUTS SEG NFR BLD: 75.5 % (ref 32–75)
NRBC # BLD: 0.04 K/UL (ref 0–0.01)
NRBC BLD-RTO: 0.3 PER 100 WBC
PERFORMED BY:: ABNORMAL
PHOSPHATE SERPL-MCNC: 3.2 MG/DL (ref 2.6–4.7)
PLATELET # BLD AUTO: 167 K/UL (ref 150–400)
PMV BLD AUTO: 12.8 FL (ref 8.9–12.9)
POTASSIUM SERPL-SCNC: 4 MMOL/L (ref 3.5–5.1)
RBC # BLD AUTO: 2.47 M/UL (ref 3.8–5.2)
RBC MORPH BLD: ABNORMAL
RBC MORPH BLD: ABNORMAL
SODIUM SERPL-SCNC: 144 MMOL/L (ref 136–145)
WBC # BLD AUTO: 15.4 K/UL (ref 3.6–11)

## 2025-01-25 PROCEDURE — 85025 COMPLETE CBC W/AUTO DIFF WBC: CPT

## 2025-01-25 PROCEDURE — 6360000002 HC RX W HCPCS: Performed by: STUDENT IN AN ORGANIZED HEALTH CARE EDUCATION/TRAINING PROGRAM

## 2025-01-25 PROCEDURE — 51798 US URINE CAPACITY MEASURE: CPT

## 2025-01-25 PROCEDURE — 6370000000 HC RX 637 (ALT 250 FOR IP): Performed by: STUDENT IN AN ORGANIZED HEALTH CARE EDUCATION/TRAINING PROGRAM

## 2025-01-25 PROCEDURE — 94761 N-INVAS EAR/PLS OXIMETRY MLT: CPT

## 2025-01-25 PROCEDURE — 2700000000 HC OXYGEN THERAPY PER DAY

## 2025-01-25 PROCEDURE — 6360000002 HC RX W HCPCS: Performed by: NURSE PRACTITIONER

## 2025-01-25 PROCEDURE — 94003 VENT MGMT INPAT SUBQ DAY: CPT

## 2025-01-25 PROCEDURE — 6370000000 HC RX 637 (ALT 250 FOR IP): Performed by: INTERNAL MEDICINE

## 2025-01-25 PROCEDURE — 36415 COLL VENOUS BLD VENIPUNCTURE: CPT

## 2025-01-25 PROCEDURE — 2000000000 HC ICU R&B

## 2025-01-25 PROCEDURE — 2500000003 HC RX 250 WO HCPCS: Performed by: STUDENT IN AN ORGANIZED HEALTH CARE EDUCATION/TRAINING PROGRAM

## 2025-01-25 PROCEDURE — 51701 INSERT BLADDER CATHETER: CPT

## 2025-01-25 PROCEDURE — 93005 ELECTROCARDIOGRAM TRACING: CPT | Performed by: STUDENT IN AN ORGANIZED HEALTH CARE EDUCATION/TRAINING PROGRAM

## 2025-01-25 PROCEDURE — 94640 AIRWAY INHALATION TREATMENT: CPT

## 2025-01-25 PROCEDURE — 80048 BASIC METABOLIC PNL TOTAL CA: CPT

## 2025-01-25 PROCEDURE — 84100 ASSAY OF PHOSPHORUS: CPT

## 2025-01-25 PROCEDURE — 2580000003 HC RX 258: Performed by: STUDENT IN AN ORGANIZED HEALTH CARE EDUCATION/TRAINING PROGRAM

## 2025-01-25 PROCEDURE — 82962 GLUCOSE BLOOD TEST: CPT

## 2025-01-25 PROCEDURE — 83735 ASSAY OF MAGNESIUM: CPT

## 2025-01-25 RX ORDER — MIDAZOLAM HYDROCHLORIDE 2 MG/2ML
2 INJECTION, SOLUTION INTRAMUSCULAR; INTRAVENOUS ONCE
Status: COMPLETED | OUTPATIENT
Start: 2025-01-25 | End: 2025-01-25

## 2025-01-25 RX ORDER — GLYCOPYRROLATE 0.2 MG/ML
0.1 INJECTION INTRAMUSCULAR; INTRAVENOUS ONCE
Status: COMPLETED | OUTPATIENT
Start: 2025-01-25 | End: 2025-01-25

## 2025-01-25 RX ORDER — ENOXAPARIN SODIUM 100 MG/ML
40 INJECTION SUBCUTANEOUS DAILY
Status: DISCONTINUED | OUTPATIENT
Start: 2025-01-25 | End: 2025-01-25

## 2025-01-25 RX ORDER — ENOXAPARIN SODIUM 100 MG/ML
30 INJECTION SUBCUTANEOUS DAILY
Status: DISCONTINUED | OUTPATIENT
Start: 2025-01-25 | End: 2025-01-25

## 2025-01-25 RX ADMIN — BUPROPION HYDROCHLORIDE 75 MG: 75 TABLET, FILM COATED ORAL at 20:23

## 2025-01-25 RX ADMIN — SODIUM CHLORIDE, PRESERVATIVE FREE 10 ML: 5 INJECTION INTRAVENOUS at 08:09

## 2025-01-25 RX ADMIN — Medication 3 MG: at 20:23

## 2025-01-25 RX ADMIN — SODIUM CHLORIDE, PRESERVATIVE FREE 10 ML: 5 INJECTION INTRAVENOUS at 20:22

## 2025-01-25 RX ADMIN — INSULIN LISPRO 2 UNITS: 100 INJECTION, SOLUTION INTRAVENOUS; SUBCUTANEOUS at 17:59

## 2025-01-25 RX ADMIN — BUPROPION HYDROCHLORIDE 75 MG: 75 TABLET, FILM COATED ORAL at 08:10

## 2025-01-25 RX ADMIN — INSULIN GLARGINE 10 UNITS: 100 INJECTION, SOLUTION SUBCUTANEOUS at 08:38

## 2025-01-25 RX ADMIN — PIPERACILLIN AND TAZOBACTAM 3375 MG: 3; .375 INJECTION, POWDER, LYOPHILIZED, FOR SOLUTION INTRAVENOUS at 13:29

## 2025-01-25 RX ADMIN — BUDESONIDE 500 MCG: 0.5 INHALANT ORAL at 19:41

## 2025-01-25 RX ADMIN — BUDESONIDE 500 MCG: 0.5 INHALANT ORAL at 08:33

## 2025-01-25 RX ADMIN — IPRATROPIUM BROMIDE AND ALBUTEROL SULFATE 1 DOSE: 2.5; .5 SOLUTION RESPIRATORY (INHALATION) at 08:33

## 2025-01-25 RX ADMIN — ATORVASTATIN CALCIUM 20 MG: 20 TABLET, FILM COATED ORAL at 20:23

## 2025-01-25 RX ADMIN — PIPERACILLIN AND TAZOBACTAM 3375 MG: 3; .375 INJECTION, POWDER, LYOPHILIZED, FOR SOLUTION INTRAVENOUS at 20:31

## 2025-01-25 RX ADMIN — GLYCOPYRROLATE 0.1 MG: 0.2 INJECTION INTRAMUSCULAR; INTRAVENOUS at 10:45

## 2025-01-25 RX ADMIN — INSULIN GLARGINE 10 UNITS: 100 INJECTION, SOLUTION SUBCUTANEOUS at 20:23

## 2025-01-25 RX ADMIN — HYDROCORTISONE SODIUM SUCCINATE 25 MG: 100 INJECTION, POWDER, FOR SOLUTION INTRAMUSCULAR; INTRAVENOUS at 08:09

## 2025-01-25 RX ADMIN — IPRATROPIUM BROMIDE AND ALBUTEROL SULFATE 1 DOSE: 2.5; .5 SOLUTION RESPIRATORY (INHALATION) at 19:41

## 2025-01-25 RX ADMIN — PANTOPRAZOLE SODIUM 40 MG: 40 INJECTION, POWDER, FOR SOLUTION INTRAVENOUS at 08:13

## 2025-01-25 RX ADMIN — ENOXAPARIN SODIUM 40 MG: 100 INJECTION SUBCUTANEOUS at 08:39

## 2025-01-25 RX ADMIN — PIPERACILLIN AND TAZOBACTAM 3375 MG: 3; .375 INJECTION, POWDER, LYOPHILIZED, FOR SOLUTION INTRAVENOUS at 05:00

## 2025-01-25 RX ADMIN — ASPIRIN 81 MG 81 MG: 81 TABLET ORAL at 08:10

## 2025-01-25 RX ADMIN — HYDROCORTISONE SODIUM SUCCINATE 25 MG: 100 INJECTION, POWDER, FOR SOLUTION INTRAMUSCULAR; INTRAVENOUS at 20:23

## 2025-01-25 RX ADMIN — IPRATROPIUM BROMIDE AND ALBUTEROL SULFATE 1 DOSE: 2.5; .5 SOLUTION RESPIRATORY (INHALATION) at 15:17

## 2025-01-25 RX ADMIN — INSULIN LISPRO 2 UNITS: 100 INJECTION, SOLUTION INTRAVENOUS; SUBCUTANEOUS at 04:56

## 2025-01-25 RX ADMIN — INSULIN LISPRO 2 UNITS: 100 INJECTION, SOLUTION INTRAVENOUS; SUBCUTANEOUS at 12:09

## 2025-01-25 RX ADMIN — PANTOPRAZOLE SODIUM 40 MG: 40 INJECTION, POWDER, FOR SOLUTION INTRAVENOUS at 20:22

## 2025-01-25 RX ADMIN — ACETYLCYSTEINE 600 MG: 200 SOLUTION ORAL; RESPIRATORY (INHALATION) at 19:41

## 2025-01-25 RX ADMIN — ACETYLCYSTEINE 600 MG: 200 SOLUTION ORAL; RESPIRATORY (INHALATION) at 08:33

## 2025-01-25 RX ADMIN — MIDAZOLAM 2 MG: 1 INJECTION INTRAMUSCULAR; INTRAVENOUS at 20:34

## 2025-01-25 ASSESSMENT — PULMONARY FUNCTION TESTS
PIF_VALUE: 15
PIF_VALUE: 14
PIF_VALUE: 23
PIF_VALUE: 19
PIF_VALUE: 20
PIF_VALUE: 21
PIF_VALUE: 20
PIF_VALUE: 26
PIF_VALUE: 24
PIF_VALUE: 27
PIF_VALUE: 21
PIF_VALUE: 19
PIF_VALUE: 21
PIF_VALUE: 19
PIF_VALUE: 21
PIF_VALUE: 21
PIF_VALUE: 22
PIF_VALUE: 19
PIF_VALUE: 15

## 2025-01-25 ASSESSMENT — PAIN SCALES - GENERAL
PAINLEVEL_OUTOF10: 0

## 2025-01-25 NOTE — PROGRESS NOTES
Patient's HR sustaining at 43 and BP is slowly trending down, currently 91/49.  NP Ricky at bedside.  Received orders for EKG and Troponin.        NP Ricky notified of EKG result (A-Flutter) and Troponin of 81.  Notified NP that HR remains at 43 and BP is currently 119/50.  No new orders received.   Continue to monitor for now.        Inquired if NP Ricky wants to keep water flushes via OGT at 400 mL every 3 hours as Na is now 145 x 2 BMP's.  Per NP, decrease water flushes to 200 mL every 4 hours.          Noted that patient has no GI prophylaxis ordered.  Last dose of Protonix was at 0900 and the order .  Notified NP Ricky.  Received order to resume Protonix 40mg IV BID.    0000  Patient has not yet voided s/p sim removal at 1830.  Bladder scan showed only 57 mL of urine.  Will continue to monitor voiding status.      0345  Bladder scan with 456 mL of urine.      0400  Straight cath performed.  425 mL drained.

## 2025-01-25 NOTE — PLAN OF CARE
Problem: ABCDS Injury Assessment  Goal: Absence of physical injury  Outcome: Progressing  Flowsheets (Taken 1/24/2025 2000)  Absence of Physical Injury: Implement safety measures based on patient assessment     Problem: Skin/Tissue Integrity  Goal: Absence of new skin breakdown  Description: 1.  Monitor for areas of redness and/or skin breakdown  2.  Assess vascular access sites hourly  3.  Every 4-6 hours minimum:  Change oxygen saturation probe site  4.  Every 4-6 hours:  If on nasal continuous positive airway pressure, respiratory therapy assess nares and determine need for appliance change or resting period.  Outcome: Progressing     Problem: Chronic Conditions and Co-morbidities  Goal: Patient's chronic conditions and co-morbidity symptoms are monitored and maintained or improved  1/24/2025 2334 by Rosa Isela Chadwick, RN  Outcome: Progressing  Flowsheets (Taken 1/24/2025 2000)  Care Plan - Patient's Chronic Conditions and Co-Morbidity Symptoms are Monitored and Maintained or Improved:   Monitor and assess patient's chronic conditions and comorbid symptoms for stability, deterioration, or improvement   Collaborate with multidisciplinary team to address chronic and comorbid conditions and prevent exacerbation or deterioration   Update acute care plan with appropriate goals if chronic or comorbid symptoms are exacerbated and prevent overall improvement and discharge  1/24/2025 1815 by Svetlana Flores, RN  Outcome: Progressing  Flowsheets (Taken 1/24/2025 0800)  Care Plan - Patient's Chronic Conditions and Co-Morbidity Symptoms are Monitored and Maintained or Improved:   Monitor and assess patient's chronic conditions and comorbid symptoms for stability, deterioration, or improvement   Collaborate with multidisciplinary team to address chronic and comorbid conditions and prevent exacerbation or deterioration   Update acute care plan with appropriate goals if chronic or comorbid symptoms are exacerbated and

## 2025-01-25 NOTE — PROGRESS NOTES
CRITICAL CARE PROGRESS NOTE    Name: Nida Graves   : 1975   MRN: [id number]   Date: 2025      Diagnoses/problem list:   Acute hypoxic and hypercapnic respiratory failure  Atrial flutter  Possible NSTEMI  Acute HFrEF  Diabetes  Morbid obesity  Acute metabolic/septic encephalopathy    24-hour events:   Ms. Graves is a 49-year-old female with PMH chronic disability and bedbound status, diabetes, asthma, obesity, who presented for shortness of breath.  Shortness of breath worsening over the past few days.  Upon presentation ED patient found to be hypoxic necessitating O2 via NC.  Otherwise workup significant for arrhythmia.  Concerns for slow conducting A-fib.  Cardiology subsequently consulted and noted overall concern for A-fib with possible complete heart block versus slow ventricular response.  Patient supplemented to ICU for further evaluation and management. She was transferred out of ICU on . Early morning  ICU contacted for hypoxia, hypercarbia, and encephalopathy. She was hypoxic on 6L NC, had fever 100.5, and ABG revealed pCO2 in the 70's. Patient transferred back to ICU for further evaluation and management.      DVT study is negative  Echo showed EF of 29% with moderate TR     24-hour events:  2025: Doing well no major overnight event, still on high flow above 70%, labs are acceptable, troponin is mildly elevated, chest x-ray consistent with bilateral pulm pleural effusion  2025: Started on steroids and antibiotics  1/15/2025: Still requiring high flow with 50 L 70%, feels better slowly getting better chest x-ray still showing significant increase in interstitial edema and lower lobe airspace disease  2025 patient continued to do well with aggressive diuresis steroids and antibiotics, was transitioned to 35 L 45% high flow, now on 6 L oxygen and doing well, cardiology and palliative care following  : transferred to ICU, placed on NIV. Abx broadened to levaquin,  blood cx drawn, 500 mL bolus given due to concern volume depletion/contraction alkalosis in the setting of diuresis and decreased po intake.     1/19 Day updates: Patient continued to have increased agitation, hypoxia and hypercapnia despite BIPAP increased support. Decision made to intubate, place a central line for CVP monitoring and arterial line for hemodynamic monitoring and frequent ABGs  1/20: updated the  bedside with plans to delay RHC/LHC in setting of hypoxia. CVP with 10-16 after gentle hydration yesterday. Able to wean NE support. Still requires high vent support.   1/21: will start gentle diuresis today. Continue slow vent support wean  1/22: Gentle diuresis with hypernatremia and will adjust IVF and free water flushes. Concern for GIB and will hold tube feeds with plan for CTA A/P  1/23: concern for GIB with CT scan no evidence of bleeding. Received 1u pRBC. FIO2 improved  1/24: Able to SBT today with decreased sedation and improved mentation. Amiodarone discontinued.   1/25: Plan for SBT again today. Atrial flutter with bradycardia to the 40s. Will start DVT dosing lovenox this am with continued watching for GIB    ROS negative except as otherwise documented.     Assessment and plan:     NEUROLOGICAL:    Analgesia / sedation  Anxiety  - Intubated   - RASS goal 0 negative 1  - propofol infusion for sedation on hold with good neurostatus  - resumed home cymbalta and wellbutrin    PULMONOLOGY:   Acute hypoxemic respiratory failure  -Goal SpO2>=92, pH>=7.30  -Full mechanical support  -Daily SBT  -VAP Px  -Trend ABG / CXR  -Albuterol / Atrovent Q4hr  -FIO2 40%, PEEP 5     CARDIOVASCULAR:   HFrEF  Atrial flutter  Pulmonary edema  -Goal MAP>=65  -Trend troponin / lactate PRN  - LVEF 20-30%, right ventricle overload, moderate TR   -EKG PRN  -NE for SBP support weaned off  -atrial fibrillation with RVR 1/22. Amiodarone infusion stopped due to bradycardia and currently in aflutter  -May need

## 2025-01-25 NOTE — PROGRESS NOTES
CARDIOLOGY PROGRESS NOTE    Patient Name: Nida Graves  Age: 49 y.o.  Gender:female  :1975  MRN: [id number]    Patient seen and examined. This is a patient with a history of diabetes, asthma who presented with shortness of breath and hypoxia now being followed for atrial flutter and CHF. Remains in ICU, intubated. Sedation held for weaning trials. Off pressor support.     Remains intubated, 40% FIO2, tele - afib 50s. No pressors      INPATIENT MEDICATIONS:  Home medications reviewed.    Current Facility-Administered Medications:     enoxaparin (LOVENOX) injection 40 mg, 40 mg, SubCUTAneous, Daily, Mary Estevez MD, 40 mg at 25 0839    acetylcysteine (MUCOMYST) 20 % solution 600 mg, 600 mg, Inhalation, BID RT, Mary Estevez MD, 600 mg at 25 0833    [Held by provider] traZODone (DESYREL) tablet 25 mg, 25 mg, Oral, Daily, Mary Estevez MD    hydrocortisone sodium succinate PF (SOLU-CORTEF) injection 25 mg, 25 mg, IntraVENous, Q12H, Mary Estevez MD, 25 mg at 25 0809    insulin glargine (LANTUS) injection vial 10 Units, 10 Units, SubCUTAneous, BID, Mary Estevez MD, 10 Units at 25 0838    insulin lispro (HUMALOG,ADMELOG) injection vial 0-8 Units, 0-8 Units, SubCUTAneous, 4 times per day, Mary Estevez MD, 2 Units at 25 1209    pantoprazole (PROTONIX) injection 40 mg, 40 mg, IntraVENous, BID, Winnie García APRN - CNP, 40 mg at 25 0813    0.9 % sodium chloride infusion, , IntraVENous, PRN, Frieda Alvarado APRN - CNP    [Held by provider] enoxaparin (LOVENOX) injection 100 mg, 1 mg/kg, SubCUTAneous, BID, Mary Estevez MD, 100 mg at 25 204    bisacodyl (DULCOLAX) suppository 10 mg, 10 mg, Rectal, Daily PRN, Mary Estevez MD    atorvastatin (LIPITOR) tablet 20 mg, 20 mg, Per NG tube, Nightly, Mary Estevez MD, 20 mg at 25    buPROPion (WELLBUTRIN) tablet 75 mg, 75 mg, Per NG tube, BID, Klein,  EXAMINATION:    General:  Chronically ill appearing, obese  Cardiovascular:  irregular rhythm, regular rate, no murmur  Respiratory:  Lung sounds are diminished, bibasilar rales   Abdomen:  soft, nontender  Extremities:   no lower extremity edema  Skin:  Dry, warm      Vitals:    01/25/25 1400   BP: (!) 101/59   Pulse: (!) 43   Resp: 13   Temp:    SpO2:        Recent labs results and imaging reviewed.     Impression and recommendations are as follows:  Atrial flutter/fib with slowish vent rate, no significant bradycardia  Consider AUGUSTIN/DCCV prior to discharge.   Continue therapuetic lovenox for now with plan to transition to eliquis prior to discharge.   Elevated troponin: peak of 639  likely myocardial injury secondary to acute hypoxia, aflutter   Continue aspirin.  Cath once off vent, medically stable  Acute systolic heart failure with reduced ejection fraction, unknown etiolog  EF 25-30%  CVP stable, can hold diuretics  Initiate GDMT as BP allows  Acute hypoxic hypercapnic respiratory failure:   likely from respiratory fatigue/poor effort/  CVP stable.  Hypertension, acceptable blood pressure  Hyperlipidemia, on statin therapy  Diabetes mellitus: tx plan per primary team      Thank you for involving us in the care of this patient.  Please do not hesitate to call me if additional questions arise.    Driss Christian MD  1/25/2025

## 2025-01-26 LAB
ANION GAP SERPL CALC-SCNC: 6 MMOL/L (ref 2–12)
APPEARANCE UR: ABNORMAL
BACTERIA URNS QL MICRO: NEGATIVE /HPF
BASOPHILS # BLD: 0 K/UL (ref 0–0.1)
BASOPHILS NFR BLD: 0 % (ref 0–1)
BILIRUB UR QL: NEGATIVE
BUN SERPL-MCNC: 31 MG/DL (ref 6–20)
BUN/CREAT SERPL: 39 (ref 12–20)
CA-I BLD-MCNC: 8.6 MG/DL (ref 8.5–10.1)
CAOX CRY URNS QL MICRO: ABNORMAL
CHLORIDE SERPL-SCNC: 112 MMOL/L (ref 97–108)
CO2 SERPL-SCNC: 24 MMOL/L (ref 21–32)
COLOR UR: ABNORMAL
CREAT SERPL-MCNC: 0.8 MG/DL (ref 0.55–1.02)
DIFFERENTIAL METHOD BLD: ABNORMAL
EOSINOPHIL # BLD: 0 K/UL (ref 0–0.4)
EOSINOPHIL NFR BLD: 0 % (ref 0–7)
EPITH CASTS URNS QL MICRO: ABNORMAL /LPF
ERYTHROCYTE [DISTWIDTH] IN BLOOD BY AUTOMATED COUNT: 15.9 % (ref 11.5–14.5)
GLUCOSE BLD STRIP.AUTO-MCNC: 138 MG/DL (ref 65–100)
GLUCOSE BLD STRIP.AUTO-MCNC: 151 MG/DL (ref 65–100)
GLUCOSE BLD STRIP.AUTO-MCNC: 152 MG/DL (ref 65–100)
GLUCOSE BLD STRIP.AUTO-MCNC: 177 MG/DL (ref 65–100)
GLUCOSE BLD STRIP.AUTO-MCNC: 220 MG/DL (ref 65–100)
GLUCOSE SERPL-MCNC: 223 MG/DL (ref 65–100)
GLUCOSE UR STRIP.AUTO-MCNC: >500 MG/DL
HCT VFR BLD AUTO: 22 % (ref 35–47)
HCT VFR BLD AUTO: 25.3 % (ref 35–47)
HGB BLD-MCNC: 6.7 G/DL (ref 11.5–16)
HGB BLD-MCNC: 7.8 G/DL (ref 11.5–16)
HGB UR QL STRIP: ABNORMAL
IMM GRANULOCYTES # BLD AUTO: 0 K/UL
IMM GRANULOCYTES NFR BLD AUTO: 0 %
KETONES UR QL STRIP.AUTO: NEGATIVE MG/DL
LEUKOCYTE ESTERASE UR QL STRIP.AUTO: NEGATIVE
LYMPHOCYTES # BLD: 1.29 K/UL (ref 0.8–3.5)
LYMPHOCYTES NFR BLD: 9 % (ref 12–49)
MAGNESIUM SERPL-MCNC: 2.3 MG/DL (ref 1.6–2.4)
MCH RBC QN AUTO: 29 PG (ref 26–34)
MCHC RBC AUTO-ENTMCNC: 30.5 G/DL (ref 30–36.5)
MCV RBC AUTO: 95.2 FL (ref 80–99)
MONOCYTES # BLD: 1.57 K/UL (ref 0–1)
MONOCYTES NFR BLD: 11 % (ref 5–13)
NEUTS SEG # BLD: 11.44 K/UL (ref 1.8–8)
NEUTS SEG NFR BLD: 80 % (ref 32–75)
NITRITE UR QL STRIP.AUTO: NEGATIVE
NRBC # BLD: 0.04 K/UL (ref 0–0.01)
NRBC BLD-RTO: 0.3 PER 100 WBC
PERFORMED BY:: ABNORMAL
PH UR STRIP: 5 (ref 5–8)
PHOSPHATE SERPL-MCNC: 3.3 MG/DL (ref 2.6–4.7)
PLATELET # BLD AUTO: 167 K/UL (ref 150–400)
PMV BLD AUTO: 12.1 FL (ref 8.9–12.9)
POTASSIUM SERPL-SCNC: 3.6 MMOL/L (ref 3.5–5.1)
PROT UR STRIP-MCNC: NEGATIVE MG/DL
RBC # BLD AUTO: 2.31 M/UL (ref 3.8–5.2)
RBC #/AREA URNS HPF: ABNORMAL /HPF (ref 0–5)
RBC MORPH BLD: ABNORMAL
SODIUM SERPL-SCNC: 142 MMOL/L (ref 136–145)
SP GR UR REFRACTOMETRY: 1.02 (ref 1–1.03)
URINE CULTURE IF INDICATED: ABNORMAL
UROBILINOGEN UR QL STRIP.AUTO: 0.1 EU/DL (ref 0.1–1)
WBC # BLD AUTO: 14.3 K/UL (ref 3.6–11)
WBC URNS QL MICRO: ABNORMAL /HPF (ref 0–4)

## 2025-01-26 PROCEDURE — 6370000000 HC RX 637 (ALT 250 FOR IP): Performed by: STUDENT IN AN ORGANIZED HEALTH CARE EDUCATION/TRAINING PROGRAM

## 2025-01-26 PROCEDURE — 85018 HEMOGLOBIN: CPT

## 2025-01-26 PROCEDURE — 85014 HEMATOCRIT: CPT

## 2025-01-26 PROCEDURE — 81001 URINALYSIS AUTO W/SCOPE: CPT

## 2025-01-26 PROCEDURE — 2500000003 HC RX 250 WO HCPCS: Performed by: STUDENT IN AN ORGANIZED HEALTH CARE EDUCATION/TRAINING PROGRAM

## 2025-01-26 PROCEDURE — 6360000002 HC RX W HCPCS: Performed by: STUDENT IN AN ORGANIZED HEALTH CARE EDUCATION/TRAINING PROGRAM

## 2025-01-26 PROCEDURE — 51798 US URINE CAPACITY MEASURE: CPT

## 2025-01-26 PROCEDURE — 86850 RBC ANTIBODY SCREEN: CPT

## 2025-01-26 PROCEDURE — 80048 BASIC METABOLIC PNL TOTAL CA: CPT

## 2025-01-26 PROCEDURE — 94761 N-INVAS EAR/PLS OXIMETRY MLT: CPT

## 2025-01-26 PROCEDURE — 85025 COMPLETE CBC W/AUTO DIFF WBC: CPT

## 2025-01-26 PROCEDURE — 83735 ASSAY OF MAGNESIUM: CPT

## 2025-01-26 PROCEDURE — 86923 COMPATIBILITY TEST ELECTRIC: CPT

## 2025-01-26 PROCEDURE — 82962 GLUCOSE BLOOD TEST: CPT

## 2025-01-26 PROCEDURE — 94003 VENT MGMT INPAT SUBQ DAY: CPT

## 2025-01-26 PROCEDURE — 36430 TRANSFUSION BLD/BLD COMPNT: CPT

## 2025-01-26 PROCEDURE — 94640 AIRWAY INHALATION TREATMENT: CPT

## 2025-01-26 PROCEDURE — 86900 BLOOD TYPING SEROLOGIC ABO: CPT

## 2025-01-26 PROCEDURE — 6360000002 HC RX W HCPCS: Performed by: NURSE PRACTITIONER

## 2025-01-26 PROCEDURE — 51701 INSERT BLADDER CATHETER: CPT

## 2025-01-26 PROCEDURE — 6370000000 HC RX 637 (ALT 250 FOR IP): Performed by: INTERNAL MEDICINE

## 2025-01-26 PROCEDURE — 84100 ASSAY OF PHOSPHORUS: CPT

## 2025-01-26 PROCEDURE — 2580000003 HC RX 258: Performed by: STUDENT IN AN ORGANIZED HEALTH CARE EDUCATION/TRAINING PROGRAM

## 2025-01-26 PROCEDURE — 2000000000 HC ICU R&B

## 2025-01-26 PROCEDURE — 2700000000 HC OXYGEN THERAPY PER DAY

## 2025-01-26 PROCEDURE — 86901 BLOOD TYPING SEROLOGIC RH(D): CPT

## 2025-01-26 RX ORDER — INSULIN GLARGINE 100 [IU]/ML
12 INJECTION, SOLUTION SUBCUTANEOUS 2 TIMES DAILY
Status: DISCONTINUED | OUTPATIENT
Start: 2025-01-26 | End: 2025-01-27

## 2025-01-26 RX ORDER — SODIUM CHLORIDE 9 MG/ML
INJECTION, SOLUTION INTRAVENOUS PRN
Status: DISCONTINUED | OUTPATIENT
Start: 2025-01-26 | End: 2025-02-18 | Stop reason: ALTCHOICE

## 2025-01-26 RX ADMIN — PANTOPRAZOLE SODIUM 40 MG: 40 INJECTION, POWDER, FOR SOLUTION INTRAVENOUS at 20:00

## 2025-01-26 RX ADMIN — PANTOPRAZOLE SODIUM 40 MG: 40 INJECTION, POWDER, FOR SOLUTION INTRAVENOUS at 08:49

## 2025-01-26 RX ADMIN — IPRATROPIUM BROMIDE AND ALBUTEROL SULFATE 1 DOSE: 2.5; .5 SOLUTION RESPIRATORY (INHALATION) at 08:04

## 2025-01-26 RX ADMIN — INSULIN GLARGINE 10 UNITS: 100 INJECTION, SOLUTION SUBCUTANEOUS at 08:49

## 2025-01-26 RX ADMIN — BUDESONIDE 500 MCG: 0.5 INHALANT ORAL at 08:04

## 2025-01-26 RX ADMIN — BUDESONIDE 500 MCG: 0.5 INHALANT ORAL at 20:33

## 2025-01-26 RX ADMIN — SODIUM CHLORIDE, PRESERVATIVE FREE 10 ML: 5 INJECTION INTRAVENOUS at 19:54

## 2025-01-26 RX ADMIN — INSULIN GLARGINE 12 UNITS: 100 INJECTION, SOLUTION SUBCUTANEOUS at 20:00

## 2025-01-26 RX ADMIN — INSULIN LISPRO 2 UNITS: 100 INJECTION, SOLUTION INTRAVENOUS; SUBCUTANEOUS at 00:06

## 2025-01-26 RX ADMIN — IPRATROPIUM BROMIDE AND ALBUTEROL SULFATE 1 DOSE: 2.5; .5 SOLUTION RESPIRATORY (INHALATION) at 13:25

## 2025-01-26 RX ADMIN — BUPROPION HYDROCHLORIDE 75 MG: 75 TABLET, FILM COATED ORAL at 08:48

## 2025-01-26 RX ADMIN — SODIUM CHLORIDE, PRESERVATIVE FREE 10 ML: 5 INJECTION INTRAVENOUS at 08:49

## 2025-01-26 RX ADMIN — ASPIRIN 81 MG 81 MG: 81 TABLET ORAL at 08:49

## 2025-01-26 RX ADMIN — PIPERACILLIN AND TAZOBACTAM 3375 MG: 3; .375 INJECTION, POWDER, LYOPHILIZED, FOR SOLUTION INTRAVENOUS at 04:35

## 2025-01-26 RX ADMIN — INSULIN LISPRO 2 UNITS: 100 INJECTION, SOLUTION INTRAVENOUS; SUBCUTANEOUS at 05:30

## 2025-01-26 RX ADMIN — ACETYLCYSTEINE 600 MG: 200 SOLUTION ORAL; RESPIRATORY (INHALATION) at 08:04

## 2025-01-26 RX ADMIN — IPRATROPIUM BROMIDE AND ALBUTEROL SULFATE 1 DOSE: 2.5; .5 SOLUTION RESPIRATORY (INHALATION) at 20:33

## 2025-01-26 ASSESSMENT — PAIN SCALES - GENERAL
PAINLEVEL_OUTOF10: 0

## 2025-01-26 ASSESSMENT — PULMONARY FUNCTION TESTS
PIF_VALUE: 20
PIF_VALUE: 25
PIF_VALUE: 21
PIF_VALUE: 21
PIF_VALUE: 23
PIF_VALUE: 20
PIF_VALUE: 22
PIF_VALUE: 23
PIF_VALUE: 22
PIF_VALUE: 23
PIF_VALUE: 22
PIF_VALUE: 23
PIF_VALUE: 20
PIF_VALUE: 21
PIF_VALUE: 24
PIF_VALUE: 23
PIF_VALUE: 23
PIF_VALUE: 22

## 2025-01-26 NOTE — PLAN OF CARE
Problem: ABCDS Injury Assessment  Goal: Absence of physical injury  1/25/2025 2214 by Rosa Isela Chadwick RN  Outcome: Progressing  Flowsheets (Taken 1/25/2025 2000)  Absence of Physical Injury: Implement safety measures based on patient assessment  1/25/2025 1008 by Ct Lamb RN  Outcome: Progressing     Problem: Skin/Tissue Integrity  Goal: Absence of new skin breakdown  Description: 1.  Monitor for areas of redness and/or skin breakdown  2.  Assess vascular access sites hourly  3.  Every 4-6 hours minimum:  Change oxygen saturation probe site  4.  Every 4-6 hours:  If on nasal continuous positive airway pressure, respiratory therapy assess nares and determine need for appliance change or resting period.  1/25/2025 2214 by Rosa Isela Chadwick RN  Outcome: Progressing  1/25/2025 1008 by Ct Lamb RN  Outcome: Progressing     Problem: Chronic Conditions and Co-morbidities  Goal: Patient's chronic conditions and co-morbidity symptoms are monitored and maintained or improved  Outcome: Progressing  Flowsheets (Taken 1/25/2025 2000)  Care Plan - Patient's Chronic Conditions and Co-Morbidity Symptoms are Monitored and Maintained or Improved:   Monitor and assess patient's chronic conditions and comorbid symptoms for stability, deterioration, or improvement   Collaborate with multidisciplinary team to address chronic and comorbid conditions and prevent exacerbation or deterioration   Update acute care plan with appropriate goals if chronic or comorbid symptoms are exacerbated and prevent overall improvement and discharge     Problem: Discharge Planning  Goal: Discharge to home or other facility with appropriate resources  Outcome: Progressing  Flowsheets (Taken 1/25/2025 2000)  Discharge to home or other facility with appropriate resources:   Identify barriers to discharge with patient and caregiver   Identify discharge learning needs (meds, wound care, etc)     Problem: Safety - Adult  Goal: Free from fall  application  3. Inform patient/family regarding the reason for restraint  4. Q2H: Monitor safety, psychosocial status, comfort, nutrition and hydration  Outcome: Progressing  Flowsheets  Taken 1/25/2025 2000 by Rosa Isela Chadwick RN  Remains free of injury from restraints (restraint for interference with medical device):   Determine that other, less restrictive measures have been tried or would not be effective before applying the restraint   Evaluate the patient's condition at the time of restraint application   Inform patient/family regarding the reason for restraint   Every 2 hours: Monitor safety, psychosocial status, comfort, nutrition and hydration  Taken 1/25/2025 1000 by Ct Lamb RN  Remains free of injury from restraints (restraint for interference with medical device):   Determine that other, less restrictive measures have been tried or would not be effective before applying the restraint   Evaluate the patient's condition at the time of restraint application   Inform patient/family regarding the reason for restraint   Every 2 hours: Monitor safety, psychosocial status, comfort, nutrition and hydration  Taken 1/25/2025 0932 by Ct Lamb RN  Remains free of injury from restraints (restraint for interference with medical device):   Determine that other, less restrictive measures have been tried or would not be effective before applying the restraint   Evaluate the patient's condition at the time of restraint application   Inform patient/family regarding the reason for restraint   Every 2 hours: Monitor safety, psychosocial status, comfort, nutrition and hydration     Problem: Nutrition Deficit:  Goal: Optimize nutritional status  Outcome: Progressing

## 2025-01-26 NOTE — PROGRESS NOTES
CARDIOLOGY PROGRESS NOTE    Patient Name: Nida Graves  Age: 49 y.o.  Gender:female  :1975  MRN: [id number]    Patient seen and examined. This is a patient with a history of diabetes, asthma who presented with shortness of breath and hypoxia now being followed for atrial flutter and CHF. Remains in ICU, intubated. Sedation held for weaning trials. Off pressor support.      Remains intubated, 40% FIO2, tele - afib 50s. No pressors     Remains intubated, 35% FIO2, tele - AF 50s average, MAP 60s      INPATIENT MEDICATIONS:  Home medications reviewed.    Current Facility-Administered Medications:     0.9 % sodium chloride infusion, , IntraVENous, PRN, Winnie García APRN - CNP    insulin glargine (LANTUS) injection vial 12 Units, 12 Units, SubCUTAneous, BID, Mary Estevez MD    [Held by provider] traZODone (DESYREL) tablet 25 mg, 25 mg, Oral, Daily, Mary Estevez MD    insulin lispro (HUMALOG,ADMELOG) injection vial 0-8 Units, 0-8 Units, SubCUTAneous, 4 times per day, Mary Estevez MD, 2 Units at 25 0530    pantoprazole (PROTONIX) injection 40 mg, 40 mg, IntraVENous, BID, Winnie García APRN - CNP, 40 mg at 25 0849    0.9 % sodium chloride infusion, , IntraVENous, PRN, Frieda Alvarado APRN - CNP    [Held by provider] enoxaparin (LOVENOX) injection 100 mg, 1 mg/kg, SubCUTAneous, BID, Mary Estevez MD, 100 mg at 25    bisacodyl (DULCOLAX) suppository 10 mg, 10 mg, Rectal, Daily PRN, Mary Estevez MD    atorvastatin (LIPITOR) tablet 20 mg, 20 mg, Per NG tube, Nightly, Mary Estevez MD, 20 mg at 25    buPROPion (WELLBUTRIN) tablet 75 mg, 75 mg, Per NG tube, BID, Mary Estevez MD, 75 mg at 25 0848    melatonin tablet 3 mg, 3 mg, Per NG tube, QHS, Mary Estevez MD, 3 mg at 25    [Held by provider] polyethylene glycol (GLYCOLAX) packet 17 g, 17 g, Per NG tube, Daily, Mary Estevez MD, 17

## 2025-01-26 NOTE — PROGRESS NOTES
CRITICAL CARE PROGRESS NOTE    Name: Nida Graves   : 1975   MRN: [id number]   Date: 2025      Diagnoses/problem list:   Acute hypoxic and hypercapnic respiratory failure  Atrial flutter  Possible NSTEMI  Acute HFrEF  Diabetes  Morbid obesity  Acute metabolic/septic encephalopathy    24-hour events:   Ms. Graves is a 49-year-old female with PMH chronic disability and bedbound status, diabetes, asthma, obesity, who presented for shortness of breath.  Shortness of breath worsening over the past few days.  Upon presentation ED patient found to be hypoxic necessitating O2 via NC.  Otherwise workup significant for arrhythmia.  Concerns for slow conducting A-fib.  Cardiology subsequently consulted and noted overall concern for A-fib with possible complete heart block versus slow ventricular response.  Patient supplemented to ICU for further evaluation and management. She was transferred out of ICU on . Early morning  ICU contacted for hypoxia, hypercarbia, and encephalopathy. She was hypoxic on 6L NC, had fever 100.5, and ABG revealed pCO2 in the 70's. Patient transferred back to ICU for further evaluation and management.      DVT study is negative  Echo showed EF of 29% with moderate TR     24-hour events:  2025: Doing well no major overnight event, still on high flow above 70%, labs are acceptable, troponin is mildly elevated, chest x-ray consistent with bilateral pulm pleural effusion  2025: Started on steroids and antibiotics  1/15/2025: Still requiring high flow with 50 L 70%, feels better slowly getting better chest x-ray still showing significant increase in interstitial edema and lower lobe airspace disease  2025 patient continued to do well with aggressive diuresis steroids and antibiotics, was transitioned to 35 L 45% high flow, now on 6 L oxygen and doing well, cardiology and palliative care following  : transferred to ICU, placed on NIV. Abx broadened to levaquin,  murmurs  Pulm: coarse breath sounds throughout  ABD: Soft, mildly distended, non tender, normal bowel sounds. Morbid obesity  Extremities: edema to lower extremities bilaterally  Neuro: intubated, alert, oriented    Labs and Data: Reviewed 01/26/25  Medications: Reviewed 01/26/25  Imaging: Reviewed 01/26/25    Intake/Output:     Intake/Output Summary (Last 24 hours) at 1/26/2025 1209  Last data filed at 1/26/2025 1100  Gross per 24 hour   Intake 2541.72 ml   Output 2200 ml   Net 341.72 ml       CRITICAL CARE DOCUMENTATION  I had a face to face encounter with the patient, reviewed and interpreted patient data including clinical events, labs, images, vital signs, I/O's, and examined patient.  I have discussed the case and the plan and management of the patient's care with the consulting services, the bedside nurses and the respiratory therapist.      NOTE OF PERSONAL INVOLVEMENT IN CARE   This patient has a high probability of imminent, clinically significant deterioration, which requires the highest level of preparedness to intervene urgently. I participated in the decision-making and personally managed or directed the management of the following life and organ supporting interventions that required my frequent assessment to treat or prevent imminent deterioration.    I personally spent 35 minutes of critical care time.  This is time spent at this critically ill patient's bedside actively involved in patient care as well as the coordination of care.  This does not include any procedural time which has been billed separately.    Shilpa Estevez MD  Critical Care Medicine  [facility] Critical Care

## 2025-01-26 NOTE — PROGRESS NOTES
0230  NP Ricky notified of Hgb 6.7.  NP ordered 1 unit of PRBC's.      0400  FMS keeps leaking heavily around the tube.   Suspect occlusion.  FMS removed, which revealed a clump of stool occluding the system.  After removal, patient continued to have profuse liquid diarrhea.  Therefore, FMS was replaced and is currently draining appropriately without leaking.

## 2025-01-27 ENCOUNTER — APPOINTMENT (OUTPATIENT)
Facility: HOSPITAL | Age: 50
DRG: 004 | End: 2025-01-27
Payer: COMMERCIAL

## 2025-01-27 LAB
ALBUMIN SERPL-MCNC: 2.6 G/DL (ref 3.5–5)
ALBUMIN/GLOB SERPL: 0.6 (ref 1.1–2.2)
ALP SERPL-CCNC: 244 U/L (ref 45–117)
ALT SERPL-CCNC: 50 U/L (ref 12–78)
ANION GAP SERPL CALC-SCNC: 6 MMOL/L (ref 2–12)
ANION GAP SERPL CALC-SCNC: 6 MMOL/L (ref 2–12)
ARTERIAL PATENCY WRIST A: YES
AST SERPL W P-5'-P-CCNC: 53 U/L (ref 15–37)
BASE DEFICIT BLDA-SCNC: 1.1 MMOL/L
BASE DEFICIT BLDA-SCNC: 2.3 MMOL/L
BASE DEFICIT BLDA-SCNC: 3.1 MMOL/L
BASE DEFICIT BLDA-SCNC: 5.5 MMOL/L
BASOPHILS # BLD: 0 K/UL (ref 0–0.1)
BASOPHILS NFR BLD: 0 % (ref 0–1)
BDY SITE: ABNORMAL
BILIRUB DIRECT SERPL-MCNC: 0.2 MG/DL (ref 0–0.2)
BILIRUB SERPL-MCNC: 0.7 MG/DL (ref 0.2–1)
BNP SERPL-MCNC: 8077 PG/ML
BODY TEMPERATURE: 99.3
BODY TEMPERATURE: 99.3
BODY TEMPERATURE: 99.8
BODY TEMPERATURE: 99.9
BUN SERPL-MCNC: 19 MG/DL (ref 6–20)
BUN SERPL-MCNC: 24 MG/DL (ref 6–20)
BUN/CREAT SERPL: 28 (ref 12–20)
BUN/CREAT SERPL: 36 (ref 12–20)
CA-I BLD-MCNC: 9.3 MG/DL (ref 8.5–10.1)
CA-I BLD-MCNC: 9.4 MG/DL (ref 8.5–10.1)
CHLORIDE SERPL-SCNC: 114 MMOL/L (ref 97–108)
CHLORIDE SERPL-SCNC: 116 MMOL/L (ref 97–108)
CO2 SERPL-SCNC: 23 MMOL/L (ref 21–32)
CO2 SERPL-SCNC: 25 MMOL/L (ref 21–32)
COHGB MFR BLD: 0.3 % (ref 1–2)
CREAT SERPL-MCNC: 0.67 MG/DL (ref 0.55–1.02)
CREAT SERPL-MCNC: 0.67 MG/DL (ref 0.55–1.02)
DIFFERENTIAL METHOD BLD: ABNORMAL
EOSINOPHIL # BLD: 0 K/UL (ref 0–0.4)
EOSINOPHIL NFR BLD: 0 % (ref 0–7)
ERYTHROCYTE [DISTWIDTH] IN BLOOD BY AUTOMATED COUNT: 17 % (ref 11.5–14.5)
ERYTHROCYTE [DISTWIDTH] IN BLOOD BY AUTOMATED COUNT: 17.9 % (ref 11.5–14.5)
FIO2 ON VENT: 100 %
FIO2 ON VENT: 100 %
FIO2 ON VENT: 36 %
FIO2 ON VENT: 40 %
GAS FLOW.O2 O2 DELIVERY SYS: 4 L/MIN
GAS FLOW.O2 O2 DELIVERY SYS: 5 L/MIN
GAS FLOW.O2 SETTING OXYMISER: 12
GLOBULIN SER CALC-MCNC: 4.5 G/DL (ref 2–4)
GLUCOSE BLD STRIP.AUTO-MCNC: 111 MG/DL (ref 65–100)
GLUCOSE BLD STRIP.AUTO-MCNC: 124 MG/DL (ref 65–100)
GLUCOSE BLD STRIP.AUTO-MCNC: 144 MG/DL (ref 65–100)
GLUCOSE BLD STRIP.AUTO-MCNC: 177 MG/DL (ref 65–100)
GLUCOSE SERPL-MCNC: 101 MG/DL (ref 65–100)
GLUCOSE SERPL-MCNC: 115 MG/DL (ref 65–100)
HCO3 BLDA-SCNC: 22 MMOL/L (ref 22–26)
HCO3 BLDA-SCNC: 23 MMOL/L (ref 22–26)
HCO3 BLDA-SCNC: 24 MMOL/L (ref 22–26)
HCO3 BLDA-SCNC: 24 MMOL/L (ref 22–26)
HCT VFR BLD AUTO: 30.7 % (ref 35–47)
HCT VFR BLD AUTO: 37.8 % (ref 35–47)
HGB BLD-MCNC: 11 G/DL (ref 11.5–16)
HGB BLD-MCNC: 9.5 G/DL (ref 11.5–16)
IMM GRANULOCYTES # BLD AUTO: 0 K/UL
IMM GRANULOCYTES NFR BLD AUTO: 0 %
LACTATE SERPL-SCNC: 1.2 MMOL/L (ref 0.4–2)
LYMPHOCYTES # BLD: 2.74 K/UL (ref 0.8–3.5)
LYMPHOCYTES NFR BLD: 18 % (ref 12–49)
MAGNESIUM SERPL-MCNC: 2.4 MG/DL (ref 1.6–2.4)
MCH RBC QN AUTO: 29.2 PG (ref 26–34)
MCH RBC QN AUTO: 29.3 PG (ref 26–34)
MCHC RBC AUTO-ENTMCNC: 29.1 G/DL (ref 30–36.5)
MCHC RBC AUTO-ENTMCNC: 30.9 G/DL (ref 30–36.5)
MCV RBC AUTO: 100.3 FL (ref 80–99)
MCV RBC AUTO: 94.8 FL (ref 80–99)
METHGB MFR BLD: 0.1 % (ref 0–1.4)
METHGB MFR BLD: 0.2 % (ref 0–1.4)
METHGB MFR BLD: 0.3 % (ref 0–1.4)
METHGB MFR BLD: 0.4 % (ref 0–1.4)
MONOCYTES # BLD: 1.22 K/UL (ref 0–1)
MONOCYTES NFR BLD: 8 % (ref 5–13)
NEUTS SEG # BLD: 11.24 K/UL (ref 1.8–8)
NEUTS SEG NFR BLD: 74 % (ref 32–75)
NRBC # BLD: 0.02 K/UL (ref 0–0.01)
NRBC # BLD: 0.05 K/UL (ref 0–0.01)
NRBC BLD-RTO: 0.1 PER 100 WBC
NRBC BLD-RTO: 0.3 PER 100 WBC
OXYHGB MFR BLD: 85.6 % (ref 95–99)
OXYHGB MFR BLD: 89.1 % (ref 95–99)
OXYHGB MFR BLD: 90.8 % (ref 95–99)
OXYHGB MFR BLD: 96.9 % (ref 95–99)
PCO2 BLDA: 43 MMHG (ref 35–45)
PCO2 BLDA: 45 MMHG (ref 35–45)
PCO2 BLDA: 49 MMHG (ref 35–45)
PCO2 BLDA: 50 MMHG (ref 35–45)
PEEP RESPIRATORY: 5
PEEP RESPIRATORY: 5
PERFORMED BY:: ABNORMAL
PH BLDA: 7.26 (ref 7.35–7.45)
PH BLDA: 7.31 (ref 7.35–7.45)
PH BLDA: 7.33 (ref 7.35–7.45)
PH BLDA: 7.37 (ref 7.35–7.45)
PHOSPHATE SERPL-MCNC: 2.3 MG/DL (ref 2.6–4.7)
PLATELET # BLD AUTO: 175 K/UL (ref 150–400)
PLATELET # BLD AUTO: 218 K/UL (ref 150–400)
PMV BLD AUTO: 12.3 FL (ref 8.9–12.9)
PMV BLD AUTO: 13 FL (ref 8.9–12.9)
PO2 BLDA: 108 MMHG (ref 80–100)
PO2 BLDA: 58 MMHG (ref 80–100)
PO2 BLDA: 62 MMHG (ref 80–100)
PO2 BLDA: 70 MMHG (ref 80–100)
POTASSIUM SERPL-SCNC: 3 MMOL/L (ref 3.5–5.1)
POTASSIUM SERPL-SCNC: 4.2 MMOL/L (ref 3.5–5.1)
PRESSURE SUPPORT SETTING VENT: 10
PRESSURE SUPPORT SETTING VENT: 8
PROCALCITONIN SERPL-MCNC: 0.29 NG/ML
PROT SERPL-MCNC: 7.1 G/DL (ref 6.4–8.2)
RBC # BLD AUTO: 3.24 M/UL (ref 3.8–5.2)
RBC # BLD AUTO: 3.77 M/UL (ref 3.8–5.2)
RBC MORPH BLD: ABNORMAL
SAO2 % BLD: 86 % (ref 95–99)
SAO2 % BLD: 90 % (ref 95–99)
SAO2 % BLD: 91 % (ref 95–99)
SAO2 % BLD: 98 % (ref 95–99)
SAO2% DEVICE SAO2% SENSOR NAME: ABNORMAL
SODIUM SERPL-SCNC: 145 MMOL/L (ref 136–145)
SODIUM SERPL-SCNC: 145 MMOL/L (ref 136–145)
SPECIMEN SITE: ABNORMAL
TROPONIN I SERPL HS-MCNC: 131 NG/L (ref 0–51)
VENTILATION MODE VENT: ABNORMAL
VENTILATION MODE VENT: ABNORMAL
WBC # BLD AUTO: 15.2 K/UL (ref 3.6–11)
WBC # BLD AUTO: 16.4 K/UL (ref 3.6–11)

## 2025-01-27 PROCEDURE — 6360000002 HC RX W HCPCS

## 2025-01-27 PROCEDURE — 6360000002 HC RX W HCPCS: Performed by: INTERNAL MEDICINE

## 2025-01-27 PROCEDURE — 6360000002 HC RX W HCPCS: Performed by: NURSE PRACTITIONER

## 2025-01-27 PROCEDURE — 99233 SBSQ HOSP IP/OBS HIGH 50: CPT

## 2025-01-27 PROCEDURE — 0BH17EZ INSERTION OF ENDOTRACHEAL AIRWAY INTO TRACHEA, VIA NATURAL OR ARTIFICIAL OPENING: ICD-10-PCS | Performed by: INTERNAL MEDICINE

## 2025-01-27 PROCEDURE — 36600 WITHDRAWAL OF ARTERIAL BLOOD: CPT

## 2025-01-27 PROCEDURE — 92610 EVALUATE SWALLOWING FUNCTION: CPT

## 2025-01-27 PROCEDURE — 2700000000 HC OXYGEN THERAPY PER DAY

## 2025-01-27 PROCEDURE — 2580000003 HC RX 258: Performed by: STUDENT IN AN ORGANIZED HEALTH CARE EDUCATION/TRAINING PROGRAM

## 2025-01-27 PROCEDURE — 71045 X-RAY EXAM CHEST 1 VIEW: CPT

## 2025-01-27 PROCEDURE — 2500000003 HC RX 250 WO HCPCS: Performed by: STUDENT IN AN ORGANIZED HEALTH CARE EDUCATION/TRAINING PROGRAM

## 2025-01-27 PROCEDURE — 85025 COMPLETE CBC W/AUTO DIFF WBC: CPT

## 2025-01-27 PROCEDURE — 5A1955Z RESPIRATORY VENTILATION, GREATER THAN 96 CONSECUTIVE HOURS: ICD-10-PCS | Performed by: INTERNAL MEDICINE

## 2025-01-27 PROCEDURE — 6360000002 HC RX W HCPCS: Performed by: STUDENT IN AN ORGANIZED HEALTH CARE EDUCATION/TRAINING PROGRAM

## 2025-01-27 PROCEDURE — 94002 VENT MGMT INPAT INIT DAY: CPT

## 2025-01-27 PROCEDURE — 6370000000 HC RX 637 (ALT 250 FOR IP): Performed by: STUDENT IN AN ORGANIZED HEALTH CARE EDUCATION/TRAINING PROGRAM

## 2025-01-27 PROCEDURE — 6370000000 HC RX 637 (ALT 250 FOR IP): Performed by: INTERNAL MEDICINE

## 2025-01-27 PROCEDURE — 82962 GLUCOSE BLOOD TEST: CPT

## 2025-01-27 PROCEDURE — 2000000000 HC ICU R&B

## 2025-01-27 PROCEDURE — 82803 BLOOD GASES ANY COMBINATION: CPT

## 2025-01-27 PROCEDURE — 83880 ASSAY OF NATRIURETIC PEPTIDE: CPT

## 2025-01-27 PROCEDURE — 94640 AIRWAY INHALATION TREATMENT: CPT

## 2025-01-27 PROCEDURE — 84484 ASSAY OF TROPONIN QUANT: CPT

## 2025-01-27 PROCEDURE — P9016 RBC LEUKOCYTES REDUCED: HCPCS

## 2025-01-27 PROCEDURE — 36415 COLL VENOUS BLD VENIPUNCTURE: CPT

## 2025-01-27 PROCEDURE — 5A0935A ASSISTANCE WITH RESPIRATORY VENTILATION, LESS THAN 24 CONSECUTIVE HOURS, HIGH NASAL FLOW/VELOCITY: ICD-10-PCS | Performed by: INTERNAL MEDICINE

## 2025-01-27 PROCEDURE — 2500000003 HC RX 250 WO HCPCS

## 2025-01-27 PROCEDURE — 83605 ASSAY OF LACTIC ACID: CPT

## 2025-01-27 PROCEDURE — 83735 ASSAY OF MAGNESIUM: CPT

## 2025-01-27 PROCEDURE — 80076 HEPATIC FUNCTION PANEL: CPT

## 2025-01-27 PROCEDURE — 84145 PROCALCITONIN (PCT): CPT

## 2025-01-27 PROCEDURE — 85027 COMPLETE CBC AUTOMATED: CPT

## 2025-01-27 PROCEDURE — 84100 ASSAY OF PHOSPHORUS: CPT

## 2025-01-27 PROCEDURE — 80048 BASIC METABOLIC PNL TOTAL CA: CPT

## 2025-01-27 PROCEDURE — 94761 N-INVAS EAR/PLS OXIMETRY MLT: CPT

## 2025-01-27 RX ORDER — ETOMIDATE 2 MG/ML
INJECTION INTRAVENOUS
Status: COMPLETED
Start: 2025-01-27 | End: 2025-01-27

## 2025-01-27 RX ORDER — PROPOFOL 10 MG/ML
INJECTION, EMULSION INTRAVENOUS
Status: COMPLETED
Start: 2025-01-27 | End: 2025-01-27

## 2025-01-27 RX ORDER — HYDROXYZINE HYDROCHLORIDE 25 MG/1
25 TABLET, FILM COATED ORAL 3 TIMES DAILY PRN
Status: DISCONTINUED | OUTPATIENT
Start: 2025-01-27 | End: 2025-02-13

## 2025-01-27 RX ORDER — DOPAMINE HYDROCHLORIDE 320 MG/100ML
1-20 INJECTION, SOLUTION INTRAVENOUS CONTINUOUS
Status: DISCONTINUED | OUTPATIENT
Start: 2025-01-27 | End: 2025-02-01

## 2025-01-27 RX ORDER — PROPOFOL 10 MG/ML
5-50 INJECTION, EMULSION INTRAVENOUS CONTINUOUS
Status: DISCONTINUED | OUTPATIENT
Start: 2025-01-27 | End: 2025-01-31

## 2025-01-27 RX ORDER — ACETYLCYSTEINE 200 MG/ML
600 SOLUTION ORAL; RESPIRATORY (INHALATION)
Status: DISCONTINUED | OUTPATIENT
Start: 2025-01-27 | End: 2025-01-27

## 2025-01-27 RX ORDER — ETOMIDATE 2 MG/ML
20 INJECTION INTRAVENOUS ONCE
Status: COMPLETED | OUTPATIENT
Start: 2025-01-27 | End: 2025-01-27

## 2025-01-27 RX ORDER — DULOXETIN HYDROCHLORIDE 30 MG/1
30 CAPSULE, DELAYED RELEASE ORAL DAILY
Status: DISCONTINUED | OUTPATIENT
Start: 2025-01-27 | End: 2025-01-31

## 2025-01-27 RX ORDER — 0.9 % SODIUM CHLORIDE 0.9 %
500 INTRAVENOUS SOLUTION INTRAVENOUS ONCE
Status: CANCELLED | OUTPATIENT
Start: 2025-01-27 | End: 2025-01-27

## 2025-01-27 RX ORDER — INSULIN LISPRO 100 [IU]/ML
0-4 INJECTION, SOLUTION INTRAVENOUS; SUBCUTANEOUS EVERY 6 HOURS SCHEDULED
Status: DISCONTINUED | OUTPATIENT
Start: 2025-01-28 | End: 2025-02-25

## 2025-01-27 RX ORDER — GLYCOPYRROLATE 0.2 MG/ML
0.1 INJECTION INTRAMUSCULAR; INTRAVENOUS 3 TIMES DAILY
Status: DISCONTINUED | OUTPATIENT
Start: 2025-01-27 | End: 2025-01-28

## 2025-01-27 RX ORDER — ROCURONIUM BROMIDE 10 MG/ML
0.6 INJECTION, SOLUTION INTRAVENOUS ONCE
Status: COMPLETED | OUTPATIENT
Start: 2025-01-27 | End: 2025-01-27

## 2025-01-27 RX ORDER — ROCURONIUM BROMIDE 10 MG/ML
INJECTION, SOLUTION INTRAVENOUS
Status: COMPLETED
Start: 2025-01-27 | End: 2025-01-27

## 2025-01-27 RX ORDER — ACETYLCYSTEINE 200 MG/ML
600 SOLUTION ORAL; RESPIRATORY (INHALATION)
Status: COMPLETED | OUTPATIENT
Start: 2025-01-27 | End: 2025-01-29

## 2025-01-27 RX ADMIN — IPRATROPIUM BROMIDE AND ALBUTEROL SULFATE 1 DOSE: 2.5; .5 SOLUTION RESPIRATORY (INHALATION) at 21:45

## 2025-01-27 RX ADMIN — GLYCOPYRROLATE 0.1 MG: 0.2 INJECTION INTRAMUSCULAR; INTRAVENOUS at 23:27

## 2025-01-27 RX ADMIN — PROPOFOL 20 MCG/KG/MIN: 10 INJECTION, EMULSION INTRAVENOUS at 19:11

## 2025-01-27 RX ADMIN — ROCURONIUM BROMIDE 61 MG: 10 INJECTION, SOLUTION INTRAVENOUS at 18:57

## 2025-01-27 RX ADMIN — ETOMIDATE 20 MG: 2 INJECTION, SOLUTION INTRAVENOUS at 18:57

## 2025-01-27 RX ADMIN — POTASSIUM CHLORIDE 10 MEQ: 7.45 INJECTION INTRAVENOUS at 03:53

## 2025-01-27 RX ADMIN — ASPIRIN 81 MG 81 MG: 81 TABLET ORAL at 09:02

## 2025-01-27 RX ADMIN — GLYCOPYRROLATE 0.1 MG: 0.2 INJECTION INTRAMUSCULAR; INTRAVENOUS at 13:01

## 2025-01-27 RX ADMIN — SODIUM CHLORIDE, PRESERVATIVE FREE 10 ML: 5 INJECTION INTRAVENOUS at 09:01

## 2025-01-27 RX ADMIN — POTASSIUM CHLORIDE 10 MEQ: 7.45 INJECTION INTRAVENOUS at 04:51

## 2025-01-27 RX ADMIN — POTASSIUM CHLORIDE 10 MEQ: 7.45 INJECTION INTRAVENOUS at 11:02

## 2025-01-27 RX ADMIN — BUDESONIDE 500 MCG: 0.5 INHALANT ORAL at 08:14

## 2025-01-27 RX ADMIN — IPRATROPIUM BROMIDE AND ALBUTEROL SULFATE 1 DOSE: 2.5; .5 SOLUTION RESPIRATORY (INHALATION) at 08:14

## 2025-01-27 RX ADMIN — SODIUM CHLORIDE: 9 INJECTION, SOLUTION INTRAVENOUS at 03:51

## 2025-01-27 RX ADMIN — BUDESONIDE 500 MCG: 0.5 INHALANT ORAL at 21:45

## 2025-01-27 RX ADMIN — ATORVASTATIN CALCIUM 20 MG: 20 TABLET, FILM COATED ORAL at 23:27

## 2025-01-27 RX ADMIN — ACETYLCYSTEINE 600 MG: 200 SOLUTION ORAL; RESPIRATORY (INHALATION) at 13:51

## 2025-01-27 RX ADMIN — SODIUM CHLORIDE, PRESERVATIVE FREE 10 ML: 5 INJECTION INTRAVENOUS at 20:30

## 2025-01-27 RX ADMIN — POTASSIUM CHLORIDE 10 MEQ: 7.45 INJECTION INTRAVENOUS at 05:57

## 2025-01-27 RX ADMIN — IPRATROPIUM BROMIDE AND ALBUTEROL SULFATE 1 DOSE: 2.5; .5 SOLUTION RESPIRATORY (INHALATION) at 13:51

## 2025-01-27 RX ADMIN — DULOXETINE HYDROCHLORIDE 30 MG: 30 CAPSULE, DELAYED RELEASE ORAL at 10:28

## 2025-01-27 RX ADMIN — INSULIN GLARGINE 12 UNITS: 100 INJECTION, SOLUTION SUBCUTANEOUS at 09:01

## 2025-01-27 RX ADMIN — POTASSIUM CHLORIDE 10 MEQ: 7.45 INJECTION INTRAVENOUS at 08:09

## 2025-01-27 RX ADMIN — PANTOPRAZOLE SODIUM 40 MG: 40 INJECTION, POWDER, FOR SOLUTION INTRAVENOUS at 09:01

## 2025-01-27 RX ADMIN — PANTOPRAZOLE SODIUM 40 MG: 40 INJECTION, POWDER, FOR SOLUTION INTRAVENOUS at 23:27

## 2025-01-27 RX ADMIN — POTASSIUM CHLORIDE 10 MEQ: 7.45 INJECTION INTRAVENOUS at 07:06

## 2025-01-27 RX ADMIN — BUPROPION HYDROCHLORIDE 75 MG: 75 TABLET, FILM COATED ORAL at 09:02

## 2025-01-27 RX ADMIN — GLYCOPYRROLATE 0.1 MG: 0.2 INJECTION INTRAMUSCULAR; INTRAVENOUS at 10:28

## 2025-01-27 RX ADMIN — ETOMIDATE 20 MG: 2 INJECTION INTRAVENOUS at 18:57

## 2025-01-27 RX ADMIN — DOPAMINE HYDROCHLORIDE IN DEXTROSE 10 MCG/KG/MIN: 3.2 INJECTION, SOLUTION INTRAVENOUS at 21:55

## 2025-01-27 ASSESSMENT — PAIN SCALES - GENERAL
PAINLEVEL_OUTOF10: 0

## 2025-01-27 ASSESSMENT — PULMONARY FUNCTION TESTS
PIF_VALUE: 23
PIF_VALUE: 24
PIF_VALUE: 31
PIF_VALUE: 23
PIF_VALUE: 31
PIF_VALUE: 23
PIF_VALUE: 0
PIF_VALUE: 23
PIF_VALUE: 23
PIF_VALUE: 31

## 2025-01-27 NOTE — PROGRESS NOTES
Palliative Medicine Social Work Note      Palliative Medicine ([facility] Carnes[facility] made supportive visit to pt; no family at bedside.  Pt has been extubated, was awake in bed though groggy, is on hi-flow O2 but repeatedly pulled prongs down under her chin.  Pt was able to give some medical hx (\"I have aFlutter\") but was surprised to learn she is in the hospital, identified current location as her home when asked.  Pt repeatedly shared that she had been so hot, was unable to clarify whether she was speaking of events leading to current hospitalization or to how she feels now.  Pt shared that being intubated was a terrible experience, stated she would not want to be re-intubated but was unable to verbalize the potential consequence of no re-intubation (ie, death). Palliative team will attempt to discuss together with pt and  if possible.  Discussed with RN.     Addendum, 2:10 pm:  Reviewed chart later in day, noted pt's increased O2 requirements.  NP reached out to  Nannette by phone to provide update.   had been at bedside yesterday, was pleased to have been able to communicate with pt and hear her voice.   is in favor of reintubation if necessary, expressed belief that pt would opt for intubation as well if she understood the alternative.  Will continue to follow for ongoing support and symptom management.     Thank you for including Palliative Medicine in the care of Ms. Graves.    Margo Walter, TYSON, ACHP-SW  Palliative Medicine:  [phone])

## 2025-01-27 NOTE — PLAN OF CARE
Problem: ABCDS Injury Assessment  Goal: Absence of physical injury  Outcome: Progressing  Flowsheets (Taken 1/26/2025 2000)  Absence of Physical Injury: Implement safety measures based on patient assessment     Problem: Skin/Tissue Integrity  Goal: Absence of new skin breakdown  Description: 1.  Monitor for areas of redness and/or skin breakdown  2.  Assess vascular access sites hourly  3.  Every 4-6 hours minimum:  Change oxygen saturation probe site  4.  Every 4-6 hours:  If on nasal continuous positive airway pressure, respiratory therapy assess nares and determine need for appliance change or resting period.  Outcome: Progressing     Problem: Chronic Conditions and Co-morbidities  Goal: Patient's chronic conditions and co-morbidity symptoms are monitored and maintained or improved  Outcome: Progressing  Flowsheets (Taken 1/26/2025 2000)  Care Plan - Patient's Chronic Conditions and Co-Morbidity Symptoms are Monitored and Maintained or Improved:   Monitor and assess patient's chronic conditions and comorbid symptoms for stability, deterioration, or improvement   Collaborate with multidisciplinary team to address chronic and comorbid conditions and prevent exacerbation or deterioration   Update acute care plan with appropriate goals if chronic or comorbid symptoms are exacerbated and prevent overall improvement and discharge     Problem: Discharge Planning  Goal: Discharge to home or other facility with appropriate resources  Outcome: Progressing  Flowsheets (Taken 1/26/2025 2000)  Discharge to home or other facility with appropriate resources:   Identify barriers to discharge with patient and caregiver   Identify discharge learning needs (meds, wound care, etc)     Problem: Safety - Adult  Goal: Free from fall injury  Outcome: Progressing  Flowsheets (Taken 1/26/2025 2000)  Free From Fall Injury:   Instruct family/caregiver on patient safety   Based on caregiver fall risk screen, instruct family/caregiver to  (Taken 1/26/2025 2000)  Electrolytes maintained within normal limits:   Monitor labs and assess patient for signs and symptoms of electrolyte imbalances   Administer electrolyte replacement as ordered   Monitor response to electrolyte replacements, including repeat lab results as appropriate  Goal: Hemodynamic stability and optimal renal function maintained  Outcome: Progressing  Flowsheets (Taken 1/26/2025 2000)  Hemodynamic stability and optimal renal function maintained:   Monitor labs and assess for signs and symptoms of volume excess or deficit   Monitor intake, output and patient weight   Monitor response to interventions for patient's volume status, including labs, urine output, blood pressure (other measures as available)     Problem: Musculoskeletal - Adult  Goal: Return mobility to safest level of function  Outcome: Progressing  Flowsheets (Taken 1/26/2025 2000)  Return Mobility to Safest Level of Function: Assess patient stability and activity tolerance for standing, transferring and ambulating with or without assistive devices  Goal: Maintain proper alignment of affected body part  Outcome: Progressing  Goal: Return ADL status to a safe level of function  Outcome: Progressing  Flowsheets (Taken 1/26/2025 2000)  Return ADL Status to a Safe Level of Function: Administer medication as ordered     Problem: Spiritual Care  Goal: Pt/Family able to move forward in process of forgiving self, others, and/or higher power  Description: INTERVENTIONS:  1. Assist patient/family to overcome blocks to healing by use of spiritual practices (prayer, meditation, guided imagery, reiki, breath work, etc).  2. De-myth guilt and help patient/family identify possible irrational spiritual/cultural beliefs and values.  3. Explore possibilities of making amends & reconciliation with self, others, and/or a greater power.  4. Guide patient/family in identifying painful feelings of guilt.  5. Help patient/famiy explore and identify

## 2025-01-27 NOTE — PROGRESS NOTES
CARDIOLOGY PROGRESS NOTE      Patient Name: Nida Graves  Age: 49 y.o.  Gender:female  :1975  MRN: [id number]    Patient seen and examined. This is a patient with a history of diabetes, asthma who presented with shortness of breath and hypoxia now being followed for atrial flutter and CHF. In ICU, recently extubated. Off pressor support. Awake and talking during my interview.    Remains intubated, 40% FIO2, tele - afib 50s. No pressors      Remains intubated, 35% FIO2, tele - AF 50s average, MAP 60s    : Denies any acute symptoms. High flow O2 on, although patient removing while talking. Denies chest pains.     Telemetry reviewed, there were no events noted in the past 24 hours. AF 50s.     Pertinent review of systems items noted above, all other systems are negative. Current medications reviewed.    Physical Examination    Allergies   Allergen Reactions    Latex Swelling    Penicillins Hives    Codeine Anxiety     Vitals:    25 1601   BP: (!) 145/90   Pulse: 67   Resp: 27   Temp: 99.9 °F (37.7 °C)   SpO2: 95%     Vital signs are stable  No apparent distress.  General:  Chronically ill appearing, obese  Cardiovascular:  irregular rhythm, bradycardic rate, no murmur  Respiratory:  Lung sounds are diminished, bibasilar rales   Abdomen:  soft, nontender  Extremities:   no lower extremity edema  Skin:  Dry, warm    Labs reviewed:  Recent Results (from the past 12 hour(s))   POCT Glucose    Collection Time: 25  4:41 AM   Result Value Ref Range    POC Glucose 144 (H) 65 - 100 mg/dL    Performed by: Farrukh Natarajan    POCT Glucose    Collection Time: 25  6:59 AM   Result Value Ref Range    POC Glucose 177 (H) 65 - 100 mg/dL    Performed by: Farrukh Natarajan    Blood Gas, Arterial    Collection Time: 25  7:03 AM   Result Value Ref Range    pH, Arterial 7.26 (L) 7.35 - 7.45      pCO2, Arterial 50 (H) 35 - 45 mmHg    pO2, Arterial 70 (L) 80 - 100 mmHg    O2 Sat, Arterial 91 (L) 95 -

## 2025-01-27 NOTE — CONSULTS
Palliative Medicine  Patient Name: Nida Graves  YOB: 1975  MRN: [id number]  Age: 49 y.o.  Gender: female    Date of Initial Consult: 2025  Date of Service: 2025  Time: 3:41 PM  Provider: GINNY Chaparro CNP  Hospital Day: 16  Admit Date: 2025  Referring Provider: Dr. Shahid       Reasons for Consultation:  Goals of Care    HISTORY OF PRESENT ILLNESS (HPI):   Nida Graves is a 49 y.o. female with a past medical history of obesity was 270# now down to 240#/ BMI 37),DM with peripheral neuropathy, asthma (previously followed by Dr. Johnson with [facility]), chronic back pain, debility, who was admitted on 2025 from home  with a diagnosis of Shortness of breath, hypoxia.   CXR: LLL airspace disease, interstitial edema  ECHO : EF 25-30%, decrease RV function, mod TR, LA dilation.     25:   Assessment midmorning. Pt with significant decline within the last 24 hours - now intubated given respiratory distress and increasing oxygen requirements, hypotensive and needed CVC and arterial line placement, on levophed gtt. , Nannette, at bedside.     25:   Assessment midmorning in conjunction with palliative LCSW, Margo Gaitan. Pt extubated, confused but able to communicate with a few words in short periods. On HFNC at time of my assessment, 30L/45%. Chart review later in afternoon showed increasing oxygen requirements, 60L/90%.      Psychosocial: patient is  to spouse Nannette Valle [phone]  She has a dtr age 21 who is graduating from [employer] this Spring.  Her nephew lives with her (autism, age 27)  Patient's spouse does all the cooking, cleaning, household chores and works full time.   Patient has chronic debility, ambulates with walker for past 3 years, able to get around house, watches TV., independent with dressing/ bathing but fall risk., (spouse just purchased walk in tub)   Patient's mother and brother both  in .   No AMD documents: patient  would trust her spouse to make medical decisions if unable.     PALLIATIVE DIAGNOSES:    Acute on chronic respiratory failure, multifactorial  Aspiration, asthma, pulmonary edema, weak muscles/ deconditioned  Suspected PE, on tx (unable to lie flat for CTA)   HFrEF (EF 25-30%)   Aflutter with slow ventricular response  Possible NSTEMI  Obesity, diabetes, peripheral neuropathy, chronic Bilateral LE below knee leg pain  Chronic back pain  Mood disorder, on medication  Palliative medicine encounter   Care goals discussion     ASSESSMENT AND PLAN:     Follow up visit -- chart reviewed thoroughly prior to seeing pt; including notes, labs, imaging, interval events since last palliative encounter. Visit in conjunction with xavier Aragon LCSW. Pt extubated yesterday, able to interact a bit albeit confused, drowsy, significant audible secretions and on HFNC.   No family at bedside. Spoke with Nannette, pt , via phone.   Goals of care --   Discussed interval events in  since last palliative encounter in depth with Nannette. He was there when she was extubated yesterday and is excited that she is off the vent. Did discuss our visit today, where she was able to communicate a bit but was very confused - pulling off oxygen, thought she was at home. She was able to tell us a few details about her medical conditions. Overall, does not have capacity for decision making at this time.   Relayed to Nannette that we discussed intubation/ventilation with pt today. She does recall the vent, states it was \"horrible\". Inquired with pt if she would want to be reintubated if needed, she states \"no\" but was unable to tell our team her understanding if it was not to be replaced, what that would mean for her. Did discuss this with Nannette, as well as even within a few hours she is having significant increase in oxygen requirements, also share our concern she may have trouble protecting her airway as her respiratory status declines. He

## 2025-01-27 NOTE — PLAN OF CARE
Problem: ABCDS Injury Assessment  Goal: Absence of physical injury  1/26/2025 2117 by Rosa Isela Chadwick, RN  Outcome: Progressing  Flowsheets (Taken 1/26/2025 2000)  Absence of Physical Injury: Implement safety measures based on patient assessment     Problem: Skin/Tissue Integrity  Goal: Absence of new skin breakdown  Description: 1.  Monitor for areas of redness and/or skin breakdown  2.  Assess vascular access sites hourly  3.  Every 4-6 hours minimum:  Change oxygen saturation probe site  4.  Every 4-6 hours:  If on nasal continuous positive airway pressure, respiratory therapy assess nares and determine need for appliance change or resting period.  1/27/2025 0957 by Valentina Nelson RN  Outcome: Progressing  1/26/2025 2117 by Rosa Isela Chadwick RN  Outcome: Progressing     Problem: Safety - Adult  Goal: Free from fall injury  1/27/2025 0957 by Valentina Nelson RN  Outcome: Progressing  1/26/2025 2117 by Rosa Isela Chadwick RN  Outcome: Progressing  Flowsheets (Taken 1/26/2025 2000)  Free From Fall Injury:   Instruct family/caregiver on patient safety   Based on caregiver fall risk screen, instruct family/caregiver to ask for assistance with transferring infant if caregiver noted to have fall risk factors     Problem: Gastrointestinal - Adult  Goal: Minimal or absence of nausea and vomiting  1/26/2025 2117 by oRsa Isela Chadwick RN  Outcome: Progressing  Flowsheets (Taken 1/26/2025 2000)  Minimal or absence of nausea and vomiting:   Provide nonpharmacologic comfort measures as appropriate   Administer ordered antiemetic medications as needed   Advance diet as tolerated, if ordered   Nutrition consult to assist patient with adequate nutrition and appropriate food choices  Goal: Maintains or returns to baseline bowel function  1/26/2025 2117 by Rosa Isela Chadwick, RN  Outcome: Progressing  Flowsheets (Taken 1/26/2025 2000)  Maintains or returns to baseline bowel function:   Assess bowel function   Administer ordered  medications as needed   Encourage mobilization and activity   Nutrition consult to assist patient with appropriate food choices  Goal: Maintains adequate nutritional intake  1/26/2025 2117 by Rosa Isela Chadwick, RN  Outcome: Progressing  Flowsheets (Taken 1/26/2025 2000)  Maintains adequate nutritional intake: Monitor intake and output, weight and lab values

## 2025-01-27 NOTE — PROGRESS NOTES
Bipap ordered from nightshift, however nurse had concerns about Ms. Graves not protecting her airway and able to pull mask off if need be.  Dr. Salvador we will repeat ABG in an hour on nasal cannula and revisit.

## 2025-01-27 NOTE — CARE COORDINATION
Currently on 1L NC.  Pending PT/OT eval.  Patient continues to be followed by [facility].  Insurance will require beatrice.       CARLOS Grissom

## 2025-01-27 NOTE — PLAN OF CARE
Speech LAnguage Pathology Dysphagia EVALUATION    Patient: Nida Graves (49 y.o. female)  Date: 1/27/2025  Primary Diagnosis: Atrial fibrillation, unspecified type (HCC) [I48.91]  Atrial flutter, unspecified type (HCC) [I48.92]  Congestive heart failure, unspecified HF chronicity, unspecified heart failure type (HCC) [I50.9]  Acute hypoxic respiratory failure [J96.01]  Procedure(s) (LRB):  Left heart cath / coronary angiography (N/A)     Precautions: aspiration,  Fall Risk, General Precautions     Time In: 1002  Time Out: 1017    DIET RECOMMENDATIONS: Clear liquid and moderately thick liquids    SWALLOW SAFETY PRECAUTIONS: 1:1 assistance with ALL PO intake, STRICT aspiration and GERD precautions, monitor pt closely for s/s aspiration, meds crushed if able in applesauce or pudding, FEED ONLY IF AWAKE AND ALERT.  Hold PO intake if concerns for aspiration are present and notify MD.   ASSESSMENT :  Based on the objective data described below, the patient presents with mod-severe oropharyngeal dysphagia, negatively impacted by significant secretions and respiratory status.     Pt seen for bedside sw evaluation, see details below.   Pt admitted with A-fib, CHF, acute hypoxic respiratory failure. Pt transferred back to ICU, subsequently intubated 1- and extubated 1-.  Hx as noted below. Pt denies prior dysphagia.     Pt noted to have wet vocal quality upon admission, copious clear secretions that pt was cued to expectorate and clinician suctioned from oral cavity. Pt is to start Robinul this date. NG tube was reportedly removed with extubation.   Oral phase with global weakness, spillage of liquids via spoon and delayed but funcational a-p propulsion. Pharyngeal phase delayed and weak to palpation. Pt's vocal quality clears following elicited cough but secretions and cough persists throughout assessment. Aspiration risk is present and significant. Discussed observation and recommendations with MD and nsg.  in bed  Vocal Quality:  (wet, clears with elicited cough and suction of secretions)  Consistency Presented: Ice Chips;Thin;Moderately Thick;Pureed  How Presented: SLP-fed/Presented;Spoon;Straw  Bolus Acceptance: No impairment  Bolus Formation/Control: Impaired  Type of Impairment: Delayed;Spillage;Lip closure  Propulsion: Delayed (# of seconds)  Oral Residue: None  Initiation of Swallow: Delayed (# of seconds)  Laryngeal Elevation: Weak  Aspiration Signs/Symptoms: Other (comment) (persistent cough and wet vocal quality of secretions, unclear if this is also related to possible aspiration)  Pharyngeal Phase Characteristics: Suspected pharyngeal residue;Altered vocal quality  Effective Modifications: None  Cues for Modifications: Moderate  Oral Phase  Oral Phase - Comment: mod          Voice:  Vocal Quality:  (wet, clears with elicited cough and suction of secretions)  Baseline Vocal Quality:  (unknown)      After Treatment:  Patient left in no apparent distress in bed, Call bell left within reach, and Nursing notified     Pain:  VAS (numerical) 0    COMMUNICATION/EDUCATION:   Swallow safety precautions, Aspiration precautions, Diet recommendations, MBS recommendations, Compensatory strategies, and Prognosis and SLP POC education provided to Patient via explanation, all questions/concerns addressed. Patient verbalizes understanding and requires reinforcement.    The patient's plan of care including recommendations, planned interventions, and recommended diet changes were discussed with: Registered nurse and Physician.    Patient/family agree to work toward stated goals and plan of care    Thank you,  Tiffany Olivera M.S. CCC-SLP  Minutes: 15

## 2025-01-27 NOTE — PROGRESS NOTES
CRITICAL CARE PROGRESS NOTE    Name: Nida Graves   : 1975   MRN: [id number]   Date: 2025      Diagnoses/problem list:   Acute hypoxic and hypercapnic respiratory failure  Acute kidney injury  Atrial flutter  GI bleed  Nosebleed, resolved  NSTEMI  Biventricular failure  Acute HFrEF, 25 to 30%  Diabetes  Morbid obesity  Acute metabolic/septic encephalopathy    24-hour events:   Extubated yesterday.  O2 requirements increased from 1 L to 5 L this morning.  Has pooling of secretions in posterior oropharynx.  Able to cough up and suction at this time.  Encouraged frequent coughing.  Added glycopyrrolate and Mucomyst nebs with chest PT.  Discussed with RT.  Will place her on HFNC.    Assessment and plan:   Ms. Graves is a 49-year-old female with PMH chronic debility and bedbound status, diabetes, asthma, obesity, who presented for shortness of breath.  Shortness of breath worsening over the past few days.  Upon presentation ED patient found to be hypoxic necessitating O2 via NC.  She was also found to be in A-fib with slow ventricular response.  She was transferred out of ICU on . Early morning  upgraded to ICU again due to severe hypoxia and hypercapnia requiring intubation.     NEUROLOGICAL:    Continue home Cymbalta and bupropion  Avoid sedating medications  Delirium precautions    PULMONOLOGY:   Extubated on   Currently on HFNC, continue to wean  Mucomyst nebs and glycopyrrolate  Persistent bibasal atelectasis on CXR  DuoNebs  Chest PT    CARDIOVASCULAR:   BP stable without vasopressors  LVEF 20-30%, right ventricle overload, moderate TR   GDMT for HFrEF as tolerated  Diuresis as needed  A-fib is rate controlled at this time  Off amiodarone due to bradycardia currently in aflutter  May need AUGUSTIN/cardioversion and LHC prior to discharge  [facility] following    GASTROINTESTINAL:   Protonix 40mg IV BID due to concern for GI bleed  Noted to have dark stools . Occult stool  images, vital signs, I/O's, and examined patient.  I have discussed the case and the plan and management of the patient's care with the consulting services, the bedside nurses and the respiratory therapist.      NOTE OF PERSONAL INVOLVEMENT IN CARE   This patient has a high probability of imminent, clinically significant deterioration, which requires the highest level of preparedness to intervene urgently. I participated in the decision-making and personally managed or directed the management of the following life and organ supporting interventions that required my frequent assessment to treat or prevent imminent deterioration.    I personally spent 35 minutes of critical care time.  This is time spent at this critically ill patient's bedside actively involved in patient care as well as the coordination of care.  This does not include any procedural time which has been billed separately.      Josue Salvador MD   Critical Care Medicine  [facility], [place]

## 2025-01-28 ENCOUNTER — APPOINTMENT (OUTPATIENT)
Facility: HOSPITAL | Age: 50
DRG: 004 | End: 2025-01-28
Payer: COMMERCIAL

## 2025-01-28 LAB
ABO + RH BLD: NORMAL
ANION GAP SERPL CALC-SCNC: 6 MMOL/L (ref 2–12)
ARTERIAL PATENCY WRIST A: YES
ARTERIAL PATENCY WRIST A: YES
BASE DEFICIT BLDA-SCNC: 0.3 MMOL/L
BASE DEFICIT BLDA-SCNC: 3.1 MMOL/L
BASOPHILS # BLD: 0.05 K/UL (ref 0–0.1)
BASOPHILS NFR BLD: 0.3 % (ref 0–1)
BDY SITE: ABNORMAL
BDY SITE: ABNORMAL
BLD PROD TYP BPU: NORMAL
BLOOD BANK BLOOD PRODUCT EXPIRATION DATE: NORMAL
BLOOD BANK DISPENSE STATUS: NORMAL
BLOOD BANK ISBT PRODUCT BLOOD TYPE: 5100
BLOOD BANK UNIT TYPE AND RH: NORMAL
BLOOD GROUP ANTIBODIES SERPL: NEGATIVE
BODY TEMPERATURE: 100.5
BODY TEMPERATURE: 98.8
BPU ID: NORMAL
BUN SERPL-MCNC: 19 MG/DL (ref 6–20)
BUN/CREAT SERPL: 28 (ref 12–20)
CA-I BLD-MCNC: 9.2 MG/DL (ref 8.5–10.1)
CHLORIDE SERPL-SCNC: 115 MMOL/L (ref 97–108)
CO2 SERPL-SCNC: 25 MMOL/L (ref 21–32)
COHGB MFR BLD: 0.3 % (ref 1–2)
COHGB MFR BLD: 0.3 % (ref 1–2)
CREAT SERPL-MCNC: 0.67 MG/DL (ref 0.55–1.02)
CROSSMATCH RESULT: NORMAL
DIFFERENTIAL METHOD BLD: ABNORMAL
ECHO AO ROOT DIAM: 3.1 CM
ECHO AO ROOT INDEX: 1.46 CM/M2
ECHO AV AREA PEAK VELOCITY: 2 CM2
ECHO AV AREA VTI: 2 CM2
ECHO AV AREA/BSA PEAK VELOCITY: 0.9 CM2/M2
ECHO AV AREA/BSA VTI: 0.9 CM2/M2
ECHO AV MEAN GRADIENT: 5 MMHG
ECHO AV MEAN VELOCITY: 1 M/S
ECHO AV PEAK GRADIENT: 9 MMHG
ECHO AV PEAK VELOCITY: 1.5 M/S
ECHO AV VELOCITY RATIO: 0.53
ECHO AV VTI: 23.3 CM
ECHO BSA: 2.29 M2
ECHO IVC EXP: 0.6 CM
ECHO LA DIAMETER INDEX: 2.12 CM/M2
ECHO LA DIAMETER: 4.5 CM
ECHO LA TO AORTIC ROOT RATIO: 1.45
ECHO LV EDV A2C: 90 ML
ECHO LV EDV A4C: 127 ML
ECHO LV EDV INDEX A4C: 60 ML/M2
ECHO LV EDV NDEX A2C: 42 ML/M2
ECHO LV EJECTION FRACTION A2C: 33 %
ECHO LV EJECTION FRACTION A4C: 33 %
ECHO LV EJECTION FRACTION BIPLANE: 33 % (ref 55–100)
ECHO LV ESV A2C: 60 ML
ECHO LV ESV A4C: 86 ML
ECHO LV ESV INDEX A2C: 28 ML/M2
ECHO LV ESV INDEX A4C: 41 ML/M2
ECHO LV FRACTIONAL SHORTENING: 16 % (ref 28–44)
ECHO LV INTERNAL DIMENSION DIASTOLE INDEX: 1.75 CM/M2
ECHO LV INTERNAL DIMENSION DIASTOLIC: 3.7 CM (ref 3.9–5.3)
ECHO LV INTERNAL DIMENSION SYSTOLIC INDEX: 1.46 CM/M2
ECHO LV INTERNAL DIMENSION SYSTOLIC: 3.1 CM
ECHO LV IVSD: 1.2 CM (ref 0.6–0.9)
ECHO LV MASS 2D: 138.2 G (ref 67–162)
ECHO LV MASS INDEX 2D: 65.2 G/M2 (ref 43–95)
ECHO LV POSTERIOR WALL DIASTOLIC: 1.1 CM (ref 0.6–0.9)
ECHO LV RELATIVE WALL THICKNESS RATIO: 0.59
ECHO LVOT AREA: 3.8 CM2
ECHO LVOT AV VTI INDEX: 0.52
ECHO LVOT DIAM: 2.2 CM
ECHO LVOT MEAN GRADIENT: 1 MMHG
ECHO LVOT PEAK GRADIENT: 2 MMHG
ECHO LVOT PEAK VELOCITY: 0.8 M/S
ECHO LVOT STROKE VOLUME INDEX: 21.9 ML/M2
ECHO LVOT SV: 46.4 ML
ECHO LVOT VTI: 12.2 CM
ECHO MV REGURGITANT PEAK GRADIENT: 8 MMHG
ECHO MV REGURGITANT PEAK VELOCITY: 1.4 M/S
ECHO RV FREE WALL PEAK S': 9.4 CM/S
ECHO RV TAPSE: 1.2 CM (ref 1.7–?)
ECHO TV REGURGITANT MAX VELOCITY: 1.7 M/S
ECHO TV REGURGITANT PEAK GRADIENT: 12 MMHG
EKG ATRIAL RATE: 278 BPM
EKG ATRIAL RATE: 285 BPM
EKG DIAGNOSIS: NORMAL
EKG DIAGNOSIS: NORMAL
EKG P AXIS: 259 DEGREES
EKG P AXIS: 61 DEGREES
EKG Q-T INTERVAL: 498 MS
EKG Q-T INTERVAL: 568 MS
EKG QRS DURATION: 138 MS
EKG QRS DURATION: 140 MS
EKG QTC CALCULATION (BAZETT): 444 MS
EKG QTC CALCULATION (BAZETT): 557 MS
EKG R AXIS: -50 DEGREES
EKG R AXIS: -53 DEGREES
EKG T AXIS: -52 DEGREES
EKG T AXIS: -69 DEGREES
EKG VENTRICULAR RATE: 48 BPM
EKG VENTRICULAR RATE: 58 BPM
EOSINOPHIL # BLD: 0.56 K/UL (ref 0–0.4)
EOSINOPHIL NFR BLD: 3.1 % (ref 0–7)
ERYTHROCYTE [DISTWIDTH] IN BLOOD BY AUTOMATED COUNT: 16.4 % (ref 11.5–14.5)
EST. AVERAGE GLUCOSE BLD GHB EST-MCNC: 120 MG/DL
FIO2 ON VENT: 100 %
FIO2 ON VENT: 50 %
GAS FLOW.O2 SETTING OXYMISER: 14
GAS FLOW.O2 SETTING OXYMISER: 15
GLUCOSE BLD STRIP.AUTO-MCNC: 101 MG/DL (ref 65–100)
GLUCOSE BLD STRIP.AUTO-MCNC: 101 MG/DL (ref 65–100)
GLUCOSE BLD STRIP.AUTO-MCNC: 104 MG/DL (ref 65–100)
GLUCOSE BLD STRIP.AUTO-MCNC: 98 MG/DL (ref 65–100)
GLUCOSE SERPL-MCNC: 100 MG/DL (ref 65–100)
HBA1C MFR BLD: 5.8 % (ref 4–5.6)
HCO3 BLDA-SCNC: 22 MMOL/L (ref 22–26)
HCO3 BLDA-SCNC: 23 MMOL/L (ref 22–26)
HCT VFR BLD AUTO: 30.4 % (ref 35–47)
HGB BLD-MCNC: 9.4 G/DL (ref 11.5–16)
IMM GRANULOCYTES # BLD AUTO: 0.34 K/UL (ref 0–0.04)
IMM GRANULOCYTES NFR BLD AUTO: 1.9 % (ref 0–0.5)
IPAP/PIP: 10
LYMPHOCYTES # BLD: 2.25 K/UL (ref 0.8–3.5)
LYMPHOCYTES NFR BLD: 12.4 % (ref 12–49)
MCH RBC QN AUTO: 29.1 PG (ref 26–34)
MCHC RBC AUTO-ENTMCNC: 30.9 G/DL (ref 30–36.5)
MCV RBC AUTO: 94.1 FL (ref 80–99)
METHGB MFR BLD: 0.4 % (ref 0–1.4)
METHGB MFR BLD: 0.6 % (ref 0–1.4)
MONOCYTES # BLD: 1.74 K/UL (ref 0–1)
MONOCYTES NFR BLD: 9.6 % (ref 5–13)
MRSA DNA SPEC QL NAA+PROBE: NOT DETECTED
NEUTS SEG # BLD: 13.15 K/UL (ref 1.8–8)
NEUTS SEG NFR BLD: 72.7 % (ref 32–75)
NRBC # BLD: 0.03 K/UL (ref 0–0.01)
NRBC BLD-RTO: 0.2 PER 100 WBC
OXYHGB MFR BLD: 95.6 % (ref 95–99)
OXYHGB MFR BLD: 96.4 % (ref 95–99)
PCO2 BLDA: 34 MMHG (ref 35–45)
PCO2 BLDA: 36 MMHG (ref 35–45)
PEEP RESPIRATORY: 12
PEEP RESPIRATORY: 12
PERFORMED BY:: ABNORMAL
PERFORMED BY:: NORMAL
PH BLDA: 7.39 (ref 7.35–7.45)
PH BLDA: 7.45 (ref 7.35–7.45)
PLATELET # BLD AUTO: 298 K/UL (ref 150–400)
PMV BLD AUTO: 12 FL (ref 8.9–12.9)
PO2 BLDA: 107 MMHG (ref 80–100)
PO2 BLDA: 87 MMHG (ref 80–100)
POTASSIUM SERPL-SCNC: 4.3 MMOL/L (ref 3.5–5.1)
RBC # BLD AUTO: 3.23 M/UL (ref 3.8–5.2)
SAO2 % BLD: 96 % (ref 95–99)
SAO2 % BLD: 97 % (ref 95–99)
SAO2% DEVICE SAO2% SENSOR NAME: ABNORMAL
SAO2% DEVICE SAO2% SENSOR NAME: ABNORMAL
SODIUM SERPL-SCNC: 146 MMOL/L (ref 136–145)
SPECIMEN EXP DATE BLD: NORMAL
SPECIMEN SITE: ABNORMAL
SPECIMEN SITE: ABNORMAL
TRANSFUSION STATUS PATIENT QL: NORMAL
TROPONIN I SERPL HS-MCNC: 86 NG/L (ref 0–51)
UNIT DIVISION: 0
UNIT ISSUE DATE/TIME: NORMAL
VENTILATION MODE VENT: ABNORMAL
VENTILATION MODE VENT: ABNORMAL
WBC # BLD AUTO: 18.1 K/UL (ref 3.6–11)

## 2025-01-28 PROCEDURE — 6360000002 HC RX W HCPCS: Performed by: INTERNAL MEDICINE

## 2025-01-28 PROCEDURE — 94640 AIRWAY INHALATION TREATMENT: CPT

## 2025-01-28 PROCEDURE — 84484 ASSAY OF TROPONIN QUANT: CPT

## 2025-01-28 PROCEDURE — 6360000002 HC RX W HCPCS: Performed by: STUDENT IN AN ORGANIZED HEALTH CARE EDUCATION/TRAINING PROGRAM

## 2025-01-28 PROCEDURE — 94003 VENT MGMT INPAT SUBQ DAY: CPT

## 2025-01-28 PROCEDURE — 36600 WITHDRAWAL OF ARTERIAL BLOOD: CPT

## 2025-01-28 PROCEDURE — 2500000003 HC RX 250 WO HCPCS: Performed by: STUDENT IN AN ORGANIZED HEALTH CARE EDUCATION/TRAINING PROGRAM

## 2025-01-28 PROCEDURE — 6370000000 HC RX 637 (ALT 250 FOR IP): Performed by: INTERNAL MEDICINE

## 2025-01-28 PROCEDURE — 93005 ELECTROCARDIOGRAM TRACING: CPT | Performed by: NURSE PRACTITIONER

## 2025-01-28 PROCEDURE — 2500000003 HC RX 250 WO HCPCS: Performed by: INTERNAL MEDICINE

## 2025-01-28 PROCEDURE — 85025 COMPLETE CBC W/AUTO DIFF WBC: CPT

## 2025-01-28 PROCEDURE — 36415 COLL VENOUS BLD VENIPUNCTURE: CPT

## 2025-01-28 PROCEDURE — 80048 BASIC METABOLIC PNL TOTAL CA: CPT

## 2025-01-28 PROCEDURE — 94668 MNPJ CHEST WALL SBSQ: CPT

## 2025-01-28 PROCEDURE — 83036 HEMOGLOBIN GLYCOSYLATED A1C: CPT

## 2025-01-28 PROCEDURE — 82803 BLOOD GASES ANY COMBINATION: CPT

## 2025-01-28 PROCEDURE — 93321 DOPPLER ECHO F-UP/LMTD STD: CPT

## 2025-01-28 PROCEDURE — 2580000003 HC RX 258: Performed by: INTERNAL MEDICINE

## 2025-01-28 PROCEDURE — 87641 MR-STAPH DNA AMP PROBE: CPT

## 2025-01-28 PROCEDURE — 6370000000 HC RX 637 (ALT 250 FOR IP): Performed by: STUDENT IN AN ORGANIZED HEALTH CARE EDUCATION/TRAINING PROGRAM

## 2025-01-28 PROCEDURE — 2000000000 HC ICU R&B

## 2025-01-28 PROCEDURE — 71045 X-RAY EXAM CHEST 1 VIEW: CPT

## 2025-01-28 PROCEDURE — 6360000002 HC RX W HCPCS: Performed by: NURSE PRACTITIONER

## 2025-01-28 PROCEDURE — 93005 ELECTROCARDIOGRAM TRACING: CPT | Performed by: INTERNAL MEDICINE

## 2025-01-28 PROCEDURE — 2700000000 HC OXYGEN THERAPY PER DAY

## 2025-01-28 PROCEDURE — 94761 N-INVAS EAR/PLS OXIMETRY MLT: CPT

## 2025-01-28 PROCEDURE — 6360000004 HC RX CONTRAST MEDICATION

## 2025-01-28 RX ADMIN — BUPROPION HYDROCHLORIDE 75 MG: 75 TABLET, FILM COATED ORAL at 21:29

## 2025-01-28 RX ADMIN — ATORVASTATIN CALCIUM 20 MG: 20 TABLET, FILM COATED ORAL at 21:29

## 2025-01-28 RX ADMIN — DOPAMINE HYDROCHLORIDE IN DEXTROSE 5 MCG/KG/MIN: 3.2 INJECTION, SOLUTION INTRAVENOUS at 17:07

## 2025-01-28 RX ADMIN — ACETYLCYSTEINE 600 MG: 200 SOLUTION ORAL; RESPIRATORY (INHALATION) at 19:24

## 2025-01-28 RX ADMIN — ACETYLCYSTEINE 600 MG: 200 SOLUTION ORAL; RESPIRATORY (INHALATION) at 07:34

## 2025-01-28 RX ADMIN — CEFEPIME 2000 MG: 2 INJECTION, POWDER, FOR SOLUTION INTRAVENOUS at 17:06

## 2025-01-28 RX ADMIN — PROPOFOL 20 MCG/KG/MIN: 10 INJECTION, EMULSION INTRAVENOUS at 14:02

## 2025-01-28 RX ADMIN — ASPIRIN 81 MG 81 MG: 81 TABLET ORAL at 07:46

## 2025-01-28 RX ADMIN — PERFLUTREN 2 ML: 6.52 INJECTION, SUSPENSION INTRAVENOUS at 21:14

## 2025-01-28 RX ADMIN — GLYCOPYRROLATE 0.1 MG: 0.2 INJECTION INTRAMUSCULAR; INTRAVENOUS at 07:46

## 2025-01-28 RX ADMIN — BUDESONIDE 500 MCG: 0.5 INHALANT ORAL at 19:23

## 2025-01-28 RX ADMIN — BUPROPION HYDROCHLORIDE 75 MG: 75 TABLET, FILM COATED ORAL at 07:46

## 2025-01-28 RX ADMIN — BUDESONIDE 500 MCG: 0.5 INHALANT ORAL at 07:34

## 2025-01-28 RX ADMIN — PROPOFOL 20 MCG/KG/MIN: 10 INJECTION, EMULSION INTRAVENOUS at 21:05

## 2025-01-28 RX ADMIN — SODIUM CHLORIDE, PRESERVATIVE FREE 10 ML: 5 INJECTION INTRAVENOUS at 21:29

## 2025-01-28 RX ADMIN — IPRATROPIUM BROMIDE AND ALBUTEROL SULFATE 1 DOSE: 2.5; .5 SOLUTION RESPIRATORY (INHALATION) at 19:23

## 2025-01-28 RX ADMIN — SODIUM CHLORIDE, PRESERVATIVE FREE 10 ML: 5 INJECTION INTRAVENOUS at 07:47

## 2025-01-28 RX ADMIN — CEFEPIME 2000 MG: 2 INJECTION, POWDER, FOR SOLUTION INTRAVENOUS at 08:20

## 2025-01-28 RX ADMIN — PROPOFOL 15 MCG/KG/MIN: 10 INJECTION, EMULSION INTRAVENOUS at 03:57

## 2025-01-28 RX ADMIN — DULOXETINE HYDROCHLORIDE 30 MG: 30 CAPSULE, DELAYED RELEASE ORAL at 07:46

## 2025-01-28 RX ADMIN — ACETAMINOPHEN 650 MG: 650 SOLUTION ORAL at 04:45

## 2025-01-28 RX ADMIN — IPRATROPIUM BROMIDE AND ALBUTEROL SULFATE 1 DOSE: 2.5; .5 SOLUTION RESPIRATORY (INHALATION) at 11:32

## 2025-01-28 RX ADMIN — IPRATROPIUM BROMIDE AND ALBUTEROL SULFATE 1 DOSE: 2.5; .5 SOLUTION RESPIRATORY (INHALATION) at 07:34

## 2025-01-28 ASSESSMENT — PULMONARY FUNCTION TESTS
PIF_VALUE: 23
PIF_VALUE: 20
PIF_VALUE: 23
PIF_VALUE: 22
PIF_VALUE: 23
PIF_VALUE: 25
PIF_VALUE: 23
PIF_VALUE: 25
PIF_VALUE: 23
PIF_VALUE: 25
PIF_VALUE: 23
PIF_VALUE: 18
PIF_VALUE: 23
PIF_VALUE: 25
PIF_VALUE: 23
PIF_VALUE: 18
PIF_VALUE: 23

## 2025-01-28 ASSESSMENT — PAIN SCALES - GENERAL
PAINLEVEL_OUTOF10: 0

## 2025-01-28 NOTE — PROGRESS NOTES
CRITICAL CARE PROGRESS NOTE    Name: Nida Graves   : 1975   MRN: [id number]   Date: 2025      Diagnoses/problem list:   Acute hypoxic and hypercapnic respiratory failure  Acute kidney injury  Atrial flutter with slow ventricular response  GI bleed  Nosebleed, resolved  NSTEMI  Biventricular failure  Acute HFrEF, 25 to 30%  Diabetes  Morbid obesity  Acute metabolic/septic encephalopathy    24-hour events:   Reintubated yesterday due to severe hypoxia and inability to clear respiratory secretions.  On 100% FiO2 and PEEP of 12 and saw her this morning.  Saturation was 100%, a drop of the FiO2 to 85% and requested RT to keep titrating down as tolerated.  Patient is awake and following commands.  On dopamine at 3 mcg/kg/min with heart rate in the low 50s.    Assessment and plan:   Ms. Graves is a 49-year-old female with PMH chronic debility and bedbound status, diabetes, asthma, obesity, who presented for shortness of breath.  Shortness of breath worsening over the past few days.  Upon presentation ED patient found to be hypoxic necessitating O2 via NC.  She was also found to be in A-fib with slow ventricular response.  She was transferred out of ICU on . Early morning  upgraded to ICU again due to severe hypoxia and hypercapnia requiring intubation.     NEUROLOGICAL:    Continue home Cymbalta and bupropion  Titrate sedation for RASS goal 0  SAT daily for neuroexam  Delirium precautions    PULMONOLOGY:   Extubated on , reintubated on  due to inability to protect airway  Will need tracheostomy once FiO2 improves  Mucomyst nebs and chest PT  Persistent bibasal atelectasis on CXR  DuoNebs    CARDIOVASCULAR:   Dopamine to keep MAP above 65 and heart rate above 40  LVEF 20-30%, right ventricle overload, moderate TR   GDMT for HFrEF as tolerated  Diuresis as needed  Atrial flutter with slow ventricular conduction at this time  Avoid AVN blocking agents  May need AUGUSTIN/cardioversion and LHC prior

## 2025-01-28 NOTE — PROCEDURES
Procedure Note - Intubation:   Performed by Marylin Salvador MD .   Staff Intensivist    Diagnosis: Acute hypoxic respiratory failure  Insertion Date: 1/27/25 Time: 7:00 PM   Obtained Consent? Yes; emergent   Procedure Location:  ICU.      Immediately prior to the procedure, the patient was reevaluated and found suitable for the planned procedure and any planned medications.  Immediately prior to the procedure a time out was called to verify the correct patient, procedure, equipment, staff, and marking as appropriate.    Medications given were etomidate 20 mg and rocuronium 100 mg.   A number 7.5 cuffed   ETT was placed to 23 cm at the teeth.   Placement was evaluated by noting bilateral, symmetric breath sounds and good end-tidal CO2 detector color change .    Attempts required: 1.    Complications: none.    The procedure was tolerated well.

## 2025-01-28 NOTE — PROGRESS NOTES
CARDIOLOGY PROGRESS NOTE      Patient Name: Nida Graves  Age: 49 y.o.  Gender:female  :1975  MRN: [id number]    Patient seen and examined. This is a patient with a history of diabetes, asthma who presented with shortness of breath and hypoxia now being followed for atrial flutter and CHF. In ICU, recently extubated. Off pressor support. Awake and talking during my interview.      Remains intubated, 40% FIO2, tele - afib 50s. No pressors      Remains intubated, 35% FIO2, tele - AF 50s average, MAP 60s     : Denies any acute symptoms. High flow O2 on, although patient removing while talking. Denies chest pains.    : Re-intubated  to severe hypoxia. On dopamine and propofol.      Telemetry reviewed, there were no events noted in the past 24 hours. AF 50s.      Pertinent review of systems items noted above, all other systems are negative. Current medications reviewed..    Telemetry reviewed, there were no events noted in the past 24 hours.    Pertinent review of systems items noted above, all other systems are negative. Current medications reviewed.    Physical Examination    Allergies   Allergen Reactions    Latex Swelling    Penicillins Hives    Codeine Anxiety     Vitals:    25 1330   BP: 103/61   Pulse: 57   Resp:    Temp: 98.6 °F (37 °C)   SpO2: 100%     Vital signs are stable  Intubated  Heart has a irregular rhythm, denita.  No murmur  Lung- are ventilator breaths  Abdomen is soft, nontender, normal bowel sounds.  Extremities have mild LE edema  Skin is dry and warm.    Labs reviewed:  Recent Results (from the past 12 hour(s))   Basic Metabolic Panel    Collection Time: 25  4:48 AM   Result Value Ref Range    Sodium 146 (H) 136 - 145 mmol/L    Potassium 4.3 3.5 - 5.1 mmol/L    Chloride 115 (H) 97 - 108 mmol/L    CO2 25 21 - 32 mmol/L    Anion Gap 6 2 - 12 mmol/L    Glucose 100 65 - 100 mg/dL    BUN 19 6 - 20 mg/dL    Creatinine 0.67 0.55 - 1.02 mg/dL    BUN/Creatinine  yesterday  likely myocardial injury secondary to acute hypoxia, aflutter   Continue aspirin.  Continue statin  Cath once off vent, medically stable  Limited echo today  Acute systolic heart failure with reduced ejection fraction, unknown etiolog  EF 25-30%  CVP stable, off diuretics  Initiate GDMT as BP allows  Acute hypoxic hypercapnic respiratory failure:   likely from respiratory fatigue/poor effort/  Reintubated 1/27/25 for severe hypoxia.  ICU team following  Hypertension  Hyperlipidemia, on statin therapy  Diabetes mellitus: tx plan per primary team  Anemia  Critically ill multiple comorbid conditions    Please do not hesitate to call if additional questions arise.    Noa Saha PA-C  1/28/2025     I have reviewed the history and exam and performed the MDM in its entirety. Repeat limited echo for EF.

## 2025-01-28 NOTE — PROGRESS NOTES
Per chart review, pt now intubated due to severe hypoxia and inability to clear respiratory secretions. Will d/c SLP consult at this time and please reconsult as medically appropriate when pt's status improves.

## 2025-01-28 NOTE — PROGRESS NOTES
0715: Bedside report received from GUILLERMO Tolbert.  Pt in bed resting with eyes closed, no s/s of distress.  HR 54, BP 86/53; dopamine at 5 mcg/kg/min.  RASS -2; propofol at 15mcg/kg/min.      0735: Propofol down to 10mcg/kg/min, pt easily arousable and following commands.    0830: Intensivist rounding.  FiO2 down to 85%, pt satting 100%.    1010: IDRs completed.  Plan to start enteral feeds today, continue to wean O2.  Recheck EGK and MRSA swab.      1405: Tube feed hung per order.    1907: Bedside report given to GUILLERMO Tolbert.

## 2025-01-28 NOTE — CARE COORDINATION
CM reviewed Pt medicals, Pt lives with her  and Nephew.    Pt  assist Pt with ADL.     [facility] is following Pt for IR. [facility] will need to get auth.    [facility] is following Pt, as of this time Pt  wants everything done.     Per IDR   Pt had been extubated and had to be re intubated yesterday. Possible pneumonia.    Start tube feeds.    Pt stated that if needed she would be ok with trach.

## 2025-01-28 NOTE — CONSULTS
Comprehensive Nutrition Assessment    Type and Reason for Visit:  Reassess (Follow Up), Consult for TF     Nutrition Recommendations/Plan:   Once OGT confirmed in appropriate position, start TF w/ Promote (peptide-based HP substitute) @ 20 ml/hr and advance 10 ml/hr every 4 hours to a goal rate of 40 ml/hr + 2 Prosource BID (4 total daily). Administer 200ml free water every 3 hours  TF @ goal provides: 960 kcals, 61 g protein, and 798 ml free water  Prosource x2 BID: +240 kcals, +60 g pro  Propofol @ 12.2 ml/hr: +322 kcals   TOTAL: 1522 kcals (80%/25 kcals/kg IBW), 121 g protein (2 g/kg IBW), and 2398 ml water daily  Monitor lytes and correct as needed - hx of hypernatremia this admission requiring higher FWF, will monitor for need to increase  Monitor TF admin, lab values, fluid status, and bowel fxn/abd exam     Malnutrition Assessment:  Malnutrition Status:  Mild malnutrition (01/21/25 1213)    Context:  Chronic Illness       Nutrition Assessment:    Presented w/ SOB, arrhythmia. Admitted to ICU 1/13-1/16. Returned from floor 1/19 d/t hypoxia, hypercarbia, and encephalopathy. Persistent hypoxia on biPAP, intubated. Pt currently on vent w/ sedation. Per discussion in MDRs, plans to initiate TF today, wean propofol as able, and continue diuresing. EF 30%. Pt w/ % intake documented 1/14, 0% later in admission. (1/23) TF initiated 1/21 - Promote @30 + 4PS. Concern for GI bleed w/ dark stools, TF held 1/22 for CTA, neg, restarted and now @ goal. (1/28) Pt extubated 1/26, SLP rec mod thick CLD. Incr O2 needs 1/27,copious secretions, reintubated 1/27. Per discussion in MDRs, plan to restart TF. Will provides recs. Labs: , h/h 9.4/30.4, Na 146, Phos 2.3. Meds: SSI, cefipime, dopamine @ 5 mcg/kg/min, Propofol @ 12.2 ml/hr (322 kcals)    Nutrition Related Findings:    NFPE w/ possible mild muscle wasting. No reported n/v/d, last BM 1/28, FMS since 1/26. No noted hx of dysphagia. +2 BUE, +3/+4 BLE edema,  worsened. Wound Type: None       Current Nutrition Intake & Therapies:    Average Meal Intake: 1-25%  Average Supplements Intake: NPO  ADULT DIET; Clear Liquid; Moderately Thick (Honey)    Anthropometric Measures:  Height: 170.2 cm (5' 7.01\")  Ideal Body Weight (IBW): 135 lbs (61 kg)    Current Body Weight: 101.3 kg (223 lb 5.2 oz), 161.3 % IBW. Weight Source: Bed scale  Current BMI (kg/m2): 35  Usual Body Weight: 90.7 kg (200 lb) (Nov 2024)  % Weight Change (Calculated): 8.9  Weight Adjustment For: No Adjustment  BMI Categories: Obese Class 1 (BMI 30.0-34.9)    Estimated Daily Nutrient Needs:  Energy Requirements Based On: Formula  Weight Used for Energy Requirements:  (~1 wk ago given fluid+)  Energy (kcal/day): 1908 kcals/d (PSU 2003b)  Weight Used for Protein Requirements: Ideal  Protein (g/day): 122-129 g/day (2-2.1 g/kg IBW)  Method Used for Fluid Requirements: Defer to provider  Fluid (ml/day): 2029 ml/day    Nutrition Diagnosis:   Inadequate oral intake related to impaired respiratory function as evidenced by intubation    Nutrition Interventions:   Food and/or Nutrient Delivery: Start Tube Feeding  Nutrition Education/Counseling: No recommendation at this time  Coordination of Nutrition Care: Continue to monitor while inpatient, Interdisciplinary Rounds  Plan of Care discussed with: MDR team    Goals:  Goals: Tolerate nutrition support at goal rate, by next RD assessment  Type of Goal: New goal  Previous Goal Met: Progressing toward Goal(s)    Nutrition Monitoring and Evaluation:   Behavioral-Environmental Outcomes: None Identified  Food/Nutrient Intake Outcomes: Enteral Nutrition Intake/Tolerance  Physical Signs/Symptoms Outcomes: Biochemical Data, Constipation, GI Status, Weight, Fluid Status or Edema    Discharge Planning:    Too soon to determine     Winnie Garrison RD  Contact: [phone]

## 2025-01-28 NOTE — PLAN OF CARE
Problem: Safety - Medical Restraint  Goal: Remains free of injury from restraints (Restraint for Interference with Medical Device)  Description: INTERVENTIONS:  1. Determine that other, less restrictive measures have been tried or would not be effective before applying the restraint  2. Evaluate the patient's condition at the time of restraint application  3. Inform patient/family regarding the reason for restraint  4. Q2H: Monitor safety, psychosocial status, comfort, nutrition and hydration  Recent Flowsheet Documentation  Taken 1/28/2025 0800 by Nora Wilson RN  Remains free of injury from restraints (restraint for interference with medical device):   Determine that other, less restrictive measures have been tried or would not be effective before applying the restraint   Evaluate the patient's condition at the time of restraint application   Inform patient/family regarding the reason for restraint   Every 2 hours: Monitor safety, psychosocial status, comfort, nutrition and hydration  Taken 1/27/2025 2000 by Tomasz Saini RN  Remains free of injury from restraints (restraint for interference with medical device):   Determine that other, less restrictive measures have been tried or would not be effective before applying the restraint   Evaluate the patient's condition at the time of restraint application   Inform patient/family regarding the reason for restraint   Every 2 hours: Monitor safety, psychosocial status, comfort, nutrition and hydration

## 2025-01-29 LAB
ANION GAP SERPL CALC-SCNC: 4 MMOL/L (ref 2–12)
ARTERIAL PATENCY WRIST A: YES
BASE DEFICIT BLDA-SCNC: 1.5 MMOL/L
BASOPHILS # BLD: 0 K/UL (ref 0–0.1)
BASOPHILS NFR BLD: 0 % (ref 0–1)
BDY SITE: ABNORMAL
BODY TEMPERATURE: 98.9
BUN SERPL-MCNC: 17 MG/DL (ref 6–20)
BUN/CREAT SERPL: 31 (ref 12–20)
CA-I BLD-MCNC: 9.3 MG/DL (ref 8.5–10.1)
CHLORIDE SERPL-SCNC: 113 MMOL/L (ref 97–108)
CO2 SERPL-SCNC: 23 MMOL/L (ref 21–32)
COHGB MFR BLD: 0.3 % (ref 1–2)
CREAT SERPL-MCNC: 0.55 MG/DL (ref 0.55–1.02)
DIFFERENTIAL METHOD BLD: ABNORMAL
EOSINOPHIL # BLD: 0.68 K/UL (ref 0–0.4)
EOSINOPHIL NFR BLD: 5 % (ref 0–7)
ERYTHROCYTE [DISTWIDTH] IN BLOOD BY AUTOMATED COUNT: 15.7 % (ref 11.5–14.5)
FIO2 ON VENT: 40 %
GAS FLOW.O2 SETTING OXYMISER: 14
GLUCOSE BLD STRIP.AUTO-MCNC: 123 MG/DL (ref 65–100)
GLUCOSE BLD STRIP.AUTO-MCNC: 140 MG/DL (ref 65–100)
GLUCOSE BLD STRIP.AUTO-MCNC: 173 MG/DL (ref 65–100)
GLUCOSE BLD STRIP.AUTO-MCNC: 178 MG/DL (ref 65–100)
GLUCOSE SERPL-MCNC: 155 MG/DL (ref 65–100)
HCO3 BLDA-SCNC: 22 MMOL/L (ref 22–26)
HCT VFR BLD AUTO: 28.5 % (ref 35–47)
HGB BLD-MCNC: 8.9 G/DL (ref 11.5–16)
IMM GRANULOCYTES # BLD AUTO: 0 K/UL
IMM GRANULOCYTES NFR BLD AUTO: 0 %
IPAP/PIP: 12
LYMPHOCYTES # BLD: 2.18 K/UL (ref 0.8–3.5)
LYMPHOCYTES NFR BLD: 16 % (ref 12–49)
MCH RBC QN AUTO: 28.8 PG (ref 26–34)
MCHC RBC AUTO-ENTMCNC: 31.2 G/DL (ref 30–36.5)
MCV RBC AUTO: 92.2 FL (ref 80–99)
METHGB MFR BLD: 0.3 % (ref 0–1.4)
MONOCYTES # BLD: 1.5 K/UL (ref 0–1)
MONOCYTES NFR BLD: 11 % (ref 5–13)
MYELOCYTES NFR BLD MANUAL: 1 %
NEUTS BAND NFR BLD MANUAL: 2 % (ref 0–6)
NEUTS SEG # BLD: 9.11 K/UL (ref 1.8–8)
NEUTS SEG NFR BLD: 65 % (ref 32–75)
NRBC # BLD: 0 K/UL (ref 0–0.01)
NRBC BLD-RTO: 0 PER 100 WBC
OXYHGB MFR BLD: 94.4 % (ref 95–99)
PCO2 BLDA: 33 MMHG (ref 35–45)
PEEP RESPIRATORY: 7
PERFORMED BY:: ABNORMAL
PH BLDA: 7.44 (ref 7.35–7.45)
PLATELET # BLD AUTO: 293 K/UL (ref 150–400)
PMV BLD AUTO: 11.4 FL (ref 8.9–12.9)
PO2 BLDA: 80 MMHG (ref 80–100)
POTASSIUM SERPL-SCNC: 3.8 MMOL/L (ref 3.5–5.1)
RBC # BLD AUTO: 3.09 M/UL (ref 3.8–5.2)
RBC MORPH BLD: ABNORMAL
SAO2 % BLD: 95 % (ref 95–99)
SAO2% DEVICE SAO2% SENSOR NAME: ABNORMAL
SODIUM SERPL-SCNC: 140 MMOL/L (ref 136–145)
SPECIMEN SITE: ABNORMAL
WBC # BLD AUTO: 13.6 K/UL (ref 3.6–11)

## 2025-01-29 PROCEDURE — 2000000000 HC ICU R&B

## 2025-01-29 PROCEDURE — 6370000000 HC RX 637 (ALT 250 FOR IP): Performed by: INTERNAL MEDICINE

## 2025-01-29 PROCEDURE — 6360000002 HC RX W HCPCS: Performed by: NURSE PRACTITIONER

## 2025-01-29 PROCEDURE — 6370000000 HC RX 637 (ALT 250 FOR IP): Performed by: STUDENT IN AN ORGANIZED HEALTH CARE EDUCATION/TRAINING PROGRAM

## 2025-01-29 PROCEDURE — 36600 WITHDRAWAL OF ARTERIAL BLOOD: CPT

## 2025-01-29 PROCEDURE — 85025 COMPLETE CBC W/AUTO DIFF WBC: CPT

## 2025-01-29 PROCEDURE — 36415 COLL VENOUS BLD VENIPUNCTURE: CPT

## 2025-01-29 PROCEDURE — 82803 BLOOD GASES ANY COMBINATION: CPT

## 2025-01-29 PROCEDURE — 82962 GLUCOSE BLOOD TEST: CPT

## 2025-01-29 PROCEDURE — 6360000002 HC RX W HCPCS: Performed by: INTERNAL MEDICINE

## 2025-01-29 PROCEDURE — 80048 BASIC METABOLIC PNL TOTAL CA: CPT

## 2025-01-29 PROCEDURE — 94640 AIRWAY INHALATION TREATMENT: CPT

## 2025-01-29 PROCEDURE — 2700000000 HC OXYGEN THERAPY PER DAY

## 2025-01-29 PROCEDURE — 2500000003 HC RX 250 WO HCPCS: Performed by: STUDENT IN AN ORGANIZED HEALTH CARE EDUCATION/TRAINING PROGRAM

## 2025-01-29 PROCEDURE — 94668 MNPJ CHEST WALL SBSQ: CPT

## 2025-01-29 PROCEDURE — 6360000002 HC RX W HCPCS: Performed by: STUDENT IN AN ORGANIZED HEALTH CARE EDUCATION/TRAINING PROGRAM

## 2025-01-29 PROCEDURE — 94761 N-INVAS EAR/PLS OXIMETRY MLT: CPT

## 2025-01-29 PROCEDURE — 2500000003 HC RX 250 WO HCPCS: Performed by: INTERNAL MEDICINE

## 2025-01-29 PROCEDURE — 94003 VENT MGMT INPAT SUBQ DAY: CPT

## 2025-01-29 PROCEDURE — 99222 1ST HOSP IP/OBS MODERATE 55: CPT | Performed by: OTOLARYNGOLOGY

## 2025-01-29 RX ADMIN — PROPOFOL 25 MCG/KG/MIN: 10 INJECTION, EMULSION INTRAVENOUS at 22:25

## 2025-01-29 RX ADMIN — BUPROPION HYDROCHLORIDE 75 MG: 75 TABLET, FILM COATED ORAL at 20:10

## 2025-01-29 RX ADMIN — IPRATROPIUM BROMIDE AND ALBUTEROL SULFATE 1 DOSE: 2.5; .5 SOLUTION RESPIRATORY (INHALATION) at 12:01

## 2025-01-29 RX ADMIN — ATORVASTATIN CALCIUM 20 MG: 20 TABLET, FILM COATED ORAL at 20:10

## 2025-01-29 RX ADMIN — IPRATROPIUM BROMIDE AND ALBUTEROL SULFATE 1 DOSE: 2.5; .5 SOLUTION RESPIRATORY (INHALATION) at 19:26

## 2025-01-29 RX ADMIN — BUDESONIDE 500 MCG: 0.5 INHALANT ORAL at 19:26

## 2025-01-29 RX ADMIN — PROPOFOL 25 MCG/KG/MIN: 10 INJECTION, EMULSION INTRAVENOUS at 15:12

## 2025-01-29 RX ADMIN — ASPIRIN 81 MG 81 MG: 81 TABLET ORAL at 08:50

## 2025-01-29 RX ADMIN — IPRATROPIUM BROMIDE AND ALBUTEROL SULFATE 1 DOSE: 2.5; .5 SOLUTION RESPIRATORY (INHALATION) at 07:34

## 2025-01-29 RX ADMIN — SODIUM CHLORIDE, PRESERVATIVE FREE 10 ML: 5 INJECTION INTRAVENOUS at 08:51

## 2025-01-29 RX ADMIN — SODIUM CHLORIDE, PRESERVATIVE FREE 10 ML: 5 INJECTION INTRAVENOUS at 20:10

## 2025-01-29 RX ADMIN — DOPAMINE HYDROCHLORIDE IN DEXTROSE 5 MCG/KG/MIN: 3.2 INJECTION, SOLUTION INTRAVENOUS at 20:12

## 2025-01-29 RX ADMIN — PROPOFOL 25 MCG/KG/MIN: 10 INJECTION, EMULSION INTRAVENOUS at 10:49

## 2025-01-29 RX ADMIN — BUDESONIDE 500 MCG: 0.5 INHALANT ORAL at 07:34

## 2025-01-29 RX ADMIN — ACETYLCYSTEINE 600 MG: 200 SOLUTION ORAL; RESPIRATORY (INHALATION) at 07:35

## 2025-01-29 RX ADMIN — DULOXETINE HYDROCHLORIDE 30 MG: 30 CAPSULE, DELAYED RELEASE ORAL at 08:50

## 2025-01-29 RX ADMIN — CEFEPIME 2000 MG: 2 INJECTION, POWDER, FOR SOLUTION INTRAVENOUS at 08:47

## 2025-01-29 RX ADMIN — CEFEPIME 2000 MG: 2 INJECTION, POWDER, FOR SOLUTION INTRAVENOUS at 17:59

## 2025-01-29 RX ADMIN — BUPROPION HYDROCHLORIDE 75 MG: 75 TABLET, FILM COATED ORAL at 08:50

## 2025-01-29 RX ADMIN — WHITE PETROLATUM,ZINC OXIDE: 57; 17 PASTE TOPICAL at 15:10

## 2025-01-29 RX ADMIN — ACETYLCYSTEINE 600 MG: 200 SOLUTION ORAL; RESPIRATORY (INHALATION) at 19:26

## 2025-01-29 RX ADMIN — WHITE PETROLATUM,ZINC OXIDE: 57; 17 PASTE TOPICAL at 20:11

## 2025-01-29 RX ADMIN — PROPOFOL 20 MCG/KG/MIN: 10 INJECTION, EMULSION INTRAVENOUS at 03:41

## 2025-01-29 RX ADMIN — CEFEPIME 2000 MG: 2 INJECTION, POWDER, FOR SOLUTION INTRAVENOUS at 00:57

## 2025-01-29 ASSESSMENT — PULMONARY FUNCTION TESTS
PIF_VALUE: 20
PIF_VALUE: 19
PIF_VALUE: 19
PIF_VALUE: 20
PIF_VALUE: 19
PIF_VALUE: 20
PIF_VALUE: 19
PIF_VALUE: 20
PIF_VALUE: 19
PIF_VALUE: 20
PIF_VALUE: 19
PIF_VALUE: 20
PIF_VALUE: 23
PIF_VALUE: 20
PIF_VALUE: 21
PIF_VALUE: 20
PIF_VALUE: 19
PIF_VALUE: 20
PIF_VALUE: 20
PIF_VALUE: 19
PIF_VALUE: 20
PIF_VALUE: 19
PIF_VALUE: 20

## 2025-01-29 ASSESSMENT — PAIN SCALES - GENERAL
PAINLEVEL_OUTOF10: 0

## 2025-01-29 NOTE — PLAN OF CARE
Problem: ABCDS Injury Assessment  Goal: Absence of physical injury  Recent Flowsheet Documentation  Taken 1/29/2025 0800 by Shana Barbosa RN  Absence of Physical Injury: Implement safety measures based on patient assessment     Problem: Chronic Conditions and Co-morbidities  Goal: Patient's chronic conditions and co-morbidity symptoms are monitored and maintained or improved  Recent Flowsheet Documentation  Taken 1/29/2025 0800 by Shana Barbosa RN  Care Plan - Patient's Chronic Conditions and Co-Morbidity Symptoms are Monitored and Maintained or Improved:   Monitor and assess patient's chronic conditions and comorbid symptoms for stability, deterioration, or improvement   Collaborate with multidisciplinary team to address chronic and comorbid conditions and prevent exacerbation or deterioration     Problem: Discharge Planning  Goal: Discharge to home or other facility with appropriate resources  Recent Flowsheet Documentation  Taken 1/29/2025 0800 by Shana Barbosa RN  Discharge to home or other facility with appropriate resources:   Arrange for needed discharge resources and transportation as appropriate   Identify discharge learning needs (meds, wound care, etc)   Arrange for interpreters to assist at discharge as needed     Problem: Safety - Adult  Goal: Free from fall injury  Recent Flowsheet Documentation  Taken 1/29/2025 0800 by Shana Barbosa RN  Free From Fall Injury:   Instruct family/caregiver on patient safety   Based on caregiver fall risk screen, instruct family/caregiver to ask for assistance with transferring infant if caregiver noted to have fall risk factors     Problem: Neurosensory - Adult  Goal: Achieves stable or improved neurological status  Recent Flowsheet Documentation  Taken 1/29/2025 0800 by Shana Barbosa RN  Achieves stable or improved neurological status:   Assess for and report changes in neurological status   Initiate measures to prevent increased intracranial pressure

## 2025-01-29 NOTE — PROGRESS NOTES
CRITICAL CARE PROGRESS NOTE    Name: Nida Graves   : 1975   MRN: [id number]   Date: 2025      Diagnoses/problem list:   Acute hypoxic and hypercapnic respiratory failure  Acute kidney injury  Atrial flutter with slow ventricular response  GI bleed  Nosebleed, resolved  NSTEMI  Biventricular failure  Acute HFrEF, 25 to 30%  Diabetes  Morbid obesity  Acute metabolic/septic encephalopathy    24-hour events:   Increase secretions noted.  Receiving Mucomyst nebs.  FiO2 down to 40% and PEEP is at 7.  She is awake and following commands.  Still on dopamine at 3 mcg/kg/min.  ENT consulted for tracheostomy.    Assessment and plan:   Ms. Graves is a 49-year-old female with PMH chronic debility and bedbound status, diabetes, asthma, obesity, who presented for shortness of breath.  Shortness of breath worsening over the past few days.  Upon presentation ED patient found to be hypoxic necessita.  Ting O2 via NC.  She was also found to be in A-fib with slow ventricular response.  She was transferred out of ICU on . Early morning  upgraded to ICU again due to severe hypoxia and hypercapnia requiring intubation.     NEUROLOGICAL:    Continue home Cymbalta and bupropion  Titrate sedation for RASS goal 0  SAT daily for neuroexam  Delirium precautions    PULMONOLOGY:   Extubated on , reintubated on  due to inability to protect airway  ENT consulted on  for tracheostomy  Mucomyst nebs and chest PT  Persistent bibasal atelectasis on CXR  DuoNebs    CARDIOVASCULAR:   Dopamine to keep MAP above 65 and heart rate above 40  LVEF 20-30%, right ventricle overload, moderate TR   GDMT for HFrEF as tolerated  Diuresis as needed  Atrial flutter with slow ventricular conduction at this time  Avoid AVN blocking agents  May need AUGUSTIN/cardioversion and LHC prior to discharge  [facility] following    GASTROINTESTINAL:   Protonix 40mg IV BID due to concern for GI bleed  Noted to have dark stools .

## 2025-01-29 NOTE — CONSULTS
Otolaryngology-Head and Neck Surgery  Inpatient Consultation    Patient: Nida Graves  YOB: 1975  MRN: [id number]  Date of Service:  2025    Reason for Consultation:   Chief Complaint   Patient presents with    Shortness of Breath       Requesting Physician: ICU     History of Present Illness: Nida Graves is a 49 y.o. female who was admitted with respiratory failure, CHF, atrial flutter. Intubated  - , extubated but required reintubation     Weaning FiO2     Past Medical History:  Past Medical History:   Diagnosis Date    Allergy     Asthma     Diabetes (HCC)        Past Surgical History:   Past Surgical History:   Procedure Laterality Date     SECTION      X 2    GYN      novasure, csection scar revision       Medications:   Current Outpatient Medications   Medication Instructions    albuterol (PROVENTIL) 2.5 mg, Inhalation, EVERY 4 HOURS PRN    benzonatate (TESSALON) 100 MG capsule Take 1-2 capsules TID prn cough.    budesonide (PULMICORT FLEXHALER) 180 MCG/ACT AEPB inhaler TAKE 1 PUFF BY MOUTH TWICE A DAY    buPROPion (WELLBUTRIN XL) 150 MG extended release tablet 1 tablet, Oral, DAILY    diclofenac sodium (VOLTAREN) 4 g, Topical, 4 TIMES DAILY    DULoxetine (CYMBALTA) 30 mg, Oral, DAILY    etodolac (LODINE) 400 mg, 2 TIMES DAILY    fluticasone-salmeterol (WIXELA INHUB) 500-50 MCG/ACT AEPB diskus inhaler 500 puffs, 2 TIMES DAILY    losartan (COZAAR) 25 MG tablet 1 tablet, Oral, DAILY    Spiriva HandiHaler 18 mcg, Oral, ONCE    traZODone (DESYREL) 50 MG tablet 1 tablet, Oral, DAILY       Allergies:   Allergies   Allergen Reactions    Latex Swelling    Penicillins Hives    Codeine Anxiety       Social History:   Social History     Socioeconomic History    Marital status: Single     Spouse name: None    Number of children: None    Years of education: None    Highest education level: None   Tobacco Use    Smoking status: Never    Smokeless tobacco: Never   Substance and

## 2025-01-29 NOTE — WOUND CARE
(LODINE) 400 MG tablet Take 1 tablet by mouth 2 times daily (Patient not taking: Reported on 1/12/2025)      fluticasone-salmeterol (WIXELA INHUB) 500-50 MCG/ACT AEPB diskus inhaler Take 500 puffs by mouth 2 times daily (Patient not taking: Reported on 1/12/2025)            BP (!) 103/59   Pulse (!) 45   Temp 99.7 °F (37.6 °C)   Resp 14   Ht 1.702 m (5' 7.01\")   Wt 101.3 kg (223 lb 5.2 oz)   SpO2 99%   BMI 34.97 kg/m²     Lab Results   Component Value Date/Time    WBC 13.6 (H) 01/29/2025 05:48 AM    LABA1C 5.8 (H) 01/28/2025 04:48 AM    POCGLU 173 (H) 01/29/2025 12:09 PM    POCGLU 140 (H) 01/29/2025 07:00 AM    HGB 8.9 (L) 01/29/2025 05:48 AM    HCT 28.5 (L) 01/29/2025 05:48 AM     01/29/2025 05:48 AM       ADULT TUBE FEEDING; Orogastric; Peptide Based High Protein; Continuous; 20; Yes; 10; Q 4 hours; 40; 200; Q 3 hours; Protein; 2 Doses; BID         ASSESSMENT     Wound Identification & Type: MASD split in skin of gluteal cleft  Dressing change: applied zinc paste     Contributing Factors: decreased mobility, shear force, obesity, malnutrition, incontinence of stool, and incontinence of urine    Wound Buttocks Right small open spot to inner right buttock (Active)   Wound Image   01/29/25 1410   Wound Etiology Other 01/29/25 1410   Dressing Status New dressing applied 01/29/25 1410   Wound Cleansed Soap and water 01/27/25 0400   Dressing/Treatment Zinc paste;Foam 01/29/25 1410   Dressing Change Due 01/30/25 01/29/25 1410   Wound Assessment Pink/red 01/29/25 1410   Drainage Amount None (dry) 01/29/25 1410   Odor None 01/29/25 1410   Gely-wound Assessment Denuded 01/29/25 1410   Margins Attached edges 01/29/25 1410   Wound Thickness Description not for Pressure Injury Partial thickness 01/29/25 1410   Number of days:         Assessment:   -A&O x: ERUM  -Verbally communicative (Y/N): N  -Mobile (Y/N): N       -Assists in repositioning (Y/N): N    -Able to turn independently (Y/N): N      PLAN     Skin Care  & Pressure Relief Recommendations  Minimize Layers of Linen, Turn/reposition approximately every 2 hours, Pillow / Wedges, Manage incontinence, Promote continence; Skin protective lotion/cream to buttocks and sacrum daily and as needed with incontinence care, and Offloading boots    Quintin: Petra    Nutritionist Consulted: Yes  Nutrition Status: Mild malnutrition    Support Surface: Isolibrium    Physician/Provider notified:   Recommendations: MASD split in skin of gluteal cleft.  No wound to sacrum or coccyx.    -For gluteal cleft and perianal: Apply Remedy Z-Guard Protectant Paste BID and prn for soiling.  If soiled, remove top soiled layer only and reapply.  Use Remedy Phytoplex Barrier Cream wipes for gentle cleansing.  To completely remove, use Remedy Foaming Cleanser or soap and water.      Prevention:  Apply Optifoam Border Sacral foam dressing to sacrum every three days to redistribute pressure from bony prominence and prevent pressure and friction injury to skin.  Rn is to gently peel back foam dressing for shift integumentary assessment and re-secure.  Patient has a indwelling sim catheter to manage  incontinence.  Patient has a fecal management system to manage GI incontinence.  Use foam wedge to turn q2h at 30 degree angle to offload sacrum.  Maintain heel boots on both feet while in bed for offloading of the heels.  Maintain HOB at 30 degrees or less, if not contraindicated, to reduce pressure to buttocks and sacrum.  Raise foot of bed to help prevent friction and shear injury from sliding down in the bed.  Re-consult [facility] for other skin/wound care concerns.     Discharge Wound Care Needs: see above    Teaching completed with:   [] Patient           [] Family member       [] Caregiver          [] Nursing  [] Other    Patient/Caregiver Teaching:  Level of patient/caregiver understanding able to:   [] Indicates understanding       [] Needs reinforcement  [] Unsuccessful      [] Verbal Understanding  []

## 2025-01-29 NOTE — PROGRESS NOTES
CARDIOLOGY PROGRESS NOTE      Patient Name: Nida Graves  Age: 49 y.o.  Gender:female  :1975  MRN: [id number]    Patient seen and examined. This is a patient with a history of diabetes, asthma who presented with shortness of breath and hypoxia now being followed for atrial flutter and CHF. In ICU, recently extubated. Off pressor support. Awake and talking during my interview.     Extubated , reintubated  d/t hypoxia. Remains intubated and sedated. Remains in afib, hr 40-50s, remains on dopamine drip.     Telemetry reviewed, there were no events noted in the past 24 hours. AF 50s.      Pertinent review of systems items noted above, all other systems are negative. Current medications reviewed..    Physical Examination    Allergies   Allergen Reactions    Latex Swelling    Penicillins Hives    Codeine Anxiety     Vitals:    25 1300   BP: (!) 103/59   Pulse: (!) 45   Resp: 14   Temp: 99.7 °F (37.6 °C)   SpO2: 99%     Vital signs are stable  Intubated  Heart has a irregular rhythm, denita.  No murmur  Lung- are ventilator breaths  Abdomen is soft, nontender, normal bowel sounds.  Extremities have mild LE edema  Skin is dry and warm.    Labs reviewed:  Recent Results (from the past 12 hour(s))   Basic Metabolic Panel    Collection Time: 25  5:48 AM   Result Value Ref Range    Sodium 140 136 - 145 mmol/L    Potassium 3.8 3.5 - 5.1 mmol/L    Chloride 113 (H) 97 - 108 mmol/L    CO2 23 21 - 32 mmol/L    Anion Gap 4 2 - 12 mmol/L    Glucose 155 (H) 65 - 100 mg/dL    BUN 17 6 - 20 mg/dL    Creatinine 0.55 0.55 - 1.02 mg/dL    BUN/Creatinine Ratio 31 (H) 12 - 20      Est, Glom Filt Rate >90 >60 ml/min/1.73m2    Calcium 9.3 8.5 - 10.1 mg/dL   CBC with Auto Differential    Collection Time: 25  5:48 AM   Result Value Ref Range    WBC 13.6 (H) 3.6 - 11.0 K/uL    RBC 3.09 (L) 3.80 - 5.20 M/uL    Hemoglobin 8.9 (L) 11.5 - 16.0 g/dL    Hematocrit 28.5 (L) 35.0 - 47.0 %    MCV 92.2 80.0 - 99.0 FL       Elevated troponin: peak of 639  Chest pain free yesterday  likely myocardial injury secondary to acute hypoxia, aflutter   Continue aspirin.  Continue statin  Cath once off vent, medically stable  Acute systolic heart failure with reduced ejection fraction, unknown etiolog  EF 25-30%  CVP stable, off diuretics  Initiate GDMT as BP allows  Limited echo 6/28 EF 30-35%, normal IVC size  Acute hypoxic hypercapnic respiratory failure:   likely from respiratory fatigue/poor effort  Reintubated 1/27/25 for severe hypoxia.  ICU team following  Hypertension  Hyperlipidemia, on statin therapy  Diabetes mellitus: tx plan per primary team  Anemia  Critically ill multiple comorbid conditions    Please do not hesitate to call if additional questions arise.    Shanice Maloney, RISHABHP  1/29/2025     I have reviewed the history and exam and performed the MDM in its entirety. Repeat limited echo for EF.

## 2025-01-29 NOTE — CARE COORDINATION
CM reviewed Pt medicals, Pt lives with her  and Nephew.     Pt  assist Pt with ADL.      [facility] is following Pt for IR. [facility] will need to get auth.     Palliative Care is following Pt, as of this time Pt  wants everything done.     Per IDR    Pt remains on the vent.    Pt is A&O, following commands.  Pt stated that she is comfortable, no pain.     Pt will receive a trach.     Pt on tube feeds, at goal.

## 2025-01-30 ENCOUNTER — ANESTHESIA (OUTPATIENT)
Facility: HOSPITAL | Age: 50
DRG: 004 | End: 2025-01-30
Payer: COMMERCIAL

## 2025-01-30 ENCOUNTER — APPOINTMENT (OUTPATIENT)
Facility: HOSPITAL | Age: 50
DRG: 004 | End: 2025-01-30
Payer: COMMERCIAL

## 2025-01-30 ENCOUNTER — ANESTHESIA EVENT (OUTPATIENT)
Facility: HOSPITAL | Age: 50
DRG: 004 | End: 2025-01-30
Payer: COMMERCIAL

## 2025-01-30 PROBLEM — I48.91 ATRIAL FIBRILLATION (HCC): Status: ACTIVE | Noted: 2025-01-30

## 2025-01-30 PROBLEM — R09.89 SUSPECTED DEEP VEIN THROMBOSIS (DVT): Status: ACTIVE | Noted: 2025-01-30

## 2025-01-30 PROBLEM — J96.21 ACUTE ON CHRONIC HYPOXIC RESPIRATORY FAILURE: Status: ACTIVE | Noted: 2025-01-12

## 2025-01-30 LAB
ANION GAP SERPL CALC-SCNC: 6 MMOL/L (ref 2–12)
BASOPHILS # BLD: 0.03 K/UL (ref 0–0.1)
BASOPHILS NFR BLD: 0.2 % (ref 0–1)
BUN SERPL-MCNC: 19 MG/DL (ref 6–20)
BUN/CREAT SERPL: 37 (ref 12–20)
CA-I BLD-MCNC: 9.2 MG/DL (ref 8.5–10.1)
CHLORIDE SERPL-SCNC: 111 MMOL/L (ref 97–108)
CO2 SERPL-SCNC: 22 MMOL/L (ref 21–32)
CREAT SERPL-MCNC: 0.52 MG/DL (ref 0.55–1.02)
DIFFERENTIAL METHOD BLD: ABNORMAL
EOSINOPHIL # BLD: 0.69 K/UL (ref 0–0.4)
EOSINOPHIL NFR BLD: 5.2 % (ref 0–7)
ERYTHROCYTE [DISTWIDTH] IN BLOOD BY AUTOMATED COUNT: 16 % (ref 11.5–14.5)
GLUCOSE BLD STRIP.AUTO-MCNC: 119 MG/DL (ref 65–100)
GLUCOSE BLD STRIP.AUTO-MCNC: 121 MG/DL (ref 65–100)
GLUCOSE BLD STRIP.AUTO-MCNC: 126 MG/DL (ref 65–100)
GLUCOSE BLD STRIP.AUTO-MCNC: 132 MG/DL (ref 65–100)
GLUCOSE SERPL-MCNC: 132 MG/DL (ref 65–100)
HCT VFR BLD AUTO: 28.2 % (ref 35–47)
HGB BLD-MCNC: 8.8 G/DL (ref 11.5–16)
IMM GRANULOCYTES # BLD AUTO: 0.1 K/UL (ref 0–0.04)
IMM GRANULOCYTES NFR BLD AUTO: 0.7 % (ref 0–0.5)
LYMPHOCYTES # BLD: 2.21 K/UL (ref 0.8–3.5)
LYMPHOCYTES NFR BLD: 16.5 % (ref 12–49)
MCH RBC QN AUTO: 28.8 PG (ref 26–34)
MCHC RBC AUTO-ENTMCNC: 31.2 G/DL (ref 30–36.5)
MCV RBC AUTO: 92.2 FL (ref 80–99)
MONOCYTES # BLD: 1.32 K/UL (ref 0–1)
MONOCYTES NFR BLD: 9.9 % (ref 5–13)
NEUTS SEG # BLD: 9.03 K/UL (ref 1.8–8)
NEUTS SEG NFR BLD: 67.5 % (ref 32–75)
NRBC # BLD: 0 K/UL (ref 0–0.01)
NRBC BLD-RTO: 0 PER 100 WBC
PERFORMED BY:: ABNORMAL
PLATELET # BLD AUTO: 270 K/UL (ref 150–400)
PMV BLD AUTO: 11.2 FL (ref 8.9–12.9)
POTASSIUM SERPL-SCNC: 3.9 MMOL/L (ref 3.5–5.1)
RBC # BLD AUTO: 3.06 M/UL (ref 3.8–5.2)
SODIUM SERPL-SCNC: 139 MMOL/L (ref 136–145)
WBC # BLD AUTO: 13.4 K/UL (ref 3.6–11)

## 2025-01-30 PROCEDURE — 6360000002 HC RX W HCPCS: Performed by: STUDENT IN AN ORGANIZED HEALTH CARE EDUCATION/TRAINING PROGRAM

## 2025-01-30 PROCEDURE — 3700000000 HC ANESTHESIA ATTENDED CARE: Performed by: OTOLARYNGOLOGY

## 2025-01-30 PROCEDURE — 2500000003 HC RX 250 WO HCPCS: Performed by: STUDENT IN AN ORGANIZED HEALTH CARE EDUCATION/TRAINING PROGRAM

## 2025-01-30 PROCEDURE — 2000000000 HC ICU R&B

## 2025-01-30 PROCEDURE — 6360000002 HC RX W HCPCS: Performed by: NURSE PRACTITIONER

## 2025-01-30 PROCEDURE — 2500000003 HC RX 250 WO HCPCS: Performed by: INTERNAL MEDICINE

## 2025-01-30 PROCEDURE — 6370000000 HC RX 637 (ALT 250 FOR IP): Performed by: STUDENT IN AN ORGANIZED HEALTH CARE EDUCATION/TRAINING PROGRAM

## 2025-01-30 PROCEDURE — 0B113F4 BYPASS TRACHEA TO CUTANEOUS WITH TRACHEOSTOMY DEVICE, PERCUTANEOUS APPROACH: ICD-10-PCS | Performed by: OTOLARYNGOLOGY

## 2025-01-30 PROCEDURE — 80048 BASIC METABOLIC PNL TOTAL CA: CPT

## 2025-01-30 PROCEDURE — 82962 GLUCOSE BLOOD TEST: CPT

## 2025-01-30 PROCEDURE — 2709999900 HC NON-CHARGEABLE SUPPLY: Performed by: OTOLARYNGOLOGY

## 2025-01-30 PROCEDURE — 2500000003 HC RX 250 WO HCPCS: Performed by: NURSE ANESTHETIST, CERTIFIED REGISTERED

## 2025-01-30 PROCEDURE — 6360000002 HC RX W HCPCS: Performed by: INTERNAL MEDICINE

## 2025-01-30 PROCEDURE — 6370000000 HC RX 637 (ALT 250 FOR IP): Performed by: INTERNAL MEDICINE

## 2025-01-30 PROCEDURE — 36415 COLL VENOUS BLD VENIPUNCTURE: CPT

## 2025-01-30 PROCEDURE — 31600 PLANNED TRACHEOSTOMY: CPT | Performed by: OTOLARYNGOLOGY

## 2025-01-30 PROCEDURE — 94003 VENT MGMT INPAT SUBQ DAY: CPT

## 2025-01-30 PROCEDURE — C1769 GUIDE WIRE: HCPCS | Performed by: OTOLARYNGOLOGY

## 2025-01-30 PROCEDURE — 94640 AIRWAY INHALATION TREATMENT: CPT

## 2025-01-30 PROCEDURE — 71045 X-RAY EXAM CHEST 1 VIEW: CPT

## 2025-01-30 PROCEDURE — 2700000000 HC OXYGEN THERAPY PER DAY

## 2025-01-30 PROCEDURE — 94761 N-INVAS EAR/PLS OXIMETRY MLT: CPT

## 2025-01-30 PROCEDURE — 3600000002 HC SURGERY LEVEL 2 BASE: Performed by: OTOLARYNGOLOGY

## 2025-01-30 PROCEDURE — 6360000002 HC RX W HCPCS: Performed by: NURSE ANESTHETIST, CERTIFIED REGISTERED

## 2025-01-30 PROCEDURE — 94668 MNPJ CHEST WALL SBSQ: CPT

## 2025-01-30 PROCEDURE — 3700000001 HC ADD 15 MINUTES (ANESTHESIA): Performed by: OTOLARYNGOLOGY

## 2025-01-30 PROCEDURE — 3600000012 HC SURGERY LEVEL 2 ADDTL 15MIN: Performed by: OTOLARYNGOLOGY

## 2025-01-30 PROCEDURE — 85025 COMPLETE CBC W/AUTO DIFF WBC: CPT

## 2025-01-30 RX ORDER — FUROSEMIDE 10 MG/ML
40 INJECTION INTRAMUSCULAR; INTRAVENOUS ONCE
Status: COMPLETED | OUTPATIENT
Start: 2025-01-30 | End: 2025-01-30

## 2025-01-30 RX ORDER — ROCURONIUM BROMIDE 10 MG/ML
INJECTION, SOLUTION INTRAVENOUS
Status: DISCONTINUED | OUTPATIENT
Start: 2025-01-30 | End: 2025-01-30 | Stop reason: SDUPTHER

## 2025-01-30 RX ORDER — FENTANYL CITRATE 50 UG/ML
INJECTION, SOLUTION INTRAMUSCULAR; INTRAVENOUS
Status: DISCONTINUED | OUTPATIENT
Start: 2025-01-30 | End: 2025-01-30 | Stop reason: SDUPTHER

## 2025-01-30 RX ORDER — MIDAZOLAM HYDROCHLORIDE 1 MG/ML
INJECTION, SOLUTION INTRAMUSCULAR; INTRAVENOUS
Status: DISCONTINUED | OUTPATIENT
Start: 2025-01-30 | End: 2025-01-30 | Stop reason: SDUPTHER

## 2025-01-30 RX ADMIN — DOPAMINE HYDROCHLORIDE IN DEXTROSE 5 MCG/KG/MIN: 3.2 INJECTION, SOLUTION INTRAVENOUS at 22:58

## 2025-01-30 RX ADMIN — IPRATROPIUM BROMIDE AND ALBUTEROL SULFATE 1 DOSE: 2.5; .5 SOLUTION RESPIRATORY (INHALATION) at 14:15

## 2025-01-30 RX ADMIN — FUROSEMIDE 40 MG: 10 INJECTION, SOLUTION INTRAMUSCULAR; INTRAVENOUS at 16:14

## 2025-01-30 RX ADMIN — IPRATROPIUM BROMIDE AND ALBUTEROL SULFATE 1 DOSE: 2.5; .5 SOLUTION RESPIRATORY (INHALATION) at 19:39

## 2025-01-30 RX ADMIN — PROPOFOL 20 MCG/KG/MIN: 10 INJECTION, EMULSION INTRAVENOUS at 17:59

## 2025-01-30 RX ADMIN — WHITE PETROLATUM,ZINC OXIDE: 57; 17 PASTE TOPICAL at 08:51

## 2025-01-30 RX ADMIN — ASPIRIN 81 MG 81 MG: 81 TABLET ORAL at 08:41

## 2025-01-30 RX ADMIN — WHITE PETROLATUM,ZINC OXIDE: 57; 17 PASTE TOPICAL at 21:52

## 2025-01-30 RX ADMIN — CEFEPIME 2000 MG: 2 INJECTION, POWDER, FOR SOLUTION INTRAVENOUS at 01:29

## 2025-01-30 RX ADMIN — FENTANYL CITRATE 50 MCG: 50 INJECTION INTRAMUSCULAR; INTRAVENOUS at 15:16

## 2025-01-30 RX ADMIN — BUPROPION HYDROCHLORIDE 75 MG: 75 TABLET, FILM COATED ORAL at 08:41

## 2025-01-30 RX ADMIN — ROCURONIUM BROMIDE 50 MG: 10 INJECTION, SOLUTION INTRAVENOUS at 15:16

## 2025-01-30 RX ADMIN — BUDESONIDE 500 MCG: 0.5 INHALANT ORAL at 07:26

## 2025-01-30 RX ADMIN — IPRATROPIUM BROMIDE AND ALBUTEROL SULFATE 1 DOSE: 2.5; .5 SOLUTION RESPIRATORY (INHALATION) at 07:26

## 2025-01-30 RX ADMIN — MIDAZOLAM 2 MG: 1 INJECTION INTRAMUSCULAR; INTRAVENOUS at 15:16

## 2025-01-30 RX ADMIN — PROPOFOL 25 MCG/KG/MIN: 10 INJECTION, EMULSION INTRAVENOUS at 10:53

## 2025-01-30 RX ADMIN — SODIUM CHLORIDE, PRESERVATIVE FREE 10 ML: 5 INJECTION INTRAVENOUS at 21:51

## 2025-01-30 RX ADMIN — DULOXETINE HYDROCHLORIDE 30 MG: 30 CAPSULE, DELAYED RELEASE ORAL at 08:41

## 2025-01-30 RX ADMIN — CEFEPIME 2000 MG: 2 INJECTION, POWDER, FOR SOLUTION INTRAVENOUS at 08:41

## 2025-01-30 RX ADMIN — SODIUM CHLORIDE, PRESERVATIVE FREE 10 ML: 5 INJECTION INTRAVENOUS at 08:42

## 2025-01-30 RX ADMIN — CEFEPIME 2000 MG: 2 INJECTION, POWDER, FOR SOLUTION INTRAVENOUS at 16:14

## 2025-01-30 RX ADMIN — PROPOFOL 25 MCG/KG/MIN: 10 INJECTION, EMULSION INTRAVENOUS at 05:17

## 2025-01-30 RX ADMIN — BUDESONIDE 500 MCG: 0.5 INHALANT ORAL at 19:39

## 2025-01-30 ASSESSMENT — PULMONARY FUNCTION TESTS
PIF_VALUE: 21
PIF_VALUE: 20
PIF_VALUE: 19
PIF_VALUE: 18
PIF_VALUE: 20
PIF_VALUE: 20
PIF_VALUE: 19
PIF_VALUE: 20
PIF_VALUE: 24
PIF_VALUE: 19
PIF_VALUE: 20
PIF_VALUE: 20
PIF_VALUE: 19
PIF_VALUE: 19
PIF_VALUE: 20
PIF_VALUE: 20
PIF_VALUE: 19
PIF_VALUE: 21
PIF_VALUE: 20
PIF_VALUE: 20
PIF_VALUE: 19
PIF_VALUE: 19
PIF_VALUE: 20
PIF_VALUE: 24
PIF_VALUE: 20
PIF_VALUE: 20
PIF_VALUE: 19
PIF_VALUE: 20
PIF_VALUE: 19
PIF_VALUE: 20
PIF_VALUE: 20
PIF_VALUE: 19
PIF_VALUE: 20
PIF_VALUE: 18
PIF_VALUE: 20
PIF_VALUE: 20
PIF_VALUE: 17
PIF_VALUE: 18
PIF_VALUE: 21
PIF_VALUE: 19
PIF_VALUE: 24
PIF_VALUE: 20
PIF_VALUE: 19
PIF_VALUE: 20
PIF_VALUE: 24
PIF_VALUE: 19
PIF_VALUE: 19
PIF_VALUE: 20
PIF_VALUE: 19
PIF_VALUE: 20
PIF_VALUE: 20
PIF_VALUE: 19
PIF_VALUE: 25
PIF_VALUE: 19
PIF_VALUE: 19
PIF_VALUE: 20
PIF_VALUE: 19
PIF_VALUE: 20
PIF_VALUE: 20
PIF_VALUE: 19
PIF_VALUE: 20
PIF_VALUE: 19
PIF_VALUE: 20
PIF_VALUE: 19
PIF_VALUE: 20

## 2025-01-30 ASSESSMENT — PAIN SCALES - GENERAL
PAINLEVEL_OUTOF10: 0

## 2025-01-30 NOTE — OP NOTE
Catheter Best Practices  Drainage tube clipped to bed;Catheter secured to thigh;Tamper seal intact;Bag below bladder;Bag not on floor;Lack of dependent loop in tubing;Drainage bag less than half full 01/24/25 1600   Status Draining;Patent 01/24/25 1600   Output (mL) 250 mL 01/24/25 1600       [REMOVED] External Urinary Catheter (Removed)   Site Assessment Clean,dry & intact 01/18/25 0800   Placement Repositioned 01/18/25 0800   Securement Method Securing device (Describe) 01/18/25 0800   Catheter Care Catheter/Wick replaced 01/18/25 0800   Perineal Care Yes 01/18/25 0800   Suction 40 mmgHg continuous 01/18/25 0800   Urine Color Winifred 01/18/25 0800   Urine Appearance Clear 01/18/25 0800   Output (mL) 400 mL 01/18/25 1308       [REMOVED] External Urinary Catheter (Removed)   Site Assessment Clean,dry & intact 01/26/25 0400   Placement Replaced 01/26/25 0400   Catheter Care Catheter/Wick replaced;Suction Canister/Tubing changed 01/26/25 0400   Perineal Care Yes 01/26/25 0400   Suction 40 mmgHg continuous 01/26/25 0400   Output (mL) 0 mL 01/26/25 0600       [REMOVED] Fecal Management System 01/22/25 (Removed)   Stool Appearance Loose;Watery 01/26/25 0000   Stool Color Black;Green 01/26/25 0000   Position of Indicator Line Visible 01/26/25 0000   Balloon Volume Verified Yes 01/26/25 0000   Skin Assessment of the Anal Area Treatment with Zinc Oxide cream 01/26/25 0000   Tube Irrigated No 01/26/25 0000   Irrigation Volume (mL) 0 01/26/25 0000   Stool Amount Large 01/24/25 1600   Fecal Management Tube Output 0 ml 01/26/25 0000       Findings: None    Indications: 49-year-old female hospitalized for shortness of breath and acute hypoxic respiratory failure.  She was intubated for 1 week and then extubated but then once again reintubated 1 day later and now requires tracheotomy placement.    Detailed Description of Procedure:     Informed consent was obtained.  Procedure is performed at the bedside. Timeout is performed.   guidewire and introduced into the trachea under bronchoscopic visualization.  Once the trach tube was confirmed in good position the bougie was removed, the inner cannula was placed and the ventilatory circuit was attached and the cuff inflated.  The endotracheal tube was fully removed.  The bronchoscope was then advanced through the tracheostomy tube and the tip of the tube was noted to be in good position above the milton.  The tracheostomy flange was then sutured to the skin bilaterally with 2-0 Prolene suture and then a Velcro strap was secured around the neck.      Electronically Signed by Jack Badillo MD on 1/30/2025 at 3:54 PM

## 2025-01-30 NOTE — PROGRESS NOTES
CARDIOLOGY PROGRESS NOTE      Patient Name: Nida Graves  Age: 49 y.o.  Gender:female  :1975  MRN: [id number]    Patient seen and examined. This is a patient with a history of diabetes, asthma who presented with shortness of breath and hypoxia now being followed for atrial flutter and CHF.    Extubated , reintubated  d/t hypoxia. Remains intubated and minimal sedation., awake, nodding her head, gave me thumbs up. Remains in aflutter, hr 40-50s, remains on dopamine drip @5mcg.      Telemetry reviewed, there were no events noted in the past 24 hours. AFL 50s.      Pertinent review of systems items noted above, all other systems are negative. Current medications reviewed..    Physical Examination    Allergies   Allergen Reactions    Latex Swelling    Penicillins Hives    Codeine Anxiety     Vitals:    25 1300   BP: 123/70   Pulse: 57   Resp: 15   Temp: 99.3 °F (37.4 °C)   SpO2: 98%     Vital signs are stable  Intubated  Heart has a irregular rhythm, denita.  No murmur  Lung- are ventilator breaths  Abdomen is soft, nontender, normal bowel sounds.  Extremities have mild LE edema  Skin is dry and warm.    Labs reviewed:  Recent Results (from the past 12 hour(s))   POCT Glucose    Collection Time: 25  1:30 AM   Result Value Ref Range    POC Glucose 132 (H) 65 - 100 mg/dL    Performed by: Yeny Tolbert    Basic Metabolic Panel    Collection Time: 25  4:35 AM   Result Value Ref Range    Sodium 139 136 - 145 mmol/L    Potassium 3.9 3.5 - 5.1 mmol/L    Chloride 111 (H) 97 - 108 mmol/L    CO2 22 21 - 32 mmol/L    Anion Gap 6 2 - 12 mmol/L    Glucose 132 (H) 65 - 100 mg/dL    BUN 19 6 - 20 mg/dL    Creatinine 0.52 (L) 0.55 - 1.02 mg/dL    BUN/Creatinine Ratio 37 (H) 12 - 20      Est, Glom Filt Rate >90 >60 ml/min/1.73m2    Calcium 9.2 8.5 - 10.1 mg/dL   CBC with Auto Differential    Collection Time: 25  4:35 AM   Result Value Ref Range    WBC 13.4 (H) 3.6 - 11.0 K/uL    RBC 3.06 (L)

## 2025-01-30 NOTE — PROGRESS NOTES
CRITICAL CARE PROGRESS NOTE    Name: Nida Graves   : 1975   MRN: [id number]   Date: 2025      Diagnoses/problem list:   Acute hypoxic and hypercapnic respiratory failure  Acute kidney injury  Atrial flutter with slow ventricular response  GI bleed  Nosebleed, resolved  NSTEMI  Biventricular failure  Acute HFrEF, 25 to 30%  Diabetes  Morbid obesity  Acute metabolic/septic encephalopathy    24-hour events:   Remains on 40% FiO2 and PEEP of 7.  She is awake and following commands.  Still on dopamine at 3 mcg/kg/min.  ENT tentative plan for trach today.     Assessment and plan:   Ms. Graves is a 49-year-old female with PMH chronic debility and bedbound status, diabetes, asthma, obesity, who presented for shortness of breath.  Shortness of breath worsening over the past few days.  Upon presentation ED patient found to be hypoxic necessita.  Ting O2 via NC.  She was also found to be in A-fib with slow ventricular response.  She was transferred out of ICU on . Early morning  upgraded to ICU again due to severe hypoxia and hypercapnia requiring intubation.     NEUROLOGICAL:    Continue home Cymbalta and bupropion  Titrate sedation for RASS goal 0  SAT daily for neuroexam  Delirium precautions    PULMONOLOGY:   Extubated on , reintubated on  due to inability to protect airway  ENT following for trach placement   Mucomyst nebs and chest PT  Persistent bibasal atelectasis on CXR  DuoNebs    CARDIOVASCULAR:   Dopamine to keep MAP above 65 and heart rate above 40  LVEF 20-30%, right ventricle overload, moderate TR   GDMT for HFrEF as tolerated  Diuresis as needed  Atrial flutter with slow ventricular conduction at this time  Avoid AVN blocking agents  May need AUGUSTIN/cardioversion and LHC prior to discharge  [facility] following    GASTROINTESTINAL:   Protonix 40mg IV BID due to concern for GI bleed  Noted to have dark stools . Occult stool positive for blood. CTA abdomen/pelvis  the bedside nurses and the respiratory therapist.      NOTE OF PERSONAL INVOLVEMENT IN CARE   This patient has a high probability of imminent, clinically significant deterioration, which requires the highest level of preparedness to intervene urgently. I participated in the decision-making and personally managed or directed the management of the following life and organ supporting interventions that required my frequent assessment to treat or prevent imminent deterioration.    I personally spent 35 minutes of critical care time.  This is time spent at this critically ill patient's bedside actively involved in patient care as well as the coordination of care.  This does not include any procedural time which has been billed separately.      Josue Salvador MD   Critical Care Medicine  [facility]

## 2025-01-30 NOTE — ANESTHESIA PRE PROCEDURE
Department of Anesthesiology  Preprocedure Note       Name:  Nida Graves   Age:  49 y.o.  :  1975                                          MRN:  [id number]         Date:  2025      Surgeon: Surgeon(s):  Jack Badillo MD    Procedure: Procedure(s):  TRACHEOSTOMY PERCUTANEOUS    Medications prior to admission:   Prior to Admission medications    Medication Sig Start Date End Date Taking? Authorizing Provider   buPROPion (WELLBUTRIN XL) 150 MG extended release tablet Take 1 tablet by mouth daily   Yes Mary Ellen Leach MD   traZODone (DESYREL) 50 MG tablet Take 1 tablet by mouth daily 11/10/24  Yes Provider, MD Mary Ellen   DULoxetine (CYMBALTA) 30 MG extended release capsule Take 1 capsule by mouth daily 25  Yes ProviderMary Ellen MD   albuterol (PROVENTIL) (5 MG/ML) 0.5% nebulizer solution Inhale 0.5 mLs into the lungs every 4 hours as needed 3/29/22  Yes Automatic Reconciliation, Ar   benzonatate (TESSALON) 100 MG capsule Take 1-2 capsules TID prn cough. 21  Yes Automatic Reconciliation, Ar   budesonide (PULMICORT FLEXHALER) 180 MCG/ACT AEPB inhaler TAKE 1 PUFF BY MOUTH TWICE A DAY 12/3/20  Yes Automatic Reconciliation, Ar   diclofenac sodium (VOLTAREN) 1 % GEL Apply 4 g topically 4 times daily 22  Yes Automatic Reconciliation, Ar   losartan (COZAAR) 25 MG tablet Take 1 tablet by mouth daily 22  Yes Automatic Reconciliation, Ar   tiotropium (SPIRIVA HANDIHALER) 18 MCG inhalation capsule Take 1 capsule by mouth once 20  Yes Automatic Reconciliation, Ar   etodolac (LODINE) 400 MG tablet Take 1 tablet by mouth 2 times daily  Patient not taking: Reported on 2025    Mary Ellen Leach MD   fluticasone-salmeterol (WIXELA INHUB) 500-50 MCG/ACT AEPB diskus inhaler Take 500 puffs by mouth 2 times daily  Patient not taking: Reported on 2025   Automatic Reconciliation, Ar       Current medications:    Current Facility-Administered Medications   Medication  Diabetes.                 Abdominal:              PE comment: Deferred.   Vascular: negative vascular ROS.         Other Findings:             Anesthesia Plan      general     ASA 4     (Standard ASA monitors: continuous EKG, BP, HR, pulse oximeter, temperature, and capnography.)  Induction: inhalational.    MIPS: Postoperative opioids intended and Prophylactic antiemetics administered.  Anesthetic plan and risks discussed with patient (and family, if present.).                      Vic Schuster MD   1/30/2025

## 2025-01-30 NOTE — PLAN OF CARE
Problem: ABCDS Injury Assessment  Goal: Absence of physical injury  Recent Flowsheet Documentation  Taken 1/30/2025 0800 by Shana Barbosa RN  Absence of Physical Injury: Implement safety measures based on patient assessment     Problem: Chronic Conditions and Co-morbidities  Goal: Patient's chronic conditions and co-morbidity symptoms are monitored and maintained or improved  Recent Flowsheet Documentation  Taken 1/30/2025 0800 by Shana Barbosa RN  Care Plan - Patient's Chronic Conditions and Co-Morbidity Symptoms are Monitored and Maintained or Improved:   Monitor and assess patient's chronic conditions and comorbid symptoms for stability, deterioration, or improvement   Collaborate with multidisciplinary team to address chronic and comorbid conditions and prevent exacerbation or deterioration     Problem: Discharge Planning  Goal: Discharge to home or other facility with appropriate resources  Recent Flowsheet Documentation  Taken 1/30/2025 0800 by Shana Barbosa RN  Discharge to home or other facility with appropriate resources:   Identify barriers to discharge with patient and caregiver   Identify discharge learning needs (meds, wound care, etc)   Arrange for interpreters to assist at discharge as needed   Refer to discharge planning if patient needs post-hospital services based on physician order or complex needs related to functional status, cognitive ability or social support system     Problem: Safety - Adult  Goal: Free from fall injury  Recent Flowsheet Documentation  Taken 1/30/2025 0800 by Shana Barbosa RN  Free From Fall Injury:   Instruct family/caregiver on patient safety   Based on caregiver fall risk screen, instruct family/caregiver to ask for assistance with transferring infant if caregiver noted to have fall risk factors     Problem: Neurosensory - Adult  Goal: Achieves stable or improved neurological status  Recent Flowsheet Documentation  Taken 1/30/2025 0800 by Shana Barbosa  RN  Achieves stable or improved neurological status:   Initiate measures to prevent increased intracranial pressure   Maintain blood pressure and fluid volume within ordered parameters to optimize cerebral perfusion and minimize risk of hemorrhage   Monitor temperature, glucose, and sodium. Initiate appropriate interventions as ordered  Goal: Achieves maximal functionality and self care  Recent Flowsheet Documentation  Taken 1/30/2025 0800 by Shana Barbosa RN  Achieves maximal functionality and self care:   Monitor swallowing and airway patency with patient fatigue and changes in neurological status   Encourage and assist patient to increase activity and self care with guidance from physical therapy/occupational therapy     Problem: Respiratory - Adult  Goal: Achieves optimal ventilation and oxygenation  Recent Flowsheet Documentation  Taken 1/30/2025 0800 by Shana Barbosa RN  Achieves optimal ventilation and oxygenation:   Assess for changes in respiratory status   Position to facilitate oxygenation and minimize respiratory effort   Oxygen supplementation based on oxygen saturation or arterial blood gases   Assess and instruct to report shortness of breath or any respiratory difficulty   Initiate smoking cessation protocol as indicated   Encourage broncho-pulmonary hygiene including cough, deep breathe, incentive spirometry

## 2025-01-30 NOTE — CARE COORDINATION
CM reviewed Pt medicals, Pt lives with her  and Nephew.     Pt  assist Pt with ADL.      [facility] is following Pt for IR. [facility] will need to get auth.     Palliative Care is following Pt, as of this time Pt  wants everything done.           Per IDR    Pt on the vent    Pt getting a trach today or first part of next week.  If Pt does not get the trach until next week will trying weaning Pt.

## 2025-01-31 ENCOUNTER — APPOINTMENT (OUTPATIENT)
Facility: HOSPITAL | Age: 50
DRG: 004 | End: 2025-01-31
Payer: COMMERCIAL

## 2025-01-31 LAB
ANION GAP SERPL CALC-SCNC: 6 MMOL/L (ref 2–12)
BASOPHILS # BLD: 0.03 K/UL (ref 0–0.1)
BASOPHILS NFR BLD: 0.2 % (ref 0–1)
BUN SERPL-MCNC: 16 MG/DL (ref 6–20)
BUN/CREAT SERPL: 28 (ref 12–20)
CA-I BLD-MCNC: 9.6 MG/DL (ref 8.5–10.1)
CHLORIDE SERPL-SCNC: 109 MMOL/L (ref 97–108)
CO2 SERPL-SCNC: 23 MMOL/L (ref 21–32)
CREAT SERPL-MCNC: 0.57 MG/DL (ref 0.55–1.02)
DIFFERENTIAL METHOD BLD: ABNORMAL
EKG ATRIAL RATE: 81 BPM
EKG DIAGNOSIS: NORMAL
EKG P AXIS: 83 DEGREES
EKG P-R INTERVAL: 176 MS
EKG Q-T INTERVAL: 454 MS
EKG QRS DURATION: 140 MS
EKG QTC CALCULATION (BAZETT): 527 MS
EKG R AXIS: -42 DEGREES
EKG T AXIS: 100 DEGREES
EKG VENTRICULAR RATE: 81 BPM
EOSINOPHIL # BLD: 0.74 K/UL (ref 0–0.4)
EOSINOPHIL NFR BLD: 6.1 % (ref 0–7)
ERYTHROCYTE [DISTWIDTH] IN BLOOD BY AUTOMATED COUNT: 15.7 % (ref 11.5–14.5)
GLUCOSE BLD STRIP.AUTO-MCNC: 106 MG/DL (ref 65–100)
GLUCOSE BLD STRIP.AUTO-MCNC: 119 MG/DL (ref 65–100)
GLUCOSE BLD STRIP.AUTO-MCNC: 122 MG/DL (ref 65–100)
GLUCOSE SERPL-MCNC: 114 MG/DL (ref 65–100)
HCT VFR BLD AUTO: 28.7 % (ref 35–47)
HGB BLD-MCNC: 9.1 G/DL (ref 11.5–16)
IMM GRANULOCYTES # BLD AUTO: 0.08 K/UL (ref 0–0.04)
IMM GRANULOCYTES NFR BLD AUTO: 0.7 % (ref 0–0.5)
LYMPHOCYTES # BLD: 1.95 K/UL (ref 0.8–3.5)
LYMPHOCYTES NFR BLD: 16.1 % (ref 12–49)
MCH RBC QN AUTO: 28.9 PG (ref 26–34)
MCHC RBC AUTO-ENTMCNC: 31.7 G/DL (ref 30–36.5)
MCV RBC AUTO: 91.1 FL (ref 80–99)
MONOCYTES # BLD: 1.15 K/UL (ref 0–1)
MONOCYTES NFR BLD: 9.5 % (ref 5–13)
NEUTS SEG # BLD: 8.15 K/UL (ref 1.8–8)
NEUTS SEG NFR BLD: 67.4 % (ref 32–75)
NRBC # BLD: 0 K/UL (ref 0–0.01)
NRBC BLD-RTO: 0 PER 100 WBC
PERFORMED BY:: ABNORMAL
PLATELET # BLD AUTO: 312 K/UL (ref 150–400)
PMV BLD AUTO: 11.2 FL (ref 8.9–12.9)
POTASSIUM SERPL-SCNC: 3.8 MMOL/L (ref 3.5–5.1)
RBC # BLD AUTO: 3.15 M/UL (ref 3.8–5.2)
SODIUM SERPL-SCNC: 138 MMOL/L (ref 136–145)
WBC # BLD AUTO: 12.1 K/UL (ref 3.6–11)

## 2025-01-31 PROCEDURE — 2700000000 HC OXYGEN THERAPY PER DAY

## 2025-01-31 PROCEDURE — 94003 VENT MGMT INPAT SUBQ DAY: CPT

## 2025-01-31 PROCEDURE — 51798 US URINE CAPACITY MEASURE: CPT

## 2025-01-31 PROCEDURE — 6370000000 HC RX 637 (ALT 250 FOR IP): Performed by: STUDENT IN AN ORGANIZED HEALTH CARE EDUCATION/TRAINING PROGRAM

## 2025-01-31 PROCEDURE — 94640 AIRWAY INHALATION TREATMENT: CPT

## 2025-01-31 PROCEDURE — 2000000000 HC ICU R&B

## 2025-01-31 PROCEDURE — 80048 BASIC METABOLIC PNL TOTAL CA: CPT

## 2025-01-31 PROCEDURE — 71045 X-RAY EXAM CHEST 1 VIEW: CPT

## 2025-01-31 PROCEDURE — 2500000003 HC RX 250 WO HCPCS: Performed by: INTERNAL MEDICINE

## 2025-01-31 PROCEDURE — 94761 N-INVAS EAR/PLS OXIMETRY MLT: CPT

## 2025-01-31 PROCEDURE — 85025 COMPLETE CBC W/AUTO DIFF WBC: CPT

## 2025-01-31 PROCEDURE — 6360000002 HC RX W HCPCS: Performed by: NURSE PRACTITIONER

## 2025-01-31 PROCEDURE — 74018 RADEX ABDOMEN 1 VIEW: CPT

## 2025-01-31 PROCEDURE — 82962 GLUCOSE BLOOD TEST: CPT

## 2025-01-31 PROCEDURE — 94668 MNPJ CHEST WALL SBSQ: CPT

## 2025-01-31 PROCEDURE — 6360000002 HC RX W HCPCS: Performed by: STUDENT IN AN ORGANIZED HEALTH CARE EDUCATION/TRAINING PROGRAM

## 2025-01-31 PROCEDURE — 6370000000 HC RX 637 (ALT 250 FOR IP): Performed by: INTERNAL MEDICINE

## 2025-01-31 PROCEDURE — 6360000002 HC RX W HCPCS: Performed by: INTERNAL MEDICINE

## 2025-01-31 PROCEDURE — 36415 COLL VENOUS BLD VENIPUNCTURE: CPT

## 2025-01-31 PROCEDURE — 2500000003 HC RX 250 WO HCPCS: Performed by: STUDENT IN AN ORGANIZED HEALTH CARE EDUCATION/TRAINING PROGRAM

## 2025-01-31 PROCEDURE — 93005 ELECTROCARDIOGRAM TRACING: CPT | Performed by: NURSE PRACTITIONER

## 2025-01-31 RX ORDER — VENLAFAXINE 25 MG/1
37.5 TABLET ORAL 2 TIMES DAILY WITH MEALS
Status: DISCONTINUED | OUTPATIENT
Start: 2025-01-31 | End: 2025-02-13

## 2025-01-31 RX ORDER — ACETYLCYSTEINE 200 MG/ML
600 SOLUTION ORAL; RESPIRATORY (INHALATION)
Status: DISCONTINUED | OUTPATIENT
Start: 2025-01-31 | End: 2025-02-01

## 2025-01-31 RX ORDER — FENTANYL CITRATE-0.9 % NACL/PF 10 MCG/ML
25-200 PLASTIC BAG, INJECTION (ML) INTRAVENOUS CONTINUOUS
Status: DISCONTINUED | OUTPATIENT
Start: 2025-01-31 | End: 2025-02-01

## 2025-01-31 RX ADMIN — ASPIRIN 81 MG 81 MG: 81 TABLET ORAL at 11:39

## 2025-01-31 RX ADMIN — BUDESONIDE 500 MCG: 0.5 INHALANT ORAL at 19:11

## 2025-01-31 RX ADMIN — WHITE PETROLATUM,ZINC OXIDE: 57; 17 PASTE TOPICAL at 08:09

## 2025-01-31 RX ADMIN — ACETYLCYSTEINE 600 MG: 200 SOLUTION ORAL; RESPIRATORY (INHALATION) at 14:57

## 2025-01-31 RX ADMIN — SODIUM CHLORIDE, PRESERVATIVE FREE 10 ML: 5 INJECTION INTRAVENOUS at 08:05

## 2025-01-31 RX ADMIN — Medication 3 MG: at 20:51

## 2025-01-31 RX ADMIN — SODIUM CHLORIDE, PRESERVATIVE FREE 10 ML: 5 INJECTION INTRAVENOUS at 20:51

## 2025-01-31 RX ADMIN — IPRATROPIUM BROMIDE AND ALBUTEROL SULFATE 1 DOSE: 2.5; .5 SOLUTION RESPIRATORY (INHALATION) at 14:57

## 2025-01-31 RX ADMIN — CEFEPIME 2000 MG: 2 INJECTION, POWDER, FOR SOLUTION INTRAVENOUS at 08:04

## 2025-01-31 RX ADMIN — VENLAFAXINE 37.5 MG: 25 TABLET ORAL at 16:45

## 2025-01-31 RX ADMIN — IPRATROPIUM BROMIDE AND ALBUTEROL SULFATE 1 DOSE: 2.5; .5 SOLUTION RESPIRATORY (INHALATION) at 19:11

## 2025-01-31 RX ADMIN — DOPAMINE HYDROCHLORIDE IN DEXTROSE 5 MCG/KG/MIN: 3.2 INJECTION, SOLUTION INTRAVENOUS at 21:19

## 2025-01-31 RX ADMIN — PROPOFOL 20 MCG/KG/MIN: 10 INJECTION, EMULSION INTRAVENOUS at 01:14

## 2025-01-31 RX ADMIN — BUPROPION HYDROCHLORIDE 75 MG: 75 TABLET, FILM COATED ORAL at 20:51

## 2025-01-31 RX ADMIN — CEFEPIME 2000 MG: 2 INJECTION, POWDER, FOR SOLUTION INTRAVENOUS at 16:41

## 2025-01-31 RX ADMIN — ACETYLCYSTEINE 600 MG: 200 SOLUTION ORAL; RESPIRATORY (INHALATION) at 19:11

## 2025-01-31 RX ADMIN — BUPROPION HYDROCHLORIDE 75 MG: 75 TABLET, FILM COATED ORAL at 11:38

## 2025-01-31 RX ADMIN — WHITE PETROLATUM,ZINC OXIDE: 57; 17 PASTE TOPICAL at 21:16

## 2025-01-31 RX ADMIN — PROPOFOL 25 MCG/KG/MIN: 10 INJECTION, EMULSION INTRAVENOUS at 07:17

## 2025-01-31 RX ADMIN — BUDESONIDE 500 MCG: 0.5 INHALANT ORAL at 08:26

## 2025-01-31 RX ADMIN — Medication 50 MCG/HR: at 11:14

## 2025-01-31 RX ADMIN — IPRATROPIUM BROMIDE AND ALBUTEROL SULFATE 1 DOSE: 2.5; .5 SOLUTION RESPIRATORY (INHALATION) at 08:26

## 2025-01-31 RX ADMIN — CEFEPIME 2000 MG: 2 INJECTION, POWDER, FOR SOLUTION INTRAVENOUS at 01:11

## 2025-01-31 RX ADMIN — ATORVASTATIN CALCIUM 20 MG: 20 TABLET, FILM COATED ORAL at 20:51

## 2025-01-31 ASSESSMENT — PULMONARY FUNCTION TESTS
PIF_VALUE: 21
PIF_VALUE: 16
PIF_VALUE: 19
PIF_VALUE: 18
PIF_VALUE: 16
PIF_VALUE: 22
PIF_VALUE: 13
PIF_VALUE: 18
PIF_VALUE: 17
PIF_VALUE: 18
PIF_VALUE: 21
PIF_VALUE: 21
PIF_VALUE: 18
PIF_VALUE: 22
PIF_VALUE: 18
PIF_VALUE: 18
PIF_VALUE: 22
PIF_VALUE: 21
PIF_VALUE: 18
PIF_VALUE: 17
PIF_VALUE: 21
PIF_VALUE: 22
PIF_VALUE: 18
PIF_VALUE: 21
PIF_VALUE: 17
PIF_VALUE: 32
PIF_VALUE: 16
PIF_VALUE: 17
PIF_VALUE: 16
PIF_VALUE: 17

## 2025-01-31 ASSESSMENT — PAIN SCALES - GENERAL
PAINLEVEL_OUTOF10: 0

## 2025-01-31 NOTE — PROGRESS NOTES
CARDIOLOGY PROGRESS NOTE      Patient Name: Nida Graves  Age: 49 y.o.  Gender:female  :1975  MRN: [id number]    Patient seen and examined. This is a patient with a history of diabetes, asthma who presented with shortness of breath and hypoxia now being followed for atrial flutter and CHF.    Extubated , reintubated  d/t hypoxia. S/p trach 2025.  Awake, following commands. Remains in aflutter, hr 40-50s, remains on dopamine drip @5 mcg      Telemetry reviewed,       Pertinent review of systems items noted above, all other systems are negative. Current medications reviewed..    Physical Examination    Allergies   Allergen Reactions    Latex Swelling    Penicillins Hives    Codeine Anxiety     Vitals:    25 1300   BP: 113/68   Pulse: 91   Resp: 18   Temp:    SpO2: 100%     Vital signs are stable  Intubated  Heart has a irregular rhythm, denita.  No murmur  Lung- are ventilator breaths  Abdomen is soft, nontender, normal bowel sounds.  Extremities have mild LE edema  Skin is dry and warm.    Labs reviewed:  Recent Results (from the past 12 hour(s))   Basic Metabolic Panel    Collection Time: 25  3:30 AM   Result Value Ref Range    Sodium 138 136 - 145 mmol/L    Potassium 3.8 3.5 - 5.1 mmol/L    Chloride 109 (H) 97 - 108 mmol/L    CO2 23 21 - 32 mmol/L    Anion Gap 6 2 - 12 mmol/L    Glucose 114 (H) 65 - 100 mg/dL    BUN 16 6 - 20 mg/dL    Creatinine 0.57 0.55 - 1.02 mg/dL    BUN/Creatinine Ratio 28 (H) 12 - 20      Est, Glom Filt Rate >90 >60 ml/min/1.73m2    Calcium 9.6 8.5 - 10.1 mg/dL   CBC with Auto Differential    Collection Time: 25  3:30 AM   Result Value Ref Range    WBC 12.1 (H) 3.6 - 11.0 K/uL    RBC 3.15 (L) 3.80 - 5.20 M/uL    Hemoglobin 9.1 (L) 11.5 - 16.0 g/dL    Hematocrit 28.7 (L) 35.0 - 47.0 %    MCV 91.1 80.0 - 99.0 FL    MCH 28.9 26.0 - 34.0 PG    MCHC 31.7 30.0 - 36.5 g/dL    RDW 15.7 (H) 11.5 - 14.5 %    Platelets 312 150 - 400 K/uL    MPV 11.2 8.9 - 12.9

## 2025-01-31 NOTE — CARE COORDINATION
CM reviewed Pt medicals, Pt lives with her  and Nephew.     Pt  assist Pt with ADL.    Pt received a trach yesterday.    CM will talk with Pt  about D/C planning.     Per IDR  Pt trach yesterday.     Tube feedings.     2:15    CM called Pt  to discuss D/C planning.  Pt  gave CM permission to send a referral to [facility].     [facility] will have to get auth.

## 2025-01-31 NOTE — PROGRESS NOTES
Comprehensive Nutrition Assessment    Type and Reason for Visit:  Reassess    Nutrition Recommendations/Plan:   Once NGT confirmed in appropriate position, start TF w/ Promote (peptide-based HP substitute) @ 20 ml/hr and advance 10 ml/hr every 4 hours to a goal rate of 40 ml/hr + 2 Prosource BID (4 total daily). Administer 200ml free water every 3 hours  TF @ goal provides: 960 kcals, 61 g protein, and 798 ml free water  Prosource x2 BID: +240 kcals, +60 g pro  Propofol @ 12.2 ml/hr: +322 kcals   TOTAL: 1522 kcals (80%/25 kcals/kg IBW), 121 g protein (2 g/kg IBW), and 2398 ml water daily  Monitor lytes and correct as needed - hx of hypernatremia this admission requiring higher FWF, will monitor for need to increase  Monitor TF admin, lab values, fluid status, and bowel fxn/abd exam     Malnutrition Assessment:  Malnutrition Status:  Mild malnutrition (01/21/25 1213)    Context:  Chronic Illness     Findings of the 6 clinical characteristics of malnutrition:  Energy Intake:  Unable to assess  Weight Loss:  Mild weight loss (hx significant wt loss on mounjaro (-10%x4mos))     Body Fat Loss:  No body fat loss     Muscle Mass Loss:  Mild muscle mass loss (vs) Temples (temporalis)  Fluid Accumulation:  Mild (chronic illness > nutr status)     Strength:  Not Performed    Nutrition Assessment:    Presented w/ SOB, arrhythmia. Admitted to ICU 1/13-1/16. Returned from floor 1/19 d/t hypoxia, hypercarbia, and encephalopathy. Persistent hypoxia on biPAP, intubated. Pt currently on vent w/ sedation. Per discussion in MDRs, plans to initiate TF today, wean propofol as able, and continue diuresing. EF 30%. Pt w/ % intake documented 1/14, 0% later in admission. (1/23) TF initiated 1/21 - Promote @30 + 4PS. Concern for GI bleed w/ dark stools, TF held 1/22 for CTA, neg, restarted and now @ goal. (1/28) Pt extubated 1/26, SLP rec mod thick CLD. Incr O2 needs 1/27,copious secretions, reintubated 1/27. Per discussion in

## 2025-01-31 NOTE — PROGRESS NOTES
CRITICAL CARE PROGRESS NOTE    Name: Nida Graves   : 1975   MRN: [id number]   Date: 2025      Diagnoses/problem list:   Acute hypoxic and hypercapnic respiratory failure  Acute kidney injury  Atrial flutter with slow ventricular response  GI bleed  Nosebleed, resolved  NSTEMI  Biventricular failure  Acute HFrEF, 25 to 30%  Diabetes  Morbid obesity  Acute metabolic/septic encephalopathy    24-hour events:   S/p trach yesterday. On 60% FiO2 and PEEP of 9.  She is awake and following commands.  Still on dopamine at 5 mcg/kg/min.      Assessment and plan:   Ms. Graves is a 49-year-old female with PMH chronic debility and bedbound status, diabetes, asthma, obesity, who presented for shortness of breath.  Shortness of breath worsening over the past few days.  Upon presentation ED patient found to be hypoxic necessita.  Ting O2 via NC.  She was also found to be in A-fib with slow ventricular response.  She was transferred out of ICU on . Early morning  upgraded to ICU again due to severe hypoxia and hypercapnia requiring intubation.     NEUROLOGICAL:    Continue home Cymbalta and bupropion  Titrate sedation for RASS goal 0  Delirium precautions    PULMONOLOGY:   Extubated on , reintubated on  due to inability to protect airway  ENT placed trach on   LLL atelectasis on CXR  Mucomyst nebs and chest PT  DuoNebs    CARDIOVASCULAR:   Dopamine to keep MAP above 65 and heart rate above 40  Will try to wean off dopamine today  LVEF 20-30%, right ventricle overload, moderate TR   GDMT for HFrEF as tolerated  Diuresis as needed  Atrial flutter with slow ventricular conduction at times  Avoid AVN blocking agents  May need AUGUSTIN/cardioversion and LHC prior to discharge  [facility] following    GASTROINTESTINAL:   Protonix 40mg IV BID due to concern for GI bleed  Noted to have dark stools . Occult stool positive for blood. CTA abdomen/pelvis without evidence of bleeding  Resume tube  services, the bedside nurses and the respiratory therapist.      NOTE OF PERSONAL INVOLVEMENT IN CARE   This patient has a high probability of imminent, clinically significant deterioration, which requires the highest level of preparedness to intervene urgently. I participated in the decision-making and personally managed or directed the management of the following life and organ supporting interventions that required my frequent assessment to treat or prevent imminent deterioration.    I personally spent 35 minutes of critical care time.  This is time spent at this critically ill patient's bedside actively involved in patient care as well as the coordination of care.  This does not include any procedural time which has been billed separately.      Josue Salvador MD   Critical Care Medicine  [facility] Critical [facility]

## 2025-01-31 NOTE — PROGRESS NOTES
0900: Discussed pt with Dr. Salvador, orders received to wean propofol off and place NG tube.     ICU multidisciplinary rounds lead by Dr. Salvador (Intensivist): The following were reviewed and discussed: current labs,  recent imaging, lines/drains, review of body systems, nutrition, cultures, mobility, length of ICU stay. The plan of care for the day is as follows: keep restraints, NG tube placement, restart tube feeds, turn off prop and start fentanyl.     1125: NG tube placement verified by Dr. Salvador, orders received to start tube feed at goal.     1600: Pt with air leak around trach when turning and volumes dropping to 0 on vent. RT paged to notify. Dr. Salvador called to bedside, MD assessed trach, orders received for stat CXR.

## 2025-02-01 ENCOUNTER — APPOINTMENT (OUTPATIENT)
Facility: HOSPITAL | Age: 50
DRG: 004 | End: 2025-02-01
Payer: COMMERCIAL

## 2025-02-01 LAB
ANION GAP SERPL CALC-SCNC: 6 MMOL/L (ref 2–12)
BASOPHILS # BLD: 0.03 K/UL (ref 0–0.1)
BASOPHILS NFR BLD: 0.3 % (ref 0–1)
BUN SERPL-MCNC: 18 MG/DL (ref 6–20)
BUN/CREAT SERPL: 34 (ref 12–20)
CA-I BLD-MCNC: 9.3 MG/DL (ref 8.5–10.1)
CHLORIDE SERPL-SCNC: 107 MMOL/L (ref 97–108)
CO2 SERPL-SCNC: 23 MMOL/L (ref 21–32)
CREAT SERPL-MCNC: 0.53 MG/DL (ref 0.55–1.02)
DIFFERENTIAL METHOD BLD: ABNORMAL
EOSINOPHIL # BLD: 0.68 K/UL (ref 0–0.4)
EOSINOPHIL NFR BLD: 6.5 % (ref 0–7)
ERYTHROCYTE [DISTWIDTH] IN BLOOD BY AUTOMATED COUNT: 15.8 % (ref 11.5–14.5)
GLUCOSE BLD STRIP.AUTO-MCNC: 114 MG/DL (ref 65–100)
GLUCOSE BLD STRIP.AUTO-MCNC: 129 MG/DL (ref 65–100)
GLUCOSE BLD STRIP.AUTO-MCNC: 135 MG/DL (ref 65–100)
GLUCOSE BLD STRIP.AUTO-MCNC: 136 MG/DL (ref 65–100)
GLUCOSE SERPL-MCNC: 160 MG/DL (ref 65–100)
HCT VFR BLD AUTO: 27.6 % (ref 35–47)
HGB BLD-MCNC: 8.7 G/DL (ref 11.5–16)
IMM GRANULOCYTES # BLD AUTO: 0.04 K/UL (ref 0–0.04)
IMM GRANULOCYTES NFR BLD AUTO: 0.4 % (ref 0–0.5)
LYMPHOCYTES # BLD: 1.76 K/UL (ref 0.8–3.5)
LYMPHOCYTES NFR BLD: 16.8 % (ref 12–49)
MCH RBC QN AUTO: 28.6 PG (ref 26–34)
MCHC RBC AUTO-ENTMCNC: 31.5 G/DL (ref 30–36.5)
MCV RBC AUTO: 90.8 FL (ref 80–99)
MONOCYTES # BLD: 0.62 K/UL (ref 0–1)
MONOCYTES NFR BLD: 5.9 % (ref 5–13)
NEUTS SEG # BLD: 7.36 K/UL (ref 1.8–8)
NEUTS SEG NFR BLD: 70.1 % (ref 32–75)
NRBC # BLD: 0 K/UL (ref 0–0.01)
NRBC BLD-RTO: 0 PER 100 WBC
PERFORMED BY:: ABNORMAL
PLATELET # BLD AUTO: 288 K/UL (ref 150–400)
PMV BLD AUTO: 11 FL (ref 8.9–12.9)
POTASSIUM SERPL-SCNC: 3.3 MMOL/L (ref 3.5–5.1)
RBC # BLD AUTO: 3.04 M/UL (ref 3.8–5.2)
SODIUM SERPL-SCNC: 136 MMOL/L (ref 136–145)
WBC # BLD AUTO: 10.5 K/UL (ref 3.6–11)

## 2025-02-01 PROCEDURE — 2700000000 HC OXYGEN THERAPY PER DAY

## 2025-02-01 PROCEDURE — 6370000000 HC RX 637 (ALT 250 FOR IP): Performed by: INTERNAL MEDICINE

## 2025-02-01 PROCEDURE — 6360000002 HC RX W HCPCS: Performed by: STUDENT IN AN ORGANIZED HEALTH CARE EDUCATION/TRAINING PROGRAM

## 2025-02-01 PROCEDURE — 85025 COMPLETE CBC W/AUTO DIFF WBC: CPT

## 2025-02-01 PROCEDURE — 80048 BASIC METABOLIC PNL TOTAL CA: CPT

## 2025-02-01 PROCEDURE — 94761 N-INVAS EAR/PLS OXIMETRY MLT: CPT

## 2025-02-01 PROCEDURE — 82962 GLUCOSE BLOOD TEST: CPT

## 2025-02-01 PROCEDURE — 71045 X-RAY EXAM CHEST 1 VIEW: CPT

## 2025-02-01 PROCEDURE — 2000000000 HC ICU R&B

## 2025-02-01 PROCEDURE — 94640 AIRWAY INHALATION TREATMENT: CPT

## 2025-02-01 PROCEDURE — 6360000002 HC RX W HCPCS: Performed by: INTERNAL MEDICINE

## 2025-02-01 PROCEDURE — 51798 US URINE CAPACITY MEASURE: CPT

## 2025-02-01 PROCEDURE — 2500000003 HC RX 250 WO HCPCS: Performed by: STUDENT IN AN ORGANIZED HEALTH CARE EDUCATION/TRAINING PROGRAM

## 2025-02-01 PROCEDURE — 94668 MNPJ CHEST WALL SBSQ: CPT

## 2025-02-01 PROCEDURE — 02HV33Z INSERTION OF INFUSION DEVICE INTO SUPERIOR VENA CAVA, PERCUTANEOUS APPROACH: ICD-10-PCS | Performed by: INTERNAL MEDICINE

## 2025-02-01 PROCEDURE — 94003 VENT MGMT INPAT SUBQ DAY: CPT

## 2025-02-01 PROCEDURE — 2500000003 HC RX 250 WO HCPCS: Performed by: INTERNAL MEDICINE

## 2025-02-01 PROCEDURE — 36569 INSJ PICC 5 YR+ W/O IMAGING: CPT

## 2025-02-01 PROCEDURE — 6370000000 HC RX 637 (ALT 250 FOR IP): Performed by: STUDENT IN AN ORGANIZED HEALTH CARE EDUCATION/TRAINING PROGRAM

## 2025-02-01 RX ORDER — NOREPINEPHRINE BITARTRATE 0.06 MG/ML
1-100 INJECTION, SOLUTION INTRAVENOUS CONTINUOUS
Status: DISCONTINUED | OUTPATIENT
Start: 2025-02-01 | End: 2025-02-03

## 2025-02-01 RX ORDER — ACETYLCYSTEINE 200 MG/ML
600 SOLUTION ORAL; RESPIRATORY (INHALATION)
Status: COMPLETED | OUTPATIENT
Start: 2025-02-02 | End: 2025-02-03

## 2025-02-01 RX ORDER — MIDODRINE HYDROCHLORIDE 5 MG/1
10 TABLET ORAL 3 TIMES DAILY
Status: DISCONTINUED | OUTPATIENT
Start: 2025-02-01 | End: 2025-02-02

## 2025-02-01 RX ORDER — FENTANYL CITRATE 50 UG/ML
25 INJECTION, SOLUTION INTRAMUSCULAR; INTRAVENOUS
Status: DISCONTINUED | OUTPATIENT
Start: 2025-02-01 | End: 2025-02-06

## 2025-02-01 RX ORDER — HEPARIN SODIUM 5000 [USP'U]/ML
5000 INJECTION, SOLUTION INTRAVENOUS; SUBCUTANEOUS EVERY 8 HOURS SCHEDULED
Status: DISCONTINUED | OUTPATIENT
Start: 2025-02-01 | End: 2025-02-05

## 2025-02-01 RX ADMIN — ATORVASTATIN CALCIUM 20 MG: 20 TABLET, FILM COATED ORAL at 20:57

## 2025-02-01 RX ADMIN — Medication 5 MCG/MIN: at 08:27

## 2025-02-01 RX ADMIN — Medication 3 MG: at 20:57

## 2025-02-01 RX ADMIN — BUDESONIDE 500 MCG: 0.5 INHALANT ORAL at 19:18

## 2025-02-01 RX ADMIN — SODIUM CHLORIDE, PRESERVATIVE FREE 10 ML: 5 INJECTION INTRAVENOUS at 20:57

## 2025-02-01 RX ADMIN — BUPROPION HYDROCHLORIDE 75 MG: 75 TABLET, FILM COATED ORAL at 07:41

## 2025-02-01 RX ADMIN — VENLAFAXINE 37.5 MG: 25 TABLET ORAL at 07:41

## 2025-02-01 RX ADMIN — ASPIRIN 81 MG 81 MG: 81 TABLET ORAL at 07:41

## 2025-02-01 RX ADMIN — WHITE PETROLATUM,ZINC OXIDE: 57; 17 PASTE TOPICAL at 20:58

## 2025-02-01 RX ADMIN — SODIUM CHLORIDE, PRESERVATIVE FREE 10 ML: 5 INJECTION INTRAVENOUS at 07:41

## 2025-02-01 RX ADMIN — WHITE PETROLATUM,ZINC OXIDE: 57; 17 PASTE TOPICAL at 07:41

## 2025-02-01 RX ADMIN — Medication 50 MCG/HR: at 04:25

## 2025-02-01 RX ADMIN — MIDODRINE HYDROCHLORIDE 10 MG: 5 TABLET ORAL at 20:57

## 2025-02-01 RX ADMIN — IPRATROPIUM BROMIDE AND ALBUTEROL SULFATE 1 DOSE: 2.5; .5 SOLUTION RESPIRATORY (INHALATION) at 13:47

## 2025-02-01 RX ADMIN — ACETYLCYSTEINE 600 MG: 200 SOLUTION ORAL; RESPIRATORY (INHALATION) at 19:18

## 2025-02-01 RX ADMIN — VENLAFAXINE 37.5 MG: 25 TABLET ORAL at 16:09

## 2025-02-01 RX ADMIN — ACETYLCYSTEINE 600 MG: 200 SOLUTION ORAL; RESPIRATORY (INHALATION) at 07:26

## 2025-02-01 RX ADMIN — HEPARIN SODIUM 5000 UNITS: 5000 INJECTION INTRAVENOUS; SUBCUTANEOUS at 14:03

## 2025-02-01 RX ADMIN — MIDODRINE HYDROCHLORIDE 10 MG: 5 TABLET ORAL at 14:03

## 2025-02-01 RX ADMIN — CEFEPIME 2000 MG: 2 INJECTION, POWDER, FOR SOLUTION INTRAVENOUS at 16:32

## 2025-02-01 RX ADMIN — BUDESONIDE 500 MCG: 0.5 INHALANT ORAL at 07:26

## 2025-02-01 RX ADMIN — CEFEPIME 2000 MG: 2 INJECTION, POWDER, FOR SOLUTION INTRAVENOUS at 08:42

## 2025-02-01 RX ADMIN — HEPARIN SODIUM 5000 UNITS: 5000 INJECTION INTRAVENOUS; SUBCUTANEOUS at 20:57

## 2025-02-01 RX ADMIN — IPRATROPIUM BROMIDE AND ALBUTEROL SULFATE 1 DOSE: 2.5; .5 SOLUTION RESPIRATORY (INHALATION) at 19:08

## 2025-02-01 RX ADMIN — MIDODRINE HYDROCHLORIDE 10 MG: 5 TABLET ORAL at 08:24

## 2025-02-01 RX ADMIN — CEFEPIME 2000 MG: 2 INJECTION, POWDER, FOR SOLUTION INTRAVENOUS at 00:37

## 2025-02-01 RX ADMIN — IPRATROPIUM BROMIDE AND ALBUTEROL SULFATE 1 DOSE: 2.5; .5 SOLUTION RESPIRATORY (INHALATION) at 07:26

## 2025-02-01 RX ADMIN — POTASSIUM BICARBONATE 40 MEQ: 782 TABLET, EFFERVESCENT ORAL at 07:43

## 2025-02-01 RX ADMIN — BUPROPION HYDROCHLORIDE 75 MG: 75 TABLET, FILM COATED ORAL at 20:57

## 2025-02-01 ASSESSMENT — PULMONARY FUNCTION TESTS
PIF_VALUE: 20
PIF_VALUE: 19
PIF_VALUE: 19
PIF_VALUE: 20
PIF_VALUE: 20
PIF_VALUE: 19
PIF_VALUE: 20
PIF_VALUE: 19
PIF_VALUE: 20
PIF_VALUE: 19
PIF_VALUE: 20
PIF_VALUE: 22
PIF_VALUE: 20
PIF_VALUE: 19
PIF_VALUE: 19
PIF_VALUE: 20
PIF_VALUE: 19
PIF_VALUE: 20
PIF_VALUE: 20
PIF_VALUE: 19
PIF_VALUE: 20
PIF_VALUE: 19
PIF_VALUE: 19
PIF_VALUE: 20
PIF_VALUE: 20
PIF_VALUE: 19
PIF_VALUE: 19
PIF_VALUE: 20
PIF_VALUE: 20
PIF_VALUE: 19

## 2025-02-01 ASSESSMENT — PAIN SCALES - GENERAL: PAINLEVEL_OUTOF10: 0

## 2025-02-01 NOTE — PROGRESS NOTES
CARDIOLOGY PROGRESS NOTE      Patient Name: Nida Graves  Age: 49 y.o.  Gender:female  :1975  MRN: [id number]    Patient seen and examined. This is a patient with a history of diabetes, asthma who presented with shortness of breath and hypoxia now being followed for atrial flutter and CHF.    Extubated , reintubated  d/t hypoxia. S/p trach 2025.  Awake, following commands. Remains in aflutter, hr 40-50s, off dopamine, getting levophed intermittently.      Telemetry reviewed,       Pertinent review of systems items noted above, all other systems are negative. Current medications reviewed..    Physical Examination    Allergies   Allergen Reactions    Latex Swelling    Penicillins Hives    Codeine Anxiety     Vitals:    25 1300   BP:    Pulse: 55   Resp: 15   Temp:    SpO2: 99%     Vital signs are stable  Intubated  Heart has a irregular rhythm, denita.  No murmur  Lung- are ventilator breaths  Abdomen is soft, nontender, normal bowel sounds.  Extremities have mild LE edema  Skin is dry and warm.    Labs reviewed:  Recent Results (from the past 12 hour(s))   Basic Metabolic Panel    Collection Time: 25  4:40 AM   Result Value Ref Range    Sodium 136 136 - 145 mmol/L    Potassium 3.3 (L) 3.5 - 5.1 mmol/L    Chloride 107 97 - 108 mmol/L    CO2 23 21 - 32 mmol/L    Anion Gap 6 2 - 12 mmol/L    Glucose 160 (H) 65 - 100 mg/dL    BUN 18 6 - 20 mg/dL    Creatinine 0.53 (L) 0.55 - 1.02 mg/dL    BUN/Creatinine Ratio 34 (H) 12 - 20      Est, Glom Filt Rate >90 >60 ml/min/1.73m2    Calcium 9.3 8.5 - 10.1 mg/dL   CBC with Auto Differential    Collection Time: 25  4:40 AM   Result Value Ref Range    WBC 10.5 3.6 - 11.0 K/uL    RBC 3.04 (L) 3.80 - 5.20 M/uL    Hemoglobin 8.7 (L) 11.5 - 16.0 g/dL    Hematocrit 27.6 (L) 35.0 - 47.0 %    MCV 90.8 80.0 - 99.0 FL    MCH 28.6 26.0 - 34.0 PG    MCHC 31.5 30.0 - 36.5 g/dL    RDW 15.8 (H) 11.5 - 14.5 %    Platelets 288 150 - 400 K/uL    MPV 11.0 8.9  additional questions arise.    Shanice Maloney, XIMENA  2/1/2025

## 2025-02-01 NOTE — PLAN OF CARE
Problem: Skin/Tissue Integrity  Goal: Absence of new skin breakdown  Description: 1.  Monitor for areas of redness and/or skin breakdown  2.  Assess vascular access sites hourly  3.  Every 4-6 hours minimum:  Change oxygen saturation probe site  4.  Every 4-6 hours:  If on nasal continuous positive airway pressure, respiratory therapy assess nares and determine need for appliance change or resting period.  Outcome: Progressing     Problem: Safety - Adult  Goal: Free from fall injury  Outcome: Progressing     Problem: Cardiovascular - Adult  Goal: Maintains optimal cardiac output and hemodynamic stability  Outcome: Progressing  Flowsheets (Taken 1/31/2025 1930 by Driss Ratliff RN)  Maintains optimal cardiac output and hemodynamic stability:   Monitor blood pressure and heart rate   Monitor urine output and notify Licensed Independent Practitioner for values outside of normal range  Goal: Absence of cardiac dysrhythmias or at baseline  Outcome: Progressing  Flowsheets (Taken 1/31/2025 1930 by Driss Ratliff RN)  Absence of cardiac dysrhythmias or at baseline: Monitor cardiac rate and rhythm

## 2025-02-01 NOTE — PROCEDURES
PROCEDURE NOTE  Date: 2/1/2025   Name: Nida Graves  YOB: 1975    PICC    Date/Time: 2/1/2025 1:44 PM    Performed by: Jesenia Canales RN  Authorized by: Marylin Salvador MD  Consent: Written consent obtained.  Risks and benefits: risks, benefits and alternatives were discussed  Consent given by: daughter.  Patient understanding: patient states understanding of the procedure being performed (daughter)  Patient consent: the patient's understanding of the procedure matches consent given (daughter)  Procedure consent: procedure consent matches procedure scheduled  Relevant documents: relevant documents present and verified  Test results: test results available and properly labeled  Site marked: the operative site was marked  Imaging studies: imaging studies available  Required items: required blood products, implants, devices, and special equipment available  Patient identity confirmed: arm band and hospital-assigned identification number  Time out: Immediately prior to procedure a \"time out\" was called to verify the correct patient, procedure, equipment, support staff and site/side marked as required.  Indications: vesicants.  Anesthesia: local infiltration    Anesthesia:  Local Anesthetic: lidocaine 1% without epinephrine  Anesthetic total: 5 mL    Sedation:  Patient sedated: no    Preparation: skin prepped with ChloraPrep  Skin prep agent dried: skin prep agent completely dried prior to procedure  Sterile barriers: all five maximum sterile barriers used - cap, mask, sterile gown, sterile gloves, and large sterile sheet  Hand hygiene: hand hygiene performed prior to central venous catheter insertion  Location: right brachial.  Site selection rationale: ~0.45 cm in diameter without tourniquet  Patient position: ~30 degrees.  Catheter type: triple lumen  Catheter size: 5 Fr  Pre-procedure: landmarks identified  Ultrasound guidance: yes  Sterile ultrasound techniques: sterile gel and sterile probe

## 2025-02-01 NOTE — PROGRESS NOTES
DOCUMENTATION  I had a face to face encounter with the patient, reviewed and interpreted patient data including clinical events, labs, images, vital signs, I/O's, and examined patient.  I have discussed the case and the plan and management of the patient's care with the consulting services, the bedside nurses and the respiratory therapist.      NOTE OF PERSONAL INVOLVEMENT IN CARE   This patient has a high probability of imminent, clinically significant deterioration, which requires the highest level of preparedness to intervene urgently. I participated in the decision-making and personally managed or directed the management of the following life and organ supporting interventions that required my frequent assessment to treat or prevent imminent deterioration.    I personally spent 35 minutes of critical care time.  This is time spent at this critically ill patient's bedside actively involved in patient care as well as the coordination of care.  This does not include any procedural time which has been billed separately.      Josue Salvador MD   Critical Care Medicine  [facility] Critical Care

## 2025-02-02 LAB
ANION GAP SERPL CALC-SCNC: 5 MMOL/L (ref 2–12)
BASOPHILS # BLD: 0.06 K/UL (ref 0–0.1)
BASOPHILS NFR BLD: 0.5 % (ref 0–1)
BUN SERPL-MCNC: 18 MG/DL (ref 6–20)
BUN/CREAT SERPL: 32 (ref 12–20)
CA-I BLD-MCNC: 9.2 MG/DL (ref 8.5–10.1)
CHLORIDE SERPL-SCNC: 109 MMOL/L (ref 97–108)
CO2 SERPL-SCNC: 23 MMOL/L (ref 21–32)
CREAT SERPL-MCNC: 0.56 MG/DL (ref 0.55–1.02)
DIFFERENTIAL METHOD BLD: ABNORMAL
EOSINOPHIL # BLD: 0.74 K/UL (ref 0–0.4)
EOSINOPHIL NFR BLD: 5.6 % (ref 0–7)
ERYTHROCYTE [DISTWIDTH] IN BLOOD BY AUTOMATED COUNT: 15.9 % (ref 11.5–14.5)
GLUCOSE BLD STRIP.AUTO-MCNC: 132 MG/DL (ref 65–100)
GLUCOSE BLD STRIP.AUTO-MCNC: 133 MG/DL (ref 65–100)
GLUCOSE BLD STRIP.AUTO-MCNC: 161 MG/DL (ref 65–100)
GLUCOSE SERPL-MCNC: 163 MG/DL (ref 65–100)
HCT VFR BLD AUTO: 27 % (ref 35–47)
HGB BLD-MCNC: 8.4 G/DL (ref 11.5–16)
IMM GRANULOCYTES # BLD AUTO: 0.07 K/UL (ref 0–0.04)
IMM GRANULOCYTES NFR BLD AUTO: 0.5 % (ref 0–0.5)
LYMPHOCYTES # BLD: 2.34 K/UL (ref 0.8–3.5)
LYMPHOCYTES NFR BLD: 17.9 % (ref 12–49)
MAGNESIUM SERPL-MCNC: 2 MG/DL (ref 1.6–2.4)
MCH RBC QN AUTO: 28.7 PG (ref 26–34)
MCHC RBC AUTO-ENTMCNC: 31.1 G/DL (ref 30–36.5)
MCV RBC AUTO: 92.2 FL (ref 80–99)
MONOCYTES # BLD: 1.21 K/UL (ref 0–1)
MONOCYTES NFR BLD: 9.2 % (ref 5–13)
NEUTS SEG # BLD: 8.68 K/UL (ref 1.8–8)
NEUTS SEG NFR BLD: 66.3 % (ref 32–75)
NRBC # BLD: 0 K/UL (ref 0–0.01)
NRBC BLD-RTO: 0 PER 100 WBC
PERFORMED BY:: ABNORMAL
PHOSPHATE SERPL-MCNC: 2.1 MG/DL (ref 2.6–4.7)
PLATELET # BLD AUTO: 320 K/UL (ref 150–400)
PMV BLD AUTO: 10.6 FL (ref 8.9–12.9)
POTASSIUM SERPL-SCNC: 3.7 MMOL/L (ref 3.5–5.1)
RBC # BLD AUTO: 2.93 M/UL (ref 3.8–5.2)
SODIUM SERPL-SCNC: 137 MMOL/L (ref 136–145)
WBC # BLD AUTO: 13.1 K/UL (ref 3.6–11)

## 2025-02-02 PROCEDURE — 2500000003 HC RX 250 WO HCPCS: Performed by: INTERNAL MEDICINE

## 2025-02-02 PROCEDURE — 6370000000 HC RX 637 (ALT 250 FOR IP): Performed by: INTERNAL MEDICINE

## 2025-02-02 PROCEDURE — 94640 AIRWAY INHALATION TREATMENT: CPT

## 2025-02-02 PROCEDURE — 85025 COMPLETE CBC W/AUTO DIFF WBC: CPT

## 2025-02-02 PROCEDURE — 6360000002 HC RX W HCPCS: Performed by: NURSE PRACTITIONER

## 2025-02-02 PROCEDURE — 36415 COLL VENOUS BLD VENIPUNCTURE: CPT

## 2025-02-02 PROCEDURE — 2500000003 HC RX 250 WO HCPCS: Performed by: NURSE PRACTITIONER

## 2025-02-02 PROCEDURE — 6370000000 HC RX 637 (ALT 250 FOR IP): Performed by: STUDENT IN AN ORGANIZED HEALTH CARE EDUCATION/TRAINING PROGRAM

## 2025-02-02 PROCEDURE — 82962 GLUCOSE BLOOD TEST: CPT

## 2025-02-02 PROCEDURE — 2700000000 HC OXYGEN THERAPY PER DAY

## 2025-02-02 PROCEDURE — 2000000000 HC ICU R&B

## 2025-02-02 PROCEDURE — 2580000003 HC RX 258: Performed by: NURSE PRACTITIONER

## 2025-02-02 PROCEDURE — 87040 BLOOD CULTURE FOR BACTERIA: CPT

## 2025-02-02 PROCEDURE — 94761 N-INVAS EAR/PLS OXIMETRY MLT: CPT

## 2025-02-02 PROCEDURE — 6360000002 HC RX W HCPCS: Performed by: STUDENT IN AN ORGANIZED HEALTH CARE EDUCATION/TRAINING PROGRAM

## 2025-02-02 PROCEDURE — 80048 BASIC METABOLIC PNL TOTAL CA: CPT

## 2025-02-02 PROCEDURE — 83735 ASSAY OF MAGNESIUM: CPT

## 2025-02-02 PROCEDURE — 2500000003 HC RX 250 WO HCPCS: Performed by: STUDENT IN AN ORGANIZED HEALTH CARE EDUCATION/TRAINING PROGRAM

## 2025-02-02 PROCEDURE — 94003 VENT MGMT INPAT SUBQ DAY: CPT

## 2025-02-02 PROCEDURE — 6360000002 HC RX W HCPCS: Performed by: INTERNAL MEDICINE

## 2025-02-02 PROCEDURE — 84100 ASSAY OF PHOSPHORUS: CPT

## 2025-02-02 RX ORDER — MIDODRINE HYDROCHLORIDE 5 MG/1
15 TABLET ORAL 3 TIMES DAILY
Status: DISCONTINUED | OUTPATIENT
Start: 2025-02-02 | End: 2025-02-07

## 2025-02-02 RX ADMIN — WHITE PETROLATUM,ZINC OXIDE: 57; 17 PASTE TOPICAL at 07:50

## 2025-02-02 RX ADMIN — IPRATROPIUM BROMIDE AND ALBUTEROL SULFATE 1 DOSE: 2.5; .5 SOLUTION RESPIRATORY (INHALATION) at 19:22

## 2025-02-02 RX ADMIN — ASPIRIN 81 MG 81 MG: 81 TABLET ORAL at 07:49

## 2025-02-02 RX ADMIN — MIDODRINE HYDROCHLORIDE 15 MG: 5 TABLET ORAL at 07:49

## 2025-02-02 RX ADMIN — CEFEPIME 2000 MG: 2 INJECTION, POWDER, FOR SOLUTION INTRAVENOUS at 00:46

## 2025-02-02 RX ADMIN — CEFEPIME 2000 MG: 2 INJECTION, POWDER, FOR SOLUTION INTRAVENOUS at 08:36

## 2025-02-02 RX ADMIN — MIDODRINE HYDROCHLORIDE 15 MG: 5 TABLET ORAL at 20:55

## 2025-02-02 RX ADMIN — ACETYLCYSTEINE 600 MG: 200 SOLUTION ORAL; RESPIRATORY (INHALATION) at 19:22

## 2025-02-02 RX ADMIN — VENLAFAXINE 37.5 MG: 25 TABLET ORAL at 16:34

## 2025-02-02 RX ADMIN — BUPROPION HYDROCHLORIDE 75 MG: 75 TABLET, FILM COATED ORAL at 20:55

## 2025-02-02 RX ADMIN — IPRATROPIUM BROMIDE AND ALBUTEROL SULFATE 1 DOSE: 2.5; .5 SOLUTION RESPIRATORY (INHALATION) at 14:42

## 2025-02-02 RX ADMIN — VENLAFAXINE 37.5 MG: 25 TABLET ORAL at 07:49

## 2025-02-02 RX ADMIN — ACETYLCYSTEINE 600 MG: 200 SOLUTION ORAL; RESPIRATORY (INHALATION) at 07:36

## 2025-02-02 RX ADMIN — HEPARIN SODIUM 5000 UNITS: 5000 INJECTION INTRAVENOUS; SUBCUTANEOUS at 05:55

## 2025-02-02 RX ADMIN — SODIUM CHLORIDE, PRESERVATIVE FREE 10 ML: 5 INJECTION INTRAVENOUS at 07:48

## 2025-02-02 RX ADMIN — IPRATROPIUM BROMIDE AND ALBUTEROL SULFATE 1 DOSE: 2.5; .5 SOLUTION RESPIRATORY (INHALATION) at 07:36

## 2025-02-02 RX ADMIN — BUDESONIDE 500 MCG: 0.5 INHALANT ORAL at 07:36

## 2025-02-02 RX ADMIN — HEPARIN SODIUM 5000 UNITS: 5000 INJECTION INTRAVENOUS; SUBCUTANEOUS at 20:55

## 2025-02-02 RX ADMIN — MIDODRINE HYDROCHLORIDE 15 MG: 5 TABLET ORAL at 13:37

## 2025-02-02 RX ADMIN — ATORVASTATIN CALCIUM 20 MG: 20 TABLET, FILM COATED ORAL at 20:55

## 2025-02-02 RX ADMIN — HEPARIN SODIUM 5000 UNITS: 5000 INJECTION INTRAVENOUS; SUBCUTANEOUS at 13:37

## 2025-02-02 RX ADMIN — BUDESONIDE 500 MCG: 0.5 INHALANT ORAL at 19:22

## 2025-02-02 RX ADMIN — CEFEPIME 2000 MG: 2 INJECTION, POWDER, FOR SOLUTION INTRAVENOUS at 16:34

## 2025-02-02 RX ADMIN — Medication 3 MG: at 20:55

## 2025-02-02 RX ADMIN — POTASSIUM PHOSPHATE, MONOBASIC POTASSIUM PHOSPHATE, DIBASIC 20 MMOL: 224; 236 INJECTION, SOLUTION, CONCENTRATE INTRAVENOUS at 07:42

## 2025-02-02 RX ADMIN — WHITE PETROLATUM,ZINC OXIDE: 57; 17 PASTE TOPICAL at 20:56

## 2025-02-02 RX ADMIN — SODIUM CHLORIDE, PRESERVATIVE FREE 10 ML: 5 INJECTION INTRAVENOUS at 20:56

## 2025-02-02 RX ADMIN — BUPROPION HYDROCHLORIDE 75 MG: 75 TABLET, FILM COATED ORAL at 07:48

## 2025-02-02 ASSESSMENT — PULMONARY FUNCTION TESTS
PIF_VALUE: 18
PIF_VALUE: 20
PIF_VALUE: 19
PIF_VALUE: 18
PIF_VALUE: 16
PIF_VALUE: 18
PIF_VALUE: 19
PIF_VALUE: 20
PIF_VALUE: 20
PIF_VALUE: 19
PIF_VALUE: 18
PIF_VALUE: 19
PIF_VALUE: 17
PIF_VALUE: 19
PIF_VALUE: 17
PIF_VALUE: 20
PIF_VALUE: 19
PIF_VALUE: 19
PIF_VALUE: 20
PIF_VALUE: 19
PIF_VALUE: 20
PIF_VALUE: 19
PIF_VALUE: 17
PIF_VALUE: 20
PIF_VALUE: 18
PIF_VALUE: 19
PIF_VALUE: 19
PIF_VALUE: 20
PIF_VALUE: 19
PIF_VALUE: 19
PIF_VALUE: 20
PIF_VALUE: 20
PIF_VALUE: 19

## 2025-02-02 NOTE — PLAN OF CARE
Problem: Skin/Tissue Integrity  Goal: Absence of new skin breakdown  Description: 1.  Monitor for areas of redness and/or skin breakdown  2.  Assess vascular access sites hourly  3.  Every 4-6 hours minimum:  Change oxygen saturation probe site  4.  Every 4-6 hours:  If on nasal continuous positive airway pressure, respiratory therapy assess nares and determine need for appliance change or resting period.  Outcome: Progressing     Problem: Safety - Adult  Goal: Free from fall injury  Outcome: Progressing     Problem: Respiratory - Adult  Goal: Achieves optimal ventilation and oxygenation  Outcome: Progressing  Flowsheets (Taken 2/1/2025 2000 by Tito Jaimes RN)  Achieves optimal ventilation and oxygenation:   Assess for changes in respiratory status   Assess for changes in mentation and behavior   Position to facilitate oxygenation and minimize respiratory effort   Oxygen supplementation based on oxygen saturation or arterial blood gases

## 2025-02-02 NOTE — PROGRESS NOTES
CARDIOLOGY PROGRESS NOTE      Patient Name: Nida Graves  Age: 49 y.o.  Gender:female  :1975  MRN: [id number]    Patient seen and examined. This is a patient with a history of diabetes, asthma who presented with shortness of breath and hypoxia now being followed for atrial flutter and CHF.    Extubated , reintubated  d/t hypoxia. Now S/p trach 2025.      Patient seen and examined. Awake, following commands. Remains in aflutter, hr 40-50s, off dopamine, getting levophed intermittently.      Telemetry reviewed,       Pertinent review of systems items noted above, all other systems are negative. Current medications reviewed..    Physical Examination    Allergies   Allergen Reactions    Latex Swelling    Penicillins Hives    Codeine Anxiety     Vitals:    25 1200   BP: (!) 100/56   Pulse: 58   Resp: 14   Temp:    SpO2: 98%     Vital signs are stable  Intubated  Heart has a irregular rhythm, denita.  No murmur  Lung- are ventilator breaths  Abdomen is soft, nontender, normal bowel sounds.  Extremities have mild LE edema  Skin is dry and warm.    Labs reviewed:  Recent Results (from the past 12 hour(s))   Basic Metabolic Panel    Collection Time: 25  4:32 AM   Result Value Ref Range    Sodium 137 136 - 145 mmol/L    Potassium 3.7 3.5 - 5.1 mmol/L    Chloride 109 (H) 97 - 108 mmol/L    CO2 23 21 - 32 mmol/L    Anion Gap 5 2 - 12 mmol/L    Glucose 163 (H) 65 - 100 mg/dL    BUN 18 6 - 20 mg/dL    Creatinine 0.56 0.55 - 1.02 mg/dL    BUN/Creatinine Ratio 32 (H) 12 - 20      Est, Glom Filt Rate >90 >60 ml/min/1.73m2    Calcium 9.2 8.5 - 10.1 mg/dL   CBC with Auto Differential    Collection Time: 25  4:32 AM   Result Value Ref Range    WBC 13.1 (H) 3.6 - 11.0 K/uL    RBC 2.93 (L) 3.80 - 5.20 M/uL    Hemoglobin 8.4 (L) 11.5 - 16.0 g/dL    Hematocrit 27.0 (L) 35.0 - 47.0 %    MCV 92.2 80.0 - 99.0 FL    MCH 28.7 26.0 - 34.0 PG    MCHC 31.1 30.0 - 36.5 g/dL    RDW 15.9 (H) 11.5 - 14.5 %

## 2025-02-02 NOTE — PROGRESS NOTES
PT order received chart reviewed, patient sleeping upon therapist arrival but aroused after few verbal cues.  Pt was opening her eyes intermittently, she was not able to keep her eyes open or answer any questions and nodding no for therapy. We will continue to follow up.

## 2025-02-02 NOTE — PROGRESS NOTES
CRITICAL CARE PROGRESS NOTE    Name: Nida Graves   : 1975   MRN: [id number]   Date: 2025      Diagnoses/problem list:   Acute hypoxic and hypercapnic respiratory failure  Acute kidney injury  Atrial flutter with slow ventricular response  GI bleed  Nosebleed, resolved  NSTEMI  Biventricular failure  Acute HFrEF, 25 to 30%  Diabetes  Morbid obesity  Acute metabolic/septic encephalopathy    24-hour events:   Awake and following commands.  FiO2 down to 35%.  Still on Levophed at 1 mcg/min.  Increased midodrine dose today.    Assessment and plan:   Ms. Graves is a 49-year-old female with PMH chronic debility and bedbound status, diabetes, asthma, obesity, who presented for shortness of breath.  Shortness of breath worsening over the past few days.  Upon presentation ED patient found to be hypoxic necessita.  Ting O2 via NC.  She was also found to be in A-fib with slow ventricular response.  She was transferred out of ICU on . Early morning  upgraded to ICU again due to severe hypoxia and hypercapnia requiring intubation.     NEUROLOGICAL:    Continue home Cymbalta and Bupropion  Fentanyl pushes as needed for pain  Delirium precautions  PT/OT    PULMONOLOGY:   Extubated on , reintubated on  due to inability to protect airway  ENT placed trach on   Currently on 35 % and PEEP of 7, continue to wean  LLL atelectasis on CXR  Mucomyst nebs and chest PT  DuoNebs    CARDIOVASCULAR:   Levophed to keep MAP above 65   Bradycardia resolved  LVEF 20-30%, right ventricle overload, moderate TR   GDMT for HFrEF as tolerated  Diuresis as needed  Atrial flutter with slow ventricular conduction   Avoid AVN blocking agents  May need AUGUSTIN/cardioversion and LHC prior to discharge  [facility] following    GASTROINTESTINAL:   Protonix 40mg IV BID due to concern for GI bleed  Noted to have dark stools . Occult stool positive for blood. CTA abdomen/pelvis without evidence of bleeding  Continue  the patient's care with the consulting services, the bedside nurses and the respiratory therapist.      NOTE OF PERSONAL INVOLVEMENT IN CARE   This patient has a high probability of imminent, clinically significant deterioration, which requires the highest level of preparedness to intervene urgently. I participated in the decision-making and personally managed or directed the management of the following life and organ supporting interventions that required my frequent assessment to treat or prevent imminent deterioration.    I personally spent 35 minutes of critical care time.  This is time spent at this critically ill patient's bedside actively involved in patient care as well as the coordination of care.  This does not include any procedural time which has been billed separately.      Josue Salvador MD   Critical Care Medicine  [facility] Critical Care

## 2025-02-03 LAB
ANION GAP SERPL CALC-SCNC: 4 MMOL/L (ref 2–12)
BASOPHILS # BLD: 0.05 K/UL (ref 0–0.1)
BASOPHILS NFR BLD: 0.5 % (ref 0–1)
BUN SERPL-MCNC: 21 MG/DL (ref 6–20)
BUN/CREAT SERPL: 34 (ref 12–20)
CA-I BLD-MCNC: 9.4 MG/DL (ref 8.5–10.1)
CHLORIDE SERPL-SCNC: 109 MMOL/L (ref 97–108)
CO2 SERPL-SCNC: 23 MMOL/L (ref 21–32)
CREAT SERPL-MCNC: 0.62 MG/DL (ref 0.55–1.02)
DIFFERENTIAL METHOD BLD: ABNORMAL
EOSINOPHIL # BLD: 0.57 K/UL (ref 0–0.4)
EOSINOPHIL NFR BLD: 5.4 % (ref 0–7)
ERYTHROCYTE [DISTWIDTH] IN BLOOD BY AUTOMATED COUNT: 16 % (ref 11.5–14.5)
GLUCOSE BLD STRIP.AUTO-MCNC: 112 MG/DL (ref 65–100)
GLUCOSE BLD STRIP.AUTO-MCNC: 124 MG/DL (ref 65–100)
GLUCOSE BLD STRIP.AUTO-MCNC: 131 MG/DL (ref 65–100)
GLUCOSE BLD STRIP.AUTO-MCNC: 134 MG/DL (ref 65–100)
GLUCOSE SERPL-MCNC: 150 MG/DL (ref 65–100)
HCT VFR BLD AUTO: 25.1 % (ref 35–47)
HGB BLD-MCNC: 7.7 G/DL (ref 11.5–16)
IMM GRANULOCYTES # BLD AUTO: 0.05 K/UL (ref 0–0.04)
IMM GRANULOCYTES NFR BLD AUTO: 0.5 % (ref 0–0.5)
LYMPHOCYTES # BLD: 1.73 K/UL (ref 0.8–3.5)
LYMPHOCYTES NFR BLD: 16.4 % (ref 12–49)
MAGNESIUM SERPL-MCNC: 2.1 MG/DL (ref 1.6–2.4)
MCH RBC QN AUTO: 28.3 PG (ref 26–34)
MCHC RBC AUTO-ENTMCNC: 30.7 G/DL (ref 30–36.5)
MCV RBC AUTO: 92.3 FL (ref 80–99)
MONOCYTES # BLD: 0.86 K/UL (ref 0–1)
MONOCYTES NFR BLD: 8.1 % (ref 5–13)
NEUTS SEG # BLD: 7.32 K/UL (ref 1.8–8)
NEUTS SEG NFR BLD: 69.1 % (ref 32–75)
NRBC # BLD: 0 K/UL (ref 0–0.01)
NRBC BLD-RTO: 0 PER 100 WBC
PERFORMED BY:: ABNORMAL
PHOSPHATE SERPL-MCNC: 2.8 MG/DL (ref 2.6–4.7)
PLATELET # BLD AUTO: 277 K/UL (ref 150–400)
PMV BLD AUTO: 10.9 FL (ref 8.9–12.9)
POTASSIUM SERPL-SCNC: 3.9 MMOL/L (ref 3.5–5.1)
RBC # BLD AUTO: 2.72 M/UL (ref 3.8–5.2)
SODIUM SERPL-SCNC: 136 MMOL/L (ref 136–145)
WBC # BLD AUTO: 10.6 K/UL (ref 3.6–11)

## 2025-02-03 PROCEDURE — 6370000000 HC RX 637 (ALT 250 FOR IP): Performed by: STUDENT IN AN ORGANIZED HEALTH CARE EDUCATION/TRAINING PROGRAM

## 2025-02-03 PROCEDURE — 94761 N-INVAS EAR/PLS OXIMETRY MLT: CPT

## 2025-02-03 PROCEDURE — 2500000003 HC RX 250 WO HCPCS: Performed by: INTERNAL MEDICINE

## 2025-02-03 PROCEDURE — 6360000002 HC RX W HCPCS: Performed by: STUDENT IN AN ORGANIZED HEALTH CARE EDUCATION/TRAINING PROGRAM

## 2025-02-03 PROCEDURE — 85025 COMPLETE CBC W/AUTO DIFF WBC: CPT

## 2025-02-03 PROCEDURE — 2000000000 HC ICU R&B

## 2025-02-03 PROCEDURE — 6360000002 HC RX W HCPCS: Performed by: INTERNAL MEDICINE

## 2025-02-03 PROCEDURE — 80048 BASIC METABOLIC PNL TOTAL CA: CPT

## 2025-02-03 PROCEDURE — 6360000002 HC RX W HCPCS: Performed by: NURSE PRACTITIONER

## 2025-02-03 PROCEDURE — 2500000003 HC RX 250 WO HCPCS: Performed by: STUDENT IN AN ORGANIZED HEALTH CARE EDUCATION/TRAINING PROGRAM

## 2025-02-03 PROCEDURE — 94003 VENT MGMT INPAT SUBQ DAY: CPT

## 2025-02-03 PROCEDURE — 83735 ASSAY OF MAGNESIUM: CPT

## 2025-02-03 PROCEDURE — 6370000000 HC RX 637 (ALT 250 FOR IP): Performed by: INTERNAL MEDICINE

## 2025-02-03 PROCEDURE — 94640 AIRWAY INHALATION TREATMENT: CPT

## 2025-02-03 PROCEDURE — 2700000000 HC OXYGEN THERAPY PER DAY

## 2025-02-03 PROCEDURE — 82962 GLUCOSE BLOOD TEST: CPT

## 2025-02-03 PROCEDURE — 84100 ASSAY OF PHOSPHORUS: CPT

## 2025-02-03 PROCEDURE — 36415 COLL VENOUS BLD VENIPUNCTURE: CPT

## 2025-02-03 RX ORDER — FUROSEMIDE 10 MG/ML
20 INJECTION INTRAMUSCULAR; INTRAVENOUS ONCE
Status: COMPLETED | OUTPATIENT
Start: 2025-02-03 | End: 2025-02-03

## 2025-02-03 RX ORDER — FAMOTIDINE 20 MG/1
20 TABLET, FILM COATED ORAL 2 TIMES DAILY
Status: DISCONTINUED | OUTPATIENT
Start: 2025-02-03 | End: 2025-02-10

## 2025-02-03 RX ADMIN — ASPIRIN 81 MG 81 MG: 81 TABLET ORAL at 08:18

## 2025-02-03 RX ADMIN — BUDESONIDE 500 MCG: 0.5 INHALANT ORAL at 08:03

## 2025-02-03 RX ADMIN — MIDODRINE HYDROCHLORIDE 15 MG: 5 TABLET ORAL at 21:02

## 2025-02-03 RX ADMIN — IPRATROPIUM BROMIDE AND ALBUTEROL SULFATE 1 DOSE: 2.5; .5 SOLUTION RESPIRATORY (INHALATION) at 08:03

## 2025-02-03 RX ADMIN — HEPARIN SODIUM 5000 UNITS: 5000 INJECTION INTRAVENOUS; SUBCUTANEOUS at 14:53

## 2025-02-03 RX ADMIN — CEFEPIME 2000 MG: 2 INJECTION, POWDER, FOR SOLUTION INTRAVENOUS at 00:13

## 2025-02-03 RX ADMIN — SODIUM CHLORIDE, PRESERVATIVE FREE 10 ML: 5 INJECTION INTRAVENOUS at 08:19

## 2025-02-03 RX ADMIN — WHITE PETROLATUM,ZINC OXIDE: 57; 17 PASTE TOPICAL at 08:24

## 2025-02-03 RX ADMIN — MIDODRINE HYDROCHLORIDE 15 MG: 5 TABLET ORAL at 08:19

## 2025-02-03 RX ADMIN — ACETYLCYSTEINE 600 MG: 200 SOLUTION ORAL; RESPIRATORY (INHALATION) at 19:16

## 2025-02-03 RX ADMIN — FUROSEMIDE 20 MG: 10 INJECTION, SOLUTION INTRAMUSCULAR; INTRAVENOUS at 08:34

## 2025-02-03 RX ADMIN — Medication 3 MG: at 21:03

## 2025-02-03 RX ADMIN — CEFEPIME 2000 MG: 2 INJECTION, POWDER, FOR SOLUTION INTRAVENOUS at 08:15

## 2025-02-03 RX ADMIN — CEFEPIME 2000 MG: 2 INJECTION, POWDER, FOR SOLUTION INTRAVENOUS at 18:09

## 2025-02-03 RX ADMIN — BUPROPION HYDROCHLORIDE 75 MG: 75 TABLET, FILM COATED ORAL at 21:02

## 2025-02-03 RX ADMIN — BUPROPION HYDROCHLORIDE 75 MG: 75 TABLET, FILM COATED ORAL at 08:19

## 2025-02-03 RX ADMIN — HEPARIN SODIUM 5000 UNITS: 5000 INJECTION INTRAVENOUS; SUBCUTANEOUS at 21:03

## 2025-02-03 RX ADMIN — IPRATROPIUM BROMIDE AND ALBUTEROL SULFATE 1 DOSE: 2.5; .5 SOLUTION RESPIRATORY (INHALATION) at 15:15

## 2025-02-03 RX ADMIN — WHITE PETROLATUM,ZINC OXIDE: 57; 17 PASTE TOPICAL at 21:03

## 2025-02-03 RX ADMIN — FAMOTIDINE 20 MG: 20 TABLET, FILM COATED ORAL at 12:55

## 2025-02-03 RX ADMIN — ATORVASTATIN CALCIUM 20 MG: 20 TABLET, FILM COATED ORAL at 21:03

## 2025-02-03 RX ADMIN — SODIUM CHLORIDE, PRESERVATIVE FREE 10 ML: 5 INJECTION INTRAVENOUS at 21:03

## 2025-02-03 RX ADMIN — ACETYLCYSTEINE 600 MG: 200 SOLUTION ORAL; RESPIRATORY (INHALATION) at 08:04

## 2025-02-03 RX ADMIN — MIDODRINE HYDROCHLORIDE 15 MG: 5 TABLET ORAL at 14:53

## 2025-02-03 RX ADMIN — FAMOTIDINE 20 MG: 20 TABLET, FILM COATED ORAL at 21:02

## 2025-02-03 RX ADMIN — HEPARIN SODIUM 5000 UNITS: 5000 INJECTION INTRAVENOUS; SUBCUTANEOUS at 05:55

## 2025-02-03 RX ADMIN — IPRATROPIUM BROMIDE AND ALBUTEROL SULFATE 1 DOSE: 2.5; .5 SOLUTION RESPIRATORY (INHALATION) at 19:15

## 2025-02-03 RX ADMIN — VENLAFAXINE 37.5 MG: 25 TABLET ORAL at 08:19

## 2025-02-03 RX ADMIN — VENLAFAXINE 37.5 MG: 25 TABLET ORAL at 18:00

## 2025-02-03 RX ADMIN — BUDESONIDE 500 MCG: 0.5 INHALANT ORAL at 19:15

## 2025-02-03 ASSESSMENT — PAIN SCALES - GENERAL
PAINLEVEL_OUTOF10: 0

## 2025-02-03 ASSESSMENT — PULMONARY FUNCTION TESTS
PIF_VALUE: 30
PIF_VALUE: 13
PIF_VALUE: 16
PIF_VALUE: 18
PIF_VALUE: 13
PIF_VALUE: 13
PIF_VALUE: 19
PIF_VALUE: 16
PIF_VALUE: 18
PIF_VALUE: 14
PIF_VALUE: 13
PIF_VALUE: 14
PIF_VALUE: 14
PIF_VALUE: 13
PIF_VALUE: 14
PIF_VALUE: 17
PIF_VALUE: 13
PIF_VALUE: 13
PIF_VALUE: 18
PIF_VALUE: 15
PIF_VALUE: 11
PIF_VALUE: 14
PIF_VALUE: 13
PIF_VALUE: 14
PIF_VALUE: 13
PIF_VALUE: 21
PIF_VALUE: 13
PIF_VALUE: 15
PIF_VALUE: 14
PIF_VALUE: 15
PIF_VALUE: 13
PIF_VALUE: 13
PIF_VALUE: 18
PIF_VALUE: 21
PIF_VALUE: 15
PIF_VALUE: 14
PIF_VALUE: 15
PIF_VALUE: 14
PIF_VALUE: 18
PIF_VALUE: 17
PIF_VALUE: 22
PIF_VALUE: 20
PIF_VALUE: 18
PIF_VALUE: 15
PIF_VALUE: 13
PIF_VALUE: 14
PIF_VALUE: 15
PIF_VALUE: 12
PIF_VALUE: 15
PIF_VALUE: 14
PIF_VALUE: 16
PIF_VALUE: 13
PIF_VALUE: 15
PIF_VALUE: 14
PIF_VALUE: 15
PIF_VALUE: 14
PIF_VALUE: 13
PIF_VALUE: 13
PIF_VALUE: 14

## 2025-02-03 NOTE — CARE COORDINATION
CM reviewed Pt medicals, Pt lives with her  and Nephew.     Pt  assist Pt with ADL.          [facility] has accepted Pt, they will need to get auth.     [facility] stated: \"With her insurance they want a lower FiO2 and PEEP on the vent. She is also on dopamine and fentanyl which they may want her off before Auth. I will re-evaluate her on Monday and go from there.\"    Per IDR    Pt is on the trach and vent.  Pt getting tube feeds.     Switched to pressure support.     PT/OT ordered    The doctor stated that Pt is stable to go to [facility].

## 2025-02-03 NOTE — PROGRESS NOTES
CRITICAL CARE PROGRESS NOTE    Name: Nida Graves   : 1975   MRN: [id number]   Date: 2/3/2025      Diagnoses/problem list:   Acute hypoxic and hypercapnic respiratory failure  Acute kidney injury  Atrial flutter with slow ventricular response  GI bleed  Nosebleed, resolved  NSTEMI  Biventricular failure  Acute HFrEF, 25 to 30%  Diabetes  Morbid obesity    HPI & Hospital Course:   49-year-old female with PMH chronic debility and bedbound status, diabetes, asthma, obesity, who presented for shortness of breath. Upon presentation ED patient found to be hypoxic necessitating O2 via NC. She was also found to be in A-fib with slow ventricular response. She was transferred out of ICU on 25. Early morning 25 upgraded to ICU again due to severe hypoxia and hypercapnia requiring intubation. She was reintubated . Tracheostomy was placed 25.     24-hour events:   No acute overnight events. She was switched to pressure support mode today. Awake and alert, following commands.     ROS negative except as otherwise documented.     Assessment and plan:     1. Acute hypoxic and hypercapnic respiratory failure  - Extubated on , reintubated on    - S/p Trach 25  - Keep off of sedatio  - Daily pressure support trials during the day and AC/VC at night  - Out of bed today  - 20 mg IV lasix    2. A flutter  - Avoid anticoagulation given recurrent GI Bleed    3. Multifocal pneumonia  - Improving FiO2 needs  - Check procalcitonin  - Cefepime for 7-days (end date 25)    4. Chronic systolic heart failure  - Keep net negative  - 20 mg IV lasix today    SYSTEMS     Neuro - Avoid sedatives   Pulm - Wean vent  CVS - C/w midodrine    DAILY CHECKLIST     Code Status: Full  DVT Prophylaxis: SCDs/Hep sq  GI Prophylaxis: Pepcid  Feeding: Tube feeds  Physical therapy: Out of bed today  ABCDEF Bundle/Checklist Completed: Yes  Disposition: Stay in ICU. Pending LTAC placement.   Social: Discussed with patient      OBJECTIVE:     Blood pressure (!) 104/59, pulse (!) 48, temperature 99.6 °F (37.6 °C), temperature source Axillary, resp. rate 21, height 1.702 m (5' 7.01\"), weight 101.2 kg (223 lb 1.7 oz), SpO2 100%.    Physical Exam  General: Well appearing, in no distress.    Skin: No rash  Head: Normocephalic, atraumatic.  Eyes: Conjunctiva clear, sclera non-icteric, EOM intact.    Neck: Supple, without lesions.   Heart: + Murmur; irregular rate and rhythm.  Lungs: Clear to auscultation. Trach.   Abdomen: Bowel sounds normal, no tenderness, organomegaly, masses, or hernia.   Extremities: No cyanosis, peripheral pulses intact. 1+ pitting edema.   Neurologic: CN 2-12 normal. Sensation to pain, touch, and proprioception normal.   Psychiatric: Oriented x 2-3.      Labs and Data: Reviewed 02/03/25  Medications: Reviewed 02/03/25  Imaging: Reviewed 02/03/25    Intake/Output:     Intake/Output Summary (Last 24 hours) at 2/3/2025 1529  Last data filed at 2/3/2025 1500  Gross per 24 hour   Intake 1539.85 ml   Output 1200 ml   Net 339.85 ml       CRITICAL CARE DOCUMENTATION  I had a face to face encounter with the patient, reviewed and interpreted patient data including clinical events, labs, images, vital signs, I/O's, and examined patient.  I have discussed the case and the plan and management of the patient's care with the consulting services, the bedside nurses and the respiratory therapist.      NOTE OF PERSONAL INVOLVEMENT IN CARE   This patient has a high probability of imminent, clinically significant deterioration, which requires the highest level of preparedness to intervene urgently. I participated in the decision-making and personally managed or directed the management of the following life and organ supporting interventions that required my frequent assessment to treat or prevent imminent deterioration.    I personally spent 32 minutes of critical care time.  This is time spent at this critically ill patient's bedside

## 2025-02-03 NOTE — PROGRESS NOTES
SW attempted supportive visit.  Pt was resting quietly in bed, no family currently present; SW did not disturb.  Per review of chart, pt had trach placed on 1/30.  Tentative plan is for cardiac cath on 2/5 if stable.  Care Manager is coordinating plan for discharge to [facility].  SW will continue to follow for support.

## 2025-02-03 NOTE — PROGRESS NOTES
CARDIOLOGY PROGRESS NOTE      Patient Name: Nida Graves  Age: 49 y.o.  Gender:female  :1975  MRN: [id number]    Patient seen and examined. This is a patient with a history of diabetes, asthma who presented with shortness of breath and hypoxia now being followed for atrial flutter and CHF.    Extubated , reintubated  d/t hypoxia. Now S/p trach 2025.      Patient seen and examined. Awake, following commands. Remains in aflutter, hr 40-50s, off dopamine, off levophed     Telemetry reviewed      Pertinent review of systems items noted above, all other systems are negative. Current medications reviewed..    Physical Examination    Allergies   Allergen Reactions    Latex Swelling    Penicillins Hives    Codeine Anxiety     Vitals:    25 1515   BP:    Pulse: (!) 48   Resp: 21   Temp:    SpO2: 100%     Vital signs are stable  Trach, vent  Heart has a irregular rhythm, denita.  No murmur  Lung- are ventilator breaths  Abdomen is soft, nontender, normal bowel sounds.  Extremities have mild LE edema  Skin is dry and warm.    Labs reviewed:  Recent Results (from the past 12 hour(s))   Basic Metabolic Panel    Collection Time: 25  3:46 AM   Result Value Ref Range    Sodium 136 136 - 145 mmol/L    Potassium 3.9 3.5 - 5.1 mmol/L    Chloride 109 (H) 97 - 108 mmol/L    CO2 23 21 - 32 mmol/L    Anion Gap 4 2 - 12 mmol/L    Glucose 150 (H) 65 - 100 mg/dL    BUN 21 (H) 6 - 20 mg/dL    Creatinine 0.62 0.55 - 1.02 mg/dL    BUN/Creatinine Ratio 34 (H) 12 - 20      Est, Glom Filt Rate >90 >60 ml/min/1.73m2    Calcium 9.4 8.5 - 10.1 mg/dL   CBC with Auto Differential    Collection Time: 25  3:46 AM   Result Value Ref Range    WBC 10.6 3.6 - 11.0 K/uL    RBC 2.72 (L) 3.80 - 5.20 M/uL    Hemoglobin 7.7 (L) 11.5 - 16.0 g/dL    Hematocrit 25.1 (L) 35.0 - 47.0 %    MCV 92.3 80.0 - 99.0 FL    MCH 28.3 26.0 - 34.0 PG    MCHC 30.7 30.0 - 36.5 g/dL    RDW 16.0 (H) 11.5 - 14.5 %    Platelets 277 150 - 400  K/uL    MPV 10.9 8.9 - 12.9 FL    Nucleated RBCs 0.0 0.0  WBC    nRBC 0.00 0.00 - 0.01 K/uL    Neutrophils % 69.1 32.0 - 75.0 %    Lymphocytes % 16.4 12.0 - 49.0 %    Monocytes % 8.1 5.0 - 13.0 %    Eosinophils % 5.4 0.0 - 7.0 %    Basophils % 0.5 0.0 - 1.0 %    Immature Granulocytes % 0.5 0 - 0.5 %    Neutrophils Absolute 7.32 1.80 - 8.00 K/UL    Lymphocytes Absolute 1.73 0.80 - 3.50 K/UL    Monocytes Absolute 0.86 0.00 - 1.00 K/UL    Eosinophils Absolute 0.57 (H) 0.00 - 0.40 K/UL    Basophils Absolute 0.05 0.00 - 0.10 K/UL    Immature Granulocytes Absolute 0.05 (H) 0.00 - 0.04 K/UL    Differential Type AUTOMATED     Magnesium    Collection Time: 02/03/25  3:46 AM   Result Value Ref Range    Magnesium 2.1 1.6 - 2.4 mg/dL   Phosphorus    Collection Time: 02/03/25  3:46 AM   Result Value Ref Range    Phosphorus 2.8 2.6 - 4.7 mg/dL   POCT Glucose    Collection Time: 02/03/25  7:33 AM   Result Value Ref Range    POC Glucose 112 (H) 65 - 100 mg/dL    Performed by: Bam Maradiaga    POCT Glucose    Collection Time: 02/03/25 12:32 PM   Result Value Ref Range    POC Glucose 131 (H) 65 - 100 mg/dL    Performed by: RAÚL RODAS       Case discussed with Dr. Coburn and our impression and recommendations are as follows:  Atrial flutter/fib with slow ventricular rate, 40-50s, HR acceptable  Off anticoagulation due to rectal bleeding/anemia, s/p transfusion; hgb 7.7  Continue aspirin  Off Dopamine   Elevated troponin: peak of 639  likely myocardial injury secondary to acute hypoxia, aflutter   Continue aspirin, statin  Cath tentatively scheduled for Wed, if family agrees, hgb and renal function remain stable; likely diagnostic only.  Acute systolic heart failure with reduced ejection fraction, unknown etiology  EF 25-30%  BLE edema noted   Initiate GDMT as BP allows  Limited echo 1/28 EF 30-35%, normal IVC size  Acute hypoxic hypercapnic respiratory failure:   likely from respiratory fatigue/poor effort  Reintubated

## 2025-02-03 NOTE — PLAN OF CARE
Problem: Skin/Tissue Integrity  Goal: Absence of new skin breakdown  Description: 1.  Monitor for areas of redness and/or skin breakdown  2.  Assess vascular access sites hourly  3.  Every 4-6 hours minimum:  Change oxygen saturation probe site  4.  Every 4-6 hours:  If on nasal continuous positive airway pressure, respiratory therapy assess nares and determine need for appliance change or resting period.  2/2/2025 2037 by Tito Jaimes, RN  Outcome: Progressing     Problem: Chronic Conditions and Co-morbidities  Goal: Patient's chronic conditions and co-morbidity symptoms are monitored and maintained or improved  Outcome: Progressing     Problem: Safety - Adult  Goal: Free from fall injury  2/2/2025 2037 by Tito Jaimes, RN  Outcome: Progressing     Problem: Respiratory - Adult  Goal: Achieves optimal ventilation and oxygenation  2/2/2025 2037 by Tito Jaimes, RN  Outcome: Progressing  Flowsheets (Taken 2/1/2025 2000 by Tito Jaimes, RN)  Achieves optimal ventilation and oxygenation:   Assess for changes in respiratory status   Assess for changes in mentation and behavior   Position to facilitate oxygenation and minimize respiratory effort   Oxygen supplementation based on oxygen saturation or arterial blood gases

## 2025-02-04 LAB
ALBUMIN SERPL-MCNC: 1.6 G/DL (ref 3.5–5)
ALBUMIN/GLOB SERPL: 0.4 (ref 1.1–2.2)
ALP SERPL-CCNC: 287 U/L (ref 45–117)
ALT SERPL-CCNC: 53 U/L (ref 12–78)
ANION GAP SERPL CALC-SCNC: 3 MMOL/L (ref 2–12)
AST SERPL W P-5'-P-CCNC: 67 U/L (ref 15–37)
BILIRUB SERPL-MCNC: 0.6 MG/DL (ref 0.2–1)
BUN SERPL-MCNC: 27 MG/DL (ref 6–20)
BUN/CREAT SERPL: 39 (ref 12–20)
CA-I BLD-MCNC: 9.2 MG/DL (ref 8.5–10.1)
CHLORIDE SERPL-SCNC: 110 MMOL/L (ref 97–108)
CO2 SERPL-SCNC: 24 MMOL/L (ref 21–32)
CREAT SERPL-MCNC: 0.69 MG/DL (ref 0.55–1.02)
ERYTHROCYTE [DISTWIDTH] IN BLOOD BY AUTOMATED COUNT: 16.1 % (ref 11.5–14.5)
GLOBULIN SER CALC-MCNC: 4.2 G/DL (ref 2–4)
GLUCOSE BLD STRIP.AUTO-MCNC: 112 MG/DL (ref 65–100)
GLUCOSE BLD STRIP.AUTO-MCNC: 117 MG/DL (ref 65–100)
GLUCOSE BLD STRIP.AUTO-MCNC: 130 MG/DL (ref 65–100)
GLUCOSE SERPL-MCNC: 124 MG/DL (ref 65–100)
HCT VFR BLD AUTO: 25.3 % (ref 35–47)
HGB BLD-MCNC: 7.6 G/DL (ref 11.5–16)
MAGNESIUM SERPL-MCNC: 2 MG/DL (ref 1.6–2.4)
MCH RBC QN AUTO: 28.3 PG (ref 26–34)
MCHC RBC AUTO-ENTMCNC: 30 G/DL (ref 30–36.5)
MCV RBC AUTO: 94.1 FL (ref 80–99)
NRBC # BLD: 0 K/UL (ref 0–0.01)
NRBC BLD-RTO: 0 PER 100 WBC
PERFORMED BY:: ABNORMAL
PHOSPHATE SERPL-MCNC: 3.6 MG/DL (ref 2.6–4.7)
PLATELET # BLD AUTO: 281 K/UL (ref 150–400)
PMV BLD AUTO: 10.5 FL (ref 8.9–12.9)
POTASSIUM SERPL-SCNC: 4.1 MMOL/L (ref 3.5–5.1)
PROCALCITONIN SERPL-MCNC: 0.33 NG/ML
PROT SERPL-MCNC: 5.8 G/DL (ref 6.4–8.2)
RBC # BLD AUTO: 2.69 M/UL (ref 3.8–5.2)
SODIUM SERPL-SCNC: 137 MMOL/L (ref 136–145)
WBC # BLD AUTO: 10.1 K/UL (ref 3.6–11)

## 2025-02-04 PROCEDURE — 94761 N-INVAS EAR/PLS OXIMETRY MLT: CPT

## 2025-02-04 PROCEDURE — 6370000000 HC RX 637 (ALT 250 FOR IP): Performed by: STUDENT IN AN ORGANIZED HEALTH CARE EDUCATION/TRAINING PROGRAM

## 2025-02-04 PROCEDURE — 6360000002 HC RX W HCPCS: Performed by: INTERNAL MEDICINE

## 2025-02-04 PROCEDURE — 80053 COMPREHEN METABOLIC PANEL: CPT

## 2025-02-04 PROCEDURE — 84145 PROCALCITONIN (PCT): CPT

## 2025-02-04 PROCEDURE — 6370000000 HC RX 637 (ALT 250 FOR IP): Performed by: INTERNAL MEDICINE

## 2025-02-04 PROCEDURE — 82962 GLUCOSE BLOOD TEST: CPT

## 2025-02-04 PROCEDURE — 97530 THERAPEUTIC ACTIVITIES: CPT

## 2025-02-04 PROCEDURE — 2500000003 HC RX 250 WO HCPCS: Performed by: STUDENT IN AN ORGANIZED HEALTH CARE EDUCATION/TRAINING PROGRAM

## 2025-02-04 PROCEDURE — 83735 ASSAY OF MAGNESIUM: CPT

## 2025-02-04 PROCEDURE — 94003 VENT MGMT INPAT SUBQ DAY: CPT

## 2025-02-04 PROCEDURE — 2700000000 HC OXYGEN THERAPY PER DAY

## 2025-02-04 PROCEDURE — 36415 COLL VENOUS BLD VENIPUNCTURE: CPT

## 2025-02-04 PROCEDURE — 85027 COMPLETE CBC AUTOMATED: CPT

## 2025-02-04 PROCEDURE — 84100 ASSAY OF PHOSPHORUS: CPT

## 2025-02-04 PROCEDURE — 2500000003 HC RX 250 WO HCPCS: Performed by: INTERNAL MEDICINE

## 2025-02-04 PROCEDURE — 94640 AIRWAY INHALATION TREATMENT: CPT

## 2025-02-04 PROCEDURE — 2000000000 HC ICU R&B

## 2025-02-04 PROCEDURE — 6360000002 HC RX W HCPCS: Performed by: STUDENT IN AN ORGANIZED HEALTH CARE EDUCATION/TRAINING PROGRAM

## 2025-02-04 RX ORDER — FUROSEMIDE 40 MG/1
40 TABLET ORAL DAILY
Status: DISCONTINUED | OUTPATIENT
Start: 2025-02-04 | End: 2025-02-07

## 2025-02-04 RX ADMIN — WHITE PETROLATUM,ZINC OXIDE: 57; 17 PASTE TOPICAL at 08:00

## 2025-02-04 RX ADMIN — BUDESONIDE 500 MCG: 0.5 INHALANT ORAL at 07:17

## 2025-02-04 RX ADMIN — FAMOTIDINE 20 MG: 20 TABLET, FILM COATED ORAL at 21:39

## 2025-02-04 RX ADMIN — CEFEPIME 2000 MG: 2 INJECTION, POWDER, FOR SOLUTION INTRAVENOUS at 02:16

## 2025-02-04 RX ADMIN — BUPROPION HYDROCHLORIDE 75 MG: 75 TABLET, FILM COATED ORAL at 07:59

## 2025-02-04 RX ADMIN — FAMOTIDINE 20 MG: 20 TABLET, FILM COATED ORAL at 07:59

## 2025-02-04 RX ADMIN — BUDESONIDE 500 MCG: 0.5 INHALANT ORAL at 19:22

## 2025-02-04 RX ADMIN — SODIUM CHLORIDE, PRESERVATIVE FREE 10 ML: 5 INJECTION INTRAVENOUS at 21:50

## 2025-02-04 RX ADMIN — MIDODRINE HYDROCHLORIDE 15 MG: 5 TABLET ORAL at 07:59

## 2025-02-04 RX ADMIN — VENLAFAXINE 37.5 MG: 25 TABLET ORAL at 07:59

## 2025-02-04 RX ADMIN — BUPROPION HYDROCHLORIDE 75 MG: 75 TABLET, FILM COATED ORAL at 21:38

## 2025-02-04 RX ADMIN — SODIUM CHLORIDE, PRESERVATIVE FREE 10 ML: 5 INJECTION INTRAVENOUS at 07:58

## 2025-02-04 RX ADMIN — IPRATROPIUM BROMIDE AND ALBUTEROL SULFATE 1 DOSE: 2.5; .5 SOLUTION RESPIRATORY (INHALATION) at 13:59

## 2025-02-04 RX ADMIN — IPRATROPIUM BROMIDE AND ALBUTEROL SULFATE 1 DOSE: 2.5; .5 SOLUTION RESPIRATORY (INHALATION) at 19:22

## 2025-02-04 RX ADMIN — VENLAFAXINE 37.5 MG: 25 TABLET ORAL at 18:11

## 2025-02-04 RX ADMIN — WHITE PETROLATUM,ZINC OXIDE: 57; 17 PASTE TOPICAL at 22:00

## 2025-02-04 RX ADMIN — MIDODRINE HYDROCHLORIDE 15 MG: 5 TABLET ORAL at 21:39

## 2025-02-04 RX ADMIN — HEPARIN SODIUM 5000 UNITS: 5000 INJECTION INTRAVENOUS; SUBCUTANEOUS at 22:19

## 2025-02-04 RX ADMIN — MIDODRINE HYDROCHLORIDE 15 MG: 5 TABLET ORAL at 15:25

## 2025-02-04 RX ADMIN — HEPARIN SODIUM 5000 UNITS: 5000 INJECTION INTRAVENOUS; SUBCUTANEOUS at 05:17

## 2025-02-04 RX ADMIN — IPRATROPIUM BROMIDE AND ALBUTEROL SULFATE 1 DOSE: 2.5; .5 SOLUTION RESPIRATORY (INHALATION) at 07:17

## 2025-02-04 RX ADMIN — ASPIRIN 81 MG 81 MG: 81 TABLET ORAL at 07:59

## 2025-02-04 RX ADMIN — Medication 3 MG: at 21:39

## 2025-02-04 RX ADMIN — FUROSEMIDE 40 MG: 40 TABLET ORAL at 18:14

## 2025-02-04 RX ADMIN — ATORVASTATIN CALCIUM 20 MG: 20 TABLET, FILM COATED ORAL at 21:38

## 2025-02-04 RX ADMIN — CEFEPIME 2000 MG: 2 INJECTION, POWDER, FOR SOLUTION INTRAVENOUS at 07:59

## 2025-02-04 RX ADMIN — HEPARIN SODIUM 5000 UNITS: 5000 INJECTION INTRAVENOUS; SUBCUTANEOUS at 15:25

## 2025-02-04 ASSESSMENT — PAIN SCALES - GENERAL
PAINLEVEL_OUTOF10: 0

## 2025-02-04 ASSESSMENT — PULMONARY FUNCTION TESTS
PIF_VALUE: 15
PIF_VALUE: 15
PIF_VALUE: 16
PIF_VALUE: 14
PIF_VALUE: 14
PIF_VALUE: 15
PIF_VALUE: 15
PIF_VALUE: 17
PIF_VALUE: 17
PIF_VALUE: 14
PIF_VALUE: 17
PIF_VALUE: 15
PIF_VALUE: 15
PIF_VALUE: 20
PIF_VALUE: 17
PIF_VALUE: 15
PIF_VALUE: 17
PIF_VALUE: 15
PIF_VALUE: 17
PIF_VALUE: 15
PIF_VALUE: 14
PIF_VALUE: 15
PIF_VALUE: 14
PIF_VALUE: 15
PIF_VALUE: 15

## 2025-02-04 NOTE — PROGRESS NOTES
Comprehensive Nutrition Assessment    Type and Reason for Visit:  Reassess    Nutrition Recommendations/Plan:   Recommend TF to better meet nutrition needs:  Promote @ 80ml/hr to provide 1920kcals, 120g protein, 1610ml water, 1920ml total volume. FWF: 50ml q 4 hours  RD to monitor TF tolerance, weight, labs, skin, GI function     Malnutrition Assessment:  Malnutrition Status:  Mild malnutrition (01/21/25 1213)    Context:  Chronic Illness     Findings of the 6 clinical characteristics of malnutrition:  Energy Intake:  Unable to assess  Weight Loss:  Mild weight loss (hx significant wt loss on mounjaro (-10%x4mos))     Body Fat Loss:  No body fat loss     Muscle Mass Loss:  Mild muscle mass loss (vs) Temples (temporalis)  Fluid Accumulation:  Mild (chronic illness > nutr status)     Strength:  Not Performed    Nutrition Assessment:    Presented w/ SOB, arrhythmia. Admitted to ICU 1/13-1/16. Returned from floor 1/19 d/t hypoxia, hypercarbia, and encephalopathy. Persistent hypoxia on biPAP, intubated. Pt currently on vent w/ sedation. Per discussion in MDRs, plans to initiate TF today, wean propofol as able, and continue diuresing. EF 30%. Pt w/ % intake documented 1/14, 0% later in admission. (1/23) TF initiated 1/21 - Promote @30 + 4PS. Concern for GI bleed w/ dark stools, TF held 1/22 for CTA, neg, restarted and now @ goal. (1/28) Pt extubated 1/26, SLP rec mod thick CLD. Incr O2 needs 1/27,copious secretions, reintubated 1/27. Per discussion in MDRs, plan to restart TF. Will provides recs. Labs: , h/h 9.4/30.4, Na 146, Phos 2.3. Meds: SSI, cefipime, dopamine @ 5 mcg/kg/min, Propofol @ 12.2 ml/hr (322 kcals) (1/31) Pt had trach placed yesterday, NG tube placed this morning. Weight steady. Propofol @ 12.2 ml/hr, plan to wean today. Once NG placement confirmed, recommend restart TF. (2/4) Pt continues on vent and TF. Tolerating TF at goal rate.    Nutrition Related Findings:    Last BM 2/3, TF at goal

## 2025-02-04 NOTE — PROGRESS NOTES
CARDIOLOGY PROGRESS NOTE      Patient Name: Nida Graves  Age: 49 y.o.  Gender:female  :1975  MRN: [id number]    Patient seen and examined. This is a patient with a history of diabetes, asthma who presented with shortness of breath and hypoxia now being followed for atrial flutter and CHF.    Extubated , reintubated  d/t hypoxia. Now S/p trach 2025.      Patient seen and examined. Awake, following commands. Remains in aflutter, hr 40-50s, off dopamine, off levophed     Telemetry reviewed      Pertinent review of systems items noted above, all other systems are negative. Current medications reviewed..    Physical Examination    Allergies   Allergen Reactions    Latex Swelling    Penicillins Hives    Codeine Anxiety     Vitals:    25 1200   BP: 105/65   Pulse: 55   Resp: 24   Temp:    SpO2: 96%     Vital signs are stable  Trach, vent  Heart has a irregular rhythm, denita.  No murmur  Lung- are ventilator breaths  Abdomen is soft, nontender, normal bowel sounds.  Extremities have mild LE edema  Skin is dry and warm.    Labs reviewed:  Recent Results (from the past 12 hour(s))   Magnesium    Collection Time: 25  2:27 AM   Result Value Ref Range    Magnesium 2.0 1.6 - 2.4 mg/dL   Phosphorus    Collection Time: 25  2:27 AM   Result Value Ref Range    Phosphorus 3.6 2.6 - 4.7 mg/dL   Procalcitonin    Collection Time: 25  2:27 AM   Result Value Ref Range    Procalcitonin 0.33 (H) 0 ng/mL   CBC    Collection Time: 25  2:27 AM   Result Value Ref Range    WBC 10.1 3.6 - 11.0 K/uL    RBC 2.69 (L) 3.80 - 5.20 M/uL    Hemoglobin 7.6 (L) 11.5 - 16.0 g/dL    Hematocrit 25.3 (L) 35.0 - 47.0 %    MCV 94.1 80.0 - 99.0 FL    MCH 28.3 26.0 - 34.0 PG    MCHC 30.0 30.0 - 36.5 g/dL    RDW 16.1 (H) 11.5 - 14.5 %    Platelets 281 150 - 400 K/uL    MPV 10.5 8.9 - 12.9 FL    Nucleated RBCs 0.0 0.0  WBC    nRBC 0.00 0.00 - 0.01 K/uL   Comprehensive Metabolic Panel    Collection Time:

## 2025-02-04 NOTE — PLAN OF CARE
Problem: Safety - Adult  Goal: Free from fall injury  Outcome: Progressing     Problem: Neurosensory - Adult  Goal: Achieves stable or improved neurological status  Outcome: Progressing     Problem: Respiratory - Adult  Goal: Achieves optimal ventilation and oxygenation  Outcome: Progressing     Problem: Metabolic/Fluid and Electrolytes - Adult  Goal: Electrolytes maintained within normal limits  Recent Flowsheet Documentation  Taken 2/3/2025 2000 by Tito Jaimes RN  Electrolytes maintained within normal limits:   Monitor labs and assess patient for signs and symptoms of electrolyte imbalances   Administer electrolyte replacement as ordered   Monitor response to electrolyte replacements, including repeat lab results as appropriate  Goal: Hemodynamic stability and optimal renal function maintained  Recent Flowsheet Documentation  Taken 2/3/2025 2000 by Tito Jaimes RN  Hemodynamic stability and optimal renal function maintained:   Monitor labs and assess for signs and symptoms of volume excess or deficit   Monitor intake, output and patient weight   Monitor urine specific gravity, serum osmolarity and serum sodium as indicated or ordered   Monitor response to interventions for patient's volume status, including labs, urine output, blood pressure (other measures as available)

## 2025-02-04 NOTE — PROGRESS NOTES
CRITICAL CARE PROGRESS NOTE    Name: Nida Graves   : 1975   MRN: [id number]   Date: 2025      Diagnoses/problem list:   Acute hypoxic and hypercapnic respiratory failure  Atrial flutter with slow ventricular response  GI bleed  Nosebleed, resolved  NSTEMI  Biventricular failure  Acute HFrEF, 25 to 30%  Diabetes  Morbid obesity    HPI & Hospital Course:   49-year-old female with PMH chronic debility and bedbound status, diabetes, asthma, obesity, who presented for shortness of breath. Upon presentation ED patient found to be hypoxic necessitating O2 via NC. She was also found to be in A-fib with slow ventricular response. She was transferred out of ICU on 25. Early morning 25 upgraded to ICU again due to severe hypoxia and hypercapnia requiring intubation. She was reintubated . Tracheostomy was placed 25.     24-hour events:   No acute events. Started on pressure support today. Very low tidal volumes. Will keep her on PS all day today. Less awake compared to yesterday.     ROS negative except as otherwise documented.     Assessment and plan:     1. Acute hypoxic and hypercapnic respiratory failure  - Extubated on , reintubated on    - S/p Trach 25  - Keep off of sedation  - Daily pressure support trials during the day and AC/VC at night  - Out of bed today     2. A flutter  - Avoid anticoagulation given recurrent GI Bleed     3. Multifocal pneumonia  - Improving FiO2 needs  - Check procalcitonin  - Cefepime completed after 7-days     4. Chronic systolic heart failure  - Keep net negative  - Started PO lasix  - Going for cardiac cath tomorrow.   - Unable to start HF meds given low BP    SYSTEMS     Neuro - Awake  Pulm - PS trials  CVS - Cardiac cath  GI - Tube feeds    DAILY CHECKLIST     Code Status: Full  DVT Prophylaxis: SCDs/Hep sq  GI Prophylaxis: Pepcid  Feeding: Tube feeds  Physical therapy: Out of bed today  ABCDEF Bundle/Checklist Completed: Yes  Disposition:  time spent at this critically ill patient's bedside actively involved in patient care as well as the coordination of care.  This does not include any procedural time which has been billed separately.    Dr. Ismael Castillo  Intensivist

## 2025-02-04 NOTE — CARE COORDINATION
CM reviewed Pt medicals, Pt lives with her  and Nephew.     Pt  assist Pt with ADL.    [facility] has accepted Pt and started auth yesterday.     Per IDR  Cardiac Cath on 2/5.    NG tube feedings.     Off of sedation

## 2025-02-04 NOTE — PROGRESS NOTES
PHYSICAL THERAPY REEVALUATION  Patient: Nida Graves (49 y.o. female)  Date: 2/4/2025  Primary Diagnosis: Atrial fibrillation, unspecified type (HCC) [I48.91]  Atrial flutter, unspecified type (HCC) [I48.92]  Congestive heart failure, unspecified HF chronicity, unspecified heart failure type (HCC) [I50.9]  Acute hypoxic respiratory failure [J96.01]  Procedure(s) (LRB):  TRACHEOSTOMY PERCUTANEOUS (N/A) 5 Days Post-Op   Precautions: Restrictions/Precautions  Restrictions/Precautions: Fall Risk, General Precautions  Recommendations for nursing mobility: Frequent repositioning to prevent skin breakdown, LE elevation for management of edema, Maxi Move for bed to chair transfers, and Assist x1    In place during session: PICC line, Tracheostomy FIO2 28%, PEEP 6, External Catheter, Nasogastric Tube, and ICU vital sign monitoring  Chart, physical therapy assessment, plan of care and goals were reviewed.    ASSESSMENT  Patient initially seen for PT evaluation 1/16/25 and 0 skilled PT sessions since evalution. Patient seen today for PT reevaluation s/t ICU transfer on 1/19/25, re-intubated on 1/27/25, trach placed on 1/30/25, remains on vent support PEEP 6, FIO2 28% at time of reassessment. Patient A&O x person only. Pt semi-supine in bed upon arrival, agreeable to session.      Based on the objective data described, the patient currently presents with impaired functional mobility, decreased independence in ADLs, decreased ROM, impaired strength, poor safety awareness, impaired cognition, decreased command following, impaired balance, and impaired posture. (See below for objective details and assist levels).    Overall pt tolerated session fair today, currently with no c/o pain throughout session. Pt with minimal command following and participate with attempted OOB transfers, total A x2 for partial attempt, noted to be soiled. Returned to supine position to completed bowel and bladder hygiene with nsg. Pt total A x2 for  lowest position Patient left in no apparent distress in bed, Call bell within reach, Bed/ chair alarm activated, Side rails x3, and Heel protection boot applied for pressure relief and nsg updated.    COMMUNICATION/EDUCATION:   The patient’s plan of care was discussed with: Registered nurse    Patient Education  Education Given To: Patient;Staff  Education Provided: Role of Therapy;Plan of Care;Home Exercise Program;Transfer Training;Energy Conservation;Equipment;Fall Prevention Strategies  Education Provided Comments: transfer completion and LE therex while supine, transitioning bed to chair position  Education Method: Demonstration  Barriers to Learning: Cognition  Education Outcome: Verbalized understanding;Continued education needed    Thank you for this referral.  Ashly Austin, PT, DPT  Minutes: 32

## 2025-02-05 LAB
ABO + RH BLD: NORMAL
ALBUMIN SERPL-MCNC: 1.7 G/DL (ref 3.5–5)
ALBUMIN/GLOB SERPL: 0.4 (ref 1.1–2.2)
ALP SERPL-CCNC: 309 U/L (ref 45–117)
ALT SERPL-CCNC: 50 U/L (ref 12–78)
ANION GAP SERPL CALC-SCNC: 1 MMOL/L (ref 2–12)
APTT PPP: 31 SEC (ref 21.2–34.1)
AST SERPL W P-5'-P-CCNC: 48 U/L (ref 15–37)
BILIRUB SERPL-MCNC: 0.5 MG/DL (ref 0.2–1)
BLOOD GROUP ANTIBODIES SERPL: NEGATIVE
BUN SERPL-MCNC: 27 MG/DL (ref 6–20)
BUN/CREAT SERPL: 36 (ref 12–20)
CA-I BLD-MCNC: 9.7 MG/DL (ref 8.5–10.1)
CHLORIDE SERPL-SCNC: 109 MMOL/L (ref 97–108)
CO2 SERPL-SCNC: 28 MMOL/L (ref 21–32)
CREAT SERPL-MCNC: 0.74 MG/DL (ref 0.55–1.02)
ECHO BSA: 2.29 M2
ERYTHROCYTE [DISTWIDTH] IN BLOOD BY AUTOMATED COUNT: 16.2 % (ref 11.5–14.5)
ERYTHROCYTE [DISTWIDTH] IN BLOOD BY AUTOMATED COUNT: 16.4 % (ref 11.5–14.5)
GLOBULIN SER CALC-MCNC: 4.5 G/DL (ref 2–4)
GLUCOSE BLD STRIP.AUTO-MCNC: 112 MG/DL (ref 65–100)
GLUCOSE BLD STRIP.AUTO-MCNC: 165 MG/DL (ref 65–100)
GLUCOSE BLD STRIP.AUTO-MCNC: 170 MG/DL (ref 65–100)
GLUCOSE BLD STRIP.AUTO-MCNC: 175 MG/DL (ref 65–100)
GLUCOSE BLD STRIP.AUTO-MCNC: 183 MG/DL (ref 65–100)
GLUCOSE SERPL-MCNC: 175 MG/DL (ref 65–100)
HCT VFR BLD AUTO: 25.9 % (ref 35–47)
HCT VFR BLD AUTO: 27.2 % (ref 35–47)
HGB BLD-MCNC: 7.7 G/DL (ref 11.5–16)
HGB BLD-MCNC: 8.2 G/DL (ref 11.5–16)
INR PPP: 1.1 (ref 0.9–1.1)
MAGNESIUM SERPL-MCNC: 2.1 MG/DL (ref 1.6–2.4)
MCH RBC QN AUTO: 28.2 PG (ref 26–34)
MCH RBC QN AUTO: 28.2 PG (ref 26–34)
MCHC RBC AUTO-ENTMCNC: 29.7 G/DL (ref 30–36.5)
MCHC RBC AUTO-ENTMCNC: 30.1 G/DL (ref 30–36.5)
MCV RBC AUTO: 93.5 FL (ref 80–99)
MCV RBC AUTO: 94.9 FL (ref 80–99)
NRBC # BLD: 0 K/UL (ref 0–0.01)
NRBC # BLD: 0 K/UL (ref 0–0.01)
NRBC BLD-RTO: 0 PER 100 WBC
NRBC BLD-RTO: 0 PER 100 WBC
PERFORMED BY:: ABNORMAL
PHOSPHATE SERPL-MCNC: 3.2 MG/DL (ref 2.6–4.7)
PLATELET # BLD AUTO: 309 K/UL (ref 150–400)
PLATELET # BLD AUTO: 325 K/UL (ref 150–400)
PMV BLD AUTO: 10.2 FL (ref 8.9–12.9)
PMV BLD AUTO: 10.4 FL (ref 8.9–12.9)
POTASSIUM SERPL-SCNC: 4.1 MMOL/L (ref 3.5–5.1)
PROT SERPL-MCNC: 6.2 G/DL (ref 6.4–8.2)
PROTHROMBIN TIME: 14.1 SEC (ref 11.9–14.6)
RBC # BLD AUTO: 2.73 M/UL (ref 3.8–5.2)
RBC # BLD AUTO: 2.91 M/UL (ref 3.8–5.2)
SODIUM SERPL-SCNC: 138 MMOL/L (ref 136–145)
SPECIMEN EXP DATE BLD: NORMAL
THERAPEUTIC RANGE: NORMAL SEC (ref 82–109)
UFH PPP CHRO-ACNC: 0.13 IU/ML
UFH PPP CHRO-ACNC: <0.1 IU/ML
WBC # BLD AUTO: 10 K/UL (ref 3.6–11)
WBC # BLD AUTO: 9.9 K/UL (ref 3.6–11)

## 2025-02-05 PROCEDURE — 83735 ASSAY OF MAGNESIUM: CPT

## 2025-02-05 PROCEDURE — 6370000000 HC RX 637 (ALT 250 FOR IP): Performed by: STUDENT IN AN ORGANIZED HEALTH CARE EDUCATION/TRAINING PROGRAM

## 2025-02-05 PROCEDURE — 6370000000 HC RX 637 (ALT 250 FOR IP): Performed by: NURSE PRACTITIONER

## 2025-02-05 PROCEDURE — 6370000000 HC RX 637 (ALT 250 FOR IP): Performed by: INTERNAL MEDICINE

## 2025-02-05 PROCEDURE — 85730 THROMBOPLASTIN TIME PARTIAL: CPT

## 2025-02-05 PROCEDURE — 86900 BLOOD TYPING SEROLOGIC ABO: CPT

## 2025-02-05 PROCEDURE — 6360000004 HC RX CONTRAST MEDICATION: Performed by: INTERNAL MEDICINE

## 2025-02-05 PROCEDURE — 6360000002 HC RX W HCPCS: Performed by: STUDENT IN AN ORGANIZED HEALTH CARE EDUCATION/TRAINING PROGRAM

## 2025-02-05 PROCEDURE — 85520 HEPARIN ASSAY: CPT

## 2025-02-05 PROCEDURE — 2709999900 HC NON-CHARGEABLE SUPPLY: Performed by: INTERNAL MEDICINE

## 2025-02-05 PROCEDURE — 2500000003 HC RX 250 WO HCPCS: Performed by: INTERNAL MEDICINE

## 2025-02-05 PROCEDURE — 82962 GLUCOSE BLOOD TEST: CPT

## 2025-02-05 PROCEDURE — 2500000003 HC RX 250 WO HCPCS: Performed by: STUDENT IN AN ORGANIZED HEALTH CARE EDUCATION/TRAINING PROGRAM

## 2025-02-05 PROCEDURE — 94003 VENT MGMT INPAT SUBQ DAY: CPT

## 2025-02-05 PROCEDURE — C1760 CLOSURE DEV, VASC: HCPCS | Performed by: INTERNAL MEDICINE

## 2025-02-05 PROCEDURE — B201YZZ PLAIN RADIOGRAPHY OF MULTIPLE CORONARY ARTERIES USING OTHER CONTRAST: ICD-10-PCS | Performed by: INTERNAL MEDICINE

## 2025-02-05 PROCEDURE — 85027 COMPLETE CBC AUTOMATED: CPT

## 2025-02-05 PROCEDURE — 4A023N7 MEASUREMENT OF CARDIAC SAMPLING AND PRESSURE, LEFT HEART, PERCUTANEOUS APPROACH: ICD-10-PCS | Performed by: INTERNAL MEDICINE

## 2025-02-05 PROCEDURE — 94761 N-INVAS EAR/PLS OXIMETRY MLT: CPT

## 2025-02-05 PROCEDURE — 76937 US GUIDE VASCULAR ACCESS: CPT | Performed by: INTERNAL MEDICINE

## 2025-02-05 PROCEDURE — 2000000000 HC ICU R&B

## 2025-02-05 PROCEDURE — 2700000000 HC OXYGEN THERAPY PER DAY

## 2025-02-05 PROCEDURE — 99152 MOD SED SAME PHYS/QHP 5/>YRS: CPT | Performed by: INTERNAL MEDICINE

## 2025-02-05 PROCEDURE — 80053 COMPREHEN METABOLIC PANEL: CPT

## 2025-02-05 PROCEDURE — 84100 ASSAY OF PHOSPHORUS: CPT

## 2025-02-05 PROCEDURE — 6360000002 HC RX W HCPCS: Performed by: INTERNAL MEDICINE

## 2025-02-05 PROCEDURE — 85610 PROTHROMBIN TIME: CPT

## 2025-02-05 PROCEDURE — 93458 L HRT ARTERY/VENTRICLE ANGIO: CPT | Performed by: INTERNAL MEDICINE

## 2025-02-05 PROCEDURE — C1769 GUIDE WIRE: HCPCS | Performed by: INTERNAL MEDICINE

## 2025-02-05 PROCEDURE — 86850 RBC ANTIBODY SCREEN: CPT

## 2025-02-05 PROCEDURE — 86901 BLOOD TYPING SEROLOGIC RH(D): CPT

## 2025-02-05 PROCEDURE — 94640 AIRWAY INHALATION TREATMENT: CPT

## 2025-02-05 PROCEDURE — 36415 COLL VENOUS BLD VENIPUNCTURE: CPT

## 2025-02-05 PROCEDURE — 99153 MOD SED SAME PHYS/QHP EA: CPT | Performed by: INTERNAL MEDICINE

## 2025-02-05 PROCEDURE — C1894 INTRO/SHEATH, NON-LASER: HCPCS | Performed by: INTERNAL MEDICINE

## 2025-02-05 RX ORDER — SODIUM CHLORIDE 0.9 % (FLUSH) 0.9 %
5-40 SYRINGE (ML) INJECTION EVERY 12 HOURS SCHEDULED
Status: DISCONTINUED | OUTPATIENT
Start: 2025-02-05 | End: 2025-02-05

## 2025-02-05 RX ORDER — SODIUM CHLORIDE 9 MG/ML
INJECTION, SOLUTION INTRAVENOUS PRN
Status: DISCONTINUED | OUTPATIENT
Start: 2025-02-05 | End: 2025-02-05

## 2025-02-05 RX ORDER — HEPARIN SODIUM 10000 [USP'U]/100ML
5-30 INJECTION, SOLUTION INTRAVENOUS CONTINUOUS
Status: DISPENSED | OUTPATIENT
Start: 2025-02-05 | End: 2025-02-06

## 2025-02-05 RX ORDER — FENTANYL CITRATE 50 UG/ML
INJECTION, SOLUTION INTRAMUSCULAR; INTRAVENOUS PRN
Status: DISCONTINUED | OUTPATIENT
Start: 2025-02-05 | End: 2025-02-05 | Stop reason: HOSPADM

## 2025-02-05 RX ORDER — ACETAMINOPHEN 325 MG/1
650 TABLET ORAL EVERY 4 HOURS PRN
Status: DISCONTINUED | OUTPATIENT
Start: 2025-02-05 | End: 2025-02-05

## 2025-02-05 RX ORDER — HEPARIN SODIUM 1000 [USP'U]/ML
4000 INJECTION, SOLUTION INTRAVENOUS; SUBCUTANEOUS PRN
Status: DISPENSED | OUTPATIENT
Start: 2025-02-05 | End: 2025-02-06

## 2025-02-05 RX ORDER — SODIUM CHLORIDE 0.9 % (FLUSH) 0.9 %
5-40 SYRINGE (ML) INJECTION PRN
Status: DISCONTINUED | OUTPATIENT
Start: 2025-02-05 | End: 2025-02-05

## 2025-02-05 RX ORDER — MIDAZOLAM HYDROCHLORIDE 1 MG/ML
INJECTION, SOLUTION INTRAMUSCULAR; INTRAVENOUS PRN
Status: DISCONTINUED | OUTPATIENT
Start: 2025-02-05 | End: 2025-02-05 | Stop reason: HOSPADM

## 2025-02-05 RX ORDER — IOPAMIDOL 755 MG/ML
INJECTION, SOLUTION INTRAVASCULAR PRN
Status: DISCONTINUED | OUTPATIENT
Start: 2025-02-05 | End: 2025-02-05 | Stop reason: HOSPADM

## 2025-02-05 RX ORDER — LIDOCAINE HYDROCHLORIDE 10 MG/ML
INJECTION, SOLUTION INFILTRATION; PERINEURAL PRN
Status: DISCONTINUED | OUTPATIENT
Start: 2025-02-05 | End: 2025-02-05 | Stop reason: HOSPADM

## 2025-02-05 RX ORDER — HEPARIN SODIUM 200 [USP'U]/100ML
INJECTION, SOLUTION INTRAVENOUS CONTINUOUS PRN
Status: COMPLETED | OUTPATIENT
Start: 2025-02-05 | End: 2025-02-05

## 2025-02-05 RX ORDER — HEPARIN SODIUM 1000 [USP'U]/ML
2000 INJECTION, SOLUTION INTRAVENOUS; SUBCUTANEOUS PRN
Status: DISPENSED | OUTPATIENT
Start: 2025-02-05 | End: 2025-02-06

## 2025-02-05 RX ADMIN — IPRATROPIUM BROMIDE AND ALBUTEROL SULFATE 1 DOSE: 2.5; .5 SOLUTION RESPIRATORY (INHALATION) at 12:28

## 2025-02-05 RX ADMIN — SODIUM CHLORIDE, PRESERVATIVE FREE 10 ML: 5 INJECTION INTRAVENOUS at 07:55

## 2025-02-05 RX ADMIN — SODIUM CHLORIDE, PRESERVATIVE FREE 10 ML: 5 INJECTION INTRAVENOUS at 20:32

## 2025-02-05 RX ADMIN — FAMOTIDINE 20 MG: 20 TABLET, FILM COATED ORAL at 20:27

## 2025-02-05 RX ADMIN — BUPROPION HYDROCHLORIDE 75 MG: 75 TABLET, FILM COATED ORAL at 20:26

## 2025-02-05 RX ADMIN — Medication 3 MG: at 20:27

## 2025-02-05 RX ADMIN — BUDESONIDE 500 MCG: 0.5 INHALANT ORAL at 06:56

## 2025-02-05 RX ADMIN — FAMOTIDINE 20 MG: 20 TABLET, FILM COATED ORAL at 07:54

## 2025-02-05 RX ADMIN — FUROSEMIDE 40 MG: 40 TABLET ORAL at 07:54

## 2025-02-05 RX ADMIN — WHITE PETROLATUM,ZINC OXIDE: 57; 17 PASTE TOPICAL at 20:32

## 2025-02-05 RX ADMIN — VENLAFAXINE 37.5 MG: 25 TABLET ORAL at 16:58

## 2025-02-05 RX ADMIN — MIDODRINE HYDROCHLORIDE 15 MG: 5 TABLET ORAL at 16:58

## 2025-02-05 RX ADMIN — INSULIN LISPRO 1 UNITS: 100 INJECTION, SOLUTION INTRAVENOUS; SUBCUTANEOUS at 18:30

## 2025-02-05 RX ADMIN — HEPARIN SODIUM 2000 UNITS: 1000 INJECTION INTRAVENOUS; SUBCUTANEOUS at 21:14

## 2025-02-05 RX ADMIN — HEPARIN SODIUM 5000 UNITS: 5000 INJECTION INTRAVENOUS; SUBCUTANEOUS at 06:23

## 2025-02-05 RX ADMIN — HEPARIN SODIUM 9 UNITS/KG/HR: 10000 INJECTION, SOLUTION INTRAVENOUS at 14:17

## 2025-02-05 RX ADMIN — Medication 10 ML: at 11:02

## 2025-02-05 RX ADMIN — BUDESONIDE 500 MCG: 0.5 INHALANT ORAL at 19:36

## 2025-02-05 RX ADMIN — ASPIRIN 81 MG 81 MG: 81 TABLET ORAL at 07:54

## 2025-02-05 RX ADMIN — IPRATROPIUM BROMIDE AND ALBUTEROL SULFATE 1 DOSE: 2.5; .5 SOLUTION RESPIRATORY (INHALATION) at 19:36

## 2025-02-05 RX ADMIN — IPRATROPIUM BROMIDE AND ALBUTEROL SULFATE 1 DOSE: 2.5; .5 SOLUTION RESPIRATORY (INHALATION) at 06:56

## 2025-02-05 RX ADMIN — MIDODRINE HYDROCHLORIDE 15 MG: 5 TABLET ORAL at 20:29

## 2025-02-05 RX ADMIN — WHITE PETROLATUM,ZINC OXIDE: 57; 17 PASTE TOPICAL at 08:07

## 2025-02-05 RX ADMIN — VENLAFAXINE 37.5 MG: 25 TABLET ORAL at 07:54

## 2025-02-05 RX ADMIN — MIDODRINE HYDROCHLORIDE 15 MG: 5 TABLET ORAL at 07:54

## 2025-02-05 RX ADMIN — BUPROPION HYDROCHLORIDE 75 MG: 75 TABLET, FILM COATED ORAL at 07:54

## 2025-02-05 RX ADMIN — ATORVASTATIN CALCIUM 20 MG: 20 TABLET, FILM COATED ORAL at 20:26

## 2025-02-05 ASSESSMENT — PULMONARY FUNCTION TESTS
PIF_VALUE: 13
PIF_VALUE: 13
PIF_VALUE: 16
PIF_VALUE: 13
PIF_VALUE: 16
PIF_VALUE: 13
PIF_VALUE: 17
PIF_VALUE: 14
PIF_VALUE: 13
PIF_VALUE: 14
PIF_VALUE: 19
PIF_VALUE: 14

## 2025-02-05 ASSESSMENT — PAIN SCALES - GENERAL
PAINLEVEL_OUTOF10: 0

## 2025-02-05 NOTE — PROGRESS NOTES
CRITICAL CARE PROGRESS NOTE    Name: Nida Graves   : 1975   MRN: [id number]   Date: 2025      Diagnoses/problem list:   Acute hypoxic and hypercapnic respiratory failure  Atrial flutter with slow ventricular response  GI bleed  Nosebleed, resolved  NSTEMI  Biventricular failure  Acute HFrEF, 25 to 30%  Diabetes  Morbid obesity    HPI & Hospital Course:   49-year-old female with PMH of chronic debility and bedbound status, diabetes, asthma, obesity, who presented for shortness of breath. Upon presentation ED patient found to be hypoxic necessitating O2 via NC. She was also found to be in A-fib with slow ventricular response. She was transferred out of ICU on 25. Early morning 25 upgraded to ICU again due to severe hypoxia and hypercapnia requiring intubation. Ct abdomen was done 25 for the concern of GI bleed which was negative. She was reintubated . Tracheostomy was placed 25. Cardiac cath 25 showed no CAD.     24-hour events:   No events. Went for cardiac cath today & no coronary artery disease was noted. Going for cardioversion tomorrow after heparin drip overnight.     ROS negative except as otherwise documented.     Assessment and plan:     1. Acute hypoxic and hypercapnic respiratory failure  - Extubated on , reintubated on    - S/p Trach 25  - Keep off of sedation  - Daily pressure support trials during the day and AC/VC at night  - Plan for discharge on Friday, will do peer to peer tomorrow with insurance      2. A flutter  - Rate controlled without medications  - Will restart heparin drip tonight (per cards) for cardioversion in AM  - Has history of GI and nose bleed prior.     3. Multifocal pneumonia  - Improving FiO2 needs  - Cefepime completed after 7-days     4. Chronic systolic heart failure  - Keep net negative  - Started PO lasix  - Normal coronaries on the cath   - Unable to start HF meds given low BP    5. GI Bleed  - CT abdomen was done  1/22/25 for the concern of GI bleed which was negative.  - Restart heparin drip with caution     SYSTEMS     Neuro - Awake  Pulm - PS trials  CVS - Cardioversion in AM  GI - Tube feeds    DAILY CHECKLIST     Code Status: Full code  DVT Prophylaxis: SCDs/Hep drip  GI Prophylaxis: Pepcid  Feeding: TF  Awakening/breathing trial: PS during the day  ABCDEF Bundle/Checklist Completed: Yes  Disposition: Stay in ICU  Social: Peer to peer tomorrow and possible LTAC on Friday.    OBJECTIVE:     Blood pressure 106/72, pulse 55, temperature 98.3 °F (36.8 °C), temperature source Axillary, resp. rate 18, height 1.702 m (5' 7.01\"), weight 101.2 kg (223 lb 1.7 oz), SpO2 97%.    Physical Exam  General: Well appearing, in no distress.    Skin: No rash  Head: Normocephalic, atraumatic.  Eyes: Conjunctiva clear, sclera non-icteric, EOM intact.    Neck: Supple, without lesions.   Heart: + Murmur; irregular rate and rhythm.  Lungs: Clear to auscultation. Trach.   Abdomen: Bowel sounds normal, no tenderness, organomegaly, masses, or hernia.   Extremities: No cyanosis, peripheral pulses intact. 1+ pitting edema.   Neurologic: CN 2-12 normal. Sensation to pain, touch, and proprioception normal.   Psychiatric: Oriented x 2-3.      Labs and Data: Reviewed 02/05/25  Medications: Reviewed 02/05/25  Imaging: Reviewed 02/05/25    Intake/Output:     Intake/Output Summary (Last 24 hours) at 2/5/2025 1320  Last data filed at 2/5/2025 1011  Gross per 24 hour   Intake 1870 ml   Output 1610 ml   Net 260 ml       CRITICAL CARE DOCUMENTATION  I had a face to face encounter with the patient, reviewed and interpreted patient data including clinical events, labs, images, vital signs, I/O's, and examined patient.  I have discussed the case and the plan and management of the patient's care with the consulting services, the bedside nurses and the respiratory therapist.      NOTE OF PERSONAL INVOLVEMENT IN CARE   This patient has a high probability of imminent,

## 2025-02-05 NOTE — PROGRESS NOTES
Heparin Infusion Initiation  Nida Graves is a 49 y.o. female starting heparin for:   aflutter  Heparin dosing: order for weight based protocol  Initial Dosing Weight: 101.2 kg (Recorded body weight)  Recent Labs     02/03/25  0346 02/04/25 0227 02/05/25  0423    281 309   HGB 7.7* 7.6* 7.7*     Factor Xa inhibitor/LMWH use within the past 72 hours? No  If yes, date and time of last administration: N/A  Hypertriglyceridemia (> 690 mg/dL) or hyperbilirubinemia (> 37 mg/dL conjugated bilirubin, >14 mg/dL unconjugated bilirubin) present? No  Recent Labs     02/05/25 0423   BILITOT 0.5       Assessment/Plan:   Heparin to be monitored using anti-Xa for duration of therapy  Initial bolus ordered: No  Starting rate:  9 unit/kg/hr  PRN boluses entered: No (neurology heparin protocol)

## 2025-02-05 NOTE — PROGRESS NOTES
CARDIOLOGY PROGRESS NOTE      Patient Name: Nida Graves  Age: 49 y.o.  Gender:female  :1975  MRN: [id number]    Patient seen and examined. This is a patient with a history of diabetes, asthma who presented with shortness of breath and hypoxia now being followed for atrial flutter and CHF.    Extubated , reintubated  d/t hypoxia. Now S/p trach 2025.      Patient seen and examined. Awake, following commands. Remains in aflutter, hr 40-50s, off dopamine, off levophed     Telemetry reviewed      Pertinent review of systems items noted above, all other systems are negative. Current medications reviewed..    Physical Examination    Allergies   Allergen Reactions    Latex Swelling    Penicillins Hives    Codeine Anxiety     Vitals:    25 1400   BP: 99/60   Pulse: 58   Resp: 18   Temp:    SpO2: 99%     Vital signs are stable  Trach, vent  Heart has a irregular rhythm, denita.  No murmur  Lung- are ventilator breaths  Abdomen is soft, nontender, normal bowel sounds.  Extremities have mild LE edema  Skin is dry and warm.    Labs reviewed:  Recent Results (from the past 12 hour(s))   Comprehensive Metabolic Panel    Collection Time: 25  4:23 AM   Result Value Ref Range    Sodium 138 136 - 145 mmol/L    Potassium 4.1 3.5 - 5.1 mmol/L    Chloride 109 (H) 97 - 108 mmol/L    CO2 28 21 - 32 mmol/L    Anion Gap 1 (L) 2 - 12 mmol/L    Glucose 175 (H) 65 - 100 mg/dL    BUN 27 (H) 6 - 20 mg/dL    Creatinine 0.74 0.55 - 1.02 mg/dL    BUN/Creatinine Ratio 36 (H) 12 - 20      Est, Glom Filt Rate >90 >60 ml/min/1.73m2    Calcium 9.7 8.5 - 10.1 mg/dL    Total Bilirubin 0.5 0.2 - 1.0 mg/dL    AST 48 (H) 15 - 37 U/L    ALT 50 12 - 78 U/L    Alk Phosphatase 309 (H) 45 - 117 U/L    Total Protein 6.2 (L) 6.4 - 8.2 g/dL    Albumin 1.7 (L) 3.5 - 5.0 g/dL    Globulin 4.5 (H) 2.0 - 4.0 g/dL    Albumin/Globulin Ratio 0.4 (L) 1.1 - 2.2     CBC    Collection Time: 25  4:23 AM   Result Value Ref Range    WBC  APTT 31.0 21.2 - 34.1 sec    Therapeutic Range   82 - 109 sec      Case discussed with Dr. Coburn and our impression and recommendations are as follows:  Atrial flutter/fib with slow ventricular rate, 40-50s, HR acceptable  AC stopped previously due to rectal bleeding/anemia, s/p transfusion; hgb 8.2  Continue aspirin  Off Dopamine   Resume heparin drip. If tolerates without bleeding, will plan for AUGUSTIN/DCCV tomorrow (2/6).  Elevated troponin: peak of 639  likely myocardial injury secondary to acute hypoxia, aflutter   LHC with nonobstructive CAD; luminal RCA, Lcx; 40% mLAD   Continue aspirin, statin  Acute systolic heart failure with reduced ejection fraction, nonischemic  EF 25-30%  BLE edema noted   Initiate GDMT as BP allows, no BB due to slow HR  Limited echo 1/28 EF 30-35%, normal IVC size  Likely secondary to atrial flutter.  Acute hypoxic hypercapnic respiratory failure:   likely from respiratory fatigue/poor effort  Reintubated 1/27/25 for severe hypoxia.  ICU team following  Hypertension: BP acceptable  Hyperlipidemia, on statin therapy  Diabetes mellitus: tx plan per primary team  Anemia: tx plan per primary team.     Critically ill multiple comorbid conditions.     Please do not hesitate to call if additional questions arise.    XIMENA Perez  2/5/2025

## 2025-02-05 NOTE — CARE COORDINATION
CM reviewed Pt medicals, Pt lives with her  and Nephew.     Pt  assist Pt with ADL.    [facility] is still waiting on auth.      Per [facility]: Her Auth was denied. The reason stated was that she is not stable enough to leave the acute care hospital. A P2P was offered. Please call [phone] option 2 within 7 business days to schedule. We are able to handle all of her needs so the physician that performs the P2P should make sure they explain to them that we are a critical care hospital and can handle her complex medical needs.     KHAI called [facility] to arrange a Peer 2 Peer.  KHAI gave Lori the doctors name and number and asked that she have their doctor call our doctor after 1:00 pm.     Per IDR    Pt is getting a Cardiac Cath today.    No fevers, changed tube feeds.      10:40  [facility] called and stated that her provider will reach out to our provider tomorrow at 2:00 pm

## 2025-02-05 NOTE — PROGRESS NOTES
Phase 2 outpatient cardiac rehab referral is not appropriate for this patient at this time due to physical and/or mental limitations, diagnosis and/or treatment plans (multiple comorbid conditions/chronic debility and bed bound status)

## 2025-02-06 ENCOUNTER — ANESTHESIA EVENT (OUTPATIENT)
Facility: HOSPITAL | Age: 50
DRG: 004 | End: 2025-02-06
Payer: COMMERCIAL

## 2025-02-06 ENCOUNTER — ANESTHESIA (OUTPATIENT)
Facility: HOSPITAL | Age: 50
DRG: 004 | End: 2025-02-06
Payer: COMMERCIAL

## 2025-02-06 ENCOUNTER — APPOINTMENT (OUTPATIENT)
Facility: HOSPITAL | Age: 50
DRG: 004 | End: 2025-02-06
Payer: COMMERCIAL

## 2025-02-06 LAB
ANION GAP SERPL CALC-SCNC: 5 MMOL/L (ref 2–12)
ARTERIAL PATENCY WRIST A: YES
BASE EXCESS BLDA CALC-SCNC: 3.3 MMOL/L (ref 0–3)
BDY SITE: ABNORMAL
BODY TEMPERATURE: 98.5
BUN SERPL-MCNC: 28 MG/DL (ref 6–20)
BUN/CREAT SERPL: 37 (ref 12–20)
CA-I BLD-MCNC: 9.7 MG/DL (ref 8.5–10.1)
CHLORIDE SERPL-SCNC: 107 MMOL/L (ref 97–108)
CO2 SERPL-SCNC: 27 MMOL/L (ref 21–32)
COHGB MFR BLD: 0 % (ref 1–2)
CREAT SERPL-MCNC: 0.75 MG/DL (ref 0.55–1.02)
ECHO BSA: 2.29 M2
ECHO LV EF PHYSICIAN: 30 %
ERYTHROCYTE [DISTWIDTH] IN BLOOD BY AUTOMATED COUNT: 16.4 % (ref 11.5–14.5)
FIO2 ON VENT: 28 %
GAS FLOW.O2 SETTING OXYMISER: 15
GLUCOSE BLD STRIP.AUTO-MCNC: 153 MG/DL (ref 65–100)
GLUCOSE BLD STRIP.AUTO-MCNC: 82 MG/DL (ref 65–100)
GLUCOSE BLD STRIP.AUTO-MCNC: 90 MG/DL (ref 65–100)
GLUCOSE BLD STRIP.AUTO-MCNC: 96 MG/DL (ref 65–100)
GLUCOSE SERPL-MCNC: 113 MG/DL (ref 65–100)
HCO3 BLDA-SCNC: 26 MMOL/L (ref 22–26)
HCT VFR BLD AUTO: 27 % (ref 35–47)
HGB BLD-MCNC: 8.1 G/DL (ref 11.5–16)
MAGNESIUM SERPL-MCNC: 2 MG/DL (ref 1.6–2.4)
MCH RBC QN AUTO: 28.3 PG (ref 26–34)
MCHC RBC AUTO-ENTMCNC: 30 G/DL (ref 30–36.5)
MCV RBC AUTO: 94.4 FL (ref 80–99)
METHGB MFR BLD: 0.3 % (ref 0–1.4)
NRBC # BLD: 0 K/UL (ref 0–0.01)
NRBC BLD-RTO: 0 PER 100 WBC
OXYHGB MFR BLD: 93.7 % (ref 95–99)
PCO2 BLDA: 34 MMHG (ref 35–45)
PEEP RESPIRATORY: 5
PERFORMED BY:: ABNORMAL
PERFORMED BY:: ABNORMAL
PERFORMED BY:: NORMAL
PH BLDA: 7.51 (ref 7.35–7.45)
PHOSPHATE SERPL-MCNC: 2.6 MG/DL (ref 2.6–4.7)
PLATELET # BLD AUTO: 343 K/UL (ref 150–400)
PMV BLD AUTO: 10.3 FL (ref 8.9–12.9)
PO2 BLDA: 67 MMHG (ref 80–100)
POTASSIUM SERPL-SCNC: 4.6 MMOL/L (ref 3.5–5.1)
RBC # BLD AUTO: 2.86 M/UL (ref 3.8–5.2)
SAO2 % BLD: 94 % (ref 95–99)
SAO2% DEVICE SAO2% SENSOR NAME: ABNORMAL
SODIUM SERPL-SCNC: 139 MMOL/L (ref 136–145)
SPECIMEN SITE: ABNORMAL
UFH PPP CHRO-ACNC: 0.38 IU/ML
UFH PPP CHRO-ACNC: 0.46 IU/ML
VT SETTING VENT: 390
WBC # BLD AUTO: 10 K/UL (ref 3.6–11)

## 2025-02-06 PROCEDURE — 83735 ASSAY OF MAGNESIUM: CPT

## 2025-02-06 PROCEDURE — 94761 N-INVAS EAR/PLS OXIMETRY MLT: CPT

## 2025-02-06 PROCEDURE — 93005 ELECTROCARDIOGRAM TRACING: CPT

## 2025-02-06 PROCEDURE — 82962 GLUCOSE BLOOD TEST: CPT

## 2025-02-06 PROCEDURE — 6360000002 HC RX W HCPCS: Performed by: STUDENT IN AN ORGANIZED HEALTH CARE EDUCATION/TRAINING PROGRAM

## 2025-02-06 PROCEDURE — 2000000000 HC ICU R&B

## 2025-02-06 PROCEDURE — 99231 SBSQ HOSP IP/OBS SF/LOW 25: CPT | Performed by: OTOLARYNGOLOGY

## 2025-02-06 PROCEDURE — 6370000000 HC RX 637 (ALT 250 FOR IP): Performed by: STUDENT IN AN ORGANIZED HEALTH CARE EDUCATION/TRAINING PROGRAM

## 2025-02-06 PROCEDURE — 82803 BLOOD GASES ANY COMBINATION: CPT

## 2025-02-06 PROCEDURE — 2500000003 HC RX 250 WO HCPCS: Performed by: STUDENT IN AN ORGANIZED HEALTH CARE EDUCATION/TRAINING PROGRAM

## 2025-02-06 PROCEDURE — 85027 COMPLETE CBC AUTOMATED: CPT

## 2025-02-06 PROCEDURE — 94640 AIRWAY INHALATION TREATMENT: CPT

## 2025-02-06 PROCEDURE — 3700000001 HC ADD 15 MINUTES (ANESTHESIA)

## 2025-02-06 PROCEDURE — 3700000000 HC ANESTHESIA ATTENDED CARE

## 2025-02-06 PROCEDURE — 92960 CARDIOVERSION ELECTRIC EXT: CPT

## 2025-02-06 PROCEDURE — 93319 3D ECHO IMG CGEN CAR ANOMAL: CPT

## 2025-02-06 PROCEDURE — 5A2204Z RESTORATION OF CARDIAC RHYTHM, SINGLE: ICD-10-PCS | Performed by: INTERNAL MEDICINE

## 2025-02-06 PROCEDURE — 80048 BASIC METABOLIC PNL TOTAL CA: CPT

## 2025-02-06 PROCEDURE — 85520 HEPARIN ASSAY: CPT

## 2025-02-06 PROCEDURE — 6370000000 HC RX 637 (ALT 250 FOR IP): Performed by: INTERNAL MEDICINE

## 2025-02-06 PROCEDURE — 36600 WITHDRAWAL OF ARTERIAL BLOOD: CPT

## 2025-02-06 PROCEDURE — 2580000003 HC RX 258: Performed by: NURSE ANESTHETIST, CERTIFIED REGISTERED

## 2025-02-06 PROCEDURE — 94003 VENT MGMT INPAT SUBQ DAY: CPT

## 2025-02-06 PROCEDURE — 36415 COLL VENOUS BLD VENIPUNCTURE: CPT

## 2025-02-06 PROCEDURE — 84100 ASSAY OF PHOSPHORUS: CPT

## 2025-02-06 PROCEDURE — B24BZZ4 ULTRASONOGRAPHY OF HEART WITH AORTA, TRANSESOPHAGEAL: ICD-10-PCS | Performed by: INTERNAL MEDICINE

## 2025-02-06 PROCEDURE — 6360000002 HC RX W HCPCS: Performed by: NURSE ANESTHETIST, CERTIFIED REGISTERED

## 2025-02-06 PROCEDURE — 2700000000 HC OXYGEN THERAPY PER DAY

## 2025-02-06 RX ORDER — HEPARIN SODIUM 1000 [USP'U]/ML
3000 INJECTION, SOLUTION INTRAVENOUS; SUBCUTANEOUS ONCE
Status: DISCONTINUED | OUTPATIENT
Start: 2025-02-06 | End: 2025-02-06

## 2025-02-06 RX ORDER — GLYCOPYRROLATE 0.2 MG/ML
INJECTION INTRAMUSCULAR; INTRAVENOUS
Status: DISCONTINUED | OUTPATIENT
Start: 2025-02-06 | End: 2025-02-06 | Stop reason: SDUPTHER

## 2025-02-06 RX ADMIN — PROPOFOL 50 MG: 10 INJECTION, EMULSION INTRAVENOUS at 14:32

## 2025-02-06 RX ADMIN — FUROSEMIDE 40 MG: 40 TABLET ORAL at 08:06

## 2025-02-06 RX ADMIN — HEPARIN SODIUM 11 UNITS/KG/HR: 10000 INJECTION, SOLUTION INTRAVENOUS at 14:00

## 2025-02-06 RX ADMIN — PROPOFOL 50 MG: 10 INJECTION, EMULSION INTRAVENOUS at 14:45

## 2025-02-06 RX ADMIN — WHITE PETROLATUM,ZINC OXIDE: 57; 17 PASTE TOPICAL at 20:17

## 2025-02-06 RX ADMIN — VENLAFAXINE 37.5 MG: 25 TABLET ORAL at 16:43

## 2025-02-06 RX ADMIN — PROPOFOL 50 MG: 10 INJECTION, EMULSION INTRAVENOUS at 14:26

## 2025-02-06 RX ADMIN — WHITE PETROLATUM,ZINC OXIDE: 57; 17 PASTE TOPICAL at 08:06

## 2025-02-06 RX ADMIN — IPRATROPIUM BROMIDE AND ALBUTEROL SULFATE 1 DOSE: 2.5; .5 SOLUTION RESPIRATORY (INHALATION) at 08:16

## 2025-02-06 RX ADMIN — PROPOFOL 50 MG: 10 INJECTION, EMULSION INTRAVENOUS at 14:41

## 2025-02-06 RX ADMIN — PHENYLEPHRINE HYDROCHLORIDE 200 MCG: 10 INJECTION INTRAVENOUS at 14:31

## 2025-02-06 RX ADMIN — ATORVASTATIN CALCIUM 20 MG: 20 TABLET, FILM COATED ORAL at 20:17

## 2025-02-06 RX ADMIN — FAMOTIDINE 20 MG: 20 TABLET, FILM COATED ORAL at 20:16

## 2025-02-06 RX ADMIN — APIXABAN 5 MG: 5 TABLET, FILM COATED ORAL at 20:30

## 2025-02-06 RX ADMIN — MIDODRINE HYDROCHLORIDE 15 MG: 5 TABLET ORAL at 08:06

## 2025-02-06 RX ADMIN — BUDESONIDE 500 MCG: 0.5 INHALANT ORAL at 08:16

## 2025-02-06 RX ADMIN — SODIUM CHLORIDE, PRESERVATIVE FREE 10 ML: 5 INJECTION INTRAVENOUS at 08:06

## 2025-02-06 RX ADMIN — ASPIRIN 81 MG 81 MG: 81 TABLET ORAL at 08:06

## 2025-02-06 RX ADMIN — SODIUM CHLORIDE, PRESERVATIVE FREE 10 ML: 5 INJECTION INTRAVENOUS at 20:18

## 2025-02-06 RX ADMIN — PROPOFOL 50 MG: 10 INJECTION, EMULSION INTRAVENOUS at 14:36

## 2025-02-06 RX ADMIN — IPRATROPIUM BROMIDE AND ALBUTEROL SULFATE 1 DOSE: 2.5; .5 SOLUTION RESPIRATORY (INHALATION) at 13:35

## 2025-02-06 RX ADMIN — FAMOTIDINE 20 MG: 20 TABLET, FILM COATED ORAL at 08:06

## 2025-02-06 RX ADMIN — IPRATROPIUM BROMIDE AND ALBUTEROL SULFATE 1 DOSE: 2.5; .5 SOLUTION RESPIRATORY (INHALATION) at 19:42

## 2025-02-06 RX ADMIN — MIDODRINE HYDROCHLORIDE 15 MG: 5 TABLET ORAL at 16:43

## 2025-02-06 RX ADMIN — BUPROPION HYDROCHLORIDE 75 MG: 75 TABLET, FILM COATED ORAL at 08:06

## 2025-02-06 RX ADMIN — GLYCOPYRROLATE 0.2 MG: 0.2 INJECTION, SOLUTION INTRAMUSCULAR; INTRAVENOUS at 14:04

## 2025-02-06 RX ADMIN — Medication 3 MG: at 20:16

## 2025-02-06 RX ADMIN — BUDESONIDE 500 MCG: 0.5 INHALANT ORAL at 19:42

## 2025-02-06 RX ADMIN — MIDODRINE HYDROCHLORIDE 15 MG: 5 TABLET ORAL at 20:16

## 2025-02-06 RX ADMIN — BUPROPION HYDROCHLORIDE 75 MG: 75 TABLET, FILM COATED ORAL at 20:16

## 2025-02-06 RX ADMIN — VENLAFAXINE 37.5 MG: 25 TABLET ORAL at 08:06

## 2025-02-06 ASSESSMENT — PULMONARY FUNCTION TESTS
PIF_VALUE: 15
PIF_VALUE: 18
PIF_VALUE: 17
PIF_VALUE: 18
PIF_VALUE: 17
PIF_VALUE: 16
PIF_VALUE: 19
PIF_VALUE: 16
PIF_VALUE: 15
PIF_VALUE: 16
PIF_VALUE: 17
PIF_VALUE: 17
PIF_VALUE: 13
PIF_VALUE: 13
PIF_VALUE: 17
PIF_VALUE: 17
PIF_VALUE: 15
PIF_VALUE: 14
PIF_VALUE: 17
PIF_VALUE: 17
PIF_VALUE: 15
PIF_VALUE: 14
PIF_VALUE: 18
PIF_VALUE: 16
PIF_VALUE: 18
PIF_VALUE: 25
PIF_VALUE: 18
PIF_VALUE: 15
PIF_VALUE: 16
PIF_VALUE: 18
PIF_VALUE: 17
PIF_VALUE: 16
PIF_VALUE: 17
PIF_VALUE: 13
PIF_VALUE: 15
PIF_VALUE: 17

## 2025-02-06 ASSESSMENT — PAIN SCALES - GENERAL
PAINLEVEL_OUTOF10: 0

## 2025-02-06 NOTE — PROGRESS NOTES
Comprehensive Nutrition Assessment    Type and Reason for Visit:  Reassess (Early Interim)    Nutrition Recommendations/Plan:   Once cleared by MD, restart TF w/ Promote @ 80 ml/hr via NGT. Administer 100 ml FWF every 6 hours.   TF provides: 1920 kcals (101%), 122 g protein (2 g/kg IBW), and 1596 ml free water + 400 ml from FBA=6315 ml free water daily  Monitor weight, TF admin, fluid status, lytes, and bowel fxn/abd exam   Monitor for nutrition POC re: PEG vs PO intake     Malnutrition Assessment:  Malnutrition Status:  Mild malnutrition (01/21/25 1213)    Context:  Chronic Illness       Nutrition Assessment:    Presented w/ SOB, arrhythmia. Admitted to ICU 1/13-1/16. Returned from floor 1/19 d/t hypoxia, hypercarbia, and encephalopathy. Persistent hypoxia on biPAP, intubated. Pt currently on vent w/ sedation. Per discussion in MDRs, plans to initiate TF today, wean propofol as able, and continue diuresing. EF 30%. Pt w/ % intake documented 1/14, 0% later in admission. (1/23) TF initiated 1/21 - Promote @30 + 4PS. Concern for GI bleed w/ dark stools, TF held 1/22 for CTA, neg, restarted and now @ goal. (1/28) Pt extubated 1/26, SLP rec mod thick CLD. Incr O2 needs 1/27,copious secretions, reintubated 1/27. Per discussion in MDRs, plan to restart TF. Will provides recs. Labs: , h/h 9.4/30.4, Na 146, Phos 2.3. Meds: SSI, cefipime, dopamine @ 5 mcg/kg/min, Propofol @ 12.2 ml/hr (322 kcals) (1/31) Pt had trach placed yesterday, NG tube placed this morning. Weight steady. Propofol @ 12.2 ml/hr, plan to wean today. Once NG placement confirmed, recommend restart TF. (2/4) Pt continues on vent and TF. Tolerating TF at goal rate. (2/6) Pt w/ plans for cardioversion today, TFs held. Peer to peer later today for placement arrangements. Labs: h/h 8.1/27, . Meds: lasix, SSI, heparin drip.    Nutrition Related Findings:    NFPE w/ possible mild muscle wasting, repeat NFPE due on f/u. No reported n/v/d/c, LBM  2/4. No known hx of dysphagia. Improved +2 BLE/BUE and gen edema. Wound Type: None       Current Nutrition Intake & Therapies:    Average Meal Intake: NPO  Average Supplements Intake: NPO  Diet NPO Exceptions are: Sips of Water with Meds    Anthropometric Measures:  Height: 170.2 cm (5' 7.01\")  Ideal Body Weight (IBW): 135 lbs (61 kg)    Current Body Weight: 101.2 kg (223 lb 1.7 oz), 161.3 % IBW. Weight Source: Bed scale  Current BMI (kg/m2): 34.9  Usual Body Weight: 90.7 kg (200 lb) (Nov 2024)  % Weight Change (Calculated): 8.9  Weight Adjustment For: No Adjustment  BMI Categories: Obese Class 1 (BMI 30.0-34.9)    Estimated Daily Nutrient Needs:  Energy Requirements Based On: Formula  Weight Used for Energy Requirements: Current  Energy (kcal/day): 1908 kcals/d (PSU 2003b)  Weight Used for Protein Requirements: Ideal  Protein (g/day): 91-110g (1.5-1.8g/kg IBW)  Method Used for Fluid Requirements: 1 ml/kcal  Fluid (ml/day): 1726ml    Nutrition Diagnosis:   Inadequate oral intake related to impaired respiratory function as evidenced by intubation    Nutrition Interventions:   Food and/or Nutrient Delivery: Start Tube Feeding (pending procedure)  Nutrition Education/Counseling: No recommendation at this time  Coordination of Nutrition Care: Continue to monitor while inpatient  Plan of Care discussed with: MDR team    Goals:  Goals: Tolerate nutrition support at goal rate, by next RD assessment  Type of Goal: Continue current goal  Previous Goal Met: Goal(s) Achieved    Nutrition Monitoring and Evaluation:   Behavioral-Environmental Outcomes: None Identified  Food/Nutrient Intake Outcomes: Enteral Nutrition Intake/Tolerance  Physical Signs/Symptoms Outcomes: Biochemical Data, Constipation, GI Status, Weight, Fluid Status or Edema    Discharge Planning:    Too soon to determine     Winnie Garrison RD  Contact: [phone]

## 2025-02-06 NOTE — ANESTHESIA PRE PROCEDURE
Negative 02/05/2025       Drug/Infectious Status (If Applicable):  Lab Results   Component Value Date/Time    HEPCAB <0.1 12/20/2021 02:08 PM       COVID-19 Screening (If Applicable):   Lab Results   Component Value Date/Time    COVID19 Not Detected 01/12/2025 09:10 AM           Anesthesia Evaluation  Patient summary reviewed and Nursing notes reviewed   no history of anesthetic complications:   Airway:   TM distance: >3 FB   Neck ROM: full  Mouth opening: > = 3 FB  Tracheostomy present Dental:          Pulmonary:Negative Pulmonary ROS and normal exam  breath sounds clear to auscultation  (+)           asthma:                            Cardiovascular:Negative CV ROS    (+) hypertension:, CHF:        Rhythm: regular  Rate: normal                 ROS comment: Echo:  ·  Left Ventricle: Reduced left ventricular systolic function with a visually estimated EF of 30 - 35%. Left ventricle size is normal. Increased wall thickness. EF BP is 33%.  ·  Tricuspid Valve: Mild regurgitation.  ·  Image quality is poor. Contrast used: Definity. Technically difficult study and technically difficult study due to patient's body habitus.     Neuro/Psych:   Negative Neuro/Psych ROS              GI/Hepatic/Renal: Neg GI/Hepatic/Renal ROS            Endo/Other: Negative Endo/Other ROS   (+) Diabetes.                 Abdominal:              PE comment: Deferred.   Vascular: negative vascular ROS.         Other Findings:             Anesthesia Plan      MAC and TIVA     ASA 4     (Standard ASA monitors: continuous EKG, BP, HR, pulse oximeter, and capnography.)  Induction: intravenous.    MIPS: Prophylactic antiemetics administered.  Anesthetic plan and risks discussed with patient (and family, if present.).                  Vic Schuster MD   2/6/2025

## 2025-02-06 NOTE — PROGRESS NOTES
CARDIOLOGY PROGRESS NOTE      Patient Name: Nida Graves  Age: 49 y.o.  Gender:female  :1975  MRN: [id number]    Patient seen and examined. This is a patient with a history of diabetes, asthma who presented with shortness of breath and hypoxia now being followed for atrial flutter and CHF.    Extubated , reintubated  d/t hypoxia. Now S/p trach 2025.      Patient seen and examined. Awake, following commands. Remains in aflutter, hr 40-50s, off dopamine, off levophed     Telemetry reviewed      Pertinent review of systems items noted above, all other systems are negative. Current medications reviewed..    Physical Examination    Allergies   Allergen Reactions    Latex Swelling    Penicillins Hives    Codeine Anxiety     Vitals:    25 1300   BP: 119/67   Pulse: 53   Resp: 16   Temp:    SpO2: 96%     Vital signs are stable  Trach, vent  Heart has a irregular rhythm, denita.  No murmur  Lung- are ventilator breaths  Abdomen is soft, nontender, normal bowel sounds.  Extremities have mild LE edema  Skin is dry and warm.    Labs reviewed:  Recent Results (from the past 12 hour(s))   Anti-Xa, Unfractionated Heparin    Collection Time: 25  3:16 AM   Result Value Ref Range    Heparin Xa,LMWH and Unfrac 0.38 IU/mL   CBC    Collection Time: 25  3:16 AM   Result Value Ref Range    WBC 10.0 3.6 - 11.0 K/uL    RBC 2.86 (L) 3.80 - 5.20 M/uL    Hemoglobin 8.1 (L) 11.5 - 16.0 g/dL    Hematocrit 27.0 (L) 35.0 - 47.0 %    MCV 94.4 80.0 - 99.0 FL    MCH 28.3 26.0 - 34.0 PG    MCHC 30.0 30.0 - 36.5 g/dL    RDW 16.4 (H) 11.5 - 14.5 %    Platelets 343 150 - 400 K/uL    MPV 10.3 8.9 - 12.9 FL    Nucleated RBCs 0.0 0.0  WBC    nRBC 0.00 0.00 - 0.01 K/uL   Magnesium    Collection Time: 25  3:16 AM   Result Value Ref Range    Magnesium 2.0 1.6 - 2.4 mg/dL   Basic Metabolic Panel    Collection Time: 25  3:16 AM   Result Value Ref Range    Sodium 139 136 - 145 mmol/L    Potassium 4.6

## 2025-02-06 NOTE — ANESTHESIA POSTPROCEDURE EVALUATION
Department of Anesthesiology  Postprocedure Note    Patient: Nida Graves  MRN: [id number]  YOB: 1975  Date of evaluation: 2/6/2025    Procedure Summary       Date: 02/06/25 Room / Location: [facility] NON-INVASIVE CARDIOLOGY; [facility] EKG    Anesthesia Start: 1401 Anesthesia Stop:     Procedure: AUGUSTIN W/ POSSIBLE CARDIOVERSION (PRN CONTRAST/BUBBLE/3D) Diagnosis:       Atrial flutter, unspecified type (HCC)      Persistent atrial fibrillation (HCC)    Scheduled Providers: Vic Schuster MD; Anish Carter APRN - CRNA Responsible Provider: Vic Schuster MD    Anesthesia Type: MAC, TIVA ASA Status: 4            Anesthesia Type: No value filed.    Caroline Phase I:      Caroline Phase II:      Anesthesia Post Evaluation    Patient location during evaluation: ICU  Patient participation: complete - patient cannot participate  Level of consciousness: lethargic  Pain score: 0  Airway patency: patent  Nausea & Vomiting: no nausea  Cardiovascular status: blood pressure returned to baseline  Respiratory status: acceptable  Hydration status: euvolemic  Pain management: adequate    No notable events documented.

## 2025-02-06 NOTE — PROGRESS NOTES
Otolaryngology-Head and Neck Surgery      Patient: Nida Graves  YOB: 1975  MRN: [id number]  Date of Service:  2025    History of Present Illness: Nida Graves is a 49 y.o. female who was admitted with respiratory failure, CHF, atrial flutter. Intubated  - , extubated but required reintubation     Weaning FiO2     25  1 week postop trach, remains on vent    Past Medical History:  Past Medical History:   Diagnosis Date    Allergy     Asthma     Diabetes (HCC)        Past Surgical History:   Past Surgical History:   Procedure Laterality Date     SECTION      X 2    GYN      novasure, csection scar revision    TRACHEOSTOMY N/A 2025    TRACHEOSTOMY PERCUTANEOUS performed by Jack Badillo MD at [facility] MAIN OR       Medications:   Current Outpatient Medications   Medication Instructions    albuterol (PROVENTIL) 2.5 mg, Inhalation, EVERY 4 HOURS PRN    benzonatate (TESSALON) 100 MG capsule Take 1-2 capsules TID prn cough.    budesonide (PULMICORT FLEXHALER) 180 MCG/ACT AEPB inhaler TAKE 1 PUFF BY MOUTH TWICE A DAY    buPROPion (WELLBUTRIN XL) 150 MG extended release tablet 1 tablet, Oral, DAILY    diclofenac sodium (VOLTAREN) 4 g, Topical, 4 TIMES DAILY    DULoxetine (CYMBALTA) 30 mg, Oral, DAILY    etodolac (LODINE) 400 mg, 2 TIMES DAILY    fluticasone-salmeterol (WIXELA INHUB) 500-50 MCG/ACT AEPB diskus inhaler 500 puffs, 2 TIMES DAILY    losartan (COZAAR) 25 MG tablet 1 tablet, Oral, DAILY    Spiriva HandiHaler 18 mcg, Oral, ONCE    traZODone (DESYREL) 50 MG tablet 1 tablet, Oral, DAILY       Allergies:   Allergies   Allergen Reactions    Latex Swelling    Penicillins Hives    Codeine Anxiety       Social History:   Social History     Socioeconomic History    Marital status: Single     Spouse name: None    Number of children: None    Years of education: None    Highest education level: None   Tobacco Use    Smoking status: Never    Smokeless tobacco: Never   Substance and  Sexual Activity    Alcohol use: No    Drug use: No     Social Determinants of Health     Food Insecurity: No Food Insecurity (1/13/2025)    Hunger Vital Sign     Worried About Running Out of Food in the Last Year: Never true     Ran Out of Food in the Last Year: Never true   Transportation Needs: No Transportation Needs (1/13/2025)    PRAPARE - Transportation     Lack of Transportation (Medical): No     Lack of Transportation (Non-Medical): No   Housing Stability: Low Risk  (1/13/2025)    Housing Stability Vital Sign     Unable to Pay for Housing in the Last Year: No     Number of Times Moved in the Last Year: 0     Homeless in the Last Year: No       Family History:  Family History   Problem Relation Age of Onset    Cancer Other         aunt with colon ca    Stroke Neg Hx     Heart Attack Neg Hx     Hypertension Father     Cancer Father         neck ca    Thyroid Disease Mother     Diabetes Mother     Hypertension Mother     Cancer Mother         cervical ca       Review of Systems:  Pt intubated       Physical Examination:   Vitals:    02/06/25 0818   BP:    Pulse: (!) 49   Resp: 17   Temp:    SpO2: 98%     Vent PEEP 7, FiO2 40%    General: Comfortable, pleasant, appears stated age  Face: No masses or lesions, facial strength symmetric   Ears: External ears unremarkable.   Nose: External nose unremarkable.   Oral Cavity / Oropharynx: No trismus. Mucosa pink and moist. No lesions. Tongue is midline and mobile.  Neck: size 6 cuffed Shiley in position, sutures removed; some skin erosion inferior to flange, betadiene applied with skin foam    Assessment and Plan:     Postop trach 1/30  Sutures removed  Will protect inferior skin erosion with foam  Cont trach care

## 2025-02-06 NOTE — PROGRESS NOTES
CRITICAL CARE PROGRESS NOTE    Name: Nida Graves   : 1975   MRN: [id number]   Date: 2025      Diagnoses/problem list:   Acute hypoxic and hypercapnic respiratory failure  Atrial flutter with slow ventricular response  GI bleed  Nosebleed, resolved  NSTEMI  Biventricular failure  Acute HFrEF, 25 to 30%  Diabetes  Morbid obesity    HPI & Hospital Course:   49-year-old female with PMH of chronic debility and bedbound status, diabetes, asthma, obesity, who presented for shortness of breath. Upon presentation ED patient found to be hypoxic necessitating O2 via NC. She was also found to be in A-fib with slow ventricular response. She was transferred out of ICU on 25. Early morning 25 upgraded to ICU again due to severe hypoxia and hypercapnia requiring intubation. Ct abdomen was done 25 for the concern of GI bleed which was negative. She was reintubated . Tracheostomy was placed 25. Cardiac cath 25 showed no CAD.     24-hour events:   No events overnight. Few episodes of bradycardia. Going for cardioversion today. Hb stable after heparin drip. Low tidal volumes on PS trial today.     ROS negative except as otherwise documented.     Assessment and plan:     1. Acute hypoxic and hypercapnic respiratory failure  - Extubated on , reintubated on    - S/p Trach 25  - Keep off of sedation  - Daily pressure support trials during the day and AC/VC at night  - Plan for discharge on Friday, will do peer to peer today with insurance      2. A flutter  - Rate controlled without medications  - C/w heparin drip  - Pending cardioversion     3. Multifocal pneumonia  - Improving FiO2 needs  - Cefepime completed after 7-days     4. Chronic systolic heart failure  - Keep net negative  - Started PO lasix  - Normal coronaries on the cardiac cath 25  - Unable to start HF meds given low BP     5. GI Bleed  - CT abdomen was done 25 for the concern of GI bleed which was negative.  -  the highest level of preparedness to intervene urgently. I participated in the decision-making and personally managed or directed the management of the following life and organ supporting interventions that required my frequent assessment to treat or prevent imminent deterioration.    I personally spent 31 minutes of critical care time.  This is time spent at this critically ill patient's bedside actively involved in patient care as well as the coordination of care.  This does not include any procedural time which has been billed separately.    Dr. Ismael Castillo  Intensivist

## 2025-02-06 NOTE — CARE COORDINATION
CM reviewed Pt medicals, Pt lives with her  and Nephew.     Pt  assist Pt with ADL.          Peer to Peer today at 2:00 pm for Pt to go to [facility].     3:00  Peer to Peer done the insurance doctor stated that they do not allow Pt to go to a LTAC, they prefer for Pt to stay in the hospital.       CM called Pt daughter, discussed with her about D/C planning.  Pt daughter stated that it was ok for CM to send a referral to [facility].      CM called nika Zarco for [facility].

## 2025-02-06 NOTE — PLAN OF CARE
Problem: Cardiovascular - Adult  Goal: Absence of cardiac dysrhythmias or at baseline  Outcome: Not Progressing     Problem: ABCDS Injury Assessment  Goal: Absence of physical injury  Outcome: Progressing     Problem: Skin/Tissue Integrity  Goal: Absence of new skin breakdown  Description: 1.  Monitor for areas of redness and/or skin breakdown  2.  Assess vascular access sites hourly  3.  Every 4-6 hours minimum:  Change oxygen saturation probe site  4.  Every 4-6 hours:  If on nasal continuous positive airway pressure, respiratory therapy assess nares and determine need for appliance change or resting period.  Outcome: Progressing     Problem: Chronic Conditions and Co-morbidities  Goal: Patient's chronic conditions and co-morbidity symptoms are monitored and maintained or improved  Outcome: Progressing     Problem: Discharge Planning  Goal: Discharge to home or other facility with appropriate resources  Outcome: Progressing     Problem: Safety - Adult  Goal: Free from fall injury  Outcome: Progressing     Problem: Neurosensory - Adult  Goal: Achieves stable or improved neurological status  Outcome: Progressing  Goal: Achieves maximal functionality and self care  Outcome: Progressing     Problem: Respiratory - Adult  Goal: Achieves optimal ventilation and oxygenation  Outcome: Progressing     Problem: Cardiovascular - Adult  Goal: Maintains optimal cardiac output and hemodynamic stability  Outcome: Progressing     Problem: Gastrointestinal - Adult  Goal: Minimal or absence of nausea and vomiting  Outcome: Progressing  Goal: Maintains or returns to baseline bowel function  Outcome: Progressing  Goal: Maintains adequate nutritional intake  Outcome: Progressing     Problem: Genitourinary - Adult  Goal: Absence of urinary retention  Outcome: Progressing     Problem: Metabolic/Fluid and Electrolytes - Adult  Goal: Electrolytes maintained within normal limits  Outcome: Progressing  Goal: Hemodynamic stability and  optimal renal function maintained  Outcome: Progressing     Problem: Musculoskeletal - Adult  Goal: Return mobility to safest level of function  Outcome: Progressing  Goal: Maintain proper alignment of affected body part  Outcome: Progressing  Goal: Return ADL status to a safe level of function  Outcome: Progressing     Problem: Spiritual Care  Goal: Pt/Family able to move forward in process of forgiving self, others, and/or higher power  Description: INTERVENTIONS:  1. Assist patient/family to overcome blocks to healing by use of spiritual practices (prayer, meditation, guided imagery, reiki, breath work, etc).  2. De-myth guilt and help patient/family identify possible irrational spiritual/cultural beliefs and values.  3. Explore possibilities of making amends & reconciliation with self, others, and/or a greater power.  4. Guide patient/family in identifying painful feelings of guilt.  5. Help patient/famiy explore and identify spiritual beliefs, cultural understandings or values that may help or hinder letting go of issue.  6. Help patient/family explore feelings of anger, bitterness, resentment.  7. Help patient/family identify and examine the situation in which these feelings are experienced.  8. Help patient/family identify destructive displacement of feelings onto other individuals.  9. Invite use of sacraments/rituals/ceremonies as appropriate (e.g. - confession, anointing, smudging).  10. Refer patient/family to formal counseling and/or to neno community for further support work.  Outcome: Progressing     Problem: Pain  Goal: Verbalizes/displays adequate comfort level or baseline comfort level  Outcome: Progressing     Problem: Skin/Tissue Integrity - Adult  Goal: Skin integrity remains intact  Outcome: Progressing  Goal: Incisions, wounds, or drain sites healing without S/S of infection  Outcome: Progressing  Goal: Oral mucous membranes remain intact  Outcome: Progressing     Problem: Nutrition

## 2025-02-07 LAB
ALBUMIN SERPL-MCNC: 1.8 G/DL (ref 3.5–5)
ALBUMIN SERPL-MCNC: 1.8 G/DL (ref 3.5–5)
ALBUMIN/GLOB SERPL: 0.4 (ref 1.1–2.2)
ALBUMIN/GLOB SERPL: 0.4 (ref 1.1–2.2)
ALP SERPL-CCNC: 365 U/L (ref 45–117)
ALP SERPL-CCNC: 373 U/L (ref 45–117)
ALT SERPL-CCNC: 54 U/L (ref 12–78)
ALT SERPL-CCNC: 54 U/L (ref 12–78)
ANION GAP SERPL CALC-SCNC: 4 MMOL/L (ref 2–12)
ANION GAP SERPL CALC-SCNC: 5 MMOL/L (ref 2–12)
APTT PPP: 149.6 SEC (ref 21.2–34.1)
APTT PPP: 31.1 SEC (ref 21.2–34.1)
APTT PPP: 77 SEC (ref 21.2–34.1)
AST SERPL W P-5'-P-CCNC: 47 U/L (ref 15–37)
AST SERPL W P-5'-P-CCNC: ABNORMAL U/L (ref 15–37)
BILIRUB SERPL-MCNC: 0.6 MG/DL (ref 0.2–1)
BILIRUB SERPL-MCNC: 0.7 MG/DL (ref 0.2–1)
BUN SERPL-MCNC: 30 MG/DL (ref 6–20)
BUN SERPL-MCNC: 30 MG/DL (ref 6–20)
BUN/CREAT SERPL: 28 (ref 12–20)
BUN/CREAT SERPL: 29 (ref 12–20)
CA-I BLD-MCNC: 9.6 MG/DL (ref 8.5–10.1)
CA-I BLD-MCNC: 9.9 MG/DL (ref 8.5–10.1)
CHLORIDE SERPL-SCNC: 104 MMOL/L (ref 97–108)
CHLORIDE SERPL-SCNC: 105 MMOL/L (ref 97–108)
CO2 SERPL-SCNC: 28 MMOL/L (ref 21–32)
CO2 SERPL-SCNC: 31 MMOL/L (ref 21–32)
CREAT SERPL-MCNC: 1.03 MG/DL (ref 0.55–1.02)
CREAT SERPL-MCNC: 1.06 MG/DL (ref 0.55–1.02)
EKG ATRIAL RATE: 64 BPM
EKG DIAGNOSIS: NORMAL
EKG P AXIS: 59 DEGREES
EKG P-R INTERVAL: 288 MS
EKG Q-T INTERVAL: 518 MS
EKG QRS DURATION: 138 MS
EKG QTC CALCULATION (BAZETT): 534 MS
EKG R AXIS: -46 DEGREES
EKG T AXIS: 144 DEGREES
EKG VENTRICULAR RATE: 64 BPM
ERYTHROCYTE [DISTWIDTH] IN BLOOD BY AUTOMATED COUNT: 16.8 % (ref 11.5–14.5)
GLOBULIN SER CALC-MCNC: 4.4 G/DL (ref 2–4)
GLOBULIN SER CALC-MCNC: 4.5 G/DL (ref 2–4)
GLUCOSE BLD STRIP.AUTO-MCNC: 107 MG/DL (ref 65–100)
GLUCOSE BLD STRIP.AUTO-MCNC: 143 MG/DL (ref 65–100)
GLUCOSE BLD STRIP.AUTO-MCNC: 161 MG/DL (ref 65–100)
GLUCOSE BLD STRIP.AUTO-MCNC: 169 MG/DL (ref 65–100)
GLUCOSE BLD STRIP.AUTO-MCNC: 174 MG/DL (ref 65–100)
GLUCOSE SERPL-MCNC: 134 MG/DL (ref 65–100)
GLUCOSE SERPL-MCNC: 140 MG/DL (ref 65–100)
HCT VFR BLD AUTO: 25.9 % (ref 35–47)
HGB BLD-MCNC: 7.7 G/DL (ref 11.5–16)
INR PPP: 1.2 (ref 0.9–1.1)
MAGNESIUM SERPL-MCNC: 2.1 MG/DL (ref 1.6–2.4)
MCH RBC QN AUTO: 28.2 PG (ref 26–34)
MCHC RBC AUTO-ENTMCNC: 29.7 G/DL (ref 30–36.5)
MCV RBC AUTO: 94.9 FL (ref 80–99)
NRBC # BLD: 0 K/UL (ref 0–0.01)
NRBC BLD-RTO: 0 PER 100 WBC
PERFORMED BY:: ABNORMAL
PHOSPHATE SERPL-MCNC: 4.2 MG/DL (ref 2.6–4.7)
PLATELET # BLD AUTO: 312 K/UL (ref 150–400)
PMV BLD AUTO: 10.6 FL (ref 8.9–12.9)
POTASSIUM SERPL-SCNC: 4.6 MMOL/L (ref 3.5–5.1)
POTASSIUM SERPL-SCNC: ABNORMAL MMOL/L (ref 3.5–5.1)
PROT SERPL-MCNC: 6.2 G/DL (ref 6.4–8.2)
PROT SERPL-MCNC: 6.3 G/DL (ref 6.4–8.2)
PROTHROMBIN TIME: 15.7 SEC (ref 11.9–14.6)
RBC # BLD AUTO: 2.73 M/UL (ref 3.8–5.2)
SODIUM SERPL-SCNC: 138 MMOL/L (ref 136–145)
SODIUM SERPL-SCNC: 139 MMOL/L (ref 136–145)
THERAPEUTIC RANGE: ABNORMAL SEC (ref 82–109)
THERAPEUTIC RANGE: ABNORMAL SEC (ref 82–109)
THERAPEUTIC RANGE: NORMAL SEC (ref 82–109)
UFH PPP CHRO-ACNC: >1.1 IU/ML
WBC # BLD AUTO: 10.5 K/UL (ref 3.6–11)

## 2025-02-07 PROCEDURE — 6370000000 HC RX 637 (ALT 250 FOR IP): Performed by: INTERNAL MEDICINE

## 2025-02-07 PROCEDURE — 80053 COMPREHEN METABOLIC PANEL: CPT

## 2025-02-07 PROCEDURE — 83735 ASSAY OF MAGNESIUM: CPT

## 2025-02-07 PROCEDURE — 6370000000 HC RX 637 (ALT 250 FOR IP): Performed by: STUDENT IN AN ORGANIZED HEALTH CARE EDUCATION/TRAINING PROGRAM

## 2025-02-07 PROCEDURE — 85730 THROMBOPLASTIN TIME PARTIAL: CPT

## 2025-02-07 PROCEDURE — 84100 ASSAY OF PHOSPHORUS: CPT

## 2025-02-07 PROCEDURE — 2500000003 HC RX 250 WO HCPCS: Performed by: STUDENT IN AN ORGANIZED HEALTH CARE EDUCATION/TRAINING PROGRAM

## 2025-02-07 PROCEDURE — 36415 COLL VENOUS BLD VENIPUNCTURE: CPT

## 2025-02-07 PROCEDURE — 2500000003 HC RX 250 WO HCPCS: Performed by: INTERNAL MEDICINE

## 2025-02-07 PROCEDURE — 85610 PROTHROMBIN TIME: CPT

## 2025-02-07 PROCEDURE — 94761 N-INVAS EAR/PLS OXIMETRY MLT: CPT

## 2025-02-07 PROCEDURE — 85027 COMPLETE CBC AUTOMATED: CPT

## 2025-02-07 PROCEDURE — 94003 VENT MGMT INPAT SUBQ DAY: CPT

## 2025-02-07 PROCEDURE — 93005 ELECTROCARDIOGRAM TRACING: CPT | Performed by: INTERNAL MEDICINE

## 2025-02-07 PROCEDURE — 97535 SELF CARE MNGMENT TRAINING: CPT

## 2025-02-07 PROCEDURE — 2700000000 HC OXYGEN THERAPY PER DAY

## 2025-02-07 PROCEDURE — 97530 THERAPEUTIC ACTIVITIES: CPT

## 2025-02-07 PROCEDURE — 94640 AIRWAY INHALATION TREATMENT: CPT

## 2025-02-07 PROCEDURE — 85520 HEPARIN ASSAY: CPT

## 2025-02-07 PROCEDURE — 2000000000 HC ICU R&B

## 2025-02-07 PROCEDURE — 51798 US URINE CAPACITY MEASURE: CPT

## 2025-02-07 PROCEDURE — 6360000002 HC RX W HCPCS: Performed by: STUDENT IN AN ORGANIZED HEALTH CARE EDUCATION/TRAINING PROGRAM

## 2025-02-07 PROCEDURE — 6360000002 HC RX W HCPCS: Performed by: INTERNAL MEDICINE

## 2025-02-07 PROCEDURE — 82962 GLUCOSE BLOOD TEST: CPT

## 2025-02-07 RX ORDER — HEPARIN SODIUM 1000 [USP'U]/ML
4000 INJECTION, SOLUTION INTRAVENOUS; SUBCUTANEOUS ONCE
Status: DISCONTINUED | OUTPATIENT
Start: 2025-02-07 | End: 2025-02-07

## 2025-02-07 RX ORDER — MIDODRINE HYDROCHLORIDE 5 MG/1
10 TABLET ORAL 3 TIMES DAILY
Status: DISCONTINUED | OUTPATIENT
Start: 2025-02-07 | End: 2025-02-13

## 2025-02-07 RX ORDER — HEPARIN SODIUM 1000 [USP'U]/ML
4000 INJECTION, SOLUTION INTRAVENOUS; SUBCUTANEOUS PRN
Status: DISCONTINUED | OUTPATIENT
Start: 2025-02-07 | End: 2025-02-10

## 2025-02-07 RX ORDER — HEPARIN SODIUM 1000 [USP'U]/ML
2000 INJECTION, SOLUTION INTRAVENOUS; SUBCUTANEOUS PRN
Status: DISCONTINUED | OUTPATIENT
Start: 2025-02-07 | End: 2025-02-10

## 2025-02-07 RX ORDER — HEPARIN SODIUM 10000 [USP'U]/100ML
5-30 INJECTION, SOLUTION INTRAVENOUS CONTINUOUS
Status: DISCONTINUED | OUTPATIENT
Start: 2025-02-07 | End: 2025-02-10

## 2025-02-07 RX ADMIN — HEPARIN SODIUM 6 UNITS/KG/HR: 10000 INJECTION, SOLUTION INTRAVENOUS at 08:28

## 2025-02-07 RX ADMIN — SODIUM CHLORIDE, PRESERVATIVE FREE 10 ML: 5 INJECTION INTRAVENOUS at 08:18

## 2025-02-07 RX ADMIN — FAMOTIDINE 20 MG: 20 TABLET, FILM COATED ORAL at 20:19

## 2025-02-07 RX ADMIN — WHITE PETROLATUM,ZINC OXIDE: 57; 17 PASTE TOPICAL at 20:18

## 2025-02-07 RX ADMIN — MIDODRINE HYDROCHLORIDE 10 MG: 5 TABLET ORAL at 20:19

## 2025-02-07 RX ADMIN — Medication 3 MG: at 20:19

## 2025-02-07 RX ADMIN — SENNOSIDES 8.6 MG: 8.6 TABLET, COATED ORAL at 14:31

## 2025-02-07 RX ADMIN — WHITE PETROLATUM,ZINC OXIDE: 57; 17 PASTE TOPICAL at 08:17

## 2025-02-07 RX ADMIN — SODIUM CHLORIDE, PRESERVATIVE FREE 10 ML: 5 INJECTION INTRAVENOUS at 20:18

## 2025-02-07 RX ADMIN — ASPIRIN 81 MG 81 MG: 81 TABLET ORAL at 08:17

## 2025-02-07 RX ADMIN — HEPARIN SODIUM 2000 UNITS: 1000 INJECTION INTRAVENOUS; SUBCUTANEOUS at 16:33

## 2025-02-07 RX ADMIN — ATORVASTATIN CALCIUM 20 MG: 20 TABLET, FILM COATED ORAL at 20:19

## 2025-02-07 RX ADMIN — IPRATROPIUM BROMIDE AND ALBUTEROL SULFATE 1 DOSE: 2.5; .5 SOLUTION RESPIRATORY (INHALATION) at 15:41

## 2025-02-07 RX ADMIN — IPRATROPIUM BROMIDE AND ALBUTEROL SULFATE 1 DOSE: 2.5; .5 SOLUTION RESPIRATORY (INHALATION) at 08:23

## 2025-02-07 RX ADMIN — BUPROPION HYDROCHLORIDE 75 MG: 75 TABLET, FILM COATED ORAL at 08:17

## 2025-02-07 RX ADMIN — IPRATROPIUM BROMIDE AND ALBUTEROL SULFATE 1 DOSE: 2.5; .5 SOLUTION RESPIRATORY (INHALATION) at 20:36

## 2025-02-07 RX ADMIN — MIDODRINE HYDROCHLORIDE 15 MG: 5 TABLET ORAL at 08:17

## 2025-02-07 RX ADMIN — VENLAFAXINE 37.5 MG: 25 TABLET ORAL at 16:38

## 2025-02-07 RX ADMIN — HEPARIN SODIUM 13 UNITS/KG/HR: 10000 INJECTION, SOLUTION INTRAVENOUS at 22:50

## 2025-02-07 RX ADMIN — VENLAFAXINE 37.5 MG: 25 TABLET ORAL at 08:17

## 2025-02-07 RX ADMIN — MIDODRINE HYDROCHLORIDE 10 MG: 5 TABLET ORAL at 14:31

## 2025-02-07 RX ADMIN — BUPROPION HYDROCHLORIDE 75 MG: 75 TABLET, FILM COATED ORAL at 20:19

## 2025-02-07 RX ADMIN — FAMOTIDINE 20 MG: 20 TABLET, FILM COATED ORAL at 08:17

## 2025-02-07 RX ADMIN — BUDESONIDE 500 MCG: 0.5 INHALANT ORAL at 20:37

## 2025-02-07 RX ADMIN — BUDESONIDE 500 MCG: 0.5 INHALANT ORAL at 08:23

## 2025-02-07 ASSESSMENT — PULMONARY FUNCTION TESTS
PIF_VALUE: 16
PIF_VALUE: 16
PIF_VALUE: 21
PIF_VALUE: 17
PIF_VALUE: 20
PIF_VALUE: 20
PIF_VALUE: 16
PIF_VALUE: 21
PIF_VALUE: 17
PIF_VALUE: 21
PIF_VALUE: 17
PIF_VALUE: 21
PIF_VALUE: 16
PIF_VALUE: 18
PIF_VALUE: 21
PIF_VALUE: 21
PIF_VALUE: 19
PIF_VALUE: 18
PIF_VALUE: 20
PIF_VALUE: 18
PIF_VALUE: 16
PIF_VALUE: 17
PIF_VALUE: 21
PIF_VALUE: 18
PIF_VALUE: 17
PIF_VALUE: 18
PIF_VALUE: 18
PIF_VALUE: 21
PIF_VALUE: 21
PIF_VALUE: 20
PIF_VALUE: 16
PIF_VALUE: 21
PIF_VALUE: 18
PIF_VALUE: 21
PIF_VALUE: 20
PIF_VALUE: 16
PIF_VALUE: 16
PIF_VALUE: 19
PIF_VALUE: 15
PIF_VALUE: 15
PIF_VALUE: 16
PIF_VALUE: 18
PIF_VALUE: 18
PIF_VALUE: 15
PIF_VALUE: 20
PIF_VALUE: 15
PIF_VALUE: 18
PIF_VALUE: 20
PIF_VALUE: 20
PIF_VALUE: 22
PIF_VALUE: 16

## 2025-02-07 ASSESSMENT — PAIN SCALES - GENERAL
PAINLEVEL_OUTOF10: 0

## 2025-02-07 NOTE — PROGRESS NOTES
CRITICAL CARE PROGRESS NOTE    Name: Nida Graves   : 1975   MRN: [id number]   Date: 2025      Diagnoses/problem list:   Acute hypoxic and hypercapnic respiratory failure  Acute kidney injury  Atrial flutter with slow ventricular response  GI bleed  Nosebleed, resolved  NSTEMI  Biventricular failure  Acute HFrEF, 25 to 30%  Diabetes  Morbid obesity  Acute metabolic/septic encephalopathy    24-hour events:   Awake and following commands.  FiO2 down to 30%.  No new complaints. Eliquis held and consulted GI for PEG placement.     Assessment and plan:   Ms. Graves is a 49-year-old female with PMH chronic debility and bedbound status, diabetes, asthma, obesity, who presented for shortness of breath.  Shortness of breath worsening over the past few days.  Upon presentation ED patient found to be hypoxic necessita.  Ting O2 via NC.  She was also found to be in A-fib with slow ventricular response.  She was transferred out of ICU on . Early morning  upgraded to ICU again due to severe hypoxia and hypercapnia requiring intubation. Now s/p trach on .    NEUROLOGICAL:    Continue Bupropion and Venlafaxine   Delirium precautions  PT/OT    PULMONOLOGY:   Extubated on , reintubated on  due to inability to protect airway  ENT placed trach on   Currently on 30 % and PEEP of 5, continue to wean  Pressure support and trach collar trials as tolerated     CARDIOVASCULAR:   Midodrine to keep MAP above 65   S/p AUGUSTIN/DCCV on   LVEF 20-30%, right ventricle overload, moderate TR   GDMT for HFrEF as tolerated  Diuresis as tolerated   Intermitted sinus denita noted, avoid AVN blocking agents  [facility] this admission: nonobstructive CAD; luminal RCA, Lcx; 40% mLAD   Continue ASA and Statin   Cardiology recs noted     GASTROINTESTINAL:   Noted to have dark stools . Occult stool positive for blood. CTA abdomen/pelvis without evidence of bleeding  Bowel regimen  Continue tube feeds as tolerated   GI consulted  bedside nurses and the respiratory therapist.      NOTE OF PERSONAL INVOLVEMENT IN CARE   This patient has a high probability of imminent, clinically significant deterioration, which requires the highest level of preparedness to intervene urgently. I participated in the decision-making and personally managed or directed the management of the following life and organ supporting interventions that required my frequent assessment to treat or prevent imminent deterioration.    I personally spent 32 minutes of critical care time.  This is time spent at this critically ill patient's bedside actively involved in patient care as well as the coordination of care.  This does not include any procedural time which has been billed separately.      Josue Salvador MD   Critical Care Medicine  [facility]

## 2025-02-07 NOTE — PROGRESS NOTES
Nutrition Note    Type and Reason for Visit:  Reassess (Early Interim)    Noted TF restarted s/p AUGUSTIN w/ possible cardioversion y'day. Order specifying Jevity 1.5, Promote hanging @ 70 ml/hr w/ goal of 80ml/hr per previous order. Current order w/ Jevity would provide >130% est need. Will modify order to match previous. GI consulted for PEG placement.     Nutrition Recommendations/Plan:   TF via NGT w/ Promote (Peptide-based HP Substitute) w/ goal rate of 80 ml/hr w/ free water flushes of 100 ml every 4 hours.  TF provides: 1920 kcals (89% est need), 122 g protein (2 g/kg IBW), 1596 ml free water + 600 ml SUM=7081 ml H2O daily  Monitor weight, TF admin, labs, fluid status and bowel fxn   K uptrending, free water increased  LBM 3 days ago, consider addn of bowel regimen; has senna PRN     Current Nutrition Intake & Therapies:    Average Meal Intake: NPO  Average Supplements Intake: NPO  Diet NPO Exceptions are: Sips of Water with Meds  ADULT TUBE FEEDING; PEG; Standard with Fiber; Continuous; 40; Yes; 10; Q 4 hours; 80; 100; Q 4 hours    Anthropometric Measures:  Height: 170.2 cm (5' 7.01\")  Ideal Body Weight (IBW): 135 lbs (61 kg)    Current Body Weight: 101.2 kg (223 lb 1.7 oz), 161.3 % IBW. Weight Source: Bed scale  Current BMI (kg/m2): 34.9  Usual Body Weight: 90.7 kg (200 lb) (Nov 2024)  % Weight Change (Calculated): 8.9  Weight Adjustment For: No Adjustment  BMI Categories: Obese Class 1 (BMI 30.0-34.9)    Estimated Daily Nutrient Needs:  Energy Requirements Based On: Formula  Weight Used for Energy Requirements: Current  Energy (kcal/day): 2168 kcals/d (MSJ 1.3/1.1)  Weight Used for Protein Requirements: Ideal  Protein (g/day): 91-110g (1.5-1.8g/kg IBW)  Method Used for Fluid Requirements: 1 ml/kcal  Fluid (ml/day): 2168 ml/d    Winnie Garrison RD  Contact: [phone]

## 2025-02-07 NOTE — CARE COORDINATION
CM reviewed Pt medicals, Pt lives with her  and Nephew.     Pt  assist Pt with ADL.    [facility] asked:  Is she still on Dopamine drip, Fi02 at 60% and Peep at 9?       Per IDR    Probably going to need a PEG on Monday    Fi02 @30% and Peep at 5

## 2025-02-07 NOTE — PLAN OF CARE
Problem: ABCDS Injury Assessment  Goal: Absence of physical injury  2/6/2025 2125 by Carmina Orantes RN  Outcome: Progressing  Flowsheets (Taken 2/6/2025 2122)  Absence of Physical Injury: Implement safety measures based on patient assessment  2/6/2025 1126 by Whit Coelho RN  Outcome: Progressing     Problem: Skin/Tissue Integrity  Goal: Absence of new skin breakdown  Description: 1.  Monitor for areas of redness and/or skin breakdown  2.  Assess vascular access sites hourly  3.  Every 4-6 hours minimum:  Change oxygen saturation probe site  4.  Every 4-6 hours:  If on nasal continuous positive airway pressure, respiratory therapy assess nares and determine need for appliance change or resting period.  2/6/2025 2125 by Carmina Orantes RN  Outcome: Progressing  2/6/2025 1126 by Whit Coelho RN  Outcome: Progressing     Problem: Chronic Conditions and Co-morbidities  Goal: Patient's chronic conditions and co-morbidity symptoms are monitored and maintained or improved  2/6/2025 2125 by Carmina Orantes RN  Outcome: Progressing  Flowsheets (Taken 2/6/2025 1900)  Care Plan - Patient's Chronic Conditions and Co-Morbidity Symptoms are Monitored and Maintained or Improved: Monitor and assess patient's chronic conditions and comorbid symptoms for stability, deterioration, or improvement  2/6/2025 1126 by Whit Coelho RN  Outcome: Progressing     Problem: Discharge Planning  Goal: Discharge to home or other facility with appropriate resources  2/6/2025 2125 by Carmina Orantes RN  Outcome: Progressing  Flowsheets (Taken 2/6/2025 1900)  Discharge to home or other facility with appropriate resources: Identify barriers to discharge with patient and caregiver  2/6/2025 1126 by Whit Coelho RN  Outcome: Progressing     Problem: Safety - Adult  Goal: Free from fall injury  2/6/2025 2125 by Carmina Orantes RN  Outcome: Progressing  Flowsheets (Taken 2/6/2025 2122)  Free From Fall Injury:  development  2/6/2025 1126 by Whit Coelho RN  Outcome: Progressing  Goal: Oral mucous membranes remain intact  2/6/2025 2125 by Carmina Orantes RN  Outcome: Progressing  Flowsheets  Taken 2/6/2025 2122  Oral Mucous Membranes Remain Intact: Assess oral mucosa and hygiene practices  Taken 2/6/2025 1900  Oral Mucous Membranes Remain Intact: Assess oral mucosa and hygiene practices  2/6/2025 1126 by Whit Coelho RN  Outcome: Progressing     Problem: Nutrition Deficit:  Goal: Optimize nutritional status  2/6/2025 2125 by Carmina Orantes RN  Outcome: Progressing  2/6/2025 1126 by Whit Coelho RN  Outcome: Progressing     Problem: Cardiovascular - Adult  Goal: Absence of cardiac dysrhythmias or at baseline  2/6/2025 2125 by Carmina Orantes RN  Outcome: Progressing  Flowsheets (Taken 2/6/2025 1900)  Absence of cardiac dysrhythmias or at baseline: Monitor cardiac rate and rhythm  2/6/2025 1126 by Whit Coelho RN  Outcome: Not Progressing

## 2025-02-07 NOTE — PROGRESS NOTES
Heparin Infusion Initiation  Nida Graves is a 49 y.o. female starting heparin for:  atrial fibrillation  Heparin dosing: order for weight based protocol  Initial Dosing Weight: 159.3 kg (Recorded body weight)  Recent Labs     02/05/25  1400 02/06/25  0316 02/07/25  0339    343 312   HGB 8.2* 8.1* 7.7*   INR 1.1  --   --    APTT 31.0  --   --      Factor Xa inhibitor/LMWH use within the past 72 hours? Yes  If yes, date and time of last administration:  2/6/25 @ 2030  Hypertriglyceridemia (> 690 mg/dL) or hyperbilirubinemia (> 37 mg/dL conjugated bilirubin, >14 mg/dL unconjugated bilirubin) present? No  Recent Labs     02/07/25  0501   BILITOT 0.6       Assessment/Plan:   Heparin to be monitored using aPTT until 72 hours following last factor Xa inhibitor/LMWH administration, anticipated date for change to anti-Xa monitoring is 2/10/25  Initial bolus ordered: No  Starting rate:  6 unit/kg/hr  PRN boluses entered: Yes

## 2025-02-07 NOTE — PROGRESS NOTES
CARDIOLOGY PROGRESS NOTE      Patient Name: Nida Graves  Age: 49 y.o.  Gender:female  :1975  MRN: [id number]    Patient seen and examined. This is a patient with a history of diabetes, asthma who presented with shortness of breath and hypoxia now being followed for atrial flutter and CHF.    Extubated , reintubated  d/t hypoxia. Now S/p trach 2025.      Patient seen and examined. Awake, following commands. Remains in sinus rhythm.  Telemetry reviewed      Pertinent review of systems items noted above, all other systems are negative. Current medications reviewed..    Physical Examination    Allergies   Allergen Reactions    Latex Swelling    Penicillins Hives    Codeine Anxiety     Vitals:    25 1541   BP:    Pulse: 59   Resp: 25   Temp:    SpO2: 93%     Vital signs are stable  Trach, vent  Heart has a irregular rhythm, denita.  No murmur  Lung- are ventilator breaths  Abdomen is soft, nontender, normal bowel sounds.  Extremities have mild LE edema  Skin is dry and warm.    Labs reviewed:  Recent Results (from the past 12 hour(s))   Comprehensive Metabolic Panel    Collection Time: 25  5:01 AM   Result Value Ref Range    Sodium 139 136 - 145 mmol/L    Potassium 4.6 3.5 - 5.1 mmol/L    Chloride 104 97 - 108 mmol/L    CO2 31 21 - 32 mmol/L    Anion Gap 4 2 - 12 mmol/L    Glucose 134 (H) 65 - 100 mg/dL    BUN 30 (H) 6 - 20 mg/dL    Creatinine 1.03 (H) 0.55 - 1.02 mg/dL    BUN/Creatinine Ratio 29 (H) 12 - 20      Est, Glom Filt Rate 67 >60 ml/min/1.73m2    Calcium 9.9 8.5 - 10.1 mg/dL    Total Bilirubin 0.6 0.2 - 1.0 mg/dL    AST 47 (H) 15 - 37 U/L    ALT 54 12 - 78 U/L    Alk Phosphatase 365 (H) 45 - 117 U/L    Total Protein 6.2 (L) 6.4 - 8.2 g/dL    Albumin 1.8 (L) 3.5 - 5.0 g/dL    Globulin 4.4 (H) 2.0 - 4.0 g/dL    Albumin/Globulin Ratio 0.4 (L) 1.1 - 2.2     POCT Glucose    Collection Time: 25  5:59 AM   Result Value Ref Range    POC Glucose 161 (H) 65 - 100 mg/dL     team  Anemia: tx plan per primary team.      Stable from cardiac standpoint. Will follow peripherally over the weekend.     Please do not hesitate to call if additional questions arise.    KEENAN MUNOZ, APRN - NP  2/7/2025

## 2025-02-07 NOTE — PROGRESS NOTES
OCCUPATIONAL THERAPY TREATMENT: REASSESSMENT    Patient: Nida Graves (49 y.o. female)  Date: 2/7/2025  Primary Diagnosis: Atrial fibrillation, unspecified type (HCC) [I48.91]  Atrial flutter, unspecified type (HCC) [I48.92]  Congestive heart failure, unspecified HF chronicity, unspecified heart failure type (HCC) [I50.9]  Acute hypoxic respiratory failure [J96.01]  Procedure(s) (LRB):  Left heart cath / coronary angiography (N/A) 2 Days Post-Op   Precautions: Fall Risk, General Precautions                Recommendations for nursing mobility: Assist x2    In place during session: Peripheral IV and Tracheostomy   , ICU Monitors  Chart, occupational therapy assessment, plan of care, and goals were reviewed.  ASSESSMENT  She was initially seen for OT evaluation 1/16/2025 and 0 skilled OT sessions since evalution. Patient seen today for OT reevaluation due to medical decline during her hospital stay at [facility] (Transfer from ICU 1/16/2025, returned to ICU 1/19/2025 due to Severe Hypoxia and Hypercapnia later requiring intubation that occurred 1/27/2025, and remains on the ICU now S/P trach placement  1/30/2025. She was A & O x 4 (Able to mouth responses). Upon arrival, she was resting semi-supine in bed upon arrival, agreeable to session.      Based on the objective data described, the patient currently presents with impaired functional mobility, decreased independence in ADLs, and decreased activity tolerance. (See below for objective details and assist levels).    Overall she tolerated session today without discomfort and signs of distress and currently requires more assistance with her care when compared to when seen a month ago. She will continue to benefit from skilled OT to address the above impairments to regain functional skills to pursue OT goals. The POC was reviewed on this date and updated to reflect current progress. Potential barriers for safe discharge: pt is a high fall risk and pt is not safe to be alone.  Daily Activity Inpatient Short Form  How much help from another person does the patient currently need... Total; A Lot A Little None   1.  Putting on and taking off regular lower body clothing? [x]  1 []  2 []  3 []  4   2.  Bathing (including washing, rinsing, drying)? [x]  1 []  2 []  3 []  4   3.  Toileting, which includes using toilet, bedpan or urinal? [x] 1 []  2 []  3 []  4   4.  Putting on and taking off regular upper body clothing? [x]  1 []  2 []  3 []  4   5.  Taking care of personal grooming such as brushing teeth? [x]  1 []  2 []  3 []  4   6.  Eating meals? [x]  1 []  2 []  3 []  4   © , Trustees of [facility], under license to [employer]. All rights reserved     Score:      Interpretation of Tool:  Represents clinically-significant functional categories (i.e. Activities of daily living).  Percentage of Impairment CH    0%   CI    1-19% CJ    20-39% CK    40-59% CL    60-79% CM    80-99% CN     100%   AMPA  Score 6-24 24 23 20-22 15-19 10-14 7-9 6       Pain Ratin/10   Pain Intervention(s):   N/A    Activity Tolerance:   Fair  and requires rest breaks  Please refer to the flowsheet for vital signs taken during this treatment.    After treatment:   Bed locked and in lowest position Patient left in no apparent distress in bed, Call bell within reach, and Side rails x3 and nsg updated.    COMMUNICATION/EDUCATION:   The patient’s plan of care was discussed with: Registered nurse    Patient Education  Education Given To: Patient  Education Method: Verbal  Barriers to Learning: None  Education Outcome: Demonstrated understanding (Ms Graves appropriately responded to questions/cues)    The supervising occupational therapist and treating occupational therapist assistant have met to review this patient’s progress and plan of care.    This patient’s plan of care is appropriate for delegation to [facility].     Thank you for this referral.  Facundo Garcia OT  Minutes: 30

## 2025-02-08 ENCOUNTER — APPOINTMENT (OUTPATIENT)
Facility: HOSPITAL | Age: 50
DRG: 004 | End: 2025-02-08
Payer: COMMERCIAL

## 2025-02-08 LAB
ANION GAP SERPL CALC-SCNC: 11 MMOL/L (ref 2–12)
ANION GAP SERPL CALC-SCNC: 5 MMOL/L (ref 2–12)
APTT PPP: 29.5 SEC (ref 21.2–34.1)
ARTERIAL PATENCY WRIST A: YES
BACTERIA SPEC CULT: NORMAL
BASE DEFICIT BLDA-SCNC: 1.5 MMOL/L
BASOPHILS # BLD: 0.04 K/UL (ref 0–0.1)
BASOPHILS NFR BLD: 0.5 % (ref 0–1)
BDY SITE: ABNORMAL
BODY TEMPERATURE: 98.8
BUN SERPL-MCNC: 36 MG/DL (ref 6–20)
BUN SERPL-MCNC: 37 MG/DL (ref 6–20)
BUN/CREAT SERPL: 26 (ref 12–20)
BUN/CREAT SERPL: 29 (ref 12–20)
CA-I BLD-MCNC: 10.1 MG/DL (ref 8.5–10.1)
CA-I BLD-MCNC: 9.8 MG/DL (ref 8.5–10.1)
CHLORIDE SERPL-SCNC: 102 MMOL/L (ref 97–108)
CHLORIDE SERPL-SCNC: 104 MMOL/L (ref 97–108)
CHLORIDE UR-SCNC: 46 MMOL/L
CO2 SERPL-SCNC: 24 MMOL/L (ref 21–32)
CO2 SERPL-SCNC: 29 MMOL/L (ref 21–32)
COHGB MFR BLD: 0.2 % (ref 1–2)
CREAT SERPL-MCNC: 1.28 MG/DL (ref 0.55–1.02)
CREAT SERPL-MCNC: 1.37 MG/DL (ref 0.55–1.02)
DIFFERENTIAL METHOD BLD: ABNORMAL
EKG ATRIAL RATE: 43 BPM
EKG ATRIAL RATE: 65 BPM
EKG ATRIAL RATE: 78 BPM
EKG ATRIAL RATE: 87 BPM
EKG DIAGNOSIS: NORMAL
EKG P AXIS: 36 DEGREES
EKG P AXIS: 37 DEGREES
EKG P-R INTERVAL: 336 MS
EKG Q-T INTERVAL: 402 MS
EKG Q-T INTERVAL: 482 MS
EKG Q-T INTERVAL: 486 MS
EKG Q-T INTERVAL: 682 MS
EKG QRS DURATION: 136 MS
EKG QRS DURATION: 136 MS
EKG QRS DURATION: 138 MS
EKG QRS DURATION: 138 MS
EKG QTC CALCULATION (BAZETT): 402 MS
EKG QTC CALCULATION (BAZETT): 415 MS
EKG QTC CALCULATION (BAZETT): 501 MS
EKG QTC CALCULATION (BAZETT): 505 MS
EKG R AXIS: -37 DEGREES
EKG R AXIS: -38 DEGREES
EKG R AXIS: -39 DEGREES
EKG R AXIS: -40 DEGREES
EKG T AXIS: -57 DEGREES
EKG T AXIS: 138 DEGREES
EKG T AXIS: 140 DEGREES
EKG T AXIS: 263 DEGREES
EKG VENTRICULAR RATE: 21 BPM
EKG VENTRICULAR RATE: 44 BPM
EKG VENTRICULAR RATE: 65 BPM
EKG VENTRICULAR RATE: 95 BPM
EOSINOPHIL # BLD: 0.24 K/UL (ref 0–0.4)
EOSINOPHIL NFR BLD: 2.8 % (ref 0–7)
ERYTHROCYTE [DISTWIDTH] IN BLOOD BY AUTOMATED COUNT: 16.5 % (ref 11.5–14.5)
ERYTHROCYTE [DISTWIDTH] IN BLOOD BY AUTOMATED COUNT: 16.5 % (ref 11.5–14.5)
FIO2 ON VENT: 30 %
GAS FLOW.O2 SETTING OXYMISER: 10
GLUCOSE BLD STRIP.AUTO-MCNC: 142 MG/DL (ref 65–100)
GLUCOSE BLD STRIP.AUTO-MCNC: 155 MG/DL (ref 65–100)
GLUCOSE BLD STRIP.AUTO-MCNC: 164 MG/DL (ref 65–100)
GLUCOSE BLD STRIP.AUTO-MCNC: 176 MG/DL (ref 65–100)
GLUCOSE BLD STRIP.AUTO-MCNC: 204 MG/DL (ref 65–100)
GLUCOSE BLD STRIP.AUTO-MCNC: 223 MG/DL (ref 65–100)
GLUCOSE SERPL-MCNC: 167 MG/DL (ref 65–100)
GLUCOSE SERPL-MCNC: 189 MG/DL (ref 65–100)
HCO3 BLDA-SCNC: 24 MMOL/L (ref 22–26)
HCT VFR BLD AUTO: 23.8 % (ref 35–47)
HCT VFR BLD AUTO: 26.9 % (ref 35–47)
HGB BLD-MCNC: 7.2 G/DL (ref 11.5–16)
HGB BLD-MCNC: 8.1 G/DL (ref 11.5–16)
IMM GRANULOCYTES # BLD AUTO: 0.07 K/UL (ref 0–0.04)
IMM GRANULOCYTES NFR BLD AUTO: 0.8 % (ref 0–0.5)
INR PPP: 1.1 (ref 0.9–1.1)
LACTATE SERPL-SCNC: 4.1 MMOL/L (ref 0.4–2)
LYMPHOCYTES # BLD: 1.63 K/UL (ref 0.8–3.5)
LYMPHOCYTES NFR BLD: 18.7 % (ref 12–49)
Lab: NORMAL
MCH RBC QN AUTO: 28.3 PG (ref 26–34)
MCH RBC QN AUTO: 28.6 PG (ref 26–34)
MCHC RBC AUTO-ENTMCNC: 30.1 G/DL (ref 30–36.5)
MCHC RBC AUTO-ENTMCNC: 30.3 G/DL (ref 30–36.5)
MCV RBC AUTO: 93.7 FL (ref 80–99)
MCV RBC AUTO: 95.1 FL (ref 80–99)
METHGB MFR BLD: 0.3 % (ref 0–1.4)
MONOCYTES # BLD: 1 K/UL (ref 0–1)
MONOCYTES NFR BLD: 11.5 % (ref 5–13)
NEUTS SEG # BLD: 5.73 K/UL (ref 1.8–8)
NEUTS SEG NFR BLD: 65.7 % (ref 32–75)
NRBC # BLD: 0 K/UL (ref 0–0.01)
NRBC # BLD: 0 K/UL (ref 0–0.01)
NRBC BLD-RTO: 0 PER 100 WBC
NRBC BLD-RTO: 0 PER 100 WBC
OSMOLALITY UR: 262 MOSM/KG H2O
OXYHGB MFR BLD: 88.1 % (ref 95–99)
PCO2 BLDA: 41 MMHG (ref 35–45)
PEEP RESPIRATORY: 5
PERFORMED BY:: ABNORMAL
PH BLDA: 7.38 (ref 7.35–7.45)
PLATELET # BLD AUTO: 240 K/UL (ref 150–400)
PLATELET # BLD AUTO: 345 K/UL (ref 150–400)
PMV BLD AUTO: 10.1 FL (ref 8.9–12.9)
PMV BLD AUTO: 9.9 FL (ref 8.9–12.9)
PO2 BLDA: 57 MMHG (ref 80–100)
POTASSIUM SERPL-SCNC: 4.8 MMOL/L (ref 3.5–5.1)
POTASSIUM SERPL-SCNC: 5.4 MMOL/L (ref 3.5–5.1)
PRESSURE SUPPORT SETTING VENT: 15
PROTHROMBIN TIME: 14.7 SEC (ref 11.9–14.6)
RBC # BLD AUTO: 2.54 M/UL (ref 3.8–5.2)
RBC # BLD AUTO: 2.83 M/UL (ref 3.8–5.2)
SAO2 % BLD: 89 % (ref 95–99)
SAO2% DEVICE SAO2% SENSOR NAME: ABNORMAL
SODIUM SERPL-SCNC: 137 MMOL/L (ref 136–145)
SODIUM SERPL-SCNC: 138 MMOL/L (ref 136–145)
SODIUM UR-SCNC: 30 MMOL/L
SPECIMEN SITE: ABNORMAL
THERAPEUTIC RANGE: NORMAL SEC (ref 82–109)
TROPONIN I SERPL HS-MCNC: 35 NG/L (ref 0–51)
TSH SERPL DL<=0.05 MIU/L-ACNC: 3.72 UIU/ML (ref 0.36–3.74)
UFH PPP CHRO-ACNC: 0.63 IU/ML
UUN UR-MCNC: 316 MG/DL
VENTILATION MODE VENT: ABNORMAL
VT SETTING VENT: 390
WBC # BLD AUTO: 17.5 K/UL (ref 3.6–11)
WBC # BLD AUTO: 8.7 K/UL (ref 3.6–11)

## 2025-02-08 PROCEDURE — 94761 N-INVAS EAR/PLS OXIMETRY MLT: CPT

## 2025-02-08 PROCEDURE — 82803 BLOOD GASES ANY COMBINATION: CPT

## 2025-02-08 PROCEDURE — 2000000000 HC ICU R&B

## 2025-02-08 PROCEDURE — 84300 ASSAY OF URINE SODIUM: CPT

## 2025-02-08 PROCEDURE — 71045 X-RAY EXAM CHEST 1 VIEW: CPT

## 2025-02-08 PROCEDURE — 2580000003 HC RX 258: Performed by: NURSE PRACTITIONER

## 2025-02-08 PROCEDURE — 6370000000 HC RX 637 (ALT 250 FOR IP): Performed by: STUDENT IN AN ORGANIZED HEALTH CARE EDUCATION/TRAINING PROGRAM

## 2025-02-08 PROCEDURE — 85610 PROTHROMBIN TIME: CPT

## 2025-02-08 PROCEDURE — 84443 ASSAY THYROID STIM HORMONE: CPT

## 2025-02-08 PROCEDURE — 2700000000 HC OXYGEN THERAPY PER DAY

## 2025-02-08 PROCEDURE — 6360000002 HC RX W HCPCS: Performed by: NURSE PRACTITIONER

## 2025-02-08 PROCEDURE — 83935 ASSAY OF URINE OSMOLALITY: CPT

## 2025-02-08 PROCEDURE — 2500000003 HC RX 250 WO HCPCS: Performed by: STUDENT IN AN ORGANIZED HEALTH CARE EDUCATION/TRAINING PROGRAM

## 2025-02-08 PROCEDURE — 85027 COMPLETE CBC AUTOMATED: CPT

## 2025-02-08 PROCEDURE — 85520 HEPARIN ASSAY: CPT

## 2025-02-08 PROCEDURE — 6370000000 HC RX 637 (ALT 250 FOR IP): Performed by: NURSE PRACTITIONER

## 2025-02-08 PROCEDURE — 6370000000 HC RX 637 (ALT 250 FOR IP): Performed by: INTERNAL MEDICINE

## 2025-02-08 PROCEDURE — 36415 COLL VENOUS BLD VENIPUNCTURE: CPT

## 2025-02-08 PROCEDURE — 94640 AIRWAY INHALATION TREATMENT: CPT

## 2025-02-08 PROCEDURE — 2500000003 HC RX 250 WO HCPCS: Performed by: INTERNAL MEDICINE

## 2025-02-08 PROCEDURE — 94003 VENT MGMT INPAT SUBQ DAY: CPT

## 2025-02-08 PROCEDURE — 82962 GLUCOSE BLOOD TEST: CPT

## 2025-02-08 PROCEDURE — 83605 ASSAY OF LACTIC ACID: CPT

## 2025-02-08 PROCEDURE — 85730 THROMBOPLASTIN TIME PARTIAL: CPT

## 2025-02-08 PROCEDURE — 6360000002 HC RX W HCPCS: Performed by: STUDENT IN AN ORGANIZED HEALTH CARE EDUCATION/TRAINING PROGRAM

## 2025-02-08 PROCEDURE — 84540 ASSAY OF URINE/UREA-N: CPT

## 2025-02-08 PROCEDURE — 82436 ASSAY OF URINE CHLORIDE: CPT

## 2025-02-08 PROCEDURE — 36569 INSJ PICC 5 YR+ W/O IMAGING: CPT

## 2025-02-08 PROCEDURE — 6360000002 HC RX W HCPCS: Performed by: INTERNAL MEDICINE

## 2025-02-08 PROCEDURE — 93005 ELECTROCARDIOGRAM TRACING: CPT | Performed by: NURSE PRACTITIONER

## 2025-02-08 PROCEDURE — P9045 ALBUMIN (HUMAN), 5%, 250 ML: HCPCS | Performed by: INTERNAL MEDICINE

## 2025-02-08 PROCEDURE — 84484 ASSAY OF TROPONIN QUANT: CPT

## 2025-02-08 PROCEDURE — 85025 COMPLETE CBC W/AUTO DIFF WBC: CPT

## 2025-02-08 PROCEDURE — 2500000003 HC RX 250 WO HCPCS: Performed by: NURSE PRACTITIONER

## 2025-02-08 PROCEDURE — 36600 WITHDRAWAL OF ARTERIAL BLOOD: CPT

## 2025-02-08 PROCEDURE — P9047 ALBUMIN (HUMAN), 25%, 50ML: HCPCS | Performed by: INTERNAL MEDICINE

## 2025-02-08 PROCEDURE — 80048 BASIC METABOLIC PNL TOTAL CA: CPT

## 2025-02-08 RX ORDER — EPINEPHRINE 0.1 MG/ML
1 INJECTION INTRAVENOUS ONCE
Status: COMPLETED | OUTPATIENT
Start: 2025-02-08 | End: 2025-02-08

## 2025-02-08 RX ORDER — ALBUMIN HUMAN 50 G/1000ML
50 SOLUTION INTRAVENOUS ONCE
Status: COMPLETED | OUTPATIENT
Start: 2025-02-08 | End: 2025-02-08

## 2025-02-08 RX ORDER — PROCHLORPERAZINE EDISYLATE 5 MG/ML
10 INJECTION INTRAMUSCULAR; INTRAVENOUS EVERY 6 HOURS PRN
Status: DISCONTINUED | OUTPATIENT
Start: 2025-02-08 | End: 2025-03-11

## 2025-02-08 RX ORDER — EPINEPHRINE 1 MG/ML(1)
1 AMPUL (ML) INJECTION ONCE
Status: DISCONTINUED | OUTPATIENT
Start: 2025-02-08 | End: 2025-02-08

## 2025-02-08 RX ORDER — GLUCAGON 1 MG/ML
1 KIT INJECTION PRN
Status: DISCONTINUED | OUTPATIENT
Start: 2025-02-08 | End: 2025-03-14 | Stop reason: HOSPADM

## 2025-02-08 RX ORDER — DOPAMINE HYDROCHLORIDE 320 MG/100ML
1-20 INJECTION, SOLUTION INTRAVENOUS CONTINUOUS
Status: DISCONTINUED | OUTPATIENT
Start: 2025-02-08 | End: 2025-02-10

## 2025-02-08 RX ORDER — DEXTROSE MONOHYDRATE 100 MG/ML
INJECTION, SOLUTION INTRAVENOUS CONTINUOUS PRN
Status: DISCONTINUED | OUTPATIENT
Start: 2025-02-08 | End: 2025-02-10

## 2025-02-08 RX ORDER — ALBUMIN (HUMAN) 12.5 G/50ML
25 SOLUTION INTRAVENOUS EVERY 8 HOURS
Status: COMPLETED | OUTPATIENT
Start: 2025-02-08 | End: 2025-02-10

## 2025-02-08 RX ADMIN — BUDESONIDE 500 MCG: 0.5 INHALANT ORAL at 08:38

## 2025-02-08 RX ADMIN — BUDESONIDE 500 MCG: 0.5 INHALANT ORAL at 20:16

## 2025-02-08 RX ADMIN — DEXTROSE 250 ML: 10 SOLUTION INTRAVENOUS at 05:23

## 2025-02-08 RX ADMIN — VENLAFAXINE 37.5 MG: 25 TABLET ORAL at 08:46

## 2025-02-08 RX ADMIN — MIDODRINE HYDROCHLORIDE 10 MG: 5 TABLET ORAL at 21:21

## 2025-02-08 RX ADMIN — INSULIN HUMAN 10 UNITS: 100 INJECTION, SOLUTION PARENTERAL at 05:16

## 2025-02-08 RX ADMIN — ISOPROTERENOL HYDROCHLORIDE 2.5 MCG/MIN: 0.2 INJECTION, SOLUTION INTRAVENOUS at 18:06

## 2025-02-08 RX ADMIN — BUPROPION HYDROCHLORIDE 75 MG: 75 TABLET, FILM COATED ORAL at 21:21

## 2025-02-08 RX ADMIN — MIDODRINE HYDROCHLORIDE 10 MG: 5 TABLET ORAL at 08:46

## 2025-02-08 RX ADMIN — PROCHLORPERAZINE EDISYLATE 10 MG: 5 INJECTION INTRAMUSCULAR; INTRAVENOUS at 02:33

## 2025-02-08 RX ADMIN — VENLAFAXINE 37.5 MG: 25 TABLET ORAL at 16:04

## 2025-02-08 RX ADMIN — FAMOTIDINE 20 MG: 20 TABLET, FILM COATED ORAL at 08:47

## 2025-02-08 RX ADMIN — ASPIRIN 81 MG 81 MG: 81 TABLET ORAL at 08:50

## 2025-02-08 RX ADMIN — SODIUM CHLORIDE, PRESERVATIVE FREE 10 ML: 5 INJECTION INTRAVENOUS at 08:50

## 2025-02-08 RX ADMIN — ISOPROTERENOL HYDROCHLORIDE 2 MCG/MIN: 0.2 INJECTION, SOLUTION INTRAVENOUS at 02:20

## 2025-02-08 RX ADMIN — ALBUMIN (HUMAN) 25 G: 0.25 INJECTION, SOLUTION INTRAVENOUS at 23:52

## 2025-02-08 RX ADMIN — IPRATROPIUM BROMIDE AND ALBUTEROL SULFATE 1 DOSE: 2.5; .5 SOLUTION RESPIRATORY (INHALATION) at 20:16

## 2025-02-08 RX ADMIN — MIDODRINE HYDROCHLORIDE 10 MG: 5 TABLET ORAL at 15:52

## 2025-02-08 RX ADMIN — ALBUMIN (HUMAN) 25 G: 0.25 INJECTION, SOLUTION INTRAVENOUS at 15:53

## 2025-02-08 RX ADMIN — DOPAMINE HYDROCHLORIDE IN DEXTROSE 5 MCG/KG/MIN: 3.2 INJECTION, SOLUTION INTRAVENOUS at 03:18

## 2025-02-08 RX ADMIN — HEPARIN SODIUM 6 UNITS/KG/HR: 10000 INJECTION, SOLUTION INTRAVENOUS at 20:21

## 2025-02-08 RX ADMIN — Medication 3 MG: at 21:22

## 2025-02-08 RX ADMIN — SENNOSIDES 8.6 MG: 8.6 TABLET, COATED ORAL at 15:56

## 2025-02-08 RX ADMIN — IPRATROPIUM BROMIDE AND ALBUTEROL SULFATE 1 DOSE: 2.5; .5 SOLUTION RESPIRATORY (INHALATION) at 13:39

## 2025-02-08 RX ADMIN — ALBUMIN (HUMAN) 50 G: 12.5 INJECTION, SOLUTION INTRAVENOUS at 08:52

## 2025-02-08 RX ADMIN — FAMOTIDINE 20 MG: 20 TABLET, FILM COATED ORAL at 21:22

## 2025-02-08 RX ADMIN — WHITE PETROLATUM,ZINC OXIDE: 57; 17 PASTE TOPICAL at 21:23

## 2025-02-08 RX ADMIN — DOPAMINE HYDROCHLORIDE IN DEXTROSE 7.5 MCG/KG/MIN: 3.2 INJECTION, SOLUTION INTRAVENOUS at 20:22

## 2025-02-08 RX ADMIN — IPRATROPIUM BROMIDE AND ALBUTEROL SULFATE 1 DOSE: 2.5; .5 SOLUTION RESPIRATORY (INHALATION) at 08:38

## 2025-02-08 RX ADMIN — ISOPROTERENOL HYDROCHLORIDE 2.5 MCG/MIN: 0.2 INJECTION, SOLUTION INTRAVENOUS at 11:20

## 2025-02-08 RX ADMIN — WHITE PETROLATUM,ZINC OXIDE: 57; 17 PASTE TOPICAL at 08:50

## 2025-02-08 RX ADMIN — BUPROPION HYDROCHLORIDE 75 MG: 75 TABLET, FILM COATED ORAL at 08:47

## 2025-02-08 RX ADMIN — ISOPROTERENOL HYDROCHLORIDE 5 MCG/MIN: 0.2 INJECTION, SOLUTION INTRAVENOUS at 06:23

## 2025-02-08 RX ADMIN — EPINEPHRINE 1 MG: 0.1 INJECTION INTRAVENOUS at 02:00

## 2025-02-08 RX ADMIN — ATORVASTATIN CALCIUM 20 MG: 20 TABLET, FILM COATED ORAL at 21:21

## 2025-02-08 RX ADMIN — SODIUM CHLORIDE, PRESERVATIVE FREE 10 ML: 5 INJECTION INTRAVENOUS at 21:22

## 2025-02-08 RX ADMIN — SODIUM ZIRCONIUM CYCLOSILICATE 10 G: 10 POWDER, FOR SUSPENSION ORAL at 15:52

## 2025-02-08 ASSESSMENT — PULMONARY FUNCTION TESTS
PIF_VALUE: 21
PIF_VALUE: 24
PIF_VALUE: 20
PIF_VALUE: 21
PIF_VALUE: 20
PIF_VALUE: 21
PIF_VALUE: 20
PIF_VALUE: 21
PIF_VALUE: 20
PIF_VALUE: 21
PIF_VALUE: 20
PIF_VALUE: 21
PIF_VALUE: 23
PIF_VALUE: 21
PIF_VALUE: 20

## 2025-02-08 ASSESSMENT — PAIN SCALES - GENERAL
PAINLEVEL_OUTOF10: 0

## 2025-02-08 NOTE — PROGRESS NOTES
CRITICAL CARE PROGRESS NOTE    Name: Nida Graves   : 1975   MRN: [id number]   Date: 2025      Diagnoses/problem list:   Acute hypoxic and hypercapnic respiratory failure  Acute kidney injury  Atrial flutter with slow ventricular response  GI bleed  Nosebleed, resolved  NSTEMI  Symptomatic bradycardia  Biventricular failure  Acute HFrEF, 25 to 30%  Diabetes  Morbid obesity  Acute metabolic/septic encephalopathy    24-hour events:   Bradycardic with long pauses and hypotension last night.  Heart rate reportedly was down to as low as 20 bpm.  Improved after starting isoproterenol and dopamine.  Awake and following commands this morning.  Remains on MV via trach, 50% FiO2 and PEEP of 5.    Assessment and plan:   Ms. Graves is a 49-year-old female with PMH chronic debility and bedbound status, diabetes, asthma, obesity, who presented for shortness of breath.  Shortness of breath worsening over the past few days.  Upon presentation ED patient found to be hypoxic necessita.  Ting O2 via NC.  She was also found to be in A-fib with slow ventricular response.  She was transferred out of ICU on . Early morning  upgraded to ICU again due to severe hypoxia and hypercapnia requiring intubation. Now s/p trach on .    NEUROLOGICAL:    Continue Bupropion and Venlafaxine   Delirium precautions  PT/OT    PULMONOLOGY:   Extubated on , reintubated on  due to inability to protect airway  ENT placed trach on   Continue to wean FiO2 as tolerated  Pressure support and trach collar trials as tolerated     CARDIOVASCULAR:   Midodrine to keep MAP above 65   S/p AUGUSTIN/DCCV on   LVEF 20-30%, right ventricle overload, moderate TR   GDMT for HFrEF as tolerated  Diuresis as needed  [facility] this admission: nonobstructive CAD; luminal RCA, Lcx; 40% mLAD   Continue ASA and Statin   Cardiology recs noted   Isoproterenol and dopamine to keep heart rate above 40  Avoid AVN blocking agents  Cardiology planning to do TVP

## 2025-02-08 NOTE — PROGRESS NOTES
Alerted by nursing staff that the patient continued to exhibit extended pauses in her heart rhythm via telemetry.  The patient continued to maintain heart rates between 20s and 40s.  Initial blood pressure stable with maps in the 70s to 80s but some hypotension noticed.  EKG significant for complete heart block with intermittent transitions to atrial flutter with slow ventricular response.  Patient initially given atropine x 1 dose, epinephrine x 1 dose, before being started on an isoproterenol infusion.  Transcutaneous pacing was also employed to little effect.  Cardiology paged, recommended the patient be started on dopamine and continued on isoproterenol for now.  Plans to insert a transvenous pacer in the a.m. to ensure adequate heart rates and hemodynamic stability.  Patient currently on 4 mcg/min of isoproterenol and 5 mcg/kg/min of dopamine.  Heart rate maintaining in the 80s and blood pressure is stable.  Transcutaneous pacing paused for now.  Will continue hemodynamic monitoring and adjust infusions as indicated.    Nannette Bravo, updated with patient's progress overnight.      GINNY Rubalcava - NP  2/8/2025  4:29 AM

## 2025-02-08 NOTE — PROGRESS NOTES
PICC Line Insertion Procedure Note    Procedure: Insertion of #4 FR/18G PICC    Indications:  hemodynamically unstable, multiple drips    Procedure Details   Informed consent was obtained for the procedure, including sedation.  Risks of  hemorrhage, and adverse drug reaction were discussed.     #4 FR/18G PICC inserted to the L Cephalic vein per hospital protocol.   Blood return:  yes    Findings:  Catheter inserted to 42 cm, with 0 cm. exposed. Mid upper arm circumference is 29 cm.  There were no changes to vital signs. Catheter was flushed with 20 cc NS. Patient did tolerate procedure well.    Right lower arm with redness/ swelling.  Noted Right Cephalic is not compressible.  Used best vessel in left arm which was not large enough to support a 5fr double or triple catheter.  4Fr Single Lumen placed in left cephalic.            Recommendations:  CXR ordered to verify placement.

## 2025-02-08 NOTE — CONSULTS
Renal Consultation Note    NAME:  Nida Graves   :   1975   MRN:   [id number]     ATTENDING: Suyapa Rea DO  PCP:  Luis Lees APRN - CNP    Date/Time:  2025 11:52 AM      Subjective:   REQUESTING PHYSICIAN:  REASON FOR CONSULT:    KIZZY    Patient is a 49-year-old  female with history of chronic debility, bedbound, diabetes asthma obesity, who presented to the hospital with shortness of breath worsening for the past few days.  On admission she was found to be hypoxic.  She was also found to be in A-fib with RVR.  She was transferred out of the ICU on  but again upgraded on  to ICU for severe hypoxia and hypercapnia requiring intubation.  She had tracheostomy on   Creatinine was 1.28 this morning which prompted a renal consultation.  Creatinine was normal at 0.5-0.7 till  but has been progressively increasing.  Patient seen in the ICU.  She is currently on trach collar.  Does not respond to questions.  No family at bedside  Lasix has been held because of worsening renal function  Good urine output reported.  750 mL past 24 hours  She had a CT with IV contrast on 25 which did not show any renal abnormalities  On midodrine 10 mg tid    Past Medical History:   Diagnosis Date    Allergy     Asthma     Diabetes (HCC)       Past Surgical History:   Procedure Laterality Date    CARDIAC PROCEDURE N/A 2025    Left heart cath / coronary angiography performed by Manuel Coburn MD at [facility] CARDIAC CATH LAB     SECTION      X 2    GYN      novasure, csection scar revision    TRACHEOSTOMY N/A 2025    TRACHEOSTOMY PERCUTANEOUS performed by Jack Badillo MD at [facility] MAIN OR     Social History     Tobacco Use    Smoking status: Never    Smokeless tobacco: Never   Substance Use Topics    Alcohol use: No      Family History   Problem Relation Age of Onset    Cancer Other         aunt with colon ca    Stroke Neg Hx     Heart Attack Neg Hx     Hypertension Father  I will continue to hold.  No significant volume overload  -No significant volume overload but she has edema extremities.  Likely from third spacing from severe hypoalbuminemia.  Albumin is only 1.8 .will give IV albumin.    -No IV contrast over the past 48 to 72 hours  -I have gone through medication list.  Not on any potential nephrotoxins  -Urine is bland will quantify proteinuria  -Follow-up on renal function in a.m.    #2  Hyperkalemia  -Potassium 5.4 today  -Likely because of KIZZY  -Will give Lokelma 10 g now  -Not on any potassium supplements  -On promote tube feeding.  Would change to Nepro if hypokalemia persists    #3  Hypercalcemia  -Calcium is 10.1 but corrected calcium 11.6  -She is not on any calcium or vitamin D supplements.  Will check PTH  -Could be secondary to prolonged immobility    #4  Respiratory distress  -She was extubated on 1/26 but reintubated on 1/27 because of inability to protect airway.  Tracheostomy was done on 1/30  -Currently on mechanical ventilation via trach  -Pressure support on trach collar trials as tolerated    #5  Hypotension  -She was noted to be hypotensive overnight.  Currently on pressors.  Also on dopamine  -WBC count today increased to 17.5.  Currently not on any antibiotics.  Also noted to be febrile with temperature 100.1 this a.m.  -I will recheck UA  -Last EF was 20 to 30% with right ventricular overload, moderate TR  -Lasix on hold because of worsening renal function.  I will continue to hold today  -Cardiology on the case    Thank you for the consultation      ________________________________________________________________________  Signed: Michelle Flores MD

## 2025-02-08 NOTE — PROGRESS NOTES
CARDIOLOGY PROGRESS NOTE      Patient Name: Nida Graves  Age: 49 y.o.  Gender:female  :1975  MRN: [id number]    Patient seen and examined. This is a patient with a history of diabetes, asthma who presented with shortness of breath and hypoxia now being followed for atrial flutter and CHF.    Extubated , reintubated  d/t hypoxia. Now S/p trach 2025.      Patient seen and examined.  Tele with pauses and complete heart block overnight. Started on isoproterenol and dopamine. Vitals stable now.         Pertinent review of systems items noted above, all other systems are negative. Current medications reviewed..    Physical Examination    Allergies   Allergen Reactions    Latex Swelling    Penicillins Hives    Codeine Anxiety     Vitals:    25 1500   BP: 134/80   Pulse: 85   Resp: 10   Temp:    SpO2:      Vital signs are stable  Trach, vent  Heart has a irregular rhythm, denita.  No murmur  Lung- are ventilator breaths  Abdomen is soft, nontender, normal bowel sounds.  Extremities have mild LE edema  Skin is dry and warm.    Labs reviewed:  Recent Results (from the past 12 hour(s))   POCT Glucose    Collection Time: 25  8:59 AM   Result Value Ref Range    POC Glucose 164 (H) 65 - 100 mg/dL    Performed by: Jackson Mosley    Sodium, urine, random    Collection Time: 25  9:15 AM   Result Value Ref Range    SODIUM, RANDOM URINE 30 mmol/L   Osmolality, Urine    Collection Time: 25  9:15 AM   Result Value Ref Range    Osmolality, Ur 262 MOSM/kg H2O   Urea nitrogen, urine    Collection Time: 25  9:15 AM   Result Value Ref Range    Urea Nitrogen, Random Urine 316 mg/dL   Chloride, Random Urine    Collection Time: 25  9:15 AM   Result Value Ref Range    Chloride 46 mmol/L   POCT Glucose    Collection Time: 25 12:55 PM   Result Value Ref Range    POC Glucose 142 (H) 65 - 100 mg/dL    Performed by: Jackson Mosley    POCT Glucose    Collection Time: 25  5:01  PM   Result Value Ref Range    POC Glucose 155 (H) 65 - 100 mg/dL    Performed by: Jackson Mosley     Impression and recommendations are as follows:  Atrial flutter/fib with slow ventricular rate, 40-60s,   HR acceptable, now on dopamine, isoproterenol being weaned off  AC previously stopped due to rectal bleeding/anemia, s/p transfusion; hgb stable 8.1  Successful AUGUSTIN/DCCV today, now in NSR but developed CHB  Continues on heparin gtt pending possibly PEG on Monday but may wave to wait until CHB is addressed, likely micra placement on Wednesday.   Recommend Eliquis at discharge   Elevated troponin: peak of 639  likely myocardial injury secondary to acute hypoxia, aflutter   LHC with nonobstructive CAD; luminal RCA, Lcx; 40% mLAD   Continue aspirin, statin  Acute systolic heart failure with reduced ejection fraction, nonischemic  EF 25-30%  BLE edema noted   Initiate GDMT as BP allows, no BB due to slow HR  Limited echo 1/28 EF 30-35%, normal IVC size  Likely secondary to atrial flutter.  Will need life-vest on dc if micra is placed due to high risk for infection of CIED  Acute hypoxic hypercapnic respiratory failure:   likely from respiratory fatigue/poor effort  Reintubated 1/27/25 for severe hypoxia.  ICU team following  Hypertension: BP acceptable  Hyperlipidemia, on statin therapy  Diabetes mellitus: tx plan per primary team  Anemia: tx plan per primary team.         Please do not hesitate to call if additional questions arise.    Manuel Coburn MD  2/8/2025

## 2025-02-08 NOTE — ANESTHESIA PRE-OP
Pt having extended pulses in rhythm on telemetry monitor. EKG obtained, intensivist notified of extended pauses and drop in HR. Intensivist responded at bed side. Patient was given atropine x1, epi x1, then started on Isoproterenol infusion. Intensivist on the phone with cardiology at bedside.

## 2025-02-08 NOTE — PLAN OF CARE
Problem: ABCDS Injury Assessment  Goal: Absence of physical injury  Outcome: Progressing     Problem: Skin/Tissue Integrity  Goal: Absence of new skin breakdown  Description: 1.  Monitor for areas of redness and/or skin breakdown  2.  Assess vascular access sites hourly  3.  Every 4-6 hours minimum:  Change oxygen saturation probe site  4.  Every 4-6 hours:  If on nasal continuous positive airway pressure, respiratory therapy assess nares and determine need for appliance change or resting period.  Outcome: Progressing     Problem: Chronic Conditions and Co-morbidities  Goal: Patient's chronic conditions and co-morbidity symptoms are monitored and maintained or improved  Outcome: Progressing  Flowsheets  Taken 2/7/2025 2000 by Carmina Orantes RN  Care Plan - Patient's Chronic Conditions and Co-Morbidity Symptoms are Monitored and Maintained or Improved: Monitor and assess patient's chronic conditions and comorbid symptoms for stability, deterioration, or improvement  Taken 2/7/2025 0800 by Melany Paz RN  Care Plan - Patient's Chronic Conditions and Co-Morbidity Symptoms are Monitored and Maintained or Improved:   Monitor and assess patient's chronic conditions and comorbid symptoms for stability, deterioration, or improvement   Collaborate with multidisciplinary team to address chronic and comorbid conditions and prevent exacerbation or deterioration     Problem: Discharge Planning  Goal: Discharge to home or other facility with appropriate resources  Outcome: Progressing  Flowsheets  Taken 2/7/2025 2000 by Carmina Orantes RN  Discharge to home or other facility with appropriate resources: Identify barriers to discharge with patient and caregiver  Taken 2/7/2025 0800 by Melany Paz RN  Discharge to home or other facility with appropriate resources: Identify barriers to discharge with patient and caregiver     Problem: Safety - Adult  Goal: Free from fall injury  Outcome: Progressing     Problem:

## 2025-02-09 LAB
ANION GAP SERPL CALC-SCNC: 7 MMOL/L (ref 2–12)
APPEARANCE UR: ABNORMAL
APTT PPP: 31.1 SEC (ref 21.2–34.1)
APTT PPP: 60 SEC (ref 21.2–34.1)
APTT PPP: 91.9 SEC (ref 21.2–34.1)
BACTERIA URNS QL MICRO: NEGATIVE /HPF
BILIRUB UR QL: NEGATIVE
BUN SERPL-MCNC: 35 MG/DL (ref 6–20)
BUN/CREAT SERPL: 25 (ref 12–20)
CA-I BLD-MCNC: 10.1 MG/DL (ref 8.5–10.1)
CHLORIDE SERPL-SCNC: 104 MMOL/L (ref 97–108)
CO2 SERPL-SCNC: 28 MMOL/L (ref 21–32)
COLOR UR: ABNORMAL
CREAT SERPL-MCNC: 1.41 MG/DL (ref 0.55–1.02)
EPITH CASTS URNS QL MICRO: ABNORMAL /LPF
ERYTHROCYTE [DISTWIDTH] IN BLOOD BY AUTOMATED COUNT: 16.6 % (ref 11.5–14.5)
GLUCOSE BLD STRIP.AUTO-MCNC: 230 MG/DL (ref 65–100)
GLUCOSE BLD STRIP.AUTO-MCNC: 240 MG/DL (ref 65–100)
GLUCOSE BLD STRIP.AUTO-MCNC: 242 MG/DL (ref 65–100)
GLUCOSE BLD STRIP.AUTO-MCNC: 252 MG/DL (ref 65–100)
GLUCOSE SERPL-MCNC: 177 MG/DL (ref 65–100)
GLUCOSE UR STRIP.AUTO-MCNC: 50 MG/DL
HCT VFR BLD AUTO: 23.8 % (ref 35–47)
HGB BLD-MCNC: 7.1 G/DL (ref 11.5–16)
HGB UR QL STRIP: ABNORMAL
KETONES UR QL STRIP.AUTO: NEGATIVE MG/DL
LEUKOCYTE ESTERASE UR QL STRIP.AUTO: NEGATIVE
MCH RBC QN AUTO: 28.4 PG (ref 26–34)
MCHC RBC AUTO-ENTMCNC: 29.8 G/DL (ref 30–36.5)
MCV RBC AUTO: 95.2 FL (ref 80–99)
NITRITE UR QL STRIP.AUTO: NEGATIVE
NRBC # BLD: 0 K/UL (ref 0–0.01)
NRBC BLD-RTO: 0 PER 100 WBC
PERFORMED BY:: ABNORMAL
PH UR STRIP: 5 (ref 5–8)
PLATELET # BLD AUTO: 253 K/UL (ref 150–400)
PMV BLD AUTO: 10.3 FL (ref 8.9–12.9)
POTASSIUM SERPL-SCNC: 4.7 MMOL/L (ref 3.5–5.1)
PROT UR STRIP-MCNC: 30 MG/DL
RBC # BLD AUTO: 2.5 M/UL (ref 3.8–5.2)
RBC #/AREA URNS HPF: ABNORMAL /HPF (ref 0–5)
SODIUM SERPL-SCNC: 139 MMOL/L (ref 136–145)
SP GR UR REFRACTOMETRY: 1.01 (ref 1–1.03)
THERAPEUTIC RANGE: ABNORMAL SEC (ref 82–109)
THERAPEUTIC RANGE: ABNORMAL SEC (ref 82–109)
THERAPEUTIC RANGE: NORMAL SEC (ref 82–109)
UFH PPP CHRO-ACNC: 0.55 IU/ML
UFH PPP CHRO-ACNC: 0.66 IU/ML
UROBILINOGEN UR QL STRIP.AUTO: 0.1 EU/DL (ref 0.1–1)
WBC # BLD AUTO: 11.4 K/UL (ref 3.6–11)
WBC CASTS URNS QL MICRO: PRESENT
WBC URNS QL MICRO: ABNORMAL /HPF (ref 0–4)

## 2025-02-09 PROCEDURE — 6360000002 HC RX W HCPCS: Performed by: INTERNAL MEDICINE

## 2025-02-09 PROCEDURE — 81001 URINALYSIS AUTO W/SCOPE: CPT

## 2025-02-09 PROCEDURE — 2000000000 HC ICU R&B

## 2025-02-09 PROCEDURE — 6360000002 HC RX W HCPCS: Performed by: NURSE PRACTITIONER

## 2025-02-09 PROCEDURE — 2500000003 HC RX 250 WO HCPCS: Performed by: STUDENT IN AN ORGANIZED HEALTH CARE EDUCATION/TRAINING PROGRAM

## 2025-02-09 PROCEDURE — 94003 VENT MGMT INPAT SUBQ DAY: CPT

## 2025-02-09 PROCEDURE — 85027 COMPLETE CBC AUTOMATED: CPT

## 2025-02-09 PROCEDURE — 2500000003 HC RX 250 WO HCPCS: Performed by: NURSE PRACTITIONER

## 2025-02-09 PROCEDURE — 6370000000 HC RX 637 (ALT 250 FOR IP): Performed by: STUDENT IN AN ORGANIZED HEALTH CARE EDUCATION/TRAINING PROGRAM

## 2025-02-09 PROCEDURE — P9047 ALBUMIN (HUMAN), 25%, 50ML: HCPCS | Performed by: INTERNAL MEDICINE

## 2025-02-09 PROCEDURE — 6370000000 HC RX 637 (ALT 250 FOR IP): Performed by: NURSE PRACTITIONER

## 2025-02-09 PROCEDURE — 80048 BASIC METABOLIC PNL TOTAL CA: CPT

## 2025-02-09 PROCEDURE — 94761 N-INVAS EAR/PLS OXIMETRY MLT: CPT

## 2025-02-09 PROCEDURE — 85730 THROMBOPLASTIN TIME PARTIAL: CPT

## 2025-02-09 PROCEDURE — 2700000000 HC OXYGEN THERAPY PER DAY

## 2025-02-09 PROCEDURE — 6370000000 HC RX 637 (ALT 250 FOR IP): Performed by: INTERNAL MEDICINE

## 2025-02-09 PROCEDURE — 97110 THERAPEUTIC EXERCISES: CPT

## 2025-02-09 PROCEDURE — 94640 AIRWAY INHALATION TREATMENT: CPT

## 2025-02-09 PROCEDURE — 6360000002 HC RX W HCPCS: Performed by: STUDENT IN AN ORGANIZED HEALTH CARE EDUCATION/TRAINING PROGRAM

## 2025-02-09 PROCEDURE — 85520 HEPARIN ASSAY: CPT

## 2025-02-09 PROCEDURE — 2580000003 HC RX 258: Performed by: NURSE PRACTITIONER

## 2025-02-09 PROCEDURE — 82962 GLUCOSE BLOOD TEST: CPT

## 2025-02-09 RX ADMIN — ALBUMIN (HUMAN) 25 G: 0.25 INJECTION, SOLUTION INTRAVENOUS at 23:28

## 2025-02-09 RX ADMIN — IPRATROPIUM BROMIDE AND ALBUTEROL SULFATE 1 DOSE: 2.5; .5 SOLUTION RESPIRATORY (INHALATION) at 08:42

## 2025-02-09 RX ADMIN — FAMOTIDINE 20 MG: 20 TABLET, FILM COATED ORAL at 21:20

## 2025-02-09 RX ADMIN — ATORVASTATIN CALCIUM 20 MG: 20 TABLET, FILM COATED ORAL at 21:20

## 2025-02-09 RX ADMIN — ALBUMIN (HUMAN) 25 G: 0.25 INJECTION, SOLUTION INTRAVENOUS at 08:44

## 2025-02-09 RX ADMIN — SODIUM CHLORIDE, PRESERVATIVE FREE 10 ML: 5 INJECTION INTRAVENOUS at 08:49

## 2025-02-09 RX ADMIN — VENLAFAXINE 37.5 MG: 25 TABLET ORAL at 17:41

## 2025-02-09 RX ADMIN — Medication 3 MG: at 21:19

## 2025-02-09 RX ADMIN — ISOPROTERENOL HYDROCHLORIDE 2.5 MCG/MIN: 0.2 INJECTION, SOLUTION INTRAVENOUS at 17:37

## 2025-02-09 RX ADMIN — MIDODRINE HYDROCHLORIDE 10 MG: 5 TABLET ORAL at 14:42

## 2025-02-09 RX ADMIN — INSULIN LISPRO 2 UNITS: 100 INJECTION, SOLUTION INTRAVENOUS; SUBCUTANEOUS at 06:11

## 2025-02-09 RX ADMIN — SODIUM CHLORIDE, PRESERVATIVE FREE 10 ML: 5 INJECTION INTRAVENOUS at 21:20

## 2025-02-09 RX ADMIN — ISOPROTERENOL HYDROCHLORIDE 2 MCG/MIN: 0.2 INJECTION, SOLUTION INTRAVENOUS at 01:03

## 2025-02-09 RX ADMIN — IPRATROPIUM BROMIDE AND ALBUTEROL SULFATE 1 DOSE: 2.5; .5 SOLUTION RESPIRATORY (INHALATION) at 15:29

## 2025-02-09 RX ADMIN — MIDODRINE HYDROCHLORIDE 10 MG: 5 TABLET ORAL at 08:48

## 2025-02-09 RX ADMIN — MIDODRINE HYDROCHLORIDE 10 MG: 5 TABLET ORAL at 21:19

## 2025-02-09 RX ADMIN — BUPROPION HYDROCHLORIDE 75 MG: 75 TABLET, FILM COATED ORAL at 21:20

## 2025-02-09 RX ADMIN — HEPARIN SODIUM 4000 UNITS: 1000 INJECTION INTRAVENOUS; SUBCUTANEOUS at 05:21

## 2025-02-09 RX ADMIN — ASPIRIN 81 MG 81 MG: 81 TABLET ORAL at 08:49

## 2025-02-09 RX ADMIN — FAMOTIDINE 20 MG: 20 TABLET, FILM COATED ORAL at 08:49

## 2025-02-09 RX ADMIN — BUPROPION HYDROCHLORIDE 75 MG: 75 TABLET, FILM COATED ORAL at 08:49

## 2025-02-09 RX ADMIN — ALBUMIN (HUMAN) 25 G: 0.25 INJECTION, SOLUTION INTRAVENOUS at 14:42

## 2025-02-09 RX ADMIN — WHITE PETROLATUM,ZINC OXIDE: 57; 17 PASTE TOPICAL at 08:54

## 2025-02-09 RX ADMIN — WHITE PETROLATUM,ZINC OXIDE: 57; 17 PASTE TOPICAL at 21:20

## 2025-02-09 RX ADMIN — IPRATROPIUM BROMIDE AND ALBUTEROL SULFATE 1 DOSE: 2.5; .5 SOLUTION RESPIRATORY (INHALATION) at 19:20

## 2025-02-09 RX ADMIN — ISOPROTERENOL HYDROCHLORIDE 2.5 MCG/MIN: 0.2 INJECTION, SOLUTION INTRAVENOUS at 08:36

## 2025-02-09 RX ADMIN — HEPARIN SODIUM 2000 UNITS: 1000 INJECTION INTRAVENOUS; SUBCUTANEOUS at 17:45

## 2025-02-09 RX ADMIN — BUDESONIDE 500 MCG: 0.5 INHALANT ORAL at 19:20

## 2025-02-09 RX ADMIN — INSULIN LISPRO 1 UNITS: 100 INJECTION, SOLUTION INTRAVENOUS; SUBCUTANEOUS at 12:06

## 2025-02-09 RX ADMIN — BUDESONIDE 500 MCG: 0.5 INHALANT ORAL at 08:42

## 2025-02-09 RX ADMIN — DOPAMINE HYDROCHLORIDE IN DEXTROSE 7.5 MCG/KG/MIN: 3.2 INJECTION, SOLUTION INTRAVENOUS at 12:07

## 2025-02-09 RX ADMIN — INSULIN LISPRO 1 UNITS: 100 INJECTION, SOLUTION INTRAVENOUS; SUBCUTANEOUS at 17:40

## 2025-02-09 RX ADMIN — HEPARIN SODIUM 10 UNITS/KG/HR: 10000 INJECTION, SOLUTION INTRAVENOUS at 14:44

## 2025-02-09 RX ADMIN — VENLAFAXINE 37.5 MG: 25 TABLET ORAL at 08:49

## 2025-02-09 ASSESSMENT — PULMONARY FUNCTION TESTS
PIF_VALUE: 16
PIF_VALUE: 21
PIF_VALUE: 28
PIF_VALUE: 16
PIF_VALUE: 21
PIF_VALUE: 16
PIF_VALUE: 20
PIF_VALUE: 21
PIF_VALUE: 21
PIF_VALUE: 24
PIF_VALUE: 21
PIF_VALUE: 21
PIF_VALUE: 22
PIF_VALUE: 21
PIF_VALUE: 22
PIF_VALUE: 20
PIF_VALUE: 27
PIF_VALUE: 21
PIF_VALUE: 21
PIF_VALUE: 16
PIF_VALUE: 21
PIF_VALUE: 21
PIF_VALUE: 16
PIF_VALUE: 21
PIF_VALUE: 21
PIF_VALUE: 20

## 2025-02-09 ASSESSMENT — PAIN SCALES - GENERAL
PAINLEVEL_OUTOF10: 0
PAINLEVEL_OUTOF10: 0

## 2025-02-09 NOTE — PLAN OF CARE
PHYSICAL THERAPY TREATMENT     Patient: Nida Graves (49 y.o. female)  Date: 2/9/2025  Diagnosis: Atrial fibrillation, unspecified type (HCC) [I48.91]  Atrial flutter, unspecified type (HCC) [I48.92]  Congestive heart failure, unspecified HF chronicity, unspecified heart failure type (HCC) [I50.9]  Acute hypoxic respiratory failure [J96.01] Acute on chronic hypoxic respiratory failure  Procedure(s) (LRB):  Left heart cath / coronary angiography (N/A) 4 Days Post-Op  Precautions: Fall Risk, General Precautions                      Recommendations for nursing mobility: Encourage HEP in prep for ADLs/mobility; see handout for details, Frequent repositioning to prevent skin breakdown, and Assist x2    In place during session: Peripheral IV, Mid line, Bipap 40%, External Catheter, EKG/telemetry , and Pulse ox  Chart, physical therapy assessment, plan of care and goals were reviewed.  ASSESSMENT  Patient continues with skilled PT services and is slowly progressing towards goals. Pt semi supine upon PT arrival, agreeable to session. Pt A&O x 3. (See below for objective details and assist levels).     Overall pt tolerated session fair today with supine therex. Pt participated in supine therex with complaints. Daughter entered room during treatment, educated on therex to reduce risk for decline and promote strengthening. Further mobility deferred for safety as pt requires x2 skilled clinicians. Will continue to benefit from skilled PT services, and will continue to progress as tolerated. Current PT DC recommendation Moderate intensity short-term skilled physical therapy up to 5x/week once medically appropriate.     Start of Session End of Session   SPO2 (%) 96    Heart Rate (BPM) 86      GOALS:    Problem: Physical Therapy - Adult  Goal: By Discharge: Performs mobility at highest level of function for planned discharge setting.  See evaluation for individualized goals.  Description: FUNCTIONAL STATUS PRIOR TO                                                  Therapeutic Exercises:       EXERCISE   Sets   Reps   Active Active Assist   Passive Self ROM   Comments   Ankle Pumps 1 10 [x] [] [] []    Quad Sets/Glut Sets 1 10 [x] [] [] []    Hamstring Sets   [] [] [] []    Short Arc Quads   [] [] [] []    Heel Slides 1 10 [x] [] [] []    Straight Leg Raises   [] [] [] []    Hip abd/add 1 10 [x] [] [] []    Long Arc Quads   [] [] [] []    Marching   [] [] [] []    Seated HR/TR   [] [] [] []       [] [] [] []       Pain Ratin/10  reported  Pain Intervention(s):   pain is at a level acceptable to the patient    Activity Tolerance:   Fair     After treatment patient left in no apparent distress:   Bed locked and returned to lowest position, Patient left in no apparent distress in bed, Call bell within reach, Bed/ chair alarm activated, Caregiver / family present, and Side rails x3, and nsg updated     COMMUNICATION/COLLABORATION:   The patient’s plan of care was discussed with: Registered nurse    Patient Education  Education Given To: Patient;Family  Education Provided: Home Exercise Program  Education Method: Demonstration  Barriers to Learning: None  Education Outcome: Verbalized understanding;Continued education needed    Ashatni Moseley PTA  Minutes: 17

## 2025-02-09 NOTE — PROGRESS NOTES
CRITICAL CARE PROGRESS NOTE    Name: Nida Graves   : 1975   MRN: [id number]   Date: 2025      Diagnoses/problem list:   Acute hypoxic and hypercapnic respiratory failure  Acute kidney injury  Atrial flutter with slow ventricular response  GI bleed  Nosebleed, resolved  NSTEMI  Symptomatic bradycardia  Biventricular failure  Acute HFrEF, 25 to 30%  Diabetes  Morbid obesity  Acute metabolic/septic encephalopathy    24-hour events:   Heart rate stable on dopamine infusion.  No new complaints this morning.  Met with patient's daughter at bedside and CODE STATUS changed to DNR per patient's request.  Remains on MV via trach, 40% FiO2 and PEEP of 5.    Assessment and plan:   Ms. Graves is a 49-year-old female with PMH chronic debility and bedbound status, diabetes, asthma, obesity, who presented for shortness of breath.  Shortness of breath worsening over the past few days.  Upon presentation ED patient found to be hypoxic necessita.  Ting O2 via NC.  She was also found to be in A-fib with slow ventricular response.  She was transferred out of ICU on . Early morning  upgraded to ICU again due to severe hypoxia and hypercapnia requiring intubation. Now s/p trach on .    NEUROLOGICAL:    Continue Bupropion and Venlafaxine   Delirium precautions  PT/OT    PULMONOLOGY:   Extubated on , reintubated on  due to inability to protect airway  ENT placed trach on   Continue to wean FiO2 as tolerated  Pressure support and trach collar trials as tolerated     CARDIOVASCULAR:   Midodrine to keep MAP above 65   S/p AUGUSTIN/DCCV on   LVEF 20-30%, right ventricle overload, moderate TR   GDMT for HFrEF as tolerated  Diuresis as needed  [facility] this admission: nonobstructive CAD; luminal RCA, Lcx; 40% mLAD   Continue ASA and Statin   Cardiology recs noted   Isoproterenol and dopamine to keep heart rate above 40  We will try to wean isoproterenol  Avoid AVN blocking agents  Cardiology planning to do Micra

## 2025-02-09 NOTE — PROGRESS NOTES
CARDIOLOGY PROGRESS NOTE      Patient Name: Nida Graves  Age: 49 y.o.  Gender:female  :1975  MRN: [id number]    Patient seen and examined. This is a patient with a history of diabetes, asthma who presented with shortness of breath and hypoxia now being followed for atrial flutter and CHF.    Extubated , reintubated  d/t hypoxia. Now S/p trach 2025.      : Patient seen and examined.  Tele with pauses and complete heart block overnight. Started on isoproterenol and dopamine. Vitals stable now.    : Stable on dopamine and isoproterenol. HR 50-60.         Pertinent review of systems items noted above, all other systems are negative. Current medications reviewed..    Physical Examination    Allergies   Allergen Reactions    Latex Swelling    Penicillins Hives    Codeine Anxiety     Vitals:    25 1700   BP: 126/76   Pulse: 78   Resp: 18   Temp:    SpO2: 96%     Vital signs are stable  Trach, vent  Heart has a irregular rhythm, denita.  No murmur  Lung- are ventilator breaths  Abdomen is soft, nontender, normal bowel sounds.  Extremities have mild LE edema  Skin is dry and warm.    Labs reviewed:  Recent Results (from the past 12 hour(s))   APTT    Collection Time: 25 11:00 AM   Result Value Ref Range    APTT 91.9 (H) 21.2 - 34.1 sec    Therapeutic Range   82 - 109 sec   POCT Glucose    Collection Time: 25 11:04 AM   Result Value Ref Range    POC Glucose 230 (H) 65 - 100 mg/dL    Performed by: Jacobo Carlisle    Anti-Xa, Unfractionated Heparin    Collection Time: 25  5:00 PM   Result Value Ref Range    Heparin Xa,LMWH and Unfrac 0.66 IU/mL   POCT Glucose    Collection Time: 25  5:31 PM   Result Value Ref Range    POC Glucose 240 (H) 65 - 100 mg/dL    Performed by: Jacobo Carlisle     Impression and recommendations are as follows:  Atrial flutter/fib with slow ventricular rate, 40-60s,   HR acceptable, now on dopamine, isoproterenol being weaned off  AC previously

## 2025-02-09 NOTE — PROGRESS NOTES
Renal  Note    NAME:  Nida Graves   :   1975   MRN:   [id number]     ATTENDING: Suyapa Rea DO  PCP:  Luis Lees APRN - CNP    Date/Time:  2025 12:31 PM      Subjective:   REQUESTING PHYSICIAN:  REASON FOR CONSULT:    KIZZY    Patient seen in the ICU.  No significant improvement in her overall condition.  She is DNR now.  She remains on mechanical ventilation via trach collar.  40% FiO2 and PEEP of 5.  Still on dopamine infusion.  Creatinine is stable at 1.4 today  Lasix was held yesterday    Past Medical History:   Diagnosis Date    Allergy     Asthma     Diabetes (HCC)       Past Surgical History:   Procedure Laterality Date    CARDIAC PROCEDURE N/A 2025    Left heart cath / coronary angiography performed by Manuel Coburn MD at [facility] CARDIAC CATH LAB     SECTION      X 2    GYN      randee, csection scar revision    TRACHEOSTOMY N/A 2025    TRACHEOSTOMY PERCUTANEOUS performed by Jack Badillo MD at [facility] MAIN OR     Social History     Tobacco Use    Smoking status: Never    Smokeless tobacco: Never   Substance Use Topics    Alcohol use: No      Family History   Problem Relation Age of Onset    Cancer Other         aunt with colon ca    Stroke Neg Hx     Heart Attack Neg Hx     Hypertension Father     Cancer Father         neck ca    Thyroid Disease Mother     Diabetes Mother     Hypertension Mother     Cancer Mother         cervical ca       Allergies   Allergen Reactions    Latex Swelling    Penicillins Hives    Codeine Anxiety      Prior to Admission medications    Medication Sig Start Date End Date Taking? Authorizing Provider   buPROPion (WELLBUTRIN XL) 150 MG extended release tablet Take 1 tablet by mouth daily   Yes Provider, MD Mary Ellen   traZODone (DESYREL) 50 MG tablet Take 1 tablet by mouth daily 11/10/24  Yes Provider, MD Mary Ellen   DULoxetine (CYMBALTA) 30 MG extended release capsule Take 1 capsule by mouth daily 25  Yes Provider, Historical,

## 2025-02-10 ENCOUNTER — ANESTHESIA EVENT (OUTPATIENT)
Facility: HOSPITAL | Age: 50
End: 2025-02-10
Payer: COMMERCIAL

## 2025-02-10 ENCOUNTER — APPOINTMENT (OUTPATIENT)
Facility: HOSPITAL | Age: 50
DRG: 004 | End: 2025-02-10
Payer: COMMERCIAL

## 2025-02-10 LAB
ANION GAP SERPL CALC-SCNC: 5 MMOL/L (ref 2–12)
APTT PPP: 112.5 SEC (ref 21.2–34.1)
BUN SERPL-MCNC: 34 MG/DL (ref 6–20)
BUN/CREAT SERPL: 27 (ref 12–20)
CA-I BLD-MCNC: 10.2 MG/DL (ref 8.5–10.1)
CA-I BLD-MCNC: 10.4 MG/DL (ref 8.5–10.1)
CHLORIDE SERPL-SCNC: 105 MMOL/L (ref 97–108)
CO2 SERPL-SCNC: 29 MMOL/L (ref 21–32)
CREAT SERPL-MCNC: 1.28 MG/DL (ref 0.55–1.02)
ERYTHROCYTE [DISTWIDTH] IN BLOOD BY AUTOMATED COUNT: 16.3 % (ref 11.5–14.5)
FERRITIN SERPL-MCNC: 158 NG/ML (ref 26–388)
FOLATE SERPL-MCNC: 3.9 NG/ML (ref 5–21)
GLUCOSE BLD STRIP.AUTO-MCNC: 170 MG/DL (ref 65–100)
GLUCOSE BLD STRIP.AUTO-MCNC: 193 MG/DL (ref 65–100)
GLUCOSE BLD STRIP.AUTO-MCNC: 251 MG/DL (ref 65–100)
GLUCOSE SERPL-MCNC: 194 MG/DL (ref 65–100)
HCT VFR BLD AUTO: 22.3 % (ref 35–47)
HCT VFR BLD AUTO: 22.4 % (ref 35–47)
HCT VFR BLD AUTO: 23.6 % (ref 35–47)
HGB BLD-MCNC: 6.6 G/DL (ref 11.5–16)
HGB BLD-MCNC: 6.6 G/DL (ref 11.5–16)
HGB BLD-MCNC: 6.9 G/DL (ref 11.5–16)
IRON SATN MFR SERPL: 4 % (ref 20–50)
IRON SERPL-MCNC: 9 UG/DL (ref 35–150)
MCH RBC QN AUTO: 27.8 PG (ref 26–34)
MCHC RBC AUTO-ENTMCNC: 29.5 G/DL (ref 30–36.5)
MCV RBC AUTO: 94.5 FL (ref 80–99)
NRBC # BLD: 0 K/UL (ref 0–0.01)
NRBC BLD-RTO: 0 PER 100 WBC
PERFORMED BY:: ABNORMAL
PLATELET # BLD AUTO: 201 K/UL (ref 150–400)
PMV BLD AUTO: 10.1 FL (ref 8.9–12.9)
POTASSIUM SERPL-SCNC: 5 MMOL/L (ref 3.5–5.1)
PTH-INTACT SERPL-MCNC: 103.8 PG/ML (ref 18.4–88)
RBC # BLD AUTO: 2.37 M/UL (ref 3.8–5.2)
SODIUM SERPL-SCNC: 139 MMOL/L (ref 136–145)
THERAPEUTIC RANGE: ABNORMAL SEC (ref 82–109)
TIBC SERPL-MCNC: 230 UG/DL (ref 250–450)
UFH PPP CHRO-ACNC: 0.38 IU/ML
UFH PPP CHRO-ACNC: 0.43 IU/ML
UFH PPP CHRO-ACNC: 0.87 IU/ML
VIT B12 SERPL-MCNC: 789 PG/ML (ref 193–986)
WBC # BLD AUTO: 10.9 K/UL (ref 3.6–11)

## 2025-02-10 PROCEDURE — 85027 COMPLETE CBC AUTOMATED: CPT

## 2025-02-10 PROCEDURE — 6360000002 HC RX W HCPCS: Performed by: INTERNAL MEDICINE

## 2025-02-10 PROCEDURE — 94003 VENT MGMT INPAT SUBQ DAY: CPT

## 2025-02-10 PROCEDURE — 85520 HEPARIN ASSAY: CPT

## 2025-02-10 PROCEDURE — 2500000003 HC RX 250 WO HCPCS: Performed by: NURSE PRACTITIONER

## 2025-02-10 PROCEDURE — 99232 SBSQ HOSP IP/OBS MODERATE 35: CPT

## 2025-02-10 PROCEDURE — 94761 N-INVAS EAR/PLS OXIMETRY MLT: CPT

## 2025-02-10 PROCEDURE — 86901 BLOOD TYPING SEROLOGIC RH(D): CPT

## 2025-02-10 PROCEDURE — 83970 ASSAY OF PARATHORMONE: CPT

## 2025-02-10 PROCEDURE — 36430 TRANSFUSION BLD/BLD COMPNT: CPT

## 2025-02-10 PROCEDURE — 86850 RBC ANTIBODY SCREEN: CPT

## 2025-02-10 PROCEDURE — 85730 THROMBOPLASTIN TIME PARTIAL: CPT

## 2025-02-10 PROCEDURE — 2580000003 HC RX 258: Performed by: NURSE PRACTITIONER

## 2025-02-10 PROCEDURE — 2000000000 HC ICU R&B

## 2025-02-10 PROCEDURE — 6370000000 HC RX 637 (ALT 250 FOR IP): Performed by: NURSE PRACTITIONER

## 2025-02-10 PROCEDURE — 36415 COLL VENOUS BLD VENIPUNCTURE: CPT

## 2025-02-10 PROCEDURE — 82728 ASSAY OF FERRITIN: CPT

## 2025-02-10 PROCEDURE — 2500000003 HC RX 250 WO HCPCS: Performed by: STUDENT IN AN ORGANIZED HEALTH CARE EDUCATION/TRAINING PROGRAM

## 2025-02-10 PROCEDURE — 83540 ASSAY OF IRON: CPT

## 2025-02-10 PROCEDURE — 2580000003 HC RX 258: Performed by: INTERNAL MEDICINE

## 2025-02-10 PROCEDURE — 82962 GLUCOSE BLOOD TEST: CPT

## 2025-02-10 PROCEDURE — 6370000000 HC RX 637 (ALT 250 FOR IP): Performed by: INTERNAL MEDICINE

## 2025-02-10 PROCEDURE — P9047 ALBUMIN (HUMAN), 25%, 50ML: HCPCS | Performed by: INTERNAL MEDICINE

## 2025-02-10 PROCEDURE — 86900 BLOOD TYPING SEROLOGIC ABO: CPT

## 2025-02-10 PROCEDURE — 6370000000 HC RX 637 (ALT 250 FOR IP): Performed by: STUDENT IN AN ORGANIZED HEALTH CARE EDUCATION/TRAINING PROGRAM

## 2025-02-10 PROCEDURE — 6360000002 HC RX W HCPCS: Performed by: STUDENT IN AN ORGANIZED HEALTH CARE EDUCATION/TRAINING PROGRAM

## 2025-02-10 PROCEDURE — 85018 HEMOGLOBIN: CPT

## 2025-02-10 PROCEDURE — 85014 HEMATOCRIT: CPT

## 2025-02-10 PROCEDURE — 71045 X-RAY EXAM CHEST 1 VIEW: CPT

## 2025-02-10 PROCEDURE — 86923 COMPATIBILITY TEST ELECTRIC: CPT

## 2025-02-10 PROCEDURE — 82607 VITAMIN B-12: CPT

## 2025-02-10 PROCEDURE — P9016 RBC LEUKOCYTES REDUCED: HCPCS

## 2025-02-10 PROCEDURE — 94640 AIRWAY INHALATION TREATMENT: CPT

## 2025-02-10 PROCEDURE — 2700000000 HC OXYGEN THERAPY PER DAY

## 2025-02-10 PROCEDURE — 82746 ASSAY OF FOLIC ACID SERUM: CPT

## 2025-02-10 PROCEDURE — 80048 BASIC METABOLIC PNL TOTAL CA: CPT

## 2025-02-10 RX ORDER — HEPARIN SODIUM 10000 [USP'U]/100ML
5-30 INJECTION, SOLUTION INTRAVENOUS CONTINUOUS
Status: DISCONTINUED | OUTPATIENT
Start: 2025-02-10 | End: 2025-02-10

## 2025-02-10 RX ORDER — SODIUM CHLORIDE 9 MG/ML
INJECTION, SOLUTION INTRAVENOUS PRN
Status: DISCONTINUED | OUTPATIENT
Start: 2025-02-10 | End: 2025-02-12

## 2025-02-10 RX ORDER — PANTOPRAZOLE SODIUM 40 MG/10ML
40 INJECTION, POWDER, LYOPHILIZED, FOR SOLUTION INTRAVENOUS 2 TIMES DAILY
Status: DISCONTINUED | OUTPATIENT
Start: 2025-02-10 | End: 2025-02-14

## 2025-02-10 RX ORDER — POLYETHYLENE GLYCOL 3350 17 G/17G
17 POWDER, FOR SOLUTION ORAL DAILY PRN
Status: DISCONTINUED | OUTPATIENT
Start: 2025-02-10 | End: 2025-02-13

## 2025-02-10 RX ORDER — PROTAMINE SULFATE 10 MG/ML
50 INJECTION, SOLUTION INTRAVENOUS ONCE
Status: COMPLETED | OUTPATIENT
Start: 2025-02-10 | End: 2025-02-10

## 2025-02-10 RX ORDER — INSULIN GLARGINE 100 [IU]/ML
5 INJECTION, SOLUTION SUBCUTANEOUS DAILY
Status: DISCONTINUED | OUTPATIENT
Start: 2025-02-10 | End: 2025-02-17

## 2025-02-10 RX ADMIN — MIDODRINE HYDROCHLORIDE 10 MG: 5 TABLET ORAL at 09:03

## 2025-02-10 RX ADMIN — FAMOTIDINE 20 MG: 20 TABLET, FILM COATED ORAL at 09:03

## 2025-02-10 RX ADMIN — Medication 3 MG: at 20:12

## 2025-02-10 RX ADMIN — EPINEPHRINE 1 MCG/MIN: 1 INJECTION INTRAMUSCULAR; INTRAVENOUS; SUBCUTANEOUS at 12:41

## 2025-02-10 RX ADMIN — IPRATROPIUM BROMIDE AND ALBUTEROL SULFATE 1 DOSE: 2.5; .5 SOLUTION RESPIRATORY (INHALATION) at 15:26

## 2025-02-10 RX ADMIN — ATORVASTATIN CALCIUM 20 MG: 20 TABLET, FILM COATED ORAL at 20:12

## 2025-02-10 RX ADMIN — SODIUM CHLORIDE, PRESERVATIVE FREE 10 ML: 5 INJECTION INTRAVENOUS at 20:01

## 2025-02-10 RX ADMIN — WHITE PETROLATUM,ZINC OXIDE: 57; 17 PASTE TOPICAL at 20:02

## 2025-02-10 RX ADMIN — BUPROPION HYDROCHLORIDE 75 MG: 75 TABLET, FILM COATED ORAL at 09:03

## 2025-02-10 RX ADMIN — BUPROPION HYDROCHLORIDE 75 MG: 75 TABLET, FILM COATED ORAL at 20:12

## 2025-02-10 RX ADMIN — INSULIN GLARGINE 5 UNITS: 100 INJECTION, SOLUTION SUBCUTANEOUS at 14:59

## 2025-02-10 RX ADMIN — ASPIRIN 81 MG 81 MG: 81 TABLET ORAL at 09:12

## 2025-02-10 RX ADMIN — ISOPROTERENOL HYDROCHLORIDE 2 MCG/MIN: 0.2 INJECTION, SOLUTION INTRAVENOUS at 01:02

## 2025-02-10 RX ADMIN — WHITE PETROLATUM,ZINC OXIDE: 57; 17 PASTE TOPICAL at 10:54

## 2025-02-10 RX ADMIN — IPRATROPIUM BROMIDE AND ALBUTEROL SULFATE 1 DOSE: 2.5; .5 SOLUTION RESPIRATORY (INHALATION) at 07:43

## 2025-02-10 RX ADMIN — MIDODRINE HYDROCHLORIDE 10 MG: 5 TABLET ORAL at 20:12

## 2025-02-10 RX ADMIN — PANTOPRAZOLE SODIUM 40 MG: 40 INJECTION, POWDER, FOR SOLUTION INTRAVENOUS at 20:12

## 2025-02-10 RX ADMIN — SODIUM CHLORIDE, PRESERVATIVE FREE 10 ML: 5 INJECTION INTRAVENOUS at 09:03

## 2025-02-10 RX ADMIN — PANTOPRAZOLE SODIUM 40 MG: 40 INJECTION, POWDER, FOR SOLUTION INTRAVENOUS at 14:27

## 2025-02-10 RX ADMIN — IPRATROPIUM BROMIDE AND ALBUTEROL SULFATE 1 DOSE: 2.5; .5 SOLUTION RESPIRATORY (INHALATION) at 19:22

## 2025-02-10 RX ADMIN — HEPARIN SODIUM 10 UNITS/KG/HR: 10000 INJECTION, SOLUTION INTRAVENOUS at 06:19

## 2025-02-10 RX ADMIN — BUDESONIDE 500 MCG: 0.5 INHALANT ORAL at 07:43

## 2025-02-10 RX ADMIN — INSULIN LISPRO 2 UNITS: 100 INJECTION, SOLUTION INTRAVENOUS; SUBCUTANEOUS at 18:08

## 2025-02-10 RX ADMIN — MIDODRINE HYDROCHLORIDE 10 MG: 5 TABLET ORAL at 15:00

## 2025-02-10 RX ADMIN — ALBUMIN (HUMAN) 25 G: 0.25 INJECTION, SOLUTION INTRAVENOUS at 06:20

## 2025-02-10 RX ADMIN — PROTAMINE SULFATE 50 MG: 10 INJECTION, SOLUTION INTRAVENOUS at 18:07

## 2025-02-10 RX ADMIN — VENLAFAXINE 37.5 MG: 25 TABLET ORAL at 18:08

## 2025-02-10 RX ADMIN — BUDESONIDE 500 MCG: 0.5 INHALANT ORAL at 19:22

## 2025-02-10 RX ADMIN — VENLAFAXINE 37.5 MG: 25 TABLET ORAL at 09:03

## 2025-02-10 ASSESSMENT — ENCOUNTER SYMPTOMS: SHORTNESS OF BREATH: 1

## 2025-02-10 ASSESSMENT — PULMONARY FUNCTION TESTS
PIF_VALUE: 17
PIF_VALUE: 22
PIF_VALUE: 21
PIF_VALUE: 16
PIF_VALUE: 12
PIF_VALUE: 27
PIF_VALUE: 23
PIF_VALUE: 20
PIF_VALUE: 21
PIF_VALUE: 19
PIF_VALUE: 19
PIF_VALUE: 23
PIF_VALUE: 21
PIF_VALUE: 21
PIF_VALUE: 24
PIF_VALUE: 28
PIF_VALUE: 21
PIF_VALUE: 20
PIF_VALUE: 22
PIF_VALUE: 23
PIF_VALUE: 21
PIF_VALUE: 21
PIF_VALUE: 22
PIF_VALUE: 20
PIF_VALUE: 19
PIF_VALUE: 21
PIF_VALUE: 22
PIF_VALUE: 21
PIF_VALUE: 18
PIF_VALUE: 20
PIF_VALUE: 21
PIF_VALUE: 25
PIF_VALUE: 22
PIF_VALUE: 20
PIF_VALUE: 22
PIF_VALUE: 21
PIF_VALUE: 20
PIF_VALUE: 21
PIF_VALUE: 21
PIF_VALUE: 20
PIF_VALUE: 16
PIF_VALUE: 18
PIF_VALUE: 18
PIF_VALUE: 25
PIF_VALUE: 22
PIF_VALUE: 23
PIF_VALUE: 21
PIF_VALUE: 23
PIF_VALUE: 21
PIF_VALUE: 18
PIF_VALUE: 21
PIF_VALUE: 21
PIF_VALUE: 20
PIF_VALUE: 21
PIF_VALUE: 20
PIF_VALUE: 20
PIF_VALUE: 18
PIF_VALUE: 19
PIF_VALUE: 17
PIF_VALUE: 20
PIF_VALUE: 21
PIF_VALUE: 23

## 2025-02-10 ASSESSMENT — PAIN SCALES - GENERAL
PAINLEVEL_OUTOF10: 0
PAINLEVEL_OUTOF10: 0

## 2025-02-10 NOTE — PROGRESS NOTES
OT tx attempted at 1440 however RN requested to hold tx this date. Will continue to follow and re-attempt OT at a later time. Thank you.

## 2025-02-10 NOTE — PROGRESS NOTES
Renal  Note    NAME:  Nida Graves   :   1975   MRN:   [id number]     ATTENDING: Suyapa Rea DO  PCP:  Luis Lees APRN - CNP    Date/Time:  2/10/2025 12:37 PM      Subjective:   REQUESTING PHYSICIAN:  REASON FOR CONSULT:    KIZZY    Patient seen in the ICU.  No significant improvement in her overall condition.  She is DNR now.  She remains on mechanical ventilation via trach collar.  40% FiO2 and PEEP of 5.  Still on dopamine infusion.  Creatinine has improved to 1.28 today  Lasix was held .    Past Medical History:   Diagnosis Date    Allergy     Asthma     Diabetes (HCC)       Past Surgical History:   Procedure Laterality Date    CARDIAC PROCEDURE N/A 2025    Left heart cath / coronary angiography performed by Manuel Coburn MD at [facility] CARDIAC CATH LAB     SECTION      X 2    GYN      randee, csection scar revision    TRACHEOSTOMY N/A 2025    TRACHEOSTOMY PERCUTANEOUS performed by Jack Badillo MD at [facility] MAIN OR     Social History     Tobacco Use    Smoking status: Never    Smokeless tobacco: Never   Substance Use Topics    Alcohol use: No      Family History   Problem Relation Age of Onset    Cancer Other         aunt with colon ca    Stroke Neg Hx     Heart Attack Neg Hx     Hypertension Father     Cancer Father         neck ca    Thyroid Disease Mother     Diabetes Mother     Hypertension Mother     Cancer Mother         cervical ca       Allergies   Allergen Reactions    Latex Swelling    Penicillins Hives    Codeine Anxiety      Prior to Admission medications    Medication Sig Start Date End Date Taking? Authorizing Provider   buPROPion (WELLBUTRIN XL) 150 MG extended release tablet Take 1 tablet by mouth daily   Yes Mary Ellen Leach MD   traZODone (DESYREL) 50 MG tablet Take 1 tablet by mouth daily 11/10/24  Yes Mary Ellen Leach MD   DULoxetine (CYMBALTA) 30 MG extended release capsule Take 1 capsule by mouth daily 25  Yes ProviderMary Ellen MD    WBC  --  10.9 11.4* 8.7   HGB 6.9* 6.6* 7.1* 7.2*   HCT 23.6* 22.4* 23.8* 23.8*   PLT  --  201 253 240     Recent Labs     02/10/25  0400 02/09/25  0200 02/08/25  1900 02/08/25  0211 02/07/25  0810 02/07/25  0501 02/07/25  0339 02/06/25  0316 02/05/25  1400 02/05/25  0423    139 138   < >  --  139 138 139  --  138   K 5.0 4.7 4.8   < >  --  4.6 Hemolyzed, Recollection Recommended 4.6  --  4.1    104 104   < >  --  104 105 107  --  109*   CO2 29 28 29   < >  --  31 28 27  --  28   BUN 34* 35* 36*   < >  --  30* 30* 28*  --  27*   CREATININE 1.28* 1.41* 1.37*   < >  --  1.03* 1.06* 0.75  --  0.74   MG  --   --   --   --   --   --  2.1 2.0  --  2.1   PHOS  --   --   --   --   --   --  4.2 2.6  --  3.2   ALT  --   --   --   --   --  54 54  --   --  50   INR  --   --  1.1  --  1.2*  --   --   --  1.1  --     < > = values in this interval not displayed.     Invalid input(s): \"GLPOC\"  No results for input(s): \"PH\", \"PCO2\", \"PO2\", \"HCO3\", \"FIO2\" in the last 720 hours.  Recent Labs     02/08/25  1900 02/07/25  0810 02/05/25  1400   INR 1.1 1.2* 1.1       Recommendations/Plan:     #1 acute kidney injury  -Creatinine improved from 1.4 -_1.2 today  -No baseline history of CKD.  Creatinine was 0.7 on 2/6  -CKD likely prerenal secondary to hypotension/ Lasix /hypercalcemia.  Blood pressure was in the low 80s on 2/7  -Lasix has been held which I will continue to hold.    -No significant volume overload but she has edema extremities.  Likely from third spacing from severe hypoalbuminemia.  Albumin is only 1.8 . on IV albumin  -No IV contrast over the past 48 to 72 hours  -I have gone through medication list.  Not on any potential nephrotoxins  -Urine is bland will quantify proteinuria  -Follow-up on renal function in a.m.    #2  Hyperkalemia  -Potassium 5.4--> 5.0 today  s/p Lokelma   -Likely because of KIZZY  -Not on any potassium supplements  -On promote tube feeding.  Would change to Nepro if hypokalemia

## 2025-02-10 NOTE — CARE COORDINATION
CM reviewed Pt medicals, Pt lives with her  and Nephew.     Pt  assist Pt with ADL.          CM sent updated medicals to [facility].     Per IDR    Pt will get a leadless Pacer on Wednesday.     Once Pt gets the Pacer then she will be stable enough to get PEG tube.    Pt made herself a DNR yesterday.

## 2025-02-10 NOTE — CONSULTS
discussion     ASSESSMENT AND PLAN:     Follow up visit today in conjunction with palliative LCSW, Margo Gaitan, tony goals of care and talk through interval events since last palliative encounter -- chart reviewed thoroughly prior to seeing pt; including notes, labs, imaging, interval events since last palliative encounter. No family at bedside.   Pt s/p trach, been tolerating PSV on vent and off sedation. Nursing shares she has been intermittently lethargic, our team assessed midmorning and she was actually able to interact with our team, have conversations in regard to current state, able to nod head yes/no and mouth words appropriately. Appeared to follow conversation although not able to fully participate on her own independently, is too weak/debilitated to write.   Goals of care --   Discussed interval events with pt. As above, she is able to follow conversation. Talked through her current plan of care, what she is needing right now, and all of her workup completed by interdisciplinary teams. She understands she has been in the hospital for quite some time, knows she has been through a lot, needing ICU care for having multiple critically ill conditions. Still remains high risk for decompensation. She nods in understanding.   Did talk through anticipated next steps as well, as of now plan for leadless pacemaker placement in next day or two as long as she remains stable. Also will need PEG placement for long term artificial nutrition.  Once again, inquired how she felt about these procedures and all of the interventions we are doing now for her. Also inquired her thoughts on going to a long term care facility with hopes of weaning vent over time, participating in rehab to strengthen. She nods her head several times through conversation as well as mouths words appropriately. She would like to continue with current plan for procedures and optimize her prior to discharge to facility as long as she remains  their loved one:   [] Yes /  [x] No /  [] No Caregiver Present/Available [] No Caregiver [] Pt Lives at Facility  If \"Yes\" to discuss with social work during IDT    Anticipatory grief assessment:   [x] Normal  / [] Maladaptive     If \"Maladaptive\" to discuss with social work during IDT    ESAS Anxiety: Anxiety Score: 3    ESAS Depression: Depression Score: Not depressed        LAB AND IMAGING FINDINGS:   Objective data reviewed:  labs, images, records, medication use, vitals, and chart     FINAL COMMENTS   Thank you for allowing Palliative Medicine to participate in the care of Nida Graves.    Only check if applicable and billing time based rather than MDM  [x] The total encounter time on this service date was __35__ minutes which was spent performing a face-to-face encounter and personally completing the provider-level activities documented in the note. This includes time spent prior to the visit and after the visit in direct care of the patient. This time does not include time spent in any separately reportable services.    Electronically signed by   GINNY Chaparro CNP  Palliative Care Team  on 2/10/2025 at 2:07 PM

## 2025-02-10 NOTE — PROGRESS NOTES
Palliative Medicine Social Work Note      Palliative Medicine (NP Deyvi, LCSW Margo) made supportive visit to pt.  No family currently present.   Pt has trach and NG in place, was able to communicate to a limited degree by mouthing words, indicated agreement with plan for peg tube though did not elaborate on her responses.  Pt was noted to have significant amount of liquid stool on chux; RN was notified.  Will continue to follow for support.     Thank you for including Palliative Medicine in the care of Ms. Graves.       Margo Walter, TYSON, [facility]-SW  Palliative Medicine:  [phone])

## 2025-02-10 NOTE — PROGRESS NOTES
CARDIOLOGY PROGRESS NOTE      Patient Name: Nida Graves  Age: 49 y.o.  Gender:female  :1975  MRN: [id number]    Patient seen and examined. This is a patient with a history of diabetes, asthma who presented with shortness of breath and hypoxia now being followed for atrial flutter and CHF.     Extubated , reintubated  d/t hypoxia. Now S/p trach 2025.       Patient seen and examined. Awake, following commands. Weaned off dopamine and isoproterenol. Remains in sinus rhythm.       Pertinent review of systems items noted above, all other systems are negative. Current medications reviewed..    Physical Examination    Allergies   Allergen Reactions    Latex Swelling    Penicillins Hives    Codeine Anxiety     Vitals:    02/10/25 1315   BP: 115/74   Pulse: 73   Resp: 16   Temp:    SpO2: 94%     Vital signs are stable  Trach, vent  Heart has a RRR.  No murmur  Lung- are ventilator breaths  Abdomen is soft, nontender, normal bowel sounds.  Extremities have mild LE edema  Skin is dry and warm.    Labs reviewed:  Recent Results (from the past 12 hour(s))   CBC    Collection Time: 02/10/25  4:00 AM   Result Value Ref Range    WBC 10.9 3.6 - 11.0 K/uL    RBC 2.37 (L) 3.80 - 5.20 M/uL    Hemoglobin 6.6 (L) 11.5 - 16.0 g/dL    Hematocrit 22.4 (L) 35.0 - 47.0 %    MCV 94.5 80.0 - 99.0 FL    MCH 27.8 26.0 - 34.0 PG    MCHC 29.5 (L) 30.0 - 36.5 g/dL    RDW 16.3 (H) 11.5 - 14.5 %    Platelets 201 150 - 400 K/uL    MPV 10.1 8.9 - 12.9 FL    Nucleated RBCs 0.0 0.0  WBC    nRBC 0.00 0.00 - 0.01 K/uL   Basic Metabolic Panel    Collection Time: 02/10/25  4:00 AM   Result Value Ref Range    Sodium 139 136 - 145 mmol/L    Potassium 5.0 3.5 - 5.1 mmol/L    Chloride 105 97 - 108 mmol/L    CO2 29 21 - 32 mmol/L    Anion Gap 5 2 - 12 mmol/L    Glucose 194 (H) 65 - 100 mg/dL    BUN 34 (H) 6 - 20 mg/dL    Creatinine 1.28 (H) 0.55 - 1.02 mg/dL    BUN/Creatinine Ratio 27 (H) 12 - 20      Est, Glom Filt Rate 51 (L)

## 2025-02-10 NOTE — CONSULTS
Gastroenterology Consult Note        Patient: Nida Graves MRN: [id number]  SSN: [id number]    YOB: 1975  Age: 49 y.o.  Sex: female      Subjective:      Nida Graves is a 49 y.o. female who is being seen for dysphagia, patient not able to give much history.    49-year-old female with PMH chronic debility and bedbound status, diabetes, asthma, obesity, who presented for shortness of breath the past few days.  .  She was also found to be in A-fib with slow ventricular response.  She was transferred out of ICU on . Early morning  upgraded to ICU again due to severe hypoxia and hypercapnia requiring intubation. Now s/p trach on .  Has been on NG tube feeding for days, GI consult placed,         Past Medical History:   Diagnosis Date    Allergy     Asthma     Diabetes (HCC)      Past Surgical History:   Procedure Laterality Date    CARDIAC PROCEDURE N/A 2025    Left heart cath / coronary angiography performed by Manuel Coburn MD at [facility] CARDIAC CATH LAB     SECTION      X 2    GYN      novasure, csection scar revision    TRACHEOSTOMY N/A 2025    TRACHEOSTOMY PERCUTANEOUS performed by Jack Badillo MD at [facility] MAIN OR      Family History   Problem Relation Age of Onset    Cancer Other         aunt with colon ca    Stroke Neg Hx     Heart Attack Neg Hx     Hypertension Father     Cancer Father         neck ca    Thyroid Disease Mother     Diabetes Mother     Hypertension Mother     Cancer Mother         cervical ca     Social History     Tobacco Use    Smoking status: Never    Smokeless tobacco: Never   Substance Use Topics    Alcohol use: No      Current Facility-Administered Medications   Medication Dose Route Frequency Provider Last Rate Last Admin    0.9 % sodium chloride infusion   IntraVENous PRN Ted Fletcher, APRN - NP        heparin 25,000 units in dextrose 5% 250 mL (premix) infusion  5-30 Units/kg/hr IntraVENous Continuous Marylin Salvador MD

## 2025-02-10 NOTE — PROGRESS NOTES
CRITICAL CARE PROGRESS NOTE    Name: Nida Graves   : 1975   MRN: [id number]   Date: 2/10/2025      Diagnoses/problem list:   Acute hypoxic and hypercapnic respiratory failure  Acute kidney injury  Atrial flutter with slow ventricular response  GI bleed  Nosebleed, resolved  NSTEMI  Symptomatic bradycardia  Biventricular failure  Acute HFrEF, 25 to 30%  Diabetes  Morbid obesity  Acute metabolic/septic encephalopathy    24-hour events:   Remains on MV via trach, FiO2 increased to 50%, PEEP is still at 5.  Chest X-ray shows mild recent congestion.  Weaned off of isoproterenol and dopamine this morning.  Heart rate stable in 50s to 60s range.    Assessment and plan:   Ms. Graves is a 49-year-old female with PMH chronic debility and bedbound status, diabetes, asthma, obesity, who presented for shortness of breath.  Shortness of breath worsening over the past few days.  Upon presentation ED patient found to be hypoxic necessita.  Ting O2 via NC.  She was also found to be in A-fib with slow ventricular response.  She was transferred out of ICU on . Early morning  upgraded to ICU again due to severe hypoxia and hypercapnia requiring intubation. Now s/p trach on .    NEUROLOGICAL:    Continue Bupropion and Venlafaxine   Delirium precautions  PT/OT    PULMONOLOGY:   Extubated on , reintubated on  due to inability to protect airway  ENT placed trach on   Continue to wean FiO2 as tolerated  Pressure support and trach collar trials as tolerated     CARDIOVASCULAR:   Midodrine to keep MAP above 65   S/p AUGUSTIN/DCCV on   LVEF 20-30%, right ventricle overload, moderate TR   GDMT for HFrEF as tolerated  Diuresis as needed  [facility] this admission: nonobstructive CAD; luminal RCA, Lcx; 40% mLAD   Continue ASA and Statin   Cardiology recs noted   Epinephrine as needed to keep heart rate above 40  Avoid AVN blocking agents  Cardiology planning to do leadless pacer on Wednesday    GASTROINTESTINAL:   Noted  Keep scheduled follow up appointment and follow up as needed   to have dark stools on 1/22. Occult stool positive for blood. CTA abdomen/pelvis without evidence of bleeding  Bowel regimen  Continue tube feeds as tolerated   GI tentatively planning for PEG placement on Tuesday    RENAL/ELECTROLYTE:   Creatinine improving now, continue to hold Lasix  Nephrology consulted, follow recs  Follow-up urine lytes  Monitor UOP  Correct electrolytes  Multiple fallopian/uterine cysts which makes bladder scan difficult for interpretation    ENDOCRINE:   Insulin sliding scale  Keep blood glucose 140-180    HEMATOLOGY/ONCOLOGY:   Eliquis for A-fib on hold for PEG placement   Will continue heparin gtt for Afib/flutter  Hgb slowly trending down, no obvious signs of bleeding  Transfuse to keep hemoglobin above 7    ID/MICRO:   Initially completed a course of Vanco and Zosyn for CAP  Completed 7 day course of Cefepime on 1/27 for possible HAP  MRSA PCR negative on 1/28    ICU DAILY CHECKLIST     Code Status: DNR  DVT Prophylaxis: Heparin gtt  SUP: Pepcid   T/L/D: PIV  Diet: Tube feeds  Activity Level: Bedrest  ABCDEF Bundle/Checklist Completed: Yes  Disposition: Stay in ICU  Multidisciplinary Rounds Completed:  Yes    OBJECTIVE:     Blood pressure 115/74, pulse 73, temperature 99.3 °F (37.4 °C), resp. rate 16, height 1.702 m (5' 7.01\"), weight 101.2 kg (223 lb 1.7 oz), SpO2 94%.  Physical Exam  Gen: On MV via trach  HEENT: NC/AT, supple  Cardiac: Sinus bradycardia  Pulm: Diminished breath sounds in bases  ABD: Soft, mildly distended, non tender  Extremities: Mild LE edema noted  Neuro: Following commands    Labs and Data: Reviewed 02/10/25  Medications: Reviewed 02/10/25  Imaging: Reviewed 02/10/25    Intake/Output:     Intake/Output Summary (Last 24 hours) at 2/10/2025 1348  Last data filed at 2/10/2025 1240  Gross per 24 hour   Intake 3492.89 ml   Output 1375 ml   Net 2117.89 ml       CRITICAL CARE DOCUMENTATION  I had a face to face encounter with the patient, reviewed and interpreted patient

## 2025-02-11 ENCOUNTER — ANESTHESIA (OUTPATIENT)
Facility: HOSPITAL | Age: 50
End: 2025-02-11
Payer: COMMERCIAL

## 2025-02-11 LAB
ABO + RH BLD: NORMAL
ALBUMIN SERPL-MCNC: 3.3 G/DL (ref 3.5–5)
ANION GAP SERPL CALC-SCNC: 4 MMOL/L (ref 2–12)
BLD PROD TYP BPU: NORMAL
BLD PROD TYP BPU: NORMAL
BLOOD BANK BLOOD PRODUCT EXPIRATION DATE: NORMAL
BLOOD BANK BLOOD PRODUCT EXPIRATION DATE: NORMAL
BLOOD BANK DISPENSE STATUS: NORMAL
BLOOD BANK DISPENSE STATUS: NORMAL
BLOOD BANK ISBT PRODUCT BLOOD TYPE: 5100
BLOOD BANK ISBT PRODUCT BLOOD TYPE: 5100
BLOOD BANK PRODUCT CODE: NORMAL
BLOOD BANK PRODUCT CODE: NORMAL
BLOOD BANK UNIT TYPE AND RH: NORMAL
BLOOD BANK UNIT TYPE AND RH: NORMAL
BLOOD GROUP ANTIBODIES SERPL: NEGATIVE
BPU ID: NORMAL
BPU ID: NORMAL
BUN SERPL-MCNC: 49 MG/DL (ref 6–20)
BUN/CREAT SERPL: 32 (ref 12–20)
CA-I BLD-MCNC: 10 MG/DL (ref 8.5–10.1)
CHLORIDE SERPL-SCNC: 105 MMOL/L (ref 97–108)
CO2 SERPL-SCNC: 28 MMOL/L (ref 21–32)
CREAT SERPL-MCNC: 1.55 MG/DL (ref 0.55–1.02)
CROSSMATCH RESULT: NORMAL
CROSSMATCH RESULT: NORMAL
GLUCOSE BLD STRIP.AUTO-MCNC: 111 MG/DL (ref 65–100)
GLUCOSE BLD STRIP.AUTO-MCNC: 118 MG/DL (ref 65–100)
GLUCOSE BLD STRIP.AUTO-MCNC: 128 MG/DL (ref 65–100)
GLUCOSE BLD STRIP.AUTO-MCNC: 135 MG/DL (ref 65–100)
GLUCOSE BLD STRIP.AUTO-MCNC: 201 MG/DL (ref 65–100)
GLUCOSE BLD STRIP.AUTO-MCNC: 236 MG/DL (ref 65–100)
GLUCOSE SERPL-MCNC: 146 MG/DL (ref 65–100)
HCT VFR BLD AUTO: 25 % (ref 35–47)
HGB BLD-MCNC: 7.8 G/DL (ref 11.5–16)
PERFORMED BY:: ABNORMAL
PHOSPHATE SERPL-MCNC: 3.2 MG/DL (ref 2.6–4.7)
POTASSIUM SERPL-SCNC: 5.2 MMOL/L (ref 3.5–5.1)
SODIUM SERPL-SCNC: 137 MMOL/L (ref 136–145)
SPECIMEN EXP DATE BLD: NORMAL
TRANSFUSION STATUS PATIENT QL: NORMAL
TRANSFUSION STATUS PATIENT QL: NORMAL
UNIT DIVISION: 0
UNIT DIVISION: 0
UNIT ISSUE DATE/TIME: NORMAL
UNIT ISSUE DATE/TIME: NORMAL

## 2025-02-11 PROCEDURE — 6360000002 HC RX W HCPCS: Performed by: STUDENT IN AN ORGANIZED HEALTH CARE EDUCATION/TRAINING PROGRAM

## 2025-02-11 PROCEDURE — 94761 N-INVAS EAR/PLS OXIMETRY MLT: CPT

## 2025-02-11 PROCEDURE — 97530 THERAPEUTIC ACTIVITIES: CPT

## 2025-02-11 PROCEDURE — 6370000000 HC RX 637 (ALT 250 FOR IP): Performed by: STUDENT IN AN ORGANIZED HEALTH CARE EDUCATION/TRAINING PROGRAM

## 2025-02-11 PROCEDURE — 85018 HEMOGLOBIN: CPT

## 2025-02-11 PROCEDURE — 97535 SELF CARE MNGMENT TRAINING: CPT

## 2025-02-11 PROCEDURE — 85014 HEMATOCRIT: CPT

## 2025-02-11 PROCEDURE — 2000000000 HC ICU R&B

## 2025-02-11 PROCEDURE — 6370000000 HC RX 637 (ALT 250 FOR IP): Performed by: INTERNAL MEDICINE

## 2025-02-11 PROCEDURE — 6360000002 HC RX W HCPCS: Performed by: INTERNAL MEDICINE

## 2025-02-11 PROCEDURE — 2500000003 HC RX 250 WO HCPCS: Performed by: STUDENT IN AN ORGANIZED HEALTH CARE EDUCATION/TRAINING PROGRAM

## 2025-02-11 PROCEDURE — 2580000003 HC RX 258: Performed by: STUDENT IN AN ORGANIZED HEALTH CARE EDUCATION/TRAINING PROGRAM

## 2025-02-11 PROCEDURE — 94640 AIRWAY INHALATION TREATMENT: CPT

## 2025-02-11 PROCEDURE — 36415 COLL VENOUS BLD VENIPUNCTURE: CPT

## 2025-02-11 PROCEDURE — 2700000000 HC OXYGEN THERAPY PER DAY

## 2025-02-11 PROCEDURE — 94003 VENT MGMT INPAT SUBQ DAY: CPT

## 2025-02-11 PROCEDURE — 99232 SBSQ HOSP IP/OBS MODERATE 35: CPT

## 2025-02-11 PROCEDURE — 80069 RENAL FUNCTION PANEL: CPT

## 2025-02-11 RX ORDER — CINACALCET 30 MG/1
30 TABLET, FILM COATED ORAL DAILY
Status: DISCONTINUED | OUTPATIENT
Start: 2025-02-11 | End: 2025-03-14 | Stop reason: HOSPADM

## 2025-02-11 RX ORDER — FOLIC ACID 1 MG/1
1 TABLET ORAL DAILY
Status: DISCONTINUED | OUTPATIENT
Start: 2025-02-11 | End: 2025-02-13

## 2025-02-11 RX ADMIN — BUDESONIDE 500 MCG: 0.5 INHALANT ORAL at 20:08

## 2025-02-11 RX ADMIN — MIDODRINE HYDROCHLORIDE 10 MG: 5 TABLET ORAL at 20:45

## 2025-02-11 RX ADMIN — VENLAFAXINE 37.5 MG: 25 TABLET ORAL at 15:41

## 2025-02-11 RX ADMIN — VENLAFAXINE 37.5 MG: 25 TABLET ORAL at 08:34

## 2025-02-11 RX ADMIN — BUDESONIDE 500 MCG: 0.5 INHALANT ORAL at 05:28

## 2025-02-11 RX ADMIN — IPRATROPIUM BROMIDE AND ALBUTEROL SULFATE 1 DOSE: 2.5; .5 SOLUTION RESPIRATORY (INHALATION) at 20:08

## 2025-02-11 RX ADMIN — MIDODRINE HYDROCHLORIDE 10 MG: 5 TABLET ORAL at 15:40

## 2025-02-11 RX ADMIN — IPRATROPIUM BROMIDE AND ALBUTEROL SULFATE 1 DOSE: 2.5; .5 SOLUTION RESPIRATORY (INHALATION) at 05:28

## 2025-02-11 RX ADMIN — BUPROPION HYDROCHLORIDE 75 MG: 75 TABLET, FILM COATED ORAL at 08:34

## 2025-02-11 RX ADMIN — WHITE PETROLATUM,ZINC OXIDE: 57; 17 PASTE TOPICAL at 20:45

## 2025-02-11 RX ADMIN — PANTOPRAZOLE SODIUM 40 MG: 40 INJECTION, POWDER, FOR SOLUTION INTRAVENOUS at 08:34

## 2025-02-11 RX ADMIN — CINACALCET HYDROCHLORIDE 30 MG: 30 TABLET, FILM COATED ORAL at 15:40

## 2025-02-11 RX ADMIN — MIDODRINE HYDROCHLORIDE 10 MG: 5 TABLET ORAL at 08:34

## 2025-02-11 RX ADMIN — ATORVASTATIN CALCIUM 20 MG: 20 TABLET, FILM COATED ORAL at 20:45

## 2025-02-11 RX ADMIN — SODIUM CHLORIDE 125 MG: 9 INJECTION, SOLUTION INTRAVENOUS at 21:42

## 2025-02-11 RX ADMIN — SODIUM CHLORIDE, PRESERVATIVE FREE 10 ML: 5 INJECTION INTRAVENOUS at 08:22

## 2025-02-11 RX ADMIN — BUPROPION HYDROCHLORIDE 75 MG: 75 TABLET, FILM COATED ORAL at 20:44

## 2025-02-11 RX ADMIN — PANTOPRAZOLE SODIUM 40 MG: 40 INJECTION, POWDER, FOR SOLUTION INTRAVENOUS at 20:44

## 2025-02-11 RX ADMIN — IPRATROPIUM BROMIDE AND ALBUTEROL SULFATE 1 DOSE: 2.5; .5 SOLUTION RESPIRATORY (INHALATION) at 13:44

## 2025-02-11 RX ADMIN — WHITE PETROLATUM,ZINC OXIDE: 57; 17 PASTE TOPICAL at 08:22

## 2025-02-11 RX ADMIN — SODIUM CHLORIDE, PRESERVATIVE FREE 10 ML: 5 INJECTION INTRAVENOUS at 20:45

## 2025-02-11 RX ADMIN — Medication 3 MG: at 20:44

## 2025-02-11 RX ADMIN — FOLIC ACID 1 MG: 1 TABLET ORAL at 21:26

## 2025-02-11 ASSESSMENT — PULMONARY FUNCTION TESTS
PIF_VALUE: 24
PIF_VALUE: 21
PIF_VALUE: 20
PIF_VALUE: 21
PIF_VALUE: 27
PIF_VALUE: 28
PIF_VALUE: 24
PIF_VALUE: 24
PIF_VALUE: 22
PIF_VALUE: 22
PIF_VALUE: 24
PIF_VALUE: 20
PIF_VALUE: 24
PIF_VALUE: 22
PIF_VALUE: 20
PIF_VALUE: 24
PIF_VALUE: 25
PIF_VALUE: 16
PIF_VALUE: 21
PIF_VALUE: 24
PIF_VALUE: 21
PIF_VALUE: 24
PIF_VALUE: 21
PIF_VALUE: 22
PIF_VALUE: 22
PIF_VALUE: 26
PIF_VALUE: 21
PIF_VALUE: 21
PIF_VALUE: 20
PIF_VALUE: 24
PIF_VALUE: 21
PIF_VALUE: 25
PIF_VALUE: 31
PIF_VALUE: 24
PIF_VALUE: 21
PIF_VALUE: 21
PIF_VALUE: 24
PIF_VALUE: 23
PIF_VALUE: 26
PIF_VALUE: 22
PIF_VALUE: 21
PIF_VALUE: 26
PIF_VALUE: 25
PIF_VALUE: 21
PIF_VALUE: 21
PIF_VALUE: 22
PIF_VALUE: 24
PIF_VALUE: 21
PIF_VALUE: 23
PIF_VALUE: 34
PIF_VALUE: 21
PIF_VALUE: 24

## 2025-02-11 ASSESSMENT — PAIN SCALES - GENERAL
PAINLEVEL_OUTOF10: 0
PAINLEVEL_OUTOF10: 0

## 2025-02-11 NOTE — PROGRESS NOTES
Renal  Note    NAME:  Nida Graves   :   1975   MRN:   [id number]     ATTENDING: Suyapa Rea DO  PCP:  Luis Lees APRN - CNP    Date/Time:  2025 1:07 PM      Subjective:   REQUESTING PHYSICIAN:  REASON FOR CONSULT:    KIZZY    Patient seen in the ICU.  No significant improvement in her overall condition.  She is DNR now.  She remains on mechanical ventilation via trach collar.  40% FiO2 and PEEP of 5.  Still on dopamine infusion.  Creatinine has again increased to 1.5 today.  Potassium is 5.2.  BUN 49   Lasix was held .  No hypotensive episodes noted  Urine output 700 mL in the past 24 hours  Significant stool output  Hemoglobin decreased to 6.6 yesterday.  Status post blood transfusion    Past Medical History:   Diagnosis Date    Allergy     Asthma     Diabetes (HCC)       Past Surgical History:   Procedure Laterality Date    CARDIAC PROCEDURE N/A 2025    Left heart cath / coronary angiography performed by Manuel Coburn MD at [facility] CARDIAC CATH LAB     SECTION      X 2    GYN      novasure, csection scar revision    TRACHEOSTOMY N/A 2025    TRACHEOSTOMY PERCUTANEOUS performed by Jack Badillo MD at [facility] MAIN OR     Social History     Tobacco Use    Smoking status: Never    Smokeless tobacco: Never   Substance Use Topics    Alcohol use: No      Family History   Problem Relation Age of Onset    Cancer Other         aunt with colon ca    Stroke Neg Hx     Heart Attack Neg Hx     Hypertension Father     Cancer Father         neck ca    Thyroid Disease Mother     Diabetes Mother     Hypertension Mother     Cancer Mother         cervical ca       Allergies   Allergen Reactions    Latex Swelling    Penicillins Hives    Codeine Anxiety      Prior to Admission medications    Medication Sig Start Date End Date Taking? Authorizing Provider   buPROPion (WELLBUTRIN XL) 150 MG extended release tablet Take 1 tablet by mouth daily   Yes Provider, Historical, MD   traZODone  over the past 48 to 72 hours  -I have gone through medication list.  Not on any potential nephrotoxins  -Urine is bland will quantify proteinuria  -Follow-up on renal function in a.m.    #2  Hyperkalemia  -Potassium 5.4--> 5.2 today  s/p Lokelma   -Likely because of KIZZY  -Not on any potassium supplements  -On promote tube feeding.  Would change to Nepro because of hyperkalemia.  She is scheduled for PEG placement today    #3  Hypercalcemia  -Calcium is 10.2 but corrected calcium 10.6  -She is not on any calcium or vitamin D supplements.  PTH is 106.  I will start Sensipar 30 mg daily    #4  Respiratory distress  -She was extubated on 1/26 but reintubated on 1/27 because of inability to protect airway.  Tracheostomy was done on 1/30  -Currently on mechanical ventilation via trach  -Pressure support on trach collar trials as tolerated    #5  Hypotension  -She was noted to be hypotensive overnight.  Currently on pressors.  Also on dopamine  -WBC count today increased to 17.5--10.9.  Currently not on any antibiotics.  Also noted to be febrile with temperature 100.1 yesterday but afebrile now  -Urine analysis is bland  -Last EF was 20 to 30% with right ventricular overload, moderate TR  -Lasix on hold because of worsening renal function.  I will continue to hold today  -Cardiology on the case.  -On epinephrine for bradycardia.  Plan for PPM on Wednesday        ________________________________________________________________________  Signed: Michelle Flores MD

## 2025-02-11 NOTE — PROGRESS NOTES
CARDIOLOGY PROGRESS NOTE      Patient Name: Nida Graves  Age: 49 y.o.  Gender:female  :1975  MRN: [id number]    Patient seen and examined. This is a patient with a history of diabetes, asthma who presented with shortness of breath and hypoxia now being followed for atrial flutter and CHF.     Extubated , reintubated  d/t hypoxia. Now S/p trach 2025.       Patient seen and examined. Awake, following commands. Heart rate stable on Epi at 1mcg/min. Remains in sinus rhythm.       Pertinent review of systems items noted above, all other systems are negative. Current medications reviewed..    Physical Examination    Allergies   Allergen Reactions    Latex Swelling    Penicillins Hives    Codeine Anxiety     Vitals:    25 1400   BP: (!) 128/110   Pulse: 73   Resp:    Temp:    SpO2: 92%     Vital signs are stable  Trach, vent  Heart has a RRR.  No murmur  Lung- are ventilator breaths  Abdomen is soft, nontender, normal bowel sounds.  Extremities have mild LE edema  Skin is dry and warm.    Labs reviewed:  Recent Results (from the past 12 hour(s))   Renal Function Panel    Collection Time: 25  3:30 AM   Result Value Ref Range    Sodium 137 136 - 145 mmol/L    Potassium 5.2 (H) 3.5 - 5.1 mmol/L    Chloride 105 97 - 108 mmol/L    CO2 28 21 - 32 mmol/L    Anion Gap 4 2 - 12 mmol/L    Glucose 146 (H) 65 - 100 mg/dL    BUN 49 (H) 6 - 20 mg/dL    Creatinine 1.55 (H) 0.55 - 1.02 mg/dL    BUN/Creatinine Ratio 32 (H) 12 - 20      Est, Glom Filt Rate 41 (L) >60 ml/min/1.73m2    Calcium 10.0 8.5 - 10.1 mg/dL    Phosphorus 3.2 2.6 - 4.7 mg/dL    Albumin 3.3 (L) 3.5 - 5.0 g/dL   POCT Glucose    Collection Time: 25  6:39 AM   Result Value Ref Range    POC Glucose 128 (H) 65 - 100 mg/dL    Performed by: Brandt Ren    POCT Glucose    Collection Time: 25 11:03 AM   Result Value Ref Range    POC Glucose 111 (H) 65 - 100 mg/dL    Performed by: SHAI HEREDIA      Case discussed with  Dr. Prajapati and our impression and recommendations are as follows:   Atrial flutter/fib with slow ventricular rate, 40-60s,   HR acceptable, dopamine and isoproterenol weaned off this morning   AC previously stopped due to rectal bleeding/anemia, s/p transfusion; hgb stable 7.8  Successful AUGUSTIN/DCCV 2/07/2025, was in NSR but developed CHB  Scheduled for micra placement on Wednesday at 4pm with Dr. Luna   Recommend Eliquis at discharge   Elevated troponin: peak of 639  likely myocardial injury secondary to acute hypoxia, aflutter   [facility] with nonobstructive CAD; luminal RCA, Lcx; 40% mLAD   Continue aspirin, statin  Acute systolic heart failure with reduced ejection fraction, nonischemic  EF 25-30%  BLE edema noted   Initiate GDMT as BP allows, no BB due to slow HR  Limited echo 1/28 EF 30-35%, normal IVC size  Likely secondary to atrial flutter.  Will need life-vest on dc if micra is placed due to high risk for infection of CIED  Acute hypoxic hypercapnic respiratory failure:   likely from respiratory fatigue/poor effort  ICU team following  Hypertension: BP acceptable  Hyperlipidemia, on statin therapy  Diabetes mellitus: tx plan per primary team  Anemia: tx plan per primary team.     Please do not hesitate to call if additional questions arise.    KEENAN MUNOZ, APRN - NP  2/11/2025

## 2025-02-11 NOTE — PROGRESS NOTES
Physician Progress Note      PATIENT:               TESSA SHEN  CSN #:                  [id number]  :                       1975  ADMIT DATE:       2025 8:57 AM  DISCH DATE:  RESPONDING  PROVIDER #:        Marylin Salvador MD          QUERY TEXT:    Patient admitted with SOB. Noted to have dietician assessment with   malnutrition diagnosis. If possible, please document in progress notes and   discharge summary if you are evaluating and /or treating any of the following:    The medical record reflects the following:  Risk Factors: DM  aspiration    Clinical Indicators:  Malnutrition Assessment:  Malnutrition Status:  Mild malnutrition (25 1213)  Context:  Chronic Illness  Findings of the 6 clinical characteristics of malnutrition:  Energy Intake:  Unable to assess  Weight Loss:  Mild weight loss (hx significant wt loss on mounjaro   (-10%x4mos))  Body Fat Loss:  No body fat loss  Muscle Mass Loss:  Mild muscle mass loss (vs) Temples (temporalis)  Fluid Accumulation:  Mild (chronic illness > nutr status)   Strength:  Not Performed    Anthropometric Measures:  Height: 170.2 cm (5' 7\")  Ideal Body Weight (IBW): 135 lbs (61 kg)  Current Body Weight: 98.8 kg (217 lb 13 oz), 161.3 % IBW. Weight Source: Bed   scale  Current BMI (kg/m2): 34.1  Usual Body Weight: 90.7 kg (200 lb) (2024)  % Weight Change (Calculated): 8.9  Weight Adjustment For: No Adjustment  BMI Categories: Obese Class 1 (BMI 30.0-34.9)      Treatment:  Nutrition Recommendations/Plan:  1. Once OGT position confirmed appropriate, initiate TF w/ Promote   (Peptide-based HP Substitute) @ 10 ml/hr and advance 10 ml/hr every 6 hours to   a goal rate of 30 ml/hr + 2 ProSource BID. Administer 200 ml free water every   4 hours.  TF @ goal provides: 720 kcals, 46 g protein, and 598 ml free water  ProSource provides: +320 kcals, +80 g protein  Propofol @ 17.8ml/hr: +470 kcals  TOTAL: 1510 kcals (25 kcals/kgIBW/74% EEE), 126 g protein

## 2025-02-11 NOTE — PROGRESS NOTES
Gastroenterology Progress Note        Patient: Nida Graves MRN: [id number]  SSN: [id number]    YOB: 1975  Age: 49 y.o.  Sex: female      Admit Date: 1/12/2025    LOS: 30 days     Subjective:   Patient seen examined, no specific complaint, NG tube feeding   Past Medical History:   Diagnosis Date    Allergy     Asthma     Diabetes (HCC)         Current Facility-Administered Medications:     cinacalcet (SENSIPAR) tablet 30 mg, 30 mg, Oral, Daily, Michelle Flores MD    0.9 % sodium chloride infusion, , IntraVENous, PRN, Sahib, Taaliba SONIA, APRN - NP    insulin glargine (LANTUS) injection vial 5 Units, 5 Units, SubCUTAneous, Daily, Marylin Salvador MD, 5 Units at 02/10/25 1459    polyethylene glycol (GLYCOLAX) packet 17 g, 17 g, Per NG tube, Daily PRN, Marylin Salvador MD    pantoprazole (PROTONIX) injection 40 mg, 40 mg, IntraVENous, BID, Marylin Salvador MD, 40 mg at 02/11/25 0834    EPINEPHrine 5 mg in sodium chloride 0.9 % 250 mL infusion, 1-20 mcg/min, IntraVENous, Continuous, Marylin Salvador MD, Last Rate: 3 mL/hr at 02/11/25 0150, 1 mcg/min at 02/11/25 0150    0.9 % sodium chloride infusion, , IntraVENous, PRN, Marylin Salvador MD    prochlorperazine (COMPAZINE) injection 10 mg, 10 mg, IntraVENous, Q6H PRN, YaraibTed, APRN - NP, 10 mg at 02/08/25 0233    glucagon injection 1 mg, 1 mg, SubCUTAneous, PRN, Sahib, Taaliba A, APRN - NP    midodrine (PROAMATINE) tablet 10 mg, 10 mg, Per NG tube, TID, Marylin Salvador MD, 10 mg at 02/11/25 0834    [Held by provider] apixaban (ELIQUIS) tablet 5 mg, 5 mg, Per NG tube, BID, Ismael Castillo MD, 5 mg at 02/06/25 2030    venlafaxine (EFFEXOR) tablet 37.5 mg, 37.5 mg, Per NG tube, BID WC, Marylin Salvador MD, 37.5 mg at 02/11/25 0834    Petrolatum-Zinc Oxide ointment, , Topical, BID, Marylin Salvador MD, Given at 02/11/25 0822    hydrOXYzine HCl (ATARAX) tablet 25 mg, 25 mg, Oral, TID PRN, Marylin Salvador MD       visualized.      2. Apparent mass in the left aspect of the uterus with central necrosis that may   represent a fibroid measuring 4.9 cm.      3. Nonspecific echogenic endometrium measuring 13 mm in thickness.      Electronically signed by Juan M Collins      CTA ABDOMEN PELVIS W WO CONTRAST   Final Result   No evidence of active GI bleeding.      Multicystic fluid attenuating lesion in the pelvis without definite   visualization of the ovaries and indeterminate low attenuating uterine foci are   concerning although incompletely evaluated by CT. Clinical and laboratory   correlation is advised. Ultrasound may be helpful for further characterization.      Redemonstration dependent bilateral consolidative opacities versus atelectasis.   Clinical correlation is advised.      Other chronic/incidental findings as above.               Electronically signed by Thaddeus Patterson      CT CHEST WO CONTRAST   Final Result   Bibasilar dependent consolidative opacities versus atelectasis. Clinical   correlation is advised.      Chronic/incidental findings as above.      Electronically signed by Thaddeus [facility]      XR CHEST PORTABLE   Final Result      Lines and tubes as above, otherwise no short-interval change in the chest.      Electronically signed by Thaddeus [facility]      XR CHEST PORTABLE   Final Result      Low lung volumes with left basilar atelectasis.         Electronically signed by Juan M Collins      XR CHEST 1 VIEW   Final Result   Hypoinflated lungs with persistent bibasilar atelectasis and small pleural   effusions.      Electronically signed by Nitin Lei      XR CHEST 1 VIEW   Final Result   Increased bibasilar atelectasis with low lung volumes and small pleural   effusions.      Electronically signed by Nitin Lei      Vascular duplex lower extremity venous bilateral   Final Result      XR CHEST PORTABLE   Final Result      Small bilateral pleural effusions with bilateral lower lobe atelectasis.

## 2025-02-11 NOTE — PROGRESS NOTES
OCCUPATIONAL THERAPY TREATMENT/DISCHARGE  Patient: Nida Graves (49 y.o. female)  Date: 2/11/2025  Diagnosis: Atrial fibrillation, unspecified type (HCC) [I48.91]  Atrial flutter, unspecified type (HCC) [I48.92]  Congestive heart failure, unspecified HF chronicity, unspecified heart failure type (HCC) [I50.9]  Acute hypoxic respiratory failure [J96.01] Acute on chronic hypoxic respiratory failure  Procedure(s) (LRB):  Left heart cath / coronary angiography (N/A) 6 Days Post-Op  Precautions: Fall Risk, General Precautions                Recommendations for nursing mobility: Frequent repositioning to prevent skin breakdown    In place during session: Peripheral IV, EKG/telemetry , and Pulse ox Tracheostomy  Chart, occupational therapy assessment, plan of care, and goals were reviewed.  ASSESSMENT  She was received semi-supine in bed upon arrival and agreeable to OT treatment at this time. She is  A &O 4.     Overall, tolerated session with minimal movement and is currently total care. (see below for objective details and assist levels).  She currently has no need for continued skilled acute OT at this time either noted by OT or reported by pt, will DC skilled OT following treatment; pt verbalized understanding and agreement. . Current OT DC recommendation No skilled occupational therapy, return to previous living setting    Patient does not require further skilled occupational therapy intervention at this level of care.    GOALS:   Occupational Therapy Goals:  Initiated 1/16/2025- Updated 2/7/2025  Patient/Family stated goal: Okay, I will give it a try (referring to sitting on EOB with assistance - stated 1/16/2025).  1.  Patient will perform self-feeding with Modified [place] sitting on EOB without loss of balance and little to no SOB within 7 day(s). D/C - 2/7/2025  2.  Patient will perform grooming semi-supine in bed with Moderate Assistance and Additional Time within 7 day(s). Updated - 2/7/2025  3.

## 2025-02-11 NOTE — PROGRESS NOTES
Comprehensive Nutrition Assessment    Type and Reason for Visit:  Reassess (Follow Up)    Nutrition Recommendations/Plan:   Once PEG is cleared for use by MD, start TF w/ Promote (peptide-based HP substitute) at 20 ml/hr and advance 10 ml/hr every 4 hours to a goal rate of 80 ml/hr. Administer free water flushes of 100 ml every 4 hours.   TF @ goal provides: 1920 kcals (89% est need), 122 g protein (2 g/kg), 1596 ml free water + 600 ml BYF=7200 ml H2O daily   Pt now having loose stools after constipation, low threshold for further assessment of obstipation  Monitor TF rate/tolerance, labs, fluid status, and bowel fxn/abd exam      Malnutrition Assessment:  Malnutrition Status:  Mild malnutrition (01/21/25 1213)    Context:  Chronic Illness       Nutrition Assessment:    Presented w/ SOB, arrhythmia. Admitted to ICU 1/13-1/16. Returned from floor 1/19 d/t hypoxia, hypercarbia, and encephalopathy. Persistent hypoxia on biPAP, intubated. Pt currently on vent w/ sedation. Per discussion in MDRs, plans to initiate TF today, wean propofol as able, and continue diuresing. EF 30%. Pt w/ % intake documented 1/14, 0% later in admission. (1/23) TF initiated 1/21 - Promote @30 + 4PS. Concern for GI bleed w/ dark stools, TF held 1/22 for CTA, neg, restarted and now @ goal. (1/28) Pt extubated 1/26, SLP rec mod thick CLD. Incr O2 needs 1/27,copious secretions, reintubated 1/27. Per discussion in MDRs, plan to restart TF. Will provides recs. Labs: , h/h 9.4/30.4, Na 146, Phos 2.3. Meds: SSI, cefipime, dopamine @ 5 mcg/kg/min, Propofol @ 12.2 ml/hr (322 kcals) (1/31) Pt had trach placed yesterday, NG tube placed this morning. Weight steady. Propofol @ 12.2 ml/hr, plan to wean today. Once NG placement confirmed, recommend restart TF. (2/4) Pt continues on vent and TF. Tolerating TF at goal rate. (2/6) Pt w/ plans for cardioversion today, TFs held. Peer to peer later today for placement arrangements. (2/11)Prev.  tolerating TF w/ Promote @ 80ml/hr (goal), now held w/ plans for PEG placement today. Pt on 1 of epi w/ stable heart rate. FiO2 down to 35%. Will provide updated TF recs for when PEG is cleared for use. Weight is down slightly, incr UOP noted. Will monitor. Labs: , K 5.2, BUN 49, Cr 1.55, h/h 7.8/25. Meds: epi @1mcg/min    Nutrition Related Findings:    NFPE w/ possible mild muscle wasting, repeat NFPE due on f/u. No reported n/v/,+diarrhea, FMS placed 2/10, LBM 2/11, +constipation prior. No known hx of dysphagia. Improved +1 BUE and trace gen/BLE edema. Wound Type: None       Current Nutrition Intake & Therapies:    Average Meal Intake: NPO  Average Supplements Intake: NPO  Diet NPO Exceptions are: Sips of Water with Meds    Anthropometric Measures:  Height: 170.2 cm (5' 7.01\")  Ideal Body Weight (IBW): 135 lbs (61 kg)    Current Body Weight: 100.1 kg (220 lb 10.9 oz), 161.3 % IBW. Weight Source: Bed scale  Current BMI (kg/m2): 34.6  Usual Body Weight: 90.7 kg (200 lb) (Nov 2024)  % Weight Change (Calculated): 8.9  Weight Adjustment For: No Adjustment  BMI Categories: Obese Class 1 (BMI 30.0-34.9)    Estimated Daily Nutrient Needs:  Energy Requirements Based On: Formula  Weight Used for Energy Requirements: Current  Energy (kcal/day): 2158 kcals/d (MSJ1.2/1.1)  Weight Used for Protein Requirements: Ideal  Protein (g/day): 122 g/d (2 g/kg IBW)  Method Used for Fluid Requirements: 1 ml/kcal  Fluid (ml/day): 2158 ml/d    Nutrition Diagnosis:   Inadequate oral intake related to impaired respiratory function as evidenced by intubation    Nutrition Interventions:   Food and/or Nutrient Delivery:  (Restart TF via PEG once cleared)  Nutrition Education/Counseling: No recommendation at this time  Coordination of Nutrition Care: Continue to monitor while inpatient  Plan of Care discussed with: MDR team    Goals:  Goals: Tolerate nutrition support at goal rate, by next RD assessment  Type of Goal: Continue current

## 2025-02-11 NOTE — CARE COORDINATION
CM reviewed Pt medicals, Pt lives with her  and Nephew.     Pt  assist Pt with ADL.    [facility] stated: Please upload trach type and size information, most recent RT notes including suctioning requirements and current vent settings. Please upload current MAR and labs obtained for today 2/10/25 for our RT team to review     CM sent updated medicals.    Per IDR    Waiting for Pt to get leadless Pace Maker and PEG before she can D/C to [facility].    Will restart tube feeds today.    45% on the vent

## 2025-02-11 NOTE — PROGRESS NOTES
CRITICAL CARE PROGRESS NOTE    Name: Nida Graves   : 1975   MRN: [id number]   Date: 2025      Diagnoses/problem list:   Acute hypoxic and hypercapnic respiratory failure  Acute kidney injury  Atrial flutter with slow ventricular response  Complete heart block  GI bleed  Nosebleed, resolved  NSTEMI  Symptomatic bradycardia  Biventricular failure  Acute HFrEF, 25 to 30%  Diabetes  Morbid obesity  Acute metabolic/septic encephalopathy    24-hour events:   Remains on MV via trach, FiO2 decreased to 35 %, PEEP is at 5.  Heart rate stable on epinephrine at 1 mcg/min.  Creatinine rising again.  UOP good.    Assessment and plan:   Ms. Graves is a 49-year-old female with PMH chronic debility and bedbound status, diabetes, asthma, obesity, who presented for shortness of breath.  Shortness of breath worsening over the past few days.  Upon presentation ED patient found to be hypoxic necessita.  Ting O2 via NC.  She was also found to be in A-fib with slow ventricular response.  She was transferred out of ICU on . Early morning  upgraded to ICU again due to severe hypoxia and hypercapnia requiring intubation. Now s/p trach on .    NEUROLOGICAL:    Continue Bupropion and Venlafaxine   Delirium precautions  PT/OT    PULMONOLOGY:   Extubated on , reintubated on  due to inability to protect airway  ENT placed trach on   Continue to wean FiO2 as tolerated  Pressure support and trach collar trials as tolerated    CARDIOVASCULAR:   Midodrine to keep MAP above 65   S/p AUGUSTIN/DCCV on   LVEF 20-30%, right ventricle overload, moderate TR   GDMT for HFrEF as tolerated  Diuresis as needed  [facility] this admission: nonobstructive CAD; luminal RCA, Lcx; 40% mLAD   Continue ASA and Statin   Cardiology recs noted   Epinephrine as needed to keep heart rate above 40  Avoid AVN blocking agents  Cardiology planning to do leadless pacer on Wednesday    GASTROINTESTINAL:   Noted to have dark stools on . Occult  events, labs, images, vital signs, I/O's, and examined patient.  I have discussed the case and the plan and management of the patient's care with the consulting services, the bedside nurses and the respiratory therapist.      NOTE OF PERSONAL INVOLVEMENT IN CARE   This patient has a high probability of imminent, clinically significant deterioration, which requires the highest level of preparedness to intervene urgently. I participated in the decision-making and personally managed or directed the management of the following life and organ supporting interventions that required my frequent assessment to treat or prevent imminent deterioration.    I personally spent 35 minutes of critical care time.  This is time spent at this critically ill patient's bedside actively involved in patient care as well as the coordination of care.  This does not include any procedural time which has been billed separately.      Josue Salvador MD   Critical Care Medicine  [facility]

## 2025-02-11 NOTE — CONSULTS
Palliative Medicine  Patient Name: Nida Graves  YOB: 1975  MRN: [id number]  Age: 49 y.o.  Gender: female    Date of Initial Consult: 2025  Date of Service: 2025  Time: 3:17 PM  Provider: GINNY Chaparro CNP  Hospital Day: 31  Admit Date: 2025  Referring Provider: Dr. Shahid       Reasons for Consultation:  Goals of Care    HISTORY OF PRESENT ILLNESS (HPI):   Nida Graves is a 49 y.o. female with a past medical history of obesity was 270# now down to 240#/ BMI 37),DM with peripheral neuropathy, asthma (previously followed by Dr. Johnson with [facility]), chronic back pain, debility, who was admitted on 2025 from home  with a diagnosis of Shortness of breath, hypoxia.   CXR: LLL airspace disease, interstitial edema  ECHO : EF 25-30%, decrease RV function, mod TR, LA dilation.       Psychosocial: patient is  to spouse Nannette Valle [phone]  She has a dtr age 21 who is graduating from [employer] this Spring.  Her nephew lives with her (autism, age 27)  Patient's spouse does all the cooking, cleaning, household chores and works full time.   Patient has chronic debility, ambulates with walker for past 3 years, able to get around house, watches TV., independent with dressing/ bathing but fall risk., (spouse just purchased walk in tub)   Patient's mother and brother both  in .   No AMD documents: patient would trust her spouse to make medical decisions if unable.     PALLIATIVE DIAGNOSES:    Acute on chronic respiratory failure, multifactorial  Aspiration, asthma, pulmonary edema, weak muscles/ deconditioned  Suspected PE, on tx (unable to lie flat for CTA)   HFrEF (EF 25-30%)   Aflutter with slow ventricular response  Intermittent bradycardia   Possible NSTEMI  Obesity, diabetes, peripheral neuropathy, chronic Bilateral LE below knee leg pain  Chronic back pain  Mood disorder, on medication  Physical debility  Palliative medicine encounter   Care goals  patient for cultural preferences / practices and a referral made as appropriate to needs (Cultural Services, Patient Advocacy, Ethics, etc.)    Spiritual Affiliation: Gnosticist    Any spiritual / Mu-ism concerns:  [] Yes /  [x] No   If \"Yes\" to discuss with pastoral care during IDT     Does caregiver feel burdened by caring for their loved one:   [] Yes /  [x] No /  [] No Caregiver Present/Available [] No Caregiver [] Pt Lives at Facility  If \"Yes\" to discuss with social work during IDT    Anticipatory grief assessment:   [x] Normal  / [] Maladaptive     If \"Maladaptive\" to discuss with social work during IDT    ESAS Anxiety: Anxiety Score: 3    ESAS Depression: Depression Score: Not depressed        LAB AND IMAGING FINDINGS:   Objective data reviewed:  labs, images, records, medication use, vitals, and chart     FINAL COMMENTS   Thank you for allowing Palliative Medicine to participate in the care of Nida Graves.    Only check if applicable and billing time based rather than MDM  [x] The total encounter time on this service date was __35__ minutes which was spent performing a face-to-face encounter and personally completing the provider-level activities documented in the note. This includes time spent prior to the visit and after the visit in direct care of the patient. This time does not include time spent in any separately reportable services.    Electronically signed by   GINNY Chaparro CNP  Palliative Care Team  on 2/11/2025 at 3:17 PM

## 2025-02-11 NOTE — PLAN OF CARE
PHYSICAL THERAPY REEVALUATION  Patient: Nida Graves (49 y.o. female)  Date: 2/11/2025  Primary Diagnosis: Atrial fibrillation, unspecified type (HCC) [I48.91]  Atrial flutter, unspecified type (HCC) [I48.92]  Congestive heart failure, unspecified HF chronicity, unspecified heart failure type (HCC) [I50.9]  Acute hypoxic respiratory failure [J96.01]  Procedure(s) (LRB):  Left heart cath / coronary angiography (N/A) 6 Days Post-Op   Precautions: Fall Risk, General Precautions                      Recommendations for nursing mobility: Encourage HEP in prep for ADLs/mobility; see handout for details, Frequent repositioning to prevent skin breakdown, LE elevation for management of edema, and Assist x2    In place during session: Tracheostomy CPAP/PS 35% FiO2, EKG/telemetry , Pulse ox, and Nasogastric Tube  Chart, physical therapy assessment, plan of care, and goals were reviewed.      ASSESSMENT  Patient initially seen for PT evaluation 2/4/25 and 2 skilled PT sessions since evalution. Patient seen today for PT reevaluation s/t LOS. Patient alert to self. Pt semi-supine upon arrival, agreeable to session.      Based on the objective data described, the patient currently presents with impaired functional mobility, decreased ROM, impaired strength, decreased activity tolerance, poor safety awareness, impaired cognition, poor attention/concentration, and impaired balance. (See below for objective details and assist levels).    Overall pt tolerated session fair today, currently with no pain reported. Pt with slight improvement noted in command following and pt able to perform ankle pumps and able to lift her lower legs off the bed when removing bunny boots.  Performed PROM/AAROM with pt's LE's and pt tolerated without c/o pain. Attempted rolling to her L side and with total A x 2, pt able to come to L side, but unable to  handrail of the bed to hold herself on her side. Pt demos significant generalized weakness and

## 2025-02-12 ENCOUNTER — ANESTHESIA EVENT (OUTPATIENT)
Facility: HOSPITAL | Age: 50
End: 2025-02-12
Payer: COMMERCIAL

## 2025-02-12 ENCOUNTER — ANESTHESIA (OUTPATIENT)
Facility: HOSPITAL | Age: 50
End: 2025-02-12
Payer: COMMERCIAL

## 2025-02-12 ENCOUNTER — APPOINTMENT (OUTPATIENT)
Facility: HOSPITAL | Age: 50
DRG: 004 | End: 2025-02-12
Payer: COMMERCIAL

## 2025-02-12 LAB
ANION GAP SERPL CALC-SCNC: 5 MMOL/L (ref 2–12)
ANION GAP SERPL CALC-SCNC: 6 MMOL/L (ref 2–12)
BUN SERPL-MCNC: 53 MG/DL (ref 6–20)
BUN SERPL-MCNC: 66 MG/DL (ref 6–20)
BUN/CREAT SERPL: 30 (ref 12–20)
BUN/CREAT SERPL: 34 (ref 12–20)
CA-I BLD-MCNC: 10 MG/DL (ref 8.5–10.1)
CA-I BLD-MCNC: 9.5 MG/DL (ref 8.5–10.1)
CHLORIDE SERPL-SCNC: 103 MMOL/L (ref 97–108)
CHLORIDE SERPL-SCNC: 105 MMOL/L (ref 97–108)
CO2 SERPL-SCNC: 27 MMOL/L (ref 21–32)
CO2 SERPL-SCNC: 27 MMOL/L (ref 21–32)
CREAT SERPL-MCNC: 1.79 MG/DL (ref 0.55–1.02)
CREAT SERPL-MCNC: 1.95 MG/DL (ref 0.55–1.02)
ECHO BSA: 2.29 M2
ERYTHROCYTE [DISTWIDTH] IN BLOOD BY AUTOMATED COUNT: 16.3 % (ref 11.5–14.5)
GLUCOSE BLD STRIP.AUTO-MCNC: 138 MG/DL (ref 65–100)
GLUCOSE BLD STRIP.AUTO-MCNC: 162 MG/DL (ref 65–100)
GLUCOSE BLD STRIP.AUTO-MCNC: 164 MG/DL (ref 65–100)
GLUCOSE BLD STRIP.AUTO-MCNC: 182 MG/DL (ref 65–100)
GLUCOSE BLD STRIP.AUTO-MCNC: 205 MG/DL (ref 65–100)
GLUCOSE BLD STRIP.AUTO-MCNC: 80 MG/DL (ref 65–100)
GLUCOSE SERPL-MCNC: 150 MG/DL (ref 65–100)
GLUCOSE SERPL-MCNC: 200 MG/DL (ref 65–100)
HCT VFR BLD AUTO: 30.5 % (ref 35–47)
HGB BLD-MCNC: 9.2 G/DL (ref 11.5–16)
MCH RBC QN AUTO: 28.1 PG (ref 26–34)
MCHC RBC AUTO-ENTMCNC: 30.2 G/DL (ref 30–36.5)
MCV RBC AUTO: 93.3 FL (ref 80–99)
NRBC # BLD: 0.03 K/UL (ref 0–0.01)
NRBC BLD-RTO: 0.2 PER 100 WBC
PERFORMED BY:: ABNORMAL
PERFORMED BY:: NORMAL
PLATELET # BLD AUTO: 205 K/UL (ref 150–400)
PMV BLD AUTO: 10.9 FL (ref 8.9–12.9)
POTASSIUM SERPL-SCNC: 5.3 MMOL/L (ref 3.5–5.1)
POTASSIUM SERPL-SCNC: 5.7 MMOL/L (ref 3.5–5.1)
RBC # BLD AUTO: 3.27 M/UL (ref 3.8–5.2)
SODIUM SERPL-SCNC: 135 MMOL/L (ref 136–145)
SODIUM SERPL-SCNC: 138 MMOL/L (ref 136–145)
WBC # BLD AUTO: 13.1 K/UL (ref 3.6–11)

## 2025-02-12 PROCEDURE — 2000000000 HC ICU R&B

## 2025-02-12 PROCEDURE — 3700000001 HC ADD 15 MINUTES (ANESTHESIA): Performed by: INTERNAL MEDICINE

## 2025-02-12 PROCEDURE — 6370000000 HC RX 637 (ALT 250 FOR IP): Performed by: STUDENT IN AN ORGANIZED HEALTH CARE EDUCATION/TRAINING PROGRAM

## 2025-02-12 PROCEDURE — 6360000002 HC RX W HCPCS: Performed by: INTERNAL MEDICINE

## 2025-02-12 PROCEDURE — 6370000000 HC RX 637 (ALT 250 FOR IP): Performed by: INTERNAL MEDICINE

## 2025-02-12 PROCEDURE — 94640 AIRWAY INHALATION TREATMENT: CPT

## 2025-02-12 PROCEDURE — 2500000003 HC RX 250 WO HCPCS: Performed by: STUDENT IN AN ORGANIZED HEALTH CARE EDUCATION/TRAINING PROGRAM

## 2025-02-12 PROCEDURE — 99231 SBSQ HOSP IP/OBS SF/LOW 25: CPT | Performed by: STUDENT IN AN ORGANIZED HEALTH CARE EDUCATION/TRAINING PROGRAM

## 2025-02-12 PROCEDURE — 94003 VENT MGMT INPAT SUBQ DAY: CPT

## 2025-02-12 PROCEDURE — 2580000003 HC RX 258

## 2025-02-12 PROCEDURE — 6360000002 HC RX W HCPCS: Performed by: STUDENT IN AN ORGANIZED HEALTH CARE EDUCATION/TRAINING PROGRAM

## 2025-02-12 PROCEDURE — 6360000002 HC RX W HCPCS

## 2025-02-12 PROCEDURE — C1894 INTRO/SHEATH, NON-LASER: HCPCS | Performed by: INTERNAL MEDICINE

## 2025-02-12 PROCEDURE — 71045 X-RAY EXAM CHEST 1 VIEW: CPT

## 2025-02-12 PROCEDURE — 2709999900 HC NON-CHARGEABLE SUPPLY: Performed by: INTERNAL MEDICINE

## 2025-02-12 PROCEDURE — C1769 GUIDE WIRE: HCPCS | Performed by: INTERNAL MEDICINE

## 2025-02-12 PROCEDURE — 82962 GLUCOSE BLOOD TEST: CPT

## 2025-02-12 PROCEDURE — 85027 COMPLETE CBC AUTOMATED: CPT

## 2025-02-12 PROCEDURE — 94761 N-INVAS EAR/PLS OXIMETRY MLT: CPT

## 2025-02-12 PROCEDURE — 33274 TCAT INSJ/RPL PERM LDLS PM: CPT | Performed by: INTERNAL MEDICINE

## 2025-02-12 PROCEDURE — 80048 BASIC METABOLIC PNL TOTAL CA: CPT

## 2025-02-12 PROCEDURE — 6370000000 HC RX 637 (ALT 250 FOR IP): Performed by: NURSE PRACTITIONER

## 2025-02-12 PROCEDURE — 02HK3NZ INSERTION OF INTRACARDIAC PACEMAKER INTO RIGHT VENTRICLE, PERCUTANEOUS APPROACH: ICD-10-PCS | Performed by: INTERNAL MEDICINE

## 2025-02-12 PROCEDURE — 3700000000 HC ANESTHESIA ATTENDED CARE: Performed by: INTERNAL MEDICINE

## 2025-02-12 PROCEDURE — 6360000004 HC RX CONTRAST MEDICATION: Performed by: INTERNAL MEDICINE

## 2025-02-12 PROCEDURE — 2580000003 HC RX 258: Performed by: STUDENT IN AN ORGANIZED HEALTH CARE EDUCATION/TRAINING PROGRAM

## 2025-02-12 PROCEDURE — C1786 PMKR, SINGLE, RATE-RESP: HCPCS | Performed by: INTERNAL MEDICINE

## 2025-02-12 PROCEDURE — 36415 COLL VENOUS BLD VENIPUNCTURE: CPT

## 2025-02-12 PROCEDURE — 2700000000 HC OXYGEN THERAPY PER DAY

## 2025-02-12 DEVICE — TPS MC2AVR1 MICRA AV2 US
Type: IMPLANTABLE DEVICE | Status: FUNCTIONAL
Brand: MICRA™ AV2

## 2025-02-12 RX ORDER — EPINEPHRINE IN SOD CHLOR,ISO 1 MG/10 ML
0.2 SYRINGE (ML) INTRAVENOUS ONCE
Status: COMPLETED | OUTPATIENT
Start: 2025-02-12 | End: 2025-02-12

## 2025-02-12 RX ORDER — CEFAZOLIN SODIUM 1 G/3ML
INJECTION, POWDER, FOR SOLUTION INTRAMUSCULAR; INTRAVENOUS
Status: DISCONTINUED | OUTPATIENT
Start: 2025-02-12 | End: 2025-02-12 | Stop reason: SDUPTHER

## 2025-02-12 RX ORDER — HEPARIN SODIUM 1000 [USP'U]/ML
INJECTION, SOLUTION INTRAVENOUS; SUBCUTANEOUS
Status: DISCONTINUED | OUTPATIENT
Start: 2025-02-12 | End: 2025-02-12 | Stop reason: SDUPTHER

## 2025-02-12 RX ORDER — ATROPINE SULFATE 0.1 MG/ML
INJECTION INTRAVENOUS
Status: COMPLETED
Start: 2025-02-12 | End: 2025-02-12

## 2025-02-12 RX ORDER — ONDANSETRON 2 MG/ML
INJECTION INTRAMUSCULAR; INTRAVENOUS
Status: DISCONTINUED | OUTPATIENT
Start: 2025-02-12 | End: 2025-02-12 | Stop reason: SDUPTHER

## 2025-02-12 RX ORDER — FENTANYL CITRATE 50 UG/ML
INJECTION, SOLUTION INTRAMUSCULAR; INTRAVENOUS
Status: DISCONTINUED | OUTPATIENT
Start: 2025-02-12 | End: 2025-02-12 | Stop reason: SDUPTHER

## 2025-02-12 RX ORDER — DOPAMINE HYDROCHLORIDE 320 MG/100ML
1-20 INJECTION, SOLUTION INTRAVENOUS CONTINUOUS
Status: DISCONTINUED | OUTPATIENT
Start: 2025-02-12 | End: 2025-02-12

## 2025-02-12 RX ORDER — PROTAMINE SULFATE 10 MG/ML
INJECTION, SOLUTION INTRAVENOUS
Status: DISCONTINUED | OUTPATIENT
Start: 2025-02-12 | End: 2025-02-12 | Stop reason: SDUPTHER

## 2025-02-12 RX ORDER — MIDAZOLAM HYDROCHLORIDE 1 MG/ML
INJECTION, SOLUTION INTRAMUSCULAR; INTRAVENOUS
Status: DISCONTINUED | OUTPATIENT
Start: 2025-02-12 | End: 2025-02-12 | Stop reason: SDUPTHER

## 2025-02-12 RX ORDER — IOPAMIDOL 755 MG/ML
INJECTION, SOLUTION INTRAVASCULAR PRN
Status: DISCONTINUED | OUTPATIENT
Start: 2025-02-12 | End: 2025-02-12 | Stop reason: HOSPADM

## 2025-02-12 RX ORDER — SODIUM CHLORIDE 9 MG/ML
INJECTION, SOLUTION INTRAVENOUS
Status: DISCONTINUED | OUTPATIENT
Start: 2025-02-12 | End: 2025-02-12 | Stop reason: SDUPTHER

## 2025-02-12 RX ADMIN — SODIUM ZIRCONIUM CYCLOSILICATE 10 G: 10 POWDER, FOR SUSPENSION ORAL at 21:12

## 2025-02-12 RX ADMIN — MIDODRINE HYDROCHLORIDE 10 MG: 5 TABLET ORAL at 20:57

## 2025-02-12 RX ADMIN — BUPROPION HYDROCHLORIDE 75 MG: 75 TABLET, FILM COATED ORAL at 08:36

## 2025-02-12 RX ADMIN — FENTANYL CITRATE 25 MCG: 50 INJECTION INTRAMUSCULAR; INTRAVENOUS at 13:27

## 2025-02-12 RX ADMIN — WHITE PETROLATUM,ZINC OXIDE: 57; 17 PASTE TOPICAL at 09:02

## 2025-02-12 RX ADMIN — Medication 3 MG: at 20:57

## 2025-02-12 RX ADMIN — SODIUM CHLORIDE, PRESERVATIVE FREE 10 ML: 5 INJECTION INTRAVENOUS at 09:01

## 2025-02-12 RX ADMIN — SODIUM CHLORIDE, PRESERVATIVE FREE 10 ML: 5 INJECTION INTRAVENOUS at 20:56

## 2025-02-12 RX ADMIN — PROTAMINE SULFATE 5 MG: 10 INJECTION, SOLUTION INTRAVENOUS at 14:11

## 2025-02-12 RX ADMIN — MIDODRINE HYDROCHLORIDE 10 MG: 5 TABLET ORAL at 08:37

## 2025-02-12 RX ADMIN — FENTANYL CITRATE 25 MCG: 50 INJECTION INTRAMUSCULAR; INTRAVENOUS at 13:56

## 2025-02-12 RX ADMIN — BUDESONIDE 500 MCG: 0.5 INHALANT ORAL at 19:20

## 2025-02-12 RX ADMIN — PANTOPRAZOLE SODIUM 40 MG: 40 INJECTION, POWDER, FOR SOLUTION INTRAVENOUS at 20:56

## 2025-02-12 RX ADMIN — SODIUM CHLORIDE 125 MG: 9 INJECTION, SOLUTION INTRAVENOUS at 18:19

## 2025-02-12 RX ADMIN — MIDAZOLAM 2 MG: 1 INJECTION INTRAMUSCULAR; INTRAVENOUS at 13:18

## 2025-02-12 RX ADMIN — IPRATROPIUM BROMIDE AND ALBUTEROL SULFATE 1 DOSE: 2.5; .5 SOLUTION RESPIRATORY (INHALATION) at 15:20

## 2025-02-12 RX ADMIN — CEFAZOLIN 2 G: 1 INJECTION, POWDER, FOR SOLUTION INTRAMUSCULAR; INTRAVENOUS at 13:27

## 2025-02-12 RX ADMIN — INSULIN LISPRO 1 UNITS: 100 INJECTION, SOLUTION INTRAVENOUS; SUBCUTANEOUS at 11:42

## 2025-02-12 RX ADMIN — PROTAMINE SULFATE 5 MG: 10 INJECTION, SOLUTION INTRAVENOUS at 14:10

## 2025-02-12 RX ADMIN — ATORVASTATIN CALCIUM 20 MG: 20 TABLET, FILM COATED ORAL at 20:57

## 2025-02-12 RX ADMIN — PROTAMINE SULFATE 5 MG: 10 INJECTION, SOLUTION INTRAVENOUS at 14:09

## 2025-02-12 RX ADMIN — BUPROPION HYDROCHLORIDE 75 MG: 75 TABLET, FILM COATED ORAL at 20:57

## 2025-02-12 RX ADMIN — EPINEPHRINE 0.2 MG: 0.1 INJECTION INTRACARDIAC; INTRAVENOUS at 08:42

## 2025-02-12 RX ADMIN — SODIUM CHLORIDE: 900 INJECTION, SOLUTION INTRAVENOUS at 13:18

## 2025-02-12 RX ADMIN — PROTAMINE SULFATE 5 MG: 10 INJECTION, SOLUTION INTRAVENOUS at 14:08

## 2025-02-12 RX ADMIN — PROTAMINE SULFATE 1 MG: 10 INJECTION, SOLUTION INTRAVENOUS at 14:05

## 2025-02-12 RX ADMIN — HEPARIN SODIUM 5000 UNITS: 1000 INJECTION, SOLUTION INTRAVENOUS; SUBCUTANEOUS at 13:31

## 2025-02-12 RX ADMIN — PANTOPRAZOLE SODIUM 40 MG: 40 INJECTION, POWDER, FOR SOLUTION INTRAVENOUS at 08:36

## 2025-02-12 RX ADMIN — WHITE PETROLATUM,ZINC OXIDE: 57; 17 PASTE TOPICAL at 20:57

## 2025-02-12 RX ADMIN — MIDODRINE HYDROCHLORIDE 10 MG: 5 TABLET ORAL at 15:31

## 2025-02-12 RX ADMIN — PROTAMINE SULFATE 5 MG: 10 INJECTION, SOLUTION INTRAVENOUS at 14:07

## 2025-02-12 RX ADMIN — ASPIRIN 81 MG 81 MG: 81 TABLET ORAL at 08:37

## 2025-02-12 RX ADMIN — BUDESONIDE 500 MCG: 0.5 INHALANT ORAL at 08:11

## 2025-02-12 RX ADMIN — PROTAMINE SULFATE 4 MG: 10 INJECTION, SOLUTION INTRAVENOUS at 14:06

## 2025-02-12 RX ADMIN — ONDANSETRON 4 MG: 2 INJECTION INTRAMUSCULAR; INTRAVENOUS at 13:18

## 2025-02-12 RX ADMIN — IPRATROPIUM BROMIDE AND ALBUTEROL SULFATE 1 DOSE: 2.5; .5 SOLUTION RESPIRATORY (INHALATION) at 19:20

## 2025-02-12 RX ADMIN — VENLAFAXINE 37.5 MG: 25 TABLET ORAL at 15:30

## 2025-02-12 RX ADMIN — CINACALCET HYDROCHLORIDE 30 MG: 30 TABLET, FILM COATED ORAL at 08:37

## 2025-02-12 RX ADMIN — SODIUM ZIRCONIUM CYCLOSILICATE 10 G: 10 POWDER, FOR SUSPENSION ORAL at 15:30

## 2025-02-12 RX ADMIN — ATROPINE SULFATE INJECTION 0.5 MG: 0.1 INJECTION, SOLUTION INTRAVENOUS at 07:57

## 2025-02-12 RX ADMIN — DOPAMINE HYDROCHLORIDE IN DEXTROSE 7.5 MCG/KG/MIN: 3.2 INJECTION, SOLUTION INTRAVENOUS at 08:03

## 2025-02-12 RX ADMIN — VENLAFAXINE 37.5 MG: 25 TABLET ORAL at 08:36

## 2025-02-12 RX ADMIN — FOLIC ACID 1 MG: 1 TABLET ORAL at 08:37

## 2025-02-12 RX ADMIN — IPRATROPIUM BROMIDE AND ALBUTEROL SULFATE 1 DOSE: 2.5; .5 SOLUTION RESPIRATORY (INHALATION) at 08:11

## 2025-02-12 ASSESSMENT — PULMONARY FUNCTION TESTS
PIF_VALUE: 25
PIF_VALUE: 26
PIF_VALUE: 31
PIF_VALUE: 23
PIF_VALUE: 25
PIF_VALUE: 37
PIF_VALUE: 36
PIF_VALUE: 26
PIF_VALUE: 34
PIF_VALUE: 23
PIF_VALUE: 20
PIF_VALUE: 25
PIF_VALUE: 22
PIF_VALUE: 34
PIF_VALUE: 22
PIF_VALUE: 25
PIF_VALUE: 33
PIF_VALUE: 24
PIF_VALUE: 25
PIF_VALUE: 27
PIF_VALUE: 27
PIF_VALUE: 24
PIF_VALUE: 26

## 2025-02-12 ASSESSMENT — ENCOUNTER SYMPTOMS: SHORTNESS OF BREATH: 1

## 2025-02-12 ASSESSMENT — PAIN SCALES - GENERAL: PAINLEVEL_OUTOF10: 0

## 2025-02-12 NOTE — PROGRESS NOTES
Renal  Note    NAME:  Nida Graves   :   1975   MRN:   [id number]     ATTENDING: Suyapa Rea DO  PCP:  Luis Lees APRN - CNP    Date/Time:  2025 4:42 PM      Subjective:   REQUESTING PHYSICIAN:  REASON FOR CONSULT:    KIZZY    Patient seen in the ICU.  She had permanent pacemaker placement today.  Creatinine is increased today to 1.9 BUN 66  Potassium increased to 5.7 on recent labs  Lactic acid is still elevated        Past Medical History:   Diagnosis Date    Allergy     Asthma     Diabetes (HCC)       Past Surgical History:   Procedure Laterality Date    CARDIAC PROCEDURE N/A 2025    Left heart cath / coronary angiography performed by Manuel Coburn MD at [facility] CARDIAC CATH LAB     SECTION      X 2    GYN      randee, csection scar revision    TRACHEOSTOMY N/A 2025    TRACHEOSTOMY PERCUTANEOUS performed by Jack Badillo MD at [facility] MAIN OR     Social History     Tobacco Use    Smoking status: Never    Smokeless tobacco: Never   Substance Use Topics    Alcohol use: No      Family History   Problem Relation Age of Onset    Cancer Other         aunt with colon ca    Stroke Neg Hx     Heart Attack Neg Hx     Hypertension Father     Cancer Father         neck ca    Thyroid Disease Mother     Diabetes Mother     Hypertension Mother     Cancer Mother         cervical ca       Allergies   Allergen Reactions    Latex Swelling    Penicillins Hives    Codeine Anxiety      Prior to Admission medications    Medication Sig Start Date End Date Taking? Authorizing Provider   buPROPion (WELLBUTRIN XL) 150 MG extended release tablet Take 1 tablet by mouth daily   Yes Provider, MD Mary Ellen   traZODone (DESYREL) 50 MG tablet Take 1 tablet by mouth daily 11/10/24  Yes Provider, MD Mary Ellen   DULoxetine (CYMBALTA) 30 MG extended release capsule Take 1 capsule by mouth daily 25  Yes Provider, MD Mary Ellen   albuterol (PROVENTIL) (5 MG/ML) 0.5% nebulizer solution Inhale 0.5    HGB 9.2* 7.8* 6.6*   < > 6.6* 7.1*   HCT 30.5* 25.0* 22.3*   < > 22.4* 23.8*     --   --   --  201 253    < > = values in this interval not displayed.     Recent Labs     02/12/25  1544 02/12/25  0310 02/11/25  0330 02/09/25  0200 02/08/25  1900 02/08/25  0211 02/07/25  0810 02/07/25  0501 02/07/25  0339 02/06/25  0316 02/05/25  1400 02/05/25  0423   * 138 137   < > 138   < >  --  139 138 139  --  138   K 5.7* 5.3* 5.2*   < > 4.8   < >  --  4.6 Hemolyzed, Recollection Recommended 4.6  --  4.1    105 105   < > 104   < >  --  104 105 107  --  109*   CO2 27 27 28   < > 29   < >  --  31 28 27  --  28   BUN 66* 53* 49*   < > 36*   < >  --  30* 30* 28*  --  27*   CREATININE 1.95* 1.79* 1.55*   < > 1.37*   < >  --  1.03* 1.06* 0.75  --  0.74   MG  --   --   --   --   --   --   --   --  2.1 2.0  --  2.1   PHOS  --   --  3.2  --   --   --   --   --  4.2 2.6  --  3.2   ALT  --   --   --   --   --   --   --  54 54  --   --  50   INR  --   --   --   --  1.1  --  1.2*  --   --   --  1.1  --     < > = values in this interval not displayed.     Invalid input(s): \"GLPOC\"  No results for input(s): \"PH\", \"PCO2\", \"PO2\", \"HCO3\", \"FIO2\" in the last 720 hours.  Recent Labs     02/08/25  1900 02/07/25  0810 02/05/25  1400   INR 1.1 1.2* 1.1       Recommendations/Plan:     #1 acute kidney injury  -Creatinine improved from 1.4 -->1.2, creatinine again increasing from 1.2-->1.5-->1.7-->1.9 today.  BUN 66, despite holding Lasix  -KIZZY secondary to prerenal-->ATN etiology 2/2 severe anemia/hypercalcemia.  Hemoglobin was 6.6 .  -No baseline history of CKD.  Creatinine was 0.7 on 2/6  -Lasix has been held which I will continue to hold.  No significant volume overload noted  -Likely edema from third spacing from severe hypoalbuminemia.  Albumin 1.8 which is improved to 3.3 today with IV albumin  -No IV contrast over the past 48 to 72 hours  -I have gone through medication list.  Not on any potential nephrotoxins  -Urine is

## 2025-02-12 NOTE — CARE COORDINATION
CM reviewed Pt medicals, Pt lives with her  and Nephew.      Pt  assist Pt with ADL.    Palliative Care has met with Pt daughter, she stated to continue with pacemaker placement, PEG placement and work toward getting Pt to LTC.     CM sent updated medicals to [facility]    Per IDR    Pt got worse over night, vent 100% over night.     Pt will get Pacemaker.

## 2025-02-12 NOTE — ANESTHESIA PRE PROCEDURE
Department of Anesthesiology  Preprocedure Note       Name:  Nida Graves   Age:  49 y.o.  :  1975                                          MRN:  [id number]         Date:  2025      Surgeon: Surgeon(s):  Rocky Cristobal MD    Procedure: Procedure(s):  ESOPHAGOGASTRODUODENOSCOPY PERCUTANEOUS ENDOSCOPIC GASTROSTOMY TUBE PLACEMENT    Medications prior to admission:   Prior to Admission medications    Medication Sig Start Date End Date Taking? Authorizing Provider   buPROPion (WELLBUTRIN XL) 150 MG extended release tablet Take 1 tablet by mouth daily   Yes Provider, MD Mary Ellen   traZODone (DESYREL) 50 MG tablet Take 1 tablet by mouth daily 11/10/24  Yes Provider, MD Mary Ellen   DULoxetine (CYMBALTA) 30 MG extended release capsule Take 1 capsule by mouth daily 25  Yes ProviderMary Ellen MD   albuterol (PROVENTIL) (5 MG/ML) 0.5% nebulizer solution Inhale 0.5 mLs into the lungs every 4 hours as needed 3/29/22  Yes Automatic Reconciliation, Ar   benzonatate (TESSALON) 100 MG capsule Take 1-2 capsules TID prn cough. 21  Yes Automatic Reconciliation, Ar   budesonide (PULMICORT FLEXHALER) 180 MCG/ACT AEPB inhaler TAKE 1 PUFF BY MOUTH TWICE A DAY 12/3/20  Yes Automatic Reconciliation, Ar   diclofenac sodium (VOLTAREN) 1 % GEL Apply 4 g topically 4 times daily 22  Yes Automatic Reconciliation, Ar   losartan (COZAAR) 25 MG tablet Take 1 tablet by mouth daily 22  Yes Automatic Reconciliation, Ar   tiotropium (SPIRIVA HANDIHALER) 18 MCG inhalation capsule Take 1 capsule by mouth once 20  Yes Automatic Reconciliation, Ar   etodolac (LODINE) 400 MG tablet Take 1 tablet by mouth 2 times daily  Patient not taking: Reported on 2025    ProviderMary Ellen MD   fluticasone-salmeterol (WIXELA INHUB) 500-50 MCG/ACT AEPB diskus inhaler Take 500 puffs by mouth 2 times daily  Patient not taking: Reported on 2025   Automatic Reconciliation, Ar       Current medications:    Current 
      Diagnosis Date   • Allergy    • Asthma    • Diabetes (HCC)        Past Surgical History:        Procedure Laterality Date   • CARDIAC PROCEDURE N/A 2025    Left heart cath / coronary angiography performed by Manuel Coburn MD at [facility] CARDIAC CATH LAB   •  SECTION      X 2   • GYN      randee, csection scar revision   • TRACHEOSTOMY N/A 2025    TRACHEOSTOMY PERCUTANEOUS performed by Jack Badillo MD at [facility] MAIN OR       Social History:    Social History     Tobacco Use   • Smoking status: Never   • Smokeless tobacco: Never   Substance Use Topics   • Alcohol use: No                                Counseling given: Not Answered      Vital Signs (Current):   Vitals:    02/10/25 1430 02/10/25 1445 02/10/25 1500 02/10/25 1526   BP: (!) 106/53 (!) 103/46 105/78    Pulse: 54 53 (!) 47 (!) 45   Resp: 15  24   Temp:       TempSrc:       SpO2: 94% 93% 92% 96%   Weight:       Height:                                                  BP Readings from Last 3 Encounters:   02/10/25 105/78   22 134/88   22 (!) 152/88       NPO Status: Time of last liquid consumption: 0800 (NG tube flush)                                                 Date of last liquid consumption: 25                             BMI:   Wt Readings from Last 3 Encounters:   25 101.2 kg (223 lb 1.7 oz)   22 115.4 kg (254 lb 8 oz)   22 116.3 kg (256 lb 6.4 oz)     Body mass index is 34.93 kg/m².    CBC:   Lab Results   Component Value Date/Time    WBC 10.9 02/10/2025 04:00 AM    RBC 2.37 02/10/2025 04:00 AM    HGB 6.9 02/10/2025 05:30 AM    HCT 23.6 02/10/2025 05:30 AM    MCV 94.5 02/10/2025 04:00 AM    RDW 16.3 02/10/2025 04:00 AM     02/10/2025 04:00 AM       CMP:   Lab Results   Component Value Date/Time     02/10/2025 04:00 AM    K 5.0 02/10/2025 04:00 AM     02/10/2025 04:00 AM    CO2 29 02/10/2025 04:00 AM    BUN 34 02/10/2025 04:00 AM    CREATININE 1.28 02/10/2025 04:00 AM

## 2025-02-12 NOTE — PROGRESS NOTES
CRITICAL CARE PROGRESS NOTE    Name: Nida Graves   : 1975   MRN: [id number]   Date: 2025      Diagnoses/problem list:   Acute hypoxic and hypercapnic respiratory failure  Acute kidney injury  Atrial flutter with slow ventricular response  Complete heart block  GI bleed  Nosebleed, resolved  NSTEMI  Symptomatic bradycardia  Biventricular failure  Acute HFrEF, 25 to 30%  Diabetes  Morbid obesity  Acute metabolic/septic encephalopathy    24-hour events:   Bradycardia to 20s this morning with prolonged sinus pauses.  Improved with atropine push and dopamine infusion.  Cardiology planning for TVP vs leadless pacemaker today. O2 requirement went up this morning after bradycardic episode. Will obtain CXR.    Assessment and plan:   Ms. Graves is a 49-year-old female with PMH chronic debility and bedbound status, diabetes, asthma, obesity, who presented for shortness of breath.  Shortness of breath worsening over the past few days.  Upon presentation ED patient found to be hypoxic necessita.  Ting O2 via NC.  She was also found to be in A-fib with slow ventricular response.  She was transferred out of ICU on . Early morning  upgraded to ICU again due to severe hypoxia and hypercapnia requiring intubation. Now s/p trach on .    NEUROLOGICAL:    Continue Bupropion and Venlafaxine   Delirium precautions  PT/OT    PULMONOLOGY:   Extubated on , reintubated on  due to inability to protect airway  ENT placed trach on   Continue to wean FiO2 as tolerated  Pressure support and trach collar trials as tolerated    CARDIOVASCULAR:   Midodrine to keep MAP above 65   S/p AUGUSTIN/DCCV on   LVEF 20-30%, right ventricle overload, moderate TR   GDMT for HFrEF as tolerated  Diuresis as needed  [facility] this admission: nonobstructive CAD; luminal RCA, Lcx; 40% mLAD   Continue ASA and Statin   Cardiology recs noted   Dopamine as needed to keep heart rate above 40  Avoid AVN blocking agents  Cardiology planning  to do leadless pacer today    GASTROINTESTINAL:   Noted to have dark stools on 1/22. Occult stool positive for blood. CTA abdomen/pelvis without evidence of bleeding  Bowel regimen  Continue tube feeds as tolerated   GI planning for PEG placement after she gets a pacer     RENAL/ELECTROLYTE:   Creatinine rising again, likely due to hypotensive episodes during bradycardia continue to hold Lasix  Nephrology consulted, follow recs  Follow-up urine lytes  Monitor UOP  Correct electrolytes  Multiple fallopian/uterine cysts which makes bladder scan difficult for interpretation    ENDOCRINE:   Insulin sliding scale  Keep blood glucose 140-180    HEMATOLOGY/ONCOLOGY:   Eliquis for A-fib on hold due to drop in hemoglobin on 2/10 with requiring 2 unit PRBC  No obvious bleeding noted, continue to monitor closely  Transfuse to keep hemoglobin above 7    ID/MICRO:   Initially completed a course of Vanco and Zosyn for CAP  Completed 7 day course of Cefepime on 1/27 for possible HAP  MRSA PCR negative on 1/28    ICU DAILY CHECKLIST     Code Status: DNR  DVT Prophylaxis: SCDs  SUP: Pepcid  T/L/D: PIV  Diet: Tube feeds  Activity Level: Bedrest  ABCDEF Bundle/Checklist Completed: Yes  Disposition: Stay in ICU  Multidisciplinary Rounds Completed:  Yes    OBJECTIVE:     Blood pressure 132/75, pulse 53, temperature 97.9 °F (36.6 °C), temperature source Axillary, resp. rate 14, height 1.702 m (5' 7.01\"), weight (!) 161 kg (354 lb 15.1 oz), SpO2 96%.  Physical Exam  Gen: On MV via trach  HEENT: NC/AT, supple  Cardiac: Sinus bradycardia  Pulm: Diminished breath sounds in bases  ABD: Soft, mildly distended, non tender  Extremities: Mild LE edema noted  Neuro: Following commands    Labs and Data: Reviewed 02/12/25  Medications: Reviewed 02/12/25  Imaging: Reviewed 02/12/25    Intake/Output:     Intake/Output Summary (Last 24 hours) at 2/12/2025 1212  Last data filed at 2/12/2025 1200  Gross per 24 hour   Intake 1925.4 ml   Output 600 ml

## 2025-02-12 NOTE — PROGRESS NOTES
Otolaryngology-Head and Neck Surgery      Patient: Nida Graves  YOB: 1975  MRN: [id number]  Date of Service:  2025    History of Present Illness: Nida Graves is a 49 y.o. female who was admitted with respiratory failure, CHF, atrial flutter. Intubated  - , extubated but required reintubation     Weaning FiO2     25  1 week postop trach, remains on vent    25:   Having some wound breakdown under the inferior flange of the tracheostomy plate    Past Medical History:  Past Medical History:   Diagnosis Date    Allergy     Asthma     Diabetes (HCC)      Past Surgical History:   Past Surgical History:   Procedure Laterality Date    CARDIAC PROCEDURE N/A 2025    Left heart cath / coronary angiography performed by Manuel Coburn MD at [facility] CARDIAC CATH LAB     SECTION      X 2    GYN      novasure, csection scar revision    TRACHEOSTOMY N/A 2025    TRACHEOSTOMY PERCUTANEOUS performed by Jack Badillo MD at [facility] MAIN OR       Medications:   Current Outpatient Medications   Medication Instructions    albuterol (PROVENTIL) 2.5 mg, Inhalation, EVERY 4 HOURS PRN    benzonatate (TESSALON) 100 MG capsule Take 1-2 capsules TID prn cough.    budesonide (PULMICORT FLEXHALER) 180 MCG/ACT AEPB inhaler TAKE 1 PUFF BY MOUTH TWICE A DAY    buPROPion (WELLBUTRIN XL) 150 MG extended release tablet 1 tablet, Oral, DAILY    diclofenac sodium (VOLTAREN) 4 g, Topical, 4 TIMES DAILY    DULoxetine (CYMBALTA) 30 mg, Oral, DAILY    etodolac (LODINE) 400 mg, 2 TIMES DAILY    fluticasone-salmeterol (WIXELA INHUB) 500-50 MCG/ACT AEPB diskus inhaler 500 puffs, 2 TIMES DAILY    losartan (COZAAR) 25 MG tablet 1 tablet, Oral, DAILY    Spiriva HandiHaler 18 mcg, Oral, ONCE    traZODone (DESYREL) 50 MG tablet 1 tablet, Oral, DAILY     Allergies:   Allergies   Allergen Reactions    Latex Swelling    Penicillins Hives    Codeine Anxiety     Social History:   Social History     Socioeconomic  reduced pressure dressing underneath inferior flange of tracheostomy  -Keep ventilator tubes elevated to reduce downward and inferior pressure on the skin    Cont trach care

## 2025-02-12 NOTE — ANESTHESIA POSTPROCEDURE EVALUATION
Department of Anesthesiology  Postprocedure Note    Patient: Nida Graves  MRN: [id number]  YOB: 1975  Date of evaluation: 2/12/2025    Procedure Summary       Date: 02/12/25 Room / Location: [facility] EP LAB / [facility] ELECTROPHYSIOLOGY    Anesthesia Start: 1318 Anesthesia Stop:     Procedure: Insert or replace transcath PPM leadless Diagnosis:       Complete heart block (HCC)      (Successful Micra implanted)    Providers: Marito Luna MD Responsible Provider: Ericka Prajapati MD    Anesthesia Type: MAC, TIVA ASA Status: 4 - Emergent            Anesthesia Type: No value filed.    Caroline Phase I:      Caroline Phase II:      Anesthesia Post Evaluation    Patient location during evaluation: ICU  Patient participation: complete - patient cannot participate  Level of consciousness: sedated and ventilated  Nausea & Vomiting: no vomiting and no nausea  Cardiovascular status: blood pressure returned to baseline and vasoactive/inotropes  Respiratory status: acceptable  Hydration status: stable    There were no known notable events for this encounter.

## 2025-02-12 NOTE — CONSULTS
Department of Internal Medicine  Section of Cardiology  Attending Consult Note      Reason for Consult: Complete heart block severe cardiomyopathy  Requesting Physician: Dr. Jackson    CHIEF COMPLAINT: Complete heart block    History Obtained From:  patient, daughter patient is trached    HISTORY OF PRESENT ILLNESS:      The patient is a 49 y.o. female critically ill patient presented to hospital with heart toxic respiratory failure found to have severe heart failure with an EF of 30 to 35% cath showed nonobstructive CAD, hospital course was complicated by A-fib status post DCCV on  patient developed complete heart block in the weekend with her low EF and complete heart block she is a candidate for BiV ICD, considering her comorbidities and the fact that she is having a lot of lives in the second the ICU and she is miguel need PEG tube during this admission we elected to place a Micra today with a plan to place a LifeVest on discharge if the patient recovers from this acute insult patient will need a CRT D device.  She has dropped her 7 she is status post 2 units of blood transfusion she is stable at 9, Isopril and dopamine was held last night because of high blood pressure patient developed complete heart block with a heart rate in the 20s today those episodes are causing her to have kidney injury,   Past Medical History:        Diagnosis Date    Allergy     Asthma     Diabetes (HCC)      Past Surgical History:        Procedure Laterality Date    CARDIAC PROCEDURE N/A 2025    Left heart cath / coronary angiography performed by Manuel Coburn MD at [facility] CARDIAC CATH LAB     SECTION      X 2    GYN      randee, csection scar revision    TRACHEOSTOMY N/A 2025    TRACHEOSTOMY PERCUTANEOUS performed by Jack Badillo MD at [facility] MAIN OR     Current Medications:    Current Facility-Administered Medications: DOPamine (INTROPIN) 800 mg in dextrose 5 % 250 mL infusion, 1-20 mcg/kg/min, IntraVENous,  cardiomyopathy and complete heart block presented to the EP lab today for AV Micra implantation with a plan to discharge her with a LifeVest and plan to place a CRT-D when she recovers as an outpatient.       Plan:       #A-fib status post cardioversion she is not on anticoagulation now heparin, she will need to be started on anticoagulation as soon as possible she got cardioverted few days ago.  Whenever it is safe by the primary team.       #Complete heart block  Plan for Micra implantation Today        # Severe LV dysfunction  Will need to be started on optimal medical therapy when the patient is more stable.   Will need a CRT-D as outpatient

## 2025-02-12 NOTE — ANESTHESIA PRE PROCEDURE
Department of Anesthesiology  Preprocedure Note       Name:  Nida Graves   Age:  49 y.o.  :  1975                                          MRN:  [id number]         Date:  2025      Surgeon: Surgeon(s):  Marito Luna MD    Procedure: Procedure(s):  Insert or replace transcath PPM leadless    Medications prior to admission:   Prior to Admission medications    Medication Sig Start Date End Date Taking? Authorizing Provider   buPROPion (WELLBUTRIN XL) 150 MG extended release tablet Take 1 tablet by mouth daily   Yes Provider, MD Mary Ellen   traZODone (DESYREL) 50 MG tablet Take 1 tablet by mouth daily 11/10/24  Yes Provider, MD Mary Ellen   DULoxetine (CYMBALTA) 30 MG extended release capsule Take 1 capsule by mouth daily 25  Yes Provider, MD Mary Ellen   albuterol (PROVENTIL) (5 MG/ML) 0.5% nebulizer solution Inhale 0.5 mLs into the lungs every 4 hours as needed 3/29/22  Yes Automatic Reconciliation, Ar   benzonatate (TESSALON) 100 MG capsule Take 1-2 capsules TID prn cough. 21  Yes Automatic Reconciliation, Ar   budesonide (PULMICORT FLEXHALER) 180 MCG/ACT AEPB inhaler TAKE 1 PUFF BY MOUTH TWICE A DAY 12/3/20  Yes Automatic Reconciliation, Ar   diclofenac sodium (VOLTAREN) 1 % GEL Apply 4 g topically 4 times daily 22  Yes Automatic Reconciliation, Ar   losartan (COZAAR) 25 MG tablet Take 1 tablet by mouth daily 22  Yes Automatic Reconciliation, Ar   tiotropium (SPIRIVA HANDIHALER) 18 MCG inhalation capsule Take 1 capsule by mouth once 20  Yes Automatic Reconciliation, Ar   etodolac (LODINE) 400 MG tablet Take 1 tablet by mouth 2 times daily  Patient not taking: Reported on 2025    ProviderMary Ellen MD   fluticasone-salmeterol (WIXELA INHUB) 500-50 MCG/ACT AEPB diskus inhaler Take 500 puffs by mouth 2 times daily  Patient not taking: Reported on 2025   Automatic Reconciliation, Ar       Current medications:    Current Facility-Administered

## 2025-02-12 NOTE — PROGRESS NOTES
SBT not done at this time due to bradycardic episodes and now requiring 1100% FIO2 and 10cm of peep

## 2025-02-13 LAB
ANION GAP SERPL CALC-SCNC: 5 MMOL/L (ref 2–12)
BUN SERPL-MCNC: 59 MG/DL (ref 6–20)
BUN/CREAT SERPL: 31 (ref 12–20)
CA-I BLD-MCNC: 9.5 MG/DL (ref 8.5–10.1)
CHLORIDE SERPL-SCNC: 104 MMOL/L (ref 97–108)
CO2 SERPL-SCNC: 29 MMOL/L (ref 21–32)
CREAT SERPL-MCNC: 1.92 MG/DL (ref 0.55–1.02)
ERYTHROCYTE [DISTWIDTH] IN BLOOD BY AUTOMATED COUNT: 16.1 % (ref 11.5–14.5)
ERYTHROCYTE [DISTWIDTH] IN BLOOD BY AUTOMATED COUNT: 16.3 % (ref 11.5–14.5)
GLUCOSE BLD STRIP.AUTO-MCNC: 109 MG/DL (ref 65–100)
GLUCOSE BLD STRIP.AUTO-MCNC: 123 MG/DL (ref 65–100)
GLUCOSE BLD STRIP.AUTO-MCNC: 137 MG/DL (ref 65–100)
GLUCOSE BLD STRIP.AUTO-MCNC: 172 MG/DL (ref 65–100)
GLUCOSE BLD STRIP.AUTO-MCNC: 67 MG/DL (ref 65–100)
GLUCOSE BLD STRIP.AUTO-MCNC: 68 MG/DL (ref 65–100)
GLUCOSE BLD STRIP.AUTO-MCNC: 70 MG/DL (ref 65–100)
GLUCOSE BLD STRIP.AUTO-MCNC: 88 MG/DL (ref 65–100)
GLUCOSE SERPL-MCNC: 65 MG/DL (ref 65–100)
HCT VFR BLD AUTO: 23.7 % (ref 35–47)
HCT VFR BLD AUTO: 24.1 % (ref 35–47)
HGB BLD-MCNC: 7.1 G/DL (ref 11.5–16)
HGB BLD-MCNC: 7.5 G/DL (ref 11.5–16)
MCH RBC QN AUTO: 28.1 PG (ref 26–34)
MCH RBC QN AUTO: 29.2 PG (ref 26–34)
MCHC RBC AUTO-ENTMCNC: 30 G/DL (ref 30–36.5)
MCHC RBC AUTO-ENTMCNC: 31.1 G/DL (ref 30–36.5)
MCV RBC AUTO: 93.7 FL (ref 80–99)
MCV RBC AUTO: 93.8 FL (ref 80–99)
NRBC # BLD: 0 K/UL (ref 0–0.01)
NRBC # BLD: 0.02 K/UL (ref 0–0.01)
NRBC BLD-RTO: 0 PER 100 WBC
NRBC BLD-RTO: 0.2 PER 100 WBC
PERFORMED BY:: ABNORMAL
PERFORMED BY:: NORMAL
PLATELET # BLD AUTO: 177 K/UL (ref 150–400)
PLATELET # BLD AUTO: 188 K/UL (ref 150–400)
PMV BLD AUTO: 10.3 FL (ref 8.9–12.9)
PMV BLD AUTO: 10.6 FL (ref 8.9–12.9)
POTASSIUM SERPL-SCNC: 5.6 MMOL/L (ref 3.5–5.1)
RBC # BLD AUTO: 2.53 M/UL (ref 3.8–5.2)
RBC # BLD AUTO: 2.57 M/UL (ref 3.8–5.2)
SODIUM SERPL-SCNC: 138 MMOL/L (ref 136–145)
WBC # BLD AUTO: 11.3 K/UL (ref 3.6–11)
WBC # BLD AUTO: 9.5 K/UL (ref 3.6–11)

## 2025-02-13 PROCEDURE — 0DH63UZ INSERTION OF FEEDING DEVICE INTO STOMACH, PERCUTANEOUS APPROACH: ICD-10-PCS | Performed by: INTERNAL MEDICINE

## 2025-02-13 PROCEDURE — 6370000000 HC RX 637 (ALT 250 FOR IP): Performed by: INTERNAL MEDICINE

## 2025-02-13 PROCEDURE — 2580000003 HC RX 258: Performed by: NURSE PRACTITIONER

## 2025-02-13 PROCEDURE — 2700000000 HC OXYGEN THERAPY PER DAY

## 2025-02-13 PROCEDURE — 2580000003 HC RX 258: Performed by: STUDENT IN AN ORGANIZED HEALTH CARE EDUCATION/TRAINING PROGRAM

## 2025-02-13 PROCEDURE — 6360000002 HC RX W HCPCS: Performed by: STUDENT IN AN ORGANIZED HEALTH CARE EDUCATION/TRAINING PROGRAM

## 2025-02-13 PROCEDURE — 94761 N-INVAS EAR/PLS OXIMETRY MLT: CPT

## 2025-02-13 PROCEDURE — 6370000000 HC RX 637 (ALT 250 FOR IP): Performed by: NURSE PRACTITIONER

## 2025-02-13 PROCEDURE — 3E0G76Z INTRODUCTION OF NUTRITIONAL SUBSTANCE INTO UPPER GI, VIA NATURAL OR ARTIFICIAL OPENING: ICD-10-PCS | Performed by: INTERNAL MEDICINE

## 2025-02-13 PROCEDURE — 3700000001 HC ADD 15 MINUTES (ANESTHESIA): Performed by: INTERNAL MEDICINE

## 2025-02-13 PROCEDURE — 2500000003 HC RX 250 WO HCPCS: Performed by: STUDENT IN AN ORGANIZED HEALTH CARE EDUCATION/TRAINING PROGRAM

## 2025-02-13 PROCEDURE — 3600007512: Performed by: INTERNAL MEDICINE

## 2025-02-13 PROCEDURE — 36415 COLL VENOUS BLD VENIPUNCTURE: CPT

## 2025-02-13 PROCEDURE — 2000000000 HC ICU R&B

## 2025-02-13 PROCEDURE — 94003 VENT MGMT INPAT SUBQ DAY: CPT

## 2025-02-13 PROCEDURE — 3600007502: Performed by: INTERNAL MEDICINE

## 2025-02-13 PROCEDURE — 94640 AIRWAY INHALATION TREATMENT: CPT

## 2025-02-13 PROCEDURE — 6360000002 HC RX W HCPCS: Performed by: INTERNAL MEDICINE

## 2025-02-13 PROCEDURE — 80048 BASIC METABOLIC PNL TOTAL CA: CPT

## 2025-02-13 PROCEDURE — 2709999900 HC NON-CHARGEABLE SUPPLY: Performed by: INTERNAL MEDICINE

## 2025-02-13 PROCEDURE — 82962 GLUCOSE BLOOD TEST: CPT

## 2025-02-13 PROCEDURE — 85027 COMPLETE CBC AUTOMATED: CPT

## 2025-02-13 PROCEDURE — 3700000000 HC ANESTHESIA ATTENDED CARE: Performed by: INTERNAL MEDICINE

## 2025-02-13 PROCEDURE — 6370000000 HC RX 637 (ALT 250 FOR IP): Performed by: STUDENT IN AN ORGANIZED HEALTH CARE EDUCATION/TRAINING PROGRAM

## 2025-02-13 PROCEDURE — 6360000002 HC RX W HCPCS: Performed by: NURSE ANESTHETIST, CERTIFIED REGISTERED

## 2025-02-13 RX ORDER — ATORVASTATIN CALCIUM 20 MG/1
20 TABLET, FILM COATED ORAL NIGHTLY
Status: DISCONTINUED | OUTPATIENT
Start: 2025-02-13 | End: 2025-03-14 | Stop reason: HOSPADM

## 2025-02-13 RX ORDER — CEFAZOLIN SODIUM 1 G/3ML
INJECTION, POWDER, FOR SOLUTION INTRAMUSCULAR; INTRAVENOUS
Status: DISCONTINUED | OUTPATIENT
Start: 2025-02-13 | End: 2025-02-13 | Stop reason: SDUPTHER

## 2025-02-13 RX ORDER — FOLIC ACID 1 MG/1
1 TABLET ORAL DAILY
Status: DISCONTINUED | OUTPATIENT
Start: 2025-02-14 | End: 2025-03-14 | Stop reason: HOSPADM

## 2025-02-13 RX ORDER — SENNOSIDES A AND B 8.6 MG/1
1 TABLET, FILM COATED ORAL DAILY PRN
Status: DISCONTINUED | OUTPATIENT
Start: 2025-02-13 | End: 2025-03-14 | Stop reason: HOSPADM

## 2025-02-13 RX ORDER — ASPIRIN 81 MG/1
81 TABLET, CHEWABLE ORAL DAILY
Status: DISCONTINUED | OUTPATIENT
Start: 2025-02-14 | End: 2025-03-14 | Stop reason: HOSPADM

## 2025-02-13 RX ORDER — MIDODRINE HYDROCHLORIDE 5 MG/1
10 TABLET ORAL 3 TIMES DAILY
Status: DISCONTINUED | OUTPATIENT
Start: 2025-02-13 | End: 2025-03-08

## 2025-02-13 RX ORDER — BUPROPION HYDROCHLORIDE 75 MG/1
75 TABLET ORAL 2 TIMES DAILY
Status: DISCONTINUED | OUTPATIENT
Start: 2025-02-13 | End: 2025-03-05

## 2025-02-13 RX ORDER — ACETAMINOPHEN 160 MG/5ML
650 LIQUID ORAL EVERY 6 HOURS PRN
Status: DISCONTINUED | OUTPATIENT
Start: 2025-02-13 | End: 2025-03-14 | Stop reason: HOSPADM

## 2025-02-13 RX ORDER — POLYETHYLENE GLYCOL 3350 17 G/17G
17 POWDER, FOR SOLUTION ORAL DAILY PRN
Status: DISCONTINUED | OUTPATIENT
Start: 2025-02-13 | End: 2025-02-26

## 2025-02-13 RX ORDER — PROPOFOL 10 MG/ML
INJECTION, EMULSION INTRAVENOUS
Status: DISCONTINUED | OUTPATIENT
Start: 2025-02-13 | End: 2025-02-13 | Stop reason: SDUPTHER

## 2025-02-13 RX ORDER — VENLAFAXINE 25 MG/1
37.5 TABLET ORAL 2 TIMES DAILY WITH MEALS
Status: DISCONTINUED | OUTPATIENT
Start: 2025-02-13 | End: 2025-02-26

## 2025-02-13 RX ORDER — LIDOCAINE HYDROCHLORIDE 20 MG/ML
INJECTION, SOLUTION EPIDURAL; INFILTRATION; INTRACAUDAL; PERINEURAL
Status: DISCONTINUED | OUTPATIENT
Start: 2025-02-13 | End: 2025-02-13 | Stop reason: SDUPTHER

## 2025-02-13 RX ORDER — ACETAMINOPHEN 650 MG/1
650 SUPPOSITORY RECTAL EVERY 6 HOURS PRN
Status: DISCONTINUED | OUTPATIENT
Start: 2025-02-13 | End: 2025-03-14 | Stop reason: HOSPADM

## 2025-02-13 RX ORDER — HYDROXYZINE HYDROCHLORIDE 25 MG/1
25 TABLET, FILM COATED ORAL 3 TIMES DAILY PRN
Status: DISCONTINUED | OUTPATIENT
Start: 2025-02-13 | End: 2025-03-14 | Stop reason: HOSPADM

## 2025-02-13 RX ADMIN — SODIUM ZIRCONIUM CYCLOSILICATE 10 G: 10 POWDER, FOR SUSPENSION ORAL at 21:16

## 2025-02-13 RX ADMIN — MIDODRINE HYDROCHLORIDE 10 MG: 5 TABLET ORAL at 16:37

## 2025-02-13 RX ADMIN — SODIUM CHLORIDE, PRESERVATIVE FREE 10 ML: 5 INJECTION INTRAVENOUS at 21:00

## 2025-02-13 RX ADMIN — IPRATROPIUM BROMIDE AND ALBUTEROL SULFATE 1 DOSE: 2.5; .5 SOLUTION RESPIRATORY (INHALATION) at 19:33

## 2025-02-13 RX ADMIN — POLYETHYLENE GLYCOL 3350 17 G: 17 POWDER, FOR SOLUTION ORAL at 16:41

## 2025-02-13 RX ADMIN — INSULIN HUMAN 10 UNITS: 100 INJECTION, SOLUTION PARENTERAL at 06:06

## 2025-02-13 RX ADMIN — BUDESONIDE 500 MCG: 0.5 INHALANT ORAL at 07:59

## 2025-02-13 RX ADMIN — PROPOFOL 40 MG: 10 INJECTION, EMULSION INTRAVENOUS at 12:14

## 2025-02-13 RX ADMIN — BUPROPION HYDROCHLORIDE 75 MG: 75 TABLET, FILM COATED ORAL at 21:15

## 2025-02-13 RX ADMIN — PANTOPRAZOLE SODIUM 40 MG: 40 INJECTION, POWDER, FOR SOLUTION INTRAVENOUS at 21:00

## 2025-02-13 RX ADMIN — CEFAZOLIN 2 G: 1 INJECTION, POWDER, FOR SOLUTION INTRAMUSCULAR; INTRAVENOUS at 12:08

## 2025-02-13 RX ADMIN — BUPROPION HYDROCHLORIDE 75 MG: 75 TABLET, FILM COATED ORAL at 07:47

## 2025-02-13 RX ADMIN — ALTEPLASE 1 MG: 2.2 INJECTION, POWDER, LYOPHILIZED, FOR SOLUTION INTRAVENOUS at 14:41

## 2025-02-13 RX ADMIN — IPRATROPIUM BROMIDE AND ALBUTEROL SULFATE 1 DOSE: 2.5; .5 SOLUTION RESPIRATORY (INHALATION) at 07:59

## 2025-02-13 RX ADMIN — MIDODRINE HYDROCHLORIDE 10 MG: 5 TABLET ORAL at 21:16

## 2025-02-13 RX ADMIN — FOLIC ACID 1 MG: 1 TABLET ORAL at 07:47

## 2025-02-13 RX ADMIN — PROPOFOL 40 MG: 10 INJECTION, EMULSION INTRAVENOUS at 12:16

## 2025-02-13 RX ADMIN — LIDOCAINE HYDROCHLORIDE 80 MG: 20 INJECTION, SOLUTION EPIDURAL; INFILTRATION; INTRACAUDAL; PERINEURAL at 12:08

## 2025-02-13 RX ADMIN — SODIUM CHLORIDE 125 MG: 9 INJECTION, SOLUTION INTRAVENOUS at 16:00

## 2025-02-13 RX ADMIN — PANTOPRAZOLE SODIUM 40 MG: 40 INJECTION, POWDER, FOR SOLUTION INTRAVENOUS at 07:48

## 2025-02-13 RX ADMIN — VENLAFAXINE 37.5 MG: 25 TABLET ORAL at 07:47

## 2025-02-13 RX ADMIN — WHITE PETROLATUM,ZINC OXIDE: 57; 17 PASTE TOPICAL at 08:11

## 2025-02-13 RX ADMIN — VENLAFAXINE 37.5 MG: 25 TABLET ORAL at 16:37

## 2025-02-13 RX ADMIN — ATORVASTATIN CALCIUM 20 MG: 20 TABLET, FILM COATED ORAL at 21:16

## 2025-02-13 RX ADMIN — SODIUM ZIRCONIUM CYCLOSILICATE 10 G: 10 POWDER, FOR SUSPENSION ORAL at 07:48

## 2025-02-13 RX ADMIN — PROPOFOL 60 MG: 10 INJECTION, EMULSION INTRAVENOUS at 12:09

## 2025-02-13 RX ADMIN — DEXTROSE MONOHYDRATE 125 ML: 100 INJECTION, SOLUTION INTRAVENOUS at 11:36

## 2025-02-13 RX ADMIN — CINACALCET HYDROCHLORIDE 30 MG: 30 TABLET, FILM COATED ORAL at 07:47

## 2025-02-13 RX ADMIN — SODIUM CHLORIDE, PRESERVATIVE FREE 10 ML: 5 INJECTION INTRAVENOUS at 07:48

## 2025-02-13 RX ADMIN — WHITE PETROLATUM,ZINC OXIDE: 57; 17 PASTE TOPICAL at 21:00

## 2025-02-13 RX ADMIN — ASPIRIN 81 MG 81 MG: 81 TABLET ORAL at 07:47

## 2025-02-13 RX ADMIN — Medication 3 MG: at 21:16

## 2025-02-13 RX ADMIN — PROPOFOL 40 MG: 10 INJECTION, EMULSION INTRAVENOUS at 12:12

## 2025-02-13 RX ADMIN — DEXTROSE MONOHYDRATE 125 ML: 100 INJECTION, SOLUTION INTRAVENOUS at 17:22

## 2025-02-13 RX ADMIN — DEXTROSE MONOHYDRATE 250 ML: 100 INJECTION, SOLUTION INTRAVENOUS at 06:03

## 2025-02-13 RX ADMIN — BUDESONIDE 500 MCG: 0.5 INHALANT ORAL at 19:33

## 2025-02-13 RX ADMIN — MIDODRINE HYDROCHLORIDE 10 MG: 5 TABLET ORAL at 07:48

## 2025-02-13 ASSESSMENT — PULMONARY FUNCTION TESTS
PIF_VALUE: 21
PIF_VALUE: 21
PIF_VALUE: 26
PIF_VALUE: 21
PIF_VALUE: 19
PIF_VALUE: 25
PIF_VALUE: 20
PIF_VALUE: 21
PIF_VALUE: 21
PIF_VALUE: 28
PIF_VALUE: 20
PIF_VALUE: 21
PIF_VALUE: 22
PIF_VALUE: 18
PIF_VALUE: 22
PIF_VALUE: 21
PIF_VALUE: 21
PIF_VALUE: 23
PIF_VALUE: 22
PIF_VALUE: 18
PIF_VALUE: 24

## 2025-02-13 ASSESSMENT — PAIN SCALES - GENERAL: PAINLEVEL_OUTOF10: 0

## 2025-02-13 NOTE — PROGRESS NOTES
Nutrition Note    Type and Reason for Visit:  TF Modification     Pt w/ incr K, TF changed to Nepro per nephrology. Will recalculate feeds. s/p leadless pacemaker 2/11, PEG today. Labs: h/h 7.1/23.7, , K 5.6, BUN 59, Cr 1.92. Meds: folic acid, ferric gluconate.    Nutrition Recommendations/Plan:   After PEG is cleared for use by MD, initiate TF w/ Nepro @ 20 ml/hr and advance 10 ml/hr every 4 hours to a goal rate of 45 ml/hr + 1 Prosource BID. Administer 200 ml free water flushes every 4 hours.  TF provides: 1944 kcals, 87 g protien, and 784 ml free water  Prosource: +120 kcals, +30 g protein  TOTAL: 2064 kcals (97% est need), 117 g protein (96% est need) and 1984 ml free water   Pt having loose stools after constipation, low threshold for further assessment of obstipation  Monitor TF rate/tolerance, labs, fluid status, and bowel fxn/abd exam         Estimated Daily Nutrient Needs:  Energy Requirements Based On: Formula  Weight Used for Energy Requirements: Current  Energy (kcal/day): 2158 kcals/d (MSJ1.2/1.1)  Weight Used for Protein Requirements: Ideal  Protein (g/day): 122 g/d (2 g/kg IBW)  Method Used for Fluid Requirements: 1 ml/kcal  Fluid (ml/day): 2158 ml/d    Winnie Garrison RD  Contact: [phone]

## 2025-02-13 NOTE — PROGRESS NOTES
Ventilator settings as follows:    Vt 390  FiO2 35%  R 10  Peep 5.0    Pt being suctioned as needed.

## 2025-02-13 NOTE — PROGRESS NOTES
Renal  Note    NAME:  Nida Graves   :   1975   MRN:   [id number]     ATTENDING: Suyapa Rea DO  PCP:  Luis Lees APRN - CNP    Date/Time:  2025 3:04 PM      Subjective:   REQUESTING PHYSICIAN:  REASON FOR CONSULT:    KIZZY    Patient seen in the ICU.  She is more awake today responds to questions.  She is being scheduled for PEG tube placement this morning.  Had PPM placed yesterday   Creatinine is stable at 1.9 today  Potassium increased to 5.6 on recent labs  Lactic acid is still elevated        Past Medical History:   Diagnosis Date    Allergy     Asthma     Diabetes (HCC)       Past Surgical History:   Procedure Laterality Date    CARDIAC PROCEDURE N/A 2025    Left heart cath / coronary angiography performed by Manuel Coburn MD at [facility] CARDIAC CATH LAB     SECTION      X 2    GYN      randee, csection scar revision    TRACHEOSTOMY N/A 2025    TRACHEOSTOMY PERCUTANEOUS performed by Jack Badillo MD at [facility] MAIN OR    UPPER GASTROINTESTINAL ENDOSCOPY N/A 2025    ESOPHAGOGASTRODUODENOSCOPY PERCUTANEOUS ENDOSCOPIC GASTROSTOMY TUBE PLACEMENT performed by Rocky Cristobal MD at [facility] ENDOSCOPY     Social History     Tobacco Use    Smoking status: Never    Smokeless tobacco: Never   Substance Use Topics    Alcohol use: No      Family History   Problem Relation Age of Onset    Cancer Other         aunt with colon ca    Stroke Neg Hx     Heart Attack Neg Hx     Hypertension Father     Cancer Father         neck ca    Thyroid Disease Mother     Diabetes Mother     Hypertension Mother     Cancer Mother         cervical ca       Allergies   Allergen Reactions    Latex Swelling    Penicillins Hives    Codeine Anxiety      Prior to Admission medications    Medication Sig Start Date End Date Taking? Authorizing Provider   buPROPion (WELLBUTRIN XL) 150 MG extended release tablet Take 1 tablet by mouth daily   Yes Provider, MD Mary Ellen   traZODone (DESYREL) 50 MG tablet  1 which I will continue to hold.  No significant volume overload noted  -Likely edema from third spacing from severe hypoalbuminemia.  Albumin 1.8 which is improved to 3.3 today with IV albumin  -No IV contrast over the past 48 to 72 hours  -I have gone through medication list.  Not on any potential nephrotoxins  -Urine is bland will quantify proteinuria  -Follow-up on renal function in a.m.    #2  Hyperkalemia  -Potassium 5.6.  Will give Lokelma   -Likely because of KIZZY  -Not on any potassium supplements  -I have changed tube feeding to Nepro because of hypokalemia.  She is having PEG tube placed today    #3  Hypercalcemia  -Calcium is 10  but corrected calcium 10.6  -She is not on any calcium or vitamin D supplements.  PTH is 106.  Started Sensipar 30 mg daily    #4  Respiratory distress  -She was extubated on 1/26 but reintubated on 1/27 because of inability to protect airway.  Tracheostomy was done on 1/30  -Currently on mechanical ventilation via trach  -Pressure support on trach collar trials as tolerated    #5  Hypotension  -She was noted to be hypotensive overnight.  Currently on pressors.  Also on dopamine  -WBC count today increased to 17.5--11.3.  Afebrile now. currently not on any antibiotics.    -Urine analysis is bland  -Last EF was 20 to 30% with right ventricular overload, moderate TR  -Lasix on hold because of worsening renal function.  I will continue to hold today  -Cardiology on the case.  S/p PPM on 2/12  -On epinephrine for bradycardia.        ________________________________________________________________________  Signed: Michelle Flores MD   Principal Discharge DX:	Normal vaginal delivery  Assessment and plan of treatment:	s/p  was iol for severe IUGR,had chroio now afebrilefor 16 hrs  doing well pp encourage ambulating and breastfeeding  dc home if stable  fup in 4-6 wks

## 2025-02-13 NOTE — ANESTHESIA POSTPROCEDURE EVALUATION
Department of Anesthesiology  Postprocedure Note    Patient: Nida Graves  MRN: [id number]  YOB: 1975  Date of evaluation: 2/13/2025    Procedure Summary       Date: 02/13/25 Room / Location: [facility] ENDO TRAVEL / [facility] ENDOSCOPY    Anesthesia Start: 1206 Anesthesia Stop: 1222    Procedure: ESOPHAGOGASTRODUODENOSCOPY PERCUTANEOUS ENDOSCOPIC GASTROSTOMY TUBE PLACEMENT (Upper GI Region) Diagnosis:       Dysphagia, unspecified type      (Dysphagia, unspecified type [R13.10])    Surgeons: Rocky Cristobal MD Responsible Provider: Trace Strickland MD    Anesthesia Type: general ASA Status: 4            Anesthesia Type: No value filed.    Caroline Phase I:      Caroline Phase II:      Anesthesia Post Evaluation    Patient location during evaluation: bedside (Endoscopy Unit)  Patient participation: complete - patient participated  Level of consciousness: sleepy but conscious  Pain score: 0  Airway patency: patent  Nausea & Vomiting: no nausea and no vomiting  Cardiovascular status: hemodynamically stable  Respiratory status: acceptable  Hydration status: stable  Comments: This patient remained on the stretcher.  The patient was handed off to the endoscopy nursing team.  All questions regarding pre-, intra-, and postoperative care were answered.  Multimodal analgesia pain management approach    No notable events documented.

## 2025-02-13 NOTE — PROGRESS NOTES
CRITICAL CARE PROGRESS NOTE    Name: Nida Graves   : 1975   MRN: [id number]   Date: 2025      Diagnoses/problem list:   Acute hypoxic and hypercapnic respiratory failure  Acute kidney injury  Atrial flutter with slow ventricular response  Complete heart block  GI bleed  Nosebleed, resolved  NSTEMI  Symptomatic bradycardia  Biventricular failure  Acute HFrEF, 25 to 30%  Diabetes  Morbid obesity  Acute metabolic/septic encephalopathy    24-hour events:   Had leadless pacemaker placement yesterday and working well. No other acute overnights.     Assessment and plan:   Ms. Graves is a 49-year-old female with PMH chronic debility and bedbound status, diabetes, asthma, obesity, who presented for shortness of breath.  Shortness of breath worsening over the past few days.  Upon presentation ED patient found to be hypoxic necessita.  Ting O2 via NC.  She was also found to be in A-fib with slow ventricular response.  She was transferred out of ICU on . Early morning  upgraded to ICU again due to severe hypoxia and hypercapnia requiring intubation. Now s/p trach on . Leadless pacemaker     NEUROLOGICAL:    Continue Bupropion and Venlafaxine   Delirium precautions  PT/OT    PULMONOLOGY:   Extubated on , reintubated on  due to inability to protect airway  ENT placed trach on   Continue to wean FiO2 as tolerated  Pressure support and trach collar trials as tolerated    CARDIOVASCULAR:   Midodrine to keep MAP above 65   S/p AUGUSTIN/DCCV on   LVEF 20-30%, right ventricle overload, moderate TR   GDMT for HFrEF as tolerated  Diuresis as needed  [facility] this admission: nonobstructive CAD; luminal RCA, Lcx; 40% mLAD   Continue ASA and Statin   Cardiology recs noted   Dopamine as needed to keep heart rate above 40  Avoid AVN blocking agents  Leadless pacemaker     GASTROINTESTINAL:   Noted to have dark stools on . Occult stool positive for blood. CTA abdomen/pelvis without evidence of

## 2025-02-13 NOTE — CARE COORDINATION
CM reviewed Pt medicals, Pt got her Pacemaker yesterday.  Pt will get PEG tube.    Palliative Care is following Pt.    Sent updated medicals to [facility].

## 2025-02-13 NOTE — WOUND CARE
Wound Care Note:    Wound Care into see patient because of wound under tracheostomy collar    Skin Care & Pressure Relief Recommendations:  Minimize layers of linen  Pads under patient to optimize support surface and microclimate  Turn/reposition approximately every 2 hours.  Pillow Wedges  Manage incontinence  Promote continence; Skin Protective lotion to buttocks and sacrum daily and as needed with incontinence care    Offload heels with pillows or offloading boots.    Support surface: Isolibrium    A partial-thickness wound is noted to anterior neck beneath tracheostomy collar.  Skin is denuded and slightly moist.  Likely related to MASD from secretions and possible pressure.  I cleansed the area with saline and applied to a cut-to-fit lite foam to reduce pressure to area from tracheostomy collar.    -For anterior neck: cleanse gently with saline and cloth or cotton swab, pat dry, and cover with a cut-to-fit ( for trach site) lite foam daily and prn for soiling.    Prevention:  Apply Optifoam Border Sacral foam dressing to sacrum every three days to redistribute pressure from bony prominence and prevent pressure and friction injury to skin.  Rn is to gently peel back foam dressing for shift integumentary assessment and re-secure.  Maintain the the external  incontinence management system to manage  incontinence.  Apply zinc paste TID and prn for soiling to buttocks and gluteal folds / cleft to protect skin from incontinence related breakdown.  Use foam wedge to turn q2h at 30 degree angle to offload sacrum.  Float heels with 1-2 pillows lengthwise while in bed for offloading of the heels.  Maintain HOB at 30 degrees or less, if not contraindicated, to reduce pressure to buttocks and sacrum.  Raise foot of bed to help prevent friction and shear injury from sliding down in the bed.  Re-consult [facility] for other skin/wound care concerns.

## 2025-02-13 NOTE — PROGRESS NOTES
Spiritual Health History and Assessment/Progress Note  [facility]    Attempted Encounter,  ,  ,      Name: Nida Graves MRN: [id number]    Age: 49 y.o.     Sex: female   Language: English   Orthodoxy: Anabaptist   Acute on chronic hypoxic respiratory failure     Date: 2/13/2025            Total Time Calculated: 10 min              Spiritual Assessment began in [facility] 2 [facility] ICU            Encounter Overview/Reason: Attempted Encounter  Service Provided For: Patient not available    Nikki, Belief, Meaning:   Patient unable to assess at this time  Family/Friends No family/friends present      Importance and Influence:  Patient unable to assess at this time  Family/Friends No family/friends present    Community:  Patient Other: unable to assess at this time  Family/Friends No family/friends present    Assessment and Plan of Care:     Patient Interventions include: Other: n/a  Family/Friends Interventions include: No family/friends present    Patient Plan of Care: Spiritual Care available upon further referral  Family/Friends Plan of Care: Spiritual Care available upon further referral and No family/friends present    I attempted to meet with patient for a routine visit; she was asleep at time of attempt. I will hope to follow up at a later date when she is awake. I also consulted with GUILLERMO Harris, and learned patient has a daughter who supports her. Further  support available for her, too, upon request.    Electronically signed by  Rylan Tirado MDiv  Chaplain Resident   on 2/13/2025 at 3:15 PM

## 2025-02-13 NOTE — PROGRESS NOTES
Arrived to the bedside to assess leadless pacemaker access site ~24 hours post procedure. The initial dressing was removed to expose the figure 8 suture which was cut and removed. Once the suture was removed minor oozing was noted from the site. Manual pressure was held for 10 minutes with a 4x4. A quick-clot interventional dressing was placed with a Tegaderm over top of it.     Site noted to be clean and dry. No hematoma noted.

## 2025-02-14 LAB
ALBUMIN SERPL-MCNC: 2.7 G/DL (ref 3.5–5)
ANION GAP SERPL CALC-SCNC: 6 MMOL/L (ref 2–12)
BUN SERPL-MCNC: 53 MG/DL (ref 6–20)
BUN/CREAT SERPL: 27 (ref 12–20)
CA-I BLD-MCNC: 9.2 MG/DL (ref 8.5–10.1)
CHLORIDE SERPL-SCNC: 104 MMOL/L (ref 97–108)
CO2 SERPL-SCNC: 28 MMOL/L (ref 21–32)
CREAT SERPL-MCNC: 1.99 MG/DL (ref 0.55–1.02)
ERYTHROCYTE [DISTWIDTH] IN BLOOD BY AUTOMATED COUNT: 16.2 % (ref 11.5–14.5)
GLUCOSE BLD STRIP.AUTO-MCNC: 111 MG/DL (ref 65–100)
GLUCOSE BLD STRIP.AUTO-MCNC: 112 MG/DL (ref 65–100)
GLUCOSE BLD STRIP.AUTO-MCNC: 115 MG/DL (ref 65–100)
GLUCOSE BLD STRIP.AUTO-MCNC: 69 MG/DL (ref 65–100)
GLUCOSE BLD STRIP.AUTO-MCNC: 91 MG/DL (ref 65–100)
GLUCOSE BLD STRIP.AUTO-MCNC: 94 MG/DL (ref 65–100)
GLUCOSE SERPL-MCNC: 57 MG/DL (ref 65–100)
HCT VFR BLD AUTO: 24.4 % (ref 35–47)
HGB BLD-MCNC: 7.3 G/DL (ref 11.5–16)
MCH RBC QN AUTO: 28.4 PG (ref 26–34)
MCHC RBC AUTO-ENTMCNC: 29.9 G/DL (ref 30–36.5)
MCV RBC AUTO: 94.9 FL (ref 80–99)
NRBC # BLD: 0 K/UL (ref 0–0.01)
NRBC BLD-RTO: 0 PER 100 WBC
PERFORMED BY:: ABNORMAL
PERFORMED BY:: NORMAL
PHOSPHATE SERPL-MCNC: 4.1 MG/DL (ref 2.6–4.7)
PLATELET # BLD AUTO: 178 K/UL (ref 150–400)
PMV BLD AUTO: 10.5 FL (ref 8.9–12.9)
POTASSIUM SERPL-SCNC: 4.7 MMOL/L (ref 3.5–5.1)
RBC # BLD AUTO: 2.57 M/UL (ref 3.8–5.2)
SODIUM SERPL-SCNC: 138 MMOL/L (ref 136–145)
WBC # BLD AUTO: 10.3 K/UL (ref 3.6–11)

## 2025-02-14 PROCEDURE — 2000000000 HC ICU R&B

## 2025-02-14 PROCEDURE — 6370000000 HC RX 637 (ALT 250 FOR IP): Performed by: INTERNAL MEDICINE

## 2025-02-14 PROCEDURE — 82962 GLUCOSE BLOOD TEST: CPT

## 2025-02-14 PROCEDURE — 94003 VENT MGMT INPAT SUBQ DAY: CPT

## 2025-02-14 PROCEDURE — 43762 RPLC GTUBE NO REVJ TRC: CPT

## 2025-02-14 PROCEDURE — 6360000002 HC RX W HCPCS: Performed by: INTERNAL MEDICINE

## 2025-02-14 PROCEDURE — 6360000002 HC RX W HCPCS: Performed by: STUDENT IN AN ORGANIZED HEALTH CARE EDUCATION/TRAINING PROGRAM

## 2025-02-14 PROCEDURE — 2500000003 HC RX 250 WO HCPCS: Performed by: STUDENT IN AN ORGANIZED HEALTH CARE EDUCATION/TRAINING PROGRAM

## 2025-02-14 PROCEDURE — 2580000003 HC RX 258: Performed by: STUDENT IN AN ORGANIZED HEALTH CARE EDUCATION/TRAINING PROGRAM

## 2025-02-14 PROCEDURE — 80069 RENAL FUNCTION PANEL: CPT

## 2025-02-14 PROCEDURE — 6370000000 HC RX 637 (ALT 250 FOR IP): Performed by: STUDENT IN AN ORGANIZED HEALTH CARE EDUCATION/TRAINING PROGRAM

## 2025-02-14 PROCEDURE — 94640 AIRWAY INHALATION TREATMENT: CPT

## 2025-02-14 PROCEDURE — 85027 COMPLETE CBC AUTOMATED: CPT

## 2025-02-14 PROCEDURE — 36415 COLL VENOUS BLD VENIPUNCTURE: CPT

## 2025-02-14 RX ORDER — LANSOPRAZOLE 30 MG/1
30 TABLET, ORALLY DISINTEGRATING, DELAYED RELEASE ORAL
Status: DISCONTINUED | OUTPATIENT
Start: 2025-02-15 | End: 2025-03-14 | Stop reason: HOSPADM

## 2025-02-14 RX ORDER — FUROSEMIDE 10 MG/ML
20 INJECTION INTRAMUSCULAR; INTRAVENOUS ONCE
Status: COMPLETED | OUTPATIENT
Start: 2025-02-14 | End: 2025-02-14

## 2025-02-14 RX ORDER — HEPARIN SODIUM 5000 [USP'U]/ML
5000 INJECTION, SOLUTION INTRAVENOUS; SUBCUTANEOUS EVERY 8 HOURS SCHEDULED
Status: DISCONTINUED | OUTPATIENT
Start: 2025-02-14 | End: 2025-02-17

## 2025-02-14 RX ADMIN — ATORVASTATIN CALCIUM 20 MG: 20 TABLET, FILM COATED ORAL at 21:51

## 2025-02-14 RX ADMIN — ASPIRIN 81 MG 81 MG: 81 TABLET ORAL at 09:18

## 2025-02-14 RX ADMIN — BUDESONIDE 500 MCG: 0.5 INHALANT ORAL at 17:46

## 2025-02-14 RX ADMIN — SODIUM CHLORIDE, PRESERVATIVE FREE 10 ML: 5 INJECTION INTRAVENOUS at 21:51

## 2025-02-14 RX ADMIN — BUPROPION HYDROCHLORIDE 75 MG: 75 TABLET, FILM COATED ORAL at 21:51

## 2025-02-14 RX ADMIN — SODIUM CHLORIDE 125 MG: 9 INJECTION, SOLUTION INTRAVENOUS at 18:27

## 2025-02-14 RX ADMIN — MIDODRINE HYDROCHLORIDE 10 MG: 5 TABLET ORAL at 14:58

## 2025-02-14 RX ADMIN — HEPARIN SODIUM 5000 UNITS: 5000 INJECTION INTRAVENOUS; SUBCUTANEOUS at 21:51

## 2025-02-14 RX ADMIN — BUDESONIDE 500 MCG: 0.5 INHALANT ORAL at 06:06

## 2025-02-14 RX ADMIN — WHITE PETROLATUM,ZINC OXIDE: 57; 17 PASTE TOPICAL at 09:19

## 2025-02-14 RX ADMIN — HEPARIN SODIUM 5000 UNITS: 5000 INJECTION INTRAVENOUS; SUBCUTANEOUS at 14:58

## 2025-02-14 RX ADMIN — FOLIC ACID 1 MG: 1 TABLET ORAL at 09:18

## 2025-02-14 RX ADMIN — EPOETIN ALFA-EPBX 2500 UNITS: 3000 INJECTION, SOLUTION INTRAVENOUS; SUBCUTANEOUS at 17:09

## 2025-02-14 RX ADMIN — MIDODRINE HYDROCHLORIDE 10 MG: 5 TABLET ORAL at 21:51

## 2025-02-14 RX ADMIN — WHITE PETROLATUM,ZINC OXIDE: 57; 17 PASTE TOPICAL at 21:59

## 2025-02-14 RX ADMIN — PANTOPRAZOLE SODIUM 40 MG: 40 INJECTION, POWDER, FOR SOLUTION INTRAVENOUS at 09:16

## 2025-02-14 RX ADMIN — SODIUM CHLORIDE, PRESERVATIVE FREE 10 ML: 5 INJECTION INTRAVENOUS at 09:17

## 2025-02-14 RX ADMIN — VENLAFAXINE 37.5 MG: 25 TABLET ORAL at 17:09

## 2025-02-14 RX ADMIN — IPRATROPIUM BROMIDE AND ALBUTEROL SULFATE 1 DOSE: 2.5; .5 SOLUTION RESPIRATORY (INHALATION) at 14:22

## 2025-02-14 RX ADMIN — BUPROPION HYDROCHLORIDE 75 MG: 75 TABLET, FILM COATED ORAL at 09:18

## 2025-02-14 RX ADMIN — MIDODRINE HYDROCHLORIDE 10 MG: 5 TABLET ORAL at 09:18

## 2025-02-14 RX ADMIN — IPRATROPIUM BROMIDE AND ALBUTEROL SULFATE 1 DOSE: 2.5; .5 SOLUTION RESPIRATORY (INHALATION) at 06:06

## 2025-02-14 RX ADMIN — IPRATROPIUM BROMIDE AND ALBUTEROL SULFATE 1 DOSE: 2.5; .5 SOLUTION RESPIRATORY (INHALATION) at 17:45

## 2025-02-14 RX ADMIN — SODIUM ZIRCONIUM CYCLOSILICATE 10 G: 10 POWDER, FOR SUSPENSION ORAL at 09:17

## 2025-02-14 RX ADMIN — VENLAFAXINE 37.5 MG: 25 TABLET ORAL at 09:18

## 2025-02-14 RX ADMIN — DEXTROSE MONOHYDRATE 125 ML: 100 INJECTION, SOLUTION INTRAVENOUS at 06:23

## 2025-02-14 RX ADMIN — CINACALCET HYDROCHLORIDE 30 MG: 30 TABLET, FILM COATED ORAL at 14:57

## 2025-02-14 RX ADMIN — FUROSEMIDE 20 MG: 10 INJECTION, SOLUTION INTRAMUSCULAR; INTRAVENOUS at 09:17

## 2025-02-14 ASSESSMENT — PULMONARY FUNCTION TESTS
PIF_VALUE: 19
PIF_VALUE: 21
PIF_VALUE: 22
PIF_VALUE: 24
PIF_VALUE: 22
PIF_VALUE: 20
PIF_VALUE: 15
PIF_VALUE: 21
PIF_VALUE: 21
PIF_VALUE: 26
PIF_VALUE: 26
PIF_VALUE: 22
PIF_VALUE: 21
PIF_VALUE: 21
PIF_VALUE: 22
PIF_VALUE: 37
PIF_VALUE: 26
PIF_VALUE: 25
PIF_VALUE: 22
PIF_VALUE: 22
PIF_VALUE: 18
PIF_VALUE: 19
PIF_VALUE: 20

## 2025-02-14 NOTE — PROGRESS NOTES
CARDIOLOGY PROGRESS NOTE      Patient Name: Nida Graves  Age: 49 y.o.  Gender:female  :1975  MRN: [id number]    Patient seen and examined. This is a patient with a history of diabetes, asthma who presented with shortness of breath and hypoxia now being followed for atrial flutter and CHF.     Extubated , reintubated  d/t hypoxia. Now S/p trach 2025.       Patient seen and examined. Remains in ICU. More awake today, following commands. Remains in sinus rhythm. PEG tube placed yesterday. Reportedly, continues to fail SBT due to apnea.        Pertinent review of systems items noted above, all other systems are negative. Current medications reviewed..    Physical Examination    Allergies   Allergen Reactions    Latex Swelling    Penicillins Hives    Codeine Anxiety     Vitals:    25 1600   BP: 135/88   Pulse: 62   Resp: 14   Temp:    SpO2: 98%     Vital signs are stable  Trach, vent  Heart has a RRR.  No murmur  Lung- are ventilator breaths  Abdomen is soft, nontender, normal bowel sounds.  Extremities have mild LE edema  Skin is dry and warm.    Labs reviewed:  Recent Results (from the past 12 hour(s))   CBC    Collection Time: 25  5:00 AM   Result Value Ref Range    WBC 10.3 3.6 - 11.0 K/uL    RBC 2.57 (L) 3.80 - 5.20 M/uL    Hemoglobin 7.3 (L) 11.5 - 16.0 g/dL    Hematocrit 24.4 (L) 35.0 - 47.0 %    MCV 94.9 80.0 - 99.0 FL    MCH 28.4 26.0 - 34.0 PG    MCHC 29.9 (L) 30.0 - 36.5 g/dL    RDW 16.2 (H) 11.5 - 14.5 %    Platelets 178 150 - 400 K/uL    MPV 10.5 8.9 - 12.9 FL    Nucleated RBCs 0.0 0.0  WBC    nRBC 0.00 0.00 - 0.01 K/uL   Renal Function Panel    Collection Time: 25  5:00 AM   Result Value Ref Range    Sodium 138 136 - 145 mmol/L    Potassium 4.7 3.5 - 5.1 mmol/L    Chloride 104 97 - 108 mmol/L    CO2 28 21 - 32 mmol/L    Anion Gap 6 2 - 12 mmol/L    Glucose 57 (L) 65 - 100 mg/dL    BUN 53 (H) 6 - 20 mg/dL    Creatinine 1.99 (H) 0.55 - 1.02 mg/dL

## 2025-02-14 NOTE — PROGRESS NOTES
Renal  Note    NAME:  Nida Graves   :   1975   MRN:   [id number]     ATTENDING: Suyapa Rea DO  PCP:  Luis Lees APRN - CNP    Date/Time:  2025 3:24 PM      Subjective:   REQUESTING PHYSICIAN:  REASON FOR CONSULT:    KIZZY    Patient seen in the ICU.  She is more awake today responds to questions.    Creatinine is stable at 1.9 today  Potassium has improved to 4.7 to   lactic acid is still elevated  She had PEG tube placement yesterday bedside      Past Medical History:   Diagnosis Date    Allergy     Asthma     Diabetes (HCC)       Past Surgical History:   Procedure Laterality Date    CARDIAC PROCEDURE N/A 2025    Left heart cath / coronary angiography performed by Manuel Coburn MD at [facility] CARDIAC CATH LAB     SECTION      X 2    EP DEVICE PROCEDURE N/A 2025    Insert or replace transcath PPM leadless performed by Marito Luna MD at [facility] ELECTROPHYSIOLOGY    GYN      novasure, csection scar revision    INVASIVE VASCULAR N/A 2025    Ultrasound guided vascular access performed by Marito Luna MD at [facility] ELECTROPHYSIOLOGY    TRACHEOSTOMY N/A 2025    TRACHEOSTOMY PERCUTANEOUS performed by Jack Badillo MD at [facility] MAIN OR    UPPER GASTROINTESTINAL ENDOSCOPY N/A 2025    ESOPHAGOGASTRODUODENOSCOPY PERCUTANEOUS ENDOSCOPIC GASTROSTOMY TUBE PLACEMENT performed by Rocky Cristobal MD at [facility] ENDOSCOPY     Social History     Tobacco Use    Smoking status: Never    Smokeless tobacco: Never   Substance Use Topics    Alcohol use: No      Family History   Problem Relation Age of Onset    Cancer Other         aunt with colon ca    Stroke Neg Hx     Heart Attack Neg Hx     Hypertension Father     Cancer Father         neck ca    Thyroid Disease Mother     Diabetes Mother     Hypertension Mother     Cancer Mother         cervical ca       Allergies   Allergen Reactions    Latex Swelling    Penicillins Hives    Codeine Anxiety      Prior to Admission medications  secondary to prerenal-->ATN etiology 2/2 severe anemia/hypercalcemia.  Hemoglobin was 6.6 .  -No baseline history of CKD.  Creatinine was 0.7 on 2/6  -She has edema both hands.  Likely because of severe hypoalbuminemia.  Okay to use as needed.  Received 1 dose this morning  -Likely edema from third spacing from severe hypoalbuminemia.  Albumin 1.8 which is improved to 3.3 today with IV albumin  -I have gone through medication list.  Not on any potential nephrotoxins .no IV contrast  -Urine is bland will quantify proteinuria  -Follow-up on renal function in a.m.    #2  Hyperkalemia  -Potassium 5.6--4.7 with Lokelma  -Likely because of KIZZY  -Not on any potassium supplements  -I have changed tube feeding to Nepro because of hypokalemia. S/p PEG tube on 2/30    #3  Hypercalcemia  -Calcium is 10  but corrected calcium 10.6  -She is not on any calcium or vitamin D supplements.  PTH is 106.  Started Sensipar 30 mg daily    #4  Respiratory distress  -She was extubated on 1/26 but reintubated on 1/27 because of inability to protect airway.  Tracheostomy was done on 1/30  -Currently on mechanical ventilation via trach  -Pressure support on trach collar trials as tolerated    #5  Hypotension  -She was noted to be hypotensive overnight.  Currently on pressors.  Also on dopamine  -WBC count today increased to 17.5--10.3.  Afebrile now. currently not on any antibiotics.    -Urine analysis is bland  -Last EF was 20 to 30% with right ventricular overload, moderate TR  -Okay to continue as needed Lasix  -Cardiology on the case.  S/p PPM on 2/12  -On epinephrine for bradycardia.        ________________________________________________________________________  Signed: Michelle Flores MD

## 2025-02-14 NOTE — CARE COORDINATION
CM reviewed Pt medicals, Pt has gotten both her PEG and pacemaker.     Pt is ready to go to [facility].     CM sent updated medicals to [facility] and [facility].     10:00  [facility] stated:  I am unable to accept yet still pending review needed vent settings and I have to get [employer] approved as well. Plus, check on bed status

## 2025-02-14 NOTE — PROGRESS NOTES
PT eval order received and acknowledged. PT eval attempted at 1435 however per nsg pt somnolent today and not appropriate for skilled PT at this time. Will continue to follow patient and attempt PT eval at a later time. Thank you.

## 2025-02-14 NOTE — PROGRESS NOTES
CRITICAL CARE PROGRESS NOTE    Name: Nida Graves   : 1975   MRN: [id number]   Date: 2025      Diagnoses/problem list:   Acute hypoxic and hypercapnic respiratory failure  Acute kidney injury  Atrial flutter with slow ventricular response  Complete heart block  GI bleed  Nosebleed, resolved  NSTEMI  Symptomatic bradycardia  Biventricular failure  Acute HFrEF, 25 to 30%  Diabetes  Morbid obesity  Acute metabolic/septic encephalopathy    24-hour events:   PEG placement yesterday. No acute issues post placement. Continues to fail SBT due to apnea and generalized weakness.     Assessment and plan:   Ms. Graves is a 49-year-old female with PMH chronic debility and bedbound status, diabetes, asthma, obesity, who presented for shortness of breath.  Shortness of breath worsening over the past few days.  Upon presentation ED patient found to be hypoxic necessita.  Ting O2 via NC.  She was also found to be in A-fib with slow ventricular response.  She was transferred out of ICU on . Early morning  upgraded to ICU again due to severe hypoxia and hypercapnia requiring intubation. Now s/p trach on . Leadless pacemaker . PEG     NEUROLOGICAL:    Continue Bupropion and Venlafaxine   Delirium precautions  PT/OT    PULMONOLOGY:   Extubated on , reintubated on  due to inability to protect airway  ENT placed trach on   Continue to wean FiO2 as tolerated  Pressure support and trach collar trials as tolerated  Daily SBT with multiple failures due to apnea     CARDIOVASCULAR:   Midodrine to keep MAP above 65   S/p AUGUSTIN/DCCV on   LVEF 20-30%, right ventricle overload, moderate TR   GDMT for HFrEF as tolerated  Diuresis as needed  [facility] this admission: nonobstructive CAD; luminal RCA, Lcx; 40% mLAD   Continue ASA and Statin   Cardiology recs noted   Leadless pacemaker     GASTROINTESTINAL:   Noted to have dark stools on . Occult stool positive for blood. CTA abdomen/pelvis without

## 2025-02-15 ENCOUNTER — APPOINTMENT (OUTPATIENT)
Facility: HOSPITAL | Age: 50
DRG: 004 | End: 2025-02-15
Payer: COMMERCIAL

## 2025-02-15 LAB
ALBUMIN SERPL-MCNC: 2.7 G/DL (ref 3.5–5)
ANION GAP SERPL CALC-SCNC: 6 MMOL/L (ref 2–12)
BUN SERPL-MCNC: 56 MG/DL (ref 6–20)
BUN/CREAT SERPL: 32 (ref 12–20)
CA-I BLD-MCNC: 9.1 MG/DL (ref 8.5–10.1)
CHLORIDE SERPL-SCNC: 102 MMOL/L (ref 97–108)
CO2 SERPL-SCNC: 28 MMOL/L (ref 21–32)
CREAT SERPL-MCNC: 1.77 MG/DL (ref 0.55–1.02)
ERYTHROCYTE [DISTWIDTH] IN BLOOD BY AUTOMATED COUNT: 16.6 % (ref 11.5–14.5)
GLUCOSE BLD STRIP.AUTO-MCNC: 108 MG/DL (ref 65–100)
GLUCOSE BLD STRIP.AUTO-MCNC: 116 MG/DL (ref 65–100)
GLUCOSE BLD STRIP.AUTO-MCNC: 118 MG/DL (ref 65–100)
GLUCOSE BLD STRIP.AUTO-MCNC: 118 MG/DL (ref 65–100)
GLUCOSE SERPL-MCNC: 115 MG/DL (ref 65–100)
HCT VFR BLD AUTO: 25.3 % (ref 35–47)
HGB BLD-MCNC: 7.7 G/DL (ref 11.5–16)
MAGNESIUM SERPL-MCNC: 2 MG/DL (ref 1.6–2.4)
MCH RBC QN AUTO: 28.6 PG (ref 26–34)
MCHC RBC AUTO-ENTMCNC: 30.4 G/DL (ref 30–36.5)
MCV RBC AUTO: 94.1 FL (ref 80–99)
NRBC # BLD: 0 K/UL (ref 0–0.01)
NRBC BLD-RTO: 0 PER 100 WBC
PERFORMED BY:: ABNORMAL
PHOSPHATE SERPL-MCNC: 3.5 MG/DL (ref 2.6–4.7)
PHOSPHATE SERPL-MCNC: 3.6 MG/DL (ref 2.6–4.7)
PLATELET # BLD AUTO: 193 K/UL (ref 150–400)
PMV BLD AUTO: 10.5 FL (ref 8.9–12.9)
POTASSIUM SERPL-SCNC: 3.6 MMOL/L (ref 3.5–5.1)
RBC # BLD AUTO: 2.69 M/UL (ref 3.8–5.2)
SODIUM SERPL-SCNC: 136 MMOL/L (ref 136–145)
WBC # BLD AUTO: 10.5 K/UL (ref 3.6–11)

## 2025-02-15 PROCEDURE — 6370000000 HC RX 637 (ALT 250 FOR IP): Performed by: STUDENT IN AN ORGANIZED HEALTH CARE EDUCATION/TRAINING PROGRAM

## 2025-02-15 PROCEDURE — 6360000002 HC RX W HCPCS: Performed by: STUDENT IN AN ORGANIZED HEALTH CARE EDUCATION/TRAINING PROGRAM

## 2025-02-15 PROCEDURE — 94640 AIRWAY INHALATION TREATMENT: CPT

## 2025-02-15 PROCEDURE — 6370000000 HC RX 637 (ALT 250 FOR IP): Performed by: INTERNAL MEDICINE

## 2025-02-15 PROCEDURE — 82962 GLUCOSE BLOOD TEST: CPT

## 2025-02-15 PROCEDURE — 2580000003 HC RX 258: Performed by: STUDENT IN AN ORGANIZED HEALTH CARE EDUCATION/TRAINING PROGRAM

## 2025-02-15 PROCEDURE — 2500000003 HC RX 250 WO HCPCS: Performed by: STUDENT IN AN ORGANIZED HEALTH CARE EDUCATION/TRAINING PROGRAM

## 2025-02-15 PROCEDURE — 85027 COMPLETE CBC AUTOMATED: CPT

## 2025-02-15 PROCEDURE — 83735 ASSAY OF MAGNESIUM: CPT

## 2025-02-15 PROCEDURE — 94761 N-INVAS EAR/PLS OXIMETRY MLT: CPT

## 2025-02-15 PROCEDURE — 2000000000 HC ICU R&B

## 2025-02-15 PROCEDURE — 2500000003 HC RX 250 WO HCPCS: Performed by: INTERNAL MEDICINE

## 2025-02-15 PROCEDURE — 94003 VENT MGMT INPAT SUBQ DAY: CPT

## 2025-02-15 PROCEDURE — 6360000002 HC RX W HCPCS: Performed by: INTERNAL MEDICINE

## 2025-02-15 PROCEDURE — 80069 RENAL FUNCTION PANEL: CPT

## 2025-02-15 PROCEDURE — 74018 RADEX ABDOMEN 1 VIEW: CPT

## 2025-02-15 PROCEDURE — 84100 ASSAY OF PHOSPHORUS: CPT

## 2025-02-15 PROCEDURE — 2700000000 HC OXYGEN THERAPY PER DAY

## 2025-02-15 RX ORDER — ACETYLCYSTEINE 200 MG/ML
600 SOLUTION ORAL; RESPIRATORY (INHALATION) ONCE
Status: COMPLETED | OUTPATIENT
Start: 2025-02-15 | End: 2025-02-15

## 2025-02-15 RX ADMIN — ASPIRIN 81 MG 81 MG: 81 TABLET ORAL at 09:26

## 2025-02-15 RX ADMIN — BUPROPION HYDROCHLORIDE 75 MG: 75 TABLET, FILM COATED ORAL at 21:32

## 2025-02-15 RX ADMIN — WHITE PETROLATUM,ZINC OXIDE: 57; 17 PASTE TOPICAL at 09:15

## 2025-02-15 RX ADMIN — MIDODRINE HYDROCHLORIDE 10 MG: 5 TABLET ORAL at 15:18

## 2025-02-15 RX ADMIN — LANSOPRAZOLE 30 MG: 30 TABLET, ORALLY DISINTEGRATING ORAL at 06:04

## 2025-02-15 RX ADMIN — BUDESONIDE 500 MCG: 0.5 INHALANT ORAL at 19:18

## 2025-02-15 RX ADMIN — HEPARIN SODIUM 5000 UNITS: 5000 INJECTION INTRAVENOUS; SUBCUTANEOUS at 15:19

## 2025-02-15 RX ADMIN — MIDODRINE HYDROCHLORIDE 10 MG: 5 TABLET ORAL at 09:26

## 2025-02-15 RX ADMIN — SODIUM CHLORIDE 125 MG: 9 INJECTION, SOLUTION INTRAVENOUS at 19:57

## 2025-02-15 RX ADMIN — BUPROPION HYDROCHLORIDE 75 MG: 75 TABLET, FILM COATED ORAL at 09:26

## 2025-02-15 RX ADMIN — HEPARIN SODIUM 5000 UNITS: 5000 INJECTION INTRAVENOUS; SUBCUTANEOUS at 21:32

## 2025-02-15 RX ADMIN — IPRATROPIUM BROMIDE AND ALBUTEROL SULFATE 1 DOSE: 2.5; .5 SOLUTION RESPIRATORY (INHALATION) at 08:21

## 2025-02-15 RX ADMIN — IPRATROPIUM BROMIDE AND ALBUTEROL SULFATE 1 DOSE: 2.5; .5 SOLUTION RESPIRATORY (INHALATION) at 15:01

## 2025-02-15 RX ADMIN — VENLAFAXINE 37.5 MG: 25 TABLET ORAL at 18:42

## 2025-02-15 RX ADMIN — WHITE PETROLATUM,ZINC OXIDE: 57; 17 PASTE TOPICAL at 22:07

## 2025-02-15 RX ADMIN — MIDODRINE HYDROCHLORIDE 10 MG: 5 TABLET ORAL at 21:32

## 2025-02-15 RX ADMIN — FOLIC ACID 1 MG: 1 TABLET ORAL at 09:26

## 2025-02-15 RX ADMIN — VENLAFAXINE 37.5 MG: 25 TABLET ORAL at 09:26

## 2025-02-15 RX ADMIN — IPRATROPIUM BROMIDE AND ALBUTEROL SULFATE 1 DOSE: 2.5; .5 SOLUTION RESPIRATORY (INHALATION) at 19:18

## 2025-02-15 RX ADMIN — SODIUM CHLORIDE, PRESERVATIVE FREE 10 ML: 5 INJECTION INTRAVENOUS at 09:26

## 2025-02-15 RX ADMIN — CINACALCET HYDROCHLORIDE 30 MG: 30 TABLET, FILM COATED ORAL at 09:26

## 2025-02-15 RX ADMIN — BUDESONIDE 500 MCG: 0.5 INHALANT ORAL at 08:21

## 2025-02-15 RX ADMIN — DOCUSATE SODIUM 100 MG: 50 LIQUID ORAL at 12:05

## 2025-02-15 RX ADMIN — HEPARIN SODIUM 5000 UNITS: 5000 INJECTION INTRAVENOUS; SUBCUTANEOUS at 06:04

## 2025-02-15 RX ADMIN — ATORVASTATIN CALCIUM 20 MG: 20 TABLET, FILM COATED ORAL at 21:32

## 2025-02-15 RX ADMIN — SODIUM CHLORIDE, PRESERVATIVE FREE 10 ML: 5 INJECTION INTRAVENOUS at 21:32

## 2025-02-15 RX ADMIN — ACETYLCYSTEINE 600 MG: 200 INHALANT RESPIRATORY (INHALATION) at 08:21

## 2025-02-15 ASSESSMENT — PULMONARY FUNCTION TESTS
PIF_VALUE: 28
PIF_VALUE: 25
PIF_VALUE: 25
PIF_VALUE: 22
PIF_VALUE: 21
PIF_VALUE: 25
PIF_VALUE: 18
PIF_VALUE: 20
PIF_VALUE: 20
PIF_VALUE: 23
PIF_VALUE: 20
PIF_VALUE: 30
PIF_VALUE: 23
PIF_VALUE: 20
PIF_VALUE: 23
PIF_VALUE: 19
PIF_VALUE: 24

## 2025-02-15 ASSESSMENT — PAIN SCALES - GENERAL: PAINLEVEL_OUTOF10: 0

## 2025-02-15 NOTE — PROGRESS NOTES
SBT done this morning 15ps +5 35% Pt alarmed apnea and sats dropped to 77%,  placed pt back on a rate and gave 100%O2 for 2mins. SpO2 up to 100%

## 2025-02-15 NOTE — PROGRESS NOTES
Renal  Note    NAME:  Nida Graves   :   1975   MRN:   [id number]     ATTENDING: Suyapa Rea DO  PCP:  Luis Lees APRN - CNP    Date/Time:  2/15/2025 2:10 PM      Subjective:   REQUESTING PHYSICIAN:  REASON FOR CONSULT:    KIZZY    Patient seen in the ICU.  Daughter at bedside. she is more awake today responds to questions.    Renal function is improving creatinine improved to 1.6 today.  Potassium is also improved  Tolerating tube feeds at goal.  S/p PEG on     Past Medical History:   Diagnosis Date    Allergy     Asthma     Diabetes (HCC)       Past Surgical History:   Procedure Laterality Date    CARDIAC PROCEDURE N/A 2025    Left heart cath / coronary angiography performed by Manuel Coburn MD at [facility] CARDIAC CATH LAB     SECTION      X 2    EP DEVICE PROCEDURE N/A 2025    Insert or replace transcath PPM leadless performed by Marito Luna MD at [facility] ELECTROPHYSIOLOGY    GYN      novasure, csection scar revision    INVASIVE VASCULAR N/A 2025    Ultrasound guided vascular access performed by Marito Luna MD at [facility] ELECTROPHYSIOLOGY    TRACHEOSTOMY N/A 2025    TRACHEOSTOMY PERCUTANEOUS performed by Jack Badillo MD at [facility] MAIN OR    UPPER GASTROINTESTINAL ENDOSCOPY N/A 2025    ESOPHAGOGASTRODUODENOSCOPY PERCUTANEOUS ENDOSCOPIC GASTROSTOMY TUBE PLACEMENT performed by Rocky Cristobal MD at [facility] ENDOSCOPY     Social History     Tobacco Use    Smoking status: Never    Smokeless tobacco: Never   Substance Use Topics    Alcohol use: No      Family History   Problem Relation Age of Onset    Cancer Other         aunt with colon ca    Stroke Neg Hx     Heart Attack Neg Hx     Hypertension Father     Cancer Father         neck ca    Thyroid Disease Mother     Diabetes Mother     Hypertension Mother     Cancer Mother         cervical ca       Allergies   Allergen Reactions    Latex Swelling    Penicillins Hives    Codeine Anxiety      Prior to Admission  medications    Medication Sig Start Date End Date Taking? Authorizing Provider   buPROPion (WELLBUTRIN XL) 150 MG extended release tablet Take 1 tablet by mouth daily   Yes Mary Ellen Leach MD   traZODone (DESYREL) 50 MG tablet Take 1 tablet by mouth daily 11/10/24  Yes Mary Ellen Leach MD   DULoxetine (CYMBALTA) 30 MG extended release capsule Take 1 capsule by mouth daily 25  Yes ProviderMary Ellen MD   albuterol (PROVENTIL) (5 MG/ML) 0.5% nebulizer solution Inhale 0.5 mLs into the lungs every 4 hours as needed 3/29/22  Yes Automatic Reconciliation, Ar   benzonatate (TESSALON) 100 MG capsule Take 1-2 capsules TID prn cough. 21  Yes Automatic Reconciliation, Ar   budesonide (PULMICORT FLEXHALER) 180 MCG/ACT AEPB inhaler TAKE 1 PUFF BY MOUTH TWICE A DAY 12/3/20  Yes Automatic Reconciliation, Ar   diclofenac sodium (VOLTAREN) 1 % GEL Apply 4 g topically 4 times daily 22  Yes Automatic Reconciliation, Ar   losartan (COZAAR) 25 MG tablet Take 1 tablet by mouth daily 22  Yes Automatic Reconciliation, Ar   tiotropium (SPIRIVA HANDIHALER) 18 MCG inhalation capsule Take 1 capsule by mouth once 20  Yes Automatic Reconciliation, Ar   etodolac (LODINE) 400 MG tablet Take 1 tablet by mouth 2 times daily  Patient not taking: Reported on 2025    ProviderMary Ellen MD   fluticasone-salmeterol (WIXELA INHUB) 500-50 MCG/ACT AEPB diskus inhaler Take 500 puffs by mouth 2 times daily  Patient not taking: Reported on 2025   Automatic Reconciliation, Ar       REVIEW OF SYSTEMS:       Unable to obtain because of patient's mentation      Objective:   VITALS:    /73   Pulse 62   Temp 97.6 °F (36.4 °C) (Axillary)   Resp 16   Ht 1.702 m (5' 7.01\")   Wt (!) 161.7 kg (356 lb 7.7 oz)   SpO2 97%   BMI 55.82 kg/m²   Temp (24hrs), Av.5 °F (36.9 °C), Min:97.6 °F (36.4 °C), Max:99.4 °F (37.4 °C)      PHYSICAL EXAM:     General: NAD, on trach collar  Eyes: sclera

## 2025-02-15 NOTE — PROGRESS NOTES
CRITICAL CARE PROGRESS NOTE    Name: Nida Graves   : 1975   MRN: [id number]   Date: 2/15/2025      Diagnoses/problem list:   Acute hypoxic and hypercapnic respiratory failure  Acute kidney injury  Atrial flutter with slow ventricular response  Complete heart block  GI bleed  Nosebleed, resolved  NSTEMI  Symptomatic bradycardia  Biventricular failure  Acute HFrEF, 25 to 30%  Diabetes  Morbid obesity  Acute metabolic/septic encephalopathy    24-hour events:   Tolerated tube feeds at goal with change to Nepro. Multiple attempts at SBT but failed due to weakness and apnea.    Assessment and plan:   Ms. Graves is a 49-year-old female with PMH chronic debility and bedbound status, diabetes, asthma, obesity, who presented for shortness of breath.  Shortness of breath worsening over the past few days.  Upon presentation ED patient found to be hypoxic necessita.  Ting O2 via NC.  She was also found to be in A-fib with slow ventricular response.  She was transferred out of ICU on . Early morning  upgraded to ICU again due to severe hypoxia and hypercapnia requiring intubation. Now s/p trach on . Leadless pacemaker . PEG     NEUROLOGICAL:    Continue Bupropion and Venlafaxine   Delirium precautions  PT/OT    PULMONOLOGY:   Extubated on , reintubated on  due to inability to protect airway  ENT placed trach on   Continue to wean FiO2 as tolerated  Pressure support and trach collar trials as tolerated  Daily SBT with multiple failures due to apnea     CARDIOVASCULAR:   Midodrine to keep MAP above 65   S/p AUGUSTIN/DCCV on   LVEF 20-30%, right ventricle overload, moderate TR   GDMT for HFrEF as tolerated  Diuresis as needed  [facility] this admission: nonobstructive CAD; luminal RCA, Lcx; 40% mLAD   Continue ASA and Statin   Cardiology recs noted   Leadless pacemaker     GASTROINTESTINAL:   Noted to have dark stools on . Occult stool positive for blood. CTA abdomen/pelvis without evidence        CRITICAL CARE DOCUMENTATION  I had a face to face encounter with the patient, reviewed and interpreted patient data including clinical events, labs, images, vital signs, I/O's, and examined patient.  I have discussed the case and the plan and management of the patient's care with the consulting services, the bedside nurses and the respiratory therapist.      NOTE OF PERSONAL INVOLVEMENT IN CARE   This patient has a high probability of imminent, clinically significant deterioration, which requires the highest level of preparedness to intervene urgently. I participated in the decision-making and personally managed or directed the management of the following life and organ supporting interventions that required my frequent assessment to treat or prevent imminent deterioration.    I personally spent 35 minutes of critical care time.  This is time spent at this critically ill patient's bedside actively involved in patient care as well as the coordination of care.  This does not include any procedural time which has been billed separately.      Shilpa Estevez MD  [facility] [place] [facility]

## 2025-02-15 NOTE — PLAN OF CARE
Problem: ABCDS Injury Assessment  Goal: Absence of physical injury  Outcome: Progressing     Problem: Skin/Tissue Integrity  Goal: Absence of new skin breakdown  Description: 1.  Monitor for areas of redness and/or skin breakdown  2.  Assess vascular access sites hourly  3.  Every 4-6 hours minimum:  Change oxygen saturation probe site  4.  Every 4-6 hours:  If on nasal continuous positive airway pressure, respiratory therapy assess nares and determine need for appliance change or resting period.  Outcome: Progressing     Problem: Chronic Conditions and Co-morbidities  Goal: Patient's chronic conditions and co-morbidity symptoms are monitored and maintained or improved  Outcome: Progressing     Problem: Discharge Planning  Goal: Discharge to home or other facility with appropriate resources  Outcome: Progressing     Problem: Safety - Adult  Goal: Free from fall injury  Outcome: Progressing     Problem: Neurosensory - Adult  Goal: Achieves stable or improved neurological status  Outcome: Progressing  Goal: Achieves maximal functionality and self care  Outcome: Progressing     Problem: Respiratory - Adult  Goal: Achieves optimal ventilation and oxygenation  Outcome: Progressing     Problem: Cardiovascular - Adult  Goal: Maintains optimal cardiac output and hemodynamic stability  Outcome: Progressing  Goal: Absence of cardiac dysrhythmias or at baseline  Outcome: Progressing     Problem: Gastrointestinal - Adult  Goal: Minimal or absence of nausea and vomiting  Outcome: Progressing  Goal: Maintains or returns to baseline bowel function  Outcome: Progressing  Goal: Maintains adequate nutritional intake  Outcome: Progressing     Problem: Genitourinary - Adult  Goal: Absence of urinary retention  Outcome: Progressing     Problem: Metabolic/Fluid and Electrolytes - Adult  Goal: Electrolytes maintained within normal limits  Outcome: Progressing  Goal: Hemodynamic stability and optimal renal function maintained  Outcome:  infection  Outcome: Progressing  Goal: Oral mucous membranes remain intact  Outcome: Progressing     Problem: Nutrition Deficit:  Goal: Optimize nutritional status  Outcome: Progressing

## 2025-02-16 LAB
ALBUMIN SERPL-MCNC: 2.5 G/DL (ref 3.5–5)
ANION GAP SERPL CALC-SCNC: 6 MMOL/L (ref 2–12)
BASOPHILS # BLD: 0.04 K/UL (ref 0–0.1)
BASOPHILS NFR BLD: 0.4 % (ref 0–1)
BUN SERPL-MCNC: 48 MG/DL (ref 6–20)
BUN/CREAT SERPL: 30 (ref 12–20)
CA-I BLD-MCNC: 9 MG/DL (ref 8.5–10.1)
CHLORIDE SERPL-SCNC: 103 MMOL/L (ref 97–108)
CO2 SERPL-SCNC: 32 MMOL/L (ref 21–32)
CREAT SERPL-MCNC: 1.59 MG/DL (ref 0.55–1.02)
DIFFERENTIAL METHOD BLD: ABNORMAL
EOSINOPHIL # BLD: 0.69 K/UL (ref 0–0.4)
EOSINOPHIL NFR BLD: 6.9 % (ref 0–7)
ERYTHROCYTE [DISTWIDTH] IN BLOOD BY AUTOMATED COUNT: 17.2 % (ref 11.5–14.5)
GLUCOSE BLD STRIP.AUTO-MCNC: 110 MG/DL (ref 65–100)
GLUCOSE BLD STRIP.AUTO-MCNC: 113 MG/DL (ref 65–100)
GLUCOSE BLD STRIP.AUTO-MCNC: 115 MG/DL (ref 65–100)
GLUCOSE BLD STRIP.AUTO-MCNC: 119 MG/DL (ref 65–100)
GLUCOSE SERPL-MCNC: 102 MG/DL (ref 65–100)
HCT VFR BLD AUTO: 24.4 % (ref 35–47)
HGB BLD-MCNC: 7.4 G/DL (ref 11.5–16)
IMM GRANULOCYTES # BLD AUTO: 0.08 K/UL (ref 0–0.04)
IMM GRANULOCYTES NFR BLD AUTO: 0.8 % (ref 0–0.5)
LYMPHOCYTES # BLD: 1.79 K/UL (ref 0.8–3.5)
LYMPHOCYTES NFR BLD: 17.8 % (ref 12–49)
MAGNESIUM SERPL-MCNC: 1.9 MG/DL (ref 1.6–2.4)
MCH RBC QN AUTO: 28.7 PG (ref 26–34)
MCHC RBC AUTO-ENTMCNC: 30.3 G/DL (ref 30–36.5)
MCV RBC AUTO: 94.6 FL (ref 80–99)
MONOCYTES # BLD: 1.09 K/UL (ref 0–1)
MONOCYTES NFR BLD: 10.9 % (ref 5–13)
NEUTS SEG # BLD: 6.34 K/UL (ref 1.8–8)
NEUTS SEG NFR BLD: 63.2 % (ref 32–75)
NRBC # BLD: 0 K/UL (ref 0–0.01)
NRBC BLD-RTO: 0 PER 100 WBC
PERFORMED BY:: ABNORMAL
PHOSPHATE SERPL-MCNC: 3 MG/DL (ref 2.6–4.7)
PLATELET # BLD AUTO: 224 K/UL (ref 150–400)
PMV BLD AUTO: 10.4 FL (ref 8.9–12.9)
POTASSIUM SERPL-SCNC: 3.2 MMOL/L (ref 3.5–5.1)
RBC # BLD AUTO: 2.58 M/UL (ref 3.8–5.2)
SODIUM SERPL-SCNC: 141 MMOL/L (ref 136–145)
WBC # BLD AUTO: 10 K/UL (ref 3.6–11)

## 2025-02-16 PROCEDURE — 6360000002 HC RX W HCPCS: Performed by: STUDENT IN AN ORGANIZED HEALTH CARE EDUCATION/TRAINING PROGRAM

## 2025-02-16 PROCEDURE — 94761 N-INVAS EAR/PLS OXIMETRY MLT: CPT

## 2025-02-16 PROCEDURE — 2500000003 HC RX 250 WO HCPCS: Performed by: STUDENT IN AN ORGANIZED HEALTH CARE EDUCATION/TRAINING PROGRAM

## 2025-02-16 PROCEDURE — 83735 ASSAY OF MAGNESIUM: CPT

## 2025-02-16 PROCEDURE — 85025 COMPLETE CBC W/AUTO DIFF WBC: CPT

## 2025-02-16 PROCEDURE — 82962 GLUCOSE BLOOD TEST: CPT

## 2025-02-16 PROCEDURE — 2700000000 HC OXYGEN THERAPY PER DAY

## 2025-02-16 PROCEDURE — 94003 VENT MGMT INPAT SUBQ DAY: CPT

## 2025-02-16 PROCEDURE — 94640 AIRWAY INHALATION TREATMENT: CPT

## 2025-02-16 PROCEDURE — 6370000000 HC RX 637 (ALT 250 FOR IP): Performed by: STUDENT IN AN ORGANIZED HEALTH CARE EDUCATION/TRAINING PROGRAM

## 2025-02-16 PROCEDURE — 80069 RENAL FUNCTION PANEL: CPT

## 2025-02-16 PROCEDURE — 2000000000 HC ICU R&B

## 2025-02-16 PROCEDURE — 36415 COLL VENOUS BLD VENIPUNCTURE: CPT

## 2025-02-16 PROCEDURE — 6370000000 HC RX 637 (ALT 250 FOR IP): Performed by: INTERNAL MEDICINE

## 2025-02-16 RX ADMIN — POTASSIUM CHLORIDE 20 MEQ: 29.8 INJECTION, SOLUTION INTRAVENOUS at 04:54

## 2025-02-16 RX ADMIN — BUPROPION HYDROCHLORIDE 75 MG: 75 TABLET, FILM COATED ORAL at 20:01

## 2025-02-16 RX ADMIN — HEPARIN SODIUM 5000 UNITS: 5000 INJECTION INTRAVENOUS; SUBCUTANEOUS at 05:36

## 2025-02-16 RX ADMIN — BUPROPION HYDROCHLORIDE 75 MG: 75 TABLET, FILM COATED ORAL at 08:45

## 2025-02-16 RX ADMIN — POTASSIUM CHLORIDE 20 MEQ: 29.8 INJECTION, SOLUTION INTRAVENOUS at 03:36

## 2025-02-16 RX ADMIN — BUDESONIDE 500 MCG: 0.5 INHALANT ORAL at 19:54

## 2025-02-16 RX ADMIN — MIDODRINE HYDROCHLORIDE 10 MG: 5 TABLET ORAL at 15:40

## 2025-02-16 RX ADMIN — ASPIRIN 81 MG 81 MG: 81 TABLET ORAL at 08:45

## 2025-02-16 RX ADMIN — MIDODRINE HYDROCHLORIDE 10 MG: 5 TABLET ORAL at 08:45

## 2025-02-16 RX ADMIN — FOLIC ACID 1 MG: 1 TABLET ORAL at 08:45

## 2025-02-16 RX ADMIN — POTASSIUM BICARBONATE 60 MEQ: 782 TABLET, EFFERVESCENT ORAL at 08:43

## 2025-02-16 RX ADMIN — HEPARIN SODIUM 5000 UNITS: 5000 INJECTION INTRAVENOUS; SUBCUTANEOUS at 22:11

## 2025-02-16 RX ADMIN — CINACALCET HYDROCHLORIDE 30 MG: 30 TABLET, FILM COATED ORAL at 08:45

## 2025-02-16 RX ADMIN — VENLAFAXINE 37.5 MG: 25 TABLET ORAL at 15:40

## 2025-02-16 RX ADMIN — WHITE PETROLATUM,ZINC OXIDE: 57; 17 PASTE TOPICAL at 08:42

## 2025-02-16 RX ADMIN — VENLAFAXINE 37.5 MG: 25 TABLET ORAL at 08:45

## 2025-02-16 RX ADMIN — WHITE PETROLATUM,ZINC OXIDE: 57; 17 PASTE TOPICAL at 20:20

## 2025-02-16 RX ADMIN — IPRATROPIUM BROMIDE AND ALBUTEROL SULFATE 1 DOSE: 2.5; .5 SOLUTION RESPIRATORY (INHALATION) at 19:54

## 2025-02-16 RX ADMIN — ATORVASTATIN CALCIUM 20 MG: 20 TABLET, FILM COATED ORAL at 20:01

## 2025-02-16 RX ADMIN — LANSOPRAZOLE 30 MG: 30 TABLET, ORALLY DISINTEGRATING ORAL at 05:36

## 2025-02-16 RX ADMIN — SODIUM CHLORIDE, PRESERVATIVE FREE 10 ML: 5 INJECTION INTRAVENOUS at 20:02

## 2025-02-16 RX ADMIN — MIDODRINE HYDROCHLORIDE 10 MG: 5 TABLET ORAL at 20:01

## 2025-02-16 RX ADMIN — HEPARIN SODIUM 5000 UNITS: 5000 INJECTION INTRAVENOUS; SUBCUTANEOUS at 14:01

## 2025-02-16 RX ADMIN — BUDESONIDE 500 MCG: 0.5 INHALANT ORAL at 09:42

## 2025-02-16 RX ADMIN — IPRATROPIUM BROMIDE AND ALBUTEROL SULFATE 1 DOSE: 2.5; .5 SOLUTION RESPIRATORY (INHALATION) at 09:42

## 2025-02-16 RX ADMIN — DOCUSATE SODIUM 100 MG: 50 LIQUID ORAL at 08:44

## 2025-02-16 RX ADMIN — SODIUM CHLORIDE, PRESERVATIVE FREE 10 ML: 5 INJECTION INTRAVENOUS at 08:42

## 2025-02-16 RX ADMIN — IPRATROPIUM BROMIDE AND ALBUTEROL SULFATE 1 DOSE: 2.5; .5 SOLUTION RESPIRATORY (INHALATION) at 15:08

## 2025-02-16 ASSESSMENT — PULMONARY FUNCTION TESTS
PIF_VALUE: 23
PIF_VALUE: 18
PIF_VALUE: 18
PIF_VALUE: 23
PIF_VALUE: 18
PIF_VALUE: 17
PIF_VALUE: 18
PIF_VALUE: 23
PIF_VALUE: 24
PIF_VALUE: 27
PIF_VALUE: 18
PIF_VALUE: 26
PIF_VALUE: 25
PIF_VALUE: 18
PIF_VALUE: 17
PIF_VALUE: 23
PIF_VALUE: 23
PIF_VALUE: 18
PIF_VALUE: 30
PIF_VALUE: 18

## 2025-02-16 ASSESSMENT — PAIN SCALES - GENERAL
PAINLEVEL_OUTOF10: 0
PAINLEVEL_OUTOF10: 0

## 2025-02-16 NOTE — PLAN OF CARE
Problem: ABCDS Injury Assessment  Goal: Absence of physical injury  2/16/2025 0230 by Sj Huffman RN  Outcome: Progressing  2/16/2025 0230 by Sj Huffman RN  Outcome: Progressing     Problem: Skin/Tissue Integrity  Goal: Absence of new skin breakdown  Description: 1.  Monitor for areas of redness and/or skin breakdown  2.  Assess vascular access sites hourly  3.  Every 4-6 hours minimum:  Change oxygen saturation probe site  4.  Every 4-6 hours:  If on nasal continuous positive airway pressure, respiratory therapy assess nares and determine need for appliance change or resting period.  2/16/2025 0230 by Sj Huffman RN  Outcome: Progressing  2/16/2025 0230 by Sj Huffman RN  Outcome: Progressing     Problem: Chronic Conditions and Co-morbidities  Goal: Patient's chronic conditions and co-morbidity symptoms are monitored and maintained or improved  2/16/2025 0230 by Sj Huffman RN  Outcome: Progressing  2/16/2025 0230 by Sj Huffman RN  Outcome: Progressing     Problem: Discharge Planning  Goal: Discharge to home or other facility with appropriate resources  2/16/2025 0230 by Sj Huffman RN  Outcome: Progressing  2/16/2025 0230 by Sj Huffman RN  Outcome: Progressing     Problem: Safety - Adult  Goal: Free from fall injury  2/16/2025 0230 by Sj Huffman RN  Outcome: Progressing  2/16/2025 0230 by Sj Huffman, RN  Outcome: Progressing     Problem: Neurosensory - Adult  Goal: Achieves stable or improved neurological status  2/16/2025 0230 by Sj Huffman, RN  Outcome: Progressing  2/16/2025 0230 by Sj Huffman, RN  Outcome: Progressing  Goal: Achieves maximal functionality and self care  2/16/2025 0230 by Sj Huffman, RN  Outcome: Progressing  2/16/2025 0230 by Sj Huffman RN  Outcome: Progressing     Problem: Respiratory - Adult  Goal: Achieves optimal ventilation and oxygenation  2/16/2025 0230 by Sj Huffman, GUILLERMO  Outcome:

## 2025-02-16 NOTE — PROGRESS NOTES
CRITICAL CARE PROGRESS NOTE    Name: Nida Graves   : 1975   MRN: [id number]   Date: 2025      Diagnoses/problem list:   Acute hypoxic and hypercapnic respiratory failure  Acute kidney injury  Atrial flutter with slow ventricular response  Complete heart block  GI bleed  Nosebleed, resolved  NSTEMI  Symptomatic bradycardia  Biventricular failure  Acute HFrEF, 25 to 30%  Diabetes  Morbid obesity  Acute metabolic/septic encephalopathy    24-hour events:   Patient reports she feels more depressed today but is willing to try serial SBTs today.     Assessment and plan:   Ms. Graves is a 49-year-old female with PMH chronic debility and bedbound status, diabetes, asthma, obesity, who presented for shortness of breath.  Shortness of breath worsening over the past few days.  Upon presentation ED patient found to be hypoxic necessita.  Ting O2 via NC.  She was also found to be in A-fib with slow ventricular response.  She was transferred out of ICU on . Early morning  upgraded to ICU again due to severe hypoxia and hypercapnia requiring intubation. Now s/p trach on . Leadless pacemaker . PEG     NEUROLOGICAL:    Continue Bupropion and Venlafaxine   Delirium precautions  PT/OT  Recommend day-night adherence and encourage more mobility    PULMONOLOGY:   Extubated on , reintubated on  due to inability to protect airway  ENT placed trach on   Continue to wean FiO2 as tolerated  Pressure support and trach collar trials as tolerated  Daily SBT with multiple failures due to apnea     CARDIOVASCULAR:   Midodrine to keep MAP above 65   S/p AUGUSTIN/DCCV on   LVEF 20-30%, right ventricle overload, moderate TR   GDMT for HFrEF as tolerated  Diuresis as needed  [facility] this admission: nonobstructive CAD; luminal RCA, Lcx; 40% mLAD   Continue ASA and Statin   Cardiology recs noted   Leadless pacemaker     GASTROINTESTINAL:   Noted to have dark stools on . Occult stool positive for blood.  2250 ml   Net -1510 ml       CRITICAL CARE DOCUMENTATION  I had a face to face encounter with the patient, reviewed and interpreted patient data including clinical events, labs, images, vital signs, I/O's, and examined patient.  I have discussed the case and the plan and management of the patient's care with the consulting services, the bedside nurses and the respiratory therapist.      NOTE OF PERSONAL INVOLVEMENT IN CARE   This patient has a high probability of imminent, clinically significant deterioration, which requires the highest level of preparedness to intervene urgently. I participated in the decision-making and personally managed or directed the management of the following life and organ supporting interventions that required my frequent assessment to treat or prevent imminent deterioration.    I personally spent 31 minutes of critical care time.  This is time spent at this critically ill patient's bedside actively involved in patient care as well as the coordination of care.  This does not include any procedural time which has been billed separately.      Shilpa Estevez MD  [facility]

## 2025-02-16 NOTE — PROGRESS NOTES
Renal  Note    NAME:  Nida Graves   :   1975   MRN:   [id number]     ATTENDING: Suyapa Rea DO  PCP:  Luis Lees APRN - CNP    Date/Time:  2025 11:26 AM      Subjective:   REQUESTING PHYSICIAN:  REASON FOR CONSULT:    KIZZY    Patient seen in the ICU.   she is more awake today responds to questions appropriately  Renal function is improving creatinine improved to 1.5 today.  Potassium today is low   tolerating tube feeds at goal.  S/p PEG on     Past Medical History:   Diagnosis Date    Allergy     Asthma     Diabetes (HCC)       Past Surgical History:   Procedure Laterality Date    CARDIAC PROCEDURE N/A 2025    Left heart cath / coronary angiography performed by Manuel Coburn MD at [facility] CARDIAC CATH LAB     SECTION      X 2    EP DEVICE PROCEDURE N/A 2025    Insert or replace transcath PPM leadless performed by Marito Luna MD at [facility] ELECTROPHYSIOLOGY    GYN      novasure, csection scar revision    INVASIVE VASCULAR N/A 2025    Ultrasound guided vascular access performed by Marito Luna MD at [facility] ELECTROPHYSIOLOGY    TRACHEOSTOMY N/A 2025    TRACHEOSTOMY PERCUTANEOUS performed by Jack Badillo MD at [facility] MAIN OR    UPPER GASTROINTESTINAL ENDOSCOPY N/A 2025    ESOPHAGOGASTRODUODENOSCOPY PERCUTANEOUS ENDOSCOPIC GASTROSTOMY TUBE PLACEMENT performed by Rocky Cristobal MD at [facility] ENDOSCOPY     Social History     Tobacco Use    Smoking status: Never    Smokeless tobacco: Never   Substance Use Topics    Alcohol use: No      Family History   Problem Relation Age of Onset    Cancer Other         aunt with colon ca    Stroke Neg Hx     Heart Attack Neg Hx     Hypertension Father     Cancer Father         neck ca    Thyroid Disease Mother     Diabetes Mother     Hypertension Mother     Cancer Mother         cervical ca       Allergies   Allergen Reactions    Latex Swelling    Penicillins Hives    Codeine Anxiety      Prior to Admission  anemia/hypercalcemia.  Hb was 6.6 .  -No baseline history of CKD.  Creatinine was 0.7 on 2/6  -She has edema both hands.  Likely because of severe hypoalbuminemia.  Okay to use lasix as needed.  Received 1 dose yesterday  -I have gone through medication list.  Not on any potential nephrotoxins .no IV contrast  -Urine is bland will quantify proteinuria  -Follow-up on renal function in a.m.    #2  Hyperkalemia  -Potassium 5.6--3.2 today after changing tube feeds to Nepro  -Likely because of KIZZY  -Not on any potassium supplements  -Since hyperkalemia has now resolved, will change tube feeds back promote . s/p PEG tube on 2/013    #3  Hypercalcemia  -Calcium 9.1  but corrected calcium 10.4.  -She is not on any calcium or vitamin D supplements.  PTH is 106.  Started Sensipar 30 mg daily  -Corrected calcium 10.2 today    #4  Respiratory distress  -She was extubated on 1/26 but reintubated on 1/27 because of inability to protect airway.  Tracheostomy was done on 1/30  -Currently on mechanical ventilation via trach  -Pressure support on trach collar trials as tolerated.  FiO2 today decreased to 30%    #5  Hypotension  -She was noted to be hypotensive overnight.  Currently off pressors.    -WBC count today increased to 17.5--10.0.  Afebrile now. currently not on any antibiotics.    -Urine analysis is bland  -Last EF was 20 to 30% with right ventricular overload, moderate TR  -Okay to continue as needed Lasix  -Cardiology on the case.  S/p PPM on 2/12      ________________________________________________________________________  Signed: Michelle Flores MD

## 2025-02-17 LAB
ANION GAP SERPL CALC-SCNC: 3 MMOL/L (ref 2–12)
BASOPHILS # BLD: 0.04 K/UL (ref 0–0.1)
BASOPHILS NFR BLD: 0.4 % (ref 0–1)
BUN SERPL-MCNC: 52 MG/DL (ref 6–20)
BUN/CREAT SERPL: 37 (ref 12–20)
CA-I BLD-MCNC: 9 MG/DL (ref 8.5–10.1)
CHLORIDE SERPL-SCNC: 103 MMOL/L (ref 97–108)
CO2 SERPL-SCNC: 33 MMOL/L (ref 21–32)
CREAT SERPL-MCNC: 1.39 MG/DL (ref 0.55–1.02)
DIFFERENTIAL METHOD BLD: ABNORMAL
EOSINOPHIL # BLD: 0.79 K/UL (ref 0–0.4)
EOSINOPHIL NFR BLD: 8 % (ref 0–7)
ERYTHROCYTE [DISTWIDTH] IN BLOOD BY AUTOMATED COUNT: 17.8 % (ref 11.5–14.5)
GLUCOSE BLD STRIP.AUTO-MCNC: 107 MG/DL (ref 65–100)
GLUCOSE BLD STRIP.AUTO-MCNC: 108 MG/DL (ref 65–100)
GLUCOSE BLD STRIP.AUTO-MCNC: 108 MG/DL (ref 65–100)
GLUCOSE BLD STRIP.AUTO-MCNC: 110 MG/DL (ref 65–100)
GLUCOSE SERPL-MCNC: 94 MG/DL (ref 65–100)
HCT VFR BLD AUTO: 25.5 % (ref 35–47)
HGB BLD-MCNC: 7.6 G/DL (ref 11.5–16)
IMM GRANULOCYTES # BLD AUTO: 0.08 K/UL (ref 0–0.04)
IMM GRANULOCYTES NFR BLD AUTO: 0.8 % (ref 0–0.5)
LYMPHOCYTES # BLD: 1.91 K/UL (ref 0.8–3.5)
LYMPHOCYTES NFR BLD: 19.4 % (ref 12–49)
MAGNESIUM SERPL-MCNC: 1.9 MG/DL (ref 1.6–2.4)
MCH RBC QN AUTO: 28.6 PG (ref 26–34)
MCHC RBC AUTO-ENTMCNC: 29.8 G/DL (ref 30–36.5)
MCV RBC AUTO: 95.9 FL (ref 80–99)
MONOCYTES # BLD: 1.16 K/UL (ref 0–1)
MONOCYTES NFR BLD: 11.8 % (ref 5–13)
NEUTS SEG # BLD: 5.86 K/UL (ref 1.8–8)
NEUTS SEG NFR BLD: 59.6 % (ref 32–75)
NRBC # BLD: 0 K/UL (ref 0–0.01)
NRBC BLD-RTO: 0 PER 100 WBC
PERFORMED BY:: ABNORMAL
PHOSPHATE SERPL-MCNC: 3.4 MG/DL (ref 2.6–4.7)
PLATELET # BLD AUTO: 268 K/UL (ref 150–400)
PMV BLD AUTO: 10.2 FL (ref 8.9–12.9)
POTASSIUM SERPL-SCNC: 4.8 MMOL/L (ref 3.5–5.1)
RBC # BLD AUTO: 2.66 M/UL (ref 3.8–5.2)
SODIUM SERPL-SCNC: 139 MMOL/L (ref 136–145)
WBC # BLD AUTO: 9.8 K/UL (ref 3.6–11)

## 2025-02-17 PROCEDURE — 6370000000 HC RX 637 (ALT 250 FOR IP): Performed by: INTERNAL MEDICINE

## 2025-02-17 PROCEDURE — 84100 ASSAY OF PHOSPHORUS: CPT

## 2025-02-17 PROCEDURE — 2580000003 HC RX 258: Performed by: INTERNAL MEDICINE

## 2025-02-17 PROCEDURE — 80048 BASIC METABOLIC PNL TOTAL CA: CPT

## 2025-02-17 PROCEDURE — 36415 COLL VENOUS BLD VENIPUNCTURE: CPT

## 2025-02-17 PROCEDURE — 94003 VENT MGMT INPAT SUBQ DAY: CPT

## 2025-02-17 PROCEDURE — 6360000002 HC RX W HCPCS: Performed by: STUDENT IN AN ORGANIZED HEALTH CARE EDUCATION/TRAINING PROGRAM

## 2025-02-17 PROCEDURE — 6370000000 HC RX 637 (ALT 250 FOR IP): Performed by: STUDENT IN AN ORGANIZED HEALTH CARE EDUCATION/TRAINING PROGRAM

## 2025-02-17 PROCEDURE — 94761 N-INVAS EAR/PLS OXIMETRY MLT: CPT

## 2025-02-17 PROCEDURE — 2500000003 HC RX 250 WO HCPCS: Performed by: INTERNAL MEDICINE

## 2025-02-17 PROCEDURE — 94640 AIRWAY INHALATION TREATMENT: CPT

## 2025-02-17 PROCEDURE — 2500000003 HC RX 250 WO HCPCS: Performed by: STUDENT IN AN ORGANIZED HEALTH CARE EDUCATION/TRAINING PROGRAM

## 2025-02-17 PROCEDURE — 83735 ASSAY OF MAGNESIUM: CPT

## 2025-02-17 PROCEDURE — 2700000000 HC OXYGEN THERAPY PER DAY

## 2025-02-17 PROCEDURE — 85025 COMPLETE CBC W/AUTO DIFF WBC: CPT

## 2025-02-17 PROCEDURE — 82962 GLUCOSE BLOOD TEST: CPT

## 2025-02-17 PROCEDURE — 2000000000 HC ICU R&B

## 2025-02-17 PROCEDURE — 6360000002 HC RX W HCPCS: Performed by: INTERNAL MEDICINE

## 2025-02-17 RX ORDER — SODIUM FERRIC GLUCONATE COMPLEX IN SUCROSE 12.5 MG/ML
125 INJECTION INTRAVENOUS DAILY
Status: DISCONTINUED | OUTPATIENT
Start: 2025-02-17 | End: 2025-02-17

## 2025-02-17 RX ADMIN — BUPROPION HYDROCHLORIDE 75 MG: 75 TABLET, FILM COATED ORAL at 08:44

## 2025-02-17 RX ADMIN — IPRATROPIUM BROMIDE AND ALBUTEROL SULFATE 1 DOSE: 2.5; .5 SOLUTION RESPIRATORY (INHALATION) at 19:19

## 2025-02-17 RX ADMIN — DOCUSATE SODIUM 100 MG: 50 LIQUID ORAL at 08:44

## 2025-02-17 RX ADMIN — APIXABAN 5 MG: 5 TABLET, FILM COATED ORAL at 22:55

## 2025-02-17 RX ADMIN — SODIUM CHLORIDE 125 MG: 9 INJECTION, SOLUTION INTRAVENOUS at 13:15

## 2025-02-17 RX ADMIN — BUDESONIDE 500 MCG: 0.5 INHALANT ORAL at 07:21

## 2025-02-17 RX ADMIN — WHITE PETROLATUM,ZINC OXIDE: 57; 17 PASTE TOPICAL at 23:08

## 2025-02-17 RX ADMIN — HEPARIN SODIUM 5000 UNITS: 5000 INJECTION INTRAVENOUS; SUBCUTANEOUS at 06:54

## 2025-02-17 RX ADMIN — VENLAFAXINE 37.5 MG: 25 TABLET ORAL at 18:12

## 2025-02-17 RX ADMIN — BUDESONIDE 500 MCG: 0.5 INHALANT ORAL at 19:19

## 2025-02-17 RX ADMIN — IPRATROPIUM BROMIDE AND ALBUTEROL SULFATE 1 DOSE: 2.5; .5 SOLUTION RESPIRATORY (INHALATION) at 07:21

## 2025-02-17 RX ADMIN — SODIUM CHLORIDE, PRESERVATIVE FREE 10 ML: 5 INJECTION INTRAVENOUS at 21:15

## 2025-02-17 RX ADMIN — WHITE PETROLATUM,ZINC OXIDE: 57; 17 PASTE TOPICAL at 08:45

## 2025-02-17 RX ADMIN — BUPROPION HYDROCHLORIDE 75 MG: 75 TABLET, FILM COATED ORAL at 22:55

## 2025-02-17 RX ADMIN — IPRATROPIUM BROMIDE AND ALBUTEROL SULFATE 1 DOSE: 2.5; .5 SOLUTION RESPIRATORY (INHALATION) at 13:49

## 2025-02-17 RX ADMIN — ASPIRIN 81 MG 81 MG: 81 TABLET ORAL at 08:44

## 2025-02-17 RX ADMIN — MIDODRINE HYDROCHLORIDE 10 MG: 5 TABLET ORAL at 08:44

## 2025-02-17 RX ADMIN — CINACALCET HYDROCHLORIDE 30 MG: 30 TABLET, FILM COATED ORAL at 08:44

## 2025-02-17 RX ADMIN — LANSOPRAZOLE 30 MG: 30 TABLET, ORALLY DISINTEGRATING ORAL at 08:44

## 2025-02-17 RX ADMIN — MIDODRINE HYDROCHLORIDE 10 MG: 5 TABLET ORAL at 22:54

## 2025-02-17 RX ADMIN — FOLIC ACID 1 MG: 1 TABLET ORAL at 08:45

## 2025-02-17 RX ADMIN — VENLAFAXINE 37.5 MG: 25 TABLET ORAL at 08:44

## 2025-02-17 RX ADMIN — MIDODRINE HYDROCHLORIDE 10 MG: 5 TABLET ORAL at 18:12

## 2025-02-17 RX ADMIN — ATORVASTATIN CALCIUM 20 MG: 20 TABLET, FILM COATED ORAL at 22:55

## 2025-02-17 RX ADMIN — SODIUM CHLORIDE, PRESERVATIVE FREE 10 ML: 5 INJECTION INTRAVENOUS at 08:45

## 2025-02-17 ASSESSMENT — PULMONARY FUNCTION TESTS
PIF_VALUE: 18
PIF_VALUE: 21
PIF_VALUE: 21
PIF_VALUE: 17
PIF_VALUE: 18
PIF_VALUE: 20
PIF_VALUE: 18
PIF_VALUE: 18
PIF_VALUE: 21
PIF_VALUE: 18
PIF_VALUE: 18
PIF_VALUE: 20
PIF_VALUE: 20
PIF_VALUE: 18
PIF_VALUE: 21
PIF_VALUE: 18
PIF_VALUE: 21
PIF_VALUE: 18
PIF_VALUE: 17
PIF_VALUE: 21
PIF_VALUE: 21
PIF_VALUE: 18

## 2025-02-17 ASSESSMENT — PAIN SCALES - GENERAL
PAINLEVEL_OUTOF10: 0

## 2025-02-17 NOTE — PROGRESS NOTES
Renal Progress Note    Patient: Nida Graves MRN: [id number]  SSN: [id number]    YOB: 1975  Age: 49 y.o.  Sex: female      Admit Date: 1/12/2025    LOS: 36 days     Subjective:   Patient seen at bedside. Alert and awake, no acute distress.  Status post tracheostomy, on vent  She is understanding conversation   Edema improved, no complaints of any swelling in lower extremities.   Repeat labs showed improvement in creatinine to 1.39, repeat potassium is 4.8      Current Facility-Administered Medications   Medication Dose Route Frequency    docusate (COLACE) 50 MG/5ML liquid 100 mg  100 mg Oral Daily    epoetin itz-epbx (RETACRIT) injection 2,500 Units  2,500 Units SubCUTAneous Once per day on Monday Wednesday Friday    lansoprazole (PREVACID SOLUTAB) disintegrating tablet 30 mg  30 mg Per G Tube QAM AC    aspirin chewable tablet 81 mg  81 mg Per G Tube Daily    atorvastatin (LIPITOR) tablet 20 mg  20 mg Per G Tube Nightly    buPROPion (WELLBUTRIN) tablet 75 mg  75 mg Per G Tube BID    folic acid (FOLVITE) tablet 1 mg  1 mg Per G Tube Daily    midodrine (PROAMATINE) tablet 10 mg  10 mg Per G Tube TID    venlafaxine (EFFEXOR) tablet 37.5 mg  37.5 mg Per G Tube BID WC    acetaminophen (TYLENOL) 160 MG/5ML solution 650 mg  650 mg Per G Tube Q6H PRN    Or    acetaminophen (TYLENOL) suppository 650 mg  650 mg Rectal Q6H PRN    hydrOXYzine HCl (ATARAX) tablet 25 mg  25 mg Per G Tube TID PRN    magnesium hydroxide (MILK OF MAGNESIA) 400 MG/5ML suspension 30 mL  30 mL Per G Tube Q6H PRN    polyethylene glycol (GLYCOLAX) packet 17 g  17 g Per G Tube Daily PRN    senna (SENOKOT) tablet 8.6 mg  1 tablet Per G Tube Daily PRN    cinacalcet (SENSIPAR) tablet 30 mg  30 mg Oral Daily    prochlorperazine (COMPAZINE) injection 10 mg  10 mg IntraVENous Q6H PRN    glucagon injection 1 mg  1 mg SubCUTAneous PRN    apixaban (ELIQUIS) tablet 5 mg  5 mg Per NG tube BID    Petrolatum-Zinc Oxide ointment   Topical BID     awake, understanding conversation, following commands,    Skin: normal skin turgor, no skin rashes.        Intake and Output:  Current Shift: No intake/output data recorded.  Last three shifts: 02/15 1901 - 02/17 0700  In: 1050 [I.V.:10]  Out: 3450 [Urine:3450]      Lab/Data Review:  Recent Labs     02/15/25  0455 02/16/25  0209 02/17/25  0215   WBC 10.5 10.0 9.8   HGB 7.7* 7.4* 7.6*   HCT 25.3* 24.4* 25.5*    224 268     Recent Labs     02/15/25  0455 02/16/25  0209 02/17/25  0215    141 139   K 3.6 3.2* 4.8    103 103   CO2 28 32 33*   GLUCOSE 115* 102* 94   BUN 56* 48* 52*   CREATININE 1.77* 1.59* 1.39*   CALCIUM 9.1 9.0 9.0   MG 2.0 1.9 1.9   PHOS 3.5  3.6 3.0 3.4     No results for input(s): \"PHART\", \"JSI5FIN\", \"PO2ART\", \"UAP8TOL\", \"BEART\", \"OSP7GJJE\", \"HGBART\", \"JX7WNPPIU\", \"FIO2A\", \"K1TGXFDK\", \"OXYHEM\", \"CARBOXHGBART\", \"METHGBART\", \"M6DDRSVMX\", \"PHCORART\", \"TEMP\" in the last 72 hours.    Invalid input(s): \"TJN4HJNKD\"  Recent Results (from the past 24 hour(s))   POCT Glucose    Collection Time: 02/16/25  6:19 PM   Result Value Ref Range    POC Glucose 115 (H) 65 - 100 mg/dL    Performed by: Bam Maradiaga    POCT Glucose    Collection Time: 02/17/25 12:45 AM   Result Value Ref Range    POC Glucose 108 (H) 65 - 100 mg/dL    Performed by: Nathanael Gustafson    Basic Metabolic Panel    Collection Time: 02/17/25  2:15 AM   Result Value Ref Range    Sodium 139 136 - 145 mmol/L    Potassium 4.8 3.5 - 5.1 mmol/L    Chloride 103 97 - 108 mmol/L    CO2 33 (H) 21 - 32 mmol/L    Anion Gap 3 2 - 12 mmol/L    Glucose 94 65 - 100 mg/dL    BUN 52 (H) 6 - 20 mg/dL    Creatinine 1.39 (H) 0.55 - 1.02 mg/dL    BUN/Creatinine Ratio 37 (H) 12 - 20      Est, Glom Filt Rate 47 (L) >60 ml/min/1.73m2    Calcium 9.0 8.5 - 10.1 mg/dL   Magnesium    Collection Time: 02/17/25  2:15 AM   Result Value Ref Range    Magnesium 1.9 1.6 - 2.4 mg/dL   Phosphorus    Collection Time: 02/17/25  2:15 AM   Result Value Ref Range

## 2025-02-17 NOTE — PROGRESS NOTES
CRITICAL CARE PROGRESS NOTE    Name: Nida Graves   : 1975   MRN: [id number]   Date: 2025      Diagnoses/problem list:   Acute hypoxic and hypercapnic respiratory failure  Acute kidney injury  Atrial flutter with slow ventricular response  Complete heart block  GI bleed  Nosebleed, resolved  NSTEMI  Symptomatic bradycardia  Biventricular failure  Acute HFrEF, 25 to 30%  Diabetes  Morbid obesity  Acute metabolic/septic encephalopathy    24-hour events:   Did well overnight. No acute issues.     Assessment and plan:   Ms. Graves is a 49-year-old female with PMH chronic debility and bedbound status, diabetes, asthma, obesity, who presented for shortness of breath.  Shortness of breath worsening over the past few days.  Upon presentation ED patient found to be hypoxic necessita.  Ting O2 via NC.  She was also found to be in A-fib with slow ventricular response.  She was transferred out of ICU on . Early morning  upgraded to ICU again due to severe hypoxia and hypercapnia requiring intubation. Now s/p trach on . Leadless pacemaker . PEG     NEUROLOGICAL:    Continue Bupropion and Venlafaxine   Delirium precautions  PT/OT  Recommend day-night adherence and encourage more mobility    PULMONOLOGY:   Extubated on , reintubated on  due to inability to protect airway  ENT placed trach on   Continue to wean FiO2 as tolerated  Pressure support and trach collar trials as tolerated  Daily SBT with multiple failures due to apnea     CARDIOVASCULAR:   Midodrine to keep MAP above 65   S/p AUGUSTIN/DCCV on   LVEF 20-30%, right ventricle overload, moderate TR   GDMT for HFrEF as tolerated  Diuresis as needed  [facility] this admission: nonobstructive CAD; luminal RCA, Lcx; 40% mLAD   Continue ASA and Statin   Cardiology recs noted   Leadless pacemaker     GASTROINTESTINAL:   Noted to have dark stools on . Occult stool positive for blood. CTA abdomen/pelvis without evidence of

## 2025-02-17 NOTE — PROGRESS NOTES
CARDIOLOGY PROGRESS NOTE      Patient Name: Nida Graves  Age: 49 y.o.  Gender:female  :1975  MRN: [id number]    Patient seen and examined. This is a patient with a history of diabetes, asthma who presented with shortness of breath and hypoxia now being followed for atrial flutter and CHF.     Extubated , reintubated  d/t hypoxia. Now S/p trach 2025.    Successful AUGUSTIN/DCCV 2025, was in NSR but developed CHB, Micra placed      Patient seen and examined. Remains in ICU. Nods yes and no to questions.  Denies chest pain.  Nods that breathing is okay. Tele reviewed    Limited review of systems items noted above. Current medications reviewed..    Physical Examination    Allergies   Allergen Reactions    Latex Swelling    Penicillins Hives    Codeine Anxiety     Vitals:    25 1100   BP: 134/81   Pulse: 68   Resp: 25   Temp:    SpO2: 98%     Vital signs are stable  Trach, vent  Heart has a RRR.  No murmur  Lungs are coarse  Abdomen is soft, nontender, normal bowel sounds.  Extremities have trace to mild LE edema  Skin is dry and warm.    Labs reviewed:  Recent Results (from the past 12 hour(s))   Basic Metabolic Panel    Collection Time: 25  2:15 AM   Result Value Ref Range    Sodium 139 136 - 145 mmol/L    Potassium 4.8 3.5 - 5.1 mmol/L    Chloride 103 97 - 108 mmol/L    CO2 33 (H) 21 - 32 mmol/L    Anion Gap 3 2 - 12 mmol/L    Glucose 94 65 - 100 mg/dL    BUN 52 (H) 6 - 20 mg/dL    Creatinine 1.39 (H) 0.55 - 1.02 mg/dL    BUN/Creatinine Ratio 37 (H) 12 - 20      Est, Glom Filt Rate 47 (L) >60 ml/min/1.73m2    Calcium 9.0 8.5 - 10.1 mg/dL   Magnesium    Collection Time: 25  2:15 AM   Result Value Ref Range    Magnesium 1.9 1.6 - 2.4 mg/dL   Phosphorus    Collection Time: 25  2:15 AM   Result Value Ref Range    Phosphorus 3.4 2.6 - 4.7 mg/dL   CBC with Auto Differential    Collection Time: 25  2:15 AM   Result Value Ref Range    WBC 9.8 3.6 - 11.0 K/uL    RBC  2.66 (L) 3.80 - 5.20 M/uL    Hemoglobin 7.6 (L) 11.5 - 16.0 g/dL    Hematocrit 25.5 (L) 35.0 - 47.0 %    MCV 95.9 80.0 - 99.0 FL    MCH 28.6 26.0 - 34.0 PG    MCHC 29.8 (L) 30.0 - 36.5 g/dL    RDW 17.8 (H) 11.5 - 14.5 %    Platelets 268 150 - 400 K/uL    MPV 10.2 8.9 - 12.9 FL    Nucleated RBCs 0.0 0.0  WBC    nRBC 0.00 0.00 - 0.01 K/uL    Neutrophils % 59.6 32.0 - 75.0 %    Lymphocytes % 19.4 12.0 - 49.0 %    Monocytes % 11.8 5.0 - 13.0 %    Eosinophils % 8.0 (H) 0.0 - 7.0 %    Basophils % 0.4 0.0 - 1.0 %    Immature Granulocytes % 0.8 (H) 0 - 0.5 %    Neutrophils Absolute 5.86 1.80 - 8.00 K/UL    Lymphocytes Absolute 1.91 0.80 - 3.50 K/UL    Monocytes Absolute 1.16 (H) 0.00 - 1.00 K/UL    Eosinophils Absolute 0.79 (H) 0.00 - 0.40 K/UL    Basophils Absolute 0.04 0.00 - 0.10 K/UL    Immature Granulocytes Absolute 0.08 (H) 0.00 - 0.04 K/UL    Differential Type AUTOMATED     POCT Glucose    Collection Time: 02/17/25  5:50 AM   Result Value Ref Range    POC Glucose 108 (H) 65 - 100 mg/dL    Performed by: Deyvi Norton (PCT)    POCT Glucose    Collection Time: 02/17/25 11:46 AM   Result Value Ref Range    POC Glucose 110 (H) 65 - 100 mg/dL    Performed by: Gill Lenz      Case discussed with Dr. Coburn and our impression and recommendations are as follows:   Atrial flutter/fib with slow ventricular rate, 40-60s, then CHB now s/p micra  Currently SR  AC previously stopped due to rectal bleeding/anemia, s/p transfusion; hgb 7.6 today  Recommend Eliquis at discharge   Elevated troponin: peak of 639  likely myocardial injury secondary to acute hypoxia/aflutter   [facility] with nonobstructive CAD; luminal RCA, Lcx; 40% mLAD   Continue aspirin, statin  Acute systolic heart failure with reduced ejection fraction, nonischemic  EF 25-30% then limited echo 1/28 EF 30-35%, normal IVC size  Initiate GDMT as BP allows, currently on midodrine  Can consider ICD in the OP setting    Hypertension: BP acceptable on midodrine 10 mg

## 2025-02-17 NOTE — CARE COORDINATION
CM reviewed Pt medicals, Shanon, Liaison for [facility] came to see Pt on Friday.    Shanon has to get approval from their respiratory for admission. [facility] does not get to get auth.     Per IDR    Fi02@35%

## 2025-02-17 NOTE — PROGRESS NOTES
Comprehensive Nutrition Assessment    Type and Reason for Visit:  Reassess (Early Follow Up)  Addendum: Message w/ MD and given plans for d/c in progress, will trial bolus feeds. See recs below.   Nutrition Recommendations/Plan:   Initiate bolus feeds w/ Promote with fiber via PEG @ 100 ml administered to gravity and increase 90 ml each subsequent bolus as tolerated to a goal of 280 ml every 4 hours. Administer 50 ml free water flush before and after each bolus. (bolus 1: 100ml, bolus 2: 190 ml, bolus 3: 280 ml (goal))  Provides: 1680 kcals (78% est need/17 kcals/kg), 106 g protein (1.7 g/kg IBW), and 1396 ml free water+600 ml NJRf=0551 ml H2O daily  Monitor fluid status and lytes, correct as needed  Monitor weight as able (bed scale needs zeroing if pt gets out of bed), TF admin, labs, and bowel fxn/abd exam     Malnutrition Assessment:  Malnutrition Status:  Mild malnutrition (01/21/25 1213)    Context:  Chronic Illness       Nutrition Assessment:    Presented w/ SOB, arrhythmia. Admitted to ICU 1/13-1/16. Returned from floor 1/19 d/t hypoxia, hypercarbia, and encephalopathy. Persistent hypoxia on biPAP, intubated. Pt currently on vent w/ sedation. Per discussion in MDRs, plans to initiate TF today, wean propofol as able, and continue diuresing. EF 30%. Pt w/ % intake documented 1/14, 0% later in admission. (1/23) TF initiated 1/21 - Promote @30 + 4PS. Concern for GI bleed w/ dark stools, TF held 1/22 for CTA, neg, restarted and now @ goal. (1/28) Pt extubated 1/26, SLP rec mod thick CLD. Incr O2 needs 1/27,copious secretions, reintubated 1/27. Per discussion in MDRs, plan to restart TF. Will provides recs. Labs: , h/h 9.4/30.4, Na 146, Phos 2.3. Meds: SSI, cefipime, dopamine @ 5 mcg/kg/min, Propofol @ 12.2 ml/hr (322 kcals) (1/31) Pt had trach placed yesterday, NG tube placed this morning. Weight steady. Propofol @ 12.2 ml/hr, plan to wean today. Once NG placement confirmed, recommend restart TF.  for Protein Requirements: Ideal  Protein (g/day): 110-122 g/d (1.8-2 g/kgIBW)  Method Used for Fluid Requirements: 1 ml/kcal  Fluid (ml/day): 2158 ml/d    Nutrition Diagnosis:   Inadequate oral intake related to impaired respiratory function as evidenced by  (trach to vent)    Nutrition Interventions:   Food and/or Nutrient Delivery: Modify Tube Feeding  Nutrition Education/Counseling: No recommendation at this time  Coordination of Nutrition Care: Continue to monitor while inpatient  Plan of Care discussed with: MDR team    Goals:  Goals: Tolerate nutrition support at goal rate, by next RD assessment  Type of Goal: Continue current goal  Previous Goal Met: Goal(s) Achieved    Nutrition Monitoring and Evaluation:   Behavioral-Environmental Outcomes: None Identified  Food/Nutrient Intake Outcomes: Enteral Nutrition Intake/Tolerance  Physical Signs/Symptoms Outcomes: Biochemical Data, Constipation, GI Status, Weight, Fluid Status or Edema    Discharge Planning:    Enteral Nutrition     Winnie Garrison RD  Contact: [phone]

## 2025-02-17 NOTE — PLAN OF CARE
Problem: ABCDS Injury Assessment  Goal: Absence of physical injury  Outcome: Progressing     Problem: Skin/Tissue Integrity  Goal: Absence of new skin breakdown  Description: 1.  Monitor for areas of redness and/or skin breakdown  2.  Assess vascular access sites hourly  3.  Every 4-6 hours minimum:  Change oxygen saturation probe site  4.  Every 4-6 hours:  If on nasal continuous positive airway pressure, respiratory therapy assess nares and determine need for appliance change or resting period.  Outcome: Progressing     Problem: Chronic Conditions and Co-morbidities  Goal: Patient's chronic conditions and co-morbidity symptoms are monitored and maintained or improved  Outcome: Progressing     Problem: Discharge Planning  Goal: Discharge to home or other facility with appropriate resources  Outcome: Progressing     Problem: Safety - Adult  Goal: Free from fall injury  Outcome: Progressing     Problem: Neurosensory - Adult  Goal: Achieves stable or improved neurological status  Outcome: Progressing  Goal: Achieves maximal functionality and self care  Outcome: Progressing     Problem: Respiratory - Adult  Goal: Achieves optimal ventilation and oxygenation  Outcome: Progressing     Problem: Cardiovascular - Adult  Goal: Maintains optimal cardiac output and hemodynamic stability  Outcome: Progressing  Goal: Absence of cardiac dysrhythmias or at baseline  Outcome: Progressing     Problem: Gastrointestinal - Adult  Goal: Minimal or absence of nausea and vomiting  Outcome: Progressing  Goal: Maintains or returns to baseline bowel function  Outcome: Progressing  Goal: Maintains adequate nutritional intake  Outcome: Progressing     Problem: Genitourinary - Adult  Goal: Absence of urinary retention  Outcome: Progressing     Problem: Metabolic/Fluid and Electrolytes - Adult  Goal: Electrolytes maintained within normal limits  Outcome: Progressing  Goal: Hemodynamic stability and optimal renal function maintained  Outcome:  Progressing

## 2025-02-18 LAB
ALBUMIN SERPL-MCNC: 2.4 G/DL (ref 3.5–5)
ANION GAP SERPL CALC-SCNC: 4 MMOL/L (ref 2–12)
BASOPHILS # BLD: 0.04 K/UL (ref 0–0.1)
BASOPHILS NFR BLD: 0.5 % (ref 0–1)
BUN SERPL-MCNC: 46 MG/DL (ref 6–20)
BUN/CREAT SERPL: 35 (ref 12–20)
CA-I BLD-MCNC: 8.8 MG/DL (ref 8.5–10.1)
CHLORIDE SERPL-SCNC: 105 MMOL/L (ref 97–108)
CO2 SERPL-SCNC: 33 MMOL/L (ref 21–32)
CREAT SERPL-MCNC: 1.31 MG/DL (ref 0.55–1.02)
DIFFERENTIAL METHOD BLD: ABNORMAL
EOSINOPHIL # BLD: 0.64 K/UL (ref 0–0.4)
EOSINOPHIL NFR BLD: 7.4 % (ref 0–7)
ERYTHROCYTE [DISTWIDTH] IN BLOOD BY AUTOMATED COUNT: 18 % (ref 11.5–14.5)
GLUCOSE BLD STRIP.AUTO-MCNC: 106 MG/DL (ref 65–100)
GLUCOSE BLD STRIP.AUTO-MCNC: 110 MG/DL (ref 65–100)
GLUCOSE BLD STRIP.AUTO-MCNC: 112 MG/DL (ref 65–100)
GLUCOSE BLD STRIP.AUTO-MCNC: 123 MG/DL (ref 65–100)
GLUCOSE BLD STRIP.AUTO-MCNC: 95 MG/DL (ref 65–100)
GLUCOSE SERPL-MCNC: 111 MG/DL (ref 65–100)
HCT VFR BLD AUTO: 24.7 % (ref 35–47)
HGB BLD-MCNC: 7.2 G/DL (ref 11.5–16)
IMM GRANULOCYTES # BLD AUTO: 0.1 K/UL (ref 0–0.04)
IMM GRANULOCYTES NFR BLD AUTO: 1.2 % (ref 0–0.5)
LYMPHOCYTES # BLD: 1.89 K/UL (ref 0.8–3.5)
LYMPHOCYTES NFR BLD: 21.9 % (ref 12–49)
MAGNESIUM SERPL-MCNC: 1.9 MG/DL (ref 1.6–2.4)
MCH RBC QN AUTO: 28.5 PG (ref 26–34)
MCHC RBC AUTO-ENTMCNC: 29.1 G/DL (ref 30–36.5)
MCV RBC AUTO: 97.6 FL (ref 80–99)
MONOCYTES # BLD: 1.15 K/UL (ref 0–1)
MONOCYTES NFR BLD: 13.3 % (ref 5–13)
NEUTS SEG # BLD: 4.82 K/UL (ref 1.8–8)
NEUTS SEG NFR BLD: 55.7 % (ref 32–75)
NRBC # BLD: 0 K/UL (ref 0–0.01)
NRBC BLD-RTO: 0 PER 100 WBC
PERFORMED BY:: ABNORMAL
PERFORMED BY:: NORMAL
PHOSPHATE SERPL-MCNC: 3.7 MG/DL (ref 2.6–4.7)
PLATELET # BLD AUTO: 264 K/UL (ref 150–400)
PMV BLD AUTO: 9.9 FL (ref 8.9–12.9)
POTASSIUM SERPL-SCNC: 4.7 MMOL/L (ref 3.5–5.1)
RBC # BLD AUTO: 2.53 M/UL (ref 3.8–5.2)
SODIUM SERPL-SCNC: 142 MMOL/L (ref 136–145)
WBC # BLD AUTO: 8.6 K/UL (ref 3.6–11)

## 2025-02-18 PROCEDURE — 2580000003 HC RX 258: Performed by: INTERNAL MEDICINE

## 2025-02-18 PROCEDURE — 2700000000 HC OXYGEN THERAPY PER DAY

## 2025-02-18 PROCEDURE — 85025 COMPLETE CBC W/AUTO DIFF WBC: CPT

## 2025-02-18 PROCEDURE — 2000000000 HC ICU R&B

## 2025-02-18 PROCEDURE — 97530 THERAPEUTIC ACTIVITIES: CPT

## 2025-02-18 PROCEDURE — 2500000003 HC RX 250 WO HCPCS: Performed by: STUDENT IN AN ORGANIZED HEALTH CARE EDUCATION/TRAINING PROGRAM

## 2025-02-18 PROCEDURE — 80069 RENAL FUNCTION PANEL: CPT

## 2025-02-18 PROCEDURE — 94640 AIRWAY INHALATION TREATMENT: CPT

## 2025-02-18 PROCEDURE — 6370000000 HC RX 637 (ALT 250 FOR IP): Performed by: STUDENT IN AN ORGANIZED HEALTH CARE EDUCATION/TRAINING PROGRAM

## 2025-02-18 PROCEDURE — 2500000003 HC RX 250 WO HCPCS: Performed by: INTERNAL MEDICINE

## 2025-02-18 PROCEDURE — 6360000002 HC RX W HCPCS: Performed by: STUDENT IN AN ORGANIZED HEALTH CARE EDUCATION/TRAINING PROGRAM

## 2025-02-18 PROCEDURE — 94003 VENT MGMT INPAT SUBQ DAY: CPT

## 2025-02-18 PROCEDURE — 6370000000 HC RX 637 (ALT 250 FOR IP): Performed by: INTERNAL MEDICINE

## 2025-02-18 PROCEDURE — 36415 COLL VENOUS BLD VENIPUNCTURE: CPT

## 2025-02-18 PROCEDURE — 94761 N-INVAS EAR/PLS OXIMETRY MLT: CPT

## 2025-02-18 PROCEDURE — 6360000002 HC RX W HCPCS: Performed by: INTERNAL MEDICINE

## 2025-02-18 PROCEDURE — 82962 GLUCOSE BLOOD TEST: CPT

## 2025-02-18 PROCEDURE — 83735 ASSAY OF MAGNESIUM: CPT

## 2025-02-18 PROCEDURE — 94668 MNPJ CHEST WALL SBSQ: CPT

## 2025-02-18 RX ORDER — ACETYLCYSTEINE 200 MG/ML
600 SOLUTION ORAL; RESPIRATORY (INHALATION)
Status: DISCONTINUED | OUTPATIENT
Start: 2025-02-18 | End: 2025-02-20

## 2025-02-18 RX ADMIN — VENLAFAXINE 37.5 MG: 25 TABLET ORAL at 08:54

## 2025-02-18 RX ADMIN — VENLAFAXINE 37.5 MG: 25 TABLET ORAL at 17:16

## 2025-02-18 RX ADMIN — IPRATROPIUM BROMIDE AND ALBUTEROL SULFATE 1 DOSE: 2.5; .5 SOLUTION RESPIRATORY (INHALATION) at 13:20

## 2025-02-18 RX ADMIN — IPRATROPIUM BROMIDE AND ALBUTEROL SULFATE 1 DOSE: 2.5; .5 SOLUTION RESPIRATORY (INHALATION) at 07:19

## 2025-02-18 RX ADMIN — ATORVASTATIN CALCIUM 20 MG: 20 TABLET, FILM COATED ORAL at 21:15

## 2025-02-18 RX ADMIN — LANSOPRAZOLE 30 MG: 30 TABLET, ORALLY DISINTEGRATING ORAL at 05:56

## 2025-02-18 RX ADMIN — SODIUM CHLORIDE, PRESERVATIVE FREE 10 ML: 5 INJECTION INTRAVENOUS at 21:15

## 2025-02-18 RX ADMIN — WHITE PETROLATUM,ZINC OXIDE: 57; 17 PASTE TOPICAL at 21:16

## 2025-02-18 RX ADMIN — ASPIRIN 81 MG 81 MG: 81 TABLET ORAL at 08:55

## 2025-02-18 RX ADMIN — SODIUM CHLORIDE, PRESERVATIVE FREE 10 ML: 5 INJECTION INTRAVENOUS at 08:54

## 2025-02-18 RX ADMIN — SODIUM CHLORIDE 125 MG: 9 INJECTION, SOLUTION INTRAVENOUS at 14:47

## 2025-02-18 RX ADMIN — BUDESONIDE 500 MCG: 0.5 INHALANT ORAL at 07:19

## 2025-02-18 RX ADMIN — MIDODRINE HYDROCHLORIDE 10 MG: 5 TABLET ORAL at 08:53

## 2025-02-18 RX ADMIN — APIXABAN 5 MG: 5 TABLET, FILM COATED ORAL at 08:54

## 2025-02-18 RX ADMIN — BUDESONIDE 500 MCG: 0.5 INHALANT ORAL at 19:49

## 2025-02-18 RX ADMIN — MIDODRINE HYDROCHLORIDE 10 MG: 5 TABLET ORAL at 14:50

## 2025-02-18 RX ADMIN — ACETYLCYSTEINE 600 MG: 200 SOLUTION ORAL; RESPIRATORY (INHALATION) at 19:48

## 2025-02-18 RX ADMIN — BUPROPION HYDROCHLORIDE 75 MG: 75 TABLET, FILM COATED ORAL at 08:54

## 2025-02-18 RX ADMIN — MIDODRINE HYDROCHLORIDE 10 MG: 5 TABLET ORAL at 21:15

## 2025-02-18 RX ADMIN — WHITE PETROLATUM,ZINC OXIDE: 57; 17 PASTE TOPICAL at 08:54

## 2025-02-18 RX ADMIN — FOLIC ACID 1 MG: 1 TABLET ORAL at 08:54

## 2025-02-18 RX ADMIN — APIXABAN 5 MG: 5 TABLET, FILM COATED ORAL at 21:15

## 2025-02-18 RX ADMIN — CINACALCET HYDROCHLORIDE 30 MG: 30 TABLET, FILM COATED ORAL at 09:45

## 2025-02-18 RX ADMIN — DOCUSATE SODIUM 100 MG: 50 LIQUID ORAL at 08:54

## 2025-02-18 RX ADMIN — IPRATROPIUM BROMIDE AND ALBUTEROL SULFATE 1 DOSE: 2.5; .5 SOLUTION RESPIRATORY (INHALATION) at 19:48

## 2025-02-18 RX ADMIN — BUPROPION HYDROCHLORIDE 75 MG: 75 TABLET, FILM COATED ORAL at 21:15

## 2025-02-18 ASSESSMENT — PAIN SCALES - GENERAL
PAINLEVEL_OUTOF10: 0

## 2025-02-18 ASSESSMENT — PULMONARY FUNCTION TESTS
PIF_VALUE: 20
PIF_VALUE: 26
PIF_VALUE: 20
PIF_VALUE: 21
PIF_VALUE: 20
PIF_VALUE: 26
PIF_VALUE: 20
PIF_VALUE: 21
PIF_VALUE: 16
PIF_VALUE: 26
PIF_VALUE: 15
PIF_VALUE: 25
PIF_VALUE: 26
PIF_VALUE: 21
PIF_VALUE: 26
PIF_VALUE: 26
PIF_VALUE: 21
PIF_VALUE: 20
PIF_VALUE: 16

## 2025-02-18 NOTE — PROGRESS NOTES
CARDIOLOGY PROGRESS NOTE      Patient Name: Nida Graves  Age: 49 y.o.  Gender:female  :1975  MRN: [id number]    Patient seen and examined. This is a patient with a history of diabetes, asthma who presented with shortness of breath and hypoxia now being followed for atrial flutter and CHF.     Extubated , reintubated  d/t hypoxia. Now S/p trach 2025.    Successful AUGUSTIN/DCCV 2025, was in NSR but developed CHB, Micra placed      Patient seen and examined. Remains in ICU. Asleep in bed, nods yes to feeling okay. Tele reviewed    Limited review of systems items noted above. Current medications reviewed..    Physical Examination    Allergies   Allergen Reactions    Latex Swelling    Penicillins Hives    Codeine Anxiety     Vitals:    25 1320   BP:    Pulse: 64   Resp: 17   Temp:    SpO2: 96%     Vital signs are stable  Trach, vent  Heart has a RRR.  No murmur  Lungs are coarse  Abdomen is soft, nontender, normal bowel sounds.  Extremities have trace to mild LE edema  Skin is dry and warm.    Labs reviewed:  Recent Results (from the past 12 hour(s))   Magnesium    Collection Time: 25  3:30 AM   Result Value Ref Range    Magnesium 1.9 1.6 - 2.4 mg/dL   CBC with Auto Differential    Collection Time: 25  3:30 AM   Result Value Ref Range    WBC 8.6 3.6 - 11.0 K/uL    RBC 2.53 (L) 3.80 - 5.20 M/uL    Hemoglobin 7.2 (L) 11.5 - 16.0 g/dL    Hematocrit 24.7 (L) 35.0 - 47.0 %    MCV 97.6 80.0 - 99.0 FL    MCH 28.5 26.0 - 34.0 PG    MCHC 29.1 (L) 30.0 - 36.5 g/dL    RDW 18.0 (H) 11.5 - 14.5 %    Platelets 264 150 - 400 K/uL    MPV 9.9 8.9 - 12.9 FL    Nucleated RBCs 0.0 0.0  WBC    nRBC 0.00 0.00 - 0.01 K/uL    Neutrophils % 55.7 32.0 - 75.0 %    Lymphocytes % 21.9 12.0 - 49.0 %    Monocytes % 13.3 (H) 5.0 - 13.0 %    Eosinophils % 7.4 (H) 0.0 - 7.0 %    Basophils % 0.5 0.0 - 1.0 %    Immature Granulocytes % 1.2 (H) 0 - 0.5 %    Neutrophils Absolute 4.82 1.80 - 8.00 K/UL

## 2025-02-18 NOTE — CARE COORDINATION
[facility], KHAI also called Shanon ocasio Liaison and left a VM requesting a return call.    Alison Claudio, called and stated that she is going to take Pt medicals to the .          CM reviewed Pt medicals, Pt lives with her  and Nephew.      Pt  assist Pt with ADL       Per IDR  Pt has been working with PT.     Going to try SBT.     Pt is ready to go to [facility] when they can accept her.

## 2025-02-18 NOTE — PROGRESS NOTES
Pt has a pacemaker: MicraAV2    PPM mode: VDD   Set perimeters: below 60 and above 120    Serial number# [id number]  Device# [id number]  Model# [id number]

## 2025-02-18 NOTE — PROGRESS NOTES
CRITICAL CARE PROGRESS NOTE    Name: Nida Graves   : 1975   MRN: [id number]   Date: 2025      Diagnoses/problem list:   Acute hypoxic and hypercapnic respiratory failure  Acute kidney injury  Atrial flutter with slow ventricular response  Complete heart block  GI bleed  Nosebleed, resolved  NSTEMI  Symptomatic bradycardia  Biventricular failure  Acute HFrEF, 25 to 30%  Diabetes  Morbid obesity  Acute metabolic/septic encephalopathy    24-hour events:   Did well overnight. Had mucus plugging today.    Assessment and plan:   Ms. Graves is a 49-year-old female with PMH chronic debility and bedbound status, diabetes, asthma, obesity, who presented for shortness of breath.  Shortness of breath worsening over the past few days.  Upon presentation ED patient found to be hypoxic necessita.  Ting O2 via NC.  She was also found to be in A-fib with slow ventricular response.  She was transferred out of ICU on . Early morning  upgraded to ICU again due to severe hypoxia and hypercapnia requiring intubation. Now s/p trach on . Leadless pacemaker . PEG     NEUROLOGICAL:    Continue Bupropion and Venlafaxine   Delirium precautions  PT/OT  Recommend day-night adherence and encourage more mobility    PULMONOLOGY:   Extubated on , reintubated on  due to inability to protect airway  ENT placed trach on   Continue to wean FiO2 as tolerated  Pressure support and trach collar trials as tolerated  Daily SBT with multiple failures due to apnea   Mucus plugging and added mucomyst for 2 days    CARDIOVASCULAR:   Midodrine to keep MAP above 65   S/p AUGUSTIN/DCCV on   LVEF 20-30%, right ventricle overload, moderate TR   GDMT for HFrEF as tolerated  Diuresis as needed  [facility] this admission: nonobstructive CAD; luminal RCA, Lcx; 40% mLAD   Continue ASA and Statin   Cardiology recs noted   Leadless pacemaker     GASTROINTESTINAL:   Noted to have dark stools on . Occult stool positive for blood.  02/18/25    Intake/Output:     Intake/Output Summary (Last 24 hours) at 2/18/2025 1804  Last data filed at 2/18/2025 1200  Gross per 24 hour   Intake 1110 ml   Output 1300 ml   Net -190 ml       CRITICAL CARE DOCUMENTATION  I had a face to face encounter with the patient, reviewed and interpreted patient data including clinical events, labs, images, vital signs, I/O's, and examined patient.  I have discussed the case and the plan and management of the patient's care with the consulting services, the bedside nurses and the respiratory therapist.      NOTE OF PERSONAL INVOLVEMENT IN CARE   This patient has a high probability of imminent, clinically significant deterioration, which requires the highest level of preparedness to intervene urgently. I participated in the decision-making and personally managed or directed the management of the following life and organ supporting interventions that required my frequent assessment to treat or prevent imminent deterioration.    I personally spent 31 minutes of critical care time.  This is time spent at this critically ill patient's bedside actively involved in patient care as well as the coordination of care.  This does not include any procedural time which has been billed separately.      Shilpa Estevez MD  [facility] Critical Care

## 2025-02-18 NOTE — PROGRESS NOTES
Renal Progress Note    Patient: Nida Graves MRN: [id number]  SSN: [id number]    YOB: 1975  Age: 49 y.o.  Sex: female      Admit Date: 1/12/2025    LOS: 37 days     Subjective:   Patient seen at bedside.  awake, no acute distress.  Status post tracheostomy, on vent  She is understanding conversation   Edema improved, Repeat labs showed improvement in creatinine to 1.31, repeat potassium is 4.7      Current Facility-Administered Medications   Medication Dose Route Frequency    ferric gluconate (FERRLECIT) 125 mg in sodium chloride 0.9 % 100 mL IVPB  125 mg IntraVENous Q24H    docusate (COLACE) 50 MG/5ML liquid 100 mg  100 mg Oral Daily    epoetin itz-epbx (RETACRIT) injection 2,500 Units  2,500 Units SubCUTAneous Once per day on Monday Wednesday Friday    lansoprazole (PREVACID SOLUTAB) disintegrating tablet 30 mg  30 mg Per G Tube QAM AC    aspirin chewable tablet 81 mg  81 mg Per G Tube Daily    atorvastatin (LIPITOR) tablet 20 mg  20 mg Per G Tube Nightly    buPROPion (WELLBUTRIN) tablet 75 mg  75 mg Per G Tube BID    folic acid (FOLVITE) tablet 1 mg  1 mg Per G Tube Daily    midodrine (PROAMATINE) tablet 10 mg  10 mg Per G Tube TID    venlafaxine (EFFEXOR) tablet 37.5 mg  37.5 mg Per G Tube BID WC    acetaminophen (TYLENOL) 160 MG/5ML solution 650 mg  650 mg Per G Tube Q6H PRN    Or    acetaminophen (TYLENOL) suppository 650 mg  650 mg Rectal Q6H PRN    hydrOXYzine HCl (ATARAX) tablet 25 mg  25 mg Per G Tube TID PRN    magnesium hydroxide (MILK OF MAGNESIA) 400 MG/5ML suspension 30 mL  30 mL Per G Tube Q6H PRN    polyethylene glycol (GLYCOLAX) packet 17 g  17 g Per G Tube Daily PRN    senna (SENOKOT) tablet 8.6 mg  1 tablet Per G Tube Daily PRN    cinacalcet (SENSIPAR) tablet 30 mg  30 mg Oral Daily    prochlorperazine (COMPAZINE) injection 10 mg  10 mg IntraVENous Q6H PRN    glucagon injection 1 mg  1 mg SubCUTAneous PRN    apixaban (ELIQUIS) tablet 5 mg  5 mg Per NG tube BID    Petrolatum-Zinc

## 2025-02-18 NOTE — PLAN OF CARE
PHYSICAL THERAPY REEVALUATION  Patient: Nida Graves (49 y.o. female)  Date: 2/18/2025  Primary Diagnosis: Atrial fibrillation, unspecified type (HCC) [I48.91]  Atrial flutter, unspecified type (HCC) [I48.92]  Congestive heart failure, unspecified HF chronicity, unspecified heart failure type (HCC) [I50.9]  Acute hypoxic respiratory failure [J96.01]  Procedure(s) (LRB):  ESOPHAGOGASTRODUODENOSCOPY PERCUTANEOUS ENDOSCOPIC GASTROSTOMY TUBE PLACEMENT (N/A) 5 Days Post-Op   Precautions: Fall Risk, General Precautions                      Recommendations for nursing mobility: Encourage HEP in prep for ADLs/mobility; see handout for details, Frequent repositioning to prevent skin breakdown, and Assist x2    In place during session: Peripheral IV, PICC line, Tracheostomy on vent CPAP/PS 35% FiO2, External Catheter, EKG/telemetry , Pulse ox, and PEG Tube  Chart, physical therapy assessment, plan of care, and goals were reviewed.      ASSESSMENT  Patient initially seen for PT evaluation 1/16/25 and 3 skilled PT sessions since evalution. Patient seen today for PT reevaluation s/t LOS. Patient A&O x3. Pt semi-supine upon arrival, agreeable to session.      Based on the objective data described, the patient currently presents with impaired functional mobility, decreased ROM, impaired strength, decreased activity tolerance, poor safety awareness, impaired balance, and impaired posture. (See below for objective details and assist levels).    Overall pt tolerated session fair today, currently with no reports of pain. Pt demonstrates improvement in functional mobility today. Pt able to participate in LE exercises with assist with cues for proper technique. Pt able to roll to her L side with max A x 1 with much greater ease today compared to previous treatment.  Attempted sup to sit transfer and pt able to assist to move legs off the bed and to pull herself to sitting, but unable to sit up all the way.  RN called in and with max A    3.  Moving from lying on back to sitting on the side of the bed?   [] 1   [x] 2   [] 3   [] 4          How much help from another person does the patient currently need... Total A Lot A Little None   4.  Moving to and from a bed to a chair (including a wheelchair)?   [x] 1   [] 2   [] 3   [] 4   5.  Need to walk in hospital room?   [x] 1   [] 2   [] 3   [] 4   6.  Climbing 3-5 steps with a railing?   [x] 1   [] 2   [] 3   [] 4   © , Trustees of [facility], under license to [employer]. All rights reserved     Score:  Initial:  Most Recent: (Date: 2025 )   Interpretation of Tool:  Represents activities that are increasingly more difficult (i.e. Bed mobility, Transfers, Gait).  Score 24 23 22-20 19-15 14-10 9-7 6   Modifier CH CI CJ CK CL CM CN     Pain Ratin/10  reported  Pain Intervention(s):       Activity Tolerance:   Fair  and requires rest breaks    After treatment patient left in no apparent distress:   Bed locked and in lowest position Patient left in no apparent distress in bed and Call bell within reach and nsg updated.    COMMUNICATION/EDUCATION:   The patient’s plan of care was discussed with: Registered nurse    Patient Education  Education Provided: Role of Therapy;Plan of Care;Home Exercise Program;Mobility Training  Education Method: Demonstration;Verbal  Barriers to Learning: None  Education Outcome: Verbalized understanding;Continued education needed        Thank you for this referral.  Shanice Norton, PT  Minutes: 24

## 2025-02-19 LAB
ANION GAP SERPL CALC-SCNC: 3 MMOL/L (ref 2–12)
BASOPHILS # BLD: 0.05 K/UL (ref 0–0.1)
BASOPHILS NFR BLD: 0.5 % (ref 0–1)
BUN SERPL-MCNC: 55 MG/DL (ref 6–20)
BUN/CREAT SERPL: 44 (ref 12–20)
CA-I BLD-MCNC: 9.4 MG/DL (ref 8.5–10.1)
CHLORIDE SERPL-SCNC: 103 MMOL/L (ref 97–108)
CO2 SERPL-SCNC: 32 MMOL/L (ref 21–32)
CREAT SERPL-MCNC: 1.26 MG/DL (ref 0.55–1.02)
DIFFERENTIAL METHOD BLD: ABNORMAL
EOSINOPHIL # BLD: 0.53 K/UL (ref 0–0.4)
EOSINOPHIL NFR BLD: 5.4 % (ref 0–7)
ERYTHROCYTE [DISTWIDTH] IN BLOOD BY AUTOMATED COUNT: 18.5 % (ref 11.5–14.5)
GLUCOSE BLD STRIP.AUTO-MCNC: 121 MG/DL (ref 65–100)
GLUCOSE BLD STRIP.AUTO-MCNC: 139 MG/DL (ref 65–100)
GLUCOSE BLD STRIP.AUTO-MCNC: 139 MG/DL (ref 65–100)
GLUCOSE BLD STRIP.AUTO-MCNC: 181 MG/DL (ref 65–100)
GLUCOSE BLD STRIP.AUTO-MCNC: 196 MG/DL (ref 65–100)
GLUCOSE SERPL-MCNC: 130 MG/DL (ref 65–100)
HCT VFR BLD AUTO: 25.5 % (ref 35–47)
HGB BLD-MCNC: 7.6 G/DL (ref 11.5–16)
IMM GRANULOCYTES # BLD AUTO: 0.11 K/UL (ref 0–0.04)
IMM GRANULOCYTES NFR BLD AUTO: 1.1 % (ref 0–0.5)
LYMPHOCYTES # BLD: 2.14 K/UL (ref 0.8–3.5)
LYMPHOCYTES NFR BLD: 21.6 % (ref 12–49)
MAGNESIUM SERPL-MCNC: 1.8 MG/DL (ref 1.6–2.4)
MCH RBC QN AUTO: 28.8 PG (ref 26–34)
MCHC RBC AUTO-ENTMCNC: 29.8 G/DL (ref 30–36.5)
MCV RBC AUTO: 96.6 FL (ref 80–99)
MONOCYTES # BLD: 1.02 K/UL (ref 0–1)
MONOCYTES NFR BLD: 10.3 % (ref 5–13)
NEUTS SEG # BLD: 6.04 K/UL (ref 1.8–8)
NEUTS SEG NFR BLD: 61.1 % (ref 32–75)
NRBC # BLD: 0 K/UL (ref 0–0.01)
NRBC BLD-RTO: 0 PER 100 WBC
PERFORMED BY:: ABNORMAL
PHOSPHATE SERPL-MCNC: 3 MG/DL (ref 2.6–4.7)
PLATELET # BLD AUTO: 287 K/UL (ref 150–400)
PMV BLD AUTO: 9.9 FL (ref 8.9–12.9)
POTASSIUM SERPL-SCNC: 4.8 MMOL/L (ref 3.5–5.1)
RBC # BLD AUTO: 2.64 M/UL (ref 3.8–5.2)
SODIUM SERPL-SCNC: 138 MMOL/L (ref 136–145)
WBC # BLD AUTO: 9.9 K/UL (ref 3.6–11)

## 2025-02-19 PROCEDURE — 84100 ASSAY OF PHOSPHORUS: CPT

## 2025-02-19 PROCEDURE — 2000000000 HC ICU R&B

## 2025-02-19 PROCEDURE — 6370000000 HC RX 637 (ALT 250 FOR IP): Performed by: STUDENT IN AN ORGANIZED HEALTH CARE EDUCATION/TRAINING PROGRAM

## 2025-02-19 PROCEDURE — 2700000000 HC OXYGEN THERAPY PER DAY

## 2025-02-19 PROCEDURE — 36415 COLL VENOUS BLD VENIPUNCTURE: CPT

## 2025-02-19 PROCEDURE — 85025 COMPLETE CBC W/AUTO DIFF WBC: CPT

## 2025-02-19 PROCEDURE — 6360000002 HC RX W HCPCS: Performed by: STUDENT IN AN ORGANIZED HEALTH CARE EDUCATION/TRAINING PROGRAM

## 2025-02-19 PROCEDURE — 94761 N-INVAS EAR/PLS OXIMETRY MLT: CPT

## 2025-02-19 PROCEDURE — 80048 BASIC METABOLIC PNL TOTAL CA: CPT

## 2025-02-19 PROCEDURE — 97530 THERAPEUTIC ACTIVITIES: CPT

## 2025-02-19 PROCEDURE — 2500000003 HC RX 250 WO HCPCS: Performed by: STUDENT IN AN ORGANIZED HEALTH CARE EDUCATION/TRAINING PROGRAM

## 2025-02-19 PROCEDURE — 2580000003 HC RX 258: Performed by: INTERNAL MEDICINE

## 2025-02-19 PROCEDURE — 83735 ASSAY OF MAGNESIUM: CPT

## 2025-02-19 PROCEDURE — 6370000000 HC RX 637 (ALT 250 FOR IP): Performed by: INTERNAL MEDICINE

## 2025-02-19 PROCEDURE — 82962 GLUCOSE BLOOD TEST: CPT

## 2025-02-19 PROCEDURE — 94640 AIRWAY INHALATION TREATMENT: CPT

## 2025-02-19 PROCEDURE — 6370000000 HC RX 637 (ALT 250 FOR IP): Performed by: NURSE PRACTITIONER

## 2025-02-19 PROCEDURE — 6360000002 HC RX W HCPCS: Performed by: INTERNAL MEDICINE

## 2025-02-19 RX ADMIN — APIXABAN 5 MG: 5 TABLET, FILM COATED ORAL at 20:30

## 2025-02-19 RX ADMIN — VENLAFAXINE 37.5 MG: 25 TABLET ORAL at 16:22

## 2025-02-19 RX ADMIN — BUPROPION HYDROCHLORIDE 75 MG: 75 TABLET, FILM COATED ORAL at 08:44

## 2025-02-19 RX ADMIN — INSULIN LISPRO 1 UNITS: 100 INJECTION, SOLUTION INTRAVENOUS; SUBCUTANEOUS at 17:29

## 2025-02-19 RX ADMIN — CINACALCET HYDROCHLORIDE 30 MG: 30 TABLET, FILM COATED ORAL at 11:57

## 2025-02-19 RX ADMIN — ASPIRIN 81 MG 81 MG: 81 TABLET ORAL at 08:45

## 2025-02-19 RX ADMIN — SODIUM CHLORIDE 125 MG: 9 INJECTION, SOLUTION INTRAVENOUS at 16:27

## 2025-02-19 RX ADMIN — FOLIC ACID 1 MG: 1 TABLET ORAL at 08:45

## 2025-02-19 RX ADMIN — MIDODRINE HYDROCHLORIDE 10 MG: 5 TABLET ORAL at 16:22

## 2025-02-19 RX ADMIN — HYDROXYZINE HYDROCHLORIDE 25 MG: 25 TABLET, FILM COATED ORAL at 08:44

## 2025-02-19 RX ADMIN — IPRATROPIUM BROMIDE AND ALBUTEROL SULFATE 1 DOSE: 2.5; .5 SOLUTION RESPIRATORY (INHALATION) at 07:14

## 2025-02-19 RX ADMIN — EPOETIN ALFA-EPBX 2500 UNITS: 3000 INJECTION, SOLUTION INTRAVENOUS; SUBCUTANEOUS at 16:22

## 2025-02-19 RX ADMIN — WHITE PETROLATUM,ZINC OXIDE: 57; 17 PASTE TOPICAL at 09:00

## 2025-02-19 RX ADMIN — IPRATROPIUM BROMIDE AND ALBUTEROL SULFATE 1 DOSE: 2.5; .5 SOLUTION RESPIRATORY (INHALATION) at 15:09

## 2025-02-19 RX ADMIN — APIXABAN 5 MG: 5 TABLET, FILM COATED ORAL at 08:45

## 2025-02-19 RX ADMIN — ACETYLCYSTEINE 600 MG: 200 SOLUTION ORAL; RESPIRATORY (INHALATION) at 07:14

## 2025-02-19 RX ADMIN — LANSOPRAZOLE 30 MG: 30 TABLET, ORALLY DISINTEGRATING ORAL at 05:47

## 2025-02-19 RX ADMIN — BUPROPION HYDROCHLORIDE 75 MG: 75 TABLET, FILM COATED ORAL at 20:29

## 2025-02-19 RX ADMIN — MIDODRINE HYDROCHLORIDE 10 MG: 5 TABLET ORAL at 08:45

## 2025-02-19 RX ADMIN — ACETYLCYSTEINE 600 MG: 200 SOLUTION ORAL; RESPIRATORY (INHALATION) at 20:01

## 2025-02-19 RX ADMIN — INSULIN LISPRO 1 UNITS: 100 INJECTION, SOLUTION INTRAVENOUS; SUBCUTANEOUS at 11:57

## 2025-02-19 RX ADMIN — VENLAFAXINE 37.5 MG: 25 TABLET ORAL at 08:45

## 2025-02-19 RX ADMIN — BUDESONIDE 500 MCG: 0.5 INHALANT ORAL at 20:01

## 2025-02-19 RX ADMIN — DOCUSATE SODIUM 100 MG: 50 LIQUID ORAL at 08:44

## 2025-02-19 RX ADMIN — BUDESONIDE 500 MCG: 0.5 INHALANT ORAL at 07:14

## 2025-02-19 RX ADMIN — MIDODRINE HYDROCHLORIDE 10 MG: 5 TABLET ORAL at 20:29

## 2025-02-19 RX ADMIN — SODIUM CHLORIDE, PRESERVATIVE FREE 10 ML: 5 INJECTION INTRAVENOUS at 08:44

## 2025-02-19 RX ADMIN — IPRATROPIUM BROMIDE AND ALBUTEROL SULFATE 1 DOSE: 2.5; .5 SOLUTION RESPIRATORY (INHALATION) at 20:01

## 2025-02-19 RX ADMIN — WHITE PETROLATUM,ZINC OXIDE: 57; 17 PASTE TOPICAL at 20:31

## 2025-02-19 RX ADMIN — ATORVASTATIN CALCIUM 20 MG: 20 TABLET, FILM COATED ORAL at 20:29

## 2025-02-19 RX ADMIN — SODIUM CHLORIDE, PRESERVATIVE FREE 10 ML: 5 INJECTION INTRAVENOUS at 20:29

## 2025-02-19 ASSESSMENT — PULMONARY FUNCTION TESTS
PIF_VALUE: 26
PIF_VALUE: 25
PIF_VALUE: 26
PIF_VALUE: 25
PIF_VALUE: 26
PIF_VALUE: 17
PIF_VALUE: 17
PIF_VALUE: 26
PIF_VALUE: 18
PIF_VALUE: 25
PIF_VALUE: 26
PIF_VALUE: 25
PIF_VALUE: 25
PIF_VALUE: 26
PIF_VALUE: 26
PIF_VALUE: 16
PIF_VALUE: 26

## 2025-02-19 ASSESSMENT — PAIN SCALES - GENERAL
PAINLEVEL_OUTOF10: 0

## 2025-02-19 NOTE — CARE COORDINATION
KHAI reviewed Pt medicals, yesterday at 1:30 [facility] asked for more medicals, CM sent the requested information.     KHAI has called Shanon and left a VM.     Shanon has asked for a number for RT so their RT can talk with our RT.    Per IDR    Pt is doing excellent. Up in the chair. Going to move Pt to room 265

## 2025-02-19 NOTE — PLAN OF CARE
PHYSICAL THERAPY TREATMENT     Patient: Nida Graves (49 y.o. female)  Date: 2/19/2025  Diagnosis: Atrial fibrillation, unspecified type (HCC) [I48.91]  Atrial flutter, unspecified type (HCC) [I48.92]  Congestive heart failure, unspecified HF chronicity, unspecified heart failure type (HCC) [I50.9]  Acute hypoxic respiratory failure (HCC) [J96.01] Acute on chronic hypoxic respiratory failure (HCC)  Procedure(s) (LRB):  ESOPHAGOGASTRODUODENOSCOPY PERCUTANEOUS ENDOSCOPIC GASTROSTOMY TUBE PLACEMENT (N/A) 6 Days Post-Op  Precautions: Fall Risk, General Precautions                Recommendations for nursing mobility: Frequent repositioning to prevent skin breakdown, Use of bed/chair alarm for safety, LE elevation for management of edema, Maxi Move for bed to chair transfers, and Assist x2    In place during session: Tracheostomy with vent support, External Catheter, and ICU vital sign monitoring  Chart, physical therapy assessment, plan of care and goals were reviewed.  ASSESSMENT  Patient continues with skilled PT services and is slowly progressing towards goals. Pt semi-supine in bed upon PT arrival, agreeable to session. Pt A&O x 4, nsg in room assisting with mobility/line and lead management. (See below for objective details and assist levels).     Overall pt tolerated session fair today with no c/o pain throughout session, dizziness reported with positional changes but not sustained. Pt noted to have had a BM prior mobility, rolled in bed prior to EOB mobility for bowel hygiene. Pt required mod A x2 with additional time for bed mobility and EOB transfers. Due to fatigue and heavy left sided lean with seated position. Pt returned to supine position with mod A x2. Will continue to benefit from skilled PT services, and will continue to progress as tolerated.  Current PT DC recommendation LTAC due to continued ventilator reliance once medically appropriate.     Start of Session End of Session   SPO2 (%) 98 97

## 2025-02-19 NOTE — PROGRESS NOTES
Renal Progress Note    Patient: Nida Graves MRN: [id number]  SSN: [id number]    YOB: 1975  Age: 49 y.o.  Sex: female      Admit Date: 1/12/2025    LOS: 38 days     Subjective:   Patient seen at bedside.  awake, no acute distress.  Status post tracheostomy, remains on vent  She is understanding conversation   Edema improved, Repeat labs showed improvement in creatinine to 1.26, repeat potassium is 4.8      Current Facility-Administered Medications   Medication Dose Route Frequency    acetylcysteine (MUCOMYST) 20 % solution 600 mg  600 mg Inhalation BID RT    ferric gluconate (FERRLECIT) 125 mg in sodium chloride 0.9 % 100 mL IVPB  125 mg IntraVENous Q24H    docusate (COLACE) 50 MG/5ML liquid 100 mg  100 mg Oral Daily    epoetin itz-epbx (RETACRIT) injection 2,500 Units  2,500 Units SubCUTAneous Once per day on Monday Wednesday Friday    lansoprazole (PREVACID SOLUTAB) disintegrating tablet 30 mg  30 mg Per G Tube QAM AC    aspirin chewable tablet 81 mg  81 mg Per G Tube Daily    atorvastatin (LIPITOR) tablet 20 mg  20 mg Per G Tube Nightly    buPROPion (WELLBUTRIN) tablet 75 mg  75 mg Per G Tube BID    folic acid (FOLVITE) tablet 1 mg  1 mg Per G Tube Daily    midodrine (PROAMATINE) tablet 10 mg  10 mg Per G Tube TID    venlafaxine (EFFEXOR) tablet 37.5 mg  37.5 mg Per G Tube BID WC    acetaminophen (TYLENOL) 160 MG/5ML solution 650 mg  650 mg Per G Tube Q6H PRN    Or    acetaminophen (TYLENOL) suppository 650 mg  650 mg Rectal Q6H PRN    hydrOXYzine HCl (ATARAX) tablet 25 mg  25 mg Per G Tube TID PRN    magnesium hydroxide (MILK OF MAGNESIA) 400 MG/5ML suspension 30 mL  30 mL Per G Tube Q6H PRN    polyethylene glycol (GLYCOLAX) packet 17 g  17 g Per G Tube Daily PRN    senna (SENOKOT) tablet 8.6 mg  1 tablet Per G Tube Daily PRN    cinacalcet (SENSIPAR) tablet 30 mg  30 mg Oral Daily    prochlorperazine (COMPAZINE) injection 10 mg  10 mg IntraVENous Q6H PRN    glucagon injection 1 mg  1 mg

## 2025-02-19 NOTE — PROGRESS NOTES
CARDIOLOGY PROGRESS NOTE      Patient Name: Nida Graves  Age: 49 y.o.  Gender:female  :1975  MRN: [id number]    Patient seen and examined. This is a patient with a history of diabetes, asthma who presented with shortness of breath and hypoxia now being followed for atrial flutter and CHF.     Extubated , reintubated  d/t hypoxia. Now S/p trach 2025.    Successful AUGUSTIN/DCCV 2025, was in NSR but developed CHB, Micra placed      Patient seen and examined. Remains in ICU. Asleep in bed.  Discussed overnight events with RN.  She wants to get up and have a change of scenery.     Limited review of systems items noted above. Current medications reviewed.    Physical Examination    Allergies   Allergen Reactions    Latex Swelling    Penicillins Hives    Codeine Anxiety     Vitals:    25 0714   BP:    Pulse: 62   Resp: 16   Temp:    SpO2: 100%     Vital signs are stable  Trach, vent  Heart has a RRR.  No murmur  Lungs are coarse  Abdomen is soft, nontender, normal bowel sounds.  Extremities have trace to mild LE edema  Skin is dry and warm.    Labs reviewed:  Recent Results (from the past 12 hour(s))   POCT Glucose    Collection Time: 25 12:45 AM   Result Value Ref Range    POC Glucose 139 (H) 65 - 100 mg/dL    Performed by: Brandt Ren    Basic Metabolic Panel    Collection Time: 25  3:20 AM   Result Value Ref Range    Sodium 138 136 - 145 mmol/L    Potassium 4.8 3.5 - 5.1 mmol/L    Chloride 103 97 - 108 mmol/L    CO2 32 21 - 32 mmol/L    Anion Gap 3 2 - 12 mmol/L    Glucose 130 (H) 65 - 100 mg/dL    BUN 55 (H) 6 - 20 mg/dL    Creatinine 1.26 (H) 0.55 - 1.02 mg/dL    BUN/Creatinine Ratio 44 (H) 12 - 20      Est, Glom Filt Rate 52 (L) >60 ml/min/1.73m2    Calcium 9.4 8.5 - 10.1 mg/dL   Magnesium    Collection Time: 25  3:20 AM   Result Value Ref Range    Magnesium 1.8 1.6 - 2.4 mg/dL   Phosphorus    Collection Time: 25  3:20 AM   Result Value Ref Range     Phosphorus 3.0 2.6 - 4.7 mg/dL   CBC with Auto Differential    Collection Time: 02/19/25  3:20 AM   Result Value Ref Range    WBC 9.9 3.6 - 11.0 K/uL    RBC 2.64 (L) 3.80 - 5.20 M/uL    Hemoglobin 7.6 (L) 11.5 - 16.0 g/dL    Hematocrit 25.5 (L) 35.0 - 47.0 %    MCV 96.6 80.0 - 99.0 FL    MCH 28.8 26.0 - 34.0 PG    MCHC 29.8 (L) 30.0 - 36.5 g/dL    RDW 18.5 (H) 11.5 - 14.5 %    Platelets 287 150 - 400 K/uL    MPV 9.9 8.9 - 12.9 FL    Nucleated RBCs 0.0 0.0  WBC    nRBC 0.00 0.00 - 0.01 K/uL    Neutrophils % 61.1 32.0 - 75.0 %    Lymphocytes % 21.6 12.0 - 49.0 %    Monocytes % 10.3 5.0 - 13.0 %    Eosinophils % 5.4 0.0 - 7.0 %    Basophils % 0.5 0.0 - 1.0 %    Immature Granulocytes % 1.1 (H) 0 - 0.5 %    Neutrophils Absolute 6.04 1.80 - 8.00 K/UL    Lymphocytes Absolute 2.14 0.80 - 3.50 K/UL    Monocytes Absolute 1.02 (H) 0.00 - 1.00 K/UL    Eosinophils Absolute 0.53 (H) 0.00 - 0.40 K/UL    Basophils Absolute 0.05 0.00 - 0.10 K/UL    Immature Granulocytes Absolute 0.11 (H) 0.00 - 0.04 K/UL    Differential Type AUTOMATED     POCT Glucose    Collection Time: 02/19/25  5:50 AM   Result Value Ref Range    POC Glucose 139 (H) 65 - 100 mg/dL    Performed by: Brandt Ren      Case discussed with Dr. Coburn and our impression and recommendations are as follows:   Atrial flutter/fib with slow ventricular rate, 40-60s, then CHB now s/p micra  Remains paced  AC previously stopped due to rectal bleeding/anemia, s/p transfusion; hgb 7.6 today  Eliquis restarted, continue to trend h/h  Elevated troponin: peak of 639  likely myocardial injury secondary to acute hypoxia/aflutter   [facility] with nonobstructive CAD; luminal RCA, Lcx; 40% mLAD   Continue aspirin, atorvastatin  Acute systolic heart failure with reduced ejection fraction, nonischemic  EF 25-30% then limited echo 1/28 EF 30-35%, normal IVC size  Initiate GDMT as BP allows, currently on midodrine  Can consider ICD in the OP setting, likely not able to use lifevest

## 2025-02-19 NOTE — PROGRESS NOTES
RCA, Lcx; 40% mLAD   Continue ASA and Statin   Cardiology recs noted   Leadless pacemaker 2/12    GASTROINTESTINAL:   Noted to have dark stools on 1/22. Occult stool positive for blood. CTA abdomen/pelvis without evidence of bleeding  Bowel regimen  Continue tube feeds as tolerated via PEG    RENAL/ELECTROLYTE:   Creatinine elevated, likely due to hypotensive episodes during bradycardia Lasix 20mg IV x1 2/14 with improvement in Cr. Will assess daily for dosing  Nephrology consulted, follow recs  Monitor UOP  Correct electrolytes  Multiple fallopian/uterine cysts which makes bladder scan difficult for interpretation    ENDOCRINE:   Insulin sliding scale  Keep blood glucose 140-180  Discontinued lantus    HEMATOLOGY/ONCOLOGY:   Eliquis for A-fib on hold due to drop in hemoglobin on 2/10 with requiring 2 unit PRBC- will add 2/17 after tolerating 3 days of heparin SQ  Started heparin SQ prophylactic dose 2/14 with no overt signs of bleeding. Will transition to eliquis today.  No obvious bleeding noted, continue to monitor closely  Transfuse to keep hemoglobin above 7    ID/MICRO:   Initially completed a course of Vanco and Zosyn for CAP  Completed 7 day course of Cefepime on 1/27 for possible HAP  MRSA PCR negative on 1/28    ICU DAILY CHECKLIST     Code Status: DNR  DVT Prophylaxis: SCDs, eliquis  SUP: Pepcid  T/L/D: PIV  Diet: Tube feeds  Activity Level: Bedrest  ABCDEF Bundle/Checklist Completed: Yes  Disposition: Stay in ICU  Multidisciplinary Rounds Completed:  Yes    OBJECTIVE:     Blood pressure 126/74, pulse 62, temperature 97.9 °F (36.6 °C), temperature source Oral, resp. rate 16, height 1.702 m (5' 7.01\"), weight (!) 160.2 kg (353 lb 2.8 oz), SpO2 99%.  Physical Exam  Gen: On MV via trach  HEENT: NC/AT, supple  Cardiac: Paced at 60bpm  Pulm: clear breath sounds bilaterally  ABD: Soft, mildly distended, non tender, PEG tube in place  Extremities: Mild LE edema noted. Right groin site from pacemaker with small  amount of fat necrosis. No overt signs of infection  Neuro: Following commands    Labs and Data: Reviewed 02/19/25  Medications: Reviewed 02/19/25  Imaging: Reviewed 02/19/25    Intake/Output:     Intake/Output Summary (Last 24 hours) at 2/19/2025 1046  Last data filed at 2/19/2025 0642  Gross per 24 hour   Intake 1710 ml   Output 1000 ml   Net 710 ml       CRITICAL CARE DOCUMENTATION  I had a face to face encounter with the patient, reviewed and interpreted patient data including clinical events, labs, images, vital signs, I/O's, and examined patient.  I have discussed the case and the plan and management of the patient's care with the consulting services, the bedside nurses and the respiratory therapist.      NOTE OF PERSONAL INVOLVEMENT IN CARE   This patient has a high probability of imminent, clinically significant deterioration, which requires the highest level of preparedness to intervene urgently. I participated in the decision-making and personally managed or directed the management of the following life and organ supporting interventions that required my frequent assessment to treat or prevent imminent deterioration.    I personally spent 31 minutes of critical care time.  This is time spent at this critically ill patient's bedside actively involved in patient care as well as the coordination of care.  This does not include any procedural time which has been billed separately.      Shilpa Estevez MD  [facility]

## 2025-02-20 ENCOUNTER — APPOINTMENT (OUTPATIENT)
Facility: HOSPITAL | Age: 50
DRG: 004 | End: 2025-02-20
Payer: COMMERCIAL

## 2025-02-20 LAB
ANION GAP SERPL CALC-SCNC: 2 MMOL/L (ref 2–12)
BASOPHILS # BLD: 0.05 K/UL (ref 0–0.1)
BASOPHILS NFR BLD: 0.4 % (ref 0–1)
BUN SERPL-MCNC: 57 MG/DL (ref 6–20)
BUN/CREAT SERPL: 45 (ref 12–20)
CA-I BLD-MCNC: 8.6 MG/DL (ref 8.5–10.1)
CHLORIDE SERPL-SCNC: 105 MMOL/L (ref 97–108)
CO2 SERPL-SCNC: 32 MMOL/L (ref 21–32)
CREAT SERPL-MCNC: 1.27 MG/DL (ref 0.55–1.02)
DIFFERENTIAL METHOD BLD: ABNORMAL
EOSINOPHIL # BLD: 0.54 K/UL (ref 0–0.4)
EOSINOPHIL NFR BLD: 4.4 % (ref 0–7)
ERYTHROCYTE [DISTWIDTH] IN BLOOD BY AUTOMATED COUNT: 18.6 % (ref 11.5–14.5)
GLUCOSE BLD STRIP.AUTO-MCNC: 119 MG/DL (ref 65–100)
GLUCOSE BLD STRIP.AUTO-MCNC: 123 MG/DL (ref 65–100)
GLUCOSE BLD STRIP.AUTO-MCNC: 141 MG/DL (ref 65–100)
GLUCOSE BLD STRIP.AUTO-MCNC: 157 MG/DL (ref 65–100)
GLUCOSE BLD STRIP.AUTO-MCNC: 190 MG/DL (ref 65–100)
GLUCOSE SERPL-MCNC: 174 MG/DL (ref 65–100)
HCT VFR BLD AUTO: 25.3 % (ref 35–47)
HGB BLD-MCNC: 7.5 G/DL (ref 11.5–16)
IMM GRANULOCYTES # BLD AUTO: 0.1 K/UL (ref 0–0.04)
IMM GRANULOCYTES NFR BLD AUTO: 0.8 % (ref 0–0.5)
LYMPHOCYTES # BLD: 1.93 K/UL (ref 0.8–3.5)
LYMPHOCYTES NFR BLD: 15.7 % (ref 12–49)
MAGNESIUM SERPL-MCNC: 1.6 MG/DL (ref 1.6–2.4)
MCH RBC QN AUTO: 28.8 PG (ref 26–34)
MCHC RBC AUTO-ENTMCNC: 29.6 G/DL (ref 30–36.5)
MCV RBC AUTO: 97.3 FL (ref 80–99)
MONOCYTES # BLD: 1.28 K/UL (ref 0–1)
MONOCYTES NFR BLD: 10.4 % (ref 5–13)
NEUTS SEG # BLD: 8.43 K/UL (ref 1.8–8)
NEUTS SEG NFR BLD: 68.3 % (ref 32–75)
NRBC # BLD: 0 K/UL (ref 0–0.01)
NRBC BLD-RTO: 0 PER 100 WBC
PERFORMED BY:: ABNORMAL
PHOSPHATE SERPL-MCNC: 3 MG/DL (ref 2.6–4.7)
PLATELET # BLD AUTO: 315 K/UL (ref 150–400)
PMV BLD AUTO: 10.1 FL (ref 8.9–12.9)
POTASSIUM SERPL-SCNC: 5.4 MMOL/L (ref 3.5–5.1)
RBC # BLD AUTO: 2.6 M/UL (ref 3.8–5.2)
SODIUM SERPL-SCNC: 139 MMOL/L (ref 136–145)
WBC # BLD AUTO: 12.3 K/UL (ref 3.6–11)

## 2025-02-20 PROCEDURE — 6370000000 HC RX 637 (ALT 250 FOR IP): Performed by: NURSE PRACTITIONER

## 2025-02-20 PROCEDURE — 85025 COMPLETE CBC W/AUTO DIFF WBC: CPT

## 2025-02-20 PROCEDURE — 94640 AIRWAY INHALATION TREATMENT: CPT

## 2025-02-20 PROCEDURE — 6370000000 HC RX 637 (ALT 250 FOR IP): Performed by: STUDENT IN AN ORGANIZED HEALTH CARE EDUCATION/TRAINING PROGRAM

## 2025-02-20 PROCEDURE — 6360000002 HC RX W HCPCS: Performed by: STUDENT IN AN ORGANIZED HEALTH CARE EDUCATION/TRAINING PROGRAM

## 2025-02-20 PROCEDURE — 94669 MECHANICAL CHEST WALL OSCILL: CPT

## 2025-02-20 PROCEDURE — 82962 GLUCOSE BLOOD TEST: CPT

## 2025-02-20 PROCEDURE — 94003 VENT MGMT INPAT SUBQ DAY: CPT

## 2025-02-20 PROCEDURE — 6360000002 HC RX W HCPCS: Performed by: NURSE PRACTITIONER

## 2025-02-20 PROCEDURE — 36415 COLL VENOUS BLD VENIPUNCTURE: CPT

## 2025-02-20 PROCEDURE — 2700000000 HC OXYGEN THERAPY PER DAY

## 2025-02-20 PROCEDURE — 83735 ASSAY OF MAGNESIUM: CPT

## 2025-02-20 PROCEDURE — 2000000000 HC ICU R&B

## 2025-02-20 PROCEDURE — 94761 N-INVAS EAR/PLS OXIMETRY MLT: CPT

## 2025-02-20 PROCEDURE — 2580000003 HC RX 258: Performed by: NURSE PRACTITIONER

## 2025-02-20 PROCEDURE — 80048 BASIC METABOLIC PNL TOTAL CA: CPT

## 2025-02-20 PROCEDURE — 6370000000 HC RX 637 (ALT 250 FOR IP): Performed by: INTERNAL MEDICINE

## 2025-02-20 PROCEDURE — 84100 ASSAY OF PHOSPHORUS: CPT

## 2025-02-20 PROCEDURE — 2500000003 HC RX 250 WO HCPCS: Performed by: STUDENT IN AN ORGANIZED HEALTH CARE EDUCATION/TRAINING PROGRAM

## 2025-02-20 PROCEDURE — 71045 X-RAY EXAM CHEST 1 VIEW: CPT

## 2025-02-20 RX ORDER — ACETYLCYSTEINE 200 MG/ML
600 SOLUTION ORAL; RESPIRATORY (INHALATION) EVERY 6 HOURS
Status: DISCONTINUED | OUTPATIENT
Start: 2025-02-20 | End: 2025-02-20

## 2025-02-20 RX ORDER — IPRATROPIUM BROMIDE AND ALBUTEROL SULFATE 2.5; .5 MG/3ML; MG/3ML
1 SOLUTION RESPIRATORY (INHALATION)
Status: DISCONTINUED | OUTPATIENT
Start: 2025-02-20 | End: 2025-02-26

## 2025-02-20 RX ORDER — ACETYLCYSTEINE 200 MG/ML
600 SOLUTION ORAL; RESPIRATORY (INHALATION)
Status: DISCONTINUED | OUTPATIENT
Start: 2025-02-20 | End: 2025-02-20

## 2025-02-20 RX ORDER — IPRATROPIUM BROMIDE AND ALBUTEROL SULFATE 2.5; .5 MG/3ML; MG/3ML
1 SOLUTION RESPIRATORY (INHALATION) EVERY 6 HOURS
Status: DISCONTINUED | OUTPATIENT
Start: 2025-02-20 | End: 2025-02-20

## 2025-02-20 RX ORDER — ALBUTEROL SULFATE 2.5 MG/.5ML
2.5 SOLUTION RESPIRATORY (INHALATION) ONCE
Status: COMPLETED | OUTPATIENT
Start: 2025-02-20 | End: 2025-02-20

## 2025-02-20 RX ORDER — SODIUM CHLORIDE FOR INHALATION 3 %
4 VIAL, NEBULIZER (ML) INHALATION
Status: DISCONTINUED | OUTPATIENT
Start: 2025-02-20 | End: 2025-02-23

## 2025-02-20 RX ADMIN — ALBUTEROL SULFATE 2.5 MG: 2.5 SOLUTION RESPIRATORY (INHALATION) at 05:03

## 2025-02-20 RX ADMIN — INSULIN LISPRO 1 UNITS: 100 INJECTION, SOLUTION INTRAVENOUS; SUBCUTANEOUS at 11:51

## 2025-02-20 RX ADMIN — MIDODRINE HYDROCHLORIDE 10 MG: 5 TABLET ORAL at 20:48

## 2025-02-20 RX ADMIN — IPRATROPIUM BROMIDE AND ALBUTEROL SULFATE 1 DOSE: 2.5; .5 SOLUTION RESPIRATORY (INHALATION) at 13:40

## 2025-02-20 RX ADMIN — ATORVASTATIN CALCIUM 20 MG: 20 TABLET, FILM COATED ORAL at 20:47

## 2025-02-20 RX ADMIN — MAGNESIUM SULFATE HEPTAHYDRATE 2000 MG: 40 INJECTION, SOLUTION INTRAVENOUS at 05:34

## 2025-02-20 RX ADMIN — INSULIN HUMAN 10 UNITS: 100 INJECTION, SOLUTION PARENTERAL at 05:11

## 2025-02-20 RX ADMIN — IPRATROPIUM BROMIDE AND ALBUTEROL SULFATE 1 DOSE: 2.5; .5 SOLUTION RESPIRATORY (INHALATION) at 22:11

## 2025-02-20 RX ADMIN — DOCUSATE SODIUM 100 MG: 50 LIQUID ORAL at 09:26

## 2025-02-20 RX ADMIN — FOLIC ACID 1 MG: 1 TABLET ORAL at 09:25

## 2025-02-20 RX ADMIN — VENLAFAXINE 37.5 MG: 25 TABLET ORAL at 09:25

## 2025-02-20 RX ADMIN — ASPIRIN 81 MG 81 MG: 81 TABLET ORAL at 09:25

## 2025-02-20 RX ADMIN — SODIUM ZIRCONIUM CYCLOSILICATE 10 G: 10 POWDER, FOR SUSPENSION ORAL at 23:45

## 2025-02-20 RX ADMIN — MIDODRINE HYDROCHLORIDE 10 MG: 5 TABLET ORAL at 16:40

## 2025-02-20 RX ADMIN — WHITE PETROLATUM,ZINC OXIDE: 57; 17 PASTE TOPICAL at 20:49

## 2025-02-20 RX ADMIN — LANSOPRAZOLE 30 MG: 30 TABLET, ORALLY DISINTEGRATING ORAL at 05:36

## 2025-02-20 RX ADMIN — SODIUM CHLORIDE, PRESERVATIVE FREE 10 ML: 5 INJECTION INTRAVENOUS at 09:26

## 2025-02-20 RX ADMIN — BUDESONIDE 500 MCG: 0.5 INHALANT ORAL at 08:33

## 2025-02-20 RX ADMIN — DEXTROSE 250 ML: 10 SOLUTION INTRAVENOUS at 05:11

## 2025-02-20 RX ADMIN — IPRATROPIUM BROMIDE AND ALBUTEROL SULFATE 1 DOSE: 2.5; .5 SOLUTION RESPIRATORY (INHALATION) at 19:42

## 2025-02-20 RX ADMIN — APIXABAN 5 MG: 5 TABLET, FILM COATED ORAL at 09:25

## 2025-02-20 RX ADMIN — BUPROPION HYDROCHLORIDE 75 MG: 75 TABLET, FILM COATED ORAL at 09:25

## 2025-02-20 RX ADMIN — CINACALCET HYDROCHLORIDE 30 MG: 30 TABLET, FILM COATED ORAL at 11:51

## 2025-02-20 RX ADMIN — ACETYLCYSTEINE 600 MG: 200 INHALANT RESPIRATORY (INHALATION) at 19:43

## 2025-02-20 RX ADMIN — MIDODRINE HYDROCHLORIDE 10 MG: 5 TABLET ORAL at 09:25

## 2025-02-20 RX ADMIN — APIXABAN 5 MG: 5 TABLET, FILM COATED ORAL at 20:48

## 2025-02-20 RX ADMIN — BUDESONIDE 500 MCG: 0.5 INHALANT ORAL at 19:42

## 2025-02-20 RX ADMIN — Medication 4 ML: at 22:11

## 2025-02-20 RX ADMIN — WHITE PETROLATUM,ZINC OXIDE: 57; 17 PASTE TOPICAL at 08:35

## 2025-02-20 RX ADMIN — VENLAFAXINE 37.5 MG: 25 TABLET ORAL at 16:40

## 2025-02-20 RX ADMIN — SODIUM CHLORIDE, PRESERVATIVE FREE 10 ML: 5 INJECTION INTRAVENOUS at 20:48

## 2025-02-20 RX ADMIN — ACETYLCYSTEINE 600 MG: 200 INHALANT RESPIRATORY (INHALATION) at 05:03

## 2025-02-20 RX ADMIN — ACETYLCYSTEINE 600 MG: 200 INHALANT RESPIRATORY (INHALATION) at 13:40

## 2025-02-20 RX ADMIN — BUPROPION HYDROCHLORIDE 75 MG: 75 TABLET, FILM COATED ORAL at 20:47

## 2025-02-20 ASSESSMENT — PULMONARY FUNCTION TESTS
PIF_VALUE: 24
PIF_VALUE: 30
PIF_VALUE: 25
PIF_VALUE: 24
PIF_VALUE: 24
PIF_VALUE: 25
PIF_VALUE: 26
PIF_VALUE: 24
PIF_VALUE: 26
PIF_VALUE: 23
PIF_VALUE: 22
PIF_VALUE: 23
PIF_VALUE: 31
PIF_VALUE: 25
PIF_VALUE: 22
PIF_VALUE: 26
PIF_VALUE: 23
PIF_VALUE: 21
PIF_VALUE: 22
PIF_VALUE: 23
PIF_VALUE: 22
PIF_VALUE: 22
PIF_VALUE: 26
PIF_VALUE: 23
PIF_VALUE: 27
PIF_VALUE: 22
PIF_VALUE: 23
PIF_VALUE: 23
PIF_VALUE: 22
PIF_VALUE: 26

## 2025-02-20 ASSESSMENT — PAIN SCALES - GENERAL
PAINLEVEL_OUTOF10: 0

## 2025-02-20 NOTE — PROGRESS NOTES
Spiritual Health History and Assessment/Progress Note  [facility]    Initial Encounter, Spiritual/Emotional Needs,  ,  ,      Name: Nida Graves MRN: [id number]    Age: 49 y.o.     Sex: female   Language: English   Confucianism: Restorationism   Acute on chronic hypoxic respiratory failure (HCC)     Date: 2/20/2025            Total Time Calculated: 15 min              Spiritual Assessment began in [facility]        Referral/Consult From: Nurse   Encounter Overview/Reason: Initial Encounter, Spiritual/Emotional Needs  Service Provided For: Patient not available    Nikki, Belief, Meaning:   Patient unable to assess at this time  Family/Friends No family/friends present      Importance and Influence:  Patient unable to assess at this time  Family/Friends No family/friends present    Community:  Patient Other: unable to assess at this time  Family/Friends No family/friends present    Assessment and Plan of Care:     Patient Interventions include: Other: n/a  Family/Friends Interventions include: No family/friends present    Patient Plan of Care: Spiritual Care available upon further referral  Family/Friends Plan of Care: No family/friends present    Was referred to offer spiritual and emotional support by nursing staff; Pt was asleep at time of attempt. Will make another attempt later today or tomorrow.    Electronically signed by  Rylan Tirado MDiv  Chaplain Resident   on 2/20/2025 at 1:02 PM

## 2025-02-20 NOTE — PROGRESS NOTES
Renal Progress Note    Patient: Nida Graves MRN: [id number]  SSN: [id number]    YOB: 1975  Age: 49 y.o.  Sex: female      Admit Date: 1/12/2025    LOS: 39 days     Subjective:   Patient seen in ICU.  awake, no acute distress.  Status post tracheostomy, remains on vent  She is understanding conversation   Edema improved, Repeat labs showed improvement in creatinine to 1.27, repeat potassium is 5.4      Current Facility-Administered Medications   Medication Dose Route Frequency    ipratropium 0.5 mg-albuterol 2.5 mg (DUONEB) nebulizer solution 1 Dose  1 Dose Inhalation Q6H    acetylcysteine (MUCOMYST) 20 % solution 600 mg  600 mg Inhalation Q6H    docusate (COLACE) 50 MG/5ML liquid 100 mg  100 mg Oral Daily    epoetin itz-epbx (RETACRIT) injection 2,500 Units  2,500 Units SubCUTAneous Once per day on Monday Wednesday Friday    lansoprazole (PREVACID SOLUTAB) disintegrating tablet 30 mg  30 mg Per G Tube QAM AC    aspirin chewable tablet 81 mg  81 mg Per G Tube Daily    atorvastatin (LIPITOR) tablet 20 mg  20 mg Per G Tube Nightly    buPROPion (WELLBUTRIN) tablet 75 mg  75 mg Per G Tube BID    folic acid (FOLVITE) tablet 1 mg  1 mg Per G Tube Daily    midodrine (PROAMATINE) tablet 10 mg  10 mg Per G Tube TID    venlafaxine (EFFEXOR) tablet 37.5 mg  37.5 mg Per G Tube BID WC    acetaminophen (TYLENOL) 160 MG/5ML solution 650 mg  650 mg Per G Tube Q6H PRN    Or    acetaminophen (TYLENOL) suppository 650 mg  650 mg Rectal Q6H PRN    hydrOXYzine HCl (ATARAX) tablet 25 mg  25 mg Per G Tube TID PRN    magnesium hydroxide (MILK OF MAGNESIA) 400 MG/5ML suspension 30 mL  30 mL Per G Tube Q6H PRN    polyethylene glycol (GLYCOLAX) packet 17 g  17 g Per G Tube Daily PRN    senna (SENOKOT) tablet 8.6 mg  1 tablet Per G Tube Daily PRN    cinacalcet (SENSIPAR) tablet 30 mg  30 mg Oral Daily    prochlorperazine (COMPAZINE) injection 10 mg  10 mg IntraVENous Q6H PRN    glucagon injection 1 mg  1 mg SubCUTAneous PRN  2/013,  continue to monitor  Will give 1 dose of Lokelma through PEG tube today, repeat potassium tomorrow     #3  Hypercalcemia with high PTH, primary hyperparathyroidism  Improved calcium levels-, continue Sensipar 30 mg daily  -Continue to monitor calcium levels    #4  Respiratory distress  -She was extubated on 1/26 but reintubated on 1/27 because of inability to protect airway.  Tracheostomy was done on 1/30  -Currently on mechanical ventilation via trach     #5  Hypotension  Improved hemodynamic status  -Last EF was 20 to 30% with right ventricular overload, moderate TR  May use Lasix as needed for fluid management  -Cardiology on the case.  S/p PPM on 2/12    6. Anemia: came with severe anemia, s/p pRBC tx  Sever iron def noted, Received 4 doses of  IV ferrlecit doses  Monitor H/H    Signed By: Isael Amezquita MD     February 20, 2025

## 2025-02-20 NOTE — CARE COORDINATION
Chart reviewed and patient discussed during rounds. Cm reached out to [facility] and [facility] regarding acceptance of patient. They requested specific information regarding patient's vent settings, peak pressures, volumes, and SBT/weaning trials. CM discussed with our respiratory therapist and provided information requested to [place]. They have not yet stated if they accept or not.     Also, during rounds CM was asked about LTAC. After reviewing chart and reaching out to [facility], apparently patient's insurance denied LTAC. They denied for several different reasons (vent settings too high, concerns patient not stable enough). A peer to peer was completed and denial was upheld. After speaking with the liaison for the LTAC, this particular insurance very rarely approves this level of care. CM inquired about requesting auth again and was informed they do not allow a resubmission of auth unless there has been a major change in status. There is an option to appeal the denial, however, the patient's vent settings reportedly have not improved since then.     At this time it appears the only option is to wait on [facility] to see if they can accept. CM will continue to follow.

## 2025-02-21 LAB
ALBUMIN SERPL-MCNC: 2.4 G/DL (ref 3.5–5)
ANION GAP SERPL CALC-SCNC: 3 MMOL/L (ref 2–12)
BASOPHILS # BLD: 0.04 K/UL (ref 0–0.1)
BASOPHILS # BLD: 0.05 K/UL (ref 0–0.1)
BASOPHILS NFR BLD: 0.4 % (ref 0–1)
BASOPHILS NFR BLD: 0.5 % (ref 0–1)
BUN SERPL-MCNC: 57 MG/DL (ref 6–20)
BUN/CREAT SERPL: 45 (ref 12–20)
CA-I BLD-MCNC: 8.6 MG/DL (ref 8.5–10.1)
CHLORIDE SERPL-SCNC: 104 MMOL/L (ref 97–108)
CO2 SERPL-SCNC: 31 MMOL/L (ref 21–32)
CREAT SERPL-MCNC: 1.26 MG/DL (ref 0.55–1.02)
DIFFERENTIAL METHOD BLD: ABNORMAL
DIFFERENTIAL METHOD BLD: ABNORMAL
EOSINOPHIL # BLD: 0.57 K/UL (ref 0–0.4)
EOSINOPHIL # BLD: 0.57 K/UL (ref 0–0.4)
EOSINOPHIL NFR BLD: 5.3 % (ref 0–7)
EOSINOPHIL NFR BLD: 5.4 % (ref 0–7)
ERYTHROCYTE [DISTWIDTH] IN BLOOD BY AUTOMATED COUNT: 18.7 % (ref 11.5–14.5)
ERYTHROCYTE [DISTWIDTH] IN BLOOD BY AUTOMATED COUNT: 18.9 % (ref 11.5–14.5)
GLUCOSE BLD STRIP.AUTO-MCNC: 113 MG/DL (ref 65–100)
GLUCOSE BLD STRIP.AUTO-MCNC: 144 MG/DL (ref 65–100)
GLUCOSE BLD STRIP.AUTO-MCNC: 152 MG/DL (ref 65–100)
GLUCOSE SERPL-MCNC: 104 MG/DL (ref 65–100)
HCT VFR BLD AUTO: 23.5 % (ref 35–47)
HCT VFR BLD AUTO: 23.8 % (ref 35–47)
HGB BLD-MCNC: 7 G/DL (ref 11.5–16)
HGB BLD-MCNC: 7.2 G/DL (ref 11.5–16)
IMM GRANULOCYTES # BLD AUTO: 0.09 K/UL (ref 0–0.04)
IMM GRANULOCYTES # BLD AUTO: 0.1 K/UL (ref 0–0.04)
IMM GRANULOCYTES NFR BLD AUTO: 0.8 % (ref 0–0.5)
IMM GRANULOCYTES NFR BLD AUTO: 0.9 % (ref 0–0.5)
LYMPHOCYTES # BLD: 1.95 K/UL (ref 0.8–3.5)
LYMPHOCYTES # BLD: 2.05 K/UL (ref 0.8–3.5)
LYMPHOCYTES NFR BLD: 18.4 % (ref 12–49)
LYMPHOCYTES NFR BLD: 18.9 % (ref 12–49)
MAGNESIUM SERPL-MCNC: 2 MG/DL (ref 1.6–2.4)
MCH RBC QN AUTO: 28.8 PG (ref 26–34)
MCH RBC QN AUTO: 29.3 PG (ref 26–34)
MCHC RBC AUTO-ENTMCNC: 29.8 G/DL (ref 30–36.5)
MCHC RBC AUTO-ENTMCNC: 30.3 G/DL (ref 30–36.5)
MCV RBC AUTO: 96.7 FL (ref 80–99)
MCV RBC AUTO: 96.7 FL (ref 80–99)
MONOCYTES # BLD: 0.96 K/UL (ref 0–1)
MONOCYTES # BLD: 1.02 K/UL (ref 0–1)
MONOCYTES NFR BLD: 8.9 % (ref 5–13)
MONOCYTES NFR BLD: 9.6 % (ref 5–13)
NEUTS SEG # BLD: 6.93 K/UL (ref 1.8–8)
NEUTS SEG # BLD: 7.11 K/UL (ref 1.8–8)
NEUTS SEG NFR BLD: 65.4 % (ref 32–75)
NEUTS SEG NFR BLD: 65.5 % (ref 32–75)
NRBC # BLD: 0 K/UL (ref 0–0.01)
NRBC # BLD: 0 K/UL (ref 0–0.01)
NRBC BLD-RTO: 0 PER 100 WBC
NRBC BLD-RTO: 0 PER 100 WBC
PERFORMED BY:: ABNORMAL
PHOSPHATE SERPL-MCNC: 3.2 MG/DL (ref 2.6–4.7)
PLATELET # BLD AUTO: 322 K/UL (ref 150–400)
PLATELET # BLD AUTO: 327 K/UL (ref 150–400)
PMV BLD AUTO: 9.8 FL (ref 8.9–12.9)
PMV BLD AUTO: 9.8 FL (ref 8.9–12.9)
POTASSIUM SERPL-SCNC: 5.4 MMOL/L (ref 3.5–5.1)
RBC # BLD AUTO: 2.43 M/UL (ref 3.8–5.2)
RBC # BLD AUTO: 2.46 M/UL (ref 3.8–5.2)
SODIUM SERPL-SCNC: 138 MMOL/L (ref 136–145)
WBC # BLD AUTO: 10.6 K/UL (ref 3.6–11)
WBC # BLD AUTO: 10.8 K/UL (ref 3.6–11)

## 2025-02-21 PROCEDURE — 6370000000 HC RX 637 (ALT 250 FOR IP): Performed by: STUDENT IN AN ORGANIZED HEALTH CARE EDUCATION/TRAINING PROGRAM

## 2025-02-21 PROCEDURE — 6360000002 HC RX W HCPCS: Performed by: STUDENT IN AN ORGANIZED HEALTH CARE EDUCATION/TRAINING PROGRAM

## 2025-02-21 PROCEDURE — 94669 MECHANICAL CHEST WALL OSCILL: CPT

## 2025-02-21 PROCEDURE — 2000000000 HC ICU R&B

## 2025-02-21 PROCEDURE — 94668 MNPJ CHEST WALL SBSQ: CPT

## 2025-02-21 PROCEDURE — 94760 N-INVAS EAR/PLS OXIMETRY 1: CPT

## 2025-02-21 PROCEDURE — 6360000002 HC RX W HCPCS: Performed by: NURSE PRACTITIONER

## 2025-02-21 PROCEDURE — 2700000000 HC OXYGEN THERAPY PER DAY

## 2025-02-21 PROCEDURE — 36415 COLL VENOUS BLD VENIPUNCTURE: CPT

## 2025-02-21 PROCEDURE — 86850 RBC ANTIBODY SCREEN: CPT

## 2025-02-21 PROCEDURE — 85025 COMPLETE CBC W/AUTO DIFF WBC: CPT

## 2025-02-21 PROCEDURE — 2580000003 HC RX 258: Performed by: STUDENT IN AN ORGANIZED HEALTH CARE EDUCATION/TRAINING PROGRAM

## 2025-02-21 PROCEDURE — 83735 ASSAY OF MAGNESIUM: CPT

## 2025-02-21 PROCEDURE — 86901 BLOOD TYPING SEROLOGIC RH(D): CPT

## 2025-02-21 PROCEDURE — 86900 BLOOD TYPING SEROLOGIC ABO: CPT

## 2025-02-21 PROCEDURE — 80069 RENAL FUNCTION PANEL: CPT

## 2025-02-21 PROCEDURE — 94003 VENT MGMT INPAT SUBQ DAY: CPT

## 2025-02-21 PROCEDURE — 2580000003 HC RX 258: Performed by: NURSE PRACTITIONER

## 2025-02-21 PROCEDURE — 86923 COMPATIBILITY TEST ELECTRIC: CPT

## 2025-02-21 PROCEDURE — 6370000000 HC RX 637 (ALT 250 FOR IP): Performed by: INTERNAL MEDICINE

## 2025-02-21 PROCEDURE — 82962 GLUCOSE BLOOD TEST: CPT

## 2025-02-21 PROCEDURE — 94640 AIRWAY INHALATION TREATMENT: CPT

## 2025-02-21 PROCEDURE — 6370000000 HC RX 637 (ALT 250 FOR IP): Performed by: NURSE PRACTITIONER

## 2025-02-21 PROCEDURE — 2500000003 HC RX 250 WO HCPCS: Performed by: STUDENT IN AN ORGANIZED HEALTH CARE EDUCATION/TRAINING PROGRAM

## 2025-02-21 RX ADMIN — BUDESONIDE 500 MCG: 0.5 INHALANT ORAL at 07:40

## 2025-02-21 RX ADMIN — IPRATROPIUM BROMIDE 0.5 MG: 0.5 SOLUTION RESPIRATORY (INHALATION) at 18:44

## 2025-02-21 RX ADMIN — SODIUM CHLORIDE: 9 INJECTION, SOLUTION INTRAVENOUS at 04:07

## 2025-02-21 RX ADMIN — IPRATROPIUM BROMIDE AND ALBUTEROL SULFATE 1 DOSE: 2.5; .5 SOLUTION RESPIRATORY (INHALATION) at 07:40

## 2025-02-21 RX ADMIN — CINACALCET HYDROCHLORIDE 30 MG: 30 TABLET, FILM COATED ORAL at 09:47

## 2025-02-21 RX ADMIN — Medication 4 ML: at 19:23

## 2025-02-21 RX ADMIN — ASPIRIN 81 MG 81 MG: 81 TABLET ORAL at 08:40

## 2025-02-21 RX ADMIN — DOCUSATE SODIUM 100 MG: 50 LIQUID ORAL at 08:42

## 2025-02-21 RX ADMIN — Medication 4 ML: at 15:09

## 2025-02-21 RX ADMIN — FOLIC ACID 1 MG: 1 TABLET ORAL at 08:40

## 2025-02-21 RX ADMIN — LANSOPRAZOLE 30 MG: 30 TABLET, ORALLY DISINTEGRATING ORAL at 08:40

## 2025-02-21 RX ADMIN — IPRATROPIUM BROMIDE AND ALBUTEROL SULFATE 1 DOSE: 2.5; .5 SOLUTION RESPIRATORY (INHALATION) at 11:15

## 2025-02-21 RX ADMIN — SODIUM CHLORIDE, PRESERVATIVE FREE 10 ML: 5 INJECTION INTRAVENOUS at 20:34

## 2025-02-21 RX ADMIN — Medication 4 ML: at 07:40

## 2025-02-21 RX ADMIN — APIXABAN 5 MG: 5 TABLET, FILM COATED ORAL at 20:34

## 2025-02-21 RX ADMIN — Medication 4 ML: at 11:15

## 2025-02-21 RX ADMIN — SODIUM ZIRCONIUM CYCLOSILICATE 10 G: 10 POWDER, FOR SUSPENSION ORAL at 18:35

## 2025-02-21 RX ADMIN — SODIUM CHLORIDE, PRESERVATIVE FREE 10 ML: 5 INJECTION INTRAVENOUS at 08:43

## 2025-02-21 RX ADMIN — WHITE PETROLATUM,ZINC OXIDE: 57; 17 PASTE TOPICAL at 08:55

## 2025-02-21 RX ADMIN — MIDODRINE HYDROCHLORIDE 10 MG: 5 TABLET ORAL at 16:14

## 2025-02-21 RX ADMIN — IPRATROPIUM BROMIDE AND ALBUTEROL SULFATE 1 DOSE: 2.5; .5 SOLUTION RESPIRATORY (INHALATION) at 19:20

## 2025-02-21 RX ADMIN — WHITE PETROLATUM,ZINC OXIDE: 57; 17 PASTE TOPICAL at 20:34

## 2025-02-21 RX ADMIN — BUPROPION HYDROCHLORIDE 75 MG: 75 TABLET, FILM COATED ORAL at 08:40

## 2025-02-21 RX ADMIN — EPOETIN ALFA-EPBX 2500 UNITS: 3000 INJECTION, SOLUTION INTRAVENOUS; SUBCUTANEOUS at 18:37

## 2025-02-21 RX ADMIN — IPRATROPIUM BROMIDE AND ALBUTEROL SULFATE 1 DOSE: 2.5; .5 SOLUTION RESPIRATORY (INHALATION) at 15:10

## 2025-02-21 RX ADMIN — BUDESONIDE 500 MCG: 0.5 INHALANT ORAL at 19:20

## 2025-02-21 RX ADMIN — MIDODRINE HYDROCHLORIDE 10 MG: 5 TABLET ORAL at 20:34

## 2025-02-21 RX ADMIN — VENLAFAXINE 37.5 MG: 25 TABLET ORAL at 08:40

## 2025-02-21 RX ADMIN — MIDODRINE HYDROCHLORIDE 10 MG: 5 TABLET ORAL at 08:40

## 2025-02-21 RX ADMIN — APIXABAN 5 MG: 5 TABLET, FILM COATED ORAL at 08:40

## 2025-02-21 RX ADMIN — ATORVASTATIN CALCIUM 20 MG: 20 TABLET, FILM COATED ORAL at 20:34

## 2025-02-21 RX ADMIN — BUPROPION HYDROCHLORIDE 75 MG: 75 TABLET, FILM COATED ORAL at 20:34

## 2025-02-21 RX ADMIN — ONDANSETRON 4 MG: 2 INJECTION INTRAMUSCULAR; INTRAVENOUS at 18:50

## 2025-02-21 RX ADMIN — VENLAFAXINE 37.5 MG: 25 TABLET ORAL at 18:32

## 2025-02-21 ASSESSMENT — PULMONARY FUNCTION TESTS
PIF_VALUE: 25
PIF_VALUE: 20
PIF_VALUE: 24
PIF_VALUE: 22
PIF_VALUE: 16
PIF_VALUE: 25
PIF_VALUE: 25
PIF_VALUE: 28
PIF_VALUE: 30
PIF_VALUE: 22
PIF_VALUE: 23
PIF_VALUE: 24
PIF_VALUE: 24
PIF_VALUE: 23
PIF_VALUE: 26
PIF_VALUE: 23
PIF_VALUE: 22
PIF_VALUE: 24
PIF_VALUE: 18
PIF_VALUE: 22
PIF_VALUE: 20

## 2025-02-21 ASSESSMENT — PAIN SCALES - GENERAL: PAINLEVEL_OUTOF10: 0

## 2025-02-21 NOTE — PROGRESS NOTES
Spiritual Health History and Assessment/Progress Note  [facility]    Initial Encounter,  ,  ,      Name: Nida Graves MRN: [id number]    Age: 49 y.o.     Sex: female   Language: English   Nondenominational: Hinduism   Acute on chronic hypoxic respiratory failure (HCC)     Date: 2/21/2025            Total Time Calculated: 20 min              Spiritual Assessment began in [facility] [place] [facility]        Referral/Consult From: Nurse   Encounter Overview/Reason: Initial Encounter  Service Provided For: Patient    Nikki, Belief, Meaning:   Patient identifies as spiritual, is connected with a nikki tradition or spiritual practice, has beliefs or practices that help with coping during difficult times, and Other: Methodist - Hinduism  Family/Friends No family/friends present      Importance and Influence:  Patient has spiritual/personal beliefs that influence decisions regarding their health  Family/Friends No family/friends present    Community:  Patient expresses feelings of isolation: disconnected from family/friends  Family/Friends No family/friends present    Assessment and Plan of Care:     Patient Interventions include: Facilitated expression of thoughts and feelings, Explored spiritual coping/struggle/distress, Affirmed coping skills/support systems, Facilitated life review and/ or legacy, and Other: prayer, active listening, ministry of presence  Family/Friends Interventions include: No family/friends present    Patient Plan of Care: Spiritual Care available upon further referral  Family/Friends Plan of Care: No family/friends present    Today I followed up on the referral I received yesterday to offer spiritual and emotional support to the Pt. She was awake at time of visit, but due to her condition, she is unable to speak. We communicated using gesture and with the help of yes-or-no questions, and I was able to learn that the patient is a Methodist, Hinduism I believe, and that she has some friends but little family

## 2025-02-21 NOTE — PROGRESS NOTES
Renal Progress Note    Patient: Nida Graves MRN: [id number]  SSN: [id number]    YOB: 1975  Age: 49 y.o.  Sex: female      Admit Date: 1/12/2025    LOS: 40 days     Subjective:   Patient seen in ICU.  awake, no acute distress.  Status post tracheostomy, remains on vent  Edema improved, Repeat labs showed improvement in creatinine to 1.27, repeat potassium is 5.4      Current Facility-Administered Medications   Medication Dose Route Frequency    sodium chloride (Inhalant) 3 % nebulizer solution 4 mL  4 mL Nebulization 4x Daily RT    ipratropium 0.5 mg-albuterol 2.5 mg (DUONEB) nebulizer solution 1 Dose  1 Dose Inhalation 4x Daily RT    docusate (COLACE) 50 MG/5ML liquid 100 mg  100 mg Oral Daily    epoetin itz-epbx (RETACRIT) injection 2,500 Units  2,500 Units SubCUTAneous Once per day on Monday Wednesday Friday    lansoprazole (PREVACID SOLUTAB) disintegrating tablet 30 mg  30 mg Per G Tube QAM AC    aspirin chewable tablet 81 mg  81 mg Per G Tube Daily    atorvastatin (LIPITOR) tablet 20 mg  20 mg Per G Tube Nightly    buPROPion (WELLBUTRIN) tablet 75 mg  75 mg Per G Tube BID    folic acid (FOLVITE) tablet 1 mg  1 mg Per G Tube Daily    midodrine (PROAMATINE) tablet 10 mg  10 mg Per G Tube TID    venlafaxine (EFFEXOR) tablet 37.5 mg  37.5 mg Per G Tube BID WC    acetaminophen (TYLENOL) 160 MG/5ML solution 650 mg  650 mg Per G Tube Q6H PRN    Or    acetaminophen (TYLENOL) suppository 650 mg  650 mg Rectal Q6H PRN    hydrOXYzine HCl (ATARAX) tablet 25 mg  25 mg Per G Tube TID PRN    magnesium hydroxide (MILK OF MAGNESIA) 400 MG/5ML suspension 30 mL  30 mL Per G Tube Q6H PRN    polyethylene glycol (GLYCOLAX) packet 17 g  17 g Per G Tube Daily PRN    senna (SENOKOT) tablet 8.6 mg  1 tablet Per G Tube Daily PRN    cinacalcet (SENSIPAR) tablet 30 mg  30 mg Oral Daily    prochlorperazine (COMPAZINE) injection 10 mg  10 mg IntraVENous Q6H PRN    glucagon injection 1 mg  1 mg SubCUTAneous PRN    apixaban  (ELIQUIS) tablet 5 mg  5 mg Per NG tube BID    Petrolatum-Zinc Oxide ointment   Topical BID    insulin lispro (HUMALOG,ADMELOG) injection vial 0-4 Units  0-4 Units SubCUTAneous 4 times per day    bisacodyl (DULCOLAX) suppository 10 mg  10 mg Rectal Daily PRN    ondansetron (ZOFRAN-ODT) disintegrating tablet 4 mg  4 mg Oral Q8H PRN    Or    ondansetron (ZOFRAN) injection 4 mg  4 mg IntraVENous Q4H PRN    sodium chloride flush 0.9 % injection 5-40 mL  5-40 mL IntraVENous 2 times per day    sodium chloride flush 0.9 % injection 5-40 mL  5-40 mL IntraVENous PRN    0.9 % sodium chloride infusion   IntraVENous PRN    potassium chloride 20 mEq/50 mL IVPB (Central Line)  20 mEq IntraVENous PRN    Or    potassium chloride 10 mEq/100 mL IVPB (Peripheral Line)  10 mEq IntraVENous PRN    magnesium sulfate 2000 mg in 50 mL IVPB premix  2,000 mg IntraVENous PRN    ipratropium (ATROVENT) 0.02 % nebulizer solution 0.5 mg  0.5 mg Nebulization Q6H PRN    glucose chewable tablet 16 g  4 tablet Oral PRN    dextrose bolus 10% 125 mL  125 mL IntraVENous PRN    Or    dextrose bolus 10% 250 mL  250 mL IntraVENous PRN    dextrose 10 % infusion   IntraVENous Continuous PRN    budesonide (PULMICORT) nebulizer suspension 500 mcg  0.5 mg Nebulization BID RT        Vitals:    02/21/25 1115 02/21/25 1200 02/21/25 1203 02/21/25 1511   BP:       Pulse: 90   (!) 101   Resp: 17   17   Temp:  98.7 °F (37.1 °C)     TempSrc:  Oral     SpO2: 98%   99%   Weight:       Height:   1.702 m (5' 7.01\")      Objective:   General: alert awake well-oriented, no acute distress.  On vent through trach   HEENT: EOMI, no Icterus, no Pallor,  mucosa moist.  Neck: Neck is supple, No JVD  Lungs: Fair air entry present bilaterally  CVS: heart sounds normal, regular rate and rhythm  GI: soft, nontender, normal BS.  Extremeties: no cyanosis, no edema,   Neuro: Alert, awake, understanding conversation, following commands,    Skin: normal skin turgor, no skin rashes.

## 2025-02-21 NOTE — PROGRESS NOTES
Comprehensive Nutrition Assessment    Type and Reason for Visit:  Reassess (Early Follow Up)  Addendum: RN calling requesting orders using Kangaroo pump intermittent settings. See below.  Nutrition Recommendations/Plan:   Modify TF to bolus feeds via PEG w/ Nepro (Renal Formula) 240 ml over 30min (480ml/hr rate) every 6 hours + 1 Prosource daily. Program 250 ml free water flush every 4 hours.  TF provides: 1728 kcals, 77 g protein, and 697 ml free water  Prosource: +60 kcals, +15 g protein  TOTAL: 1788 kcals (83% est need), 92 g protein (1.5 g/kg IBW/84% est need), and 2297 ml H2O  Monitor fluid status and lytes, correct as needed  Monitor weight as able (bed scale needs zeroing if pt gets out of bed), TF admin, labs, and bowel fxn/abd exam     Malnutrition Assessment:  Malnutrition Status:  Mild malnutrition (01/21/25 1213)    Context:  Chronic Illness       Nutrition Assessment:    Please see previous note for more detailed assessment; Presented w/ SOB, arrhythmia. ICU 1/13-1/16. Returned from floor 1/19 d/t hypoxia, hypercarbia, and encephalopathy. Persistent hypoxia on biPAP, intubated. TF initiated 1/21 - Promote @30 + 4PS. Concern for GI bleed w/ dark stools, TF held 1/22 for CTA, neg, restarted 1/24. Pt extubated 1/26, SLP rec mod thick CLD. Incr O2 needs 1/27,copious secretions, reintubated 1/27. Trach 1/30, propofol weaning. Planned for cardioversion 2/6, TF held. PEG and leadless pacemaker 2/11. 2/13 TF changed to Nepro per Nephrology d/t incr K, RD modified regiment to meet needs. 2/16 Nephrology switched back to Promote RD updated regimen. (2/21) Messaged by PharmD, Nephrology requesting formula change back to Nepro. Will recalculate and decr protein given Nephrology c/f exacerbating hyperkalemia. Unable to assess pt weight, bed scale inaccurate. Continued weaning of vent settings as tolerated, pt awaiting insurance approval for LTAC. Pt w/ mucus plugMD carlene incr FWF to attempt to thin, mucomyst. Labs:    Behavioral-Environmental Outcomes: None Identified  Food/Nutrient Intake Outcomes: Enteral Nutrition Intake/Tolerance  Physical Signs/Symptoms Outcomes: Biochemical Data, Constipation, GI Status, Weight, Fluid Status or Edema    Discharge Planning:    Enteral Nutrition     Winnie Garrison RD  Contact: [phone]

## 2025-02-21 NOTE — PROGRESS NOTES
CRITICAL CARE PROGRESS NOTE    Name: Nida Graves   : 1975   MRN: [id number]   Date: 2025      Diagnoses/problem list:   Acute hypoxic and hypercapnic respiratory failure  Acute kidney injury  Atrial flutter with slow ventricular response  Complete heart block  GI bleed  Nosebleed, resolved  NSTEMI  Symptomatic bradycardia  Biventricular failure  Acute HFrEF, 25 to 30%  Diabetes  Morbid obesity  Acute metabolic/septic encephalopathy, improved  Deconditioning    24-hour events:   Frequent mucus plugging that is easily relieved with in-line suctioning. Continue mucolytic nebs. Weaning ventilator support as tolerated.   Pt continues to await acceptance to LTAC where she would undoubtedly have the most medical benefit with clear indication given her long-term ventilator requirement and deconditioning.     Assessment and plan:   Ms. Graves is a 49-year-old female with PMH chronic debility and bedbound status, diabetes, asthma, obesity, who presented for shortness of breath.  Shortness of breath worsening over the past few days.  Upon presentation ED patient found to be hypoxic necessita.  Ting O2 via NC.  She was also found to be in A-fib with slow ventricular response.  She was transferred out of ICU on . Early morning  upgraded to ICU again due to severe hypoxia and hypercapnia requiring intubation. Now s/p trach on . Leadless pacemaker . PEG     CNS / MSK:    Continue Bupropion and Venlafaxine   PT/OT  Pt would most benefit from LTAC to best address her deconditioning and long-term ventilator weaning    PULMONOLOGY:   Extubated on , reintubated on  due to inability to protect airway  ENT placed trach on   Weaning ventilator and hoping to advance towards trach collar trials given pt's tolerance of spontaneous modes.   Recurrent mucus plugging, increasing mucolytic regimen to mucomyst q6h  Continue nebs, budesonide q12h, duonebs q6h  Pt would most benefit from LTAC to best  lb 4.5 oz), SpO2 99%.  Physical Exam  Gen: On MV via trach, NAD, A&Ox3  HEENT: NC/AT, supple  Cardiac: Paced at 60bpm  Pulm: ronchus bilaterally, but no respiratory distress  ABD: Soft, mildly distended, non tender, PEG tube in place  Extremities: Mild LE edema noted. Right groin site from pacemaker with small amount of fat necrosis. No overt signs of infection  Neuro: Following commands    Labs and Data: Reviewed 02/21/25  Medications: Reviewed 02/21/25  Imaging: Reviewed 02/21/25    Intake/Output:     Intake/Output Summary (Last 24 hours) at 2/21/2025 1115  Last data filed at 2/21/2025 0930  Gross per 24 hour   Intake 240 ml   Output 1000 ml   Net -760 ml       CRITICAL CARE DOCUMENTATION  I had a face to face encounter with the patient, reviewed and interpreted patient data including clinical events, labs, images, vital signs, I/O's, and examined patient.  I have discussed the case and the plan and management of the patient's care with the consulting services, the bedside nurses and the respiratory therapist.      NOTE OF PERSONAL INVOLVEMENT IN CARE   This patient has a high probability of imminent, clinically significant deterioration, which requires the highest level of preparedness to intervene urgently. I participated in the decision-making and personally managed or directed the management of the following life and organ supporting interventions that required my frequent assessment to treat or prevent imminent deterioration.    I personally spent 45 minutes of critical care time.  This is time spent at this critically ill patient's bedside actively involved in patient care as well as the coordination of care.  This does not include any procedural time which has been billed separately.      DO Barbara Walsh [facility]  02/21/25

## 2025-02-21 NOTE — CARE COORDINATION
CM reviewed Pt medicals, [facility] is requesting more information.     CM called Pt daughter, left a VM requesting permission to re-open [facility].     Pt daughter called CM back and stated that it would be ok to contact [facility].     Per [facility]: \"Good Morning-per our team she is not medically stable for us to admit. Her Hgb is still low and she is mucous plugging alot. We are working on RT to reach out to your RT to discuss pt further.\"    Per IDR    Secretion bloody, minimal secretions.     11:50  Per Jed, Liaison for [facility]: The P2P was already done for her on the 6th. You cannot do another P2P. The only thing we can try is a formal written appeal. I looked at her records and her vent settings are higher than they were when we submitted last time, and they said she was not stable then. I have never gotten a denial overturned with her insurance and since her vent settings are higher, I do not think the decision will be overridden just to be honest. I can try the appeal if that is what you want to do.

## 2025-02-22 LAB
ALBUMIN SERPL-MCNC: 2.5 G/DL (ref 3.5–5)
ANION GAP SERPL CALC-SCNC: 3 MMOL/L (ref 2–12)
BASOPHILS # BLD: 0.06 K/UL (ref 0–0.1)
BASOPHILS NFR BLD: 0.5 % (ref 0–1)
BUN SERPL-MCNC: 53 MG/DL (ref 6–20)
BUN/CREAT SERPL: 43 (ref 12–20)
CA-I BLD-MCNC: 8.5 MG/DL (ref 8.5–10.1)
CHLORIDE SERPL-SCNC: 103 MMOL/L (ref 97–108)
CO2 SERPL-SCNC: 32 MMOL/L (ref 21–32)
CREAT SERPL-MCNC: 1.23 MG/DL (ref 0.55–1.02)
DIFFERENTIAL METHOD BLD: ABNORMAL
EOSINOPHIL # BLD: 0.53 K/UL (ref 0–0.4)
EOSINOPHIL NFR BLD: 4.7 % (ref 0–7)
ERYTHROCYTE [DISTWIDTH] IN BLOOD BY AUTOMATED COUNT: 19.2 % (ref 11.5–14.5)
GLUCOSE BLD STRIP.AUTO-MCNC: 105 MG/DL (ref 65–100)
GLUCOSE BLD STRIP.AUTO-MCNC: 115 MG/DL (ref 65–100)
GLUCOSE BLD STRIP.AUTO-MCNC: 143 MG/DL (ref 65–100)
GLUCOSE BLD STRIP.AUTO-MCNC: 95 MG/DL (ref 65–100)
GLUCOSE SERPL-MCNC: 94 MG/DL (ref 65–100)
HCT VFR BLD AUTO: 23.2 % (ref 35–47)
HGB BLD-MCNC: 7 G/DL (ref 11.5–16)
IMM GRANULOCYTES # BLD AUTO: 0.09 K/UL (ref 0–0.04)
IMM GRANULOCYTES NFR BLD AUTO: 0.8 % (ref 0–0.5)
LYMPHOCYTES # BLD: 2.2 K/UL (ref 0.8–3.5)
LYMPHOCYTES NFR BLD: 19.6 % (ref 12–49)
MCH RBC QN AUTO: 29.2 PG (ref 26–34)
MCHC RBC AUTO-ENTMCNC: 30.2 G/DL (ref 30–36.5)
MCV RBC AUTO: 96.7 FL (ref 80–99)
MONOCYTES # BLD: 1.23 K/UL (ref 0–1)
MONOCYTES NFR BLD: 11 % (ref 5–13)
NEUTS SEG # BLD: 7.11 K/UL (ref 1.8–8)
NEUTS SEG NFR BLD: 63.4 % (ref 32–75)
NRBC # BLD: 0 K/UL (ref 0–0.01)
NRBC BLD-RTO: 0 PER 100 WBC
PERFORMED BY:: ABNORMAL
PERFORMED BY:: NORMAL
PHOSPHATE SERPL-MCNC: 4.2 MG/DL (ref 2.6–4.7)
PLATELET # BLD AUTO: 348 K/UL (ref 150–400)
PMV BLD AUTO: 9.9 FL (ref 8.9–12.9)
POTASSIUM SERPL-SCNC: 5.3 MMOL/L (ref 3.5–5.1)
RBC # BLD AUTO: 2.4 M/UL (ref 3.8–5.2)
SODIUM SERPL-SCNC: 138 MMOL/L (ref 136–145)
WBC # BLD AUTO: 11.2 K/UL (ref 3.6–11)

## 2025-02-22 PROCEDURE — 94761 N-INVAS EAR/PLS OXIMETRY MLT: CPT

## 2025-02-22 PROCEDURE — 2700000000 HC OXYGEN THERAPY PER DAY

## 2025-02-22 PROCEDURE — 2000000000 HC ICU R&B

## 2025-02-22 PROCEDURE — 6370000000 HC RX 637 (ALT 250 FOR IP)

## 2025-02-22 PROCEDURE — 6360000002 HC RX W HCPCS: Performed by: INTERNAL MEDICINE

## 2025-02-22 PROCEDURE — 94669 MECHANICAL CHEST WALL OSCILL: CPT

## 2025-02-22 PROCEDURE — 85025 COMPLETE CBC W/AUTO DIFF WBC: CPT

## 2025-02-22 PROCEDURE — 6360000002 HC RX W HCPCS: Performed by: STUDENT IN AN ORGANIZED HEALTH CARE EDUCATION/TRAINING PROGRAM

## 2025-02-22 PROCEDURE — 94640 AIRWAY INHALATION TREATMENT: CPT

## 2025-02-22 PROCEDURE — 94003 VENT MGMT INPAT SUBQ DAY: CPT

## 2025-02-22 PROCEDURE — 6370000000 HC RX 637 (ALT 250 FOR IP): Performed by: STUDENT IN AN ORGANIZED HEALTH CARE EDUCATION/TRAINING PROGRAM

## 2025-02-22 PROCEDURE — 2500000003 HC RX 250 WO HCPCS: Performed by: STUDENT IN AN ORGANIZED HEALTH CARE EDUCATION/TRAINING PROGRAM

## 2025-02-22 PROCEDURE — 80069 RENAL FUNCTION PANEL: CPT

## 2025-02-22 PROCEDURE — 6370000000 HC RX 637 (ALT 250 FOR IP): Performed by: INTERNAL MEDICINE

## 2025-02-22 PROCEDURE — 94668 MNPJ CHEST WALL SBSQ: CPT

## 2025-02-22 PROCEDURE — 97530 THERAPEUTIC ACTIVITIES: CPT

## 2025-02-22 PROCEDURE — 97165 OT EVAL LOW COMPLEX 30 MIN: CPT

## 2025-02-22 PROCEDURE — 31502 CHANGE OF WINDPIPE AIRWAY: CPT

## 2025-02-22 PROCEDURE — 6370000000 HC RX 637 (ALT 250 FOR IP): Performed by: NURSE PRACTITIONER

## 2025-02-22 PROCEDURE — 82962 GLUCOSE BLOOD TEST: CPT

## 2025-02-22 PROCEDURE — 2580000003 HC RX 258: Performed by: NURSE PRACTITIONER

## 2025-02-22 PROCEDURE — 36415 COLL VENOUS BLD VENIPUNCTURE: CPT

## 2025-02-22 RX ORDER — SODIUM CHLORIDE FOR INHALATION 0.9 %
VIAL, NEBULIZER (ML) INHALATION
Status: COMPLETED
Start: 2025-02-22 | End: 2025-02-22

## 2025-02-22 RX ORDER — FUROSEMIDE 10 MG/ML
40 INJECTION INTRAMUSCULAR; INTRAVENOUS ONCE
Status: COMPLETED | OUTPATIENT
Start: 2025-02-22 | End: 2025-02-22

## 2025-02-22 RX ADMIN — WHITE PETROLATUM,ZINC OXIDE: 57; 17 PASTE TOPICAL at 08:49

## 2025-02-22 RX ADMIN — APIXABAN 5 MG: 5 TABLET, FILM COATED ORAL at 20:30

## 2025-02-22 RX ADMIN — LANSOPRAZOLE 30 MG: 30 TABLET, ORALLY DISINTEGRATING ORAL at 08:48

## 2025-02-22 RX ADMIN — Medication 4 ML: at 06:31

## 2025-02-22 RX ADMIN — VENLAFAXINE 37.5 MG: 25 TABLET ORAL at 09:42

## 2025-02-22 RX ADMIN — APIXABAN 5 MG: 5 TABLET, FILM COATED ORAL at 08:47

## 2025-02-22 RX ADMIN — FOLIC ACID 1 MG: 1 TABLET ORAL at 08:47

## 2025-02-22 RX ADMIN — DOCUSATE SODIUM 100 MG: 50 LIQUID ORAL at 08:48

## 2025-02-22 RX ADMIN — WHITE PETROLATUM,ZINC OXIDE: 57; 17 PASTE TOPICAL at 20:29

## 2025-02-22 RX ADMIN — SODIUM ZIRCONIUM CYCLOSILICATE 10 G: 10 POWDER, FOR SUSPENSION ORAL at 10:20

## 2025-02-22 RX ADMIN — Medication 4 ML: at 11:40

## 2025-02-22 RX ADMIN — Medication 4 ML: at 15:56

## 2025-02-22 RX ADMIN — IPRATROPIUM BROMIDE AND ALBUTEROL SULFATE 1 DOSE: 2.5; .5 SOLUTION RESPIRATORY (INHALATION) at 19:26

## 2025-02-22 RX ADMIN — BUPROPION HYDROCHLORIDE 75 MG: 75 TABLET, FILM COATED ORAL at 08:47

## 2025-02-22 RX ADMIN — BUPROPION HYDROCHLORIDE 75 MG: 75 TABLET, FILM COATED ORAL at 20:28

## 2025-02-22 RX ADMIN — CINACALCET HYDROCHLORIDE 30 MG: 30 TABLET, FILM COATED ORAL at 10:15

## 2025-02-22 RX ADMIN — MIDODRINE HYDROCHLORIDE 10 MG: 5 TABLET ORAL at 20:28

## 2025-02-22 RX ADMIN — VENLAFAXINE 37.5 MG: 25 TABLET ORAL at 15:56

## 2025-02-22 RX ADMIN — MIDODRINE HYDROCHLORIDE 10 MG: 5 TABLET ORAL at 08:47

## 2025-02-22 RX ADMIN — SODIUM CHLORIDE, PRESERVATIVE FREE 10 ML: 5 INJECTION INTRAVENOUS at 20:33

## 2025-02-22 RX ADMIN — IPRATROPIUM BROMIDE AND ALBUTEROL SULFATE 1 DOSE: 2.5; .5 SOLUTION RESPIRATORY (INHALATION) at 15:56

## 2025-02-22 RX ADMIN — Medication 4 ML: at 19:26

## 2025-02-22 RX ADMIN — ASPIRIN 81 MG 81 MG: 81 TABLET ORAL at 08:48

## 2025-02-22 RX ADMIN — IPRATROPIUM BROMIDE AND ALBUTEROL SULFATE 1 DOSE: 2.5; .5 SOLUTION RESPIRATORY (INHALATION) at 11:34

## 2025-02-22 RX ADMIN — SODIUM CHLORIDE, PRESERVATIVE FREE 10 ML: 5 INJECTION INTRAVENOUS at 08:48

## 2025-02-22 RX ADMIN — BUDESONIDE 500 MCG: 0.5 INHALANT ORAL at 06:30

## 2025-02-22 RX ADMIN — BUDESONIDE 500 MCG: 0.5 INHALANT ORAL at 19:26

## 2025-02-22 RX ADMIN — IPRATROPIUM BROMIDE AND ALBUTEROL SULFATE 1 DOSE: 2.5; .5 SOLUTION RESPIRATORY (INHALATION) at 06:30

## 2025-02-22 RX ADMIN — MIDODRINE HYDROCHLORIDE 10 MG: 5 TABLET ORAL at 15:56

## 2025-02-22 RX ADMIN — ATORVASTATIN CALCIUM 20 MG: 20 TABLET, FILM COATED ORAL at 20:28

## 2025-02-22 RX ADMIN — FUROSEMIDE 40 MG: 10 INJECTION, SOLUTION INTRAMUSCULAR; INTRAVENOUS at 15:56

## 2025-02-22 ASSESSMENT — PULMONARY FUNCTION TESTS
PIF_VALUE: 28
PIF_VALUE: 29
PIF_VALUE: 26
PIF_VALUE: 26
PIF_VALUE: 24
PIF_VALUE: 32
PIF_VALUE: 23
PIF_VALUE: 24
PIF_VALUE: 25
PIF_VALUE: 25
PIF_VALUE: 26
PIF_VALUE: 29
PIF_VALUE: 26
PIF_VALUE: 25
PIF_VALUE: 24
PIF_VALUE: 29
PIF_VALUE: 26
PIF_VALUE: 27
PIF_VALUE: 26
PIF_VALUE: 27
PIF_VALUE: 25
PIF_VALUE: 31
PIF_VALUE: 27
PIF_VALUE: 26
PIF_VALUE: 26
PIF_VALUE: 25
PIF_VALUE: 26
PIF_VALUE: 24
PIF_VALUE: 25

## 2025-02-22 ASSESSMENT — PAIN SCALES - GENERAL: PAINLEVEL_OUTOF10: 0

## 2025-02-22 NOTE — PROGRESS NOTES
Renal Progress Note    Patient: Nida Graves MRN: [id number]  SSN: [id number]    YOB: 1975  Age: 49 y.o.  Sex: female      Admit Date: 1/12/2025    LOS: 41 days     Subjective:   Patient seen in ICU.  awake, no acute distress.  Status post tracheostomy, remains on vent  Edema +, Repeat labs showed improvement in creatinine to 1.23, repeat potassium is 5.3      Current Facility-Administered Medications   Medication Dose Route Frequency    sodium chloride (Inhalant) 3 % nebulizer solution 4 mL  4 mL Nebulization 4x Daily RT    ipratropium 0.5 mg-albuterol 2.5 mg (DUONEB) nebulizer solution 1 Dose  1 Dose Inhalation 4x Daily RT    docusate (COLACE) 50 MG/5ML liquid 100 mg  100 mg Oral Daily    epoetin itz-epbx (RETACRIT) injection 2,500 Units  2,500 Units SubCUTAneous Once per day on Monday Wednesday Friday    lansoprazole (PREVACID SOLUTAB) disintegrating tablet 30 mg  30 mg Per G Tube QAM AC    aspirin chewable tablet 81 mg  81 mg Per G Tube Daily    atorvastatin (LIPITOR) tablet 20 mg  20 mg Per G Tube Nightly    buPROPion (WELLBUTRIN) tablet 75 mg  75 mg Per G Tube BID    folic acid (FOLVITE) tablet 1 mg  1 mg Per G Tube Daily    midodrine (PROAMATINE) tablet 10 mg  10 mg Per G Tube TID    venlafaxine (EFFEXOR) tablet 37.5 mg  37.5 mg Per G Tube BID WC    acetaminophen (TYLENOL) 160 MG/5ML solution 650 mg  650 mg Per G Tube Q6H PRN    Or    acetaminophen (TYLENOL) suppository 650 mg  650 mg Rectal Q6H PRN    hydrOXYzine HCl (ATARAX) tablet 25 mg  25 mg Per G Tube TID PRN    magnesium hydroxide (MILK OF MAGNESIA) 400 MG/5ML suspension 30 mL  30 mL Per G Tube Q6H PRN    polyethylene glycol (GLYCOLAX) packet 17 g  17 g Per G Tube Daily PRN    senna (SENOKOT) tablet 8.6 mg  1 tablet Per G Tube Daily PRN    cinacalcet (SENSIPAR) tablet 30 mg  30 mg Oral Daily    prochlorperazine (COMPAZINE) injection 10 mg  10 mg IntraVENous Q6H PRN    glucagon injection 1 mg  1 mg SubCUTAneous PRN    apixaban  expect K to improve     #3  Hypercalcemia with high PTH, primary hyperparathyroidism  Improved calcium levels-, continue Sensipar 30 mg daily  -Continue to monitor calcium levels    #4  Respiratory distress  -She was extubated on 1/26 but reintubated on 1/27 because of inability to protect airway.  Tracheostomy was done on 1/30  -Currently on mechanical ventilation via trach     #5  Hypotension  Improved hemodynamic status  -Last EF was 20 to 30% with right ventricular overload, moderate TR  Bharathi; give 1 dose of  Lasix 40 mg IV today for edema  -Cardiology on the case.  S/p PPM on 2/12    6. Anemia: came with severe anemia, s/p pRBC tx  Sever iron def noted, Received 4 doses of  IV ferrlecit doses  Monitor H/H    Signed By: Isael Amezquita MD     February 22, 2025

## 2025-02-22 NOTE — PROGRESS NOTES
CRITICAL CARE PROGRESS NOTE    Name: Nida Graves   : 1975   MRN: [id number]   Date: 2025      Diagnoses/problem list:   Acute hypoxic and hypercapnic respiratory failure  Acute kidney injury  Atrial flutter with slow ventricular response  Complete heart block  GI bleed  Nosebleed, resolved  NSTEMI  Symptomatic bradycardia  Biventricular failure  Acute HFrEF, 25 to 30%  Diabetes  Morbid obesity  Acute metabolic/septic encephalopathy, improved  Deconditioning    24-hour events:   1 more episode of mucus plugging that is easily relieved with in-line suctioning. Continue mucolytic nebs. Weaning ventilator support as tolerated.   Pt continues to await acceptance to LTAC where she would undoubtedly have the most medical benefit with clear indication given her long-term ventilator requirement and deconditioning.  Slow improvement in creatinine noted    Assessment and plan:  -Will plan pressure support with PEEP for respiratory muscle fatigue  -Pressure support of 17 with PEEP of 8  -Placed back on ventilator if patient is tired/chest increased respiratory distress  -Continue other measures as outlined below  -Awaiting LTAC placement    Addendum:  Status post bronchoscopy  Lots of mucous plugging especially bloody with blood clots noted on the left side of the lung  Post bronchoscopy chest x-ray repeated which showed slight improvement on the left upper lobe  Due to significant bloody secretions I will hold off on Eliquis  We will start patient on TXA nebs  Monitor with serial x-rays another x-ray ordered for tomorrow   Ms. Graves is a 49-year-old female with PMH chronic debility and bedbound status, diabetes, asthma, obesity, who presented for shortness of breath.  Shortness of breath worsening over the past few days.  Upon presentation ED patient found to be hypoxic necessita.  Ting O2 via NC.  She was also found to be in A-fib with slow ventricular response.  She was transferred out of ICU on . Early  morning 1/19 upgraded to ICU again due to severe hypoxia and hypercapnia requiring intubation. Now s/p trach on 1/30. Leadless pacemaker 2/12. PEG 2/13    CNS / MSK:    Continue Bupropion and Venlafaxine   PT/OT  Pt would most benefit from LTAC to best address her deconditioning and long-term ventilator weaning    PULMONOLOGY:   Extubated on 1/26, reintubated on 1/27 due to inability to protect airway  ENT placed trach on 1/30  Weaning ventilator and hoping to advance towards trach collar trials given pt's tolerance of spontaneous modes.   Recurrent mucus plugging, increasing mucolytic regimen to mucomyst q6h  Continue nebs, budesonide q12h, duonebs q6h  Pt would most benefit from LTAC to best address her deconditioning and long-term ventilator weaning    CARDIOVASCULAR:   Midodrine to keep MAP above 65   S/p AUGUSTIN/DCCV on 2/6  LVEF 20-30%, right ventricle overload, moderate TR   Intermittent diuresis as needed  LHC (2/3/25): nonobstructive CAD; luminal RCA, Lcx; 40% mLAD   Continue ASA and Statin; GDMT for chronic HFrEF as tolerated  Cardiology following  S/p Leadless pacemaker 2/12    GASTROINTESTINAL:   Dark stools on 1/22 w/ FOBT positive. However CTA abdomen/pelvis without evidence of bleeding. Prior anemia responded to PRBCs. No recurrence of dark stools since restarting eliquis.   Bowel regimen  Continue tube feeds as tolerated via PEG; increased FWF    RENAL/ELECTROLYTE:   KIZZY d/t ATN from shock that has since improved.  S/p intermittent diuretics (last 2/14); will assess need daily and administer as indicated   Of note, multiple fallopian/uterine cysts making bladder scan difficult for interpretation    ENDOCRINE:   Insulin sliding scale  Keep blood glucose 140-180  Discontinued lantus    HEMATOLOGY/ONCOLOGY:   Eliquis for A-fib temporarily held due to drop in hemoglobin on 2/10 which improved s/p 2 unit PRBC; no evidence of bleeding and eliquis since restarted (2/17)  No recurrence of dark stools since

## 2025-02-22 NOTE — PLAN OF CARE
OCCUPATIONAL THERAPY EVALUATION  Patient: Nida Graves (49 y.o. female)  Date: 2/22/2025  Primary Diagnosis: Atrial fibrillation, unspecified type (HCC) [I48.91]  Atrial flutter, unspecified type (HCC) [I48.92]  Congestive heart failure, unspecified HF chronicity, unspecified heart failure type (HCC) [I50.9]  Acute hypoxic respiratory failure (HCC) [J96.01]  Procedure(s) (LRB):  ESOPHAGOGASTRODUODENOSCOPY PERCUTANEOUS ENDOSCOPIC GASTROSTOMY TUBE PLACEMENT (N/A) 9 Days Post-Op   Precautions: Fall Risk, General Precautions                Recommendations for nursing mobility: Encourage HEP in prep for ADLs/mobility; see handout for details, Frequent repositioning to prevent skin breakdown, LE elevation for management of edema, and Assist x2    In place during session:Tracheostomy 60% FiO2, External Catheter, EKG/telemetry , Pulse ox, and PEG Tube  ASSESSMENT  Pt is a 49 y.o. female presenting to [facility] with c/o shortness of breath and found to be in A-fib with slow ventricular response, admitted 1/12/25. She was transferred out of ICU on 1/16. Early morning 1/19 upgraded to ICU again due to severe hypoxia and hypercapnia requiring intubation. Now s/p trach on 1/30. Leadless pacemaker 2/12. PEG 2/13. She is currently being treated for acute hypoxic and hypercapnic respiratory failure, acute kidney injury, atrial flutter with  slow ventricular response, complete heart block, GI bleed, nosebleed, NSTEMI, symptomatic bradycardia, biventricular failure, acute HFrEF 25 to 30%, diabetes, morbid obesity, acute metabolic/septic encephalopathy, deconditioning. Pt received semi-supine in bed upon arrival, AXO x4, and agreeable to OT evaluation.     Based on current observations, pt presents with decreased  functional mobility, independence in ADLs, high-level IADLs, ROM, strength, body mechanics, activity tolerance, endurance, coordination, balance, fine-motor control (see below for objective details and assist levels).

## 2025-02-23 ENCOUNTER — APPOINTMENT (OUTPATIENT)
Facility: HOSPITAL | Age: 50
DRG: 004 | End: 2025-02-23
Payer: COMMERCIAL

## 2025-02-23 LAB
ALBUMIN SERPL-MCNC: 2.5 G/DL (ref 3.5–5)
ALBUMIN/GLOB SERPL: 0.6 (ref 1.1–2.2)
ALP SERPL-CCNC: 448 U/L (ref 45–117)
ALT SERPL-CCNC: 64 U/L (ref 12–78)
ANION GAP SERPL CALC-SCNC: 5 MMOL/L (ref 2–12)
AST SERPL W P-5'-P-CCNC: 86 U/L (ref 15–37)
BASOPHILS # BLD: 0.04 K/UL (ref 0–0.1)
BASOPHILS # BLD: 0.04 K/UL (ref 0–0.1)
BASOPHILS NFR BLD: 0.4 % (ref 0–1)
BASOPHILS NFR BLD: 0.4 % (ref 0–1)
BILIRUB SERPL-MCNC: 1.4 MG/DL (ref 0.2–1)
BUN SERPL-MCNC: 55 MG/DL (ref 6–20)
BUN/CREAT SERPL: 47 (ref 12–20)
CA-I BLD-MCNC: 8.6 MG/DL (ref 8.5–10.1)
CHLORIDE SERPL-SCNC: 102 MMOL/L (ref 97–108)
CO2 SERPL-SCNC: 33 MMOL/L (ref 21–32)
CREAT SERPL-MCNC: 1.18 MG/DL (ref 0.55–1.02)
DIFFERENTIAL METHOD BLD: ABNORMAL
DIFFERENTIAL METHOD BLD: ABNORMAL
EOSINOPHIL # BLD: 0.28 K/UL (ref 0–0.4)
EOSINOPHIL # BLD: 0.41 K/UL (ref 0–0.4)
EOSINOPHIL NFR BLD: 3 % (ref 0–7)
EOSINOPHIL NFR BLD: 3.9 % (ref 0–7)
ERYTHROCYTE [DISTWIDTH] IN BLOOD BY AUTOMATED COUNT: 19.1 % (ref 11.5–14.5)
ERYTHROCYTE [DISTWIDTH] IN BLOOD BY AUTOMATED COUNT: 19.5 % (ref 11.5–14.5)
GLOBULIN SER CALC-MCNC: 4.3 G/DL (ref 2–4)
GLUCOSE BLD STRIP.AUTO-MCNC: 113 MG/DL (ref 65–100)
GLUCOSE BLD STRIP.AUTO-MCNC: 117 MG/DL (ref 65–100)
GLUCOSE BLD STRIP.AUTO-MCNC: 118 MG/DL (ref 65–100)
GLUCOSE BLD STRIP.AUTO-MCNC: 118 MG/DL (ref 65–100)
GLUCOSE BLD STRIP.AUTO-MCNC: 131 MG/DL (ref 65–100)
GLUCOSE BLD STRIP.AUTO-MCNC: 144 MG/DL (ref 65–100)
GLUCOSE SERPL-MCNC: 88 MG/DL (ref 65–100)
HCT VFR BLD AUTO: 23 % (ref 35–47)
HCT VFR BLD AUTO: 23.6 % (ref 35–47)
HGB BLD-MCNC: 7 G/DL (ref 11.5–16)
HGB BLD-MCNC: 7 G/DL (ref 11.5–16)
IMM GRANULOCYTES # BLD AUTO: 0.04 K/UL (ref 0–0.04)
IMM GRANULOCYTES # BLD AUTO: 0.06 K/UL (ref 0–0.04)
IMM GRANULOCYTES NFR BLD AUTO: 0.4 % (ref 0–0.5)
IMM GRANULOCYTES NFR BLD AUTO: 0.6 % (ref 0–0.5)
LYMPHOCYTES # BLD: 1.73 K/UL (ref 0.8–3.5)
LYMPHOCYTES # BLD: 1.98 K/UL (ref 0.8–3.5)
LYMPHOCYTES NFR BLD: 18.7 % (ref 12–49)
LYMPHOCYTES NFR BLD: 18.8 % (ref 12–49)
MAGNESIUM SERPL-MCNC: 1.9 MG/DL (ref 1.6–2.4)
MCH RBC QN AUTO: 28.8 PG (ref 26–34)
MCH RBC QN AUTO: 29.7 PG (ref 26–34)
MCHC RBC AUTO-ENTMCNC: 29.7 G/DL (ref 30–36.5)
MCHC RBC AUTO-ENTMCNC: 30.4 G/DL (ref 30–36.5)
MCV RBC AUTO: 97.1 FL (ref 80–99)
MCV RBC AUTO: 97.5 FL (ref 80–99)
MONOCYTES # BLD: 1.03 K/UL (ref 0–1)
MONOCYTES # BLD: 1.17 K/UL (ref 0–1)
MONOCYTES NFR BLD: 11.1 % (ref 5–13)
MONOCYTES NFR BLD: 11.2 % (ref 5–13)
NEUTS SEG # BLD: 6.11 K/UL (ref 1.8–8)
NEUTS SEG # BLD: 6.9 K/UL (ref 1.8–8)
NEUTS SEG NFR BLD: 65.2 % (ref 32–75)
NEUTS SEG NFR BLD: 66.3 % (ref 32–75)
NRBC # BLD: 0 K/UL (ref 0–0.01)
NRBC # BLD: 0 K/UL (ref 0–0.01)
NRBC BLD-RTO: 0 PER 100 WBC
NRBC BLD-RTO: 0 PER 100 WBC
PERFORMED BY:: ABNORMAL
PLATELET # BLD AUTO: 348 K/UL (ref 150–400)
PLATELET # BLD AUTO: 353 K/UL (ref 150–400)
PMV BLD AUTO: 9.7 FL (ref 8.9–12.9)
PMV BLD AUTO: 9.7 FL (ref 8.9–12.9)
POTASSIUM SERPL-SCNC: 4.4 MMOL/L (ref 3.5–5.1)
PROT SERPL-MCNC: 6.8 G/DL (ref 6.4–8.2)
RBC # BLD AUTO: 2.36 M/UL (ref 3.8–5.2)
RBC # BLD AUTO: 2.43 M/UL (ref 3.8–5.2)
SODIUM SERPL-SCNC: 140 MMOL/L (ref 136–145)
WBC # BLD AUTO: 10.6 K/UL (ref 3.6–11)
WBC # BLD AUTO: 9.2 K/UL (ref 3.6–11)

## 2025-02-23 PROCEDURE — 6360000002 HC RX W HCPCS: Performed by: STUDENT IN AN ORGANIZED HEALTH CARE EDUCATION/TRAINING PROGRAM

## 2025-02-23 PROCEDURE — 71045 X-RAY EXAM CHEST 1 VIEW: CPT

## 2025-02-23 PROCEDURE — 2500000003 HC RX 250 WO HCPCS: Performed by: STUDENT IN AN ORGANIZED HEALTH CARE EDUCATION/TRAINING PROGRAM

## 2025-02-23 PROCEDURE — 94669 MECHANICAL CHEST WALL OSCILL: CPT

## 2025-02-23 PROCEDURE — 6370000000 HC RX 637 (ALT 250 FOR IP): Performed by: STUDENT IN AN ORGANIZED HEALTH CARE EDUCATION/TRAINING PROGRAM

## 2025-02-23 PROCEDURE — 0B9L8ZZ DRAINAGE OF LEFT LUNG, VIA NATURAL OR ARTIFICIAL OPENING ENDOSCOPIC: ICD-10-PCS | Performed by: INTERNAL MEDICINE

## 2025-02-23 PROCEDURE — 94640 AIRWAY INHALATION TREATMENT: CPT

## 2025-02-23 PROCEDURE — 2000000000 HC ICU R&B

## 2025-02-23 PROCEDURE — 80053 COMPREHEN METABOLIC PANEL: CPT

## 2025-02-23 PROCEDURE — 83735 ASSAY OF MAGNESIUM: CPT

## 2025-02-23 PROCEDURE — 94003 VENT MGMT INPAT SUBQ DAY: CPT

## 2025-02-23 PROCEDURE — 6370000000 HC RX 637 (ALT 250 FOR IP): Performed by: NURSE PRACTITIONER

## 2025-02-23 PROCEDURE — 94761 N-INVAS EAR/PLS OXIMETRY MLT: CPT

## 2025-02-23 PROCEDURE — 97110 THERAPEUTIC EXERCISES: CPT

## 2025-02-23 PROCEDURE — 82962 GLUCOSE BLOOD TEST: CPT

## 2025-02-23 PROCEDURE — 2580000003 HC RX 258: Performed by: NURSE PRACTITIONER

## 2025-02-23 PROCEDURE — 2500000003 HC RX 250 WO HCPCS: Performed by: INTERNAL MEDICINE

## 2025-02-23 PROCEDURE — 85025 COMPLETE CBC W/AUTO DIFF WBC: CPT

## 2025-02-23 PROCEDURE — 6360000002 HC RX W HCPCS: Performed by: INTERNAL MEDICINE

## 2025-02-23 PROCEDURE — 6360000002 HC RX W HCPCS

## 2025-02-23 PROCEDURE — 2700000000 HC OXYGEN THERAPY PER DAY

## 2025-02-23 PROCEDURE — 31645 BRNCHSC W/THER ASPIR 1ST: CPT

## 2025-02-23 RX ORDER — TRANEXAMIC ACID 100 MG/ML
500 INJECTION, SOLUTION INTRAVENOUS 2 TIMES DAILY
Status: COMPLETED | OUTPATIENT
Start: 2025-02-23 | End: 2025-02-25

## 2025-02-23 RX ORDER — MIDAZOLAM HYDROCHLORIDE 1 MG/ML
INJECTION, SOLUTION INTRAMUSCULAR; INTRAVENOUS
Status: COMPLETED
Start: 2025-02-23 | End: 2025-02-23

## 2025-02-23 RX ORDER — MIDAZOLAM HYDROCHLORIDE 2 MG/2ML
2 INJECTION, SOLUTION INTRAMUSCULAR; INTRAVENOUS ONCE
Status: COMPLETED | OUTPATIENT
Start: 2025-02-23 | End: 2025-02-23

## 2025-02-23 RX ORDER — MORPHINE SULFATE 4 MG/ML
4 INJECTION, SOLUTION INTRAMUSCULAR; INTRAVENOUS ONCE
Status: COMPLETED | OUTPATIENT
Start: 2025-02-23 | End: 2025-02-23

## 2025-02-23 RX ADMIN — APIXABAN 5 MG: 5 TABLET, FILM COATED ORAL at 09:27

## 2025-02-23 RX ADMIN — MIDODRINE HYDROCHLORIDE 10 MG: 5 TABLET ORAL at 09:27

## 2025-02-23 RX ADMIN — MIDODRINE HYDROCHLORIDE 10 MG: 5 TABLET ORAL at 17:06

## 2025-02-23 RX ADMIN — BUDESONIDE 500 MCG: 0.5 INHALANT ORAL at 19:58

## 2025-02-23 RX ADMIN — TRANEXAMIC ACID 500 MG: 100 INJECTION INTRAVENOUS at 14:53

## 2025-02-23 RX ADMIN — MIDAZOLAM 2 MG: 1 INJECTION INTRAMUSCULAR; INTRAVENOUS at 12:45

## 2025-02-23 RX ADMIN — BUDESONIDE 500 MCG: 0.5 INHALANT ORAL at 05:54

## 2025-02-23 RX ADMIN — IPRATROPIUM BROMIDE AND ALBUTEROL SULFATE 1 DOSE: 2.5; .5 SOLUTION RESPIRATORY (INHALATION) at 16:41

## 2025-02-23 RX ADMIN — VENLAFAXINE 37.5 MG: 25 TABLET ORAL at 09:27

## 2025-02-23 RX ADMIN — IPRATROPIUM BROMIDE AND ALBUTEROL SULFATE 1 DOSE: 2.5; .5 SOLUTION RESPIRATORY (INHALATION) at 12:56

## 2025-02-23 RX ADMIN — MIDODRINE HYDROCHLORIDE 10 MG: 5 TABLET ORAL at 19:36

## 2025-02-23 RX ADMIN — ATORVASTATIN CALCIUM 20 MG: 20 TABLET, FILM COATED ORAL at 19:36

## 2025-02-23 RX ADMIN — MORPHINE SULFATE 4 MG: 4 INJECTION, SOLUTION INTRAMUSCULAR; INTRAVENOUS at 12:29

## 2025-02-23 RX ADMIN — BUPROPION HYDROCHLORIDE 75 MG: 75 TABLET, FILM COATED ORAL at 19:36

## 2025-02-23 RX ADMIN — VENLAFAXINE 37.5 MG: 25 TABLET ORAL at 17:06

## 2025-02-23 RX ADMIN — ASPIRIN 81 MG 81 MG: 81 TABLET ORAL at 09:27

## 2025-02-23 RX ADMIN — TRANEXAMIC ACID 500 MG: 100 INJECTION INTRAVENOUS at 19:53

## 2025-02-23 RX ADMIN — WHITE PETROLATUM,ZINC OXIDE: 57; 17 PASTE TOPICAL at 09:28

## 2025-02-23 RX ADMIN — FOLIC ACID 1 MG: 1 TABLET ORAL at 09:27

## 2025-02-23 RX ADMIN — WHITE PETROLATUM,ZINC OXIDE 1 DOSE: 57; 17 PASTE TOPICAL at 19:36

## 2025-02-23 RX ADMIN — SODIUM CHLORIDE, PRESERVATIVE FREE 10 ML: 5 INJECTION INTRAVENOUS at 19:36

## 2025-02-23 RX ADMIN — IPRATROPIUM BROMIDE AND ALBUTEROL SULFATE 1 DOSE: 2.5; .5 SOLUTION RESPIRATORY (INHALATION) at 19:58

## 2025-02-23 RX ADMIN — SODIUM CHLORIDE, PRESERVATIVE FREE 10 ML: 5 INJECTION INTRAVENOUS at 09:28

## 2025-02-23 RX ADMIN — Medication 4 ML: at 06:14

## 2025-02-23 RX ADMIN — LANSOPRAZOLE 30 MG: 30 TABLET, ORALLY DISINTEGRATING ORAL at 05:19

## 2025-02-23 RX ADMIN — IPRATROPIUM BROMIDE AND ALBUTEROL SULFATE 1 DOSE: 2.5; .5 SOLUTION RESPIRATORY (INHALATION) at 05:54

## 2025-02-23 RX ADMIN — BUPROPION HYDROCHLORIDE 75 MG: 75 TABLET, FILM COATED ORAL at 09:27

## 2025-02-23 RX ADMIN — DOCUSATE SODIUM 100 MG: 50 LIQUID ORAL at 09:27

## 2025-02-23 RX ADMIN — CINACALCET HYDROCHLORIDE 30 MG: 30 TABLET, FILM COATED ORAL at 09:27

## 2025-02-23 ASSESSMENT — PULMONARY FUNCTION TESTS
PIF_VALUE: 24
PIF_VALUE: 21
PIF_VALUE: 24
PIF_VALUE: 25
PIF_VALUE: 22
PIF_VALUE: 27
PIF_VALUE: 27
PIF_VALUE: 26
PIF_VALUE: 26
PIF_VALUE: 23
PIF_VALUE: 25
PIF_VALUE: 24
PIF_VALUE: 29
PIF_VALUE: 30
PIF_VALUE: 23
PIF_VALUE: 22
PIF_VALUE: 23
PIF_VALUE: 24
PIF_VALUE: 24
PIF_VALUE: 20
PIF_VALUE: 27
PIF_VALUE: 38
PIF_VALUE: 27
PIF_VALUE: 25
PIF_VALUE: 22

## 2025-02-23 ASSESSMENT — PAIN SCALES - GENERAL
PAINLEVEL_OUTOF10: 0

## 2025-02-23 NOTE — PROGRESS NOTES
Renal Progress Note    Patient: Nida Graves MRN: [id number]  SSN: [id number]    YOB: 1975  Age: 49 y.o.  Sex: female      Admit Date: 1/12/2025    LOS: 42 days     Subjective:   Patient seen in ICU.  sedated, no acute distress.  Status post bronchscopy today for left lung opacification with ? Mucuc plugs  tracheostomy, remains on vent  Edema +, Repeat labs showed improvement in creatinine to 1.18, repeat potassium is 4.4      Current Facility-Administered Medications   Medication Dose Route Frequency    midazolam PF (VERSED) injection 2 mg  2 mg IntraVENous Once    midazolam (VERSED) 2 MG/2ML injection        tranexamic acid (CYKLOKAPRON) injection 500 mg  500 mg Nebulization BID    ipratropium 0.5 mg-albuterol 2.5 mg (DUONEB) nebulizer solution 1 Dose  1 Dose Inhalation 4x Daily RT    docusate (COLACE) 50 MG/5ML liquid 100 mg  100 mg Oral Daily    epoetin itz-epbx (RETACRIT) injection 2,500 Units  2,500 Units SubCUTAneous Once per day on Monday Wednesday Friday    lansoprazole (PREVACID SOLUTAB) disintegrating tablet 30 mg  30 mg Per G Tube QAM AC    aspirin chewable tablet 81 mg  81 mg Per G Tube Daily    atorvastatin (LIPITOR) tablet 20 mg  20 mg Per G Tube Nightly    buPROPion (WELLBUTRIN) tablet 75 mg  75 mg Per G Tube BID    folic acid (FOLVITE) tablet 1 mg  1 mg Per G Tube Daily    midodrine (PROAMATINE) tablet 10 mg  10 mg Per G Tube TID    venlafaxine (EFFEXOR) tablet 37.5 mg  37.5 mg Per G Tube BID WC    acetaminophen (TYLENOL) 160 MG/5ML solution 650 mg  650 mg Per G Tube Q6H PRN    Or    acetaminophen (TYLENOL) suppository 650 mg  650 mg Rectal Q6H PRN    hydrOXYzine HCl (ATARAX) tablet 25 mg  25 mg Per G Tube TID PRN    magnesium hydroxide (MILK OF MAGNESIA) 400 MG/5ML suspension 30 mL  30 mL Per G Tube Q6H PRN    polyethylene glycol (GLYCOLAX) packet 17 g  17 g Per G Tube Daily PRN    senna (SENOKOT) tablet 8.6 mg  1 tablet Per G Tube Daily PRN    cinacalcet (SENSIPAR) tablet 30 mg   nontender, normal BS.  Extremeties: no cyanosis, mild dependent edema in LEs,   Neuro: sedated for bronch today    Skin: normal skin turgor, no skin rashes.        Intake and Output:  Current Shift: 02/23 0701 - 02/23 1900  In: 30   Out: 150   Last three shifts: 02/21 1901 - 02/23 0700  In: 1740   Out: 2800 [Urine:2800]      Lab/Data Review:  Recent Labs     02/21/25  1140 02/22/25  0310 02/23/25  0251   WBC 10.6 11.2* 10.6   HGB 7.2* 7.0* 7.0*   HCT 23.8* 23.2* 23.6*    348 348     Recent Labs     02/21/25  0410 02/22/25  0310 02/23/25  0251    138 140   K 5.4* 5.3* 4.4    103 102   CO2 31 32 33*   GLUCOSE 104* 94 88   BUN 57* 53* 55*   CREATININE 1.26* 1.23* 1.18*   CALCIUM 8.6 8.5 8.6   MG 2.0  --  1.9   PHOS 3.2 4.2  --    BILITOT  --   --  1.4*   AST  --   --  86*   ALT  --   --  64     No results for input(s): \"PHART\", \"PHJ9HDR\", \"PO2ART\", \"BHJ4MHQ\", \"BEART\", \"HKK4KWGH\", \"HGBART\", \"DP9RMXNZP\", \"FIO2A\", \"Q4HIUHSM\", \"OXYHEM\", \"CARBOXHGBART\", \"METHGBART\", \"B1YPSSRVF\", \"PHCORART\", \"TEMP\" in the last 72 hours.    Invalid input(s): \"UQG8SFJSG\"  Recent Results (from the past 24 hour(s))   POCT Glucose    Collection Time: 02/22/25  5:39 PM   Result Value Ref Range    POC Glucose 143 (H) 65 - 100 mg/dL    Performed by: Jacobo Carlisle    POCT Glucose    Collection Time: 02/23/25 12:13 AM   Result Value Ref Range    POC Glucose 131 (H) 65 - 100 mg/dL    Performed by: LIDIA Claros    CBC with Auto Differential    Collection Time: 02/23/25  2:51 AM   Result Value Ref Range    WBC 10.6 3.6 - 11.0 K/uL    RBC 2.43 (L) 3.80 - 5.20 M/uL    Hemoglobin 7.0 (L) 11.5 - 16.0 g/dL    Hematocrit 23.6 (L) 35.0 - 47.0 %    MCV 97.1 80.0 - 99.0 FL    MCH 28.8 26.0 - 34.0 PG    MCHC 29.7 (L) 30.0 - 36.5 g/dL    RDW 19.1 (H) 11.5 - 14.5 %    Platelets 348 150 - 400 K/uL    MPV 9.7 8.9 - 12.9 FL    Nucleated RBCs 0.0 0.0  WBC    nRBC 0.00 0.00 - 0.01 K/uL    Neutrophils % 65.2 32.0 - 75.0 %    Lymphocytes % 18.8

## 2025-02-23 NOTE — PROGRESS NOTES
Post bronc x-ray has shown some improvement  We will continue to monitor  Chest PT is added    Follow-up with x-ray tomorrow

## 2025-02-23 NOTE — PLAN OF CARE
PHYSICAL THERAPY TREATMENT     Patient: Nida Graves (49 y.o. female)  Date: 2/23/2025  Diagnosis: Atrial fibrillation, unspecified type (HCC) [I48.91]  Atrial flutter, unspecified type (HCC) [I48.92]  Congestive heart failure, unspecified HF chronicity, unspecified heart failure type (HCC) [I50.9]  Acute hypoxic respiratory failure (HCC) [J96.01] Acute on chronic hypoxic respiratory failure (HCC)  Procedure(s) (LRB):  ESOPHAGOGASTRODUODENOSCOPY PERCUTANEOUS ENDOSCOPIC GASTROSTOMY TUBE PLACEMENT (N/A) 10 Days Post-Op  Precautions: Fall Risk, General Precautions                      Recommendations for nursing mobility: Encourage HEP in prep for ADLs/mobility; see handout for details, Frequent repositioning to prevent skin breakdown, LE elevation for management of edema, and Assist x2    In place during session: Tracheostomy with vent support, External Catheter, and ICU vital sign monitoring   Chart, physical therapy assessment, plan of care and goals were reviewed.  ASSESSMENT  Patient continues with skilled PT services and is progressing towards goals. Pt supine upon PT arrival, agreeable to session. Pt A&O x 4. (See below for objective details and assist levels).     Overall pt tolerated session well today with PT. Participated with LE stretching and exercise. Tolerated added exercises well with rest breaks incorporated t/o tx. Unable to progress to EOB sitting this tx due to needing Ax2 and no additional assistance available at this time.  Will continue to benefit from skilled PT services, and will continue to progress as tolerated. Current PT DC recommendation LTAC once medically appropriate.     Start of Session End of Session   SPO2 (%) 93 95   Heart Rate (BPM) 68 65     GOALS:    Problem: Physical Therapy - Adult  Goal: By Discharge: Performs mobility at highest level of function for planned discharge setting.  See evaluation for individualized goals.  Description: FUNCTIONAL STATUS PRIOR TO  [] [] [] []       [] [] [] []       Pain Ratin/10  reported  Pain Intervention(s):       Activity Tolerance:   Fair     After treatment patient left in no apparent distress:   Bed locked and returned to lowest position, Patient left in no apparent distress in bed, Call bell within reach, and Side rails x3, and nsg updated     COMMUNICATION/COLLABORATION:   The patient’s plan of care was discussed with: Registered nurse    Patient Education  Education Given To: Patient  Education Provided: Role of Therapy;Plan of Care;Home Exercise Program  Education Method: Demonstration;Verbal  Barriers to Learning: None  Education Outcome: Verbalized understanding;Continued education needed        Clarita Barbosa, PTA  Minutes: 34

## 2025-02-23 NOTE — PROGRESS NOTES
CRITICAL CARE PROGRESS NOTE    Name: Nida Graves   : 1975   MRN: [id number]   Date: 2025      Diagnoses/problem list:   Acute hypoxic and hypercapnic respiratory failure  Acute kidney injury  Atrial flutter with slow ventricular response  Complete heart block  GI bleed  Nosebleed, resolved  NSTEMI  Symptomatic bradycardia  Biventricular failure  Acute HFrEF, 25 to 30%  Diabetes  Morbid obesity  Acute metabolic/septic encephalopathy, improved  Deconditioning    24-hour events:   1 more episode of mucus plugging that is easily relieved with in-line suctioning. Continue mucolytic nebs. Weaning ventilator support as tolerated.   Pt continues to await acceptance to LTAC where she would undoubtedly have the most medical benefit with clear indication given her long-term ventilator requirement and deconditioning.  Slow improvement in creatinine noted    Assessment and plan:  -Chest x-ray shows left-side complete opacification/atelectasis  -Nursing has reported some mucous plugging  -Will plan bronchoscopy today  -Add chest PT  -Continue pressure support with PEEP as tolerated after the bronchus  -Will plan pressure support with PEEP for respiratory muscle fatigue  -Pressure support of 17 with PEEP of 8  -Placed back on ventilator if patient is tired/chest increased respiratory distress  -Continue other measures as outlined below  -Awaiting LTAC placement   Ms. Graves is a 49-year-old female with PMH chronic debility and bedbound status, diabetes, asthma, obesity, who presented for shortness of breath.  Shortness of breath worsening over the past few days.  Upon presentation ED patient found to be hypoxic necessita.  Ting O2 via NC.  She was also found to be in A-fib with slow ventricular response.  She was transferred out of ICU on . Early morning  upgraded to ICU again due to severe hypoxia and hypercapnia requiring intubation. Now s/p trach on . Leadless pacemaker . PEG     CNS / MSK:   Vanco and Zosyn for CAP  Completed 7 day course of Cefepime on 1/27 for possible HAP  MRSA PCR negative on 1/28    ICU DAILY CHECKLIST     Code Status: DNR  DVT Prophylaxis: SCDs, eliquis  SUP: Pepcid  T/L/D: PIV  Diet: Tube feeds  Activity Level: Bedrest  ABCDEF Bundle/Checklist Completed: Yes  Disposition: Stay in ICU  Multidisciplinary Rounds Completed:  Yes    OBJECTIVE:     Blood pressure (!) 101/56, pulse 65, temperature 98.8 °F (37.1 °C), temperature source Oral, resp. rate 17, height 1.702 m (5' 7.01\"), weight (!) 160.7 kg (354 lb 4.5 oz), SpO2 95%.  Physical Exam  Gen: On MV via trach, NAD, A&Ox3  HEENT: NC/AT, supple  Cardiac: Paced at 60bpm  Pulm: ronchus bilaterally, but no respiratory distress  ABD: Soft, mildly distended, non tender, PEG tube in place  Extremities: Mild LE edema noted. Right groin site from pacemaker with small amount of fat necrosis. No overt signs of infection  Neuro: Following commands    Labs and Data: Reviewed 02/23/25  Medications: Reviewed 02/23/25  Imaging: Reviewed 02/23/25    Intake/Output:     Intake/Output Summary (Last 24 hours) at 2/23/2025 1139  Last data filed at 2/23/2025 0941  Gross per 24 hour   Intake 1250 ml   Output 1850 ml   Net -600 ml       CRITICAL CARE DOCUMENTATION  I had a face to face encounter with the patient, reviewed and interpreted patient data including clinical events, labs, images, vital signs, I/O's, and examined patient.  I have discussed the case and the plan and management of the patient's care with the consulting services, the bedside nurses and the respiratory therapist.      NOTE OF PERSONAL INVOLVEMENT IN CARE   This patient has a high probability of imminent, clinically significant deterioration, which requires the highest level of preparedness to intervene urgently. I participated in the decision-making and personally managed or directed the management of the following life and organ supporting interventions that required my frequent

## 2025-02-23 NOTE — PROCEDURES
Heike Shahid M.D.  Pulmonary Critical Care & Sleep Medicine     Name: Nida Graves MRN: [id number]   : 1975 Hospital: [facility]   Date: 2025        Bronchoscopy Report    Procedure: Therapeutic bronchoscopy.    Indication: Abnormal chest imaging    Consent/Treatment: Informed consent was obtained from the  patient after risks, benefits and alternatives were explained. Timeout verified the correct patient and correct procedure.     Anesthesia:   Patient is intubated, received 4 mg morphine and 2 mg Versed for procedure    Procedure Details:   -- The bronchoscope was introduced through tracheostomy tube.   -- The trachea and milton were completely inspected and were found to be normal.  -- The right-sided endobronchial anatomy was completely inspected and was found to be normal.  -- The left-sided endobronchial anatomy was normal however a lot of thick bloody secretions were noted.  All the bloody secretions were removed, before withholding the bronchoscope no significant bleeding or secretions noted.    Specimens:   None    Rapid On-Site Evaluation: Mucous plugging due to thick bloody secretions    Complications: None    Estimated Blood Loss: Minimal    PLAN:  Chest x-ray reviewed no pneumothorax  Chest x-ray shows slight improvement in the left-sided atelectasis  Monitor with serial x-rays  Add TXA nebs  Hold Eliquis  Discussed with nursing  Recheck CBC      Heike Shahid M.D  [facility] [place] [facility]      2025  12:56 PM

## 2025-02-24 ENCOUNTER — APPOINTMENT (OUTPATIENT)
Facility: HOSPITAL | Age: 50
DRG: 004 | End: 2025-02-24
Payer: COMMERCIAL

## 2025-02-24 LAB
ALBUMIN SERPL-MCNC: 2.4 G/DL (ref 3.5–5)
ALBUMIN/GLOB SERPL: 0.6 (ref 1.1–2.2)
ALP SERPL-CCNC: 432 U/L (ref 45–117)
ALT SERPL-CCNC: 68 U/L (ref 12–78)
ANION GAP SERPL CALC-SCNC: 4 MMOL/L (ref 2–12)
AST SERPL W P-5'-P-CCNC: 83 U/L (ref 15–37)
BASOPHILS # BLD: 0.04 K/UL (ref 0–0.1)
BASOPHILS NFR BLD: 0.4 % (ref 0–1)
BILIRUB SERPL-MCNC: 1.5 MG/DL (ref 0.2–1)
BUN SERPL-MCNC: 50 MG/DL (ref 6–20)
BUN/CREAT SERPL: 51 (ref 12–20)
CA-I BLD-MCNC: 8.2 MG/DL (ref 8.5–10.1)
CHLORIDE SERPL-SCNC: 105 MMOL/L (ref 97–108)
CO2 SERPL-SCNC: 29 MMOL/L (ref 21–32)
CREAT SERPL-MCNC: 0.99 MG/DL (ref 0.55–1.02)
DIFFERENTIAL METHOD BLD: ABNORMAL
EOSINOPHIL # BLD: 0.32 K/UL (ref 0–0.4)
EOSINOPHIL NFR BLD: 3.5 % (ref 0–7)
ERYTHROCYTE [DISTWIDTH] IN BLOOD BY AUTOMATED COUNT: 19.4 % (ref 11.5–14.5)
GLOBULIN SER CALC-MCNC: 4.1 G/DL (ref 2–4)
GLUCOSE BLD STRIP.AUTO-MCNC: 100 MG/DL (ref 65–100)
GLUCOSE BLD STRIP.AUTO-MCNC: 106 MG/DL (ref 65–100)
GLUCOSE BLD STRIP.AUTO-MCNC: 94 MG/DL (ref 65–100)
GLUCOSE SERPL-MCNC: 70 MG/DL (ref 65–100)
HCT VFR BLD AUTO: 22.5 % (ref 35–47)
HCT VFR BLD AUTO: 25 % (ref 35–47)
HGB BLD-MCNC: 6.7 G/DL (ref 11.5–16)
HGB BLD-MCNC: 7.8 G/DL (ref 11.5–16)
IMM GRANULOCYTES # BLD AUTO: 0.06 K/UL (ref 0–0.04)
IMM GRANULOCYTES NFR BLD AUTO: 0.7 % (ref 0–0.5)
LYMPHOCYTES # BLD: 2.05 K/UL (ref 0.8–3.5)
LYMPHOCYTES NFR BLD: 22.5 % (ref 12–49)
MAGNESIUM SERPL-MCNC: 1.8 MG/DL (ref 1.6–2.4)
MCH RBC QN AUTO: 28.8 PG (ref 26–34)
MCHC RBC AUTO-ENTMCNC: 29.8 G/DL (ref 30–36.5)
MCV RBC AUTO: 96.6 FL (ref 80–99)
MONOCYTES # BLD: 0.95 K/UL (ref 0–1)
MONOCYTES NFR BLD: 10.4 % (ref 5–13)
NEUTS SEG # BLD: 5.69 K/UL (ref 1.8–8)
NEUTS SEG NFR BLD: 62.5 % (ref 32–75)
NRBC # BLD: 0 K/UL (ref 0–0.01)
NRBC BLD-RTO: 0 PER 100 WBC
PERFORMED BY:: ABNORMAL
PERFORMED BY:: NORMAL
PERFORMED BY:: NORMAL
PLATELET # BLD AUTO: 358 K/UL (ref 150–400)
PMV BLD AUTO: 10 FL (ref 8.9–12.9)
POTASSIUM SERPL-SCNC: 4.2 MMOL/L (ref 3.5–5.1)
PROT SERPL-MCNC: 6.5 G/DL (ref 6.4–8.2)
RBC # BLD AUTO: 2.33 M/UL (ref 3.8–5.2)
SODIUM SERPL-SCNC: 138 MMOL/L (ref 136–145)
WBC # BLD AUTO: 9.1 K/UL (ref 3.6–11)

## 2025-02-24 PROCEDURE — 36430 TRANSFUSION BLD/BLD COMPNT: CPT

## 2025-02-24 PROCEDURE — P9016 RBC LEUKOCYTES REDUCED: HCPCS

## 2025-02-24 PROCEDURE — 2000000000 HC ICU R&B

## 2025-02-24 PROCEDURE — 94669 MECHANICAL CHEST WALL OSCILL: CPT

## 2025-02-24 PROCEDURE — 36415 COLL VENOUS BLD VENIPUNCTURE: CPT

## 2025-02-24 PROCEDURE — 85014 HEMATOCRIT: CPT

## 2025-02-24 PROCEDURE — 6370000000 HC RX 637 (ALT 250 FOR IP): Performed by: NURSE PRACTITIONER

## 2025-02-24 PROCEDURE — 6370000000 HC RX 637 (ALT 250 FOR IP): Performed by: STUDENT IN AN ORGANIZED HEALTH CARE EDUCATION/TRAINING PROGRAM

## 2025-02-24 PROCEDURE — 6360000002 HC RX W HCPCS: Performed by: STUDENT IN AN ORGANIZED HEALTH CARE EDUCATION/TRAINING PROGRAM

## 2025-02-24 PROCEDURE — 94003 VENT MGMT INPAT SUBQ DAY: CPT

## 2025-02-24 PROCEDURE — 2500000003 HC RX 250 WO HCPCS: Performed by: STUDENT IN AN ORGANIZED HEALTH CARE EDUCATION/TRAINING PROGRAM

## 2025-02-24 PROCEDURE — 94640 AIRWAY INHALATION TREATMENT: CPT

## 2025-02-24 PROCEDURE — 85018 HEMOGLOBIN: CPT

## 2025-02-24 PROCEDURE — 83735 ASSAY OF MAGNESIUM: CPT

## 2025-02-24 PROCEDURE — 76705 ECHO EXAM OF ABDOMEN: CPT

## 2025-02-24 PROCEDURE — 85025 COMPLETE CBC W/AUTO DIFF WBC: CPT

## 2025-02-24 PROCEDURE — 80053 COMPREHEN METABOLIC PANEL: CPT

## 2025-02-24 PROCEDURE — 71045 X-RAY EXAM CHEST 1 VIEW: CPT

## 2025-02-24 PROCEDURE — 2500000003 HC RX 250 WO HCPCS: Performed by: INTERNAL MEDICINE

## 2025-02-24 PROCEDURE — 82962 GLUCOSE BLOOD TEST: CPT

## 2025-02-24 RX ORDER — SODIUM CHLORIDE 9 MG/ML
INJECTION, SOLUTION INTRAVENOUS PRN
Status: DISCONTINUED | OUTPATIENT
Start: 2025-02-24 | End: 2025-03-10

## 2025-02-24 RX ADMIN — IPRATROPIUM BROMIDE AND ALBUTEROL SULFATE 1 DOSE: 2.5; .5 SOLUTION RESPIRATORY (INHALATION) at 11:09

## 2025-02-24 RX ADMIN — FOLIC ACID 1 MG: 1 TABLET ORAL at 07:52

## 2025-02-24 RX ADMIN — IPRATROPIUM BROMIDE AND ALBUTEROL SULFATE 1 DOSE: 2.5; .5 SOLUTION RESPIRATORY (INHALATION) at 19:31

## 2025-02-24 RX ADMIN — CINACALCET HYDROCHLORIDE 30 MG: 30 TABLET, FILM COATED ORAL at 07:52

## 2025-02-24 RX ADMIN — LANSOPRAZOLE 30 MG: 30 TABLET, ORALLY DISINTEGRATING ORAL at 07:52

## 2025-02-24 RX ADMIN — SODIUM CHLORIDE, PRESERVATIVE FREE 10 ML: 5 INJECTION INTRAVENOUS at 21:23

## 2025-02-24 RX ADMIN — VENLAFAXINE 37.5 MG: 25 TABLET ORAL at 15:07

## 2025-02-24 RX ADMIN — MIDODRINE HYDROCHLORIDE 10 MG: 5 TABLET ORAL at 07:52

## 2025-02-24 RX ADMIN — BUDESONIDE 500 MCG: 0.5 INHALANT ORAL at 19:31

## 2025-02-24 RX ADMIN — TRANEXAMIC ACID 500 MG: 100 INJECTION INTRAVENOUS at 21:22

## 2025-02-24 RX ADMIN — BUPROPION HYDROCHLORIDE 75 MG: 75 TABLET, FILM COATED ORAL at 07:53

## 2025-02-24 RX ADMIN — IPRATROPIUM BROMIDE AND ALBUTEROL SULFATE 1 DOSE: 2.5; .5 SOLUTION RESPIRATORY (INHALATION) at 05:59

## 2025-02-24 RX ADMIN — VENLAFAXINE 37.5 MG: 25 TABLET ORAL at 07:53

## 2025-02-24 RX ADMIN — MIDODRINE HYDROCHLORIDE 10 MG: 5 TABLET ORAL at 15:07

## 2025-02-24 RX ADMIN — IPRATROPIUM BROMIDE AND ALBUTEROL SULFATE 1 DOSE: 2.5; .5 SOLUTION RESPIRATORY (INHALATION) at 15:08

## 2025-02-24 RX ADMIN — SODIUM CHLORIDE, PRESERVATIVE FREE 10 ML: 5 INJECTION INTRAVENOUS at 07:54

## 2025-02-24 RX ADMIN — BUDESONIDE 500 MCG: 0.5 INHALANT ORAL at 05:59

## 2025-02-24 RX ADMIN — MIDODRINE HYDROCHLORIDE 10 MG: 5 TABLET ORAL at 21:22

## 2025-02-24 RX ADMIN — BUPROPION HYDROCHLORIDE 75 MG: 75 TABLET, FILM COATED ORAL at 21:22

## 2025-02-24 RX ADMIN — WHITE PETROLATUM,ZINC OXIDE: 57; 17 PASTE TOPICAL at 07:54

## 2025-02-24 RX ADMIN — WHITE PETROLATUM,ZINC OXIDE: 57; 17 PASTE TOPICAL at 21:23

## 2025-02-24 RX ADMIN — DOCUSATE SODIUM 100 MG: 50 LIQUID ORAL at 07:52

## 2025-02-24 RX ADMIN — ASPIRIN 81 MG 81 MG: 81 TABLET ORAL at 07:53

## 2025-02-24 RX ADMIN — ATORVASTATIN CALCIUM 20 MG: 20 TABLET, FILM COATED ORAL at 21:22

## 2025-02-24 RX ADMIN — EPOETIN ALFA-EPBX 2500 UNITS: 3000 INJECTION, SOLUTION INTRAVENOUS; SUBCUTANEOUS at 15:08

## 2025-02-24 RX ADMIN — TRANEXAMIC ACID 500 MG: 100 INJECTION INTRAVENOUS at 08:59

## 2025-02-24 ASSESSMENT — PULMONARY FUNCTION TESTS
PIF_VALUE: 22
PIF_VALUE: 17
PIF_VALUE: 21
PIF_VALUE: 20
PIF_VALUE: 16
PIF_VALUE: 20
PIF_VALUE: 20
PIF_VALUE: 21
PIF_VALUE: 25
PIF_VALUE: 19
PIF_VALUE: 20
PIF_VALUE: 27
PIF_VALUE: 21
PIF_VALUE: 19
PIF_VALUE: 23
PIF_VALUE: 21

## 2025-02-24 ASSESSMENT — PAIN SCALES - GENERAL
PAINLEVEL_OUTOF10: 0
PAINLEVEL_OUTOF10: 0

## 2025-02-24 NOTE — PROGRESS NOTES
PT tx attempted at 1425 however per chart review pt suffered complete white out of left lung s/p bronchoscopy  to remove bloody secretions and blood clots, receiving blood transfusion due to drop in hemoglobin. Will continue to follow patient and attempt PT at a later time. Thank you.

## 2025-02-24 NOTE — PROGRESS NOTES
CRITICAL CARE PROGRESS NOTE    Name: Nida Graves   : 1975   MRN: [id number]   Date: 2025      Diagnoses/problem list:   Acute hypoxic and hypercapnic respiratory failure  Acute kidney injury  Atrial flutter with slow ventricular response  Complete heart block  GI bleed  Nosebleed, resolved  NSTEMI  Symptomatic bradycardia  Biventricular failure  Acute HFrEF, 25 to 30%  Diabetes  Morbid obesity  Acute metabolic/septic encephalopathy, improved  Deconditioning    24-hour events:   Yesterday patient had complete whiteout of the left side of the lung, status post bronchoscopy which showed a lot of bloody secretion and blood clots, which were removed, previous x-ray shows marked improvement, oxygen requirement has also improved, hemoglobin slightly dropped to 6.71 unit of PRBCs ordered, lab docs adjusted that patient has elevated bilirubin and elevated alkaline phosphatase  Pt continues to await acceptance to LTAC where she would undoubtedly have the most medical benefit with clear indication given her long-term ventilator requirement and deconditioning.  Slow improvement in creatinine noted    Assessment and plan:  - Eliquis on hold  -TXA nebs  -1 unit of PRBC  -Right upper quadrant sono  -Continue to monitor for any hemoptysis or significant bleeding  -Add chest PT  -Continue pressure support with PEEP as tolerated after the bronchus  -Will plan pressure support with PEEP for respiratory muscle fatigue  -Pressure support of 17 with PEEP of 8  -Placed back on ventilator if patient is tired/chest increased respiratory distress  -Continue other measures as outlined below  -Awaiting LTAC placement   Ms. Graves is a 49-year-old female with PMH chronic debility and bedbound status, diabetes, asthma, obesity, who presented for shortness of breath.  Shortness of breath worsening over the past few days.  Upon presentation ED patient found to be hypoxic necessita.  Ting O2 via NC.  She was also found to be in  respiratory therapist.      NOTE OF PERSONAL INVOLVEMENT IN CARE   This patient has a high probability of imminent, clinically significant deterioration, which requires the highest level of preparedness to intervene urgently. I participated in the decision-making and personally managed or directed the management of the following life and organ supporting interventions that required my frequent assessment to treat or prevent imminent deterioration.    I personally spent 35 minutes of critical care time.  This is time spent at this critically ill patient's bedside actively involved in patient care as well as the coordination of care.  This does not include any procedural time which has been billed separately.      Heike Shahid M.D      02/24/25

## 2025-02-24 NOTE — CARE COORDINATION
CM reviewed Pt medicals, CM has sent referrals to [facility] and [facility] and [facility].    CM will call Pt daughter to get permission to send to other facility that can accept a trach and vent.     9:14  CM sent updated medicals to [facility] and [facility].     Per IDR    No c/o pain, going to do a SBT.    No sedation, A&O x4    Pt getting blood

## 2025-02-24 NOTE — PROGRESS NOTES
Renal Progress Note    Patient: Nida Graves MRN: [id number]  SSN: [id number]    YOB: 1975  Age: 49 y.o.  Sex: female      Admit Date: 1/12/2025    LOS: 43 days     Subjective:   Patient seen in ICU.  sedated, no acute distress.  Status post bronchscopy yesterday for left lung opacification with ? Mucuc plugs  tracheostomy, remains on vent  Edema +, Repeat labs showed improvement in creatinine to 0.9 repeat potassium is 4.2      Current Facility-Administered Medications   Medication Dose Route Frequency    0.9 % sodium chloride infusion   IntraVENous PRN    tranexamic acid (CYKLOKAPRON) injection 500 mg  500 mg Nebulization BID    influenza split vaccine (PF) (AFLURIA;FLUARIX) injection 0.5 mL  1 Dose IntraMUSCular Prior to discharge    ipratropium 0.5 mg-albuterol 2.5 mg (DUONEB) nebulizer solution 1 Dose  1 Dose Inhalation 4x Daily RT    docusate (COLACE) 50 MG/5ML liquid 100 mg  100 mg Oral Daily    epoetin itz-epbx (RETACRIT) injection 2,500 Units  2,500 Units SubCUTAneous Once per day on Monday Wednesday Friday    lansoprazole (PREVACID SOLUTAB) disintegrating tablet 30 mg  30 mg Per G Tube QAM AC    aspirin chewable tablet 81 mg  81 mg Per G Tube Daily    atorvastatin (LIPITOR) tablet 20 mg  20 mg Per G Tube Nightly    buPROPion (WELLBUTRIN) tablet 75 mg  75 mg Per G Tube BID    folic acid (FOLVITE) tablet 1 mg  1 mg Per G Tube Daily    midodrine (PROAMATINE) tablet 10 mg  10 mg Per G Tube TID    venlafaxine (EFFEXOR) tablet 37.5 mg  37.5 mg Per G Tube BID WC    acetaminophen (TYLENOL) 160 MG/5ML solution 650 mg  650 mg Per G Tube Q6H PRN    Or    acetaminophen (TYLENOL) suppository 650 mg  650 mg Rectal Q6H PRN    hydrOXYzine HCl (ATARAX) tablet 25 mg  25 mg Per G Tube TID PRN    magnesium hydroxide (MILK OF MAGNESIA) 400 MG/5ML suspension 30 mL  30 mL Per G Tube Q6H PRN    polyethylene glycol (GLYCOLAX) packet 17 g  17 g Per G Tube Daily PRN    senna (SENOKOT) tablet 8.6 mg  1 tablet Per G  sounds normal, regular rate and rhythm  GI: soft, nontender, normal BS.  Extremeties: no cyanosis, mild dependent edema in LEs,   Neuro: sedated for bronch today    Skin: normal skin turgor, no skin rashes.        Intake and Output:  Current Shift: 02/24 0701 - 02/24 1900  In: 280   Out: 0   Last three shifts: 02/22 1901 - 02/24 0700  In: 2975 [I.V.:10]  Out: 2452 [Urine:2300]      Lab/Data Review:  Recent Labs     02/23/25  0251 02/23/25  1533 02/24/25  0200   WBC 10.6 9.2 9.1   HGB 7.0* 7.0* 6.7*   HCT 23.6* 23.0* 22.5*    353 358     Recent Labs     02/22/25  0310 02/23/25  0251 02/24/25  0200    140 138   K 5.3* 4.4 4.2    102 105   CO2 32 33* 29   GLUCOSE 94 88 70   BUN 53* 55* 50*   CREATININE 1.23* 1.18* 0.99   CALCIUM 8.5 8.6 8.2*   MG  --  1.9 1.8   PHOS 4.2  --   --    BILITOT  --  1.4* 1.5*   AST  --  86* 83*   ALT  --  64 68     No results for input(s): \"PHART\", \"VWA8VGT\", \"PO2ART\", \"IZP0RGG\", \"BEART\", \"YBR4SPQU\", \"HGBART\", \"RF2XDPYKF\", \"FIO2A\", \"U6FUQHOJ\", \"OXYHEM\", \"CARBOXHGBART\", \"METHGBART\", \"J1GABEEIY\", \"PHCORART\", \"TEMP\" in the last 72 hours.    Invalid input(s): \"RKW7EDLLM\"  Recent Results (from the past 24 hour(s))   POCT Glucose    Collection Time: 02/23/25 11:23 AM   Result Value Ref Range    POC Glucose 118 (H) 65 - 100 mg/dL    Performed by: RAÚL RODAS    CBC with Auto Differential    Collection Time: 02/23/25  3:33 PM   Result Value Ref Range    WBC 9.2 3.6 - 11.0 K/uL    RBC 2.36 (L) 3.80 - 5.20 M/uL    Hemoglobin 7.0 (L) 11.5 - 16.0 g/dL    Hematocrit 23.0 (L) 35.0 - 47.0 %    MCV 97.5 80.0 - 99.0 FL    MCH 29.7 26.0 - 34.0 PG    MCHC 30.4 30.0 - 36.5 g/dL    RDW 19.5 (H) 11.5 - 14.5 %    Platelets 353 150 - 400 K/uL    MPV 9.7 8.9 - 12.9 FL    Nucleated RBCs 0.0 0.0  WBC    nRBC 0.00 0.00 - 0.01 K/uL    Neutrophils % 66.3 32.0 - 75.0 %    Lymphocytes % 18.7 12.0 - 49.0 %    Monocytes % 11.2 5.0 - 13.0 %    Eosinophils % 3.0 0.0 - 7.0 %    Basophils % 0.4 0.0

## 2025-02-24 NOTE — PLAN OF CARE
retention  Outcome: Progressing     Problem: Metabolic/Fluid and Electrolytes - Adult  Goal: Electrolytes maintained within normal limits  Outcome: Progressing  Goal: Hemodynamic stability and optimal renal function maintained  Outcome: Progressing     Problem: Musculoskeletal - Adult  Goal: Maintain proper alignment of affected body part  Outcome: Progressing     Problem: Spiritual Care  Goal: Pt/Family able to move forward in process of forgiving self, others, and/or higher power  Description: INTERVENTIONS:  1. Assist patient/family to overcome blocks to healing by use of spiritual practices (prayer, meditation, guided imagery, reiki, breath work, etc).  2. De-myth guilt and help patient/family identify possible irrational spiritual/cultural beliefs and values.  3. Explore possibilities of making amends & reconciliation with self, others, and/or a greater power.  4. Guide patient/family in identifying painful feelings of guilt.  5. Help patient/famiy explore and identify spiritual beliefs, cultural understandings or values that may help or hinder letting go of issue.  6. Help patient/family explore feelings of anger, bitterness, resentment.  7. Help patient/family identify and examine the situation in which these feelings are experienced.  8. Help patient/family identify destructive displacement of feelings onto other individuals.  9. Invite use of sacraments/rituals/ceremonies as appropriate (e.g. - confession, anointing, smudging).  10. Refer patient/family to formal counseling and/or to neno community for further support work.  Outcome: Progressing     Problem: Pain  Goal: Verbalizes/displays adequate comfort level or baseline comfort level  Outcome: Progressing     Problem: Skin/Tissue Integrity - Adult  Goal: Skin integrity remains intact  Outcome: Progressing  Goal: Incisions, wounds, or drain sites healing without S/S of infection  Outcome: Progressing  Goal: Oral mucous membranes remain intact  Outcome:  Progressing     Problem: Physical Therapy - Adult  Goal: By Discharge: Performs mobility at highest level of function for planned discharge setting.  See evaluation for individualized goals.  Description: FUNCTIONAL STATUS PRIOR TO ADMISSION: The patient  required minimal assistance for basic and instrumental ADLs.    HOME SUPPORT PRIOR TO ADMISSION: The patient lived with  and required minimal assistance for ADLs.    Physical Therapy Goals  Initiated 1/16/2025  Pt stated goal: Get ebtter  Pt will be I with LE HEP in 7 days.  Pt will perform bed mobility with Mod Assist in 7 days.  Pt to sit EOB x 5 min with fair sitting balance in 7 days.  Pt to perform sit to stand with max A x 2 in 7 days.  2/23/2025 0917 by Clarita Barbosa, PTA  Outcome: Progressing     Problem: Nutrition Deficit:  Goal: Optimize nutritional status  Outcome: Progressing     Problem: Neurosensory - Adult  Goal: Achieves maximal functionality and self care  Outcome: Not Progressing     Problem: Musculoskeletal - Adult  Goal: Return mobility to safest level of function  Outcome: Not Progressing  Goal: Return ADL status to a safe level of function  Outcome: Not Progressing

## 2025-02-25 ENCOUNTER — APPOINTMENT (OUTPATIENT)
Facility: HOSPITAL | Age: 50
DRG: 004 | End: 2025-02-25
Payer: COMMERCIAL

## 2025-02-25 LAB
ABO + RH BLD: NORMAL
ALBUMIN SERPL-MCNC: 2.5 G/DL (ref 3.5–5)
ALBUMIN/GLOB SERPL: 0.6 (ref 1.1–2.2)
ALP SERPL-CCNC: 445 U/L (ref 45–117)
ALT SERPL-CCNC: 67 U/L (ref 12–78)
ANION GAP SERPL CALC-SCNC: 8 MMOL/L (ref 2–12)
ARTERIAL PATENCY WRIST A: YES
AST SERPL W P-5'-P-CCNC: 77 U/L (ref 15–37)
BASE EXCESS BLDA CALC-SCNC: 5.3 MMOL/L (ref 0–3)
BASOPHILS # BLD: 0.06 K/UL (ref 0–0.1)
BASOPHILS NFR BLD: 0.6 % (ref 0–1)
BDY SITE: ABNORMAL
BILIRUB SERPL-MCNC: 1.8 MG/DL (ref 0.2–1)
BLD PROD TYP BPU: NORMAL
BLOOD BANK BLOOD PRODUCT EXPIRATION DATE: NORMAL
BLOOD BANK DISPENSE STATUS: NORMAL
BLOOD BANK ISBT PRODUCT BLOOD TYPE: 5100
BLOOD BANK PRODUCT CODE: NORMAL
BLOOD BANK UNIT TYPE AND RH: NORMAL
BLOOD GROUP ANTIBODIES SERPL: NEGATIVE
BODY TEMPERATURE: 97.2
BPU ID: NORMAL
BUN SERPL-MCNC: 45 MG/DL (ref 6–20)
BUN/CREAT SERPL: 42 (ref 12–20)
CA-I BLD-MCNC: 8.7 MG/DL (ref 8.5–10.1)
CHLORIDE SERPL-SCNC: 103 MMOL/L (ref 97–108)
CO2 SERPL-SCNC: 28 MMOL/L (ref 21–32)
COHGB MFR BLD: 0.3 % (ref 1–2)
CREAT SERPL-MCNC: 1.06 MG/DL (ref 0.55–1.02)
CROSSMATCH RESULT: NORMAL
DIFFERENTIAL METHOD BLD: ABNORMAL
EOSINOPHIL # BLD: 0.26 K/UL (ref 0–0.4)
EOSINOPHIL NFR BLD: 2.8 % (ref 0–7)
ERYTHROCYTE [DISTWIDTH] IN BLOOD BY AUTOMATED COUNT: 18.9 % (ref 11.5–14.5)
FIO2 ON VENT: 40 %
GLOBULIN SER CALC-MCNC: 4.5 G/DL (ref 2–4)
GLUCOSE BLD STRIP.AUTO-MCNC: 102 MG/DL (ref 65–100)
GLUCOSE BLD STRIP.AUTO-MCNC: 103 MG/DL (ref 65–100)
GLUCOSE BLD STRIP.AUTO-MCNC: 119 MG/DL (ref 65–100)
GLUCOSE BLD STRIP.AUTO-MCNC: 122 MG/DL (ref 65–100)
GLUCOSE BLD STRIP.AUTO-MCNC: 99 MG/DL (ref 65–100)
GLUCOSE SERPL-MCNC: 99 MG/DL (ref 65–100)
HCO3 BLDA-SCNC: 28 MMOL/L (ref 22–26)
HCT VFR BLD AUTO: 25.9 % (ref 35–47)
HGB BLD-MCNC: 8 G/DL (ref 11.5–16)
IMM GRANULOCYTES # BLD AUTO: 0.06 K/UL (ref 0–0.04)
IMM GRANULOCYTES NFR BLD AUTO: 0.6 % (ref 0–0.5)
LYMPHOCYTES # BLD: 1.9 K/UL (ref 0.8–3.5)
LYMPHOCYTES NFR BLD: 20.3 % (ref 12–49)
MCH RBC QN AUTO: 29.3 PG (ref 26–34)
MCHC RBC AUTO-ENTMCNC: 30.9 G/DL (ref 30–36.5)
MCV RBC AUTO: 94.9 FL (ref 80–99)
METHGB MFR BLD: 0.2 % (ref 0–1.4)
MONOCYTES # BLD: 0.91 K/UL (ref 0–1)
MONOCYTES NFR BLD: 9.7 % (ref 5–13)
NEUTS SEG # BLD: 6.17 K/UL (ref 1.8–8)
NEUTS SEG NFR BLD: 66 % (ref 32–75)
NRBC # BLD: 0 K/UL (ref 0–0.01)
NRBC BLD-RTO: 0 PER 100 WBC
OXYHGB MFR BLD: 95.4 % (ref 95–99)
PCO2 BLDA: 33 MMHG (ref 35–45)
PERFORMED BY:: ABNORMAL
PERFORMED BY:: NORMAL
PH BLDA: 7.55 (ref 7.35–7.45)
PLATELET # BLD AUTO: 362 K/UL (ref 150–400)
PMV BLD AUTO: 10.1 FL (ref 8.9–12.9)
PO2 BLDA: 78 MMHG (ref 80–100)
POTASSIUM SERPL-SCNC: 3.9 MMOL/L (ref 3.5–5.1)
PROT SERPL-MCNC: 7 G/DL (ref 6.4–8.2)
RBC # BLD AUTO: 2.73 M/UL (ref 3.8–5.2)
SAO2 % BLD: 96 % (ref 95–99)
SAO2% DEVICE SAO2% SENSOR NAME: ABNORMAL
SODIUM SERPL-SCNC: 139 MMOL/L (ref 136–145)
SPECIMEN EXP DATE BLD: NORMAL
SPECIMEN SITE: ABNORMAL
TRANSFUSION STATUS PATIENT QL: NORMAL
UNIT DIVISION: 0
UNIT ISSUE DATE/TIME: NORMAL
WBC # BLD AUTO: 9.4 K/UL (ref 3.6–11)

## 2025-02-25 PROCEDURE — 2000000000 HC ICU R&B

## 2025-02-25 PROCEDURE — 85025 COMPLETE CBC W/AUTO DIFF WBC: CPT

## 2025-02-25 PROCEDURE — 97530 THERAPEUTIC ACTIVITIES: CPT

## 2025-02-25 PROCEDURE — 71045 X-RAY EXAM CHEST 1 VIEW: CPT

## 2025-02-25 PROCEDURE — 2700000000 HC OXYGEN THERAPY PER DAY

## 2025-02-25 PROCEDURE — 80053 COMPREHEN METABOLIC PANEL: CPT

## 2025-02-25 PROCEDURE — 82803 BLOOD GASES ANY COMBINATION: CPT

## 2025-02-25 PROCEDURE — 2500000003 HC RX 250 WO HCPCS: Performed by: STUDENT IN AN ORGANIZED HEALTH CARE EDUCATION/TRAINING PROGRAM

## 2025-02-25 PROCEDURE — 6370000000 HC RX 637 (ALT 250 FOR IP): Performed by: NURSE PRACTITIONER

## 2025-02-25 PROCEDURE — 6370000000 HC RX 637 (ALT 250 FOR IP): Performed by: STUDENT IN AN ORGANIZED HEALTH CARE EDUCATION/TRAINING PROGRAM

## 2025-02-25 PROCEDURE — 2500000003 HC RX 250 WO HCPCS: Performed by: INTERNAL MEDICINE

## 2025-02-25 PROCEDURE — 94640 AIRWAY INHALATION TREATMENT: CPT

## 2025-02-25 PROCEDURE — 6360000002 HC RX W HCPCS: Performed by: STUDENT IN AN ORGANIZED HEALTH CARE EDUCATION/TRAINING PROGRAM

## 2025-02-25 PROCEDURE — 94003 VENT MGMT INPAT SUBQ DAY: CPT

## 2025-02-25 PROCEDURE — 36600 WITHDRAWAL OF ARTERIAL BLOOD: CPT

## 2025-02-25 PROCEDURE — 94669 MECHANICAL CHEST WALL OSCILL: CPT

## 2025-02-25 PROCEDURE — 36415 COLL VENOUS BLD VENIPUNCTURE: CPT

## 2025-02-25 PROCEDURE — 82962 GLUCOSE BLOOD TEST: CPT

## 2025-02-25 RX ORDER — INSULIN LISPRO 100 [IU]/ML
0-4 INJECTION, SOLUTION INTRAVENOUS; SUBCUTANEOUS
Status: DISCONTINUED | OUTPATIENT
Start: 2025-02-25 | End: 2025-03-06

## 2025-02-25 RX ADMIN — IPRATROPIUM BROMIDE AND ALBUTEROL SULFATE 1 DOSE: 2.5; .5 SOLUTION RESPIRATORY (INHALATION) at 06:02

## 2025-02-25 RX ADMIN — LANSOPRAZOLE 30 MG: 30 TABLET, ORALLY DISINTEGRATING ORAL at 06:16

## 2025-02-25 RX ADMIN — CINACALCET HYDROCHLORIDE 30 MG: 30 TABLET, FILM COATED ORAL at 09:02

## 2025-02-25 RX ADMIN — VENLAFAXINE 37.5 MG: 25 TABLET ORAL at 17:46

## 2025-02-25 RX ADMIN — SODIUM CHLORIDE, PRESERVATIVE FREE 10 ML: 5 INJECTION INTRAVENOUS at 09:03

## 2025-02-25 RX ADMIN — BUDESONIDE 500 MCG: 0.5 INHALANT ORAL at 19:42

## 2025-02-25 RX ADMIN — SODIUM CHLORIDE, PRESERVATIVE FREE 10 ML: 5 INJECTION INTRAVENOUS at 22:07

## 2025-02-25 RX ADMIN — WHITE PETROLATUM,ZINC OXIDE: 57; 17 PASTE TOPICAL at 22:07

## 2025-02-25 RX ADMIN — TRANEXAMIC ACID 500 MG: 100 INJECTION INTRAVENOUS at 07:44

## 2025-02-25 RX ADMIN — TRANEXAMIC ACID 500 MG: 100 INJECTION INTRAVENOUS at 21:20

## 2025-02-25 RX ADMIN — MIDODRINE HYDROCHLORIDE 10 MG: 5 TABLET ORAL at 17:46

## 2025-02-25 RX ADMIN — IPRATROPIUM BROMIDE AND ALBUTEROL SULFATE 1 DOSE: 2.5; .5 SOLUTION RESPIRATORY (INHALATION) at 19:42

## 2025-02-25 RX ADMIN — BUPROPION HYDROCHLORIDE 75 MG: 75 TABLET, FILM COATED ORAL at 22:07

## 2025-02-25 RX ADMIN — DOCUSATE SODIUM 100 MG: 50 LIQUID ORAL at 07:46

## 2025-02-25 RX ADMIN — BUPROPION HYDROCHLORIDE 75 MG: 75 TABLET, FILM COATED ORAL at 07:46

## 2025-02-25 RX ADMIN — BUDESONIDE 500 MCG: 0.5 INHALANT ORAL at 06:02

## 2025-02-25 RX ADMIN — ASPIRIN 81 MG 81 MG: 81 TABLET ORAL at 07:46

## 2025-02-25 RX ADMIN — VENLAFAXINE 37.5 MG: 25 TABLET ORAL at 07:46

## 2025-02-25 RX ADMIN — MIDODRINE HYDROCHLORIDE 10 MG: 5 TABLET ORAL at 07:46

## 2025-02-25 RX ADMIN — WHITE PETROLATUM,ZINC OXIDE: 57; 17 PASTE TOPICAL at 09:02

## 2025-02-25 RX ADMIN — FOLIC ACID 1 MG: 1 TABLET ORAL at 07:46

## 2025-02-25 RX ADMIN — IPRATROPIUM BROMIDE AND ALBUTEROL SULFATE 1 DOSE: 2.5; .5 SOLUTION RESPIRATORY (INHALATION) at 10:50

## 2025-02-25 RX ADMIN — ATORVASTATIN CALCIUM 20 MG: 20 TABLET, FILM COATED ORAL at 22:07

## 2025-02-25 RX ADMIN — IPRATROPIUM BROMIDE AND ALBUTEROL SULFATE 1 DOSE: 2.5; .5 SOLUTION RESPIRATORY (INHALATION) at 15:31

## 2025-02-25 ASSESSMENT — PULMONARY FUNCTION TESTS
PIF_VALUE: 21
PIF_VALUE: 19
PIF_VALUE: 20
PIF_VALUE: 26
PIF_VALUE: 21
PIF_VALUE: 17
PIF_VALUE: 20
PIF_VALUE: 33
PIF_VALUE: 20
PIF_VALUE: 21
PIF_VALUE: 23
PIF_VALUE: 20
PIF_VALUE: 14
PIF_VALUE: 24
PIF_VALUE: 39
PIF_VALUE: 17
PIF_VALUE: 20
PIF_VALUE: 24
PIF_VALUE: 19
PIF_VALUE: 22
PIF_VALUE: 26
PIF_VALUE: 21
PIF_VALUE: 20
PIF_VALUE: 25

## 2025-02-25 ASSESSMENT — PAIN SCALES - GENERAL
PAINLEVEL_OUTOF10: 0

## 2025-02-25 NOTE — PLAN OF CARE
Problem: ABCDS Injury Assessment  Goal: Absence of physical injury  Outcome: Progressing     Problem: Skin/Tissue Integrity  Goal: Absence of new skin breakdown  Description: 1.  Monitor for areas of redness and/or skin breakdown  2.  Assess vascular access sites hourly  3.  Every 4-6 hours minimum:  Change oxygen saturation probe site  4.  Every 4-6 hours:  If on nasal continuous positive airway pressure, respiratory therapy assess nares and determine need for appliance change or resting period.  Outcome: Progressing     Problem: Chronic Conditions and Co-morbidities  Goal: Patient's chronic conditions and co-morbidity symptoms are monitored and maintained or improved  Outcome: Progressing  Flowsheets (Taken 2/24/2025 2000)  Care Plan - Patient's Chronic Conditions and Co-Morbidity Symptoms are Monitored and Maintained or Improved: Monitor and assess patient's chronic conditions and comorbid symptoms for stability, deterioration, or improvement     Problem: Discharge Planning  Goal: Discharge to home or other facility with appropriate resources  Outcome: Progressing  Flowsheets (Taken 2/24/2025 2000)  Discharge to home or other facility with appropriate resources: Identify barriers to discharge with patient and caregiver     Problem: Safety - Adult  Goal: Free from fall injury  Outcome: Progressing     Problem: Neurosensory - Adult  Goal: Achieves stable or improved neurological status  Outcome: Progressing  Flowsheets (Taken 2/24/2025 2000)  Achieves stable or improved neurological status: Assess for and report changes in neurological status  Goal: Achieves maximal functionality and self care  Outcome: Progressing  Flowsheets (Taken 2/24/2025 2000)  Achieves maximal functionality and self care: Monitor swallowing and airway patency with patient fatigue and changes in neurological status     Problem: Respiratory - Adult  Goal: Achieves optimal ventilation and oxygenation  Outcome: Progressing  Flowsheets (Taken  experienced.  8. Help patient/family identify destructive displacement of feelings onto other individuals.  9. Invite use of sacraments/rituals/ceremonies as appropriate (e.g. - confession, anointing, smudging).  10. Refer patient/family to formal counseling and/or to neno community for further support work.  Outcome: Progressing  Flowsheets (Taken 2/24/2025 2000)  Patient/family able to move forward in process of forgiving self, others, and/or higher power: Assist patient to overcome blocks to healing by use of spiritual practices (prayer, meditation, guided imagery, reiki, breath work, etc)     Problem: Pain  Goal: Verbalizes/displays adequate comfort level or baseline comfort level  Outcome: Progressing  Flowsheets (Taken 2/24/2025 2000)  Verbalizes/displays adequate comfort level or baseline comfort level: Assess pain using appropriate pain scale     Problem: Skin/Tissue Integrity - Adult  Goal: Skin integrity remains intact  Outcome: Progressing  Flowsheets (Taken 2/24/2025 2000)  Skin Integrity Remains Intact: Monitor for areas of redness and/or skin breakdown  Goal: Incisions, wounds, or drain sites healing without S/S of infection  Outcome: Progressing  Flowsheets (Taken 2/24/2025 2000)  Incisions, Wounds, or Drain Sites Healing Without Sign and Symptoms of Infection: TWICE DAILY: Assess and document skin integrity  Goal: Oral mucous membranes remain intact  Outcome: Progressing  Flowsheets (Taken 2/24/2025 2000)  Oral Mucous Membranes Remain Intact: Implement preventative oral hygiene regimen     Problem: Nutrition Deficit:  Goal: Optimize nutritional status  Outcome: Progressing

## 2025-02-25 NOTE — PROGRESS NOTES
Renal Progress Note    Patient: Nida Graves MRN: [id number]  SSN: [id number]    YOB: 1975  Age: 49 y.o.  Sex: female      Admit Date: 1/12/2025    LOS: 44 days     Subjective:   Patient seen in ICU.  Awake. On 40% FiO2, no acute distress.  Status post bronchscopy 2/23 for left lung opacification with ? Mucus plugs  tracheostomy, remains on vent  Edema +, Repeat labs showed improvement in creatinine to 1.0 repeat potassium is 3.9      Current Facility-Administered Medications   Medication Dose Route Frequency    insulin lispro (HUMALOG,ADMELOG) injection vial 0-4 Units  0-4 Units SubCUTAneous 4x Daily AC & HS    0.9 % sodium chloride infusion   IntraVENous PRN    tranexamic acid (CYKLOKAPRON) injection 500 mg  500 mg Nebulization BID    influenza split vaccine (PF) (AFLURIA;FLUARIX) injection 0.5 mL  1 Dose IntraMUSCular Prior to discharge    ipratropium 0.5 mg-albuterol 2.5 mg (DUONEB) nebulizer solution 1 Dose  1 Dose Inhalation 4x Daily RT    docusate (COLACE) 50 MG/5ML liquid 100 mg  100 mg Oral Daily    epoetin itz-epbx (RETACRIT) injection 2,500 Units  2,500 Units SubCUTAneous Once per day on Monday Wednesday Friday    lansoprazole (PREVACID SOLUTAB) disintegrating tablet 30 mg  30 mg Per G Tube QAM AC    aspirin chewable tablet 81 mg  81 mg Per G Tube Daily    atorvastatin (LIPITOR) tablet 20 mg  20 mg Per G Tube Nightly    buPROPion (WELLBUTRIN) tablet 75 mg  75 mg Per G Tube BID    folic acid (FOLVITE) tablet 1 mg  1 mg Per G Tube Daily    midodrine (PROAMATINE) tablet 10 mg  10 mg Per G Tube TID    venlafaxine (EFFEXOR) tablet 37.5 mg  37.5 mg Per G Tube BID WC    acetaminophen (TYLENOL) 160 MG/5ML solution 650 mg  650 mg Per G Tube Q6H PRN    Or    acetaminophen (TYLENOL) suppository 650 mg  650 mg Rectal Q6H PRN    hydrOXYzine HCl (ATARAX) tablet 25 mg  25 mg Per G Tube TID PRN    magnesium hydroxide (MILK OF MAGNESIA) 400 MG/5ML suspension 30 mL  30 mL Per G Tube Q6H PRN

## 2025-02-25 NOTE — CARE COORDINATION
CM reviewed Pt medicals, waiting for Pt to become medically stable so she can transfer to either [facility] or [facility].     9:20  CM called Pt daughter to discuss Pt baseline before she came into the hospital.     Pt daughter stated that Pt would use a walker for stability to go to the bathroom, walk to the kitchen.  Pt can feed herself.     Pt daughter stated that Pt could bath her self she just want help getting in and out of the shower.     Pt daughter stated that Pt had made herself home bound because she felt like she was a burden.     Pt daughter gave CM permission to send to several facilities to inquire who can accept Pt.    Pt daughter stated that they just want Pt to get better.     CM send referrals to [facility],   [facility] and [facility]    Per IDR    Pt keeps aspirating. Bloody secretions.

## 2025-02-25 NOTE — PLAN OF CARE
OCCUPATIONAL THERAPY TREATMENT  Patient: Nida Graves (49 y.o. female)  Date: 2/25/2025  Primary Diagnosis: Atrial fibrillation, unspecified type (HCC) [I48.91]  Atrial flutter, unspecified type (HCC) [I48.92]  Congestive heart failure, unspecified HF chronicity, unspecified heart failure type (HCC) [I50.9]  Acute hypoxic respiratory failure (HCC) [J96.01]  Procedure(s) (LRB):  ESOPHAGOGASTRODUODENOSCOPY PERCUTANEOUS ENDOSCOPIC GASTROSTOMY TUBE PLACEMENT (N/A) 12 Days Post-Op   Precautions: Fall Risk, General Precautions                Recommendations for nursing mobility: Encourage HEP in prep for ADLs/mobility; see handout for details, Frequent repositioning to prevent skin breakdown, and Assist x2    In place during session: Peripheral IV, Tracheostomy with trach collar, vent settings 40% Fio2, External Catheter, EKG/telemetry , Pulse ox, and PEG Tube  Chart, occupational therapy assessment, plan of care, and goals were reviewed.  ASSESSMENT  Patient continues with skilled OT services and is slowly progressing towards goals. Pt semi supine upon OT arrival, agreeable to session. Pt A&O x 4. (See below for objective details and assist levels). Pt is s/p bronchoscopy 2/23/25 for L lung opacification with removal of mucus plugs and blood clots with resultant blood transfusion due to low Hgb.     Overall pt tolerated session fair today with no reports of pain. Pt self-reporting being soiled. Pt requiring max A x2 to transfer from semi supine to sitting up at EOB. Pt requiring total A x2 to maintain sitting at EOB, strong posterior lean. Spo2 stable while sitting at EOB for ~3 minutes but pt reporting significant dizziness. Pt then assisted back to semi supine position with max-TA x2. Pt HOB reclined in order to scoot pt superiorly in bed, and Spo2 dropping to 76% while lying flat. HOB raised, and RN entering room and suctioning pt's trach collar -- Spo2 increasing to baseline % within 2-3 minutes. RN

## 2025-02-25 NOTE — PROGRESS NOTES
CRITICAL CARE PROGRESS NOTE    Name: Nida Graves   : 1975   MRN: [id number]   Date: 2025      Diagnoses/problem list:   Acute hypoxic and hypercapnic respiratory failure  Acute kidney injury  Atrial flutter with slow ventricular response  Complete heart block  GI bleed  Nosebleed, resolved  NSTEMI  Symptomatic bradycardia  Biventricular failure  Acute HFrEF, 25 to 30%  Diabetes  Morbid obesity  Acute metabolic/septic encephalopathy, improved  Deconditioning    24-hour events:   Frequent mucus plugging that is easily relieved with in-line suctioning. Continue mucolytic nebs. Weaning ventilator support as tolerated. Improved lung white-out s/p bronchoscopy .    Pt continues to await acceptance to LTAC which is best for her long-term care given her ventilator requirements and deconditioning.     Assessment and plan:   Ms. Graves is a 49-year-old female with PMH chronic debility and bedbound status, diabetes, asthma, obesity, who presented for shortness of breath.  Shortness of breath worsening over the past few days.  Upon presentation ED patient found to be hypoxic necessita.  Ting O2 via NC.  She was also found to be in A-fib with slow ventricular response.  She was transferred out of ICU on . Early morning  upgraded to ICU again due to severe hypoxia and hypercapnia requiring intubation. Now s/p trach on . Leadless pacemaker . PEG . Improved lung white-out s/p bronchoscopy     CNS / MSK:    Continue Bupropion and Venlafaxine   PT/OT  Pt would most benefit from LTAC to best address her deconditioning and long-term ventilator weaning    PULMONOLOGY:   Extubated on , reintubated on  due to inability to protect airway  ENT placed trach on   Recurrent mucus plugging, increasing mucolytic regimen to mucomyst q6h  Continue nebs, budesonide q12h, duonebs q6h  Lung white-out improved s/p bronchoscopy   Pt would most benefit from LTAC to best address her

## 2025-02-25 NOTE — PLAN OF CARE
PHYSICAL THERAPY REEVALUATION  Patient: Nida Graves (49 y.o. female)  Date: 2/25/2025  Primary Diagnosis: Atrial fibrillation, unspecified type (HCC) [I48.91]  Atrial flutter, unspecified type (HCC) [I48.92]  Congestive heart failure, unspecified HF chronicity, unspecified heart failure type (HCC) [I50.9]  Acute hypoxic respiratory failure (HCC) [J96.01]  Procedure(s) (LRB):  ESOPHAGOGASTRODUODENOSCOPY PERCUTANEOUS ENDOSCOPIC GASTROSTOMY TUBE PLACEMENT (N/A) 12 Days Post-Op   Precautions: Fall Risk, General Precautions                      Recommendations for nursing mobility: Encourage HEP in prep for ADLs/mobility; see handout for details, Frequent repositioning to prevent skin breakdown, and Assist x2    In place during session: Peripheral IV, Tracheostomy with trach collar, External Catheter, EKG/telemetry , Pulse ox, PEG Tube, and on vent 40% FiO2  Chart, physical therapy assessment, plan of care, and goals were reviewed.      ASSESSMENT  Patient initially seen for PT evaluation 1/16/25 and 6 skilled PT sessions since evalution. Patient seen today for PT reevaluation s/t LOS. Patient A&O x4. Pt semi-supine upon arrival, agreeable to session.      Based on the objective data described, the patient currently presents with impaired functional mobility, decreased ROM, impaired strength, decreased activity tolerance, impaired balance, and orthostatic hypotension. (See below for objective details and assist levels).    Overall pt tolerated session fair today, currently with no reports of pain.  Pt requiring increased assistance today to get to EOB as compared to 6 days ago; however, pt is s/p bronchoscopy 2/23/25 for L lung opacification with removal of mucus plugs and blood clots with resultant blood transfusion due to low Hgb.  Pt demos significant improvements in cognition and more communicative today during treatment.  Pt still appears down and discouraged due to prolonged illness and hospitalization.

## 2025-02-26 ENCOUNTER — APPOINTMENT (OUTPATIENT)
Facility: HOSPITAL | Age: 50
DRG: 004 | End: 2025-02-26
Payer: COMMERCIAL

## 2025-02-26 LAB
ALBUMIN SERPL-MCNC: 2.7 G/DL (ref 3.5–5)
ANION GAP SERPL CALC-SCNC: 6 MMOL/L (ref 2–12)
BASOPHILS # BLD: 0.06 K/UL (ref 0–0.1)
BASOPHILS NFR BLD: 0.7 % (ref 0–1)
BUN SERPL-MCNC: 40 MG/DL (ref 6–20)
BUN/CREAT SERPL: 45 (ref 12–20)
CA-I BLD-MCNC: 8.9 MG/DL (ref 8.5–10.1)
CHLORIDE SERPL-SCNC: 104 MMOL/L (ref 97–108)
CO2 SERPL-SCNC: 28 MMOL/L (ref 21–32)
CREAT SERPL-MCNC: 0.88 MG/DL (ref 0.55–1.02)
DIFFERENTIAL METHOD BLD: ABNORMAL
EOSINOPHIL # BLD: 0.3 K/UL (ref 0–0.4)
EOSINOPHIL NFR BLD: 3.5 % (ref 0–7)
ERYTHROCYTE [DISTWIDTH] IN BLOOD BY AUTOMATED COUNT: 18.6 % (ref 11.5–14.5)
GLUCOSE BLD STRIP.AUTO-MCNC: 102 MG/DL (ref 65–100)
GLUCOSE BLD STRIP.AUTO-MCNC: 107 MG/DL (ref 65–100)
GLUCOSE BLD STRIP.AUTO-MCNC: 128 MG/DL (ref 65–100)
GLUCOSE BLD STRIP.AUTO-MCNC: 131 MG/DL (ref 65–100)
GLUCOSE BLD STRIP.AUTO-MCNC: 144 MG/DL (ref 65–100)
GLUCOSE SERPL-MCNC: 90 MG/DL (ref 65–100)
HCT VFR BLD AUTO: 26.1 % (ref 35–47)
HGB BLD-MCNC: 7.8 G/DL (ref 11.5–16)
IMM GRANULOCYTES # BLD AUTO: 0.03 K/UL (ref 0–0.04)
IMM GRANULOCYTES NFR BLD AUTO: 0.4 % (ref 0–0.5)
LYMPHOCYTES # BLD: 1.9 K/UL (ref 0.8–3.5)
LYMPHOCYTES NFR BLD: 22.3 % (ref 12–49)
MAGNESIUM SERPL-MCNC: 1.9 MG/DL (ref 1.6–2.4)
MCH RBC QN AUTO: 28.9 PG (ref 26–34)
MCHC RBC AUTO-ENTMCNC: 29.9 G/DL (ref 30–36.5)
MCV RBC AUTO: 96.7 FL (ref 80–99)
MONOCYTES # BLD: 0.82 K/UL (ref 0–1)
MONOCYTES NFR BLD: 9.6 % (ref 5–13)
NEUTS SEG # BLD: 5.41 K/UL (ref 1.8–8)
NEUTS SEG NFR BLD: 63.5 % (ref 32–75)
NRBC # BLD: 0 K/UL (ref 0–0.01)
NRBC BLD-RTO: 0 PER 100 WBC
PERFORMED BY:: ABNORMAL
PHOSPHATE SERPL-MCNC: 3.9 MG/DL (ref 2.6–4.7)
PLATELET # BLD AUTO: 373 K/UL (ref 150–400)
PMV BLD AUTO: 9.7 FL (ref 8.9–12.9)
POTASSIUM SERPL-SCNC: 3.6 MMOL/L (ref 3.5–5.1)
RBC # BLD AUTO: 2.7 M/UL (ref 3.8–5.2)
SODIUM SERPL-SCNC: 138 MMOL/L (ref 136–145)
WBC # BLD AUTO: 8.5 K/UL (ref 3.6–11)

## 2025-02-26 PROCEDURE — 71045 X-RAY EXAM CHEST 1 VIEW: CPT

## 2025-02-26 PROCEDURE — 94669 MECHANICAL CHEST WALL OSCILL: CPT

## 2025-02-26 PROCEDURE — 6370000000 HC RX 637 (ALT 250 FOR IP): Performed by: NURSE PRACTITIONER

## 2025-02-26 PROCEDURE — 6360000002 HC RX W HCPCS: Performed by: STUDENT IN AN ORGANIZED HEALTH CARE EDUCATION/TRAINING PROGRAM

## 2025-02-26 PROCEDURE — 83735 ASSAY OF MAGNESIUM: CPT

## 2025-02-26 PROCEDURE — 2700000000 HC OXYGEN THERAPY PER DAY

## 2025-02-26 PROCEDURE — 2000000000 HC ICU R&B

## 2025-02-26 PROCEDURE — 6360000002 HC RX W HCPCS: Performed by: NURSE PRACTITIONER

## 2025-02-26 PROCEDURE — 2580000003 HC RX 258: Performed by: STUDENT IN AN ORGANIZED HEALTH CARE EDUCATION/TRAINING PROGRAM

## 2025-02-26 PROCEDURE — 2500000003 HC RX 250 WO HCPCS: Performed by: STUDENT IN AN ORGANIZED HEALTH CARE EDUCATION/TRAINING PROGRAM

## 2025-02-26 PROCEDURE — 6370000000 HC RX 637 (ALT 250 FOR IP): Performed by: STUDENT IN AN ORGANIZED HEALTH CARE EDUCATION/TRAINING PROGRAM

## 2025-02-26 PROCEDURE — 94003 VENT MGMT INPAT SUBQ DAY: CPT

## 2025-02-26 PROCEDURE — 2500000003 HC RX 250 WO HCPCS: Performed by: NURSE PRACTITIONER

## 2025-02-26 PROCEDURE — 85025 COMPLETE CBC W/AUTO DIFF WBC: CPT

## 2025-02-26 PROCEDURE — 94640 AIRWAY INHALATION TREATMENT: CPT

## 2025-02-26 PROCEDURE — 82962 GLUCOSE BLOOD TEST: CPT

## 2025-02-26 PROCEDURE — 36415 COLL VENOUS BLD VENIPUNCTURE: CPT

## 2025-02-26 PROCEDURE — 94761 N-INVAS EAR/PLS OXIMETRY MLT: CPT

## 2025-02-26 PROCEDURE — 80069 RENAL FUNCTION PANEL: CPT

## 2025-02-26 PROCEDURE — 6360000002 HC RX W HCPCS

## 2025-02-26 PROCEDURE — 0B9M8ZZ DRAINAGE OF BILATERAL LUNGS, VIA NATURAL OR ARTIFICIAL OPENING ENDOSCOPIC: ICD-10-PCS | Performed by: STUDENT IN AN ORGANIZED HEALTH CARE EDUCATION/TRAINING PROGRAM

## 2025-02-26 PROCEDURE — 99233 SBSQ HOSP IP/OBS HIGH 50: CPT

## 2025-02-26 PROCEDURE — 31645 BRNCHSC W/THER ASPIR 1ST: CPT

## 2025-02-26 RX ORDER — SUCCINYLCHOLINE CHLORIDE 20 MG/ML
100 INJECTION INTRAMUSCULAR; INTRAVENOUS ONCE
Status: DISCONTINUED | OUTPATIENT
Start: 2025-02-26 | End: 2025-03-03

## 2025-02-26 RX ORDER — ACETYLCYSTEINE 200 MG/ML
600 SOLUTION ORAL; RESPIRATORY (INHALATION)
Status: DISCONTINUED | OUTPATIENT
Start: 2025-02-26 | End: 2025-02-26

## 2025-02-26 RX ORDER — FUROSEMIDE 10 MG/ML
40 INJECTION INTRAMUSCULAR; INTRAVENOUS ONCE
Status: COMPLETED | OUTPATIENT
Start: 2025-02-26 | End: 2025-02-26

## 2025-02-26 RX ORDER — MORPHINE SULFATE 4 MG/ML
4 INJECTION, SOLUTION INTRAMUSCULAR; INTRAVENOUS ONCE
Status: COMPLETED | OUTPATIENT
Start: 2025-02-26 | End: 2025-02-26

## 2025-02-26 RX ORDER — PROPOFOL 10 MG/ML
5-50 INJECTION, EMULSION INTRAVENOUS CONTINUOUS
Status: DISPENSED | OUTPATIENT
Start: 2025-02-26 | End: 2025-02-26

## 2025-02-26 RX ORDER — MIDAZOLAM HYDROCHLORIDE 2 MG/2ML
2 INJECTION, SOLUTION INTRAMUSCULAR; INTRAVENOUS ONCE
Status: COMPLETED | OUTPATIENT
Start: 2025-02-26 | End: 2025-02-26

## 2025-02-26 RX ORDER — GUAIFENESIN 200 MG/10ML
200 LIQUID ORAL EVERY 12 HOURS
Status: DISCONTINUED | OUTPATIENT
Start: 2025-02-26 | End: 2025-03-10

## 2025-02-26 RX ORDER — SUCCINYLCHOLINE/SOD CL,ISO/PF 200MG/10ML
100 SYRINGE (ML) INTRAVENOUS ONCE
Status: DISCONTINUED | OUTPATIENT
Start: 2025-02-26 | End: 2025-02-26

## 2025-02-26 RX ORDER — NOREPINEPHRINE BITARTRATE 0.06 MG/ML
1-100 INJECTION, SOLUTION INTRAVENOUS CONTINUOUS
Status: DISCONTINUED | OUTPATIENT
Start: 2025-02-26 | End: 2025-03-03

## 2025-02-26 RX ORDER — MIDAZOLAM HYDROCHLORIDE 2 MG/2ML
4 INJECTION, SOLUTION INTRAMUSCULAR; INTRAVENOUS ONCE
Status: COMPLETED | OUTPATIENT
Start: 2025-02-26 | End: 2025-02-26

## 2025-02-26 RX ORDER — ACETYLCYSTEINE 200 MG/ML
600 SOLUTION ORAL; RESPIRATORY (INHALATION)
Status: DISCONTINUED | OUTPATIENT
Start: 2025-02-26 | End: 2025-02-27

## 2025-02-26 RX ORDER — MIDAZOLAM HYDROCHLORIDE 1 MG/ML
INJECTION, SOLUTION INTRAMUSCULAR; INTRAVENOUS
Status: COMPLETED
Start: 2025-02-26 | End: 2025-02-26

## 2025-02-26 RX ORDER — IPRATROPIUM BROMIDE AND ALBUTEROL SULFATE 2.5; .5 MG/3ML; MG/3ML
1 SOLUTION RESPIRATORY (INHALATION)
Status: DISCONTINUED | OUTPATIENT
Start: 2025-02-28 | End: 2025-03-06

## 2025-02-26 RX ORDER — VENLAFAXINE 37.5 MG/1
75 TABLET ORAL 2 TIMES DAILY WITH MEALS
Status: DISCONTINUED | OUTPATIENT
Start: 2025-02-26 | End: 2025-03-14 | Stop reason: HOSPADM

## 2025-02-26 RX ORDER — ALBUTEROL SULFATE 2.5 MG/.5ML
2.5 SOLUTION RESPIRATORY (INHALATION)
Status: COMPLETED | OUTPATIENT
Start: 2025-02-27 | End: 2025-02-28

## 2025-02-26 RX ORDER — ALBUTEROL SULFATE 2.5 MG/.5ML
2.5 SOLUTION RESPIRATORY (INHALATION) EVERY 8 HOURS
Status: DISCONTINUED | OUTPATIENT
Start: 2025-02-26 | End: 2025-02-26

## 2025-02-26 RX ORDER — POLYETHYLENE GLYCOL 3350 17 G/17G
17 POWDER, FOR SOLUTION ORAL DAILY
Status: DISCONTINUED | OUTPATIENT
Start: 2025-02-26 | End: 2025-03-04

## 2025-02-26 RX ADMIN — BUPROPION HYDROCHLORIDE 75 MG: 75 TABLET, FILM COATED ORAL at 08:46

## 2025-02-26 RX ADMIN — MIDAZOLAM 2 MG: 1 INJECTION INTRAMUSCULAR; INTRAVENOUS at 21:13

## 2025-02-26 RX ADMIN — SODIUM CHLORIDE, PRESERVATIVE FREE 10 ML: 5 INJECTION INTRAVENOUS at 10:45

## 2025-02-26 RX ADMIN — POLYETHYLENE GLYCOL 3350 17 G: 17 POWDER, FOR SOLUTION ORAL at 10:55

## 2025-02-26 RX ADMIN — VENLAFAXINE 75 MG: 25 TABLET ORAL at 16:33

## 2025-02-26 RX ADMIN — MORPHINE SULFATE 4 MG: 4 INJECTION, SOLUTION INTRAMUSCULAR; INTRAVENOUS at 21:13

## 2025-02-26 RX ADMIN — SODIUM CHLORIDE, PRESERVATIVE FREE 10 ML: 5 INJECTION INTRAVENOUS at 08:46

## 2025-02-26 RX ADMIN — ALBUTEROL SULFATE 2.5 MG: 2.5 SOLUTION RESPIRATORY (INHALATION) at 20:51

## 2025-02-26 RX ADMIN — IPRATROPIUM BROMIDE AND ALBUTEROL SULFATE 1 DOSE: 2.5; .5 SOLUTION RESPIRATORY (INHALATION) at 09:02

## 2025-02-26 RX ADMIN — ASPIRIN 81 MG 81 MG: 81 TABLET ORAL at 08:46

## 2025-02-26 RX ADMIN — MIDODRINE HYDROCHLORIDE 10 MG: 5 TABLET ORAL at 20:13

## 2025-02-26 RX ADMIN — MIDAZOLAM 4 MG: 1 INJECTION INTRAMUSCULAR; INTRAVENOUS at 21:34

## 2025-02-26 RX ADMIN — CINACALCET HYDROCHLORIDE 30 MG: 30 TABLET, FILM COATED ORAL at 09:36

## 2025-02-26 RX ADMIN — PROPOFOL 20 MCG/KG/MIN: 10 INJECTION, EMULSION INTRAVENOUS at 21:39

## 2025-02-26 RX ADMIN — FOLIC ACID 1 MG: 1 TABLET ORAL at 08:46

## 2025-02-26 RX ADMIN — BUDESONIDE 500 MCG: 0.5 INHALANT ORAL at 09:02

## 2025-02-26 RX ADMIN — ACETYLCYSTEINE 600 MG: 200 SOLUTION ORAL; RESPIRATORY (INHALATION) at 20:51

## 2025-02-26 RX ADMIN — LANSOPRAZOLE 30 MG: 30 TABLET, ORALLY DISINTEGRATING ORAL at 05:45

## 2025-02-26 RX ADMIN — GUAIFENESIN 200 MG: 200 SOLUTION ORAL at 20:46

## 2025-02-26 RX ADMIN — IPRATROPIUM BROMIDE AND ALBUTEROL SULFATE 1 DOSE: 2.5; .5 SOLUTION RESPIRATORY (INHALATION) at 11:22

## 2025-02-26 RX ADMIN — WHITE PETROLATUM,ZINC OXIDE: 57; 17 PASTE TOPICAL at 20:15

## 2025-02-26 RX ADMIN — IPRATROPIUM BROMIDE AND ALBUTEROL SULFATE 1 DOSE: 2.5; .5 SOLUTION RESPIRATORY (INHALATION) at 19:24

## 2025-02-26 RX ADMIN — MIDODRINE HYDROCHLORIDE 10 MG: 5 TABLET ORAL at 08:46

## 2025-02-26 RX ADMIN — MIDAZOLAM HYDROCHLORIDE 4 MG: 2 INJECTION, SOLUTION INTRAMUSCULAR; INTRAVENOUS at 21:34

## 2025-02-26 RX ADMIN — BUDESONIDE 500 MCG: 0.5 INHALANT ORAL at 19:24

## 2025-02-26 RX ADMIN — SODIUM CHLORIDE, PRESERVATIVE FREE 10 ML: 5 INJECTION INTRAVENOUS at 20:15

## 2025-02-26 RX ADMIN — EPOETIN ALFA-EPBX 2500 UNITS: 3000 INJECTION, SOLUTION INTRAVENOUS; SUBCUTANEOUS at 18:29

## 2025-02-26 RX ADMIN — ATORVASTATIN CALCIUM 20 MG: 20 TABLET, FILM COATED ORAL at 20:14

## 2025-02-26 RX ADMIN — SODIUM CHLORIDE 125 MG: 9 INJECTION, SOLUTION INTRAVENOUS at 11:53

## 2025-02-26 RX ADMIN — MIDODRINE HYDROCHLORIDE 10 MG: 5 TABLET ORAL at 15:26

## 2025-02-26 RX ADMIN — BUPROPION HYDROCHLORIDE 75 MG: 75 TABLET, FILM COATED ORAL at 20:14

## 2025-02-26 RX ADMIN — WHITE PETROLATUM,ZINC OXIDE: 57; 17 PASTE TOPICAL at 08:47

## 2025-02-26 RX ADMIN — FUROSEMIDE 40 MG: 10 INJECTION, SOLUTION INTRAMUSCULAR; INTRAVENOUS at 10:45

## 2025-02-26 RX ADMIN — VENLAFAXINE 37.5 MG: 25 TABLET ORAL at 08:47

## 2025-02-26 RX ADMIN — IPRATROPIUM BROMIDE AND ALBUTEROL SULFATE 1 DOSE: 2.5; .5 SOLUTION RESPIRATORY (INHALATION) at 15:24

## 2025-02-26 RX ADMIN — DOCUSATE SODIUM 100 MG: 50 LIQUID ORAL at 08:46

## 2025-02-26 ASSESSMENT — PULMONARY FUNCTION TESTS
PIF_VALUE: 24
PIF_VALUE: 29
PIF_VALUE: 20
PIF_VALUE: 21
PIF_VALUE: 29
PIF_VALUE: 24
PIF_VALUE: 21
PIF_VALUE: 22
PIF_VALUE: 28
PIF_VALUE: 30
PIF_VALUE: 25
PIF_VALUE: 23
PIF_VALUE: 22
PIF_VALUE: 21
PIF_VALUE: 29
PIF_VALUE: 22
PIF_VALUE: 23
PIF_VALUE: 23
PIF_VALUE: 22
PIF_VALUE: 34
PIF_VALUE: 22
PIF_VALUE: 22
PIF_VALUE: 23
PIF_VALUE: 24
PIF_VALUE: 31
PIF_VALUE: 26
PIF_VALUE: 22
PIF_VALUE: 21
PIF_VALUE: 26
PIF_VALUE: 27
PIF_VALUE: 25
PIF_VALUE: 27
PIF_VALUE: 23
PIF_VALUE: 25
PIF_VALUE: 18
PIF_VALUE: 24
PIF_VALUE: 22
PIF_VALUE: 26
PIF_VALUE: 21

## 2025-02-26 ASSESSMENT — PAIN SCALES - GENERAL
PAINLEVEL_OUTOF10: 0

## 2025-02-26 NOTE — PROGRESS NOTES
Performed by: Lamberto Grewal    Renal Function Panel    Collection Time: 02/26/25  3:33 AM   Result Value Ref Range    Sodium 138 136 - 145 mmol/L    Potassium 3.6 3.5 - 5.1 mmol/L    Chloride 104 97 - 108 mmol/L    CO2 28 21 - 32 mmol/L    Anion Gap 6 2 - 12 mmol/L    Glucose 90 65 - 100 mg/dL    BUN 40 (H) 6 - 20 mg/dL    Creatinine 0.88 0.55 - 1.02 mg/dL    BUN/Creatinine Ratio 45 (H) 12 - 20      Est, Glom Filt Rate 81 >60 ml/min/1.73m2    Calcium 8.9 8.5 - 10.1 mg/dL    Phosphorus 3.9 2.6 - 4.7 mg/dL    Albumin 2.7 (L) 3.5 - 5.0 g/dL   Magnesium    Collection Time: 02/26/25  3:33 AM   Result Value Ref Range    Magnesium 1.9 1.6 - 2.4 mg/dL   CBC with Auto Differential    Collection Time: 02/26/25  3:33 AM   Result Value Ref Range    WBC 8.5 3.6 - 11.0 K/uL    RBC 2.70 (L) 3.80 - 5.20 M/uL    Hemoglobin 7.8 (L) 11.5 - 16.0 g/dL    Hematocrit 26.1 (L) 35.0 - 47.0 %    MCV 96.7 80.0 - 99.0 FL    MCH 28.9 26.0 - 34.0 PG    MCHC 29.9 (L) 30.0 - 36.5 g/dL    RDW 18.6 (H) 11.5 - 14.5 %    Platelets 373 150 - 400 K/uL    MPV 9.7 8.9 - 12.9 FL    Nucleated RBCs 0.0 0.0  WBC    nRBC 0.00 0.00 - 0.01 K/uL    Neutrophils % 63.5 32.0 - 75.0 %    Lymphocytes % 22.3 12.0 - 49.0 %    Monocytes % 9.6 5.0 - 13.0 %    Eosinophils % 3.5 0.0 - 7.0 %    Basophils % 0.7 0.0 - 1.0 %    Immature Granulocytes % 0.4 0 - 0.5 %    Neutrophils Absolute 5.41 1.80 - 8.00 K/UL    Lymphocytes Absolute 1.90 0.80 - 3.50 K/UL    Monocytes Absolute 0.82 0.00 - 1.00 K/UL    Eosinophils Absolute 0.30 0.00 - 0.40 K/UL    Basophils Absolute 0.06 0.00 - 0.10 K/UL    Immature Granulocytes Absolute 0.03 0.00 - 0.04 K/UL    Differential Type AUTOMATED     POCT Glucose    Collection Time: 02/26/25  5:45 AM   Result Value Ref Range    POC Glucose 107 (H) 65 - 100 mg/dL    Performed by: Lamberto Grewal    POCT Glucose    Collection Time: 02/26/25  7:48 AM   Result Value Ref Range    POC Glucose 131 (H) 65 - 100 mg/dL    Performed by: Jacobo Carlisle     POCT Glucose    Collection Time: 02/26/25 10:54 AM   Result Value Ref Range    POC Glucose 128 (H) 65 - 100 mg/dL    Performed by: Jacobo Carlisle         Assessment and Plan:     #1 acute kidney injury  -Creatinine increased from 1.2-->1.9.   -KIZZY now improving after peaking at 1.9.  Creatinine improved 1.9--1.3--1.0--0.8  -KIZZY probably secondary to prerenal-severe anemia/hypercalcemia.  -No baseline history of CKD.  Creatinine was 0.7 on 2/6  -Continue current management and continue to monitor renal functions    #2  Hyperkalemia  -improving K 5.4-->3.6 today, probably with high-protein promote thru PEG tube supplements  Improved with change to nepro   s/p PEG tube on 2/13,  continue to monitor     #3  Hypercalcemia with high PTH, primary hyperparathyroidism  Improved calcium levels-, continue Sensipar 30 mg daily  -Continue to monitor calcium levels    #4  Respiratory distress  -She was extubated on 1/26 but reintubated on 1/27 because of inability to protect airway.  Tracheostomy was done on 1/30  -Currently on mechanical ventilation via trach  S/p bronch 2/23 for mucus plugs with left lung opacification     #5  Hypotension  Improved hemodynamic status  -Last EF was 20 to 30% with right ventricular overload, moderate TR  I will give Lasix 40 mg IV today  -Cardiology on the case.  S/p PPM on 2/12    6. Anemia: came with severe anemia, s/p pRBC tx  Sever iron def noted, Received 4 doses of  IV ferrlecit doses  Monitor H/H    Signed By: Michelle Flores MD     February 26, 2025

## 2025-02-26 NOTE — PLAN OF CARE
Problem: Skin/Tissue Integrity  Goal: Absence of new skin breakdown  Description: 1.  Monitor for areas of redness and/or skin breakdown  2.  Assess vascular access sites hourly  3.  Every 4-6 hours minimum:  Change oxygen saturation probe site  4.  Every 4-6 hours:  If on nasal continuous positive airway pressure, respiratory therapy assess nares and determine need for appliance change or resting period.  2/26/2025 1013 by Abimbola Latham RN  Outcome: Progressing  2/26/2025 0012 by Carmina Orantes RN  Outcome: Progressing     Problem: Safety - Adult  Goal: Free from fall injury  2/26/2025 1013 by Abimbola Latham RN  Outcome: Progressing  2/26/2025 0012 by Carmina Orantes RN  Outcome: Progressing     Problem: Respiratory - Adult  Goal: Achieves optimal ventilation and oxygenation  2/26/2025 1013 by Abimbola Latham RN  Outcome: Not Progressing  2/26/2025 0012 by Carmina Orantes RN  Outcome: Progressing  Flowsheets (Taken 2/25/2025 2000)  Achieves optimal ventilation and oxygenation: Assess for changes in respiratory status     Problem: Cardiovascular - Adult  Goal: Absence of cardiac dysrhythmias or at baseline  2/26/2025 0012 by Carmina Orantes RN  Outcome: Progressing  Flowsheets (Taken 2/25/2025 2000)  Absence of cardiac dysrhythmias or at baseline: Monitor cardiac rate and rhythm     Problem: Metabolic/Fluid and Electrolytes - Adult  Goal: Electrolytes maintained within normal limits  2/26/2025 0012 by Carmina Orantes RN  Outcome: Progressing  Flowsheets (Taken 2/25/2025 2000)  Electrolytes maintained within normal limits: Monitor labs and assess patient for signs and symptoms of electrolyte imbalances     Problem: Respiratory - Adult  Goal: Achieves optimal ventilation and oxygenation  2/26/2025 1013 by Abimbola Latham RN  Outcome: Not Progressing  2/26/2025 0012 by Carmina Orantes RN  Outcome: Progressing  Flowsheets (Taken 2/25/2025 2000)  Achieves optimal ventilation and oxygenation: Assess for

## 2025-02-26 NOTE — PROGRESS NOTES
Placed patient on spontaneous with PSV of 10.  Subsequently, patient dropped her oxygen saturations. Have now had to increase FIO2 to 65% to maintain sats above 90%

## 2025-02-26 NOTE — PLAN OF CARE
Problem: ABCDS Injury Assessment  Goal: Absence of physical injury  Outcome: Progressing     Problem: Skin/Tissue Integrity  Goal: Absence of new skin breakdown  Description: 1.  Monitor for areas of redness and/or skin breakdown  2.  Assess vascular access sites hourly  3.  Every 4-6 hours minimum:  Change oxygen saturation probe site  4.  Every 4-6 hours:  If on nasal continuous positive airway pressure, respiratory therapy assess nares and determine need for appliance change or resting period.  Outcome: Progressing     Problem: Chronic Conditions and Co-morbidities  Goal: Patient's chronic conditions and co-morbidity symptoms are monitored and maintained or improved  Outcome: Progressing  Flowsheets (Taken 2/25/2025 2000)  Care Plan - Patient's Chronic Conditions and Co-Morbidity Symptoms are Monitored and Maintained or Improved: Monitor and assess patient's chronic conditions and comorbid symptoms for stability, deterioration, or improvement     Problem: Discharge Planning  Goal: Discharge to home or other facility with appropriate resources  Outcome: Progressing  Flowsheets (Taken 2/25/2025 2000)  Discharge to home or other facility with appropriate resources: Identify barriers to discharge with patient and caregiver     Problem: Safety - Adult  Goal: Free from fall injury  Outcome: Progressing     Problem: Neurosensory - Adult  Goal: Achieves stable or improved neurological status  Outcome: Progressing  Flowsheets (Taken 2/25/2025 2000)  Achieves stable or improved neurological status: Assess for and report changes in neurological status  Goal: Achieves maximal functionality and self care  Outcome: Progressing  Flowsheets (Taken 2/25/2025 2000)  Achieves maximal functionality and self care: Monitor swallowing and airway patency with patient fatigue and changes in neurological status     Problem: Respiratory - Adult  Goal: Achieves optimal ventilation and oxygenation  Outcome: Progressing  Flowsheets (Taken  day(s).  2.  Patient will perform grooming with Modified [place] and Additional Time sitting on EOB without loss of balance and little to no SOB within 7 day(s).  3.  Patient will perform lower body dressing with Moderate Assist using AE as needed to manage socks and pants/underwear along with using RW with A x 1 within 7 day(s).  4.  Patient will perform toilet transfers with Minimal Assist, Additional Time, and Assist x1  using BSC bedside along with using RW within 7 day(s).  5.  Patient will perform all aspects of toileting with Minimal Assist, Additional Time, and Assist x1 using BSC bedside along with RW within 7 day(s).  6.  Patient will participate in upper extremity therapeutic exercise/activities with Supervision and Additional Time for 10 minutes within 7 day(s).    2/25/2025 1548 by Whit Ann, OT  Outcome: Progressing     Problem: Physical Therapy - Adult  Goal: By Discharge: Performs mobility at highest level of function for planned discharge setting.  See evaluation for individualized goals.  Description: FUNCTIONAL STATUS PRIOR TO ADMISSION: The patient  required minimal assistance for basic and instrumental ADLs.    HOME SUPPORT PRIOR TO ADMISSION: The patient lived with  and required minimal assistance for ADLs.    Physical Therapy Goals  Initiated 1/16/2025  Pt stated goal: Get ebtter  Pt will be I with LE HEP in 7 days.  Pt will perform bed mobility with Mod Assist in 7 days.  Pt to sit EOB x 5 min with fair sitting balance in 7 days.  Pt to perform sit to stand with max A x 2 in 7 days.  2/25/2025 1502 by Shanice Norton, PT  Outcome: Progressing     Problem: Nutrition Deficit:  Goal: Optimize nutritional status  Outcome: Progressing

## 2025-02-26 NOTE — CONSULTS
Palliative Medicine  Patient Name: Nida Graves  YOB: 1975  MRN: [id number]  Age: 49 y.o.  Gender: female    Date of Initial Consult: 2025  Date of Service: 2025  Time: 1:45 PM  Provider: GINNY Chaparro CNP  Hospital Day: 46  Admit Date: 2025  Referring Provider: Dr. Shahid       Reasons for Consultation:  Goals of Care    HISTORY OF PRESENT ILLNESS (HPI):   Nida Graves is a 49 y.o. female with a past medical history of obesity was 270# now down to 240#/ BMI 37),DM with peripheral neuropathy, asthma (previously followed by Dr. Johnson with [facility]), chronic back pain, debility, who was admitted on 2025 from home  with a diagnosis of Shortness of breath, hypoxia.   CXR: LLL airspace disease, interstitial edema  ECHO : EF 25-30%, decrease RV function, mod TR, LA dilation.       Psychosocial: patient is  to spouse Nannette Valle [phone]  She has a dtr age 21 who is graduating from [employer] this Spring.  Her nephew lives with her (autism, age 27)  Patient's spouse does all the cooking, cleaning, household chores and works full time.   Patient has chronic debility, ambulates with walker for past 3 years, able to get around house, watches TV., independent with dressing/ bathing but fall risk., (spouse just purchased walk in tub)   Patient's mother and brother both  in .   No AMD documents: patient would trust her spouse to make medical decisions if unable.     PALLIATIVE DIAGNOSES:    Acute on chronic respiratory failure, multifactorial  Aspiration, asthma, pulmonary edema, weak muscles/ deconditioned  Suspected PE, on tx (unable to lie flat for CTA)   HFrEF (EF 25-30%)   Aflutter with slow ventricular response  Intermittent bradycardia   Possible NSTEMI  Obesity, diabetes, peripheral neuropathy, chronic Bilateral LE below knee leg pain  Chronic back pain  Mood disorder, on medication  Physical debility  Hypoxia   Palliative medicine encounter      ASSESSMENT AND PLAN:     Follow up visit; chart reviewed thoroughly prior to seeing pt; including notes, labs, imaging, interval events since last palliative encounter.  No family at bedside. Talked with pt extensively today in regard to hospital trajectory overall, potential dispo planning for LTAC but having some difficulties with placement, in addition to clinical changes over the last few days that are posing as an additional barrier.   Talked with bedside ICU RN, Abimbola. ICU team had just rounded and also provided updates to pt. Do think pt is able to follow our conversation and understand her illness, difficult to fully assess given trach and barriers to communication, not sure if she fully understands complexity of her current clinical status.   We also talked through her recent bronch, fluctuating vent settings and oxygen requirements, frequent mucus plugging but attempting to medically manage. Did relay ICU/case management is continuing to advocate for LTAC would be most beneficial to her for continued attempts at vent weaning, continue artificial feeding through PEG, as well as work towards strengthening and addressing her deconditioning. She is aware she has not been out of bed for quite some time. Once again, her respiratory status posing as a bit of a barrier to her progressing. Did reassure her at any point if she wanted to stop treating aggressively, wanting to pivot treatments towards focusing on her comfort, this can be a discussion and process that can be done any time. She understands but is clear she wants to continue to keep working towards clinical improvement.   Pt able to communicate with mouthing words over trach, nodding head yes/no appropriately. She understands that she has a very long road, leaving this hospital and heading towards an LTAC which is essentially another hospital-like setting. Did share with me her daughter's thoughts when we last spoke last week, mentioned pt herself

## 2025-02-26 NOTE — CARE COORDINATION
CM reviewed Pt medicals, CM sent updated medicals to facilities.       Per IDR  Pt kept de-stating, had to go up to 100%, now back down to 55%

## 2025-02-27 ENCOUNTER — APPOINTMENT (OUTPATIENT)
Facility: HOSPITAL | Age: 50
DRG: 004 | End: 2025-02-27
Payer: COMMERCIAL

## 2025-02-27 LAB
ALBUMIN SERPL-MCNC: 2.4 G/DL (ref 3.5–5)
ANION GAP SERPL CALC-SCNC: 6 MMOL/L (ref 2–12)
BASOPHILS # BLD: 0.05 K/UL (ref 0–0.1)
BASOPHILS NFR BLD: 0.6 % (ref 0–1)
BUN SERPL-MCNC: 38 MG/DL (ref 6–20)
BUN/CREAT SERPL: 40 (ref 12–20)
CA-I BLD-MCNC: 8.2 MG/DL (ref 8.5–10.1)
CHLORIDE SERPL-SCNC: 105 MMOL/L (ref 97–108)
CO2 SERPL-SCNC: 29 MMOL/L (ref 21–32)
CREAT SERPL-MCNC: 0.96 MG/DL (ref 0.55–1.02)
DIFFERENTIAL METHOD BLD: ABNORMAL
EOSINOPHIL # BLD: 0.26 K/UL (ref 0–0.4)
EOSINOPHIL NFR BLD: 3 % (ref 0–7)
ERYTHROCYTE [DISTWIDTH] IN BLOOD BY AUTOMATED COUNT: 18.7 % (ref 11.5–14.5)
GLUCOSE BLD STRIP.AUTO-MCNC: 121 MG/DL (ref 65–100)
GLUCOSE BLD STRIP.AUTO-MCNC: 126 MG/DL (ref 65–100)
GLUCOSE BLD STRIP.AUTO-MCNC: 153 MG/DL (ref 65–100)
GLUCOSE BLD STRIP.AUTO-MCNC: 91 MG/DL (ref 65–100)
GLUCOSE SERPL-MCNC: 143 MG/DL (ref 65–100)
HCT VFR BLD AUTO: 25.7 % (ref 35–47)
HGB BLD-MCNC: 7.8 G/DL (ref 11.5–16)
IMM GRANULOCYTES # BLD AUTO: 0.04 K/UL (ref 0–0.04)
IMM GRANULOCYTES NFR BLD AUTO: 0.5 % (ref 0–0.5)
LYMPHOCYTES # BLD: 1.81 K/UL (ref 0.8–3.5)
LYMPHOCYTES NFR BLD: 20.7 % (ref 12–49)
MAGNESIUM SERPL-MCNC: 1.8 MG/DL (ref 1.6–2.4)
MCH RBC QN AUTO: 29.2 PG (ref 26–34)
MCHC RBC AUTO-ENTMCNC: 30.4 G/DL (ref 30–36.5)
MCV RBC AUTO: 96.3 FL (ref 80–99)
MONOCYTES # BLD: 0.76 K/UL (ref 0–1)
MONOCYTES NFR BLD: 8.7 % (ref 5–13)
NEUTS SEG # BLD: 5.83 K/UL (ref 1.8–8)
NEUTS SEG NFR BLD: 66.5 % (ref 32–75)
NRBC # BLD: 0 K/UL (ref 0–0.01)
NRBC BLD-RTO: 0 PER 100 WBC
PERFORMED BY:: ABNORMAL
PERFORMED BY:: NORMAL
PHOSPHATE SERPL-MCNC: 4.2 MG/DL (ref 2.6–4.7)
PLATELET # BLD AUTO: 369 K/UL (ref 150–400)
PMV BLD AUTO: 9.9 FL (ref 8.9–12.9)
POTASSIUM SERPL-SCNC: 3.5 MMOL/L (ref 3.5–5.1)
RBC # BLD AUTO: 2.67 M/UL (ref 3.8–5.2)
SODIUM SERPL-SCNC: 140 MMOL/L (ref 136–145)
WBC # BLD AUTO: 8.8 K/UL (ref 3.6–11)

## 2025-02-27 PROCEDURE — 82962 GLUCOSE BLOOD TEST: CPT

## 2025-02-27 PROCEDURE — 94640 AIRWAY INHALATION TREATMENT: CPT

## 2025-02-27 PROCEDURE — 6370000000 HC RX 637 (ALT 250 FOR IP): Performed by: NURSE PRACTITIONER

## 2025-02-27 PROCEDURE — 2700000000 HC OXYGEN THERAPY PER DAY

## 2025-02-27 PROCEDURE — 94003 VENT MGMT INPAT SUBQ DAY: CPT

## 2025-02-27 PROCEDURE — 85025 COMPLETE CBC W/AUTO DIFF WBC: CPT

## 2025-02-27 PROCEDURE — 94761 N-INVAS EAR/PLS OXIMETRY MLT: CPT

## 2025-02-27 PROCEDURE — 6370000000 HC RX 637 (ALT 250 FOR IP): Performed by: STUDENT IN AN ORGANIZED HEALTH CARE EDUCATION/TRAINING PROGRAM

## 2025-02-27 PROCEDURE — 80069 RENAL FUNCTION PANEL: CPT

## 2025-02-27 PROCEDURE — 36415 COLL VENOUS BLD VENIPUNCTURE: CPT

## 2025-02-27 PROCEDURE — 71045 X-RAY EXAM CHEST 1 VIEW: CPT

## 2025-02-27 PROCEDURE — 2000000000 HC ICU R&B

## 2025-02-27 PROCEDURE — 6370000000 HC RX 637 (ALT 250 FOR IP)

## 2025-02-27 PROCEDURE — 97110 THERAPEUTIC EXERCISES: CPT

## 2025-02-27 PROCEDURE — 2500000003 HC RX 250 WO HCPCS: Performed by: NURSE PRACTITIONER

## 2025-02-27 PROCEDURE — 83735 ASSAY OF MAGNESIUM: CPT

## 2025-02-27 PROCEDURE — 6360000002 HC RX W HCPCS: Performed by: NURSE PRACTITIONER

## 2025-02-27 PROCEDURE — 2580000003 HC RX 258: Performed by: INTERNAL MEDICINE

## 2025-02-27 PROCEDURE — 97535 SELF CARE MNGMENT TRAINING: CPT

## 2025-02-27 PROCEDURE — 6360000002 HC RX W HCPCS: Performed by: STUDENT IN AN ORGANIZED HEALTH CARE EDUCATION/TRAINING PROGRAM

## 2025-02-27 PROCEDURE — 2500000003 HC RX 250 WO HCPCS: Performed by: STUDENT IN AN ORGANIZED HEALTH CARE EDUCATION/TRAINING PROGRAM

## 2025-02-27 PROCEDURE — 94668 MNPJ CHEST WALL SBSQ: CPT

## 2025-02-27 RX ORDER — SODIUM CHLORIDE FOR INHALATION 0.9 %
VIAL, NEBULIZER (ML) INHALATION
Status: COMPLETED
Start: 2025-02-27 | End: 2025-02-27

## 2025-02-27 RX ORDER — LANOLIN ALCOHOL/MO/W.PET/CERES
400 CREAM (GRAM) TOPICAL ONCE
Status: DISCONTINUED | OUTPATIENT
Start: 2025-02-27 | End: 2025-02-27

## 2025-02-27 RX ORDER — LANOLIN ALCOHOL/MO/W.PET/CERES
400 CREAM (GRAM) TOPICAL ONCE
Status: COMPLETED | OUTPATIENT
Start: 2025-02-27 | End: 2025-02-27

## 2025-02-27 RX ORDER — SODIUM CHLORIDE FOR INHALATION 3 %
4 VIAL, NEBULIZER (ML) INHALATION
Status: COMPLETED | OUTPATIENT
Start: 2025-02-27 | End: 2025-03-01

## 2025-02-27 RX ADMIN — Medication 3 ML: at 13:58

## 2025-02-27 RX ADMIN — ALBUTEROL SULFATE 2.5 MG: 2.5 SOLUTION RESPIRATORY (INHALATION) at 18:16

## 2025-02-27 RX ADMIN — Medication 4 ML: at 20:39

## 2025-02-27 RX ADMIN — WHITE PETROLATUM,ZINC OXIDE: 57; 17 PASTE TOPICAL at 20:14

## 2025-02-27 RX ADMIN — Medication 4 ML: at 14:01

## 2025-02-27 RX ADMIN — POLYETHYLENE GLYCOL 3350 17 G: 17 POWDER, FOR SOLUTION ORAL at 08:24

## 2025-02-27 RX ADMIN — CINACALCET HYDROCHLORIDE 30 MG: 30 TABLET, FILM COATED ORAL at 08:25

## 2025-02-27 RX ADMIN — DOCUSATE SODIUM 100 MG: 50 LIQUID ORAL at 08:27

## 2025-02-27 RX ADMIN — MIDODRINE HYDROCHLORIDE 10 MG: 5 TABLET ORAL at 20:14

## 2025-02-27 RX ADMIN — LANSOPRAZOLE 30 MG: 30 TABLET, ORALLY DISINTEGRATING ORAL at 05:58

## 2025-02-27 RX ADMIN — BUDESONIDE 500 MCG: 0.5 INHALANT ORAL at 18:16

## 2025-02-27 RX ADMIN — ALBUTEROL SULFATE 2.5 MG: 2.5 SOLUTION RESPIRATORY (INHALATION) at 01:23

## 2025-02-27 RX ADMIN — MIDODRINE HYDROCHLORIDE 10 MG: 5 TABLET ORAL at 15:02

## 2025-02-27 RX ADMIN — MIDODRINE HYDROCHLORIDE 10 MG: 5 TABLET ORAL at 08:26

## 2025-02-27 RX ADMIN — POTASSIUM BICARBONATE 40 MEQ: 782 TABLET, EFFERVESCENT ORAL at 04:18

## 2025-02-27 RX ADMIN — FOLIC ACID 1 MG: 1 TABLET ORAL at 08:26

## 2025-02-27 RX ADMIN — VENLAFAXINE 75 MG: 25 TABLET ORAL at 17:08

## 2025-02-27 RX ADMIN — ALBUTEROL SULFATE 2.5 MG: 2.5 SOLUTION RESPIRATORY (INHALATION) at 08:52

## 2025-02-27 RX ADMIN — BUPROPION HYDROCHLORIDE 75 MG: 75 TABLET, FILM COATED ORAL at 08:26

## 2025-02-27 RX ADMIN — ACETYLCYSTEINE 600 MG: 200 SOLUTION ORAL; RESPIRATORY (INHALATION) at 08:52

## 2025-02-27 RX ADMIN — ATORVASTATIN CALCIUM 20 MG: 20 TABLET, FILM COATED ORAL at 20:14

## 2025-02-27 RX ADMIN — SODIUM CHLORIDE, PRESERVATIVE FREE 10 ML: 5 INJECTION INTRAVENOUS at 08:27

## 2025-02-27 RX ADMIN — VENLAFAXINE 75 MG: 25 TABLET ORAL at 08:25

## 2025-02-27 RX ADMIN — BUPROPION HYDROCHLORIDE 75 MG: 75 TABLET, FILM COATED ORAL at 20:14

## 2025-02-27 RX ADMIN — GUAIFENESIN 200 MG: 200 SOLUTION ORAL at 20:14

## 2025-02-27 RX ADMIN — ALBUTEROL SULFATE 2.5 MG: 2.5 SOLUTION RESPIRATORY (INHALATION) at 13:58

## 2025-02-27 RX ADMIN — Medication: at 20:47

## 2025-02-27 RX ADMIN — WHITE PETROLATUM,ZINC OXIDE: 57; 17 PASTE TOPICAL at 08:27

## 2025-02-27 RX ADMIN — SODIUM CHLORIDE, PRESERVATIVE FREE 10 ML: 5 INJECTION INTRAVENOUS at 20:14

## 2025-02-27 RX ADMIN — ASPIRIN 81 MG 81 MG: 81 TABLET ORAL at 08:26

## 2025-02-27 RX ADMIN — BUDESONIDE 500 MCG: 0.5 INHALANT ORAL at 08:52

## 2025-02-27 RX ADMIN — Medication 400 MG: at 04:18

## 2025-02-27 RX ADMIN — GUAIFENESIN 200 MG: 200 SOLUTION ORAL at 08:27

## 2025-02-27 ASSESSMENT — PULMONARY FUNCTION TESTS
PIF_VALUE: 25
PIF_VALUE: 22
PIF_VALUE: 23
PIF_VALUE: 22
PIF_VALUE: 23
PIF_VALUE: 22
PIF_VALUE: 23
PIF_VALUE: 24
PIF_VALUE: 34
PIF_VALUE: 21
PIF_VALUE: 23
PIF_VALUE: 20
PIF_VALUE: 29
PIF_VALUE: 22
PIF_VALUE: 26
PIF_VALUE: 22
PIF_VALUE: 25
PIF_VALUE: 23
PIF_VALUE: 22
PIF_VALUE: 24
PIF_VALUE: 22
PIF_VALUE: 24
PIF_VALUE: 22
PIF_VALUE: 23

## 2025-02-27 ASSESSMENT — PAIN SCALES - GENERAL
PAINLEVEL_OUTOF10: 0

## 2025-02-27 NOTE — PROCEDURES
PROCEDURE NOTE  Date: 2/26/2025   Name: Nida Graves  YOB: 1975    Procedures           [facility]  Bronchoscopy Procedure Note    Procedure:  Diagnostic bronchoscopy.    Indication:  Abnormal chest imaging and Mucus Plugging    Consent/Treatment:  Informed consent was obtained from the  patient after risks, benefits and alternatives were explained. Timeout verified the correct patient and correct procedure.     Anesthesia:   Patient on ventilator and receiving  Propofol drip plus morphine 4 mg and versed 6 mg     The following parameters were monitored: oxygen saturation, heart rate, blood pressure, respiratory rate, EKG, adequacy of pulmonary ventilation and response to care. Total physician intraservice time was 1 hour    Procedure Details:   -- The bronchoscope was introduced through a tracheostomy.   -- The trachea and milton were completely inspected and were found to have moderately friable mucosa.  -- The right-sided endobronchial anatomy was completely inspected and had some mucous and moderately friable mucosa. Mucus plugs suctioned out.  -- The left-sided endobronchial anatomy was completely inspected and had much more mucous and also moderately friable mucosa. Mucus plugs suctioned out after multiple passes to the left endobronchial anatomy    Specimens:   N/a    Complications: none    Estimated Blood Loss: Minimal    Suyapa Rea DO   Pulmonary and Critical Care Medicine   [facility]

## 2025-02-27 NOTE — PROGRESS NOTES
Assessed patient's mobility. Two-person assist required; patient unable to reach the edge of bed despite significant assistance. Patient reported dizziness. Due to safety concerns, attempt aborted, and patient was repositioned comfortably back in bed.

## 2025-02-27 NOTE — PROCEDURES
PROCEDURE NOTE  Date: 2/26/2025   Name: Nida Graves  YOB: 1975    Procedures  Assisted with bronchoscopy for mucus plugging, adjusting ventilator settings, moderate amount of bloody secretions/mucus removed.

## 2025-02-27 NOTE — PROGRESS NOTES
CRITICAL CARE PROGRESS NOTE    Name: Nida Graves   : 1975   MRN: [id number]   Date: 2025      Diagnoses/problem list:   Acute hypoxic and hypercapnic respiratory failure  Acute kidney injury  Atrial flutter with slow ventricular response  Complete heart block  GI bleed  Nosebleed, resolved  NSTEMI  Symptomatic bradycardia  Biventricular failure  Acute HFrEF, 25 to 30%  Diabetes  Morbid obesity  Acute metabolic/septic encephalopathy, improved  Deconditioning    24-hour events:   Worsening respiratory status overnight needing increasing FiO2. CXR showing edema. Will diurese. Considering bronchoscopy given improvements from prior bronch with clearing of mucus plugs.     Pt continues to await acceptance to LTAC which is best for her long-term care given her ventilator requirements and deconditioning.     Assessment and plan:   Ms. Graves is a 49-year-old female with PMH chronic debility and bedbound status, diabetes, asthma, obesity, who presented for shortness of breath.  Shortness of breath worsening over the past few days.  Upon presentation ED patient found to be hypoxic necessita.  Ting O2 via NC.  She was also found to be in A-fib with slow ventricular response.  She was transferred out of ICU on . Early morning  upgraded to ICU again due to severe hypoxia and hypercapnia requiring intubation. Now s/p trach on . Leadless pacemaker . PEG . Improved lung white-out s/p bronchoscopy     CNS / MSK:    Continue Bupropion and Venlafaxine   PT/OT  Pt would most benefit from LTAC to best address her deconditioning and long-term ventilator weaning    PULMONOLOGY:   Extubated on , reintubated on  due to inability to protect airway  ENT placed trach on   Recurrent mucus plugging, increasing mucolytic regimen to mucomyst q6h  Continue nebs, budesonide q12h, duonebs q6h  Lung white-out improved s/p bronchoscopy   Trial of diuresis with some improvement of

## 2025-02-27 NOTE — PROGRESS NOTES
Renal Progress Note    Patient: Nida Graves MRN: [id number]  SSN: [id number]    YOB: 1975  Age: 49 y.o.  Sex: female      Admit Date: 1/12/2025    LOS: 46 days     Subjective:   Patient seen in ICU.  Awake. On 356 FiO2, no acute distress.  Status post bronchscopy 2/23 for left lung opacification with ? Mucus plugs  tracheostomy, remains on vent  Edema +, Repeat labs showed improvement in creatinine to 0.9 repeat potassium is 3.5      Current Facility-Administered Medications   Medication Dose Route Frequency    sodium chloride (Inhalant) 3 % nebulizer solution 4 mL  4 mL Nebulization BID RT    magnesium hydroxide (MILK OF MAGNESIA) 400 MG/5ML suspension 30 mL  30 mL Per G Tube Daily PRN    ferric gluconate (FERRLECIT) 125 mg in sodium chloride 0.9 % 100 mL IVPB  125 mg IntraVENous Once per day on Monday Wednesday Friday    venlafaxine (EFFEXOR) tablet 75 mg  75 mg Per G Tube BID WC    polyethylene glycol (GLYCOLAX) packet 17 g  17 g Per G Tube Daily    guaiFENesin (ROBITUSSIN) 100 MG/5ML liquid 200 mg  200 mg Oral Q12H    norepinephrine (LEVOPHED) 16 mg in sodium chloride 0.9 % 250 mL infusion (premix)  1-100 mcg/min IntraVENous Continuous    succinylcholine (ANECTINE) injection 100 mg  100 mg IntraVENous Once    albuterol (PROVENTIL) nebulizer solution 2.5 mg  2.5 mg Nebulization Q6H RT    [START ON 2/28/2025] ipratropium 0.5 mg-albuterol 2.5 mg (DUONEB) nebulizer solution 1 Dose  1 Dose Inhalation Q6H WA RT    insulin lispro (HUMALOG,ADMELOG) injection vial 0-4 Units  0-4 Units SubCUTAneous 4x Daily AC & HS    0.9 % sodium chloride infusion   IntraVENous PRN    influenza split vaccine (PF) (AFLURIA;FLUARIX) injection 0.5 mL  1 Dose IntraMUSCular Prior to discharge    docusate (COLACE) 50 MG/5ML liquid 100 mg  100 mg Oral Daily    epoetin itz-epbx (RETACRIT) injection 2,500 Units  2,500 Units SubCUTAneous Once per day on Monday Wednesday Friday    lansoprazole (PREVACID SOLUTAB) disintegrating  injury  -Creatinine increased from 1.2-->1.9.   -KIZZY now improving after peaking at 1.9.  Creatinine improved 1.9--1.3--1.0--0.9  -KIZZY probably secondary to prerenal-severe anemia/hypercalcemia.  -No baseline history of CKD.    -Continue current management and continue to monitor renal functions    #2  Hyperkalemia  -improving K 5.4-->3.5 today, probably with high-protein promote thru PEG tube supplements  Improved with change to nepro   s/p PEG tube on 2/13,  continue to monitor     #3  Hypercalcemia with high PTH, primary hyperparathyroidism  Improved calcium levels-, continue Sensipar 30 mg daily  -Continue to monitor calcium levels    #4  Respiratory distress  -She was extubated on 1/26 but reintubated on 1/27 because of inability to protect airway.  Tracheostomy was done on 1/30  -Currently on mechanical ventilation via trach  S/p bronch 2/23 for mucus plugs with left lung opacification     #5  Hypotension  Improved hemodynamic status  -Last EF was 20 to 30% with right ventricular overload, moderate TR  S/p Lasix 40 mg IV yesterday.  Okay to continue as needed  -Cardiology on the case.  S/p PPM on 2/12    6. Anemia: came with severe anemia, s/p pRBC tx  Sever iron def noted, Received 4 doses of  IV ferrlecit doses  Monitor H/H    Signed By: Michelle Flores MD     February 27, 2025

## 2025-02-27 NOTE — CARE COORDINATION
CM reviewed Pt medicals, unfortunately Pt cannot go to a LTAC because her insurance will not give auth for Pt to go.  Pt will have to go to a Nursing Home that take trach & vents.    CM has reached out to several Nursing Home and they say that Pt is not stable due to her oxygen de-stating and suctioning, and needing blood.      Palliative Care is following Pt.

## 2025-02-27 NOTE — PROGRESS NOTES
OCCUPATIONAL THERAPY TREATMENT  Patient: Nida Graves (49 y.o. female)  Date: 2/27/2025  Primary Diagnosis: Atrial fibrillation, unspecified type (HCC) [I48.91]  Atrial flutter, unspecified type (HCC) [I48.92]  Congestive heart failure, unspecified HF chronicity, unspecified heart failure type (HCC) [I50.9]  Acute hypoxic respiratory failure (HCC) [J96.01]  Procedure(s) (LRB):  ESOPHAGOGASTRODUODENOSCOPY PERCUTANEOUS ENDOSCOPIC GASTROSTOMY TUBE PLACEMENT (N/A) 14 Days Post-Op   Precautions: Fall Risk, General Precautions                Recommendations for nursing mobility: Encourage HEP in prep for ADLs/mobility; see handout for details, Frequent repositioning to prevent skin breakdown, Use of bed/chair alarm for safety, and Assist x2    In place during session: Peripheral IV, Tracheostomy with trach collar, vent setting 35% Fio2, External Catheter, EKG/telemetry , Pulse ox, and PEG Tube  Chart, occupational therapy assessment, plan of care, and goals were reviewed.  ASSESSMENT  Patient continues with skilled OT services and is progressing towards goals. Pt presented semi supine upon HENDERSON arrival, agreeable to session. Pt A&O x 4. Pt completed grooming tasks with set up: facial hygiene and oral care using green toothettes.  Pt completed UE therex, see grid below for details, to maintain/ increase strength and endurance to aid in adl performance. Pt left semi supine with all known needs met. (See below for objective details and assist levels).     Overall pt tolerated session fair today with vitals WNL during exercises. Current OT recommendations for discharge LTAC. Will continue to benefit from skilled OT services, and will continue to progress as tolerated.      Start of Session End of Session   SPO2 (%) 96 96   Heart Rate (BPM) 70 69     GOALS:    Problem: Occupational Therapy - Adult  Goal: By Discharge: Performs self-care activities at highest level of function for planned discharge setting.  See evaluation  discharges home will need the following DME: continuing to assess with progress     SUBJECTIVE:   Patient agreeable to therapy.      OBJECTIVE DATA SUMMARY:   Cognitive/Behavioral Status:  Orientation  Orientation Level: Oriented X4  Cognition  Overall Cognitive Status: Exceptions  Arousal/Alertness: Appropriate responses to stimuli  Following Commands: Follows one step commands with increased time;Follows one step commands with repetition  Attention Span: Attends with cues to redirect  Safety Judgement: Decreased awareness of need for assistance;Decreased awareness of need for safety  Insights: Decreased awareness of deficits  Initiation: Appears intact  Sequencing: Appears intact    Functional Mobility and Transfers for ADLs:  Bed Mobility:  Bed Mobility Training  Bed Mobility Training: No    Transfers:  Transfer Training  Transfer Training: No      Balance:         ADL Intervention:       Grooming: Setup   Grooming Skilled Clinical Factors: facial, hand and oral hygiene; oral hygiene using green tootheets       Therapeutic exercise:  Pt completed B UE therex, w/ mod verbal/visual cues for proper technique and self pacing.     Exercise Sets Reps AROM AAROM PROM Self PROM Comments   Shoulder flex/ext 2 5 [] [x] [] []    Elbow flex/ext 1 10 [x] [] [] []    Wrist flex/ext 1 10 [x] [] [] []    Chest presses 1 10 [x] [] [] []    Shoulder abd/adduction 1 2 X       Digit flex/ext 1 10 X         Pain Ratin/10   Pain Intervention(s):   pain is at a level acceptable to the patient    Activity Tolerance:   Fair  and requires rest breaks    After treatment patient left in no apparent distress:   Bed locked and returned to lowest position, Patient left in no apparent distress in bed, Call bell within reach, Bed/ chair alarm activated, and Side rails x3, and nsg updated     COMMUNICATION/EDUCATION:   The patient’s plan of care was discussed with: Registered nurse    Patient Education  Education Given To: Patient  Education

## 2025-02-27 NOTE — PROGRESS NOTES
CRITICAL CARE PROGRESS NOTE    Name: Nida Graves   : 1975   MRN: [id number]   Date: 2025      Diagnoses/problem list:   Acute hypoxic and hypercapnic respiratory failure  Acute kidney injury  Atrial flutter with slow ventricular response  Complete heart block  GI bleed  Nosebleed, resolved  NSTEMI  Symptomatic bradycardia  Biventricular failure  Acute HFrEF, 25 to 30%  Diabetes  Morbid obesity  Acute metabolic/septic encephalopathy, improved  Deconditioning    24-hour events:   Improved left lung aeration after bronchoscopy yesterday.  Secretions improving.  Started saline nebs.    Pt continues to await acceptance to LTAC which is best for her long-term care given her ventilator requirements and deconditioning.     Assessment and plan:   Ms. Graves is a 49-year-old female with PMH chronic debility and bedbound status, diabetes, asthma, obesity, who presented for shortness of breath.  Shortness of breath worsening over the past few days.  Upon presentation ED patient found to be hypoxic necessita.  Ting O2 via NC.  She was also found to be in A-fib with slow ventricular response.  She was transferred out of ICU on . Early morning  upgraded to ICU again due to severe hypoxia and hypercapnia requiring intubation. Now s/p trach on . Leadless pacemaker . PEG . Improved lung white-out s/p bronchoscopy     CNS / MSK:    Continue Bupropion and Venlafaxine   PT/OT  Pt would most benefit from LTAC to best address her deconditioning and long-term ventilator weaning    PULMONOLOGY:   Extubated on , reintubated on  due to inability to protect airway  ENT placed trach on   Recurrent mucus plugging, continue 3% saline nebs  Continue nebs, budesonide q12h, duonebs q6h  Lung aeration improved s/p bronchoscopy  and   Repeat chest x-ray tomorrow morning  Pt would most benefit from LTAC to best address her deconditioning and long-term ventilator weaning    CARDIOVASCULAR:

## 2025-02-27 NOTE — PROGRESS NOTES
Comprehensive Nutrition Assessment    Type and Reason for Visit:  Reassess (Early Follow Up)    Nutrition Recommendations/Plan:   Continue TF via PEG w/ Nepro (Renal Formula) 240 ml every 6 hours + 1 Prosource daily. 250 ml free water flush every 4 hours.  TF provides: 1728 kcals, 77 g protein, and 697 ml free water  Prosource: +60 kcals, +15 g protein  TOTAL: 1788 kcals (83% est need), 92 g protein (1.5 g/kg IBW/84% est need), and 2297 ml H2O  Monitor fluid status and lytes, correct as needed  Monitor weight as able (bed scale needs zeroing if pt gets out of bed), TF admin, labs, and bowel fxn/abd exam     Malnutrition Assessment:  Malnutrition Status:  Mild malnutrition (01/21/25 1213)      Nutrition Assessment:    Please see previous note for more detailed assessment; Presented w/ SOB, arrhythmia. ICU 1/13-1/16. Returned from floor 1/19 d/t hypoxia, hypercarbia, and encephalopathy. Persistent hypoxia on biPAP, intubated. TF initiated 1/21 - Promote @30 + 4PS. Concern for GI bleed w/ dark stools, TF held 1/22 for CTA, neg, restarted 1/24. Pt extubated 1/26, SLP rec mod thick CLD. Incr O2 needs 1/27,copious secretions, reintubated 1/27. Trach 1/30, propofol weaning. Planned for cardioversion 2/6, TF held. PEG and leadless pacemaker 2/11. 2/13 TF changed to Nepro per Nephrology d/t incr GUADALUPE PARKS modified regiment to meet needs. 2/16 Nephrology switched back to Maya RD updated regimen. (2/21) Messaged by PharmD, Nephrology requesting formula change back to Nepro. Will recalculate and decr protein given Nephrology c/f exacerbating hyperkalemia. Unable to assess pt weight, bed scale inaccurate. Continued weaning of vent settings as tolerated, pt awaiting insurance approval for LTAC. Pt w/ mucus MD marcelino incr [facility] to attempt to thin, mucomyst. (2/27) Pt had lung white out 2/23 now s/p bronch 2/23, 2/26 w/ improvement. Pt still awaiting placement. Plans to attempt mobility today; RN to zero bed if OOB given  Identified  Food/Nutrient Intake Outcomes: Enteral Nutrition Intake/Tolerance  Physical Signs/Symptoms Outcomes: Biochemical Data, Constipation, GI Status, Weight, Fluid Status or Edema    Discharge Planning:    Enteral Nutrition     Winnie Garrison RD  Contact: [phone]

## 2025-02-27 NOTE — PLAN OF CARE
Problem: ABCDS Injury Assessment  Goal: Absence of physical injury  Outcome: Progressing     Problem: Chronic Conditions and Co-morbidities  Goal: Patient's chronic conditions and co-morbidity symptoms are monitored and maintained or improved  Outcome: Progressing  Flowsheets (Taken 2/26/2025 2000)  Care Plan - Patient's Chronic Conditions and Co-Morbidity Symptoms are Monitored and Maintained or Improved: Monitor and assess patient's chronic conditions and comorbid symptoms for stability, deterioration, or improvement     Problem: Safety - Adult  Goal: Free from fall injury  Outcome: Progressing     Problem: Gastrointestinal - Adult  Goal: Maintains adequate nutritional intake  Outcome: Progressing  Flowsheets (Taken 2/26/2025 2000)  Maintains adequate nutritional intake: Monitor intake and output, weight and lab values     Problem: Genitourinary - Adult  Goal: Absence of urinary retention  Outcome: Progressing  Flowsheets (Taken 2/26/2025 2000)  Absence of urinary retention: Monitor intake/output and perform bladder scan as needed     Problem: Pain  Goal: Verbalizes/displays adequate comfort level or baseline comfort level  Outcome: Progressing  Flowsheets (Taken 2/26/2025 2000)  Verbalizes/displays adequate comfort level or baseline comfort level: Encourage patient to monitor pain and request assistance

## 2025-02-28 ENCOUNTER — APPOINTMENT (OUTPATIENT)
Facility: HOSPITAL | Age: 50
DRG: 004 | End: 2025-02-28
Payer: COMMERCIAL

## 2025-02-28 LAB
ALBUMIN SERPL-MCNC: 2.6 G/DL (ref 3.5–5)
ANION GAP SERPL CALC-SCNC: 5 MMOL/L (ref 2–12)
BASOPHILS # BLD: 0.05 K/UL (ref 0–0.1)
BASOPHILS NFR BLD: 0.5 % (ref 0–1)
BUN SERPL-MCNC: 37 MG/DL (ref 6–20)
BUN/CREAT SERPL: 39 (ref 12–20)
CA-I BLD-MCNC: 8 MG/DL (ref 8.5–10.1)
CHLORIDE SERPL-SCNC: 106 MMOL/L (ref 97–108)
CO2 SERPL-SCNC: 29 MMOL/L (ref 21–32)
CREAT SERPL-MCNC: 0.96 MG/DL (ref 0.55–1.02)
DIFFERENTIAL METHOD BLD: ABNORMAL
EOSINOPHIL # BLD: 0.3 K/UL (ref 0–0.4)
EOSINOPHIL NFR BLD: 3.2 % (ref 0–7)
ERYTHROCYTE [DISTWIDTH] IN BLOOD BY AUTOMATED COUNT: 18.6 % (ref 11.5–14.5)
GLUCOSE BLD STRIP.AUTO-MCNC: 113 MG/DL (ref 65–100)
GLUCOSE BLD STRIP.AUTO-MCNC: 119 MG/DL (ref 65–100)
GLUCOSE BLD STRIP.AUTO-MCNC: 94 MG/DL (ref 65–100)
GLUCOSE BLD STRIP.AUTO-MCNC: 96 MG/DL (ref 65–100)
GLUCOSE SERPL-MCNC: 92 MG/DL (ref 65–100)
HCT VFR BLD AUTO: 26.1 % (ref 35–47)
HGB BLD-MCNC: 8.1 G/DL (ref 11.5–16)
IMM GRANULOCYTES # BLD AUTO: 0.07 K/UL (ref 0–0.04)
IMM GRANULOCYTES NFR BLD AUTO: 0.8 % (ref 0–0.5)
LYMPHOCYTES # BLD: 2.54 K/UL (ref 0.8–3.5)
LYMPHOCYTES NFR BLD: 27.2 % (ref 12–49)
MAGNESIUM SERPL-MCNC: 1.8 MG/DL (ref 1.6–2.4)
MCH RBC QN AUTO: 29.5 PG (ref 26–34)
MCHC RBC AUTO-ENTMCNC: 31 G/DL (ref 30–36.5)
MCV RBC AUTO: 94.9 FL (ref 80–99)
MONOCYTES # BLD: 0.78 K/UL (ref 0–1)
MONOCYTES NFR BLD: 8.4 % (ref 5–13)
NEUTS SEG # BLD: 5.59 K/UL (ref 1.8–8)
NEUTS SEG NFR BLD: 59.9 % (ref 32–75)
NRBC # BLD: 0 K/UL (ref 0–0.01)
NRBC BLD-RTO: 0 PER 100 WBC
PERFORMED BY:: ABNORMAL
PERFORMED BY:: ABNORMAL
PERFORMED BY:: NORMAL
PERFORMED BY:: NORMAL
PHOSPHATE SERPL-MCNC: 3.4 MG/DL (ref 2.6–4.7)
PHOSPHATE SERPL-MCNC: 3.5 MG/DL (ref 2.6–4.7)
PLATELET # BLD AUTO: 382 K/UL (ref 150–400)
PMV BLD AUTO: 10 FL (ref 8.9–12.9)
POTASSIUM SERPL-SCNC: 3.8 MMOL/L (ref 3.5–5.1)
RBC # BLD AUTO: 2.75 M/UL (ref 3.8–5.2)
SODIUM SERPL-SCNC: 140 MMOL/L (ref 136–145)
WBC # BLD AUTO: 9.3 K/UL (ref 3.6–11)

## 2025-02-28 PROCEDURE — 6370000000 HC RX 637 (ALT 250 FOR IP): Performed by: STUDENT IN AN ORGANIZED HEALTH CARE EDUCATION/TRAINING PROGRAM

## 2025-02-28 PROCEDURE — 6360000002 HC RX W HCPCS: Performed by: NURSE PRACTITIONER

## 2025-02-28 PROCEDURE — 2500000003 HC RX 250 WO HCPCS: Performed by: STUDENT IN AN ORGANIZED HEALTH CARE EDUCATION/TRAINING PROGRAM

## 2025-02-28 PROCEDURE — 94003 VENT MGMT INPAT SUBQ DAY: CPT

## 2025-02-28 PROCEDURE — 36415 COLL VENOUS BLD VENIPUNCTURE: CPT

## 2025-02-28 PROCEDURE — 94640 AIRWAY INHALATION TREATMENT: CPT

## 2025-02-28 PROCEDURE — 6360000002 HC RX W HCPCS: Performed by: STUDENT IN AN ORGANIZED HEALTH CARE EDUCATION/TRAINING PROGRAM

## 2025-02-28 PROCEDURE — 94761 N-INVAS EAR/PLS OXIMETRY MLT: CPT

## 2025-02-28 PROCEDURE — 85025 COMPLETE CBC W/AUTO DIFF WBC: CPT

## 2025-02-28 PROCEDURE — 2580000003 HC RX 258: Performed by: INTERNAL MEDICINE

## 2025-02-28 PROCEDURE — 6360000002 HC RX W HCPCS: Performed by: INTERNAL MEDICINE

## 2025-02-28 PROCEDURE — 2500000003 HC RX 250 WO HCPCS: Performed by: NURSE PRACTITIONER

## 2025-02-28 PROCEDURE — 71045 X-RAY EXAM CHEST 1 VIEW: CPT

## 2025-02-28 PROCEDURE — 84100 ASSAY OF PHOSPHORUS: CPT

## 2025-02-28 PROCEDURE — 6370000000 HC RX 637 (ALT 250 FOR IP): Performed by: NURSE PRACTITIONER

## 2025-02-28 PROCEDURE — 97530 THERAPEUTIC ACTIVITIES: CPT

## 2025-02-28 PROCEDURE — 82962 GLUCOSE BLOOD TEST: CPT

## 2025-02-28 PROCEDURE — 2000000000 HC ICU R&B

## 2025-02-28 PROCEDURE — 83735 ASSAY OF MAGNESIUM: CPT

## 2025-02-28 PROCEDURE — 2700000000 HC OXYGEN THERAPY PER DAY

## 2025-02-28 PROCEDURE — 2580000003 HC RX 258: Performed by: STUDENT IN AN ORGANIZED HEALTH CARE EDUCATION/TRAINING PROGRAM

## 2025-02-28 PROCEDURE — 80069 RENAL FUNCTION PANEL: CPT

## 2025-02-28 PROCEDURE — 6370000000 HC RX 637 (ALT 250 FOR IP): Performed by: INTERNAL MEDICINE

## 2025-02-28 PROCEDURE — 94668 MNPJ CHEST WALL SBSQ: CPT

## 2025-02-28 RX ORDER — FUROSEMIDE 10 MG/ML
40 INJECTION INTRAMUSCULAR; INTRAVENOUS ONCE
Status: COMPLETED | OUTPATIENT
Start: 2025-02-28 | End: 2025-02-28

## 2025-02-28 RX ORDER — ENOXAPARIN SODIUM 100 MG/ML
40 INJECTION SUBCUTANEOUS DAILY
Status: DISCONTINUED | OUTPATIENT
Start: 2025-02-28 | End: 2025-03-03

## 2025-02-28 RX ADMIN — ASPIRIN 81 MG 81 MG: 81 TABLET ORAL at 08:28

## 2025-02-28 RX ADMIN — Medication 4 ML: at 19:53

## 2025-02-28 RX ADMIN — LANSOPRAZOLE 30 MG: 30 TABLET, ORALLY DISINTEGRATING ORAL at 05:39

## 2025-02-28 RX ADMIN — GUAIFENESIN 200 MG: 200 SOLUTION ORAL at 08:29

## 2025-02-28 RX ADMIN — Medication 4 ML: at 08:32

## 2025-02-28 RX ADMIN — VENLAFAXINE 75 MG: 25 TABLET ORAL at 16:36

## 2025-02-28 RX ADMIN — POLYETHYLENE GLYCOL 3350 17 G: 17 POWDER, FOR SOLUTION ORAL at 08:29

## 2025-02-28 RX ADMIN — CINACALCET HYDROCHLORIDE 30 MG: 30 TABLET, FILM COATED ORAL at 09:48

## 2025-02-28 RX ADMIN — FUROSEMIDE 40 MG: 10 INJECTION, SOLUTION INTRAMUSCULAR; INTRAVENOUS at 14:45

## 2025-02-28 RX ADMIN — GUAIFENESIN 200 MG: 200 SOLUTION ORAL at 21:11

## 2025-02-28 RX ADMIN — IPRATROPIUM BROMIDE AND ALBUTEROL SULFATE 1 DOSE: 2.5; .5 SOLUTION RESPIRATORY (INHALATION) at 08:33

## 2025-02-28 RX ADMIN — WHITE PETROLATUM,ZINC OXIDE: 57; 17 PASTE TOPICAL at 09:55

## 2025-02-28 RX ADMIN — ATORVASTATIN CALCIUM 20 MG: 20 TABLET, FILM COATED ORAL at 21:11

## 2025-02-28 RX ADMIN — MIDODRINE HYDROCHLORIDE 10 MG: 5 TABLET ORAL at 08:28

## 2025-02-28 RX ADMIN — IPRATROPIUM BROMIDE AND ALBUTEROL SULFATE 1 DOSE: 2.5; .5 SOLUTION RESPIRATORY (INHALATION) at 13:48

## 2025-02-28 RX ADMIN — SODIUM CHLORIDE, PRESERVATIVE FREE 10 ML: 5 INJECTION INTRAVENOUS at 21:11

## 2025-02-28 RX ADMIN — FOLIC ACID 1 MG: 1 TABLET ORAL at 08:29

## 2025-02-28 RX ADMIN — BUPROPION HYDROCHLORIDE 75 MG: 75 TABLET, FILM COATED ORAL at 08:28

## 2025-02-28 RX ADMIN — VENLAFAXINE 75 MG: 25 TABLET ORAL at 08:28

## 2025-02-28 RX ADMIN — ENOXAPARIN SODIUM 40 MG: 100 INJECTION SUBCUTANEOUS at 08:29

## 2025-02-28 RX ADMIN — WHITE PETROLATUM,ZINC OXIDE: 57; 17 PASTE TOPICAL at 21:12

## 2025-02-28 RX ADMIN — BUDESONIDE 500 MCG: 0.5 INHALANT ORAL at 08:33

## 2025-02-28 RX ADMIN — EPOETIN ALFA-EPBX 2500 UNITS: 3000 INJECTION, SOLUTION INTRAVENOUS; SUBCUTANEOUS at 17:40

## 2025-02-28 RX ADMIN — BUDESONIDE 500 MCG: 0.5 INHALANT ORAL at 21:06

## 2025-02-28 RX ADMIN — MAGNESIUM HYDROXIDE 30 ML: 400 SUSPENSION ORAL at 11:45

## 2025-02-28 RX ADMIN — BUPROPION HYDROCHLORIDE 75 MG: 75 TABLET, FILM COATED ORAL at 21:11

## 2025-02-28 RX ADMIN — SODIUM CHLORIDE 125 MG: 9 INJECTION, SOLUTION INTRAVENOUS at 09:54

## 2025-02-28 RX ADMIN — IPRATROPIUM BROMIDE AND ALBUTEROL SULFATE 1 DOSE: 2.5; .5 SOLUTION RESPIRATORY (INHALATION) at 21:05

## 2025-02-28 RX ADMIN — DOCUSATE SODIUM 100 MG: 50 LIQUID ORAL at 08:29

## 2025-02-28 RX ADMIN — ALBUTEROL SULFATE 2.5 MG: 2.5 SOLUTION RESPIRATORY (INHALATION) at 01:50

## 2025-02-28 ASSESSMENT — PAIN SCALES - GENERAL
PAINLEVEL_OUTOF10: 0

## 2025-02-28 ASSESSMENT — PULMONARY FUNCTION TESTS
PIF_VALUE: 21
PIF_VALUE: 23
PIF_VALUE: 21
PIF_VALUE: 18
PIF_VALUE: 21
PIF_VALUE: 21
PIF_VALUE: 16
PIF_VALUE: 23
PIF_VALUE: 23
PIF_VALUE: 18
PIF_VALUE: 21
PIF_VALUE: 27
PIF_VALUE: 19
PIF_VALUE: 17
PIF_VALUE: 21
PIF_VALUE: 16

## 2025-02-28 NOTE — PROGRESS NOTES
Renal Progress Note    Patient: Nida Graves MRN: [id number]  SSN: [id number]    YOB: 1975  Age: 49 y.o.  Sex: female      Admit Date: 1/12/2025    LOS: 47 days     Subjective:   Patient seen in ICU.  Awake. On 30% FiO2, no acute distress.  tracheostomy  Edema +, Repeat labs showed stable creatinine to 0.9 repeat potassium is 3.8      Current Facility-Administered Medications   Medication Dose Route Frequency    enoxaparin (LOVENOX) injection 40 mg  40 mg SubCUTAneous Daily    sodium chloride (Inhalant) 3 % nebulizer solution 4 mL  4 mL Nebulization BID RT    magnesium hydroxide (MILK OF MAGNESIA) 400 MG/5ML suspension 30 mL  30 mL Per G Tube Daily PRN    ferric gluconate (FERRLECIT) 125 mg in sodium chloride 0.9 % 100 mL IVPB  125 mg IntraVENous Once per day on Monday Wednesday Friday    venlafaxine (EFFEXOR) tablet 75 mg  75 mg Per G Tube BID WC    polyethylene glycol (GLYCOLAX) packet 17 g  17 g Per G Tube Daily    guaiFENesin (ROBITUSSIN) 100 MG/5ML liquid 200 mg  200 mg Oral Q12H    norepinephrine (LEVOPHED) 16 mg in sodium chloride 0.9 % 250 mL infusion (premix)  1-100 mcg/min IntraVENous Continuous    succinylcholine (ANECTINE) injection 100 mg  100 mg IntraVENous Once    ipratropium 0.5 mg-albuterol 2.5 mg (DUONEB) nebulizer solution 1 Dose  1 Dose Inhalation Q6H WA RT    insulin lispro (HUMALOG,ADMELOG) injection vial 0-4 Units  0-4 Units SubCUTAneous 4x Daily AC & HS    0.9 % sodium chloride infusion   IntraVENous PRN    influenza split vaccine (PF) (AFLURIA;FLUARIX) injection 0.5 mL  1 Dose IntraMUSCular Prior to discharge    docusate (COLACE) 50 MG/5ML liquid 100 mg  100 mg Oral Daily    epoetin itz-epbx (RETACRIT) injection 2,500 Units  2,500 Units SubCUTAneous Once per day on Monday Wednesday Friday    lansoprazole (PREVACID SOLUTAB) disintegrating tablet 30 mg  30 mg Per G Tube QAM AC    aspirin chewable tablet 81 mg  81 mg Per G Tube Daily    atorvastatin (LIPITOR) tablet 20 mg  20    Renal Function Panel    Collection Time: 02/28/25  2:54 AM   Result Value Ref Range    Sodium 140 136 - 145 mmol/L    Potassium 3.8 3.5 - 5.1 mmol/L    Chloride 106 97 - 108 mmol/L    CO2 29 21 - 32 mmol/L    Anion Gap 5 2 - 12 mmol/L    Glucose 92 65 - 100 mg/dL    BUN 37 (H) 6 - 20 mg/dL    Creatinine 0.96 0.55 - 1.02 mg/dL    BUN/Creatinine Ratio 39 (H) 12 - 20      Est, Glom Filt Rate 73 >60 ml/min/1.73m2    Calcium 8.0 (L) 8.5 - 10.1 mg/dL    Phosphorus 3.5 2.6 - 4.7 mg/dL    Albumin 2.6 (L) 3.5 - 5.0 g/dL   Magnesium    Collection Time: 02/28/25  2:57 AM   Result Value Ref Range    Magnesium 1.8 1.6 - 2.4 mg/dL   Phosphorus    Collection Time: 02/28/25  2:57 AM   Result Value Ref Range    Phosphorus 3.4 2.6 - 4.7 mg/dL   CBC with Auto Differential    Collection Time: 02/28/25  2:57 AM   Result Value Ref Range    WBC 9.3 3.6 - 11.0 K/uL    RBC 2.75 (L) 3.80 - 5.20 M/uL    Hemoglobin 8.1 (L) 11.5 - 16.0 g/dL    Hematocrit 26.1 (L) 35.0 - 47.0 %    MCV 94.9 80.0 - 99.0 FL    MCH 29.5 26.0 - 34.0 PG    MCHC 31.0 30.0 - 36.5 g/dL    RDW 18.6 (H) 11.5 - 14.5 %    Platelets 382 150 - 400 K/uL    MPV 10.0 8.9 - 12.9 FL    Nucleated RBCs 0.0 0.0  WBC    nRBC 0.00 0.00 - 0.01 K/uL    Neutrophils % 59.9 32.0 - 75.0 %    Lymphocytes % 27.2 12.0 - 49.0 %    Monocytes % 8.4 5.0 - 13.0 %    Eosinophils % 3.2 0.0 - 7.0 %    Basophils % 0.5 0.0 - 1.0 %    Immature Granulocytes % 0.8 (H) 0 - 0.5 %    Neutrophils Absolute 5.59 1.80 - 8.00 K/UL    Lymphocytes Absolute 2.54 0.80 - 3.50 K/UL    Monocytes Absolute 0.78 0.00 - 1.00 K/UL    Eosinophils Absolute 0.30 0.00 - 0.40 K/UL    Basophils Absolute 0.05 0.00 - 0.10 K/UL    Immature Granulocytes Absolute 0.07 (H) 0.00 - 0.04 K/UL    Differential Type AUTOMATED     POCT Glucose    Collection Time: 02/28/25  8:02 AM   Result Value Ref Range    POC Glucose 94 65 - 100 mg/dL    Performed by: LIDIA Claros         Assessment and Plan:     #1 acute kidney injury  -Creatinine

## 2025-02-28 NOTE — PLAN OF CARE
PHYSICAL THERAPY TREATMENT     Patient: Nida Graves (49 y.o. female)  Date: 2/28/2025  Diagnosis: Atrial fibrillation, unspecified type (HCC) [I48.91]  Atrial flutter, unspecified type (HCC) [I48.92]  Congestive heart failure, unspecified HF chronicity, unspecified heart failure type (HCC) [I50.9]  Acute hypoxic respiratory failure (HCC) [J96.01] Acute on chronic hypoxic respiratory failure (HCC)  Procedure(s) (LRB):  ESOPHAGOGASTRODUODENOSCOPY PERCUTANEOUS ENDOSCOPIC GASTROSTOMY TUBE PLACEMENT (N/A) 15 Days Post-Op  Precautions: Fall Risk, General Precautions                      Recommendations for nursing mobility: Encourage HEP in prep for ADLs/mobility; see handout for details, Frequent repositioning to prevent skin breakdown, and Assist x2    In place during session: Peripheral IV, Tracheostomy CPAP/PS 35% FiO2, External Catheter, EKG/telemetry , Pulse ox, and PEG Tube  Chart, physical therapy assessment, plan of care and goals were reviewed.  ASSESSMENT  Patient continues with skilled PT services and is slowly progressing towards goals. Pt semi-supine upon PT arrival, agreeable to session. Pt A&O x 4. (See below for objective details and assist levels).     Overall pt tolerated session fair today with no reports of pain, but reports feeling anxious and tired.  Pt requiring max A x 2 for sup to sit transfer and demos strong posterior lean and difficulty placing feet underneath her to stabilize herself for sitting. Writer required to block knees/feet; however, pt continued to slowly slide forward and was too close to the EOB, so she was laid back down in the bed for a 2-3 min rest break.  After resting, she attempted sup to sit again with max A x 2 and sat for about 1-2 minutes working on sitting balance. Pt able to practice anterior weightshifting and leaning to the R (as she tends to lean L) and pt was able to assist with this. When pt is sitting, she has a difficult time keeping her knees flexed  [] [x] [] []    Straight Leg Raises   [] [] [] []    Hip abd/add   [] [] [] []    Joint compression LLE  6 [] [] [x] []    PNF diagnols L hip   [] [x] [] []    Stretching of L hip  2 min [] [] [x] []    bridges  10 [] [x] [] []       Pain Ratin/10  reported  Pain Intervention(s):       Activity Tolerance:   Fair  and requires frequent rest breaks    After treatment patient left in no apparent distress:   Bed locked and returned to lowest position, Patient left in no apparent distress in bed, Call bell within reach, and Side rails x3, and nsg updated     COMMUNICATION/COLLABORATION:   The patient’s plan of care was discussed with: Registered nurse    Patient Education  Education Given To: Patient  Education Provided: Role of Therapy;Plan of Care;Home Exercise Program  Education Method: Demonstration;Verbal  Barriers to Learning: None  Education Outcome: Verbalized understanding;Continued education needed    PT/OT sessions occurred together for increased safety of pt and clinician.     Shanice Norton, PT

## 2025-02-28 NOTE — PLAN OF CARE
Problem: Safety - Adult  Goal: Free from fall injury  Outcome: Progressing     Problem: Respiratory - Adult  Goal: Achieves optimal ventilation and oxygenation  Outcome: Progressing     Problem: Pain  Goal: Verbalizes/displays adequate comfort level or baseline comfort level  Outcome: Progressing  Flowsheets (Taken 2/27/2025 2000)  Verbalizes/displays adequate comfort level or baseline comfort level: Assess pain using appropriate pain scale

## 2025-02-28 NOTE — CARE COORDINATION
CM reviewed Pt medicals, unfortunately Pt cannot go to a LTAC because her insurance will not give auth for Pt to go.  Pt will have to go to a Nursing Home that take trach & vents.          CM has sent updated medicals to LTAC.     Per IDR  Pt is doing better, she is down to 30%

## 2025-02-28 NOTE — PROGRESS NOTES
CRITICAL CARE PROGRESS NOTE    Name: Nida Graves   : 1975   MRN: [id number]   Date: 2025      Diagnoses/problem list:   Acute hypoxic and hypercapnic respiratory failure  Acute kidney injury  Atrial flutter with slow ventricular response  Complete heart block  GI bleed  Nosebleed, resolved  NSTEMI  Symptomatic bradycardia  Biventricular failure  Acute HFrEF, 25 to 30%  Diabetes  Morbid obesity  Acute metabolic/septic encephalopathy, improved  Deconditioning    24-hour events:   No new complaints.  Patient feels fine this morning.  Follow-up chest x-ray with good bilateral aeration.    Assessment and plan:   Ms. Graves is a 49-year-old female with PMH chronic debility and bedbound status, diabetes, asthma, obesity, who presented for shortness of breath.  Shortness of breath worsening over the past few days.  Upon presentation ED patient found to be hypoxic necessita.  Ting O2 via NC.  She was also found to be in A-fib with slow ventricular response.  She was transferred out of ICU on . Early morning  upgraded to ICU again due to severe hypoxia and hypercapnia requiring intubation. Now s/p trach on . Leadless pacemaker . PEG . Improved lung white-out s/p bronchoscopy     CNS / MSK:    Continue Bupropion and Venlafaxine   PT/OT  Pt would most benefit from LTAC to best address her deconditioning and long-term ventilator weaning    PULMONOLOGY:   Extubated on , reintubated on  due to inability to protect airway  ENT placed trach on   Recurrent mucus plugging, continue 3% saline nebs  Continue nebs, budesonide q12h, duonebs q6h  Lung aeration improved s/p bronchoscopy  and   Repeat chest x-ray tomorrow morning  Pt would most benefit from LTAC to best address her deconditioning and long-term ventilator weaning    CARDIOVASCULAR:   Midodrine to keep MAP above 65   S/p AUGUSTIN/DCCV on   LVEF 20-30%, right ventricle overload, moderate TR   Diuresis as needed  [facility]  hours) at 2/28/2025 0952  Last data filed at 2/28/2025 0600  Gross per 24 hour   Intake 1820 ml   Output 1150 ml   Net 670 ml       CRITICAL CARE DOCUMENTATION  I had a face to face encounter with the patient, reviewed and interpreted patient data including clinical events, labs, images, vital signs, I/O's, and examined patient.  I have discussed the case and the plan and management of the patient's care with the consulting services, the bedside nurses and the respiratory therapist.      NOTE OF PERSONAL INVOLVEMENT IN CARE   This patient has a high probability of imminent, clinically significant deterioration, which requires the highest level of preparedness to intervene urgently. I participated in the decision-making and personally managed or directed the management of the following life and organ supporting interventions that required my frequent assessment to treat or prevent imminent deterioration.    I personally spent 35 minutes of critical care time.  This is time spent at this critically ill patient's bedside actively involved in patient care as well as the coordination of care.  This does not include any procedural time which has been billed separately.        Josue Salvador MD   Critical Care Medicine  [facility], [place] [facility]

## 2025-02-28 NOTE — PLAN OF CARE
OCCUPATIONAL THERAPY TREATMENT  Patient: Nida Graves (49 y.o. female)  Date: 2/28/2025  Primary Diagnosis: Atrial fibrillation, unspecified type (HCC) [I48.91]  Atrial flutter, unspecified type (HCC) [I48.92]  Congestive heart failure, unspecified HF chronicity, unspecified heart failure type (HCC) [I50.9]  Acute hypoxic respiratory failure (HCC) [J96.01]  Procedure(s) (LRB):  ESOPHAGOGASTRODUODENOSCOPY PERCUTANEOUS ENDOSCOPIC GASTROSTOMY TUBE PLACEMENT (N/A) 15 Days Post-Op   Precautions: Fall Risk, General Precautions                Recommendations for nursing mobility: Encourage HEP in prep for ADLs/mobility; see handout for details, Frequent repositioning to prevent skin breakdown, Use of bed/chair alarm for safety, and Assist x2    In place during session: Peripheral IV, Tracheostomy 35% fio2, External Catheter, EKG/telemetry , Pulse ox, and PEG Tube  Chart, occupational therapy assessment, plan of care, and goals were reviewed.  ASSESSMENT  Patient continues with skilled OT services and is progressing towards goals. Pt semi supine upon OT arrival, agreeable to session. Pt A&O x 4. Pt rolling b/l in bed with RN present for randell care and alisa pad change -- requiring TA with randell care and mod-max A x2. Pt then agreeable to attempt to sit at EOB with OT/PT. Pt transferring from semi supine to sitting up at EOB with max A x2. Pt with posterior lean and requiring manual assist placing feet underneath her, requiring b/l knee blocking as pt slowly sliding forwards. Pt assisted back into semi supine position due to safety concerns with sliding towards EOB. Pt resting in semi supine for 2-3 minutes, then agreeable to attempt EOB sitting again. Pt again requiring max Ax2 for transfer to sit at EOB. Pt sitting up ~2 minutes, still requiring max A to maintain sitting balance, able to somewhat correct posture with cues. Pt transferring back into semi supine position. Denies dizziness while sitting at EOB and Spo2 sats  assistance;Minimal assistance   Grooming Skilled Clinical Factors: oral hygiene with rinse and mouth swab while semi supine             Toileting: total A posterior randell care in sidelying                LE Dressing: Maximum assistance  LE Dressing Skilled Clinical Factors: lifting both legs antigravity to jada socks while semi supine                               Therapeutic exercise:    Exercise Sets Reps AROM AAROM PROM Self PROM Comments   Shoulder flex/ext 1 10 [x] [] [] []    Elbow flex/ext 1 15 [x] [] [] []    Wrist flex/ext 1 15 [x] [] [] []    Digit flex/ext 1 15 [x] [] [] []    Forward punches 1 15 [x]         Pain Ratin/10   Pain Intervention(s):       Activity Tolerance:   Fair     After treatment patient left in no apparent distress:   Bed locked and returned to lowest position, Patient left in no apparent distress in bed and Patient offloaded in partial R side lying for pressure relief, and nsg updated     COMMUNICATION/EDUCATION:   The patient’s plan of care was discussed with: Physical therapist and Registered nurse    Patient Education  Education Given To: Patient  Education Provided: Home Exercise Program;Plan of Care;Role of Therapy  Education Method: Verbal;Demonstration;Teach Back  Barriers to Learning: None  Education Outcome: Verbalized understanding;Demonstrated understanding;Continued education needed    Thank you for this referral.  Whit Ann OT  Minutes: 31

## 2025-03-01 LAB
GLUCOSE BLD STRIP.AUTO-MCNC: 115 MG/DL (ref 65–100)
GLUCOSE BLD STRIP.AUTO-MCNC: 116 MG/DL (ref 65–100)
GLUCOSE BLD STRIP.AUTO-MCNC: 125 MG/DL (ref 65–100)
GLUCOSE BLD STRIP.AUTO-MCNC: 95 MG/DL (ref 65–100)
PERFORMED BY:: ABNORMAL
PERFORMED BY:: NORMAL

## 2025-03-01 PROCEDURE — 6360000002 HC RX W HCPCS: Performed by: INTERNAL MEDICINE

## 2025-03-01 PROCEDURE — 6370000000 HC RX 637 (ALT 250 FOR IP): Performed by: STUDENT IN AN ORGANIZED HEALTH CARE EDUCATION/TRAINING PROGRAM

## 2025-03-01 PROCEDURE — 97530 THERAPEUTIC ACTIVITIES: CPT

## 2025-03-01 PROCEDURE — 6370000000 HC RX 637 (ALT 250 FOR IP): Performed by: NURSE PRACTITIONER

## 2025-03-01 PROCEDURE — 2700000000 HC OXYGEN THERAPY PER DAY

## 2025-03-01 PROCEDURE — 2000000000 HC ICU R&B

## 2025-03-01 PROCEDURE — 2500000003 HC RX 250 WO HCPCS: Performed by: NURSE PRACTITIONER

## 2025-03-01 PROCEDURE — 2580000003 HC RX 258: Performed by: INTERNAL MEDICINE

## 2025-03-01 PROCEDURE — 82962 GLUCOSE BLOOD TEST: CPT

## 2025-03-01 PROCEDURE — 94668 MNPJ CHEST WALL SBSQ: CPT

## 2025-03-01 PROCEDURE — 94761 N-INVAS EAR/PLS OXIMETRY MLT: CPT

## 2025-03-01 PROCEDURE — 6360000002 HC RX W HCPCS: Performed by: STUDENT IN AN ORGANIZED HEALTH CARE EDUCATION/TRAINING PROGRAM

## 2025-03-01 PROCEDURE — 2500000003 HC RX 250 WO HCPCS: Performed by: STUDENT IN AN ORGANIZED HEALTH CARE EDUCATION/TRAINING PROGRAM

## 2025-03-01 PROCEDURE — 94669 MECHANICAL CHEST WALL OSCILL: CPT

## 2025-03-01 PROCEDURE — 97110 THERAPEUTIC EXERCISES: CPT

## 2025-03-01 PROCEDURE — 94003 VENT MGMT INPAT SUBQ DAY: CPT

## 2025-03-01 PROCEDURE — 6370000000 HC RX 637 (ALT 250 FOR IP): Performed by: INTERNAL MEDICINE

## 2025-03-01 PROCEDURE — 94640 AIRWAY INHALATION TREATMENT: CPT

## 2025-03-01 RX ORDER — ROCURONIUM BROMIDE 10 MG/ML
INJECTION, SOLUTION INTRAVENOUS
Status: DISCONTINUED
Start: 2025-03-01 | End: 2025-03-02

## 2025-03-01 RX ADMIN — BUDESONIDE 500 MCG: 0.5 INHALANT ORAL at 07:34

## 2025-03-01 RX ADMIN — IPRATROPIUM BROMIDE AND ALBUTEROL SULFATE 1 DOSE: 2.5; .5 SOLUTION RESPIRATORY (INHALATION) at 13:03

## 2025-03-01 RX ADMIN — BUDESONIDE 500 MCG: 0.5 INHALANT ORAL at 21:29

## 2025-03-01 RX ADMIN — POLYETHYLENE GLYCOL 3350 17 G: 17 POWDER, FOR SOLUTION ORAL at 08:34

## 2025-03-01 RX ADMIN — MAGNESIUM HYDROXIDE 30 ML: 400 SUSPENSION ORAL at 08:34

## 2025-03-01 RX ADMIN — Medication 4 ML: at 19:39

## 2025-03-01 RX ADMIN — IPRATROPIUM BROMIDE AND ALBUTEROL SULFATE 1 DOSE: 2.5; .5 SOLUTION RESPIRATORY (INHALATION) at 21:29

## 2025-03-01 RX ADMIN — SODIUM CHLORIDE, PRESERVATIVE FREE 10 ML: 5 INJECTION INTRAVENOUS at 08:34

## 2025-03-01 RX ADMIN — GUAIFENESIN 200 MG: 200 SOLUTION ORAL at 08:34

## 2025-03-01 RX ADMIN — BUPROPION HYDROCHLORIDE 75 MG: 75 TABLET, FILM COATED ORAL at 20:06

## 2025-03-01 RX ADMIN — VENLAFAXINE 75 MG: 25 TABLET ORAL at 08:34

## 2025-03-01 RX ADMIN — ENOXAPARIN SODIUM 40 MG: 100 INJECTION SUBCUTANEOUS at 08:33

## 2025-03-01 RX ADMIN — CINACALCET HYDROCHLORIDE 30 MG: 30 TABLET, FILM COATED ORAL at 08:36

## 2025-03-01 RX ADMIN — VENLAFAXINE 75 MG: 25 TABLET ORAL at 17:08

## 2025-03-01 RX ADMIN — SODIUM CHLORIDE, PRESERVATIVE FREE 10 ML: 5 INJECTION INTRAVENOUS at 20:06

## 2025-03-01 RX ADMIN — WHITE PETROLATUM,ZINC OXIDE: 57; 17 PASTE TOPICAL at 08:35

## 2025-03-01 RX ADMIN — ASPIRIN 81 MG 81 MG: 81 TABLET ORAL at 08:34

## 2025-03-01 RX ADMIN — GUAIFENESIN 200 MG: 200 SOLUTION ORAL at 20:06

## 2025-03-01 RX ADMIN — Medication 4 ML: at 07:23

## 2025-03-01 RX ADMIN — BUPROPION HYDROCHLORIDE 75 MG: 75 TABLET, FILM COATED ORAL at 08:34

## 2025-03-01 RX ADMIN — ATORVASTATIN CALCIUM 20 MG: 20 TABLET, FILM COATED ORAL at 20:06

## 2025-03-01 RX ADMIN — LANSOPRAZOLE 30 MG: 30 TABLET, ORALLY DISINTEGRATING ORAL at 05:24

## 2025-03-01 RX ADMIN — DOCUSATE SODIUM 100 MG: 50 LIQUID ORAL at 08:34

## 2025-03-01 RX ADMIN — FOLIC ACID 1 MG: 1 TABLET ORAL at 08:34

## 2025-03-01 RX ADMIN — IPRATROPIUM BROMIDE AND ALBUTEROL SULFATE 1 DOSE: 2.5; .5 SOLUTION RESPIRATORY (INHALATION) at 07:34

## 2025-03-01 ASSESSMENT — PAIN SCALES - GENERAL
PAINLEVEL_OUTOF10: 0

## 2025-03-01 ASSESSMENT — PULMONARY FUNCTION TESTS
PIF_VALUE: 19
PIF_VALUE: 20
PIF_VALUE: 21
PIF_VALUE: 17
PIF_VALUE: 23
PIF_VALUE: 20
PIF_VALUE: 19
PIF_VALUE: 18
PIF_VALUE: 16
PIF_VALUE: 18
PIF_VALUE: 20
PIF_VALUE: 18
PIF_VALUE: 26
PIF_VALUE: 18
PIF_VALUE: 22
PIF_VALUE: 18
PIF_VALUE: 22
PIF_VALUE: 20
PIF_VALUE: 21
PIF_VALUE: 19
PIF_VALUE: 24
PIF_VALUE: 22
PIF_VALUE: 23
PIF_VALUE: 19
PIF_VALUE: 20
PIF_VALUE: 19
PIF_VALUE: 22
PIF_VALUE: 17
PIF_VALUE: 19

## 2025-03-01 NOTE — PLAN OF CARE
Problem: ABCDS Injury Assessment  Goal: Absence of physical injury  3/1/2025 1057 by Ghassan Gonzalez RN  Outcome: Progressing  Flowsheets (Taken 3/1/2025 1057)  Absence of Physical Injury: Implement safety measures based on patient assessment  2/28/2025 2347 by Garett Day RN  Outcome: Progressing     Problem: Skin/Tissue Integrity  Goal: Absence of new skin breakdown  Description: 1.  Monitor for areas of redness and/or skin breakdown  2.  Assess vascular access sites hourly  3.  Every 4-6 hours minimum:  Change oxygen saturation probe site  4.  Every 4-6 hours:  If on nasal continuous positive airway pressure, respiratory therapy assess nares and determine need for appliance change or resting period.  3/1/2025 1057 by Ghassan Gonzalez RN  Outcome: Progressing  2/28/2025 2347 by Garett Day RN  Outcome: Progressing     Problem: Chronic Conditions and Co-morbidities  Goal: Patient's chronic conditions and co-morbidity symptoms are monitored and maintained or improved  3/1/2025 1057 by Ghassan Gonzalez RN  Outcome: Progressing  Flowsheets (Taken 3/1/2025 1057)  Care Plan - Patient's Chronic Conditions and Co-Morbidity Symptoms are Monitored and Maintained or Improved:   Monitor and assess patient's chronic conditions and comorbid symptoms for stability, deterioration, or improvement   Collaborate with multidisciplinary team to address chronic and comorbid conditions and prevent exacerbation or deterioration   Update acute care plan with appropriate goals if chronic or comorbid symptoms are exacerbated and prevent overall improvement and discharge  2/28/2025 2347 by Garett Day RN  Outcome: Progressing  Flowsheets (Taken 2/28/2025 2000)  Care Plan - Patient's Chronic Conditions and Co-Morbidity Symptoms are Monitored and Maintained or Improved: Monitor and assess patient's chronic conditions and comorbid symptoms for stability, deterioration, or improvement     Problem: Discharge

## 2025-03-01 NOTE — PLAN OF CARE
Problem: Discharge Planning  Goal: Discharge to home or other facility with appropriate resources  2/28/2025 2347 by Garett Day RN  Outcome: Progressing  Flowsheets (Taken 2/28/2025 2000)  Discharge to home or other facility with appropriate resources: Identify barriers to discharge with patient and caregiver  2/28/2025 1657 by Karmen Rodriguez RN  Outcome: Not Progressing  Problem: ABCDS Injury Assessment  Goal: Absence of physical injury  2/28/2025 2347 by Garett Day RN  Outcome: Progressing  2/28/2025 1657 by Karmen Rodriguez RN  Outcome: Progressing     Problem: Genitourinary - Adult  Goal: Absence of urinary retention  Outcome: Progressing  Flowsheets (Taken 2/28/2025 2000)  Absence of urinary retention: Monitor intake/output and perform bladder scan as needed        Problem: Neurosensory - Adult  Goal: Achieves maximal functionality and self care  2/28/2025 2347 by Garett Day RN  Outcome: Progressing  Flowsheets (Taken 2/28/2025 2000)  Achieves maximal functionality and self care: Monitor swallowing and airway patency with patient fatigue and changes in neurological status  2/28/2025 1657 by Karmen Rodriguez RN  Outcome: Not Progressing  Flowsheets (Taken 2/28/2025 0700)  Achieves maximal functionality and self care:   Monitor swallowing and airway patency with patient fatigue and changes in neurological status   Encourage and assist patient to increase activity and self care with guidance from physical therapy/occupational therapy     Problem: Gastrointestinal - Adult  Goal: Maintains or returns to baseline bowel function  2/28/2025 2347 by Garett Day RN  Outcome: Progressing  2/28/2025 1657 by Karmen Rodriguez RN  Outcome: Not Progressing  Flowsheets (Taken 2/28/2025 0700)  Maintains or returns to baseline bowel function: Assess bowel function     Problem: Musculoskeletal - Adult  Goal: Return mobility to safest level of function  2/28/2025 2347 by Garett Day

## 2025-03-01 NOTE — PLAN OF CARE
at highest level of function for planned discharge setting.  See evaluation for individualized goals.  Description: FUNCTIONAL STATUS PRIOR TO ADMISSION: The patient  required minimal assistance for basic and instrumental ADLs.    HOME SUPPORT PRIOR TO ADMISSION: The patient lived with  and required minimal assistance for ADLs.    Physical Therapy Goals  Initiated 1/16/2025  Pt stated goal: Get ebtter  Pt will be I with LE HEP in 7 days.  Pt will perform bed mobility with Mod Assist in 7 days.  Pt to sit EOB x 5 min with fair sitting balance in 7 days.  Pt to perform sit to stand with max A x 2 in 7 days.  Outcome: Progressing          PLAN :  Patient continues to benefit from skilled intervention to address functional impairments. Continue treatment per established plan of care to address goals.    Recommendation for discharge: (in order for the patient to meet his/her long term goals)  LTAC    Potential barriers for safe discharge: pt is a high fall risk, pt is not safe to be alone, and concern for pt safely navigating or managing the home environment.    IF patient discharges home will need the following DME:     SUBJECTIVE:   Patient stated “tired.”    OBJECTIVE DATA SUMMARY:   Critical Behavior:  Orientation  Orientation Level: Oriented X4  Cognition  Arousal/Alertness: Appropriate responses to stimuli  Following Commands: Follows one step commands with increased time;Follows one step commands with repetition  Insights: Decreased awareness of deficits  Initiation: Requires cues for some  Sequencing: Requires cues for some    Functional Mobility Training:  Bed Mobility:  Bed Mobility Training  Bed Mobility Training: Yes  Interventions: Safety awareness training;Verbal cues;Manual cues  Rolling: Substantial/Maximal assistance;2 Person assistance  Supine to Sit: Substantial/Maximal assistance;2 Person assistance  Sit to Supine: Substantial/Maximal assistance;2 Person assistance  Scooting:  Substantial/Maximal assistance;2 Person assistance  Transfers:  Transfer Training  Transfer Training: No  Balance:  Balance  Sitting: Impaired  Sitting - Static: Poor (constant support)  Sitting - Dynamic: Poor (constant support)  Wheelchair Mobility:     Ambulation/Gait Training:                                                      Therapeutic Exercises:       EXERCISE   Sets   Reps   Active Active Assist   Passive Self ROM   Comments   Ankle Pumps 1 10 [x] [] [] []    Quad Sets/Glut Sets   [] [] [] []    Hamstring Sets   [] [] [] []    Short Arc Quads 1 10 [] [x] [] []    Heel Slides 1 10 [] [x] [] []    Straight Leg Raises   [] [] [] []    Hip abd/add 1 10 [] [x] [] []    Long Arc Quads   [] [] [] []    Marching   [] [] [] []    Seated HR/TR   [] [] [] []       [] [] [] []       Pain Ratin/10  reported  Pain Intervention(s):       Activity Tolerance:   Poor    After treatment patient left in no apparent distress:   Bed locked and returned to lowest position, Patient left in no apparent distress in bed, Call bell within reach, and Bed/ chair alarm activated, and nsg updated     COMMUNICATION/COLLABORATION:   The patient’s plan of care was discussed with: Physical therapist, Occupational therapy assistant, and Registered nurse    Patient Education  Education Given To: Patient  Education Provided: Role of Therapy;Plan of Care  Education Method: Verbal;Demonstration  Barriers to Learning: None  Education Outcome: Verbalized understanding;Continued education needed        Clarita Barbosa PTA  Minutes: 26

## 2025-03-01 NOTE — PROGRESS NOTES
CRITICAL CARE PROGRESS NOTE    Name: Nida Graves   : 1975   MRN: [id number]   Date: 3/1/2025      Diagnoses/problem list:   Acute hypoxic and hypercapnic respiratory failure  Acute kidney injury  Atrial flutter with slow ventricular response  Complete heart block  GI bleed  Nosebleed, resolved  NSTEMI  Symptomatic bradycardia  Biventricular failure  Acute HFrEF, 25 to 30%  Diabetes  Morbid obesity  Acute metabolic/septic encephalopathy, improved  Deconditioning    24-hour events:   No new complaints this morning.  Doing well overall.    Assessment and plan:   Ms. Graves is a 49-year-old female with PMH chronic debility and bedbound status, diabetes, asthma, obesity, who presented for shortness of breath.  Shortness of breath worsening over the past few days.  Upon presentation ED patient found to be hypoxic necessita.  Ting O2 via NC.  She was also found to be in A-fib with slow ventricular response.  She was transferred out of ICU on . Early morning  upgraded to ICU again due to severe hypoxia and hypercapnia requiring intubation. Now s/p trach on . Leadless pacemaker . PEG . Improved lung white-out s/p bronchoscopy     CNS / MSK:    Continue Bupropion and Venlafaxine   PT/OT  Pt would most benefit from LTAC to best address her deconditioning and long-term ventilator weaning    PULMONOLOGY:   Extubated on , reintubated on  due to inability to protect airway  ENT placed trach on   Recurrent mucus plugging, continue 3% saline nebs  Continue nebs, budesonide q12h, duonebs q6h  Lung aeration improved s/p bronchoscopy  and   Continue pressure support trials daily  Pt would most benefit from LTAC to best address her deconditioning and long-term ventilator weaning    CARDIOVASCULAR:   Midodrine to keep MAP above 65   S/p AUGUSTIN/DCCV on   LVEF 20-30%, right ventricle overload, moderate TR   Diuresis as needed  LHC (2/3/25): nonobstructive CAD; luminal RCA, Lcx; 40%

## 2025-03-01 NOTE — PROGRESS NOTES
Renal Progress Note    Patient: Nida Graves MRN: [id number]  SSN: [id number]    YOB: 1975  Age: 49 y.o.  Sex: female      Admit Date: 1/12/2025    LOS: 48 days     Subjective:   Patient seen in ICU.  Awake. On 30% FiO2, no acute distress.  tracheostomy  Edema +, Repeat labs showed stable creatinine to 0.9 repeat potassium is 3.8.  Today's labs are pending      Current Facility-Administered Medications   Medication Dose Route Frequency    enoxaparin (LOVENOX) injection 40 mg  40 mg SubCUTAneous Daily    magnesium hydroxide (MILK OF MAGNESIA) 400 MG/5ML suspension 30 mL  30 mL Per G Tube Daily    sodium chloride (Inhalant) 3 % nebulizer solution 4 mL  4 mL Nebulization BID RT    ferric gluconate (FERRLECIT) 125 mg in sodium chloride 0.9 % 100 mL IVPB  125 mg IntraVENous Once per day on Monday Wednesday Friday    venlafaxine (EFFEXOR) tablet 75 mg  75 mg Per G Tube BID WC    polyethylene glycol (GLYCOLAX) packet 17 g  17 g Per G Tube Daily    guaiFENesin (ROBITUSSIN) 100 MG/5ML liquid 200 mg  200 mg Oral Q12H    norepinephrine (LEVOPHED) 16 mg in sodium chloride 0.9 % 250 mL infusion (premix)  1-100 mcg/min IntraVENous Continuous    succinylcholine (ANECTINE) injection 100 mg  100 mg IntraVENous Once    ipratropium 0.5 mg-albuterol 2.5 mg (DUONEB) nebulizer solution 1 Dose  1 Dose Inhalation Q6H WA RT    insulin lispro (HUMALOG,ADMELOG) injection vial 0-4 Units  0-4 Units SubCUTAneous 4x Daily AC & HS    0.9 % sodium chloride infusion   IntraVENous PRN    influenza split vaccine (PF) (AFLURIA;FLUARIX) injection 0.5 mL  1 Dose IntraMUSCular Prior to discharge    docusate (COLACE) 50 MG/5ML liquid 100 mg  100 mg Oral Daily    epoetin itz-epbx (RETACRIT) injection 2,500 Units  2,500 Units SubCUTAneous Once per day on Monday Wednesday Friday    lansoprazole (PREVACID SOLUTAB) disintegrating tablet 30 mg  30 mg Per G Tube QAM AC    aspirin chewable tablet 81 mg  81 mg Per G Tube Daily    atorvastatin

## 2025-03-01 NOTE — PLAN OF CARE
OCCUPATIONAL THERAPY TREATMENT  Patient: Nida Graves (49 y.o. female)  Date: 3/1/2025  Primary Diagnosis: Atrial fibrillation, unspecified type (HCC) [I48.91]  Atrial flutter, unspecified type (HCC) [I48.92]  Congestive heart failure, unspecified HF chronicity, unspecified heart failure type (HCC) [I50.9]  Acute hypoxic respiratory failure (HCC) [J96.01]  Procedure(s) (LRB):  ESOPHAGOGASTRODUODENOSCOPY PERCUTANEOUS ENDOSCOPIC GASTROSTOMY TUBE PLACEMENT (N/A) 16 Days Post-Op   Precautions: Fall Risk, General Precautions                Recommendations for nursing mobility: Encourage HEP in prep for ADLs/mobility; see handout for details, Frequent repositioning to prevent skin breakdown, Use of bed/chair alarm for safety, and Assist x2    In place during session:  Peripheral IV, Tracheostomy 35% fio2, External Catheter, EKG/telemetry , Pulse ox, and PEG Tube   Chart, occupational therapy assessment, plan of care, and goals were reviewed.    ASSESSMENT  Patient continues with skilled OT services and is slowly progressing towards goals.  Pt received semi-supine in bed upon arrival, AXO x 4 and agreeable to HENDERSON/PTA tx at this time. Pt cooperative and demonstrated fair effort during activities.  Pt continues to need x2 A for all bed mobility<>EOB with constant support for sitting balance. Pt's HR increased to 147 during EOB. Pt returned to long sitting with TA x2 for scooting HOB. Set-up for simple grooming. Pt engaged in elizabeth UE exercises with education and cueing provided regarding joint protection/proper technique/pacing self/achieve full ROM needed to maintain/improve strength to increase performance in ADL'S and functional tf's, see grid below. (See below for objective details and assist levels)     Overall, pt limited by generalized weakness and decreased activity tolerance that interferes with performance in ADL's and functional tf's safely.  Will continue to progress. Current OT recommendations for discharge

## 2025-03-02 ENCOUNTER — APPOINTMENT (OUTPATIENT)
Facility: HOSPITAL | Age: 50
DRG: 004 | End: 2025-03-02
Payer: COMMERCIAL

## 2025-03-02 LAB
ALBUMIN SERPL-MCNC: 2.7 G/DL (ref 3.5–5)
ANION GAP SERPL CALC-SCNC: 6 MMOL/L (ref 2–12)
BUN SERPL-MCNC: 36 MG/DL (ref 6–20)
BUN/CREAT SERPL: 38 (ref 12–20)
CA-I BLD-MCNC: 8.7 MG/DL (ref 8.5–10.1)
CHLORIDE SERPL-SCNC: 107 MMOL/L (ref 97–108)
CO2 SERPL-SCNC: 26 MMOL/L (ref 21–32)
CREAT SERPL-MCNC: 0.96 MG/DL (ref 0.55–1.02)
ERYTHROCYTE [DISTWIDTH] IN BLOOD BY AUTOMATED COUNT: 19 % (ref 11.5–14.5)
GLUCOSE BLD STRIP.AUTO-MCNC: 108 MG/DL (ref 65–100)
GLUCOSE BLD STRIP.AUTO-MCNC: 115 MG/DL (ref 65–100)
GLUCOSE BLD STRIP.AUTO-MCNC: 115 MG/DL (ref 65–100)
GLUCOSE BLD STRIP.AUTO-MCNC: 132 MG/DL (ref 65–100)
GLUCOSE SERPL-MCNC: 84 MG/DL (ref 65–100)
HCT VFR BLD AUTO: 35.2 % (ref 35–47)
HGB BLD-MCNC: 10.7 G/DL (ref 11.5–16)
MCH RBC QN AUTO: 28.6 PG (ref 26–34)
MCHC RBC AUTO-ENTMCNC: 30.4 G/DL (ref 30–36.5)
MCV RBC AUTO: 94.1 FL (ref 80–99)
NRBC # BLD: 0 K/UL (ref 0–0.01)
NRBC BLD-RTO: 0 PER 100 WBC
PERFORMED BY:: ABNORMAL
PHOSPHATE SERPL-MCNC: 3.4 MG/DL (ref 2.6–4.7)
PLATELET # BLD AUTO: 268 K/UL (ref 150–400)
PMV BLD AUTO: 10 FL (ref 8.9–12.9)
POTASSIUM SERPL-SCNC: 3.8 MMOL/L (ref 3.5–5.1)
RBC # BLD AUTO: 3.74 M/UL (ref 3.8–5.2)
SODIUM SERPL-SCNC: 139 MMOL/L (ref 136–145)
WBC # BLD AUTO: 7 K/UL (ref 3.6–11)

## 2025-03-02 PROCEDURE — 94668 MNPJ CHEST WALL SBSQ: CPT

## 2025-03-02 PROCEDURE — 94640 AIRWAY INHALATION TREATMENT: CPT

## 2025-03-02 PROCEDURE — 97110 THERAPEUTIC EXERCISES: CPT

## 2025-03-02 PROCEDURE — 6360000002 HC RX W HCPCS: Performed by: STUDENT IN AN ORGANIZED HEALTH CARE EDUCATION/TRAINING PROGRAM

## 2025-03-02 PROCEDURE — 2580000003 HC RX 258: Performed by: INTERNAL MEDICINE

## 2025-03-02 PROCEDURE — 2700000000 HC OXYGEN THERAPY PER DAY

## 2025-03-02 PROCEDURE — 6370000000 HC RX 637 (ALT 250 FOR IP): Performed by: STUDENT IN AN ORGANIZED HEALTH CARE EDUCATION/TRAINING PROGRAM

## 2025-03-02 PROCEDURE — 6370000000 HC RX 637 (ALT 250 FOR IP): Performed by: INTERNAL MEDICINE

## 2025-03-02 PROCEDURE — 85027 COMPLETE CBC AUTOMATED: CPT

## 2025-03-02 PROCEDURE — 6360000002 HC RX W HCPCS: Performed by: INTERNAL MEDICINE

## 2025-03-02 PROCEDURE — 6370000000 HC RX 637 (ALT 250 FOR IP): Performed by: NURSE PRACTITIONER

## 2025-03-02 PROCEDURE — 2000000000 HC ICU R&B

## 2025-03-02 PROCEDURE — 71045 X-RAY EXAM CHEST 1 VIEW: CPT

## 2025-03-02 PROCEDURE — 2500000003 HC RX 250 WO HCPCS: Performed by: STUDENT IN AN ORGANIZED HEALTH CARE EDUCATION/TRAINING PROGRAM

## 2025-03-02 PROCEDURE — 2500000003 HC RX 250 WO HCPCS: Performed by: NURSE PRACTITIONER

## 2025-03-02 PROCEDURE — 94761 N-INVAS EAR/PLS OXIMETRY MLT: CPT

## 2025-03-02 PROCEDURE — 80069 RENAL FUNCTION PANEL: CPT

## 2025-03-02 PROCEDURE — 82962 GLUCOSE BLOOD TEST: CPT

## 2025-03-02 PROCEDURE — 36415 COLL VENOUS BLD VENIPUNCTURE: CPT

## 2025-03-02 RX ORDER — FUROSEMIDE 10 MG/ML
40 INJECTION INTRAMUSCULAR; INTRAVENOUS ONCE
Status: COMPLETED | OUTPATIENT
Start: 2025-03-02 | End: 2025-03-02

## 2025-03-02 RX ORDER — SODIUM CHLORIDE FOR INHALATION 3 %
4 VIAL, NEBULIZER (ML) INHALATION 3 TIMES DAILY
Status: DISPENSED | OUTPATIENT
Start: 2025-03-02 | End: 2025-03-05

## 2025-03-02 RX ADMIN — BUDESONIDE 500 MCG: 0.5 INHALANT ORAL at 07:20

## 2025-03-02 RX ADMIN — VENLAFAXINE 75 MG: 25 TABLET ORAL at 08:47

## 2025-03-02 RX ADMIN — IPRATROPIUM BROMIDE AND ALBUTEROL SULFATE 1 DOSE: 2.5; .5 SOLUTION RESPIRATORY (INHALATION) at 07:20

## 2025-03-02 RX ADMIN — IPRATROPIUM BROMIDE AND ALBUTEROL SULFATE 1 DOSE: 2.5; .5 SOLUTION RESPIRATORY (INHALATION) at 19:58

## 2025-03-02 RX ADMIN — FUROSEMIDE 40 MG: 10 INJECTION, SOLUTION INTRAMUSCULAR; INTRAVENOUS at 11:46

## 2025-03-02 RX ADMIN — Medication 4 ML: at 10:40

## 2025-03-02 RX ADMIN — Medication 4 ML: at 15:23

## 2025-03-02 RX ADMIN — CINACALCET HYDROCHLORIDE 30 MG: 30 TABLET, FILM COATED ORAL at 08:47

## 2025-03-02 RX ADMIN — GUAIFENESIN 200 MG: 200 SOLUTION ORAL at 21:50

## 2025-03-02 RX ADMIN — FOLIC ACID 1 MG: 1 TABLET ORAL at 08:48

## 2025-03-02 RX ADMIN — ASPIRIN 81 MG 81 MG: 81 TABLET ORAL at 08:48

## 2025-03-02 RX ADMIN — POLYETHYLENE GLYCOL 3350 17 G: 17 POWDER, FOR SOLUTION ORAL at 08:47

## 2025-03-02 RX ADMIN — GUAIFENESIN 200 MG: 200 SOLUTION ORAL at 08:47

## 2025-03-02 RX ADMIN — Medication 4 ML: at 19:46

## 2025-03-02 RX ADMIN — DOCUSATE SODIUM 100 MG: 50 LIQUID ORAL at 08:47

## 2025-03-02 RX ADMIN — BUPROPION HYDROCHLORIDE 75 MG: 75 TABLET, FILM COATED ORAL at 21:50

## 2025-03-02 RX ADMIN — IPRATROPIUM BROMIDE 0.5 MG: 0.5 SOLUTION RESPIRATORY (INHALATION) at 22:44

## 2025-03-02 RX ADMIN — SODIUM CHLORIDE, PRESERVATIVE FREE 10 ML: 5 INJECTION INTRAVENOUS at 08:48

## 2025-03-02 RX ADMIN — MAGNESIUM HYDROXIDE 30 ML: 400 SUSPENSION ORAL at 08:47

## 2025-03-02 RX ADMIN — ENOXAPARIN SODIUM 40 MG: 100 INJECTION SUBCUTANEOUS at 08:47

## 2025-03-02 RX ADMIN — ATORVASTATIN CALCIUM 20 MG: 20 TABLET, FILM COATED ORAL at 21:50

## 2025-03-02 RX ADMIN — LANSOPRAZOLE 30 MG: 30 TABLET, ORALLY DISINTEGRATING ORAL at 08:48

## 2025-03-02 RX ADMIN — WHITE PETROLATUM,ZINC OXIDE: 57; 17 PASTE TOPICAL at 08:48

## 2025-03-02 RX ADMIN — IPRATROPIUM BROMIDE AND ALBUTEROL SULFATE 1 DOSE: 2.5; .5 SOLUTION RESPIRATORY (INHALATION) at 13:21

## 2025-03-02 RX ADMIN — BUPROPION HYDROCHLORIDE 75 MG: 75 TABLET, FILM COATED ORAL at 08:48

## 2025-03-02 RX ADMIN — BUDESONIDE 500 MCG: 0.5 INHALANT ORAL at 19:58

## 2025-03-02 RX ADMIN — VENLAFAXINE 75 MG: 25 TABLET ORAL at 17:40

## 2025-03-02 ASSESSMENT — PULMONARY FUNCTION TESTS
PIF_VALUE: 16
PIF_VALUE: 17
PIF_VALUE: 19
PIF_VALUE: 23
PIF_VALUE: 15
PIF_VALUE: 20
PIF_VALUE: 14
PIF_VALUE: 26
PIF_VALUE: 17
PIF_VALUE: 18
PIF_VALUE: 21
PIF_VALUE: 18
PIF_VALUE: 16
PIF_VALUE: 20
PIF_VALUE: 16
PIF_VALUE: 31
PIF_VALUE: 21
PIF_VALUE: 18
PIF_VALUE: 19
PIF_VALUE: 22
PIF_VALUE: 16
PIF_VALUE: 16
PIF_VALUE: 19
PIF_VALUE: 16
PIF_VALUE: 20
PIF_VALUE: 16
PIF_VALUE: 14
PIF_VALUE: 16
PIF_VALUE: 20
PIF_VALUE: 15

## 2025-03-02 ASSESSMENT — PAIN SCALES - GENERAL
PAINLEVEL_OUTOF10: 0

## 2025-03-02 NOTE — PLAN OF CARE
PHYSICAL THERAPY TREATMENT     Patient: Nida Graves (49 y.o. female)  Date: 3/2/2025  Diagnosis: Atrial fibrillation, unspecified type (HCC) [I48.91]  Atrial flutter, unspecified type (HCC) [I48.92]  Congestive heart failure, unspecified HF chronicity, unspecified heart failure type (HCC) [I50.9]  Acute hypoxic respiratory failure (HCC) [J96.01] Acute on chronic hypoxic respiratory failure (HCC)  Procedure(s) (LRB):  ESOPHAGOGASTRODUODENOSCOPY PERCUTANEOUS ENDOSCOPIC GASTROSTOMY TUBE PLACEMENT (N/A) 17 Days Post-Op  Precautions: Fall Risk, General Precautions                      Recommendations for nursing mobility: Encourage HEP in prep for ADLs/mobility; see handout for details and Frequent repositioning to prevent skin breakdown    In place during session: Trach with vent support, ICU monitoring  Chart, physical therapy assessment, plan of care and goals were reviewed.  ASSESSMENT  Patient continues with skilled PT services and is progressing towards goals. Pt supine upon PT arrival, agreeable to session. Pt A&O x 4. (See below for objective details and assist levels).     Overall pt tolerated session well today with PT. Pt participated with LE supine therex. Tolerated added exercises/sets well. Rest breaks provided. Nsg entered room to assist with scooting pt back to HOB/repositioning as pt had slid towards end of bed during exercise/mobility.  Will continue to benefit from skilled PT services, and will continue to progress as tolerated. Current PT DC recommendation LTAC once medically appropriate.      GOALS:    Problem: Physical Therapy - Adult  Goal: By Discharge: Performs mobility at highest level of function for planned discharge setting.  See evaluation for individualized goals.  Description: FUNCTIONAL STATUS PRIOR TO ADMISSION: The patient  required minimal assistance for basic and instrumental ADLs.    HOME SUPPORT PRIOR TO ADMISSION: The patient lived with  and required minimal  treatment patient left in no apparent distress:   Bed locked and returned to lowest position, Patient left in no apparent distress in bed and Call bell within reach, and nsg updated     COMMUNICATION/COLLABORATION:   The patient’s plan of care was discussed with: Registered nurse    Patient Education  Education Given To: Patient  Education Provided: Role of Therapy;Plan of Care;Home Exercise Program  Education Method: Verbal  Barriers to Learning: None  Education Outcome: Verbalized understanding;Continued education needed        Clraita Barbosa, PTA  Minutes: 24

## 2025-03-02 NOTE — PROGRESS NOTES
Renal Progress Note    Patient: Nida Graves MRN: [id number]  SSN: [id number]    YOB: 1975  Age: 49 y.o.  Sex: female      Admit Date: 1/12/2025    LOS: 49 days     Subjective:   Patient seen in ICU.  Awake. On 30% FiO2, no acute distress.  tracheostomy  Edema +, Repeat labs showed stable creatinine to 0.9 repeat potassium is 3.8.      Current Facility-Administered Medications   Medication Dose Route Frequency    sodium chloride (Inhalant) 3 % nebulizer solution 4 mL  4 mL Nebulization TID    enoxaparin (LOVENOX) injection 40 mg  40 mg SubCUTAneous Daily    magnesium hydroxide (MILK OF MAGNESIA) 400 MG/5ML suspension 30 mL  30 mL Per G Tube Daily    ferric gluconate (FERRLECIT) 125 mg in sodium chloride 0.9 % 100 mL IVPB  125 mg IntraVENous Once per day on Monday Wednesday Friday    venlafaxine (EFFEXOR) tablet 75 mg  75 mg Per G Tube BID WC    polyethylene glycol (GLYCOLAX) packet 17 g  17 g Per G Tube Daily    guaiFENesin (ROBITUSSIN) 100 MG/5ML liquid 200 mg  200 mg Oral Q12H    norepinephrine (LEVOPHED) 16 mg in sodium chloride 0.9 % 250 mL infusion (premix)  1-100 mcg/min IntraVENous Continuous    succinylcholine (ANECTINE) injection 100 mg  100 mg IntraVENous Once    ipratropium 0.5 mg-albuterol 2.5 mg (DUONEB) nebulizer solution 1 Dose  1 Dose Inhalation Q6H WA RT    insulin lispro (HUMALOG,ADMELOG) injection vial 0-4 Units  0-4 Units SubCUTAneous 4x Daily AC & HS    0.9 % sodium chloride infusion   IntraVENous PRN    influenza split vaccine (PF) (AFLURIA;FLUARIX) injection 0.5 mL  1 Dose IntraMUSCular Prior to discharge    docusate (COLACE) 50 MG/5ML liquid 100 mg  100 mg Oral Daily    epoetin itz-epbx (RETACRIT) injection 2,500 Units  2,500 Units SubCUTAneous Once per day on Monday Wednesday Friday    lansoprazole (PREVACID SOLUTAB) disintegrating tablet 30 mg  30 mg Per G Tube QAM AC    aspirin chewable tablet 81 mg  81 mg Per G Tube Daily    atorvastatin (LIPITOR) tablet 20 mg  20 mg Per  111/65 105/70   Pulse: 78 74 70 73   Resp: 22 26 15 24   Temp:       TempSrc:       SpO2: 98% 98% 99% 99%   Weight:       Height:         Objective:   General: awake, no acute distress.  On vent through trach   HEENT: EOMI, no Icterus, no Pallor,  mucosa moist.  Neck: Neck is supple, No JVD  Lungs: Fair air entry present bilaterally.  Bilateral coarse breath sound  CVS: heart sounds normal, regular rate and rhythm  GI: soft, nontender, normal BS.  Distended  Extremeties: no cyanosis, 1+ dependent edema in LEs,   Neuro: sedated for bronch today    Skin: normal skin turgor, no skin rashes.        Intake and Output:  Current Shift: 03/02 0701 - 03/02 1900  In: 250   Out: 150 [Urine:150]  Last three shifts: 02/28 1901 - 03/02 0700  In: 3497   Out: 4450 [Urine:4450]      Lab/Data Review:  Recent Labs     02/28/25  0257 03/02/25  0358   WBC 9.3 7.0   HGB 8.1* 10.7*   HCT 26.1* 35.2    268     Recent Labs     02/28/25  0254 02/28/25  0257 03/02/25  0358     --  139   K 3.8  --  3.8     --  107   CO2 29  --  26   GLUCOSE 92  --  84   BUN 37*  --  36*   CREATININE 0.96  --  0.96   CALCIUM 8.0*  --  8.7   MG  --  1.8  --    PHOS 3.5 3.4 3.4     No results for input(s): \"PHART\", \"PED0EXO\", \"PO2ART\", \"MNL4LOD\", \"BEART\", \"NUB9CNNK\", \"HGBART\", \"RA2UJPKOY\", \"FIO2A\", \"P1KOTTSG\", \"OXYHEM\", \"CARBOXHGBART\", \"METHGBART\", \"Q0DMUEAYX\", \"PHCORART\", \"TEMP\" in the last 72 hours.    Invalid input(s): \"COI2WPWPO\"    Recent Results (from the past 24 hour(s))   POCT Glucose    Collection Time: 03/01/25 11:22 AM   Result Value Ref Range    POC Glucose 116 (H) 65 - 100 mg/dL    Performed by: Carlos Ferrell    POCT Glucose    Collection Time: 03/01/25  4:22 PM   Result Value Ref Range    POC Glucose 125 (H) 65 - 100 mg/dL    Performed by: LIDIA Claros    POCT Glucose    Collection Time: 03/01/25  7:57 PM   Result Value Ref Range    POC Glucose 115 (H) 65 - 100 mg/dL    Performed by: Amalia Driscoll    CBC    Collection Time:

## 2025-03-02 NOTE — PROGRESS NOTES
(Last 24 hours) at 3/2/2025 1037  Last data filed at 3/2/2025 0800  Gross per 24 hour   Intake 2417 ml   Output 2400 ml   Net 17 ml       CRITICAL CARE DOCUMENTATION  I had a face to face encounter with the patient, reviewed and interpreted patient data including clinical events, labs, images, vital signs, I/O's, and examined patient.  I have discussed the case and the plan and management of the patient's care with the consulting services, the bedside nurses and the respiratory therapist.      NOTE OF PERSONAL INVOLVEMENT IN CARE   This patient has a high probability of imminent, clinically significant deterioration, which requires the highest level of preparedness to intervene urgently. I participated in the decision-making and personally managed or directed the management of the following life and organ supporting interventions that required my frequent assessment to treat or prevent imminent deterioration.    I personally spent 30 minutes of critical care time.  This is time spent at this critically ill patient's bedside actively involved in patient care as well as the coordination of care.  This does not include any procedural time which has been billed separately.        Josue Salvador MD   Critical Care Medicine  [facility]

## 2025-03-03 LAB
GLUCOSE BLD STRIP.AUTO-MCNC: 103 MG/DL (ref 65–100)
GLUCOSE BLD STRIP.AUTO-MCNC: 105 MG/DL (ref 65–100)
GLUCOSE BLD STRIP.AUTO-MCNC: 158 MG/DL (ref 65–100)
GLUCOSE BLD STRIP.AUTO-MCNC: 78 MG/DL (ref 65–100)
PERFORMED BY:: ABNORMAL
PERFORMED BY:: NORMAL

## 2025-03-03 PROCEDURE — 94668 MNPJ CHEST WALL SBSQ: CPT

## 2025-03-03 PROCEDURE — 2000000000 HC ICU R&B

## 2025-03-03 PROCEDURE — 2580000003 HC RX 258: Performed by: INTERNAL MEDICINE

## 2025-03-03 PROCEDURE — 94761 N-INVAS EAR/PLS OXIMETRY MLT: CPT

## 2025-03-03 PROCEDURE — 2500000003 HC RX 250 WO HCPCS: Performed by: NURSE PRACTITIONER

## 2025-03-03 PROCEDURE — 6370000000 HC RX 637 (ALT 250 FOR IP): Performed by: STUDENT IN AN ORGANIZED HEALTH CARE EDUCATION/TRAINING PROGRAM

## 2025-03-03 PROCEDURE — 6360000002 HC RX W HCPCS: Performed by: STUDENT IN AN ORGANIZED HEALTH CARE EDUCATION/TRAINING PROGRAM

## 2025-03-03 PROCEDURE — 97530 THERAPEUTIC ACTIVITIES: CPT

## 2025-03-03 PROCEDURE — 2500000003 HC RX 250 WO HCPCS: Performed by: STUDENT IN AN ORGANIZED HEALTH CARE EDUCATION/TRAINING PROGRAM

## 2025-03-03 PROCEDURE — 2700000000 HC OXYGEN THERAPY PER DAY

## 2025-03-03 PROCEDURE — 82962 GLUCOSE BLOOD TEST: CPT

## 2025-03-03 PROCEDURE — 6370000000 HC RX 637 (ALT 250 FOR IP): Performed by: INTERNAL MEDICINE

## 2025-03-03 PROCEDURE — 2580000003 HC RX 258: Performed by: STUDENT IN AN ORGANIZED HEALTH CARE EDUCATION/TRAINING PROGRAM

## 2025-03-03 PROCEDURE — 94640 AIRWAY INHALATION TREATMENT: CPT

## 2025-03-03 PROCEDURE — 6370000000 HC RX 637 (ALT 250 FOR IP): Performed by: NURSE PRACTITIONER

## 2025-03-03 PROCEDURE — 94003 VENT MGMT INPAT SUBQ DAY: CPT

## 2025-03-03 PROCEDURE — 6360000002 HC RX W HCPCS: Performed by: INTERNAL MEDICINE

## 2025-03-03 RX ADMIN — DOCUSATE SODIUM 100 MG: 50 LIQUID ORAL at 09:06

## 2025-03-03 RX ADMIN — EPOETIN ALFA-EPBX 2500 UNITS: 3000 INJECTION, SOLUTION INTRAVENOUS; SUBCUTANEOUS at 18:37

## 2025-03-03 RX ADMIN — POLYETHYLENE GLYCOL 3350 17 G: 17 POWDER, FOR SOLUTION ORAL at 09:07

## 2025-03-03 RX ADMIN — Medication 4 ML: at 08:08

## 2025-03-03 RX ADMIN — WHITE PETROLATUM,ZINC OXIDE: 57; 17 PASTE TOPICAL at 20:39

## 2025-03-03 RX ADMIN — SODIUM CHLORIDE, PRESERVATIVE FREE 10 ML: 5 INJECTION INTRAVENOUS at 20:39

## 2025-03-03 RX ADMIN — IPRATROPIUM BROMIDE AND ALBUTEROL SULFATE 1 DOSE: 2.5; .5 SOLUTION RESPIRATORY (INHALATION) at 19:56

## 2025-03-03 RX ADMIN — FOLIC ACID 1 MG: 1 TABLET ORAL at 09:09

## 2025-03-03 RX ADMIN — BUDESONIDE 500 MCG: 0.5 INHALANT ORAL at 19:56

## 2025-03-03 RX ADMIN — ASPIRIN 81 MG 81 MG: 81 TABLET ORAL at 09:06

## 2025-03-03 RX ADMIN — GUAIFENESIN 200 MG: 200 SOLUTION ORAL at 20:39

## 2025-03-03 RX ADMIN — LANSOPRAZOLE 30 MG: 30 TABLET, ORALLY DISINTEGRATING ORAL at 09:06

## 2025-03-03 RX ADMIN — VENLAFAXINE 75 MG: 25 TABLET ORAL at 18:37

## 2025-03-03 RX ADMIN — GUAIFENESIN 200 MG: 200 SOLUTION ORAL at 09:06

## 2025-03-03 RX ADMIN — VENLAFAXINE 75 MG: 25 TABLET ORAL at 09:06

## 2025-03-03 RX ADMIN — APIXABAN 2.5 MG: 2.5 TABLET, FILM COATED ORAL at 20:39

## 2025-03-03 RX ADMIN — IPRATROPIUM BROMIDE AND ALBUTEROL SULFATE 1 DOSE: 2.5; .5 SOLUTION RESPIRATORY (INHALATION) at 07:32

## 2025-03-03 RX ADMIN — SODIUM CHLORIDE 125 MG: 9 INJECTION, SOLUTION INTRAVENOUS at 09:50

## 2025-03-03 RX ADMIN — Medication 4 ML: at 14:16

## 2025-03-03 RX ADMIN — ENOXAPARIN SODIUM 40 MG: 100 INJECTION SUBCUTANEOUS at 09:06

## 2025-03-03 RX ADMIN — SODIUM CHLORIDE, PRESERVATIVE FREE 10 ML: 5 INJECTION INTRAVENOUS at 09:07

## 2025-03-03 RX ADMIN — ATORVASTATIN CALCIUM 20 MG: 20 TABLET, FILM COATED ORAL at 20:39

## 2025-03-03 RX ADMIN — WHITE PETROLATUM,ZINC OXIDE: 57; 17 PASTE TOPICAL at 09:09

## 2025-03-03 RX ADMIN — BUDESONIDE 500 MCG: 0.5 INHALANT ORAL at 07:32

## 2025-03-03 RX ADMIN — Medication 4 ML: at 19:59

## 2025-03-03 RX ADMIN — BUPROPION HYDROCHLORIDE 75 MG: 75 TABLET, FILM COATED ORAL at 20:39

## 2025-03-03 RX ADMIN — MAGNESIUM HYDROXIDE 30 ML: 400 SUSPENSION ORAL at 09:06

## 2025-03-03 RX ADMIN — BUPROPION HYDROCHLORIDE 75 MG: 75 TABLET, FILM COATED ORAL at 09:06

## 2025-03-03 RX ADMIN — CINACALCET HYDROCHLORIDE 30 MG: 30 TABLET, FILM COATED ORAL at 09:07

## 2025-03-03 RX ADMIN — IPRATROPIUM BROMIDE AND ALBUTEROL SULFATE 1 DOSE: 2.5; .5 SOLUTION RESPIRATORY (INHALATION) at 13:23

## 2025-03-03 ASSESSMENT — PULMONARY FUNCTION TESTS
PIF_VALUE: 19
PIF_VALUE: 16
PIF_VALUE: 18
PIF_VALUE: 18
PIF_VALUE: 25
PIF_VALUE: 22
PIF_VALUE: 20
PIF_VALUE: 26
PIF_VALUE: 34
PIF_VALUE: 21
PIF_VALUE: 21
PIF_VALUE: 22
PIF_VALUE: 33
PIF_VALUE: 29
PIF_VALUE: 24
PIF_VALUE: 18
PIF_VALUE: 18
PIF_VALUE: 24
PIF_VALUE: 15
PIF_VALUE: 23
PIF_VALUE: 18

## 2025-03-03 NOTE — CARE COORDINATION
CM reviewed Pt medicals, CM sent updated medicals to [facility] and [facility].     [facility] is also following Pt for admission.     Per IDR    RT is going to try to wean Pt off the vent for a few hours today.

## 2025-03-03 NOTE — PROGRESS NOTES
OCCUPATIONAL THERAPY TREATMENT  Patient: Nida Graves (49 y.o. female)  Date: 3/3/2025  Primary Diagnosis: Atrial fibrillation, unspecified type (HCC) [I48.91]  Atrial flutter, unspecified type (HCC) [I48.92]  Congestive heart failure, unspecified HF chronicity, unspecified heart failure type (HCC) [I50.9]  Acute hypoxic respiratory failure (HCC) [J96.01]  Procedure(s) (LRB):  ESOPHAGOGASTRODUODENOSCOPY PERCUTANEOUS ENDOSCOPIC GASTROSTOMY TUBE PLACEMENT (N/A) 18 Days Post-Op   Precautions: Fall Risk, General Precautions                Recommendations for nursing mobility: Encourage HEP in prep for ADLs/mobility; see handout for details, Frequent repositioning to prevent skin breakdown, Use of bed/chair alarm for safety, and Assist x2    In place during session: Peripheral IV, Tracheostomy and vent 35% FiO2, External Catheter, and ICU lines and leads  Chart, occupational therapy assessment, plan of care, and goals were reviewed.  ASSESSMENT  Patient continues with skilled OT services and is slowly progressing towards goals. Pt presented semi supine upon HENDERSON/PTA arrival, agreeable to session. Pt A&O x 4. Pt completed UE therex, see grid below for details, to maintain/ increase strength and endurance to aid in adl performance. Pt required Ax3 for bed mobility.  Therapist and nursing attempted to assist pt into sitting.  Pt with heavy posterior lean, unable to get pt sitting up.  Pt demonstrated difficulty flexing trunk forward and started to slide off the bed. Pt also with neck in extension. Pt returned supine dependently x3.  Pt required Ax3 to scoot up to head of bed.  Pt encouraged to complete UE threxe and neck flexion to assist with functional mobility. (See below for objective details and assist levels).     Overall pt tolerated session poorly today with bed mobility. Pt with decrease insight.  Pt requesting to try walking when she couldn't sit EOB.   Current OT recommendations for discharge: LTAC.  Will

## 2025-03-03 NOTE — PROGRESS NOTES
CRITICAL CARE PROGRESS NOTE    Name: Nida Graves   : 1975   MRN: [id number]   Date: 3/3/2025      Diagnoses/problem list:   Acute hypoxic and hypercapnic respiratory failure  Acute kidney injury  Atrial flutter with slow ventricular response  Complete heart block  GI bleed  Nosebleed, resolved  NSTEMI  Symptomatic bradycardia  Biventricular failure  Acute HFrEF, 25 to 30%  Diabetes  Morbid obesity  Acute metabolic/septic encephalopathy, improved  Deconditioning    24-hour events:   Doing well on 10/5.  Will continue to decrease PS and potentially try trach collar trials.    Assessment and plan:   Ms. Graves is a 49-year-old female with PMH chronic debility and bedbound status, diabetes, asthma, obesity, who presented for shortness of breath.  Shortness of breath worsening over the past few days.  Upon presentation ED patient found to be hypoxic necessita.  Ting O2 via NC.  She was also found to be in A-fib with slow ventricular response.  She was transferred out of ICU on . Early morning  upgraded to ICU again due to severe hypoxia and hypercapnia requiring intubation. Now s/p trach on . Leadless pacemaker . PEG . Improved lung white-out s/p bronchoscopy     CNS / MSK:    Continue Bupropion and Venlafaxine   PT/OT  Pt would most benefit from LTAC to best address her deconditioning and long-term ventilator weaning    PULMONOLOGY:   Extubated on , reintubated on  due to inability to protect airway  ENT placed trach on   Recurrent mucus plugging, continue 3% saline nebs  Continue nebs, budesonide q12h, duonebs q6h  Lung aeration improved s/p bronchoscopy  and   Continue pressure support trials daily, trach collar trials if able to tolerate  Pt would most benefit from LTAC to best address her deconditioning and long-term ventilator weaning    CARDIOVASCULAR:   Midodrine to keep MAP above 65   S/p AUGUSTIN/DCCV on   LVEF 20-30%, right ventricle overload, moderate TR  hours) at 3/3/2025 0931  Last data filed at 3/3/2025 0800  Gross per 24 hour   Intake 1862.8 ml   Output 2500 ml   Net -637.2 ml       CRITICAL CARE DOCUMENTATION  I had a face to face encounter with the patient, reviewed and interpreted patient data including clinical events, labs, images, vital signs, I/O's, and examined patient.  I have discussed the case and the plan and management of the patient's care with the consulting services, the bedside nurses and the respiratory therapist.      NOTE OF PERSONAL INVOLVEMENT IN CARE   This patient has a high probability of imminent, clinically significant deterioration, which requires the highest level of preparedness to intervene urgently. I participated in the decision-making and personally managed or directed the management of the following life and organ supporting interventions that required my frequent assessment to treat or prevent imminent deterioration.    I personally spent 30 minutes of critical care time.  This is time spent at this critically ill patient's bedside actively involved in patient care as well as the coordination of care.  This does not include any procedural time which has been billed separately.        Josue Salvador MD   Critical Care Medicine  [facility]

## 2025-03-03 NOTE — PLAN OF CARE
PHYSICAL THERAPY TREATMENT     Patient: Nida Graves (49 y.o. female)  Date: 3/3/2025  Diagnosis: Atrial fibrillation, unspecified type (HCC) [I48.91]  Atrial flutter, unspecified type (HCC) [I48.92]  Congestive heart failure, unspecified HF chronicity, unspecified heart failure type (HCC) [I50.9]  Acute hypoxic respiratory failure (HCC) [J96.01] Acute on chronic hypoxic respiratory failure (HCC)  Procedure(s) (LRB):  ESOPHAGOGASTRODUODENOSCOPY PERCUTANEOUS ENDOSCOPIC GASTROSTOMY TUBE PLACEMENT (N/A) 18 Days Post-Op  Precautions: Fall Risk, General Precautions                      Recommendations for nursing mobility: Encourage HEP in prep for ADLs/mobility; see handout for details, Frequent repositioning to prevent skin breakdown, Use of bed/chair alarm for safety, Maxi Move for bed to chair transfers, and Assist x2    In place during session:  Trach and vent 35% FiO2/Icu lines and leads  Chart, physical therapy assessment, plan of care and goals were reviewed.  ASSESSMENT  Patient continues with skilled PT services and is slowly progressing towards goals. Pt received semi supine upon PT/OT arrival, agreeable to session. Pt A&O x 4 . (See below for objective details and assist levels). Pt. C/o dizziness in supine. RN in room. /81. Pt. Required total assist of 3 for bed mobility this session. Pt. Leaning heavily posteriorly and unable to completely sit pt. Upright. No trunk control noted. Pt. Very rigid and stiff through trunk/body and pt. Pushing/sliding off bed. Pt. With increased dizziness with sitting EOB, pts eyes closing and c/o more dizziness. Returned pt. To bed immediately and checked vitals again. RN in to assist with pt. Discussed with RN to place pt. In chair position.    Overall pt tolerated session poor today with bed mobility.  Will continue to benefit from skilled PT services, and will continue to progress as tolerated. Current PT DC recommendation  LTAC  once medically  relief, Updated patient's board on functional status and mobility recommendations, and left pt. With RN , and nsg updated     COMMUNICATION/COLLABORATION:   The patient’s plan of care was discussed with: Occupational therapy assistant and Registered nurse    Patient Education  Education Given To: Patient  Education Provided: Role of Therapy;Plan of Care;Home Exercise Program  Education Provided Comments: LE exercises/Importance of mobilitty/Sitting EOB and balance  Education Method: Verbal;Demonstration  Barriers to Learning: None  Education Outcome: Continued education needed    PT/OT sessions occurred together for increased safety of pt and clinician.     Arabella Howard, PTA  Minutes: 42

## 2025-03-03 NOTE — PROGRESS NOTES
Pt tolerated TC for approx 3 hours then she begin to drop Sp02 and increased RR. Placed back on vent w rate. Pt now relaxed with normal Sp02 and WOB

## 2025-03-04 LAB
ANION GAP SERPL CALC-SCNC: 6 MMOL/L (ref 2–12)
BUN SERPL-MCNC: 37 MG/DL (ref 6–20)
BUN/CREAT SERPL: 41 (ref 12–20)
CA-I BLD-MCNC: 8.9 MG/DL (ref 8.5–10.1)
CHLORIDE SERPL-SCNC: 106 MMOL/L (ref 97–108)
CO2 SERPL-SCNC: 26 MMOL/L (ref 21–32)
CREAT SERPL-MCNC: 0.9 MG/DL (ref 0.55–1.02)
ERYTHROCYTE [DISTWIDTH] IN BLOOD BY AUTOMATED COUNT: 18.8 % (ref 11.5–14.5)
GLUCOSE BLD STRIP.AUTO-MCNC: 103 MG/DL (ref 65–100)
GLUCOSE BLD STRIP.AUTO-MCNC: 120 MG/DL (ref 65–100)
GLUCOSE BLD STRIP.AUTO-MCNC: 98 MG/DL (ref 65–100)
GLUCOSE BLD STRIP.AUTO-MCNC: 98 MG/DL (ref 65–100)
GLUCOSE SERPL-MCNC: 115 MG/DL (ref 65–100)
HCT VFR BLD AUTO: 26.4 % (ref 35–47)
HGB BLD-MCNC: 8.1 G/DL (ref 11.5–16)
MCH RBC QN AUTO: 29.3 PG (ref 26–34)
MCHC RBC AUTO-ENTMCNC: 30.7 G/DL (ref 30–36.5)
MCV RBC AUTO: 95.7 FL (ref 80–99)
NRBC # BLD: 0 K/UL (ref 0–0.01)
NRBC BLD-RTO: 0 PER 100 WBC
PERFORMED BY:: ABNORMAL
PERFORMED BY:: ABNORMAL
PERFORMED BY:: NORMAL
PERFORMED BY:: NORMAL
PLATELET # BLD AUTO: 309 K/UL (ref 150–400)
PMV BLD AUTO: 10.1 FL (ref 8.9–12.9)
POTASSIUM SERPL-SCNC: 3.7 MMOL/L (ref 3.5–5.1)
RBC # BLD AUTO: 2.76 M/UL (ref 3.8–5.2)
SODIUM SERPL-SCNC: 138 MMOL/L (ref 136–145)
WBC # BLD AUTO: 8.7 K/UL (ref 3.6–11)

## 2025-03-04 PROCEDURE — 36415 COLL VENOUS BLD VENIPUNCTURE: CPT

## 2025-03-04 PROCEDURE — 6370000000 HC RX 637 (ALT 250 FOR IP): Performed by: NURSE PRACTITIONER

## 2025-03-04 PROCEDURE — 97530 THERAPEUTIC ACTIVITIES: CPT

## 2025-03-04 PROCEDURE — 6370000000 HC RX 637 (ALT 250 FOR IP): Performed by: INTERNAL MEDICINE

## 2025-03-04 PROCEDURE — 2500000003 HC RX 250 WO HCPCS: Performed by: STUDENT IN AN ORGANIZED HEALTH CARE EDUCATION/TRAINING PROGRAM

## 2025-03-04 PROCEDURE — 80048 BASIC METABOLIC PNL TOTAL CA: CPT

## 2025-03-04 PROCEDURE — 94761 N-INVAS EAR/PLS OXIMETRY MLT: CPT

## 2025-03-04 PROCEDURE — 6370000000 HC RX 637 (ALT 250 FOR IP): Performed by: STUDENT IN AN ORGANIZED HEALTH CARE EDUCATION/TRAINING PROGRAM

## 2025-03-04 PROCEDURE — 2000000000 HC ICU R&B

## 2025-03-04 PROCEDURE — 2500000003 HC RX 250 WO HCPCS: Performed by: NURSE PRACTITIONER

## 2025-03-04 PROCEDURE — 82962 GLUCOSE BLOOD TEST: CPT

## 2025-03-04 PROCEDURE — 2580000003 HC RX 258: Performed by: INTERNAL MEDICINE

## 2025-03-04 PROCEDURE — 87205 SMEAR GRAM STAIN: CPT

## 2025-03-04 PROCEDURE — 85027 COMPLETE CBC AUTOMATED: CPT

## 2025-03-04 PROCEDURE — 94640 AIRWAY INHALATION TREATMENT: CPT

## 2025-03-04 PROCEDURE — 87070 CULTURE OTHR SPECIMN AEROBIC: CPT

## 2025-03-04 PROCEDURE — 94668 MNPJ CHEST WALL SBSQ: CPT

## 2025-03-04 PROCEDURE — 6360000002 HC RX W HCPCS: Performed by: STUDENT IN AN ORGANIZED HEALTH CARE EDUCATION/TRAINING PROGRAM

## 2025-03-04 PROCEDURE — 94003 VENT MGMT INPAT SUBQ DAY: CPT

## 2025-03-04 PROCEDURE — 2700000000 HC OXYGEN THERAPY PER DAY

## 2025-03-04 RX ADMIN — SODIUM CHLORIDE, PRESERVATIVE FREE 10 ML: 5 INJECTION INTRAVENOUS at 08:20

## 2025-03-04 RX ADMIN — IPRATROPIUM BROMIDE AND ALBUTEROL SULFATE 1 DOSE: 2.5; .5 SOLUTION RESPIRATORY (INHALATION) at 14:14

## 2025-03-04 RX ADMIN — APIXABAN 2.5 MG: 2.5 TABLET, FILM COATED ORAL at 20:57

## 2025-03-04 RX ADMIN — Medication 4 ML: at 14:14

## 2025-03-04 RX ADMIN — GUAIFENESIN 200 MG: 200 SOLUTION ORAL at 20:58

## 2025-03-04 RX ADMIN — DOCUSATE SODIUM 100 MG: 50 LIQUID ORAL at 08:18

## 2025-03-04 RX ADMIN — MAGNESIUM HYDROXIDE 30 ML: 400 SUSPENSION ORAL at 08:18

## 2025-03-04 RX ADMIN — APIXABAN 2.5 MG: 2.5 TABLET, FILM COATED ORAL at 08:36

## 2025-03-04 RX ADMIN — HYDROXYZINE HYDROCHLORIDE 25 MG: 25 TABLET, FILM COATED ORAL at 01:52

## 2025-03-04 RX ADMIN — FOLIC ACID 1 MG: 1 TABLET ORAL at 08:18

## 2025-03-04 RX ADMIN — BUDESONIDE 500 MCG: 0.5 INHALANT ORAL at 20:35

## 2025-03-04 RX ADMIN — WHITE PETROLATUM,ZINC OXIDE: 57; 17 PASTE TOPICAL at 20:58

## 2025-03-04 RX ADMIN — Medication 4 ML: at 08:50

## 2025-03-04 RX ADMIN — CINACALCET HYDROCHLORIDE 30 MG: 30 TABLET, FILM COATED ORAL at 08:18

## 2025-03-04 RX ADMIN — ASPIRIN 81 MG 81 MG: 81 TABLET ORAL at 08:18

## 2025-03-04 RX ADMIN — GUAIFENESIN 200 MG: 200 SOLUTION ORAL at 08:18

## 2025-03-04 RX ADMIN — BUPROPION HYDROCHLORIDE 75 MG: 75 TABLET, FILM COATED ORAL at 08:18

## 2025-03-04 RX ADMIN — WHITE PETROLATUM,ZINC OXIDE: 57; 17 PASTE TOPICAL at 08:19

## 2025-03-04 RX ADMIN — IPRATROPIUM BROMIDE AND ALBUTEROL SULFATE 1 DOSE: 2.5; .5 SOLUTION RESPIRATORY (INHALATION) at 08:49

## 2025-03-04 RX ADMIN — ACETAMINOPHEN 650 MG: 650 SOLUTION ORAL at 01:52

## 2025-03-04 RX ADMIN — POLYETHYLENE GLYCOL 3350 17 G: 17 POWDER, FOR SOLUTION ORAL at 08:18

## 2025-03-04 RX ADMIN — IPRATROPIUM BROMIDE AND ALBUTEROL SULFATE 1 DOSE: 2.5; .5 SOLUTION RESPIRATORY (INHALATION) at 20:36

## 2025-03-04 RX ADMIN — ATORVASTATIN CALCIUM 20 MG: 20 TABLET, FILM COATED ORAL at 20:57

## 2025-03-04 RX ADMIN — VENLAFAXINE 75 MG: 25 TABLET ORAL at 08:18

## 2025-03-04 RX ADMIN — VENLAFAXINE 75 MG: 25 TABLET ORAL at 18:12

## 2025-03-04 RX ADMIN — BUDESONIDE 500 MCG: 0.5 INHALANT ORAL at 08:49

## 2025-03-04 RX ADMIN — BUPROPION HYDROCHLORIDE 75 MG: 75 TABLET, FILM COATED ORAL at 20:57

## 2025-03-04 RX ADMIN — SODIUM CHLORIDE, PRESERVATIVE FREE 10 ML: 5 INJECTION INTRAVENOUS at 20:58

## 2025-03-04 RX ADMIN — LANSOPRAZOLE 30 MG: 30 TABLET, ORALLY DISINTEGRATING ORAL at 08:18

## 2025-03-04 RX ADMIN — HYDROXYZINE HYDROCHLORIDE 25 MG: 25 TABLET, FILM COATED ORAL at 20:58

## 2025-03-04 ASSESSMENT — PULMONARY FUNCTION TESTS
PIF_VALUE: 22
PIF_VALUE: 20
PIF_VALUE: 22
PIF_VALUE: 22
PIF_VALUE: 21
PIF_VALUE: 19
PIF_VALUE: 21
PIF_VALUE: 20
PIF_VALUE: 20
PIF_VALUE: 22
PIF_VALUE: 24
PIF_VALUE: 19
PIF_VALUE: 21
PIF_VALUE: 21
PIF_VALUE: 23
PIF_VALUE: 19
PIF_VALUE: 19
PIF_VALUE: 28
PIF_VALUE: 27
PIF_VALUE: 20
PIF_VALUE: 19
PIF_VALUE: 17
PIF_VALUE: 20
PIF_VALUE: 20
PIF_VALUE: 23
PIF_VALUE: 21
PIF_VALUE: 19
PIF_VALUE: 21
PIF_VALUE: 10
PIF_VALUE: 20
PIF_VALUE: 22
PIF_VALUE: 37
PIF_VALUE: 19
PIF_VALUE: 21
PIF_VALUE: 20
PIF_VALUE: 21
PIF_VALUE: 34
PIF_VALUE: 35
PIF_VALUE: 20
PIF_VALUE: 19
PIF_VALUE: 19
PIF_VALUE: 21
PIF_VALUE: 20
PIF_VALUE: 27

## 2025-03-04 ASSESSMENT — PAIN SCALES - GENERAL
PAINLEVEL_OUTOF10: 0
PAINLEVEL_OUTOF10: 4
PAINLEVEL_OUTOF10: 0
PAINLEVEL_OUTOF10: 0

## 2025-03-04 ASSESSMENT — PAIN DESCRIPTION - LOCATION: LOCATION: HEAD

## 2025-03-04 ASSESSMENT — PAIN DESCRIPTION - DESCRIPTORS: DESCRIPTORS: ACHING

## 2025-03-04 NOTE — PROGRESS NOTES
Spiritual Health History and Assessment/Progress Note  [facility]    Attempted Encounter, Follow-up,  ,  ,      Name: Nida Graves MRN: [id number]    Age: 49 y.o.     Sex: female   Language: English   Bahai: Yazdanism   Acute on chronic hypoxic respiratory failure (HCC)     Date: 3/4/2025            Total Time Calculated: 13 min              Spiritual Assessment began in [facility] [place] ICU        Referral/Consult From: Nurse   Encounter Overview/Reason: Attempted Encounter, Follow-up  Service Provided For: Patient not available (Pt asleep)    Nikki, Belief, Meaning:   Patient unable to assess at this time  Family/Friends No family/friends present      Importance and Influence:  Patient unable to assess at this time  Family/Friends No family/friends present    Community:  Patient Other: unable to assess at this time  Family/Friends No family/friends present    Assessment and Plan of Care:     Patient Interventions include: Other: n/a  Family/Friends Interventions include: No family/friends present    Patient Plan of Care: Spiritual Care available upon further referral  Family/Friends Plan of Care: No family/friends present    I was referred for a follow-up check-in by ICU staff. Patient was asleep, and I did not disturb her, but I spent some time consulting with nursing staff to learn more about her recent updates/development. Will continue to monitor and hope to follow up with her when she is more awake.     Electronically signed by  Rylan Tirado MDiv  Chaplain Resident   on 3/4/2025 at 4:08 PM

## 2025-03-04 NOTE — PROGRESS NOTES
Renal Progress Note    Patient: Nida Graves MRN: [id number]  SSN: [id number]    YOB: 1975  Age: 49 y.o.  Sex: female      Admit Date: 1/12/2025    LOS: 50 days     Subjective:   Patient seen in ICU.  Awake, AMS. , no acute distress.  S/p tracheostomy, remains on vent  Edema improving +, Repeat labs showed stable electrolytes    Current Facility-Administered Medications   Medication Dose Route Frequency    apixaban (ELIQUIS) tablet 2.5 mg  2.5 mg Per NG tube BID    sodium chloride (Inhalant) 3 % nebulizer solution 4 mL  4 mL Nebulization TID    magnesium hydroxide (MILK OF MAGNESIA) 400 MG/5ML suspension 30 mL  30 mL Per G Tube Daily    venlafaxine (EFFEXOR) tablet 75 mg  75 mg Per G Tube BID WC    polyethylene glycol (GLYCOLAX) packet 17 g  17 g Per G Tube Daily    guaiFENesin (ROBITUSSIN) 100 MG/5ML liquid 200 mg  200 mg Oral Q12H    ipratropium 0.5 mg-albuterol 2.5 mg (DUONEB) nebulizer solution 1 Dose  1 Dose Inhalation Q6H WA RT    insulin lispro (HUMALOG,ADMELOG) injection vial 0-4 Units  0-4 Units SubCUTAneous 4x Daily AC & HS    0.9 % sodium chloride infusion   IntraVENous PRN    influenza split vaccine (PF) (AFLURIA;FLUARIX) injection 0.5 mL  1 Dose IntraMUSCular Prior to discharge    docusate (COLACE) 50 MG/5ML liquid 100 mg  100 mg Oral Daily    epoetin itz-epbx (RETACRIT) injection 2,500 Units  2,500 Units SubCUTAneous Once per day on Monday Wednesday Friday    lansoprazole (PREVACID SOLUTAB) disintegrating tablet 30 mg  30 mg Per G Tube QAM AC    aspirin chewable tablet 81 mg  81 mg Per G Tube Daily    atorvastatin (LIPITOR) tablet 20 mg  20 mg Per G Tube Nightly    buPROPion (WELLBUTRIN) tablet 75 mg  75 mg Per G Tube BID    folic acid (FOLVITE) tablet 1 mg  1 mg Per G Tube Daily    [Held by provider] midodrine (PROAMATINE) tablet 10 mg  10 mg Per G Tube TID    acetaminophen (TYLENOL) 160 MG/5ML solution 650 mg  650 mg Per G Tube Q6H PRN    Or    acetaminophen (TYLENOL) suppository  650 mg  650 mg Rectal Q6H PRN    hydrOXYzine HCl (ATARAX) tablet 25 mg  25 mg Per G Tube TID PRN    senna (SENOKOT) tablet 8.6 mg  1 tablet Per G Tube Daily PRN    cinacalcet (SENSIPAR) tablet 30 mg  30 mg Oral Daily    prochlorperazine (COMPAZINE) injection 10 mg  10 mg IntraVENous Q6H PRN    glucagon injection 1 mg  1 mg SubCUTAneous PRN    Petrolatum-Zinc Oxide ointment   Topical BID    bisacodyl (DULCOLAX) suppository 10 mg  10 mg Rectal Daily PRN    ondansetron (ZOFRAN-ODT) disintegrating tablet 4 mg  4 mg Oral Q8H PRN    Or    ondansetron (ZOFRAN) injection 4 mg  4 mg IntraVENous Q4H PRN    sodium chloride flush 0.9 % injection 5-40 mL  5-40 mL IntraVENous 2 times per day    sodium chloride flush 0.9 % injection 5-40 mL  5-40 mL IntraVENous PRN    0.9 % sodium chloride infusion   IntraVENous PRN    potassium chloride 20 mEq/50 mL IVPB (Central Line)  20 mEq IntraVENous PRN    Or    potassium chloride 10 mEq/100 mL IVPB (Peripheral Line)  10 mEq IntraVENous PRN    magnesium sulfate 2000 mg in 50 mL IVPB premix  2,000 mg IntraVENous PRN    ipratropium (ATROVENT) 0.02 % nebulizer solution 0.5 mg  0.5 mg Nebulization Q6H PRN    glucose chewable tablet 16 g  4 tablet Oral PRN    dextrose bolus 10% 125 mL  125 mL IntraVENous PRN    Or    dextrose bolus 10% 250 mL  250 mL IntraVENous PRN    dextrose 10 % infusion   IntraVENous Continuous PRN    budesonide (PULMICORT) nebulizer suspension 500 mcg  0.5 mg Nebulization BID RT        Vitals:    03/03/25 1612 03/03/25 1700 03/03/25 1956 03/03/25 2004   BP:  111/76     Pulse:  72 64 98   Resp:  19 19 21   Temp:       TempSrc:       SpO2: 98%  100% 100%   Weight:       Height:         Objective:   General: awake, no acute distress.  On vent through trach   HEENT: EOMI, no Icterus, no Pallor,  mucosa moist.  Neck: Neck is supple, No JVD  Lungs: Fair air entry present bilaterally.  Bilateral coarse breath sound  CVS: heart sounds normal, regular rate and rhythm  GI: soft,

## 2025-03-04 NOTE — PLAN OF CARE
PHYSICAL THERAPY REEVALUATION  Patient: Nida Graves (49 y.o. female)  Date: 3/4/2025  Primary Diagnosis: Atrial fibrillation, unspecified type (HCC) [I48.91]  Atrial flutter, unspecified type (HCC) [I48.92]  Congestive heart failure, unspecified HF chronicity, unspecified heart failure type (HCC) [I50.9]  Acute hypoxic respiratory failure (HCC) [J96.01]  Procedure(s) (LRB):  ESOPHAGOGASTRODUODENOSCOPY PERCUTANEOUS ENDOSCOPIC GASTROSTOMY TUBE PLACEMENT (N/A) 19 Days Post-Op   Precautions: Fall Risk, General Precautions                      Recommendations for nursing mobility: Encourage HEP in prep for ADLs/mobility; see handout for details, Frequent repositioning to prevent skin breakdown, and Assist x2    In place during session: Peripheral IV, Tracheostomy on vent, External Catheter, EKG/telemetry , Pulse ox, and PEG Tube  Chart, physical therapy assessment, plan of care, and goals were reviewed.      ASSESSMENT  Patient initially seen for PT evaluation 1/16/25 and 11 skilled PT sessions since evalution. Patient seen today for PT reevaluation s/t LOS. Patient A&O x4. Pt semi-supine upon arrival, agreeable to session.      Based on the objective data described, the patient currently presents with impaired functional mobility, decreased ROM, impaired strength, decreased activity tolerance, poor safety awareness, impaired balance, and impaired posture. (See below for objective details and assist levels).    Overall pt tolerated session fair today, currently with no reports of pain. Pt making slow progress, limited by dizziness when sitting EOB and generalized weakness. Spoke with RN at length this morning about the next steps for pt.  RN and writer agreed that pt needs a bed that will move into chair position.  Pt is not safe to transfer to a chair at this time without a colleen lift, and even with a colleen lift, pt may not be able to tolerate the position for any length of time due to dizziness. Pt's HOB elevated to

## 2025-03-04 NOTE — PROGRESS NOTES
CRITICAL CARE PROGRESS NOTE    Name: Nida Graves   : 1975   MRN: [id number]   Date: 3/4/2025      Diagnoses/problem list:   Acute hypoxic and hypercapnic respiratory failure  Acute kidney injury  Atrial flutter with slow ventricular response  Complete heart block  GI bleed  Nosebleed, resolved  NSTEMI  Symptomatic bradycardia  Biventricular failure  Acute HFrEF, 25 to 30%  Diabetes  Morbid obesity  Acute metabolic/septic encephalopathy, improved  Deconditioning    24-hour events:   No new complaints. Plan for trach collar trial today. Restarted Eliquis as no signs of bleeding.     Assessment and plan:   Ms. Graves is a 49-year-old female with PMH chronic debility and bedbound status, diabetes, asthma, obesity, who presented for shortness of breath.  Shortness of breath worsening over the past few days.  Upon presentation ED patient found to be hypoxic necessita.  Ting O2 via NC.  She was also found to be in A-fib with slow ventricular response.  She was transferred out of ICU on . Early morning  upgraded to ICU again due to severe hypoxia and hypercapnia requiring intubation. Now s/p trach on . Leadless pacemaker . PEG . Improved lung white-out s/p bronchoscopy     CNS / MSK:    Continue Bupropion and Venlafaxine   PT/OT  Pt would most benefit from LTAC to best address her deconditioning and long-term ventilator weaning    PULMONOLOGY:   Extubated on , reintubated on  due to inability to protect airway  ENT placed trach on   Recurrent mucus plugging, continue 3% saline nebs  Continue nebs, budesonide q12h, duonebs q6h  Lung aeration improved s/p bronchoscopy  and   Continue pressure support trials daily, trach collar trials if able to tolerate  Pt would most benefit from LTAC to best address her deconditioning and long-term ventilator weaning    CARDIOVASCULAR:   Midodrine to keep MAP above 65   S/p AUGUSTIN/DCCV on   LVEF 20-30%, right ventricle overload,

## 2025-03-04 NOTE — PROGRESS NOTES
At 10:04 Mrs. Graves placed on t-piece 30L/30%.  Patient tolerated well, no complications noted at this time.  HR:69 RR:19 SPO2:98%. Will continue to monitor.

## 2025-03-04 NOTE — PROGRESS NOTES
Spoke with [employer] to order a total care bariatric bed that can be adjusted into a chair position per Celena Austin RN recommendation. There are no beds showing currently available in the district. [facility] sent out an order to other managers within the district to locate a bed. If there are none available they will escalate to other districts.     Order Confirmation # [id number]

## 2025-03-04 NOTE — PROGRESS NOTES
Nutrient Delivery: Continue Current Tube Feeding  Nutrition Education/Counseling: No recommendation at this time  Coordination of Nutrition Care: Continue to monitor while inpatient  Plan of Care discussed with: MDR team    Goals:  Goals: Maintain adequate nutrition status, prior to discharge  Type of Goal: New goal  Previous Goal Met: Goal(s) Achieved    Nutrition Monitoring and Evaluation:   Behavioral-Environmental Outcomes: None Identified  Food/Nutrient Intake Outcomes: Enteral Nutrition Intake/Tolerance  Physical Signs/Symptoms Outcomes: Biochemical Data, Diarrhea, Constipation, Fluid Status or Edema, Nutrition Focused Physical Findings, Weight    Discharge Planning:    Enteral Nutrition     Winnie Garrison RD  Contact: [phone]

## 2025-03-04 NOTE — PLAN OF CARE
Problem: ABCDS Injury Assessment  Goal: Absence of physical injury  Outcome: Progressing     Problem: Skin/Tissue Integrity  Goal: Absence of new skin breakdown  Description: 1.  Monitor for areas of redness and/or skin breakdown  2.  Assess vascular access sites hourly  3.  Every 4-6 hours minimum:  Change oxygen saturation probe site  4.  Every 4-6 hours:  If on nasal continuous positive airway pressure, respiratory therapy assess nares and determine need for appliance change or resting period.  Outcome: Progressing     Problem: Chronic Conditions and Co-morbidities  Goal: Patient's chronic conditions and co-morbidity symptoms are monitored and maintained or improved  Outcome: Progressing     Problem: Safety - Adult  Goal: Free from fall injury  Outcome: Progressing     Problem: Respiratory - Adult  Goal: Achieves optimal ventilation and oxygenation  Outcome: Progressing

## 2025-03-04 NOTE — CARE COORDINATION
CM reviewed Pt medicals Pt lives with her  and nephew.     Pt  would assist Pt with ADL.        CM called Pt daughter to discuss Pt baseline before she came into the hospital.      Pt daughter stated that Pt would use a walker for stability to go to the bathroom, walk to the kitchen.  Pt can feed herself.      Pt daughter stated that Pt could bath her self she just want help getting in and out of the shower.      Pt daughter stated that Pt had made herself home bound because she felt like she was a burden.           [facility].     Per IDR    Pt is on Trach collar.  Low grade infectious.

## 2025-03-04 NOTE — PROGRESS NOTES
Around 10:55 patient placed back on previous settings due to increased respirations. Dr Salvador does not want patient to tire out.

## 2025-03-05 ENCOUNTER — APPOINTMENT (OUTPATIENT)
Facility: HOSPITAL | Age: 50
DRG: 004 | End: 2025-03-05
Payer: COMMERCIAL

## 2025-03-05 LAB
GLUCOSE BLD STRIP.AUTO-MCNC: 109 MG/DL (ref 65–100)
GLUCOSE BLD STRIP.AUTO-MCNC: 115 MG/DL (ref 65–100)
GLUCOSE BLD STRIP.AUTO-MCNC: 91 MG/DL (ref 65–100)
GLUCOSE BLD STRIP.AUTO-MCNC: 96 MG/DL (ref 65–100)
GLUCOSE BLD STRIP.AUTO-MCNC: 98 MG/DL (ref 65–100)
PERFORMED BY:: ABNORMAL
PERFORMED BY:: ABNORMAL
PERFORMED BY:: NORMAL

## 2025-03-05 PROCEDURE — 6370000000 HC RX 637 (ALT 250 FOR IP): Performed by: STUDENT IN AN ORGANIZED HEALTH CARE EDUCATION/TRAINING PROGRAM

## 2025-03-05 PROCEDURE — 31502 CHANGE OF WINDPIPE AIRWAY: CPT

## 2025-03-05 PROCEDURE — 94761 N-INVAS EAR/PLS OXIMETRY MLT: CPT

## 2025-03-05 PROCEDURE — 2500000003 HC RX 250 WO HCPCS: Performed by: STUDENT IN AN ORGANIZED HEALTH CARE EDUCATION/TRAINING PROGRAM

## 2025-03-05 PROCEDURE — 6360000002 HC RX W HCPCS: Performed by: STUDENT IN AN ORGANIZED HEALTH CARE EDUCATION/TRAINING PROGRAM

## 2025-03-05 PROCEDURE — 2000000000 HC ICU R&B

## 2025-03-05 PROCEDURE — 99223 1ST HOSP IP/OBS HIGH 75: CPT

## 2025-03-05 PROCEDURE — 2700000000 HC OXYGEN THERAPY PER DAY

## 2025-03-05 PROCEDURE — 2500000003 HC RX 250 WO HCPCS: Performed by: NURSE PRACTITIONER

## 2025-03-05 PROCEDURE — 6370000000 HC RX 637 (ALT 250 FOR IP): Performed by: INTERNAL MEDICINE

## 2025-03-05 PROCEDURE — 97530 THERAPEUTIC ACTIVITIES: CPT

## 2025-03-05 PROCEDURE — 94668 MNPJ CHEST WALL SBSQ: CPT

## 2025-03-05 PROCEDURE — 94003 VENT MGMT INPAT SUBQ DAY: CPT

## 2025-03-05 PROCEDURE — 94640 AIRWAY INHALATION TREATMENT: CPT

## 2025-03-05 PROCEDURE — 82962 GLUCOSE BLOOD TEST: CPT

## 2025-03-05 PROCEDURE — 6370000000 HC RX 637 (ALT 250 FOR IP): Performed by: NURSE PRACTITIONER

## 2025-03-05 PROCEDURE — 71045 X-RAY EXAM CHEST 1 VIEW: CPT

## 2025-03-05 RX ORDER — BUPROPION HYDROCHLORIDE 100 MG/1
100 TABLET ORAL 2 TIMES DAILY
Status: DISCONTINUED | OUTPATIENT
Start: 2025-03-05 | End: 2025-03-10

## 2025-03-05 RX ORDER — LOPERAMIDE HCL 1 MG/7.5ML
2 SOLUTION ORAL 4 TIMES DAILY PRN
Status: DISCONTINUED | OUTPATIENT
Start: 2025-03-05 | End: 2025-03-05

## 2025-03-05 RX ADMIN — VENLAFAXINE 75 MG: 25 TABLET ORAL at 21:36

## 2025-03-05 RX ADMIN — ASPIRIN 81 MG 81 MG: 81 TABLET ORAL at 08:32

## 2025-03-05 RX ADMIN — FOLIC ACID 1 MG: 1 TABLET ORAL at 08:32

## 2025-03-05 RX ADMIN — BUPROPION HYDROCHLORIDE 100 MG: 100 TABLET, FILM COATED ORAL at 21:38

## 2025-03-05 RX ADMIN — BUDESONIDE 500 MCG: 0.5 INHALANT ORAL at 07:48

## 2025-03-05 RX ADMIN — WHITE PETROLATUM,ZINC OXIDE: 57; 17 PASTE TOPICAL at 08:27

## 2025-03-05 RX ADMIN — IPRATROPIUM BROMIDE AND ALBUTEROL SULFATE 1 DOSE: 2.5; .5 SOLUTION RESPIRATORY (INHALATION) at 13:14

## 2025-03-05 RX ADMIN — GUAIFENESIN 200 MG: 200 SOLUTION ORAL at 21:38

## 2025-03-05 RX ADMIN — VENLAFAXINE 75 MG: 25 TABLET ORAL at 08:32

## 2025-03-05 RX ADMIN — EPOETIN ALFA-EPBX 2500 UNITS: 3000 INJECTION, SOLUTION INTRAVENOUS; SUBCUTANEOUS at 21:37

## 2025-03-05 RX ADMIN — IPRATROPIUM BROMIDE AND ALBUTEROL SULFATE 1 DOSE: 2.5; .5 SOLUTION RESPIRATORY (INHALATION) at 20:11

## 2025-03-05 RX ADMIN — CINACALCET HYDROCHLORIDE 30 MG: 30 TABLET, FILM COATED ORAL at 08:32

## 2025-03-05 RX ADMIN — BUDESONIDE 500 MCG: 0.5 INHALANT ORAL at 20:11

## 2025-03-05 RX ADMIN — ATORVASTATIN CALCIUM 20 MG: 20 TABLET, FILM COATED ORAL at 21:38

## 2025-03-05 RX ADMIN — SODIUM CHLORIDE, PRESERVATIVE FREE 10 ML: 5 INJECTION INTRAVENOUS at 21:38

## 2025-03-05 RX ADMIN — BUPROPION HYDROCHLORIDE 75 MG: 75 TABLET, FILM COATED ORAL at 08:32

## 2025-03-05 RX ADMIN — LANSOPRAZOLE 30 MG: 30 TABLET, ORALLY DISINTEGRATING ORAL at 06:42

## 2025-03-05 RX ADMIN — IPRATROPIUM BROMIDE AND ALBUTEROL SULFATE 1 DOSE: 2.5; .5 SOLUTION RESPIRATORY (INHALATION) at 07:48

## 2025-03-05 RX ADMIN — APIXABAN 2.5 MG: 2.5 TABLET, FILM COATED ORAL at 21:30

## 2025-03-05 RX ADMIN — APIXABAN 2.5 MG: 2.5 TABLET, FILM COATED ORAL at 08:32

## 2025-03-05 RX ADMIN — WHITE PETROLATUM,ZINC OXIDE: 57; 17 PASTE TOPICAL at 22:09

## 2025-03-05 RX ADMIN — LOPERAMIDE HYDROCHLORIDE 2 MG: 1 SOLUTION ORAL at 06:42

## 2025-03-05 RX ADMIN — SODIUM CHLORIDE, PRESERVATIVE FREE 10 ML: 5 INJECTION INTRAVENOUS at 08:28

## 2025-03-05 RX ADMIN — Medication 1 PACKET: at 15:40

## 2025-03-05 RX ADMIN — GUAIFENESIN 200 MG: 200 SOLUTION ORAL at 08:32

## 2025-03-05 ASSESSMENT — PULMONARY FUNCTION TESTS
PIF_VALUE: 20
PIF_VALUE: 26
PIF_VALUE: 23
PIF_VALUE: 22
PIF_VALUE: 27
PIF_VALUE: 22
PIF_VALUE: 31
PIF_VALUE: 23
PIF_VALUE: 20
PIF_VALUE: 25
PIF_VALUE: 26
PIF_VALUE: 15
PIF_VALUE: 24
PIF_VALUE: 32
PIF_VALUE: 23
PIF_VALUE: 19
PIF_VALUE: 19
PIF_VALUE: 25
PIF_VALUE: 20
PIF_VALUE: 19
PIF_VALUE: 23
PIF_VALUE: 27
PIF_VALUE: 20
PIF_VALUE: 23
PIF_VALUE: 20
PIF_VALUE: 19
PIF_VALUE: 22
PIF_VALUE: 19
PIF_VALUE: 21
PIF_VALUE: 22
PIF_VALUE: 23
PIF_VALUE: 22
PIF_VALUE: 23
PIF_VALUE: 21
PIF_VALUE: 36
PIF_VALUE: 23
PIF_VALUE: 21
PIF_VALUE: 19
PIF_VALUE: 29
PIF_VALUE: 15
PIF_VALUE: 24
PIF_VALUE: 24
PIF_VALUE: 23

## 2025-03-05 ASSESSMENT — PAIN SCALES - GENERAL
PAINLEVEL_OUTOF10: 0

## 2025-03-05 NOTE — PROGRESS NOTES
OCCUPATIONAL THERAPY TREATMENT  Patient: Nida Graves (49 y.o. female)  Date: 3/5/2025  Primary Diagnosis: Atrial fibrillation, unspecified type (HCC) [I48.91]  Atrial flutter, unspecified type (HCC) [I48.92]  Congestive heart failure, unspecified HF chronicity, unspecified heart failure type (HCC) [I50.9]  Acute hypoxic respiratory failure (HCC) [J96.01]  Procedure(s) (LRB):  ESOPHAGOGASTRODUODENOSCOPY PERCUTANEOUS ENDOSCOPIC GASTROSTOMY TUBE PLACEMENT (N/A) 20 Days Post-Op   Precautions: Fall Risk, General Precautions                Recommendations for nursing mobility: Encourage HEP in prep for ADLs/mobility; see handout for details, Frequent repositioning to prevent skin breakdown, Use of bed/chair alarm for safety, and Assist x2    In place during session: Peripheral IV, Tracheostomy with trach collar, EKG/telemetry , Pulse ox, and vent setting 30% FiO2  Chart, occupational therapy assessment, plan of care, and goals were reviewed.  ASSESSMENT  Patient continues with skilled OT services and is slowly progressing towards goals. Pt presented semi supine upon HENDERSON/PTA arrival, agreeable to session. Nursing in room and required assistance to get bed placed in modified chair position.  Therapist assisted with scooting pt to HOB several times, proper positing of feet (using two wedges for support), placing pillows under L shoulder and hip to assist pt to maintain proper alignment.  Once pt sitting up, pt completed UE therex, see grid below for details, to maintain/ increase strength and endurance to aid in adl performance. Pt also worked on seated crunches.  Pt educated not  to complete unless staff is in room with her.  Pt verbalized understanding.  Towel placed behind pt's head to prevent neck extension.  Pt left with bed in modified chair position with all known needs met. Therapists recommended to pt and nursing not to sit up for longer an hour the first time. Both verbalized understanding. (See below for

## 2025-03-05 NOTE — PROGRESS NOTES
Renal Progress Note    Patient: Nida Graves MRN: [id number]  SSN: [id number]    YOB: 1975  Age: 49 y.o.  Sex: female      Admit Date: 1/12/2025    LOS: 52 days     Subjective:   Patient seen in ICU.  Awake, alert, better oriented. , no acute distress.  S/p tracheostomy, remains on vent  Edema improved, Repeat labs showed stable electrolytes    Current Facility-Administered Medications   Medication Dose Route Frequency    psyllium husk-aspartame (METAMUCIL FIBER) packet 1 packet  1 packet Per G Tube Daily    buPROPion (WELLBUTRIN) tablet 100 mg  100 mg Per G Tube BID    [START ON 3/7/2025] magnesium hydroxide (MILK OF MAGNESIA) 400 MG/5ML suspension 30 mL  30 mL Per G Tube Q72H    apixaban (ELIQUIS) tablet 2.5 mg  2.5 mg Per NG tube BID    venlafaxine (EFFEXOR) tablet 75 mg  75 mg Per G Tube BID WC    guaiFENesin (ROBITUSSIN) 100 MG/5ML liquid 200 mg  200 mg Oral Q12H    ipratropium 0.5 mg-albuterol 2.5 mg (DUONEB) nebulizer solution 1 Dose  1 Dose Inhalation Q6H WA RT    insulin lispro (HUMALOG,ADMELOG) injection vial 0-4 Units  0-4 Units SubCUTAneous 4x Daily AC & HS    0.9 % sodium chloride infusion   IntraVENous PRN    influenza split vaccine (PF) (AFLURIA;FLUARIX) injection 0.5 mL  1 Dose IntraMUSCular Prior to discharge    docusate (COLACE) 50 MG/5ML liquid 100 mg  100 mg Oral Daily    epoetin itz-epbx (RETACRIT) injection 2,500 Units  2,500 Units SubCUTAneous Once per day on Monday Wednesday Friday    lansoprazole (PREVACID SOLUTAB) disintegrating tablet 30 mg  30 mg Per G Tube QAM AC    aspirin chewable tablet 81 mg  81 mg Per G Tube Daily    atorvastatin (LIPITOR) tablet 20 mg  20 mg Per G Tube Nightly    folic acid (FOLVITE) tablet 1 mg  1 mg Per G Tube Daily    [Held by provider] midodrine (PROAMATINE) tablet 10 mg  10 mg Per G Tube TID    acetaminophen (TYLENOL) 160 MG/5ML solution 650 mg  650 mg Per G Tube Q6H PRN    Or    acetaminophen (TYLENOL) suppository 650 mg  650 mg Rectal Q6H  distress  -She was extubated on 1/26 but reintubated on 1/27 because of inability to protect airway.  -  -s/p Tracheostomy on 1/30  --Currently on mechanical ventilation via trach  -S/p bronch 2/23 for mucus plugs with left lung opacification     #5  Hypotension  Improved hemodynamic status  -Last EF was 20 to 30% with right ventricular overload, moderate TR  -Okay to continue as needed lasix  -Cardiology on the case.  S/p PPM on 2/12    6. Anemia: came with severe anemia, s/p pRBC tx  Sever iron def noted, s/p 4 doses of  IV ferrlecit doses  Improved HB  Monitor H/H    Signed By: Isael Amezquita MD     March 5, 2025

## 2025-03-05 NOTE — CARE COORDINATION
0957 CM reviewed chart.    DCP is pending acceptance at SNF/IRF.    Facilities [facility] reviewing patient [facility]:  [facility]   [facility]    As of 3/4 [facility] states that they are following pending respiratory status and therapy tolerance. CM informed them via [employer] this morning that patient was able to work with therapy yesterday, notes are available for their review.     CM sent a message via [facility] to [place]. As of 3/4 they stated that they were pending bed availability. CM has asked for clarification if patient has been officially accepted pending bed.     CM will continue to follow.

## 2025-03-05 NOTE — CONSULTS
Palliative Medicine  Patient Name: Nida Graves  YOB: 1975  MRN: [id number]  Age: 49 y.o.  Gender: female    Date of Initial Consult: 2025  Date of Service: 3/5/2025  Time: 10:37 AM  Provider: GINNY Chaparro CNP  Hospital Day: 53  Admit Date: 2025  Referring Provider: Dr. Shahid       Reasons for Consultation:  Goals of Care    HISTORY OF PRESENT ILLNESS (HPI):   Nida Graves is a 49 y.o. female with a past medical history of obesity was 270# now down to 240#/ BMI 37),DM with peripheral neuropathy, asthma (previously followed by Dr. [facility] with [facility]), chronic back pain, debility, who was admitted on 2025 from home  with a diagnosis of Shortness of breath, hypoxia.   CXR: LLL airspace disease, interstitial edema  ECHO : EF 25-30%, decrease RV function, mod TR, LA dilation.       Psychosocial: patient is  to spouse Nannette Valle [phone]  She has a dtr age 21 who is graduating from [employer] this Spring.  Her nephew lives with her (autism, age 27)  Patient's spouse does all the cooking, cleaning, household chores and works full time.   Patient has chronic debility, ambulates with walker for past 3 years, able to get around house, watches TV., independent with dressing/ bathing but fall risk., (spouse just purchased walk in tub)   Patient's mother and brother both  in .   No AMD documents: patient would trust her spouse to make medical decisions if unable.     PALLIATIVE DIAGNOSES:    Acute on chronic respiratory failure, multifactorial  Aspiration, asthma, pulmonary edema, weak muscles/ deconditioned  Suspected PE, on tx (unable to lie flat for CTA)   HFrEF (EF 25-30%)   Aflutter with slow ventricular response  Intermittent bradycardia   Possible NSTEMI  Obesity, diabetes, peripheral neuropathy, chronic Bilateral LE below knee leg pain  Chronic back pain  Mood disorder, on medication  Physical debility  Hypoxia   Palliative medicine encounter  Performance Scale (PPS):  PPS: 40    ECOG:        Modified ESAS:  Modified-[place] Symptom Assessment Scale (ESAS)  Depression Score: Not depressed  Pain Score: No pain  Anxiety Score: 3  Nausea Score: Not nauseated  Appetite Score: 5  Dyspnea Score: 2    Clinical Pain Assessment (nonverbal scale for severity on nonverbal patients):   Clinical Pain Assessment  Severity: 0       NVPS:  Adult Nonverbal Pain Scale (NVPS)  Face: No particular expression or smile  Activity (Movement): Laid quietly, normal position  Guarding: Lying quietly, no positioning of hands over areas of bod  Physiology (Vital Signs): Stable vital signs  Respiratory: Baseline RR/SpO2 compliant with ventilator  NVPS Score : 0    RDOS:         Vital Signs: Blood pressure 104/65, pulse 62, temperature 98.5 °F (36.9 °C), temperature source Axillary, resp. rate 15, height 1.702 m (5' 7.01\"), weight 104.8 kg (231 lb), SpO2 99%.    PHYSICAL ASSESSMENT:   General: [] Oriented x3  [] Well appearing  [] Intubated  [x]Ill appearing  []Other: oriented to self, able to communicate with nods and mouthing words over her trach   Mental Status: [x] Normal mental status exam  [] Drowsy  [] Confused  []Other:   Cardiovascular: [x] Regular rate/rhythm  [] Arrhythmia  [] Other:  Chest: [x] Effort normal  []Lungs clear  [] Respiratory distress  []Tachypnea  [] Other:  On PSV, trach on vent, up to 60% O2   Abdomen: [x] Soft/non-tender  [] Normal appearance  [] Distended  [] Ascites  [x] Other: PEG in place, receiving TF  Neurological: [] Normal speech  [] Normal sensation  [x]Deficits present: can follow commands, able to communicate with me over trach   Extremity: [] Normal skin color/temp  [] Clubbing/cyanosis  [] No edema  [x] Other: pale, scattered ecchymosis, deconditioned extremities     Wt Readings from Last 15 Encounters:   03/05/25 104.8 kg (231 lb)   07/19/22 115.4 kg (254 lb 8 oz)   06/22/22 116.3 kg (256 lb 6.4 oz)   04/19/22 113 kg (249 lb 1.6 oz)

## 2025-03-05 NOTE — PLAN OF CARE
PHYSICAL THERAPY TREATMENT     Patient: Nida Graves (49 y.o. female)  Date: 3/5/2025  Diagnosis: Atrial fibrillation, unspecified type (HCC) [I48.91]  Atrial flutter, unspecified type (HCC) [I48.92]  Congestive heart failure, unspecified HF chronicity, unspecified heart failure type (HCC) [I50.9]  Acute hypoxic respiratory failure (HCC) [J96.01] Acute on chronic hypoxic respiratory failure (HCC)  Procedure(s) (LRB):  ESOPHAGOGASTRODUODENOSCOPY PERCUTANEOUS ENDOSCOPIC GASTROSTOMY TUBE PLACEMENT (N/A) 20 Days Post-Op  Precautions: Fall Risk, General Precautions                      Recommendations for nursing mobility: Assist x2    In place during session: Peripheral IV, High Flow  L/min  %, Tracheostomy  , EKG/telemetry , and Pulse ox  Chart, physical therapy assessment, plan of care and goals were reviewed.  ASSESSMENT  Patient continues with skilled PT services and is slowly progressing towards goals. Pt supine in bed upon PT arrival, agreeable to session. (See below for objective details and assist levels).     Overall pt tolerated session fair today. Pt positioned in chair positioned in bed with assistance of RN and HENDERSON. Pt's hips repositioned to promote better siting balance. Pt also scooted up to promote not slipping down. Pt performed 5 seated crunches to promote abdominal engagement and strengthening. Pt performed and was educated on seated LE therex. Pt verbalized and demonstrated understanding. Pt was left sitting up in bed in chair position with all needs met. Gait belts used as a seat belt in the bed to ensure safety and that pt would not slip forward. Will continue to benefit from skilled PT services, and will continue to progress as tolerated. Current PT DC recommendation long term acute care facility once medically appropriate.    GOALS:  Problem: Physical Therapy - Adult  Goal: By Discharge: Performs mobility at highest level of function for planned discharge setting.  See evaluation for

## 2025-03-05 NOTE — PLAN OF CARE
Problem: ABCDS Injury Assessment  Goal: Absence of physical injury  Outcome: Progressing     Problem: Skin/Tissue Integrity  Goal: Absence of new skin breakdown  Description: 1.  Monitor for areas of redness and/or skin breakdown  2.  Assess vascular access sites hourly  3.  Every 4-6 hours minimum:  Change oxygen saturation probe site  4.  Every 4-6 hours:  If on nasal continuous positive airway pressure, respiratory therapy assess nares and determine need for appliance change or resting period.  Outcome: Progressing     Problem: Chronic Conditions and Co-morbidities  Goal: Patient's chronic conditions and co-morbidity symptoms are monitored and maintained or improved  Outcome: Progressing  Flowsheets (Taken 3/4/2025 2000 by Sera Garcia, RN)  Care Plan - Patient's Chronic Conditions and Co-Morbidity Symptoms are Monitored and Maintained or Improved:   Monitor and assess patient's chronic conditions and comorbid symptoms for stability, deterioration, or improvement   Collaborate with multidisciplinary team to address chronic and comorbid conditions and prevent exacerbation or deterioration   Update acute care plan with appropriate goals if chronic or comorbid symptoms are exacerbated and prevent overall improvement and discharge     Problem: Discharge Planning  Goal: Discharge to home or other facility with appropriate resources  Outcome: Progressing  Flowsheets (Taken 3/4/2025 2000 by Sera Garcia, RN)  Discharge to home or other facility with appropriate resources:   Identify barriers to discharge with patient and caregiver   Arrange for needed discharge resources and transportation as appropriate   Identify discharge learning needs (meds, wound care, etc)   Arrange for interpreters to assist at discharge as needed   Refer to discharge planning if patient needs post-hospital services based on physician order or complex needs related to functional status, cognitive ability or social support system

## 2025-03-05 NOTE — PROGRESS NOTES
CRITICAL CARE PROGRESS NOTE    Name: Nida Graves   : 1975   MRN: [id number]   Date: 3/5/2025      Diagnoses/problem list:   Acute hypoxic and hypercapnic respiratory failure  Acute kidney injury  Atrial flutter with slow ventricular response  Complete heart block  GI bleed  Nosebleed, resolved  NSTEMI  Symptomatic bradycardia  Biventricular failure  Acute HFrEF, 25 to 30%  Diabetes  Morbid obesity  Acute metabolic/septic encephalopathy, improved  Deconditioning    24-hour events:   No new complaints. Plan for trach collar trial today. Restarted Eliquis as no signs of bleeding.     Assessment and plan:   Ms. Graves is a 49-year-old female with PMH chronic debility and bedbound status, diabetes, asthma, obesity, who presented for shortness of breath.  Shortness of breath worsening over the past few days.  Upon presentation ED patient found to be hypoxic necessitating O2 via NC.  She was also found to be in A-fib with slow ventricular response.  She was transferred out of ICU on . Early morning  upgraded to ICU again due to severe hypoxia and hypercapnia requiring intubation. Now s/p trach on . Leadless pacemaker . PEG . Improved lung white-out s/p bronchoscopy     CNS / MSK:    Continue Venlafaxine and Bupropion - increased bupropion to 100 mg bid  Pt would most benefit from LTAC to best address her deconditioning and long-term ventilator weaning. PT able to get pt to chair position.    PULMONOLOGY:   Extubated on , reintubated on  due to inability to protect airway  ENT placed trach on   Recurrent mucus plugging, s/p multiple intermittent mucolytic regimens   Continue nebs, budesonide q12h, duonebs q6h  Lung aeration improved s/p bronchoscopy  and   Continue pressure support and/or trach collar trials daily as tolerated  Pt would most benefit from LTAC to best address her deconditioning and long-term ventilator weaning    CARDIOVASCULAR:   Midodrine to keep MAP

## 2025-03-06 LAB
ANION GAP SERPL CALC-SCNC: 8 MMOL/L (ref 2–12)
BACTERIA SPEC CULT: NORMAL
BUN SERPL-MCNC: 42 MG/DL (ref 6–20)
BUN/CREAT SERPL: 51 (ref 12–20)
CA-I BLD-MCNC: 8.2 MG/DL (ref 8.5–10.1)
CHLORIDE SERPL-SCNC: 108 MMOL/L (ref 97–108)
CO2 SERPL-SCNC: 26 MMOL/L (ref 21–32)
CREAT SERPL-MCNC: 0.83 MG/DL (ref 0.55–1.02)
ERYTHROCYTE [DISTWIDTH] IN BLOOD BY AUTOMATED COUNT: 18.5 % (ref 11.5–14.5)
GLUCOSE BLD STRIP.AUTO-MCNC: 107 MG/DL (ref 65–100)
GLUCOSE BLD STRIP.AUTO-MCNC: 109 MG/DL (ref 65–100)
GLUCOSE BLD STRIP.AUTO-MCNC: 114 MG/DL (ref 65–100)
GLUCOSE SERPL-MCNC: 108 MG/DL (ref 65–100)
GRAM STN SPEC: NORMAL
HCT VFR BLD AUTO: 26.4 % (ref 35–47)
HGB BLD-MCNC: 8.1 G/DL (ref 11.5–16)
Lab: NORMAL
MCH RBC QN AUTO: 29.5 PG (ref 26–34)
MCHC RBC AUTO-ENTMCNC: 30.7 G/DL (ref 30–36.5)
MCV RBC AUTO: 96 FL (ref 80–99)
NRBC # BLD: 0 K/UL (ref 0–0.01)
NRBC BLD-RTO: 0 PER 100 WBC
PERFORMED BY:: ABNORMAL
PLATELET # BLD AUTO: 278 K/UL (ref 150–400)
PMV BLD AUTO: 10 FL (ref 8.9–12.9)
POTASSIUM SERPL-SCNC: 3.7 MMOL/L (ref 3.5–5.1)
RBC # BLD AUTO: 2.75 M/UL (ref 3.8–5.2)
SODIUM SERPL-SCNC: 142 MMOL/L (ref 136–145)
WBC # BLD AUTO: 8 K/UL (ref 3.6–11)

## 2025-03-06 PROCEDURE — 6370000000 HC RX 637 (ALT 250 FOR IP): Performed by: STUDENT IN AN ORGANIZED HEALTH CARE EDUCATION/TRAINING PROGRAM

## 2025-03-06 PROCEDURE — 6370000000 HC RX 637 (ALT 250 FOR IP): Performed by: NURSE PRACTITIONER

## 2025-03-06 PROCEDURE — 6370000000 HC RX 637 (ALT 250 FOR IP): Performed by: INTERNAL MEDICINE

## 2025-03-06 PROCEDURE — 94761 N-INVAS EAR/PLS OXIMETRY MLT: CPT

## 2025-03-06 PROCEDURE — 82962 GLUCOSE BLOOD TEST: CPT

## 2025-03-06 PROCEDURE — 94003 VENT MGMT INPAT SUBQ DAY: CPT

## 2025-03-06 PROCEDURE — 6360000002 HC RX W HCPCS: Performed by: STUDENT IN AN ORGANIZED HEALTH CARE EDUCATION/TRAINING PROGRAM

## 2025-03-06 PROCEDURE — 2000000000 HC ICU R&B

## 2025-03-06 PROCEDURE — 80048 BASIC METABOLIC PNL TOTAL CA: CPT

## 2025-03-06 PROCEDURE — 2700000000 HC OXYGEN THERAPY PER DAY

## 2025-03-06 PROCEDURE — 85027 COMPLETE CBC AUTOMATED: CPT

## 2025-03-06 PROCEDURE — 36415 COLL VENOUS BLD VENIPUNCTURE: CPT

## 2025-03-06 PROCEDURE — 2500000003 HC RX 250 WO HCPCS: Performed by: NURSE PRACTITIONER

## 2025-03-06 PROCEDURE — 94640 AIRWAY INHALATION TREATMENT: CPT

## 2025-03-06 PROCEDURE — 2500000003 HC RX 250 WO HCPCS: Performed by: STUDENT IN AN ORGANIZED HEALTH CARE EDUCATION/TRAINING PROGRAM

## 2025-03-06 PROCEDURE — 94668 MNPJ CHEST WALL SBSQ: CPT

## 2025-03-06 RX ORDER — INSULIN LISPRO 100 [IU]/ML
0-4 INJECTION, SOLUTION INTRAVENOUS; SUBCUTANEOUS EVERY 12 HOURS
Status: DISCONTINUED | OUTPATIENT
Start: 2025-03-07 | End: 2025-03-11

## 2025-03-06 RX ORDER — IPRATROPIUM BROMIDE AND ALBUTEROL SULFATE 2.5; .5 MG/3ML; MG/3ML
1 SOLUTION RESPIRATORY (INHALATION)
Status: DISCONTINUED | OUTPATIENT
Start: 2025-03-06 | End: 2025-03-14 | Stop reason: HOSPADM

## 2025-03-06 RX ADMIN — IPRATROPIUM BROMIDE AND ALBUTEROL SULFATE 1 DOSE: 2.5; .5 SOLUTION RESPIRATORY (INHALATION) at 14:04

## 2025-03-06 RX ADMIN — BUDESONIDE 500 MCG: 0.5 INHALANT ORAL at 08:31

## 2025-03-06 RX ADMIN — BUPROPION HYDROCHLORIDE 100 MG: 100 TABLET, FILM COATED ORAL at 21:47

## 2025-03-06 RX ADMIN — IPRATROPIUM BROMIDE AND ALBUTEROL SULFATE 1 DOSE: 2.5; .5 SOLUTION RESPIRATORY (INHALATION) at 01:09

## 2025-03-06 RX ADMIN — VENLAFAXINE 75 MG: 25 TABLET ORAL at 18:40

## 2025-03-06 RX ADMIN — IPRATROPIUM BROMIDE AND ALBUTEROL SULFATE 1 DOSE: 2.5; .5 SOLUTION RESPIRATORY (INHALATION) at 08:31

## 2025-03-06 RX ADMIN — SODIUM CHLORIDE, PRESERVATIVE FREE 10 ML: 5 INJECTION INTRAVENOUS at 08:38

## 2025-03-06 RX ADMIN — ATORVASTATIN CALCIUM 20 MG: 20 TABLET, FILM COATED ORAL at 21:47

## 2025-03-06 RX ADMIN — CINACALCET HYDROCHLORIDE 30 MG: 30 TABLET, FILM COATED ORAL at 08:39

## 2025-03-06 RX ADMIN — Medication 1 PACKET: at 08:39

## 2025-03-06 RX ADMIN — ASPIRIN 81 MG 81 MG: 81 TABLET ORAL at 08:39

## 2025-03-06 RX ADMIN — GUAIFENESIN 200 MG: 200 SOLUTION ORAL at 20:50

## 2025-03-06 RX ADMIN — WHITE PETROLATUM,ZINC OXIDE: 57; 17 PASTE TOPICAL at 08:39

## 2025-03-06 RX ADMIN — LANSOPRAZOLE 30 MG: 30 TABLET, ORALLY DISINTEGRATING ORAL at 06:15

## 2025-03-06 RX ADMIN — WHITE PETROLATUM,ZINC OXIDE: 57; 17 PASTE TOPICAL at 21:49

## 2025-03-06 RX ADMIN — BUPROPION HYDROCHLORIDE 100 MG: 100 TABLET, FILM COATED ORAL at 08:39

## 2025-03-06 RX ADMIN — IPRATROPIUM BROMIDE AND ALBUTEROL SULFATE 1 DOSE: 2.5; .5 SOLUTION RESPIRATORY (INHALATION) at 20:11

## 2025-03-06 RX ADMIN — SODIUM CHLORIDE, PRESERVATIVE FREE 10 ML: 5 INJECTION INTRAVENOUS at 21:49

## 2025-03-06 RX ADMIN — FOLIC ACID 1 MG: 1 TABLET ORAL at 08:39

## 2025-03-06 RX ADMIN — APIXABAN 2.5 MG: 2.5 TABLET, FILM COATED ORAL at 21:30

## 2025-03-06 RX ADMIN — DOCUSATE SODIUM 100 MG: 50 LIQUID ORAL at 08:39

## 2025-03-06 RX ADMIN — BUDESONIDE 500 MCG: 0.5 INHALANT ORAL at 20:11

## 2025-03-06 RX ADMIN — GUAIFENESIN 200 MG: 200 SOLUTION ORAL at 08:39

## 2025-03-06 RX ADMIN — VENLAFAXINE 75 MG: 25 TABLET ORAL at 08:40

## 2025-03-06 RX ADMIN — APIXABAN 2.5 MG: 2.5 TABLET, FILM COATED ORAL at 08:39

## 2025-03-06 ASSESSMENT — PULMONARY FUNCTION TESTS
PIF_VALUE: 24
PIF_VALUE: 21
PIF_VALUE: 21
PIF_VALUE: 24
PIF_VALUE: 25
PIF_VALUE: 21
PIF_VALUE: 23
PIF_VALUE: 22
PIF_VALUE: 21
PIF_VALUE: 20
PIF_VALUE: 19
PIF_VALUE: 24
PIF_VALUE: 20
PIF_VALUE: 25
PIF_VALUE: 24
PIF_VALUE: 21
PIF_VALUE: 20
PIF_VALUE: 28
PIF_VALUE: 21
PIF_VALUE: 18
PIF_VALUE: 20
PIF_VALUE: 18
PIF_VALUE: 19
PIF_VALUE: 32

## 2025-03-06 NOTE — PROGRESS NOTES
CRITICAL CARE PROGRESS NOTE    Name: Nida Graves   : 1975   MRN: [id number]   Date: 3/6/2025      Diagnoses/problem list:   Acute hypoxic and hypercapnic respiratory failure  Acute kidney injury  Atrial flutter with slow ventricular response  Complete heart block  GI bleed  Nosebleed, resolved  NSTEMI  Symptomatic bradycardia  Biventricular failure  Acute HFrEF, 25 to 30%  Diabetes  Morbid obesity  Acute metabolic/septic encephalopathy, improved  Deconditioning    24-hour events:   Tolerated trach collar trial for 2+ hrs yesterday. Trial again today. Continuing to mobilize pt more and more. Slowly making improvement.    Assessment and plan:   Ms. Graves is a 49-year-old female with PMH chronic debility and bedbound status, diabetes, asthma, obesity, who presented for shortness of breath.  Shortness of breath worsening over the past few days.  Upon presentation ED patient found to be hypoxic necessitating O2 via NC.  She was also found to be in A-fib with slow ventricular response.  She was transferred out of ICU on . Early morning  upgraded to ICU again due to severe hypoxia and hypercapnia requiring intubation. Now s/p trach on . Leadless pacemaker . PEG . Improved lung white-out s/p bronchoscopy     CNS / MSK:    Continue Venlafaxine and Bupropion - increased bupropion to 100 mg bid  Pt would most benefit from LTAC to best address her deconditioning and long-term ventilator weaning. PT able to get pt to chair position.    PULMONOLOGY:   Extubated on , reintubated on  due to inability to protect airway  ENT placed trach on   Recurrent mucus plugging, s/p multiple intermittent mucolytic regimens   Continue nebs, budesonide q12h, duonebs q6h  Lung aeration improved s/p bronchoscopy  and   Continue pressure support and/or trach collar trials daily as tolerated  Pt would most benefit from LTAC to best address her deconditioning and long-term ventilator  1141  Last data filed at 3/6/2025 0900  Gross per 24 hour   Intake 2330 ml   Output 1800 ml   Net 530 ml       CRITICAL CARE DOCUMENTATION  I had a face to face encounter with the patient, reviewed and interpreted patient data including clinical events, labs, images, vital signs, I/O's, and examined patient.  I have discussed the case and the plan and management of the patient's care with the consulting services, the bedside nurses and the respiratory therapist.      NOTE OF PERSONAL INVOLVEMENT IN CARE   This patient has a high probability of imminent, clinically significant deterioration, which requires the highest level of preparedness to intervene urgently. I participated in the decision-making and personally managed or directed the management of the following life and organ supporting interventions that required my frequent assessment to treat or prevent imminent deterioration.    I personally spent 40 minutes of critical care time.  This is time spent at this critically ill patient's bedside actively involved in patient care as well as the coordination of care.  This does not include any procedural time which has been billed separately.      DO Barbara Walsh Critical Care  03/06/25

## 2025-03-06 NOTE — CARE COORDINATION
CM reviewed Pt medicals, Palliative Care is following Pt.    [facility] stated: \"We cant take VENT patients, BUT can take TRACH collars with 40% o2 delivery.\"    KHA[facility], KHAI also called Shanon, the Liaison for [facility], left a VM asking her to call KHAI back.

## 2025-03-07 LAB
GLUCOSE BLD STRIP.AUTO-MCNC: 140 MG/DL (ref 65–100)
GLUCOSE BLD STRIP.AUTO-MCNC: 93 MG/DL (ref 65–100)
PERFORMED BY:: ABNORMAL
PERFORMED BY:: NORMAL

## 2025-03-07 PROCEDURE — 94761 N-INVAS EAR/PLS OXIMETRY MLT: CPT

## 2025-03-07 PROCEDURE — 6360000002 HC RX W HCPCS: Performed by: STUDENT IN AN ORGANIZED HEALTH CARE EDUCATION/TRAINING PROGRAM

## 2025-03-07 PROCEDURE — 94668 MNPJ CHEST WALL SBSQ: CPT

## 2025-03-07 PROCEDURE — 6370000000 HC RX 637 (ALT 250 FOR IP): Performed by: STUDENT IN AN ORGANIZED HEALTH CARE EDUCATION/TRAINING PROGRAM

## 2025-03-07 PROCEDURE — 97530 THERAPEUTIC ACTIVITIES: CPT

## 2025-03-07 PROCEDURE — 2500000003 HC RX 250 WO HCPCS: Performed by: INTERNAL MEDICINE

## 2025-03-07 PROCEDURE — 6370000000 HC RX 637 (ALT 250 FOR IP): Performed by: INTERNAL MEDICINE

## 2025-03-07 PROCEDURE — 6370000000 HC RX 637 (ALT 250 FOR IP): Performed by: NURSE PRACTITIONER

## 2025-03-07 PROCEDURE — 2500000003 HC RX 250 WO HCPCS: Performed by: NURSE PRACTITIONER

## 2025-03-07 PROCEDURE — 2000000000 HC ICU R&B

## 2025-03-07 PROCEDURE — 97110 THERAPEUTIC EXERCISES: CPT

## 2025-03-07 PROCEDURE — 94640 AIRWAY INHALATION TREATMENT: CPT

## 2025-03-07 PROCEDURE — 2500000003 HC RX 250 WO HCPCS: Performed by: STUDENT IN AN ORGANIZED HEALTH CARE EDUCATION/TRAINING PROGRAM

## 2025-03-07 PROCEDURE — 94003 VENT MGMT INPAT SUBQ DAY: CPT

## 2025-03-07 PROCEDURE — 2700000000 HC OXYGEN THERAPY PER DAY

## 2025-03-07 PROCEDURE — 82962 GLUCOSE BLOOD TEST: CPT

## 2025-03-07 RX ADMIN — ASPIRIN 81 MG 81 MG: 81 TABLET ORAL at 07:46

## 2025-03-07 RX ADMIN — BUDESONIDE 500 MCG: 0.5 INHALANT ORAL at 08:11

## 2025-03-07 RX ADMIN — BUPROPION HYDROCHLORIDE 100 MG: 100 TABLET, FILM COATED ORAL at 21:24

## 2025-03-07 RX ADMIN — LANSOPRAZOLE 30 MG: 30 TABLET, ORALLY DISINTEGRATING ORAL at 06:56

## 2025-03-07 RX ADMIN — GUAIFENESIN 200 MG: 200 SOLUTION ORAL at 07:45

## 2025-03-07 RX ADMIN — HYDROXYZINE HYDROCHLORIDE 25 MG: 25 TABLET, FILM COATED ORAL at 21:24

## 2025-03-07 RX ADMIN — SODIUM CHLORIDE, PRESERVATIVE FREE 10 ML: 5 INJECTION INTRAVENOUS at 21:15

## 2025-03-07 RX ADMIN — BUDESONIDE 500 MCG: 0.5 INHALANT ORAL at 19:25

## 2025-03-07 RX ADMIN — IPRATROPIUM BROMIDE AND ALBUTEROL SULFATE 1 DOSE: 2.5; .5 SOLUTION RESPIRATORY (INHALATION) at 08:12

## 2025-03-07 RX ADMIN — IPRATROPIUM BROMIDE AND ALBUTEROL SULFATE 1 DOSE: 2.5; .5 SOLUTION RESPIRATORY (INHALATION) at 13:17

## 2025-03-07 RX ADMIN — WHITE PETROLATUM,ZINC OXIDE: 57; 17 PASTE TOPICAL at 21:50

## 2025-03-07 RX ADMIN — WHITE PETROLATUM,ZINC OXIDE: 57; 17 PASTE TOPICAL at 09:00

## 2025-03-07 RX ADMIN — ATORVASTATIN CALCIUM 20 MG: 20 TABLET, FILM COATED ORAL at 21:24

## 2025-03-07 RX ADMIN — CINACALCET HYDROCHLORIDE 30 MG: 30 TABLET, FILM COATED ORAL at 07:48

## 2025-03-07 RX ADMIN — VENLAFAXINE 75 MG: 25 TABLET ORAL at 18:16

## 2025-03-07 RX ADMIN — IPRATROPIUM BROMIDE AND ALBUTEROL SULFATE 1 DOSE: 2.5; .5 SOLUTION RESPIRATORY (INHALATION) at 01:44

## 2025-03-07 RX ADMIN — FOLIC ACID 1 MG: 1 TABLET ORAL at 07:45

## 2025-03-07 RX ADMIN — VENLAFAXINE 75 MG: 25 TABLET ORAL at 07:45

## 2025-03-07 RX ADMIN — IPRATROPIUM BROMIDE AND ALBUTEROL SULFATE 1 DOSE: 2.5; .5 SOLUTION RESPIRATORY (INHALATION) at 19:25

## 2025-03-07 RX ADMIN — EPOETIN ALFA-EPBX 2500 UNITS: 3000 INJECTION, SOLUTION INTRAVENOUS; SUBCUTANEOUS at 18:48

## 2025-03-07 RX ADMIN — BUPROPION HYDROCHLORIDE 100 MG: 100 TABLET, FILM COATED ORAL at 07:45

## 2025-03-07 RX ADMIN — APIXABAN 2.5 MG: 2.5 TABLET, FILM COATED ORAL at 21:24

## 2025-03-07 RX ADMIN — SODIUM CHLORIDE, PRESERVATIVE FREE 10 ML: 5 INJECTION INTRAVENOUS at 07:46

## 2025-03-07 RX ADMIN — GUAIFENESIN 200 MG: 200 SOLUTION ORAL at 21:24

## 2025-03-07 RX ADMIN — APIXABAN 2.5 MG: 2.5 TABLET, FILM COATED ORAL at 07:45

## 2025-03-07 RX ADMIN — Medication 1 PACKET: at 07:45

## 2025-03-07 ASSESSMENT — PULMONARY FUNCTION TESTS
PIF_VALUE: 25
PIF_VALUE: 16
PIF_VALUE: 20
PIF_VALUE: 16
PIF_VALUE: 15
PIF_VALUE: 16
PIF_VALUE: 21
PIF_VALUE: 27
PIF_VALUE: 21
PIF_VALUE: 16
PIF_VALUE: 19
PIF_VALUE: 21
PIF_VALUE: 16
PIF_VALUE: 23
PIF_VALUE: 16
PIF_VALUE: 21
PIF_VALUE: 21
PIF_VALUE: 19
PIF_VALUE: 21
PIF_VALUE: 16
PIF_VALUE: 21
PIF_VALUE: 28

## 2025-03-07 ASSESSMENT — PAIN DESCRIPTION - LOCATION
LOCATION: BUTTOCKS
LOCATION: BACK

## 2025-03-07 ASSESSMENT — PAIN DESCRIPTION - ORIENTATION
ORIENTATION: INNER
ORIENTATION: RIGHT;MID

## 2025-03-07 ASSESSMENT — PAIN SCALES - GENERAL
PAINLEVEL_OUTOF10: 6
PAINLEVEL_OUTOF10: 4

## 2025-03-07 NOTE — PLAN OF CARE
PHYSICAL THERAPY TREATMENT     Patient: Nida Graves (49 y.o. female)  Date: 3/7/2025  Diagnosis: Atrial fibrillation, unspecified type (HCC) [I48.91]  Atrial flutter, unspecified type (HCC) [I48.92]  Congestive heart failure, unspecified HF chronicity, unspecified heart failure type (HCC) [I50.9]  Acute hypoxic respiratory failure (HCC) [J96.01] Acute on chronic hypoxic respiratory failure (HCC)  Procedure(s) (LRB):  ESOPHAGOGASTRODUODENOSCOPY PERCUTANEOUS ENDOSCOPIC GASTROSTOMY TUBE PLACEMENT (N/A) 22 Days Post-Op  Precautions: Fall Risk, General Precautions                      Recommendations for nursing mobility: Encourage HEP in prep for ADLs/mobility; see handout for details, Frequent repositioning to prevent skin breakdown, and Assist x2    In place during session: Peripheral IV, Tracheostomy on vent 30% FiO2, External Catheter, EKG/telemetry , Pulse ox, and PEG Tube  Chart, physical therapy assessment, plan of care and goals were reviewed.  ASSESSMENT  Patient continues with skilled PT services and is progressing towards goals. Pt semi-supine upon PT arrival, agreeable to session. Pt A&O x 4. (See below for objective details and assist levels).     Overall pt tolerated session well today with no reports of pain. Pt making significant improvement today since receiving a new bed that places her in chair position.  Pt has been tolerating short trials of sitting in chair position in the bed.  Today, bed was placed in chair position with pt's feet on the foot board.  Pt placing 20-22 kg of pressure through her B LE's to facilitate weightbearing through B LE's.  With assist of 2, pt able to pull herself forward to sitting with her back away from the bed, simulating her sitting on the EOB.  Pt able to hold her head in neutral position and demos poor balance in sitting position due to posterior and L lean.  Pt did tolerate ~ 8 minutes of sitting, working on weightshifting and pulling herself to an

## 2025-03-07 NOTE — PROGRESS NOTES
OCCUPATIONAL THERAPY TREATMENT  Patient: Nida Graves (49 y.o. female)  Date: 3/7/2025  Primary Diagnosis: Atrial fibrillation, unspecified type (HCC) [I48.91]  Atrial flutter, unspecified type (HCC) [I48.92]  Congestive heart failure, unspecified HF chronicity, unspecified heart failure type (HCC) [I50.9]  Acute hypoxic respiratory failure (HCC) [J96.01]  Procedure(s) (LRB):  ESOPHAGOGASTRODUODENOSCOPY PERCUTANEOUS ENDOSCOPIC GASTROSTOMY TUBE PLACEMENT (N/A) 22 Days Post-Op   Precautions: Fall Risk, General Precautions                Recommendations for nursing mobility: Encourage HEP in prep for ADLs/mobility; see handout for details, Frequent repositioning to prevent skin breakdown, Assist x2, and Placed bed in chair position for pt to sit upright several times a day    In place during session: Peripheral IV, Tracheostomy on vent Fio2 30%, External Catheter, EKG/telemetry , Pulse ox, and PEG Tube  Chart, occupational therapy assessment, plan of care, and goals were reviewed.  ASSESSMENT  Patient continues with skilled OT services and is slowly progressing towards goals. Pt presented semi supine upon HENDERSON/PT arrival, agreeable to session. Pt A&O x 4. Pt completed UE therex, see grid below for details, to maintain/ increase strength and endurance to aid in adl performance. Pt joined and bed placed in chair position, pt worked on core strengthening and sitting balance.  Pt required min-mod A 2 for static sitting balance and max A.2 for dynamic sitting balance.  The pt continues to presented with a R lateral/posterior lean.  Pt able to readjust to sitting upright but only able to hold for a few seconds. Pt holding head more upright.  Pt educated to continue UE therex and working on core strength.  Pt has bed controls and can place herself in chair position, however nursing verbalized more like a recliner.  Pt encouraged to sit more upright. Pt indicated understanding. Pt left semi supine with all known needs met.

## 2025-03-07 NOTE — CARE COORDINATION
CM reviewed pt medicals, [facility] did not respond back to CM.       CM called [facility], spoke with Admission, asked them to please have the Liaison call       [facility] is following Pt.    Palliative Care is following Pt    Per [facility]: After reviewing their chart our MD still has same concerns in regards to \"follow pending respiratory status, therapy tolerance\". I saw they were just put on t-piece but we want to make sure they can tolerate it and not having to go right back on vent settings. Also the therapy progress with PT and OT is req'd for our MD to approve.     Per IDR  Get her up today.     Pt moves constantly in the bed.     Pt had a good night.

## 2025-03-07 NOTE — PROGRESS NOTES
Intake 2870 ml   Output 1800 ml   Net 1070 ml       CRITICAL CARE DOCUMENTATION  I had a face to face encounter with the patient, reviewed and interpreted patient data including clinical events, labs, images, vital signs, I/O's, and examined patient.  I have discussed the case and the plan and management of the patient's care with the consulting services, the bedside nurses and the respiratory therapist.      NOTE OF PERSONAL INVOLVEMENT IN CARE   This patient has a high probability of imminent, clinically significant deterioration, which requires the highest level of preparedness to intervene urgently. I participated in the decision-making and personally managed or directed the management of the following life and organ supporting interventions that required my frequent assessment to treat or prevent imminent deterioration.    I personally spent 35 minutes of critical care time.  This is time spent at this critically ill patient's bedside actively involved in patient care as well as the coordination of care.  This does not include any procedural time which has been billed separately.      DO Barbara [facility]  03/07/25

## 2025-03-08 LAB
ANION GAP SERPL CALC-SCNC: 7 MMOL/L (ref 2–12)
BUN SERPL-MCNC: 40 MG/DL (ref 6–20)
BUN/CREAT SERPL: 53 (ref 12–20)
CA-I BLD-MCNC: 8.6 MG/DL (ref 8.5–10.1)
CHLORIDE SERPL-SCNC: 107 MMOL/L (ref 97–108)
CO2 SERPL-SCNC: 27 MMOL/L (ref 21–32)
CREAT SERPL-MCNC: 0.76 MG/DL (ref 0.55–1.02)
ERYTHROCYTE [DISTWIDTH] IN BLOOD BY AUTOMATED COUNT: 17.8 % (ref 11.5–14.5)
GLUCOSE BLD STRIP.AUTO-MCNC: 112 MG/DL (ref 65–100)
GLUCOSE BLD STRIP.AUTO-MCNC: 98 MG/DL (ref 65–100)
GLUCOSE SERPL-MCNC: 103 MG/DL (ref 65–100)
HCT VFR BLD AUTO: 28.1 % (ref 35–47)
HGB BLD-MCNC: 8.5 G/DL (ref 11.5–16)
MCH RBC QN AUTO: 28.9 PG (ref 26–34)
MCHC RBC AUTO-ENTMCNC: 30.2 G/DL (ref 30–36.5)
MCV RBC AUTO: 95.6 FL (ref 80–99)
NRBC # BLD: 0 K/UL (ref 0–0.01)
NRBC BLD-RTO: 0 PER 100 WBC
PERFORMED BY:: ABNORMAL
PERFORMED BY:: NORMAL
PLATELET # BLD AUTO: 269 K/UL (ref 150–400)
PMV BLD AUTO: 10.1 FL (ref 8.9–12.9)
POTASSIUM SERPL-SCNC: 3.4 MMOL/L (ref 3.5–5.1)
RBC # BLD AUTO: 2.94 M/UL (ref 3.8–5.2)
SODIUM SERPL-SCNC: 141 MMOL/L (ref 136–145)
WBC # BLD AUTO: 8 K/UL (ref 3.6–11)

## 2025-03-08 PROCEDURE — 94668 MNPJ CHEST WALL SBSQ: CPT

## 2025-03-08 PROCEDURE — 2500000003 HC RX 250 WO HCPCS: Performed by: NURSE PRACTITIONER

## 2025-03-08 PROCEDURE — 97110 THERAPEUTIC EXERCISES: CPT

## 2025-03-08 PROCEDURE — 6360000002 HC RX W HCPCS: Performed by: STUDENT IN AN ORGANIZED HEALTH CARE EDUCATION/TRAINING PROGRAM

## 2025-03-08 PROCEDURE — 2700000000 HC OXYGEN THERAPY PER DAY

## 2025-03-08 PROCEDURE — 6370000000 HC RX 637 (ALT 250 FOR IP): Performed by: STUDENT IN AN ORGANIZED HEALTH CARE EDUCATION/TRAINING PROGRAM

## 2025-03-08 PROCEDURE — 6370000000 HC RX 637 (ALT 250 FOR IP): Performed by: NURSE PRACTITIONER

## 2025-03-08 PROCEDURE — 6370000000 HC RX 637 (ALT 250 FOR IP): Performed by: INTERNAL MEDICINE

## 2025-03-08 PROCEDURE — 94003 VENT MGMT INPAT SUBQ DAY: CPT

## 2025-03-08 PROCEDURE — 85027 COMPLETE CBC AUTOMATED: CPT

## 2025-03-08 PROCEDURE — 2500000003 HC RX 250 WO HCPCS: Performed by: STUDENT IN AN ORGANIZED HEALTH CARE EDUCATION/TRAINING PROGRAM

## 2025-03-08 PROCEDURE — 80048 BASIC METABOLIC PNL TOTAL CA: CPT

## 2025-03-08 PROCEDURE — 94640 AIRWAY INHALATION TREATMENT: CPT

## 2025-03-08 PROCEDURE — 36415 COLL VENOUS BLD VENIPUNCTURE: CPT

## 2025-03-08 PROCEDURE — 2000000000 HC ICU R&B

## 2025-03-08 PROCEDURE — 82962 GLUCOSE BLOOD TEST: CPT

## 2025-03-08 RX ORDER — POTASSIUM CHLORIDE 1500 MG/1
40 TABLET, EXTENDED RELEASE ORAL ONCE
Status: COMPLETED | OUTPATIENT
Start: 2025-03-08 | End: 2025-03-08

## 2025-03-08 RX ADMIN — IPRATROPIUM BROMIDE AND ALBUTEROL SULFATE 1 DOSE: 2.5; .5 SOLUTION RESPIRATORY (INHALATION) at 16:12

## 2025-03-08 RX ADMIN — BUDESONIDE 500 MCG: 0.5 INHALANT ORAL at 09:13

## 2025-03-08 RX ADMIN — MAGNESIUM HYDROXIDE 30 ML: 400 SUSPENSION ORAL at 08:08

## 2025-03-08 RX ADMIN — FOLIC ACID 1 MG: 1 TABLET ORAL at 08:10

## 2025-03-08 RX ADMIN — APIXABAN 2.5 MG: 2.5 TABLET, FILM COATED ORAL at 08:10

## 2025-03-08 RX ADMIN — SODIUM CHLORIDE, PRESERVATIVE FREE 10 ML: 5 INJECTION INTRAVENOUS at 19:33

## 2025-03-08 RX ADMIN — SODIUM CHLORIDE, PRESERVATIVE FREE 10 ML: 5 INJECTION INTRAVENOUS at 08:15

## 2025-03-08 RX ADMIN — CINACALCET HYDROCHLORIDE 30 MG: 30 TABLET, FILM COATED ORAL at 08:17

## 2025-03-08 RX ADMIN — HYDROXYZINE HYDROCHLORIDE 25 MG: 25 TABLET, FILM COATED ORAL at 19:33

## 2025-03-08 RX ADMIN — ATORVASTATIN CALCIUM 20 MG: 20 TABLET, FILM COATED ORAL at 19:33

## 2025-03-08 RX ADMIN — ASPIRIN 81 MG 81 MG: 81 TABLET ORAL at 08:10

## 2025-03-08 RX ADMIN — GUAIFENESIN 200 MG: 200 SOLUTION ORAL at 19:33

## 2025-03-08 RX ADMIN — VENLAFAXINE 75 MG: 25 TABLET ORAL at 08:10

## 2025-03-08 RX ADMIN — IPRATROPIUM BROMIDE AND ALBUTEROL SULFATE 1 DOSE: 2.5; .5 SOLUTION RESPIRATORY (INHALATION) at 19:31

## 2025-03-08 RX ADMIN — WHITE PETROLATUM,ZINC OXIDE: 57; 17 PASTE TOPICAL at 19:36

## 2025-03-08 RX ADMIN — POTASSIUM CHLORIDE 40 MEQ: 1500 TABLET, EXTENDED RELEASE ORAL at 08:09

## 2025-03-08 RX ADMIN — BUDESONIDE 500 MCG: 0.5 INHALANT ORAL at 19:31

## 2025-03-08 RX ADMIN — IPRATROPIUM BROMIDE AND ALBUTEROL SULFATE 1 DOSE: 2.5; .5 SOLUTION RESPIRATORY (INHALATION) at 09:13

## 2025-03-08 RX ADMIN — GUAIFENESIN 200 MG: 200 SOLUTION ORAL at 08:08

## 2025-03-08 RX ADMIN — BUPROPION HYDROCHLORIDE 100 MG: 100 TABLET, FILM COATED ORAL at 19:33

## 2025-03-08 RX ADMIN — IPRATROPIUM BROMIDE AND ALBUTEROL SULFATE 1 DOSE: 2.5; .5 SOLUTION RESPIRATORY (INHALATION) at 02:17

## 2025-03-08 RX ADMIN — VENLAFAXINE 75 MG: 25 TABLET ORAL at 17:25

## 2025-03-08 RX ADMIN — APIXABAN 2.5 MG: 2.5 TABLET, FILM COATED ORAL at 19:33

## 2025-03-08 RX ADMIN — WHITE PETROLATUM,ZINC OXIDE: 57; 17 PASTE TOPICAL at 08:13

## 2025-03-08 RX ADMIN — Medication 1 PACKET: at 08:09

## 2025-03-08 RX ADMIN — BUPROPION HYDROCHLORIDE 100 MG: 100 TABLET, FILM COATED ORAL at 08:10

## 2025-03-08 RX ADMIN — LANSOPRAZOLE 30 MG: 30 TABLET, ORALLY DISINTEGRATING ORAL at 08:09

## 2025-03-08 ASSESSMENT — PULMONARY FUNCTION TESTS
PIF_VALUE: 22
PIF_VALUE: 21
PIF_VALUE: 20
PIF_VALUE: 11
PIF_VALUE: 21
PIF_VALUE: 20
PIF_VALUE: 18
PIF_VALUE: 21
PIF_VALUE: 18
PIF_VALUE: 18
PIF_VALUE: 11
PIF_VALUE: 11
PIF_VALUE: 18
PIF_VALUE: 21
PIF_VALUE: 20
PIF_VALUE: 20
PIF_VALUE: 21
PIF_VALUE: 20
PIF_VALUE: 18
PIF_VALUE: 11
PIF_VALUE: 21
PIF_VALUE: 22

## 2025-03-08 ASSESSMENT — PAIN SCALES - GENERAL
PAINLEVEL_OUTOF10: 0
PAINLEVEL_OUTOF10: 0

## 2025-03-08 NOTE — PLAN OF CARE
Problem: ABCDS Injury Assessment  Goal: Absence of physical injury  Outcome: Progressing     Problem: Skin/Tissue Integrity  Goal: Absence of new skin breakdown  Description: 1.  Monitor for areas of redness and/or skin breakdown  2.  Assess vascular access sites hourly  3.  Every 4-6 hours minimum:  Change oxygen saturation probe site  4.  Every 4-6 hours:  If on nasal continuous positive airway pressure, respiratory therapy assess nares and determine need for appliance change or resting period.  Outcome: Progressing     Problem: Chronic Conditions and Co-morbidities  Goal: Patient's chronic conditions and co-morbidity symptoms are monitored and maintained or improved  Outcome: Progressing     Problem: Discharge Planning  Goal: Discharge to home or other facility with appropriate resources  Outcome: Progressing     Problem: Safety - Adult  Goal: Free from fall injury  Outcome: Progressing     Problem: Neurosensory - Adult  Goal: Achieves stable or improved neurological status  Outcome: Progressing  Goal: Achieves maximal functionality and self care  Outcome: Progressing     Problem: Respiratory - Adult  Goal: Achieves optimal ventilation and oxygenation  Outcome: Progressing     Problem: Cardiovascular - Adult  Goal: Maintains optimal cardiac output and hemodynamic stability  Outcome: Progressing  Goal: Absence of cardiac dysrhythmias or at baseline  Outcome: Progressing     Problem: Gastrointestinal - Adult  Goal: Minimal or absence of nausea and vomiting  Outcome: Progressing  Goal: Maintains or returns to baseline bowel function  Outcome: Progressing  Goal: Maintains adequate nutritional intake  Outcome: Progressing     Problem: Genitourinary - Adult  Goal: Absence of urinary retention  Outcome: Progressing     Problem: Metabolic/Fluid and Electrolytes - Adult  Goal: Electrolytes maintained within normal limits  Outcome: Progressing  Goal: Hemodynamic stability and optimal renal function maintained  Outcome:  assistance for basic and instrumental ADLs.    HOME SUPPORT PRIOR TO ADMISSION: The patient lived with  and required minimal assistance for ADLs.    Physical Therapy Goals  Initiated 1/16/2025  Pt stated goal: Get ebtter  Pt will be I with LE HEP in 7 days.  Pt will perform bed mobility with Mod Assist in 7 days.  Pt to sit EOB x 15 min with fair sitting balance in 7 days.  Pt to perform sit to stand with max A x 2 in 7 days.  3/7/2025 1557 by Shanice Norton, PT  Outcome: Progressing     Problem: Nutrition Deficit:  Goal: Optimize nutritional status  Outcome: Progressing

## 2025-03-08 NOTE — PLAN OF CARE
PHYSICAL THERAPY TREATMENT     Patient: Nida Graves (49 y.o. female)  Date: 3/8/2025  Diagnosis: Atrial fibrillation, unspecified type (HCC) [I48.91]  Atrial flutter, unspecified type (HCC) [I48.92]  Congestive heart failure, unspecified HF chronicity, unspecified heart failure type (HCC) [I50.9]  Acute hypoxic respiratory failure (HCC) [J96.01] Acute on chronic hypoxic respiratory failure (HCC)  Procedure(s) (LRB):  ESOPHAGOGASTRODUODENOSCOPY PERCUTANEOUS ENDOSCOPIC GASTROSTOMY TUBE PLACEMENT (N/A) 23 Days Post-Op  Precautions: Fall Risk, General Precautions                      Recommendations for nursing mobility: Encourage HEP in prep for ADLs/mobility; see handout for details, Frequent repositioning to prevent skin breakdown, and Assist x2    In place during session: Peripheral IV, High Flow 60L/min 30%, Tracheostomy t-piece, External Catheter, EKG/telemetry , Pulse ox, and PEG Tube  Chart, physical therapy assessment, plan of care and goals were reviewed.  ASSESSMENT  Patient continues with skilled PT services and is slowly progressing towards goals. Pt in semi fowlers position upon PT arrival, agreeable to session. Pt A&O x 4. Pt can whisper however also has white board for written communication. (See below for objective details and assist levels).     Overall pt tolerated session fair today with no c/o pain throughout session. Pt reports that she has been experience diarrhea this AM and declined to attempt placing bed in chair position despite education on the importance of upright positioning for improved pulmonary function, to improve overall trunk and cervical strength and the importance of continued attempts to promote weightbearing through BLE, pt verbalized understanding however continued to decline despite education and encouragement. Pt agreeable to completing bed level therex in semi fowlers position. Pt completed UE therex and reaching across midline (see OT note for details) to promote  discharge: (in order for the patient to meet his/her long term goals)  long term acute care facility     Potential barriers for safe discharge: pts current support system is unable to meet their requirements for physical assistance, pt is a high fall risk, and concern for pt safely navigating or managing the home environment.    IF patient discharges home will need the following DME:continuing to assess with progress     SUBJECTIVE:   Patient stated “I have diarrhea today.”    OBJECTIVE DATA SUMMARY:   Critical Behavior:  Orientation  Orientation Level: Oriented X4  Cognition  Overall Cognitive Status: Exceptions  Arousal/Alertness: Appropriate responses to stimuli  Following Commands: Appears intact  Attention Span: Appears intact  Problem Solving: Assistance required to identify errors made;Assistance required to implement solutions;Assistance required to correct errors made;Decreased awareness of errors  Insights: Decreased awareness of deficits  Initiation: Requires cues for some  Sequencing: Requires cues for some    Functional Mobility Training:  Bed Mobility:  Bed Mobility Training  Bed Mobility Training:  (pt agreeable to supine/semi wilkerson level therex)  Transfers:     Balance:     Wheelchair Mobility:     Ambulation/Gait Training:                                                      Therapeutic Exercises:       EXERCISE   Sets   Reps   Active Active Assist   Passive Self ROM   Comments   Ankle Pumps   [] [] [] []    Quad Sets/Glut Sets 1 10 [x] [] [] []    Hamstring Sets   [] [] [] []    Short Arc Quads   [] [] [] []    Heel Slides 1 8 [] [x] [] []    Straight Leg Raises   [] [] [] []    Hip abd/add   [] [] [] []    Long Arc Quads   [] [] [] []    Marching   [] [] [] []    Seated HR/TR   [] [] [] []       Pain Ratin/10 reported  Pain Intervention(s):       Activity Tolerance:   Poor, requires frequent rest breaks, and observed shortness of breath on exertion    After treatment patient left in no

## 2025-03-08 NOTE — RT PROTOCOL NOTE
Attempted to leave Pt on Spontaneous Mode on the Ventilator overnight. Increased Pressure support to increase Pt's Spontaneous volumes. As Pt went to sleep, End-tidal CO2 began to elevate. Once it reached 60 mmHg, Pt was switched to SIMV, PC. 6 X Pi 15, 0.9 sec. I-time, +15 PSV, 30% for the remainder of the night. ET-CO2 decreased to 42.

## 2025-03-08 NOTE — PROGRESS NOTES
OCCUPATIONAL THERAPY TREATMENT  Patient: Nida Graves (49 y.o. female)  Date: 3/8/2025  Primary Diagnosis: Atrial fibrillation, unspecified type (HCC) [I48.91]  Atrial flutter, unspecified type (HCC) [I48.92]  Congestive heart failure, unspecified HF chronicity, unspecified heart failure type (HCC) [I50.9]  Acute hypoxic respiratory failure (HCC) [J96.01]  Procedure(s) (LRB):  ESOPHAGOGASTRODUODENOSCOPY PERCUTANEOUS ENDOSCOPIC GASTROSTOMY TUBE PLACEMENT (N/A) 23 Days Post-Op   Precautions: Fall Risk, General Precautions                Recommendations for nursing mobility: Encourage HEP in prep for ADLs/mobility; see handout for details, Frequent repositioning to prevent skin breakdown, Use of bed/chair alarm for safety, LE elevation for management of edema, and Assist x2    In place during session: Peripheral IV, Tracheostomy on vent 30% FiO2, External Catheter, EKG/telemetry , Pulse ox, and PEG Tube  Chart, occupational therapy assessment, plan of care, and goals were reviewed.  ASSESSMENT  Patient continues with skilled OT services and is progressing towards goals. Pt semi-supine upon OT arrival, agreeable to session. Pt A&O x 4. (See below for objective details and assist levels).     Overall pt tolerated session fair today with c/o GI discomfort and diarrhea, limiting pt's engagement in activities throughout session. As a result, pt declined attempting chair position with bed this date s/t not feeling well. However, pt willingly participated in therex w/ in tolerance throughout session. Pt's SpO2 remained in 90s throughout session, often increasing to 96% while engaging in therex. Pt alternated engaging in BUE exercises w/ OT and BLE exercises w/ PT (see PT noted for details) at bed level w/ HOB elevated to ~70-80* to increase tolerance to upright position. Pt completed shoulder flexion and elbow flexion 1x10 w/ verbal/visual cues for full ROM and technique. Pt completed 1 set of 10 cross body reaches to  required verbal/visual cueing for full ROM and technique for all exercises this date.   Exercise Sets Reps AROM AAROM PROM Self PROM Comments   Shoulder flex/ext 1 10 [x] [] [] []    Elbow flex/ext 1 10 [x] [] [] []    Cross body reaches 1 10 [x] [] [] [] Visual target provided to encourage full ROM and slight trunk rotation for core strength      [] [] [] []      Pain Ratin/10   Pain Intervention(s):   pain is at a level acceptable to the patient    Activity Tolerance:   Poor, requires frequent rest breaks, and observed shortness of breath on exertion    After treatment patient left in no apparent distress:   Bed locked and returned to lowest position, Patient left in no apparent distress in bed, Call bell within reach, Bed/ chair alarm activated, and Side rails x3, and nsg updated     COMMUNICATION/EDUCATION:   The patient’s plan of care was discussed with: Physical therapist and Registered nurse    Patient Education  Education Given To: Patient  Education Provided: Role of Therapy;Plan of Care;Home Exercise Program  Education Method: Verbal  Barriers to Learning: Cognition  Education Outcome: Verbalized understanding;Continued education needed    Thank you for this referral.  Estee Christiansen OT  Minutes: 24

## 2025-03-08 NOTE — PROGRESS NOTES
CRITICAL CARE PROGRESS NOTE    Name: Nida Graves   : 1975   MRN: [id number]   Date: 3/8/2025      Diagnoses/problem list:   Chronic hypoxic and hypercapnic respiratory failure  Atrial flutter with slow ventricular response  Complete heart block s/p pacemaker  GI bleed  Biventricular failure  Acute HFrEF, 25 to 30%  Diabetes  Morbid obesity  Deconditioning    24-hour events:   Switched to pressure support today from SIMV. Will go for trach collar later today. No issues overnight. Feels better.      Assessment and plan:   Ms. Graves is a 49-year-old female with PMH chronic debility and bedbound status, diabetes, asthma, obesity, who presented for shortness of breath.  Shortness of breath worsening over the past few days.  Upon presentation ED patient found to be hypoxic necessitating O2 via NC.  She was also found to be in A-fib with slow ventricular response.  She was transferred out of ICU on . Early morning  upgraded to ICU again due to severe hypoxia and hypercapnia requiring intubation. Now s/p trach on . Leadless pacemaker . PEG . Improved lung white-out s/p bronchoscopy     CNS / MSK:    Continue Venlafaxine and Bupropion  Pt would most benefit from LTAC to best address her deconditioning and long-term ventilator weaning. PT able to get pt to chair position.    PULMONOLOGY:   Extubated on , reintubated on   ENT placed trach on   Recurrent mucus plugging, s/p multiple intermittent mucolytic regimens   Continue nebs, budesonide q12h, duonebs q6h  Lung aeration improved s/p bronchoscopy  and   Continue pressure support and/or trach collar trials daily as tolerated  Pt would most benefit from LTAC to best address her deconditioning and long-term ventilator weaning    CARDIOVASCULAR:   S/p AUGUSTIN/DCCV on   LVEF 20-30%, right ventricle overload, moderate TR   Diuresis as needed  LHC (2/3/25): nonobstructive CAD; luminal RCA, Lcx; 40% mLAD   Continue ASA and  events, labs, images, vital signs, I/O's, and examined patient.  I have discussed the case and the plan and management of the patient's care with the consulting services, the bedside nurses and the respiratory therapist.      NOTE OF PERSONAL INVOLVEMENT IN CARE   This patient has a high probability of imminent, clinically significant deterioration, which requires the highest level of preparedness to intervene urgently. I participated in the decision-making and personally managed or directed the management of the following life and organ supporting interventions that required my frequent assessment to treat or prevent imminent deterioration.    I personally spent 30 minutes of critical care time.  This is time spent at this critically ill patient's bedside actively involved in patient care as well as the coordination of care.  This does not include any procedural time which has been billed separately.      Ismael Castillo MD  [facility]  03/08/25

## 2025-03-09 LAB
GLUCOSE BLD STRIP.AUTO-MCNC: 119 MG/DL (ref 65–100)
GLUCOSE BLD STRIP.AUTO-MCNC: 96 MG/DL (ref 65–100)
PERFORMED BY:: ABNORMAL
PERFORMED BY:: NORMAL

## 2025-03-09 PROCEDURE — 94003 VENT MGMT INPAT SUBQ DAY: CPT

## 2025-03-09 PROCEDURE — 2500000003 HC RX 250 WO HCPCS: Performed by: NURSE PRACTITIONER

## 2025-03-09 PROCEDURE — 2500000003 HC RX 250 WO HCPCS: Performed by: STUDENT IN AN ORGANIZED HEALTH CARE EDUCATION/TRAINING PROGRAM

## 2025-03-09 PROCEDURE — 6370000000 HC RX 637 (ALT 250 FOR IP): Performed by: STUDENT IN AN ORGANIZED HEALTH CARE EDUCATION/TRAINING PROGRAM

## 2025-03-09 PROCEDURE — 94668 MNPJ CHEST WALL SBSQ: CPT

## 2025-03-09 PROCEDURE — 97110 THERAPEUTIC EXERCISES: CPT

## 2025-03-09 PROCEDURE — 2700000000 HC OXYGEN THERAPY PER DAY

## 2025-03-09 PROCEDURE — 6360000002 HC RX W HCPCS: Performed by: STUDENT IN AN ORGANIZED HEALTH CARE EDUCATION/TRAINING PROGRAM

## 2025-03-09 PROCEDURE — 6370000000 HC RX 637 (ALT 250 FOR IP): Performed by: NURSE PRACTITIONER

## 2025-03-09 PROCEDURE — 6370000000 HC RX 637 (ALT 250 FOR IP): Performed by: INTERNAL MEDICINE

## 2025-03-09 PROCEDURE — 2000000000 HC ICU R&B

## 2025-03-09 PROCEDURE — 97530 THERAPEUTIC ACTIVITIES: CPT

## 2025-03-09 PROCEDURE — 82962 GLUCOSE BLOOD TEST: CPT

## 2025-03-09 PROCEDURE — 94761 N-INVAS EAR/PLS OXIMETRY MLT: CPT

## 2025-03-09 PROCEDURE — 94640 AIRWAY INHALATION TREATMENT: CPT

## 2025-03-09 RX ADMIN — IPRATROPIUM BROMIDE AND ALBUTEROL SULFATE 1 DOSE: 2.5; .5 SOLUTION RESPIRATORY (INHALATION) at 09:16

## 2025-03-09 RX ADMIN — HYDROXYZINE HYDROCHLORIDE 25 MG: 25 TABLET, FILM COATED ORAL at 20:05

## 2025-03-09 RX ADMIN — WHITE PETROLATUM,ZINC OXIDE: 57; 17 PASTE TOPICAL at 08:18

## 2025-03-09 RX ADMIN — SODIUM CHLORIDE, PRESERVATIVE FREE 10 ML: 5 INJECTION INTRAVENOUS at 20:05

## 2025-03-09 RX ADMIN — ASPIRIN 81 MG 81 MG: 81 TABLET ORAL at 08:16

## 2025-03-09 RX ADMIN — IPRATROPIUM BROMIDE AND ALBUTEROL SULFATE 1 DOSE: 2.5; .5 SOLUTION RESPIRATORY (INHALATION) at 12:38

## 2025-03-09 RX ADMIN — APIXABAN 2.5 MG: 2.5 TABLET, FILM COATED ORAL at 08:16

## 2025-03-09 RX ADMIN — VENLAFAXINE 75 MG: 25 TABLET ORAL at 17:47

## 2025-03-09 RX ADMIN — GUAIFENESIN 200 MG: 200 SOLUTION ORAL at 08:16

## 2025-03-09 RX ADMIN — ATORVASTATIN CALCIUM 20 MG: 20 TABLET, FILM COATED ORAL at 20:05

## 2025-03-09 RX ADMIN — BUDESONIDE 500 MCG: 0.5 INHALANT ORAL at 19:33

## 2025-03-09 RX ADMIN — FOLIC ACID 1 MG: 1 TABLET ORAL at 08:16

## 2025-03-09 RX ADMIN — BUDESONIDE 500 MCG: 0.5 INHALANT ORAL at 09:16

## 2025-03-09 RX ADMIN — GUAIFENESIN 200 MG: 200 SOLUTION ORAL at 20:04

## 2025-03-09 RX ADMIN — SODIUM CHLORIDE, PRESERVATIVE FREE 10 ML: 5 INJECTION INTRAVENOUS at 08:19

## 2025-03-09 RX ADMIN — IPRATROPIUM BROMIDE AND ALBUTEROL SULFATE 1 DOSE: 2.5; .5 SOLUTION RESPIRATORY (INHALATION) at 03:26

## 2025-03-09 RX ADMIN — WHITE PETROLATUM,ZINC OXIDE: 57; 17 PASTE TOPICAL at 20:06

## 2025-03-09 RX ADMIN — LANSOPRAZOLE 30 MG: 30 TABLET, ORALLY DISINTEGRATING ORAL at 06:09

## 2025-03-09 RX ADMIN — Medication 1 PACKET: at 08:16

## 2025-03-09 RX ADMIN — BUPROPION HYDROCHLORIDE 100 MG: 100 TABLET, FILM COATED ORAL at 08:16

## 2025-03-09 RX ADMIN — CINACALCET HYDROCHLORIDE 30 MG: 30 TABLET, FILM COATED ORAL at 08:18

## 2025-03-09 RX ADMIN — VENLAFAXINE 75 MG: 25 TABLET ORAL at 08:16

## 2025-03-09 RX ADMIN — APIXABAN 2.5 MG: 2.5 TABLET, FILM COATED ORAL at 20:04

## 2025-03-09 RX ADMIN — IPRATROPIUM BROMIDE AND ALBUTEROL SULFATE 1 DOSE: 2.5; .5 SOLUTION RESPIRATORY (INHALATION) at 19:33

## 2025-03-09 RX ADMIN — BUPROPION HYDROCHLORIDE 100 MG: 100 TABLET, FILM COATED ORAL at 20:04

## 2025-03-09 ASSESSMENT — PAIN SCALES - GENERAL
PAINLEVEL_OUTOF10: 0

## 2025-03-09 ASSESSMENT — PULMONARY FUNCTION TESTS
PIF_VALUE: 18
PIF_VALUE: 20
PIF_VALUE: 18
PIF_VALUE: 20
PIF_VALUE: 18
PIF_VALUE: 20
PIF_VALUE: 18
PIF_VALUE: 20
PIF_VALUE: 17
PIF_VALUE: 20
PIF_VALUE: 20
PIF_VALUE: 18
PIF_VALUE: 20

## 2025-03-09 NOTE — PROGRESS NOTES
OCCUPATIONAL THERAPY TREATMENT: WEEKLY REASSESSMENT    Patient: Nida Graves (49 y.o. female)  Date: 3/9/2025  Primary Diagnosis: Atrial fibrillation, unspecified type (HCC) [I48.91]  Atrial flutter, unspecified type (HCC) [I48.92]  Congestive heart failure, unspecified HF chronicity, unspecified heart failure type (HCC) [I50.9]  Acute hypoxic respiratory failure (HCC) [J96.01]  Procedure(s) (LRB):  ESOPHAGOGASTRODUODENOSCOPY PERCUTANEOUS ENDOSCOPIC GASTROSTOMY TUBE PLACEMENT (N/A) 24 Days Post-Op   Precautions: Fall Risk, General Precautions                Recommendations for nursing mobility: Encourage HEP in prep for ADLs/mobility; see handout for details, Frequent repositioning to prevent skin breakdown, and Assist x2    In place during session: Peripheral IV, Tracheostomy on vent 30% FiO2, External Catheter, EKG/telemetry , and Pulse ox  Chart, occupational therapy assessment, plan of care, and goals were reviewed.  ASSESSMENT  Patient initially seen for OT evaluation 1/16/2025 and 8 skilled OT sessions since evalution. Patient seen today for OT reevaluation s/t extended LOS. Patient A&O x4. Pt semi-supine in bed upon arrival, agreeable to session.      Based on the objective data described, the patient currently presents with impaired functional mobility, decreased independence in ADLs, impaired ability to perform high-level IADLs, decreased ROM, impaired strength, poor body mechanics, decreased activity tolerance, poor safety awareness, decreased coordination, impaired balance, and impaired posture. (See below for objective details and assist levels).    Overall pt tolerated session well today, currently with c/o 7/10 pain in LLE, which pt states is baseline for her following fracture in LLE ~3 years ago. Pt demonstrated significant improvement even compared to previous date. Today bed was placed in chair position w/ pt's feet placed on foot board. Pt consistently maintained 20-22kg of pressure through BLEs

## 2025-03-09 NOTE — PROGRESS NOTES
Renal Progress Note    Patient: Nida Graves MRN: [id number]  SSN: [id number]    YOB: 1975  Age: 49 y.o.  Sex: female      Admit Date: 1/12/2025    LOS: 56 days     Subjective:   Patient seen in ICU.  Awake, alert, better oriented. , no acute distress.  S/p tracheostomy, Edema improved, Repeat labs showed stable electrolytes    Current Facility-Administered Medications   Medication Dose Route Frequency    magnesium hydroxide (MILK OF MAGNESIA) 400 MG/5ML suspension 30 mL  30 mL Per G Tube Q72H    ipratropium 0.5 mg-albuterol 2.5 mg (DUONEB) nebulizer solution 1 Dose  1 Dose Inhalation Q6H RT    insulin lispro (HUMALOG,ADMELOG) injection vial 0-4 Units  0-4 Units SubCUTAneous Q12H    psyllium husk-aspartame (METAMUCIL FIBER) packet 1 packet  1 packet Per G Tube Daily    buPROPion (WELLBUTRIN) tablet 100 mg  100 mg Per G Tube BID    apixaban (ELIQUIS) tablet 2.5 mg  2.5 mg Per NG tube BID    venlafaxine (EFFEXOR) tablet 75 mg  75 mg Per G Tube BID WC    guaiFENesin (ROBITUSSIN) 100 MG/5ML liquid 200 mg  200 mg Oral Q12H    0.9 % sodium chloride infusion   IntraVENous PRN    influenza split vaccine (PF) (AFLURIA;FLUARIX) injection 0.5 mL  1 Dose IntraMUSCular Prior to discharge    epoetin itz-epbx (RETACRIT) injection 2,500 Units  2,500 Units SubCUTAneous Once per day on Monday Wednesday Friday    lansoprazole (PREVACID SOLUTAB) disintegrating tablet 30 mg  30 mg Per G Tube QAM AC    aspirin chewable tablet 81 mg  81 mg Per G Tube Daily    atorvastatin (LIPITOR) tablet 20 mg  20 mg Per G Tube Nightly    folic acid (FOLVITE) tablet 1 mg  1 mg Per G Tube Daily    acetaminophen (TYLENOL) 160 MG/5ML solution 650 mg  650 mg Per G Tube Q6H PRN    Or    acetaminophen (TYLENOL) suppository 650 mg  650 mg Rectal Q6H PRN    hydrOXYzine HCl (ATARAX) tablet 25 mg  25 mg Per G Tube TID PRN    senna (SENOKOT) tablet 8.6 mg  1 tablet Per G Tube Daily PRN    cinacalcet (SENSIPAR) tablet 30 mg  30 mg Oral Daily     continue as needed lasix  -Cardiology on the case.  S/p PPM on 2/12    6. Anemia: came with severe anemia, s/p pRBC tx  Sever iron def noted, s/p 4 doses of  IV ferrlecit doses  Improved HB  Monitor H/H    Will sign off, please call with any new issues    Signed By: Isael Amezquita MD     March 9, 2025

## 2025-03-09 NOTE — PLAN OF CARE
Problem: Physical Therapy - Adult  Goal: By Discharge: Performs mobility at highest level of function for planned discharge setting.  See evaluation for individualized goals.  Description: FUNCTIONAL STATUS PRIOR TO ADMISSION: The patient  required minimal assistance for basic and instrumental ADLs.    HOME SUPPORT PRIOR TO ADMISSION: The patient lived with  and required minimal assistance for ADLs.    Physical Therapy Goals  Initiated 1/16/2025  Pt stated goal: Get better  Pt will be I with LE HEP in 7 days.  Pt will perform bed mobility with Mod Assist in 7 days.  Pt to sit EOB x 15 min with fair sitting balance in 7 days.  Pt to perform sit to stand with max A x 2 in 7 days.  Outcome: Progressing            PHYSICAL THERAPY TREATMENT     Patient: Nida Graves (49 y.o. female)  Date: 3/9/2025  Diagnosis: Atrial fibrillation, unspecified type (HCC) [I48.91]  Atrial flutter, unspecified type (HCC) [I48.92]  Congestive heart failure, unspecified HF chronicity, unspecified heart failure type (HCC) [I50.9]  Acute hypoxic respiratory failure (HCC) [J96.01] Acute on chronic hypoxic respiratory failure (HCC)  Procedure(s) (LRB):  ESOPHAGOGASTRODUODENOSCOPY PERCUTANEOUS ENDOSCOPIC GASTROSTOMY TUBE PLACEMENT (N/A) 24 Days Post-Op  Precautions: Fall Risk, General Precautions                      Recommendations for nursing mobility: Encourage HEP in prep for ADLs/mobility; see handout for details, Frequent repositioning to prevent skin breakdown, Use of bed/chair alarm for safety, and Assist x2    In place during session: Peripheral IV, Tracheostomy on vent 30% FiO2, External Catheter, EKG/telemetry , Pulse ox, and PEG Tube   Chart, physical therapy assessment, plan of care and goals were reviewed.  ASSESSMENT  Patient continues with skilled PT services and is progressing towards goals. Pt received semi supine in bed upon PTA/OT's arrival, agreeable to session. Pt A&O x 4. Pt was repositioned in bed with Total to  training;Tactile cues;Visual cues  Rolling: Substantial/Maximal assistance;2 Person assistance  Transfers:  Transfer Training  Transfer Training: No  Balance:  Balance  Sitting: Impaired  Sitting - Static: Poor (constant support)  Sitting - Dynamic: Poor (constant support)  Wheelchair Mobility:    Ambulation/Gait Training:     Therapeutic Exercises:       EXERCISE   Sets   Reps   Active Active Assist   Passive Self ROM   Comments   Ankle Pumps   [] [] [] []    Glut Sets 2 5, 10 [] [] [] []    Hamstring Sets   [] [] [] []    Short Arc Quads   [] [] [] []    Heel Slides   [] [] [] []    Straight Leg Raises   [] [] [] []    Hip abd/add   [] [] [] []    Long Arc Quads   [] [] [] []    Marching 2 5 [x] [] [] []    Seated HR/TR   [] [] [] []    Abd crunches/seat ups 2 5 [x] [] [] []       Pain Ratin/10 reported  Pain Intervention(s):   rest and repositioning    Activity Tolerance:   Fair  and tolerates ADLS without rest breaks    After treatment patient left in no apparent distress:   Bed locked and returned to lowest position, Patient left in no apparent distress in bed, Call bell within reach, and Side rails x3, and nsg updated, RN entering room    COMMUNICATION/COLLABORATION:   The patient’s plan of care was discussed with: Physical therapist, Occupational therapist, and Registered nurse. Pt was seen on co-Tx with OT for the safety of pt and clinicians, pt needing Ax2    Patient Education  Education Given To: Patient  Education Provided: Role of Therapy;Plan of Care;Home Exercise Program;Precautions;Mobility Training;Equipment;Energy Conservation  Education Method: Demonstration;Verbal  Barriers to Learning: None  Education Outcome: Verbalized understanding;Demonstrated understanding;Continued education needed    Zainab Quarles PTA  Minutes: 44

## 2025-03-09 NOTE — PLAN OF CARE
Problem: ABCDS Injury Assessment  Goal: Absence of physical injury  Outcome: Progressing     Problem: Chronic Conditions and Co-morbidities  Goal: Patient's chronic conditions and co-morbidity symptoms are monitored and maintained or improved  Outcome: Progressing  Flowsheets (Taken 3/8/2025 2000)  Care Plan - Patient's Chronic Conditions and Co-Morbidity Symptoms are Monitored and Maintained or Improved: Monitor and assess patient's chronic conditions and comorbid symptoms for stability, deterioration, or improvement     Problem: Safety - Adult  Goal: Free from fall injury  Outcome: Progressing     Problem: Neurosensory - Adult  Goal: Achieves maximal functionality and self care  Outcome: Progressing  Flowsheets (Taken 3/8/2025 2000)  Achieves maximal functionality and self care: Monitor swallowing and airway patency with patient fatigue and changes in neurological status     Problem: Respiratory - Adult  Goal: Achieves optimal ventilation and oxygenation  Outcome: Progressing  Flowsheets (Taken 3/8/2025 2000)  Achieves optimal ventilation and oxygenation:   Assess for changes in respiratory status   Assess for changes in mentation and behavior     Problem: Cardiovascular - Adult  Goal: Maintains optimal cardiac output and hemodynamic stability  Outcome: Progressing  Flowsheets (Taken 3/8/2025 2000)  Maintains optimal cardiac output and hemodynamic stability: Monitor blood pressure and heart rate     Problem: Gastrointestinal - Adult  Goal: Maintains adequate nutritional intake  Outcome: Progressing  Flowsheets (Taken 3/8/2025 2000)  Maintains adequate nutritional intake: Monitor intake and output, weight and lab values      (4) no limitation

## 2025-03-09 NOTE — PROGRESS NOTES
CRITICAL CARE PROGRESS NOTE    Name: Nida Graves   : 1975   MRN: [id number]   Date: 3/9/2025      Diagnoses/problem list:   Chronic hypoxic and hypercapnic respiratory failure  Atrial flutter with slow ventricular response  Complete heart block s/p pacemaker  GI bleed  Biventricular failure  Acute HFrEF, 25 to 30%  Diabetes  Morbid obesity  Deconditioning    24-hour events:   Stayed on SIMV overnight. Feels better today in bed. Will try for trach collars. Mild cough which is improving.     Assessment and plan:   Ms. Graves is a 49-year-old female with PMH chronic debility and bedbound status, diabetes, asthma, obesity, who presented for shortness of breath.  Shortness of breath worsening over the past few days.  Upon presentation ED patient found to be hypoxic necessitating O2 via NC.  She was also found to be in A-fib with slow ventricular response.  She was transferred out of ICU on . Early morning  upgraded to ICU again due to severe hypoxia and hypercapnia requiring intubation. Now s/p trach on . Leadless pacemaker . PEG . Improved lung white-out s/p bronchoscopy     CNS / MSK:    Continue Venlafaxine and Bupropion  Pt would most benefit from LTAC to best address her deconditioning and long-term ventilator weaning. PT able to get pt to chair position.    PULMONOLOGY:   Extubated on , reintubated on   ENT placed trach on   Recurrent mucus plugging, s/p multiple intermittent mucolytic regimens   Continue nebs, budesonide q12h, duonebs q6h  Lung aeration improved s/p bronchoscopy  and   Continue pressure support and/or trach collar trials daily as tolerated  Pt would most benefit from LTAC to best address her deconditioning and long-term ventilator weaning    CARDIOVASCULAR:   S/p AUGUSTIN/DCCV on   LVEF 20-30%, right ventricle overload, moderate TR   Diuresis as needed  LHC (2/3/25): nonobstructive CAD; luminal RCA, Lcx; 40% mLAD   Continue ASA and Statin; GDMT  images, vital signs, I/O's, and examined patient.  I have discussed the case and the plan and management of the patient's care with the consulting services, the bedside nurses and the respiratory therapist.      NOTE OF PERSONAL INVOLVEMENT IN CARE   This patient has a high probability of imminent, clinically significant deterioration, which requires the highest level of preparedness to intervene urgently. I participated in the decision-making and personally managed or directed the management of the following life and organ supporting interventions that required my frequent assessment to treat or prevent imminent deterioration.    I personally spent 30 minutes of critical care time.  This is time spent at this critically ill patient's bedside actively involved in patient care as well as the coordination of care.  This does not include any procedural time which has been billed separately.      Ismael Castillo MD  [facility]  03/09/25

## 2025-03-10 LAB
ANION GAP SERPL CALC-SCNC: 5 MMOL/L (ref 2–12)
BUN SERPL-MCNC: 34 MG/DL (ref 6–20)
BUN/CREAT SERPL: 46 (ref 12–20)
CA-I BLD-MCNC: 8.8 MG/DL (ref 8.5–10.1)
CHLORIDE SERPL-SCNC: 107 MMOL/L (ref 97–108)
CO2 SERPL-SCNC: 29 MMOL/L (ref 21–32)
CREAT SERPL-MCNC: 0.74 MG/DL (ref 0.55–1.02)
ERYTHROCYTE [DISTWIDTH] IN BLOOD BY AUTOMATED COUNT: 17.9 % (ref 11.5–14.5)
GLUCOSE BLD STRIP.AUTO-MCNC: 105 MG/DL (ref 65–100)
GLUCOSE BLD STRIP.AUTO-MCNC: 90 MG/DL (ref 65–100)
GLUCOSE SERPL-MCNC: 109 MG/DL (ref 65–100)
HCT VFR BLD AUTO: 29.1 % (ref 35–47)
HGB BLD-MCNC: 8.9 G/DL (ref 11.5–16)
MCH RBC QN AUTO: 29.8 PG (ref 26–34)
MCHC RBC AUTO-ENTMCNC: 30.6 G/DL (ref 30–36.5)
MCV RBC AUTO: 97.3 FL (ref 80–99)
NRBC # BLD: 0 K/UL (ref 0–0.01)
NRBC BLD-RTO: 0 PER 100 WBC
PERFORMED BY:: ABNORMAL
PERFORMED BY:: NORMAL
PLATELET # BLD AUTO: 230 K/UL (ref 150–400)
PMV BLD AUTO: 10.5 FL (ref 8.9–12.9)
POTASSIUM SERPL-SCNC: 4.2 MMOL/L (ref 3.5–5.1)
RBC # BLD AUTO: 2.99 M/UL (ref 3.8–5.2)
SODIUM SERPL-SCNC: 141 MMOL/L (ref 136–145)
WBC # BLD AUTO: 8.4 K/UL (ref 3.6–11)

## 2025-03-10 PROCEDURE — 6370000000 HC RX 637 (ALT 250 FOR IP): Performed by: NURSE PRACTITIONER

## 2025-03-10 PROCEDURE — 94761 N-INVAS EAR/PLS OXIMETRY MLT: CPT

## 2025-03-10 PROCEDURE — 6360000002 HC RX W HCPCS: Performed by: STUDENT IN AN ORGANIZED HEALTH CARE EDUCATION/TRAINING PROGRAM

## 2025-03-10 PROCEDURE — 94668 MNPJ CHEST WALL SBSQ: CPT

## 2025-03-10 PROCEDURE — 85027 COMPLETE CBC AUTOMATED: CPT

## 2025-03-10 PROCEDURE — 6370000000 HC RX 637 (ALT 250 FOR IP): Performed by: STUDENT IN AN ORGANIZED HEALTH CARE EDUCATION/TRAINING PROGRAM

## 2025-03-10 PROCEDURE — 94003 VENT MGMT INPAT SUBQ DAY: CPT

## 2025-03-10 PROCEDURE — 2700000000 HC OXYGEN THERAPY PER DAY

## 2025-03-10 PROCEDURE — 2500000003 HC RX 250 WO HCPCS: Performed by: STUDENT IN AN ORGANIZED HEALTH CARE EDUCATION/TRAINING PROGRAM

## 2025-03-10 PROCEDURE — 80048 BASIC METABOLIC PNL TOTAL CA: CPT

## 2025-03-10 PROCEDURE — 36415 COLL VENOUS BLD VENIPUNCTURE: CPT

## 2025-03-10 PROCEDURE — 2000000000 HC ICU R&B

## 2025-03-10 PROCEDURE — 6370000000 HC RX 637 (ALT 250 FOR IP): Performed by: INTERNAL MEDICINE

## 2025-03-10 PROCEDURE — 94640 AIRWAY INHALATION TREATMENT: CPT

## 2025-03-10 PROCEDURE — 2500000003 HC RX 250 WO HCPCS: Performed by: NURSE PRACTITIONER

## 2025-03-10 PROCEDURE — 82962 GLUCOSE BLOOD TEST: CPT

## 2025-03-10 RX ORDER — GUAIFENESIN 200 MG/10ML
400 LIQUID ORAL 3 TIMES DAILY
Status: DISCONTINUED | OUTPATIENT
Start: 2025-03-10 | End: 2025-03-12

## 2025-03-10 RX ORDER — BUPROPION HYDROCHLORIDE 100 MG/1
100 TABLET ORAL 3 TIMES DAILY
Status: DISCONTINUED | OUTPATIENT
Start: 2025-03-10 | End: 2025-03-14 | Stop reason: HOSPADM

## 2025-03-10 RX ORDER — ACETYLCYSTEINE 200 MG/ML
600 SOLUTION ORAL; RESPIRATORY (INHALATION)
Status: DISCONTINUED | OUTPATIENT
Start: 2025-03-10 | End: 2025-03-14 | Stop reason: HOSPADM

## 2025-03-10 RX ADMIN — BUDESONIDE 500 MCG: 0.5 INHALANT ORAL at 08:31

## 2025-03-10 RX ADMIN — ASPIRIN 81 MG 81 MG: 81 TABLET ORAL at 08:32

## 2025-03-10 RX ADMIN — GUAIFENESIN 400 MG: 200 SOLUTION ORAL at 18:00

## 2025-03-10 RX ADMIN — ATORVASTATIN CALCIUM 20 MG: 20 TABLET, FILM COATED ORAL at 19:34

## 2025-03-10 RX ADMIN — BUPROPION HYDROCHLORIDE 100 MG: 100 TABLET, FILM COATED ORAL at 08:32

## 2025-03-10 RX ADMIN — GUAIFENESIN 400 MG: 200 SOLUTION ORAL at 13:28

## 2025-03-10 RX ADMIN — BUPROPION HYDROCHLORIDE 100 MG: 100 TABLET, FILM COATED ORAL at 19:34

## 2025-03-10 RX ADMIN — BUDESONIDE 500 MCG: 0.5 INHALANT ORAL at 19:37

## 2025-03-10 RX ADMIN — CINACALCET HYDROCHLORIDE 30 MG: 30 TABLET, FILM COATED ORAL at 08:39

## 2025-03-10 RX ADMIN — GUAIFENESIN 200 MG: 200 SOLUTION ORAL at 08:32

## 2025-03-10 RX ADMIN — SODIUM CHLORIDE, PRESERVATIVE FREE 10 ML: 5 INJECTION INTRAVENOUS at 19:34

## 2025-03-10 RX ADMIN — IPRATROPIUM BROMIDE AND ALBUTEROL SULFATE 1 DOSE: 2.5; .5 SOLUTION RESPIRATORY (INHALATION) at 19:37

## 2025-03-10 RX ADMIN — BUPROPION HYDROCHLORIDE 100 MG: 100 TABLET, FILM COATED ORAL at 13:28

## 2025-03-10 RX ADMIN — IPRATROPIUM BROMIDE AND ALBUTEROL SULFATE 1 DOSE: 2.5; .5 SOLUTION RESPIRATORY (INHALATION) at 08:31

## 2025-03-10 RX ADMIN — ACETYLCYSTEINE 600 MG: 200 SOLUTION ORAL; RESPIRATORY (INHALATION) at 19:37

## 2025-03-10 RX ADMIN — SODIUM CHLORIDE, PRESERVATIVE FREE 10 ML: 5 INJECTION INTRAVENOUS at 08:34

## 2025-03-10 RX ADMIN — Medication 1 PACKET: at 08:32

## 2025-03-10 RX ADMIN — WHITE PETROLATUM,ZINC OXIDE: 57; 17 PASTE TOPICAL at 19:34

## 2025-03-10 RX ADMIN — EPOETIN ALFA-EPBX 2500 UNITS: 3000 INJECTION, SOLUTION INTRAVENOUS; SUBCUTANEOUS at 18:26

## 2025-03-10 RX ADMIN — IPRATROPIUM BROMIDE AND ALBUTEROL SULFATE 1 DOSE: 2.5; .5 SOLUTION RESPIRATORY (INHALATION) at 01:16

## 2025-03-10 RX ADMIN — LANSOPRAZOLE 30 MG: 30 TABLET, ORALLY DISINTEGRATING ORAL at 08:32

## 2025-03-10 RX ADMIN — APIXABAN 2.5 MG: 2.5 TABLET, FILM COATED ORAL at 08:32

## 2025-03-10 RX ADMIN — APIXABAN 2.5 MG: 2.5 TABLET, FILM COATED ORAL at 19:34

## 2025-03-10 RX ADMIN — IPRATROPIUM BROMIDE AND ALBUTEROL SULFATE 1 DOSE: 2.5; .5 SOLUTION RESPIRATORY (INHALATION) at 14:31

## 2025-03-10 RX ADMIN — WHITE PETROLATUM,ZINC OXIDE: 57; 17 PASTE TOPICAL at 08:35

## 2025-03-10 RX ADMIN — VENLAFAXINE 75 MG: 25 TABLET ORAL at 18:00

## 2025-03-10 RX ADMIN — HYDROXYZINE HYDROCHLORIDE 25 MG: 25 TABLET, FILM COATED ORAL at 19:34

## 2025-03-10 RX ADMIN — FOLIC ACID 1 MG: 1 TABLET ORAL at 08:32

## 2025-03-10 RX ADMIN — VENLAFAXINE 75 MG: 25 TABLET ORAL at 08:32

## 2025-03-10 RX ADMIN — ACETYLCYSTEINE 600 MG: 200 SOLUTION ORAL; RESPIRATORY (INHALATION) at 14:31

## 2025-03-10 ASSESSMENT — PULMONARY FUNCTION TESTS
PIF_VALUE: 18
PIF_VALUE: 20
PIF_VALUE: 18
PIF_VALUE: 30
PIF_VALUE: 20
PIF_VALUE: 18
PIF_VALUE: 20
PIF_VALUE: 18
PIF_VALUE: 20
PIF_VALUE: 20
PIF_VALUE: 18
PIF_VALUE: 18
PIF_VALUE: 20

## 2025-03-10 ASSESSMENT — PAIN SCALES - GENERAL
PAINLEVEL_OUTOF10: 0

## 2025-03-10 NOTE — PLAN OF CARE
Problem: ABCDS Injury Assessment  Goal: Absence of physical injury  Outcome: Progressing     Problem: Skin/Tissue Integrity  Goal: Absence of new skin breakdown  Description: 1.  Monitor for areas of redness and/or skin breakdown  2.  Assess vascular access sites hourly  3.  Every 4-6 hours minimum:  Change oxygen saturation probe site  4.  Every 4-6 hours:  If on nasal continuous positive airway pressure, respiratory therapy assess nares and determine need for appliance change or resting period.  Outcome: Progressing     Problem: Chronic Conditions and Co-morbidities  Goal: Patient's chronic conditions and co-morbidity symptoms are monitored and maintained or improved  Outcome: Progressing  Flowsheets (Taken 3/9/2025 2000)  Care Plan - Patient's Chronic Conditions and Co-Morbidity Symptoms are Monitored and Maintained or Improved: Monitor and assess patient's chronic conditions and comorbid symptoms for stability, deterioration, or improvement     Problem: Safety - Adult  Goal: Free from fall injury  Outcome: Progressing     Problem: Respiratory - Adult  Goal: Achieves optimal ventilation and oxygenation  Outcome: Progressing     Problem: Cardiovascular - Adult  Goal: Maintains optimal cardiac output and hemodynamic stability  Outcome: Progressing  Flowsheets (Taken 3/9/2025 2000)  Maintains optimal cardiac output and hemodynamic stability: Monitor blood pressure and heart rate     Problem: Gastrointestinal - Adult  Goal: Minimal or absence of nausea and vomiting  Outcome: Progressing  Flowsheets (Taken 3/9/2025 2000)  Minimal or absence of nausea and vomiting: Administer ordered antiemetic medications as needed     Problem: Gastrointestinal - Adult  Goal: Maintains adequate nutritional intake  Outcome: Progressing  Flowsheets (Taken 3/9/2025 2000)  Maintains adequate nutritional intake: Monitor intake and output, weight and lab values     Problem: Genitourinary - Adult  Goal: Absence of urinary  retention  Outcome: Progressing  Flowsheets (Taken 3/9/2025 2000)  Absence of urinary retention:   Assess patient’s ability to void and empty bladder   Monitor intake/output and perform bladder scan as needed     Problem: Pain  Goal: Verbalizes/displays adequate comfort level or baseline comfort level  Outcome: Progressing  Flowsheets (Taken 3/9/2025 2000)  Verbalizes/displays adequate comfort level or baseline comfort level: Assess pain using appropriate pain scale

## 2025-03-10 NOTE — PROGRESS NOTES
CRITICAL CARE PROGRESS NOTE    Name: Nida Graves   : 1975   MRN: [id number]   Date: 3/10/2025      Diagnoses/problem list:   Chronic hypoxic and hypercapnic respiratory failure  Atrial flutter with slow ventricular response  Complete heart block s/p pacemaker  GI bleed  Biventricular failure  Acute HFrEF, 25 to 30%  Diabetes  Morbid obesity  Deconditioning    24-hour events:   PS today. Reports willingness to work with PT/OT today. Concerned about diarrhea and added fiber to TFs    Assessment and plan:   Ms. Graves is a 49-year-old female with PMH chronic debility and bedbound status, diabetes, asthma, obesity, who presented for shortness of breath.  Shortness of breath worsening over the past few days.  Upon presentation ED patient found to be hypoxic necessitating O2 via NC.  She was also found to be in A-fib with slow ventricular response.  She was transferred out of ICU on . Early morning  upgraded to ICU again due to severe hypoxia and hypercapnia requiring intubation. Now s/p trach on . Leadless pacemaker . PEG . Improved lung white-out s/p bronchoscopy     CNS / MSK:    Continue Venlafaxine and Bupropion  Pt would most benefit from LTAC to best address her deconditioning and long-term ventilator weaning. PT able to get pt to chair position.    PULMONOLOGY:   Extubated on , reintubated on   ENT placed trach on   Recurrent mucus plugging, s/p multiple intermittent mucolytic regimens   Continue nebs, budesonide q12h, duonebs q6h  Lung aeration improved s/p bronchoscopy  and   Continue pressure support and/or trach collar trials daily as tolerated  Pt would most benefit from LTAC to best address her deconditioning and long-term ventilator weaning    CARDIOVASCULAR:   S/p AUGUSTIN/DCCV on   LVEF 20-30%, right ventricle overload, moderate TR   Diuresis as needed  LHC (2/3/25): nonobstructive CAD; luminal RCA, Lcx; 40% mLAD   Continue ASA and Statin; GDMT for  chronic HFrEF as tolerated  S/p Leadless pacemaker 2/12  Midodrine on hold    GASTROINTESTINAL:   Dark stools on 1/22 w/ FOBT positive. However CTA abdomen/pelvis without evidence of bleeding. Prior anemia responded to PRBCs. No recurrence of dark stools since restarting eliquis.   Bowel regimen  Continue tube feeds as tolerated via PEG; continue FWF  Fiber added to help bulk stools with patient complaint of diarrhea    RENAL/ELECTROLYTE:   KIZZY d/t ATN from shock that has since improved.  Diuresis as needed  Of note, multiple fallopian/uterine cysts making bladder scan difficult for interpretation  Nephrology following    ENDOCRINE:   Insulin sliding scale  Keep blood glucose 140-180    HEMATOLOGY/ONCOLOGY:   Eliquis for A-fib   Monitor for dark stools     ID/MICRO:   Initially completed a course of Vanco and Zosyn for CAP  Completed 7 day course of Cefepime on 1/27 for possible HAP  MRSA PCR negative on 1/28    ICU DAILY CHECKLIST     Code Status: DNR  DVT Prophylaxis: eliquis  SUP: Pepcid  T/L/D: PIV  Diet: Tube feeds  Activity Level: Bedrest  ABCDEF Bundle/Checklist Completed: Yes  Disposition: Stay in ICU  Multidisciplinary Rounds Completed:  Yes    OBJECTIVE:     Blood pressure (!) 88/62, pulse 99, temperature 98.3 °F (36.8 °C), temperature source Oral, resp. rate 29, height 1.702 m (5' 7.01\"), weight 106.6 kg (235 lb 0.2 oz), SpO2 97%.  Physical Exam  Gen: On MV via trach, NAD, A&Ox3  HEENT: NC/AT, supple  Cardiac: Paced at 60bpm  Pulm: minimal rhonchi  ABD: Soft, mildly distended, non tender, PEG tube in place  Extremities: Mild LE edema noted  Neuro: A&Ox3. Following commands    Labs and Data: Reviewed 03/10/25  Medications: Reviewed 03/10/25  Imaging: Reviewed 03/10/25    Intake/Output:     Intake/Output Summary (Last 24 hours) at 3/10/2025 0926  Last data filed at 3/10/2025 0600  Gross per 24 hour   Intake 1780 ml   Output 1375 ml   Net 405 ml       CRITICAL CARE DOCUMENTATION  I had a face to face

## 2025-03-10 NOTE — CARE COORDINATION
CM reviewed Pt medicals, sent updated medicals to [facility]. Requested a call from their liaison.    CM reached out to [facility] to inquire if Pt appears to be medically stable where she can go to [facility].       10:00  Per [facility] they will send medicals to the doctor to have him look at Pt to see if she is medically stable for [facility].     Per IDR    Going to try for constant pressure support, no vent. :)

## 2025-03-11 LAB
GLUCOSE BLD STRIP.AUTO-MCNC: 115 MG/DL (ref 65–100)
GLUCOSE BLD STRIP.AUTO-MCNC: 128 MG/DL (ref 65–100)
PERFORMED BY:: ABNORMAL
PERFORMED BY:: ABNORMAL

## 2025-03-11 PROCEDURE — 6370000000 HC RX 637 (ALT 250 FOR IP): Performed by: STUDENT IN AN ORGANIZED HEALTH CARE EDUCATION/TRAINING PROGRAM

## 2025-03-11 PROCEDURE — 2000000000 HC ICU R&B

## 2025-03-11 PROCEDURE — 94761 N-INVAS EAR/PLS OXIMETRY MLT: CPT

## 2025-03-11 PROCEDURE — 97530 THERAPEUTIC ACTIVITIES: CPT

## 2025-03-11 PROCEDURE — 2500000003 HC RX 250 WO HCPCS: Performed by: STUDENT IN AN ORGANIZED HEALTH CARE EDUCATION/TRAINING PROGRAM

## 2025-03-11 PROCEDURE — 94640 AIRWAY INHALATION TREATMENT: CPT

## 2025-03-11 PROCEDURE — 6370000000 HC RX 637 (ALT 250 FOR IP): Performed by: NURSE PRACTITIONER

## 2025-03-11 PROCEDURE — 6360000002 HC RX W HCPCS: Performed by: STUDENT IN AN ORGANIZED HEALTH CARE EDUCATION/TRAINING PROGRAM

## 2025-03-11 PROCEDURE — 94003 VENT MGMT INPAT SUBQ DAY: CPT

## 2025-03-11 PROCEDURE — 6370000000 HC RX 637 (ALT 250 FOR IP): Performed by: INTERNAL MEDICINE

## 2025-03-11 PROCEDURE — 99232 SBSQ HOSP IP/OBS MODERATE 35: CPT

## 2025-03-11 PROCEDURE — 2700000000 HC OXYGEN THERAPY PER DAY

## 2025-03-11 PROCEDURE — 94668 MNPJ CHEST WALL SBSQ: CPT

## 2025-03-11 PROCEDURE — 82962 GLUCOSE BLOOD TEST: CPT

## 2025-03-11 RX ADMIN — CINACALCET HYDROCHLORIDE 30 MG: 30 TABLET, FILM COATED ORAL at 08:20

## 2025-03-11 RX ADMIN — IPRATROPIUM BROMIDE AND ALBUTEROL SULFATE 1 DOSE: 2.5; .5 SOLUTION RESPIRATORY (INHALATION) at 07:27

## 2025-03-11 RX ADMIN — LANSOPRAZOLE 30 MG: 30 TABLET, ORALLY DISINTEGRATING ORAL at 06:24

## 2025-03-11 RX ADMIN — BUDESONIDE 500 MCG: 0.5 INHALANT ORAL at 20:16

## 2025-03-11 RX ADMIN — ACETYLCYSTEINE 600 MG: 200 SOLUTION ORAL; RESPIRATORY (INHALATION) at 20:16

## 2025-03-11 RX ADMIN — GUAIFENESIN 400 MG: 200 SOLUTION ORAL at 08:14

## 2025-03-11 RX ADMIN — WHITE PETROLATUM,ZINC OXIDE: 57; 17 PASTE TOPICAL at 20:17

## 2025-03-11 RX ADMIN — APIXABAN 2.5 MG: 2.5 TABLET, FILM COATED ORAL at 20:17

## 2025-03-11 RX ADMIN — Medication 1 PACKET: at 08:14

## 2025-03-11 RX ADMIN — FOLIC ACID 1 MG: 1 TABLET ORAL at 08:14

## 2025-03-11 RX ADMIN — BUPROPION HYDROCHLORIDE 100 MG: 100 TABLET, FILM COATED ORAL at 08:14

## 2025-03-11 RX ADMIN — ACETYLCYSTEINE 600 MG: 200 SOLUTION ORAL; RESPIRATORY (INHALATION) at 07:27

## 2025-03-11 RX ADMIN — VENLAFAXINE 75 MG: 25 TABLET ORAL at 08:14

## 2025-03-11 RX ADMIN — GUAIFENESIN 400 MG: 200 SOLUTION ORAL at 18:10

## 2025-03-11 RX ADMIN — ASPIRIN 81 MG 81 MG: 81 TABLET ORAL at 08:14

## 2025-03-11 RX ADMIN — BUPROPION HYDROCHLORIDE 100 MG: 100 TABLET, FILM COATED ORAL at 13:09

## 2025-03-11 RX ADMIN — BUDESONIDE 500 MCG: 0.5 INHALANT ORAL at 07:27

## 2025-03-11 RX ADMIN — GUAIFENESIN 400 MG: 200 SOLUTION ORAL at 13:10

## 2025-03-11 RX ADMIN — SODIUM CHLORIDE, PRESERVATIVE FREE 10 ML: 5 INJECTION INTRAVENOUS at 08:16

## 2025-03-11 RX ADMIN — APIXABAN 2.5 MG: 2.5 TABLET, FILM COATED ORAL at 08:15

## 2025-03-11 RX ADMIN — ACETYLCYSTEINE 600 MG: 200 SOLUTION ORAL; RESPIRATORY (INHALATION) at 13:36

## 2025-03-11 RX ADMIN — BUPROPION HYDROCHLORIDE 100 MG: 100 TABLET, FILM COATED ORAL at 20:17

## 2025-03-11 RX ADMIN — IPRATROPIUM BROMIDE AND ALBUTEROL SULFATE 1 DOSE: 2.5; .5 SOLUTION RESPIRATORY (INHALATION) at 20:16

## 2025-03-11 RX ADMIN — IPRATROPIUM BROMIDE AND ALBUTEROL SULFATE 1 DOSE: 2.5; .5 SOLUTION RESPIRATORY (INHALATION) at 02:09

## 2025-03-11 RX ADMIN — SODIUM CHLORIDE, PRESERVATIVE FREE 10 ML: 5 INJECTION INTRAVENOUS at 20:17

## 2025-03-11 RX ADMIN — VENLAFAXINE 75 MG: 25 TABLET ORAL at 18:10

## 2025-03-11 RX ADMIN — WHITE PETROLATUM,ZINC OXIDE: 57; 17 PASTE TOPICAL at 08:19

## 2025-03-11 RX ADMIN — ATORVASTATIN CALCIUM 20 MG: 20 TABLET, FILM COATED ORAL at 20:17

## 2025-03-11 RX ADMIN — IPRATROPIUM BROMIDE AND ALBUTEROL SULFATE 1 DOSE: 2.5; .5 SOLUTION RESPIRATORY (INHALATION) at 13:35

## 2025-03-11 ASSESSMENT — PULMONARY FUNCTION TESTS
PIF_VALUE: 27
PIF_VALUE: 20
PIF_VALUE: 22
PIF_VALUE: 27
PIF_VALUE: 20
PIF_VALUE: 20
PIF_VALUE: 27
PIF_VALUE: 18
PIF_VALUE: 20
PIF_VALUE: 27
PIF_VALUE: 27
PIF_VALUE: 20
PIF_VALUE: 20
PIF_VALUE: 18
PIF_VALUE: 18
PIF_VALUE: 27
PIF_VALUE: 18
PIF_VALUE: 20
PIF_VALUE: 27
PIF_VALUE: 19
PIF_VALUE: 20
PIF_VALUE: 19

## 2025-03-11 ASSESSMENT — PAIN SCALES - GENERAL
PAINLEVEL_OUTOF10: 0

## 2025-03-11 NOTE — PROGRESS NOTES
Pt placed on trach collar 30L 30%. Patients reports no discomfort and is tolerating trach collar well. Vitals are stable at this time.   SpO2: 97%

## 2025-03-11 NOTE — PLAN OF CARE
Problem: ABCDS Injury Assessment  Goal: Absence of physical injury  Outcome: Progressing     Problem: Skin/Tissue Integrity  Goal: Absence of new skin breakdown  Description: 1.  Monitor for areas of redness and/or skin breakdown  2.  Assess vascular access sites hourly  3.  Every 4-6 hours minimum:  Change oxygen saturation probe site  4.  Every 4-6 hours:  If on nasal continuous positive airway pressure, respiratory therapy assess nares and determine need for appliance change or resting period.  Outcome: Progressing     Problem: Chronic Conditions and Co-morbidities  Goal: Patient's chronic conditions and co-morbidity symptoms are monitored and maintained or improved  Outcome: Progressing  Flowsheets (Taken 3/11/2025 0730)  Care Plan - Patient's Chronic Conditions and Co-Morbidity Symptoms are Monitored and Maintained or Improved:   Monitor and assess patient's chronic conditions and comorbid symptoms for stability, deterioration, or improvement   Collaborate with multidisciplinary team to address chronic and comorbid conditions and prevent exacerbation or deterioration   Update acute care plan with appropriate goals if chronic or comorbid symptoms are exacerbated and prevent overall improvement and discharge     Problem: Discharge Planning  Goal: Discharge to home or other facility with appropriate resources  Outcome: Progressing  Flowsheets (Taken 3/11/2025 0730)  Discharge to home or other facility with appropriate resources: Identify barriers to discharge with patient and caregiver     Problem: Safety - Adult  Goal: Free from fall injury  Outcome: Progressing     Problem: Neurosensory - Adult  Goal: Achieves stable or improved neurological status  Outcome: Progressing  Flowsheets (Taken 3/11/2025 0730)  Achieves stable or improved neurological status:   Assess for and report changes in neurological status   Maintain blood pressure and fluid volume within ordered parameters to optimize cerebral perfusion and  minimize risk of hemorrhage   Monitor temperature, glucose, and sodium. Initiate appropriate interventions as ordered  Goal: Achieves maximal functionality and self care  Outcome: Progressing  Flowsheets (Taken 3/11/2025 0730)  Achieves maximal functionality and self care: Encourage and assist patient to increase activity and self care with guidance from physical therapy/occupational therapy     Problem: Respiratory - Adult  Goal: Achieves optimal ventilation and oxygenation  Outcome: Progressing  Flowsheets (Taken 3/11/2025 0730)  Achieves optimal ventilation and oxygenation:   Assess for changes in respiratory status   Assess for changes in mentation and behavior   Position to facilitate oxygenation and minimize respiratory effort   Oxygen supplementation based on oxygen saturation or arterial blood gases   Encourage broncho-pulmonary hygiene including cough, deep breathe, incentive spirometry   Assess the need for suctioning and aspirate as needed   Assess and instruct to report shortness of breath or any respiratory difficulty   Respiratory therapy support as indicated     Problem: Cardiovascular - Adult  Goal: Maintains optimal cardiac output and hemodynamic stability  Outcome: Progressing  Flowsheets (Taken 3/11/2025 0730)  Maintains optimal cardiac output and hemodynamic stability:   Monitor blood pressure and heart rate   Monitor urine output and notify Licensed Independent Practitioner for values outside of normal range   Assess for signs of decreased cardiac output  Goal: Absence of cardiac dysrhythmias or at baseline  Outcome: Progressing  Flowsheets (Taken 3/11/2025 0730)  Absence of cardiac dysrhythmias or at baseline:   Monitor cardiac rate and rhythm   Assess for signs of decreased cardiac output   Administer antiarrhythmia medication and electrolyte replacement as ordered     Problem: Gastrointestinal - Adult  Goal: Minimal or absence of nausea and vomiting  Outcome: Progressing  Flowsheets (Taken

## 2025-03-11 NOTE — CONSULTS
Palliative Medicine  Patient Name: Nida Graves  YOB: 1975  MRN: [id number]  Age: 49 y.o.  Gender: female    Date of Initial Consult: 2025  Date of Service: 3/11/2025  Time: 1:49 PM  Provider: GINNY Chaparro CNP  Hospital Day: 59  Admit Date: 2025  Referring Provider: Dr. Shahid       Reasons for Consultation:  Goals of Care    HISTORY OF PRESENT ILLNESS (HPI):   Nida Graves is a 49 y.o. female with a past medical history of obesity was 270# now down to 240#/ BMI 37),DM with peripheral neuropathy, asthma (previously followed by Dr. [facility] with [facility]), chronic back pain, debility, who was admitted on 2025 from home  with a diagnosis of Shortness of breath, hypoxia.   CXR: LLL airspace disease, interstitial edema  ECHO : EF 25-30%, decrease RV function, mod TR, LA dilation.       Psychosocial: patient is  to spouse Nannette Vlale [phone]  She has a dtr age 21 who is graduating from [employer] this Spring.  Her nephew lives with her (autism, age 27)  Patient's spouse does all the cooking, cleaning, household chores and works full time.   Patient has chronic debility, ambulates with walker for past 3 years, able to get around house, watches TV., independent with dressing/ bathing but fall risk., (spouse just purchased walk in tub)   Patient's mother and brother both  in .   No AMD documents: patient would trust her spouse to make medical decisions if unable.     PALLIATIVE DIAGNOSES:    Acute on chronic respiratory failure, multifactorial  Aspiration, asthma, pulmonary edema, weak muscles/ deconditioned  Suspected PE, on tx (unable to lie flat for CTA)   HFrEF (EF 25-30%)   Aflutter with slow ventricular response  Intermittent bradycardia   Possible NSTEMI  Obesity, diabetes, peripheral neuropathy, chronic Bilateral LE below knee leg pain  Chronic back pain  Mood disorder, on medication  Physical debility  Hypoxia   Palliative medicine encounter  grief assessment:   [x] Normal  / [] Maladaptive     If \"Maladaptive\" to discuss with social work during IDT    ESAS Anxiety: Anxiety Score: 3    ESAS Depression: Depression Score: Not depressed        LAB AND IMAGING FINDINGS:   Objective data reviewed:  labs, images, records, medication use, vitals, and chart     FINAL COMMENTS   Thank you for allowing Palliative Medicine to participate in the care of Nida Graves.    Only check if applicable and billing time based rather than MDM  [x] The total encounter time on this service date was __35__ minutes which was spent performing a face-to-face encounter and personally completing the provider-level activities documented in the note. This includes time spent prior to the visit and after the visit in direct care of the patient. This time does not include time spent in any separately reportable services.    Electronically signed by   GINNY Chaparro - CNP  Palliative Care Team  on 3/11/2025 at 1:49 PM

## 2025-03-11 NOTE — PROGRESS NOTES
CRITICAL CARE PROGRESS NOTE    Name: Nida Graves   : 1975   MRN: [id number]   Date: 3/11/2025      Diagnoses/problem list:   Chronic hypoxic and hypercapnic respiratory failure  Atrial flutter with slow ventricular response  Complete heart block s/p pacemaker  GI bleed  Biventricular failure  Acute HFrEF, 25 to 30%  Diabetes  Morbid obesity  Deconditioning    24-hour events:   PS today. Reports willingness to work with PT/OT today. Concerned about diarrhea and added fiber to Tfs  3/11: hoping to work with PT/OT today and work towards  trach collar    Assessment and plan:   Ms. Graves is a 49-year-old female with PMH chronic debility and bedbound status, diabetes, asthma, obesity, who presented for shortness of breath.  Shortness of breath worsening over the past few days.  Upon presentation ED patient found to be hypoxic necessitating O2 via NC.  She was also found to be in A-fib with slow ventricular response.  She was transferred out of ICU on . Early morning  upgraded to ICU again due to severe hypoxia and hypercapnia requiring intubation. Now s/p trach on . Leadless pacemaker . PEG . Improved lung white-out s/p bronchoscopy     CNS / MSK:    Continue Venlafaxine and Bupropion  Pt would most benefit from LTAC to best address her deconditioning and long-term ventilator weaning. PT able to get pt to chair position.    PULMONOLOGY:   Extubated on , reintubated on   ENT placed trach on   Recurrent mucus plugging, s/p multiple intermittent mucolytic regimens   Continue nebs, budesonide q12h, duonebs q6h  Lung aeration improved s/p bronchoscopy  and   Continue pressure support and/or trach collar trials daily as tolerated  Pt would most benefit from LTAC to best address her deconditioning and long-term ventilator weaning    CARDIOVASCULAR:   S/p AUGUSTIN/DCCV on   LVEF 20-30%, right ventricle overload, moderate TR   Diuresis as needed  LHC (2/3/25):     Net 2610 ml       CRITICAL CARE DOCUMENTATION  I had a face to face encounter with the patient, reviewed and interpreted patient data including clinical events, labs, images, vital signs, I/O's, and examined patient.  I have discussed the case and the plan and management of the patient's care with the consulting services, the bedside nurses and the respiratory therapist.      NOTE OF PERSONAL INVOLVEMENT IN CARE   This patient has a high probability of imminent, clinically significant deterioration, which requires the highest level of preparedness to intervene urgently. I participated in the decision-making and personally managed or directed the management of the following life and organ supporting interventions that required my frequent assessment to treat or prevent imminent deterioration.    I personally spent 31 minutes of critical care time.  This is time spent at this critically ill patient's bedside actively involved in patient care as well as the coordination of care.  This does not include any procedural time which has been billed separately.      Shilpa Estevez MD  [facility] Critical Care

## 2025-03-11 NOTE — CARE COORDINATION
KHAI reviewed Pt medicals, per [facility]: \"Per our MD--pt must be COMPLETELY off vent; no nighttime or supplemental use in order to come to us. We will continue to follow for pt progress.\"    Khai talked with [facility], Maricruz stated that she will have Haroldo come over today to see Pt.      KHAI called Alison Zarco for [facility], mail box is full and cannot accept messages.     11:50  Alison Zarco for [facility] is here visiting Pt.

## 2025-03-11 NOTE — PLAN OF CARE
therex    Frequency/Duration: Patient will be followed by physical therapy: 3-5x/week to address goals.    Recommendation for discharge: (in order for the patient to meet his/her long term goals)  High intensity and comprehensive skilled physical therapy in a multidisciplinary setting as patient is working towards tolerating up to 3 hours of therapy/day x 5-7 days/week    Potential barriers for safe discharge: pts current support system is unable to meet their requirements for physical assistance, pt is a high fall risk, pt is not safe to be alone, and concern for pt safely navigating or managing the home environment.    IF patient discharges home will need the following DME: none     SUBJECTIVE:   Patient stated “I'll try.”    OBJECTIVE DATA SUMMARY:     Past Medical History:   Diagnosis Date    Allergy     Asthma     Diabetes (HCC)      Past Surgical History:   Procedure Laterality Date    CARDIAC PROCEDURE N/A 2025    Left heart cath / coronary angiography performed by Manuel Coburn MD at [facility] CARDIAC CATH LAB     SECTION      X 2    EP DEVICE PROCEDURE N/A 2025    Insert or replace transcath PPM leadless performed by Marito Luna MD at [facility] ELECTROPHYSIOLOGY    GYN      novasure, csection scar revision    INVASIVE VASCULAR N/A 2025    Ultrasound guided vascular access performed by Marito Luna MD at [facility] ELECTROPHYSIOLOGY    TRACHEOSTOMY N/A 2025    TRACHEOSTOMY PERCUTANEOUS performed by Jack Badillo MD at [facility] MAIN OR    UPPER GASTROINTESTINAL ENDOSCOPY N/A 2025    ESOPHAGOGASTRODUODENOSCOPY PERCUTANEOUS ENDOSCOPIC GASTROSTOMY TUBE PLACEMENT performed by Rocky Cristobal MD at [facility] ENDOSCOPY       Critical Behavior:  Orientation  Overall Orientation Status: Within Functional Limits  Orientation Level: Oriented X4  Cognition  Overall Cognitive Status: Exceptions  Arousal/Alertness: Appropriate responses to stimuli  Following Commands: Appears intact  Attention Span:  Appears intact  Safety Judgement: Decreased awareness of need for assistance;Decreased awareness of need for safety  Insights: Decreased awareness of deficits  Initiation: Requires cues for some  Sequencing: Requires cues for some         Functional Mobility:  Bed Mobility:     Bed Mobility Training  Bed Mobility Training: Yes  Interventions: Verbal cues;Manual cues;Safety awareness training;Tactile cues;Visual cues  Rolling: Minimal assistance;Partial/Moderate assistance  Supine to Sit: Partial/Moderate assistance;Substantial/Maximal assistance;2 Person assistance  Sit to Supine: Substantial/Maximal assistance;2 Person assistance  Scooting: Substantial/Maximal assistance;2 Person assistance  Transfers:     Transfer Training  Transfer Training: Yes  Interventions: Verbal cues;Tactile cues;Manual cues;Safety awareness training  Sit to Stand: Substantial/Maximal assistance;2 Person assistance  Stand to Sit: Substantial/Maximal assistance;2 Person assistance  Balance:               Balance  Sitting: Impaired  Sitting - Static: Fair (occasional) (holding onto Dianne Plus)  Sitting - Dynamic: Poor (constant support)  Standing: Impaired  Standing - Static: Poor;Constant support  Wheelchair Mobility:        Ambulation/Gait Training:                                                      Therapeutic Intervention provided:   bed mobility , EOB transfers, OOB transfers, and seated static balance       Functional Measure:  [facility]™ “6 Clicks”         Basic Mobility Inpatient Short Form  How much difficulty does the patient currently have... Unable A Lot A Little None   1.  Turning over in bed (including adjusting bedclothes, sheets and blankets)?   [] 1   [x] 2   [] 3   [] 4   2.  Sitting down on and standing up from a chair with arms ( e.g., wheelchair, bedside commode, etc.)   [] 1   [x] 2   [] 3   [] 4   3.  Moving from lying on back to sitting on the side of the bed?   [] 1   [x] 2   [] 3   [] 4          How

## 2025-03-11 NOTE — PLAN OF CARE
Problem: Safety - Adult  Goal: Free from fall injury  Outcome: Progressing     Problem: Neurosensory - Adult  Goal: Achieves stable or improved neurological status  3/10/2025 2019 by Denise Alcala RN  Outcome: Progressing  3/10/2025 0921 by Ct Lamb RN  Outcome: Progressing  Goal: Achieves maximal functionality and self care  Outcome: Progressing     Problem: Respiratory - Adult  Goal: Achieves optimal ventilation and oxygenation  3/10/2025 2019 by Denise Alcala RN  Outcome: Progressing  3/10/2025 0921 by Ct Lamb RN  Outcome: Progressing  3/10/2025 0921 by Ct Lamb RN  Outcome: Progressing  Flowsheets (Taken 3/10/2025 0758)  Achieves optimal ventilation and oxygenation:   Assess for changes in respiratory status   Assess for changes in mentation and behavior   Position to facilitate oxygenation and minimize respiratory effort   Oxygen supplementation based on oxygen saturation or arterial blood gases   Encourage broncho-pulmonary hygiene including cough, deep breathe, incentive spirometry   Assess the need for suctioning and aspirate as needed   Assess and instruct to report shortness of breath or any respiratory difficulty   Respiratory therapy support as indicated     Problem: Metabolic/Fluid and Electrolytes - Adult  Goal: Electrolytes maintained within normal limits  Outcome: Progressing  Goal: Hemodynamic stability and optimal renal function maintained  Outcome: Progressing     Problem: Gastrointestinal - Adult  Goal: Minimal or absence of nausea and vomiting  Outcome: Progressing  Goal: Maintains or returns to baseline bowel function  Outcome: Progressing  Goal: Maintains adequate nutritional intake  Outcome: Progressing     Problem: Genitourinary - Adult  Goal: Absence of urinary retention  Outcome: Progressing

## 2025-03-11 NOTE — PROGRESS NOTES
Spiritual Health History and Assessment/Progress Note  [facility]    Spiritual/Emotional Needs, Follow-up,  ,  ,      Name: Nida Graves MRN: [id number]    Age: 49 y.o.     Sex: female   Language: English   Christian: Christian   Acute on chronic hypoxic respiratory failure (HCC)     Date: 3/11/2025            Total Time Calculated: 17 min              Spiritual Assessment began in [facility]        Referral/Consult From: Nurse   Encounter Overview/Reason: Spiritual/Emotional Needs, Follow-up  Service Provided For: Patient    Nikki, Belief, Meaning:   Patient unable to assess at this time  Family/Friends No family/friends present      Importance and Influence:  Patient unable to assess at this time  Family/Friends No family/friends present    Community:  Patient feels well-supported. Support system includes: Spouse/Partner and Children  Family/Friends No family/friends present    Assessment and Plan of Care:     Patient Interventions include: Facilitated expression of thoughts and feelings, Affirmed coping skills/support systems, Facilitated life review and/ or legacy, and Other: active listening, ministry of presence  Family/Friends Interventions include: No family/friends present    Patient Plan of Care: Spiritual Care available upon further referral  Family/Friends Plan of Care: Spiritual Care available upon further referral and No family/friends present    I met with the patient in response to spiritual services consult request. Today she was able to communicate more easily than the last time I saw her, so I was happy to learn from her about her family, her fiance, and her situation. She loves to read, and shared that mysteries are probably her favorite genre. I offered active listening and a calm, supportive presence, and assured her that spiritual health will continue to keep tabs as long as she's here. Further  support available upon request.    Electronically signed by  Rylan Hamilton  MD [facility]  Chaplain Resident   on 3/11/2025 at 11:33 AM

## 2025-03-11 NOTE — PLAN OF CARE
OCCUPATIONAL THERAPY TREATMENT  Patient: Nida Graves (49 y.o. female)  Date: 3/11/2025  Primary Diagnosis: Atrial fibrillation, unspecified type (HCC) [I48.91]  Atrial flutter, unspecified type (HCC) [I48.92]  Congestive heart failure, unspecified HF chronicity, unspecified heart failure type (HCC) [I50.9]  Acute hypoxic respiratory failure (HCC) [J96.01]  Procedure(s) (LRB):  ESOPHAGOGASTRODUODENOSCOPY PERCUTANEOUS ENDOSCOPIC GASTROSTOMY TUBE PLACEMENT (N/A) 26 Days Post-Op   Precautions: Fall Risk, General Precautions                Recommendations for nursing mobility: Encourage HEP in prep for ADLs/mobility; see handout for details, Frequent repositioning to prevent skin breakdown, Maxi Move for bed to chair transfers, and Assist x2    In place during session: Tracheostomy 30L 30%, External Catheter, EKG/telemetry , and Pulse ox  Chart, occupational therapy assessment, plan of care, and goals were reviewed.  ASSESSMENT  Patient continues with skilled OT services and is progressing towards goals. Pt presented semi supine upon HENDERSON arrival, agreeable to session. Pt A&O x 4. Pt incontinent of bowel and was dependent for anterior and posterior hygiene.  Pt has improved with rolling R<>L requiring min-mod Ax1.  Pt required encouragement to sit EOB.  With HOB in high fowlers, the pt was able to pull herself into sitting.  Pt required A to get hips and B LE to EOB.  Pt with fair sitting balance requiring CGA to maintain balance as well as holding onto the bed rail and keiry plus for support.  Activity completed in prep for grooming and dressing EOB.  Pt resistant to using lifts to stand and transfer to chair.  Pt wants to do herself, not realizing she can't. Education provided to pt that this is the first steps, to get weight bearing through B LE.   Pt stood in the keiry plus for ~1 minutes.  After a rest break, pt asking to stand on her own. Pt attempted to stand with Ax3 but was unable to clear bottom from bed.

## 2025-03-12 ENCOUNTER — APPOINTMENT (OUTPATIENT)
Facility: HOSPITAL | Age: 50
DRG: 004 | End: 2025-03-12
Payer: COMMERCIAL

## 2025-03-12 LAB
ANION GAP SERPL CALC-SCNC: 4 MMOL/L (ref 2–12)
BUN SERPL-MCNC: 38 MG/DL (ref 6–20)
BUN/CREAT SERPL: 63 (ref 12–20)
CA-I BLD-MCNC: 9.2 MG/DL (ref 8.5–10.1)
CHLORIDE SERPL-SCNC: 105 MMOL/L (ref 97–108)
CO2 SERPL-SCNC: 30 MMOL/L (ref 21–32)
CREAT SERPL-MCNC: 0.6 MG/DL (ref 0.55–1.02)
EKG ATRIAL RATE: 39 BPM
EKG DIAGNOSIS: NORMAL
EKG Q-T INTERVAL: 562 MS
EKG QRS DURATION: 210 MS
EKG QTC CALCULATION (BAZETT): 584 MS
EKG R AXIS: 270 DEGREES
EKG T AXIS: -7 DEGREES
EKG VENTRICULAR RATE: 65 BPM
ERYTHROCYTE [DISTWIDTH] IN BLOOD BY AUTOMATED COUNT: 17.7 % (ref 11.5–14.5)
GLUCOSE BLD STRIP.AUTO-MCNC: 127 MG/DL (ref 65–100)
GLUCOSE BLD STRIP.AUTO-MCNC: 96 MG/DL (ref 65–100)
GLUCOSE SERPL-MCNC: 105 MG/DL (ref 65–100)
HCT VFR BLD AUTO: 30.7 % (ref 35–47)
HGB BLD-MCNC: 9.3 G/DL (ref 11.5–16)
MCH RBC QN AUTO: 29.6 PG (ref 26–34)
MCHC RBC AUTO-ENTMCNC: 30.3 G/DL (ref 30–36.5)
MCV RBC AUTO: 97.8 FL (ref 80–99)
NRBC # BLD: 0 K/UL (ref 0–0.01)
NRBC BLD-RTO: 0 PER 100 WBC
PERFORMED BY:: ABNORMAL
PERFORMED BY:: NORMAL
PLATELET # BLD AUTO: 217 K/UL (ref 150–400)
PMV BLD AUTO: 10.7 FL (ref 8.9–12.9)
POTASSIUM SERPL-SCNC: 3.9 MMOL/L (ref 3.5–5.1)
RBC # BLD AUTO: 3.14 M/UL (ref 3.8–5.2)
SODIUM SERPL-SCNC: 139 MMOL/L (ref 136–145)
WBC # BLD AUTO: 8.9 K/UL (ref 3.6–11)

## 2025-03-12 PROCEDURE — 85027 COMPLETE CBC AUTOMATED: CPT

## 2025-03-12 PROCEDURE — 36415 COLL VENOUS BLD VENIPUNCTURE: CPT

## 2025-03-12 PROCEDURE — 6370000000 HC RX 637 (ALT 250 FOR IP): Performed by: STUDENT IN AN ORGANIZED HEALTH CARE EDUCATION/TRAINING PROGRAM

## 2025-03-12 PROCEDURE — 6360000002 HC RX W HCPCS: Performed by: STUDENT IN AN ORGANIZED HEALTH CARE EDUCATION/TRAINING PROGRAM

## 2025-03-12 PROCEDURE — 2500000003 HC RX 250 WO HCPCS: Performed by: NURSE PRACTITIONER

## 2025-03-12 PROCEDURE — 6370000000 HC RX 637 (ALT 250 FOR IP): Performed by: INTERNAL MEDICINE

## 2025-03-12 PROCEDURE — 80048 BASIC METABOLIC PNL TOTAL CA: CPT

## 2025-03-12 PROCEDURE — 2500000003 HC RX 250 WO HCPCS: Performed by: STUDENT IN AN ORGANIZED HEALTH CARE EDUCATION/TRAINING PROGRAM

## 2025-03-12 PROCEDURE — 2700000000 HC OXYGEN THERAPY PER DAY

## 2025-03-12 PROCEDURE — 94761 N-INVAS EAR/PLS OXIMETRY MLT: CPT

## 2025-03-12 PROCEDURE — 6370000000 HC RX 637 (ALT 250 FOR IP): Performed by: NURSE PRACTITIONER

## 2025-03-12 PROCEDURE — 2000000000 HC ICU R&B

## 2025-03-12 PROCEDURE — 94640 AIRWAY INHALATION TREATMENT: CPT

## 2025-03-12 PROCEDURE — 93005 ELECTROCARDIOGRAM TRACING: CPT | Performed by: STUDENT IN AN ORGANIZED HEALTH CARE EDUCATION/TRAINING PROGRAM

## 2025-03-12 PROCEDURE — 71045 X-RAY EXAM CHEST 1 VIEW: CPT

## 2025-03-12 PROCEDURE — 94668 MNPJ CHEST WALL SBSQ: CPT

## 2025-03-12 PROCEDURE — 82962 GLUCOSE BLOOD TEST: CPT

## 2025-03-12 RX ORDER — GUAIFENESIN 200 MG/10ML
400 LIQUID ORAL 3 TIMES DAILY
Status: DISCONTINUED | OUTPATIENT
Start: 2025-03-12 | End: 2025-03-14 | Stop reason: HOSPADM

## 2025-03-12 RX ADMIN — VENLAFAXINE 75 MG: 25 TABLET ORAL at 15:16

## 2025-03-12 RX ADMIN — GUAIFENESIN 400 MG: 200 SOLUTION ORAL at 15:16

## 2025-03-12 RX ADMIN — VENLAFAXINE 75 MG: 25 TABLET ORAL at 09:00

## 2025-03-12 RX ADMIN — BUPROPION HYDROCHLORIDE 100 MG: 100 TABLET, FILM COATED ORAL at 15:16

## 2025-03-12 RX ADMIN — ATORVASTATIN CALCIUM 20 MG: 20 TABLET, FILM COATED ORAL at 20:50

## 2025-03-12 RX ADMIN — GUAIFENESIN 400 MG: 200 SOLUTION ORAL at 04:05

## 2025-03-12 RX ADMIN — IPRATROPIUM BROMIDE AND ALBUTEROL SULFATE 1 DOSE: 2.5; .5 SOLUTION RESPIRATORY (INHALATION) at 07:20

## 2025-03-12 RX ADMIN — GUAIFENESIN 400 MG: 200 SOLUTION ORAL at 16:29

## 2025-03-12 RX ADMIN — APIXABAN 2.5 MG: 2.5 TABLET, FILM COATED ORAL at 20:50

## 2025-03-12 RX ADMIN — WHITE PETROLATUM,ZINC OXIDE: 57; 17 PASTE TOPICAL at 20:51

## 2025-03-12 RX ADMIN — WHITE PETROLATUM,ZINC OXIDE: 57; 17 PASTE TOPICAL at 09:01

## 2025-03-12 RX ADMIN — SODIUM CHLORIDE, PRESERVATIVE FREE 10 ML: 5 INJECTION INTRAVENOUS at 09:01

## 2025-03-12 RX ADMIN — ACETYLCYSTEINE 600 MG: 200 SOLUTION ORAL; RESPIRATORY (INHALATION) at 07:21

## 2025-03-12 RX ADMIN — ASPIRIN 81 MG 81 MG: 81 TABLET ORAL at 09:00

## 2025-03-12 RX ADMIN — BUPROPION HYDROCHLORIDE 100 MG: 100 TABLET, FILM COATED ORAL at 20:50

## 2025-03-12 RX ADMIN — IPRATROPIUM BROMIDE AND ALBUTEROL SULFATE 1 DOSE: 2.5; .5 SOLUTION RESPIRATORY (INHALATION) at 20:18

## 2025-03-12 RX ADMIN — BUPROPION HYDROCHLORIDE 100 MG: 100 TABLET, FILM COATED ORAL at 08:59

## 2025-03-12 RX ADMIN — LANSOPRAZOLE 30 MG: 30 TABLET, ORALLY DISINTEGRATING ORAL at 05:11

## 2025-03-12 RX ADMIN — IPRATROPIUM BROMIDE AND ALBUTEROL SULFATE 1 DOSE: 2.5; .5 SOLUTION RESPIRATORY (INHALATION) at 01:36

## 2025-03-12 RX ADMIN — ACETAMINOPHEN 650 MG: 650 SOLUTION ORAL at 17:53

## 2025-03-12 RX ADMIN — ACETYLCYSTEINE 600 MG: 200 SOLUTION ORAL; RESPIRATORY (INHALATION) at 20:18

## 2025-03-12 RX ADMIN — BUDESONIDE 500 MCG: 0.5 INHALANT ORAL at 07:20

## 2025-03-12 RX ADMIN — GUAIFENESIN 400 MG: 200 SOLUTION ORAL at 09:00

## 2025-03-12 RX ADMIN — SODIUM CHLORIDE, PRESERVATIVE FREE 10 ML: 5 INJECTION INTRAVENOUS at 20:51

## 2025-03-12 RX ADMIN — APIXABAN 2.5 MG: 2.5 TABLET, FILM COATED ORAL at 09:00

## 2025-03-12 RX ADMIN — IPRATROPIUM BROMIDE AND ALBUTEROL SULFATE 1 DOSE: 2.5; .5 SOLUTION RESPIRATORY (INHALATION) at 13:19

## 2025-03-12 RX ADMIN — ACETYLCYSTEINE 600 MG: 200 SOLUTION ORAL; RESPIRATORY (INHALATION) at 13:19

## 2025-03-12 RX ADMIN — Medication 1 PACKET: at 08:59

## 2025-03-12 RX ADMIN — BUDESONIDE 500 MCG: 0.5 INHALANT ORAL at 20:18

## 2025-03-12 RX ADMIN — CINACALCET HYDROCHLORIDE 30 MG: 30 TABLET, FILM COATED ORAL at 09:08

## 2025-03-12 RX ADMIN — FOLIC ACID 1 MG: 1 TABLET ORAL at 08:59

## 2025-03-12 RX ADMIN — EPOETIN ALFA-EPBX 2500 UNITS: 3000 INJECTION, SOLUTION INTRAVENOUS; SUBCUTANEOUS at 17:20

## 2025-03-12 ASSESSMENT — PAIN SCALES - GENERAL
PAINLEVEL_OUTOF10: 0
PAINLEVEL_OUTOF10: 5
PAINLEVEL_OUTOF10: 0

## 2025-03-12 ASSESSMENT — PULMONARY FUNCTION TESTS
PIF_VALUE: 27

## 2025-03-12 ASSESSMENT — PAIN - FUNCTIONAL ASSESSMENT: PAIN_FUNCTIONAL_ASSESSMENT: ACTIVITIES ARE NOT PREVENTED

## 2025-03-12 ASSESSMENT — PAIN DESCRIPTION - LOCATION: LOCATION: BACK

## 2025-03-12 ASSESSMENT — PAIN DESCRIPTION - ORIENTATION: ORIENTATION: MID

## 2025-03-12 ASSESSMENT — PAIN DESCRIPTION - DESCRIPTORS: DESCRIPTORS: DISCOMFORT;ACHING

## 2025-03-12 NOTE — CASE COMMUNICATION
KHAI reviewed Pt medicals, [facility] is following Pt, she needs to be off the vent before they can accept her.    [facility] and [facility] has accepted Pt.     [facility] stated that they will start auth.     Per IDR  Pt got into a chair.    Pt will have to sit in a chair for 2 hours today.    Pt felt better, able to cough up stuff.    11:00  Haroldo Liaison from [facility] called and asked KHAI if Pt has gotten her oxygen down to 15L    1:30  KHAI called Haroldo to let her know that Pt has been on 15L for a couple of hours. Haroldo stated to lets plan on taking the Pt tomorrow to see how she does on 15L over night.

## 2025-03-12 NOTE — PROGRESS NOTES
CRITICAL CARE PROGRESS NOTE    Name: Nida Graves   : 1975   MRN: [id number]   Date: 3/12/2025      Diagnoses/problem list:   Chronic hypoxic and hypercapnic respiratory failure  Atrial flutter with slow ventricular response  Complete heart block s/p pacemaker  GI bleed  Biventricular failure  Acute HFrEF, 25 to 30%  Diabetes  Morbid obesity  Deconditioning    24-hour events:   PS today. Reports willingness to work with PT/OT today. Concerned about diarrhea and added fiber to Tfs  3/11: hoping to work with PT/OT today and work towards  trach collar  3/12: tolerated trach collar overnight. Plan for possible discharge 3/13    Assessment and plan:   Ms. Graves is a 49-year-old female with PMH chronic debility and bedbound status, diabetes, asthma, obesity, who presented for shortness of breath.  Shortness of breath worsening over the past few days.  Upon presentation ED patient found to be hypoxic necessitating O2 via NC.  She was also found to be in A-fib with slow ventricular response.  She was transferred out of ICU on . Early morning  upgraded to ICU again due to severe hypoxia and hypercapnia requiring intubation. Now s/p trach on . Leadless pacemaker . PEG . Improved lung white-out s/p bronchoscopy     CNS / MSK:    Continue Venlafaxine and Bupropion  Pt would most benefit from rehab and intense PT/OT  Able to get to bedside chair 3/11 and will attempt again today.    PULMONOLOGY:   Extubated on , reintubated on   ENT placed trach on   Recurrent mucus plugging, s/p multiple intermittent mucolytic regimens   Continue nebs, budesonide q12h, duonebs q6h  Lung aeration improved s/p bronchoscopy  and   Continue trach collar trial for as long as possible  Pt would most benefit from acute rehab to best address her deconditioning and long-term ventilator weaning    CARDIOVASCULAR:   S/p AUGUSTIN/DCCV on   LVEF 20-30%, right ventricle overload, moderate TR   Diuresis  as needed  LHC (2/3/25): nonobstructive CAD; luminal RCA, Lcx; 40% mLAD   Continue ASA and Statin; GDMT for chronic HFrEF as tolerated  S/p Leadless pacemaker 2/12  Midodrine discontinued    GASTROINTESTINAL:   Dark stools on 1/22 w/ FOBT positive. However CTA abdomen/pelvis without evidence of bleeding. Prior anemia responded to PRBCs. No recurrence of dark stools since restarting eliquis.   Bowel regimen  Continue tube feeds as tolerated via PEG; continue FWF  Fiber added to help bulk stools with patient complaint of diarrhea    RENAL/ELECTROLYTE:   KIZZY d/t ATN from shock that has since improved.  Diuresis as needed  Of note, multiple fallopian/uterine cysts making bladder scan difficult for interpretation  Nephrology following    ENDOCRINE:   Insulin sliding scale  Keep blood glucose 140-180    HEMATOLOGY/ONCOLOGY:   Eliquis for A-fib   Monitor for dark stools     ID/MICRO:   Initially completed a course of Vanco and Zosyn for CAP  Completed 7 day course of Cefepime on 1/27 for possible HAP  MRSA PCR negative on 1/28    ICU DAILY CHECKLIST     Code Status: DNR  DVT Prophylaxis: eliquis  SUP: Pepcid  T/L/D: PIV  Diet: Tube feeds  Activity Level: Bedrest  ABCDEF Bundle/Checklist Completed: Yes  Disposition: Stay in ICU  Multidisciplinary Rounds Completed:  Yes    OBJECTIVE:     Blood pressure 123/70, pulse 79, temperature 98.5 °F (36.9 °C), temperature source Oral, resp. rate 18, height 1.702 m (5' 7.01\"), weight 103.6 kg (228 lb 6.3 oz), SpO2 93%.  Physical Exam  Gen: On trach collar, NAD, A&Ox3  HEENT: NC/AT, supple  Cardiac: Paced at 60bpm  Pulm: minimal rhonchi  ABD: Soft, mildly distended, non tender, PEG tube in place  Extremities: Mild LE edema noted  Neuro: A&Ox3. Following commands    Labs and Data: Reviewed 03/12/25  Medications: Reviewed 03/12/25  Imaging: Reviewed 03/12/25    Intake/Output:     Intake/Output Summary (Last 24 hours) at 3/12/2025 1351  Last data filed at 3/12/2025 1200  Gross per 24 hour

## 2025-03-12 NOTE — PLAN OF CARE
Problem: Skin/Tissue Integrity  Goal: Absence of new skin breakdown  Description: 1.  Monitor for areas of redness and/or skin breakdown  2.  Assess vascular access sites hourly  3.  Every 4-6 hours minimum:  Change oxygen saturation probe site  4.  Every 4-6 hours:  If on nasal continuous positive airway pressure, respiratory therapy assess nares and determine need for appliance change or resting period.  3/12/2025 0812 by Valentina Nelson, RN  Outcome: Progressing  3/11/2025 1934 by Gavi Shelton, RN  Outcome: Progressing     Problem: Safety - Adult  Goal: Free from fall injury  3/12/2025 0812 by Valentina Nelson, RN  Outcome: Progressing  3/11/2025 1934 by Gavi Shelton, RN  Outcome: Progressing     Problem: Discharge Planning  Goal: Discharge to home or other facility with appropriate resources  Recent Flowsheet Documentation  Taken 3/11/2025 2000 by Gavi Shelton, RN  Discharge to home or other facility with appropriate resources: Identify barriers to discharge with patient and caregiver  3/11/2025 1934 by Gavi Shelton, RN  Outcome: Progressing

## 2025-03-13 LAB
GLUCOSE BLD STRIP.AUTO-MCNC: 151 MG/DL (ref 65–100)
PERFORMED BY:: ABNORMAL

## 2025-03-13 PROCEDURE — 2500000003 HC RX 250 WO HCPCS: Performed by: STUDENT IN AN ORGANIZED HEALTH CARE EDUCATION/TRAINING PROGRAM

## 2025-03-13 PROCEDURE — 94668 MNPJ CHEST WALL SBSQ: CPT

## 2025-03-13 PROCEDURE — 92507 TX SP LANG VOICE COMM INDIV: CPT

## 2025-03-13 PROCEDURE — 2500000003 HC RX 250 WO HCPCS: Performed by: NURSE PRACTITIONER

## 2025-03-13 PROCEDURE — 6360000002 HC RX W HCPCS: Performed by: STUDENT IN AN ORGANIZED HEALTH CARE EDUCATION/TRAINING PROGRAM

## 2025-03-13 PROCEDURE — 94761 N-INVAS EAR/PLS OXIMETRY MLT: CPT

## 2025-03-13 PROCEDURE — 97530 THERAPEUTIC ACTIVITIES: CPT

## 2025-03-13 PROCEDURE — 6370000000 HC RX 637 (ALT 250 FOR IP): Performed by: STUDENT IN AN ORGANIZED HEALTH CARE EDUCATION/TRAINING PROGRAM

## 2025-03-13 PROCEDURE — 2000000000 HC ICU R&B

## 2025-03-13 PROCEDURE — 97535 SELF CARE MNGMENT TRAINING: CPT

## 2025-03-13 PROCEDURE — 82962 GLUCOSE BLOOD TEST: CPT

## 2025-03-13 PROCEDURE — 6370000000 HC RX 637 (ALT 250 FOR IP): Performed by: INTERNAL MEDICINE

## 2025-03-13 PROCEDURE — 6370000000 HC RX 637 (ALT 250 FOR IP): Performed by: NURSE PRACTITIONER

## 2025-03-13 PROCEDURE — 99232 SBSQ HOSP IP/OBS MODERATE 35: CPT

## 2025-03-13 PROCEDURE — 92597 ORAL SPEECH DEVICE EVAL: CPT

## 2025-03-13 PROCEDURE — 94640 AIRWAY INHALATION TREATMENT: CPT

## 2025-03-13 PROCEDURE — 2700000000 HC OXYGEN THERAPY PER DAY

## 2025-03-13 RX ADMIN — BUPROPION HYDROCHLORIDE 100 MG: 100 TABLET, FILM COATED ORAL at 10:15

## 2025-03-13 RX ADMIN — SODIUM CHLORIDE, PRESERVATIVE FREE 10 ML: 5 INJECTION INTRAVENOUS at 10:31

## 2025-03-13 RX ADMIN — ACETYLCYSTEINE 600 MG: 200 SOLUTION ORAL; RESPIRATORY (INHALATION) at 19:10

## 2025-03-13 RX ADMIN — BUPROPION HYDROCHLORIDE 100 MG: 100 TABLET, FILM COATED ORAL at 21:00

## 2025-03-13 RX ADMIN — ATORVASTATIN CALCIUM 20 MG: 20 TABLET, FILM COATED ORAL at 21:00

## 2025-03-13 RX ADMIN — BUDESONIDE 500 MCG: 0.5 INHALANT ORAL at 07:56

## 2025-03-13 RX ADMIN — ACETYLCYSTEINE 600 MG: 200 SOLUTION ORAL; RESPIRATORY (INHALATION) at 07:56

## 2025-03-13 RX ADMIN — IPRATROPIUM BROMIDE AND ALBUTEROL SULFATE 1 DOSE: 2.5; .5 SOLUTION RESPIRATORY (INHALATION) at 01:10

## 2025-03-13 RX ADMIN — CINACALCET HYDROCHLORIDE 30 MG: 30 TABLET, FILM COATED ORAL at 10:29

## 2025-03-13 RX ADMIN — GUAIFENESIN 400 MG: 200 SOLUTION ORAL at 14:54

## 2025-03-13 RX ADMIN — VENLAFAXINE 75 MG: 25 TABLET ORAL at 10:32

## 2025-03-13 RX ADMIN — ACETYLCYSTEINE 600 MG: 200 SOLUTION ORAL; RESPIRATORY (INHALATION) at 13:58

## 2025-03-13 RX ADMIN — BUPROPION HYDROCHLORIDE 100 MG: 100 TABLET, FILM COATED ORAL at 14:56

## 2025-03-13 RX ADMIN — VENLAFAXINE 75 MG: 25 TABLET ORAL at 19:17

## 2025-03-13 RX ADMIN — ASPIRIN 81 MG 81 MG: 81 TABLET ORAL at 10:14

## 2025-03-13 RX ADMIN — FOLIC ACID 1 MG: 1 TABLET ORAL at 10:29

## 2025-03-13 RX ADMIN — LANSOPRAZOLE 30 MG: 30 TABLET, ORALLY DISINTEGRATING ORAL at 06:36

## 2025-03-13 RX ADMIN — IPRATROPIUM BROMIDE AND ALBUTEROL SULFATE 1 DOSE: 2.5; .5 SOLUTION RESPIRATORY (INHALATION) at 07:56

## 2025-03-13 RX ADMIN — APIXABAN 5 MG: 5 TABLET, FILM COATED ORAL at 21:00

## 2025-03-13 RX ADMIN — IPRATROPIUM BROMIDE AND ALBUTEROL SULFATE 1 DOSE: 2.5; .5 SOLUTION RESPIRATORY (INHALATION) at 13:58

## 2025-03-13 RX ADMIN — APIXABAN 2.5 MG: 2.5 TABLET, FILM COATED ORAL at 10:13

## 2025-03-13 RX ADMIN — WHITE PETROLATUM,ZINC OXIDE: 57; 17 PASTE TOPICAL at 10:30

## 2025-03-13 RX ADMIN — GUAIFENESIN 400 MG: 200 SOLUTION ORAL at 10:29

## 2025-03-13 RX ADMIN — Medication 1 PACKET: at 16:59

## 2025-03-13 RX ADMIN — GUAIFENESIN 400 MG: 200 SOLUTION ORAL at 21:00

## 2025-03-13 RX ADMIN — SODIUM CHLORIDE, PRESERVATIVE FREE 10 ML: 5 INJECTION INTRAVENOUS at 21:00

## 2025-03-13 RX ADMIN — IPRATROPIUM BROMIDE AND ALBUTEROL SULFATE 1 DOSE: 2.5; .5 SOLUTION RESPIRATORY (INHALATION) at 19:10

## 2025-03-13 RX ADMIN — BUDESONIDE 500 MCG: 0.5 INHALANT ORAL at 19:10

## 2025-03-13 ASSESSMENT — PAIN SCALES - GENERAL
PAINLEVEL_OUTOF10: 0

## 2025-03-13 NOTE — PROGRESS NOTES
CRITICAL CARE PROGRESS NOTE    Name: Nida Graves   : 1975   MRN: [id number]   Date: 3/13/2025      Diagnoses/problem list:   Chronic hypoxic and hypercapnic respiratory failure  Atrial flutter with slow ventricular response  Complete heart block s/p pacemaker  GI bleed  Biventricular failure  Acute HFrEF, 25 to 30%  Diabetes  Morbid obesity  Deconditioning    24-hour events:   PS today. Reports willingness to work with PT/OT today. Concerned about diarrhea and added fiber to Tfs  3/11: hoping to work with PT/OT today and work towards  trach collar  3/12: tolerated trach collar overnight. Plan for possible discharge 3/13  3/13: awaiting transfer to SNF potentially today. Able to stay on trach collar >24 hours    Assessment and plan:   Ms. Graves is a 49-year-old female with PMH chronic debility and bedbound status, diabetes, asthma, obesity, who presented for shortness of breath.  Shortness of breath worsening over the past few days.  Upon presentation ED patient found to be hypoxic necessitating O2 via NC.  She was also found to be in A-fib with slow ventricular response.  She was transferred out of ICU on . Early morning  upgraded to ICU again due to severe hypoxia and hypercapnia requiring intubation. Now s/p trach on . Leadless pacemaker . PEG . Improved lung white-out s/p bronchoscopy     CNS / MSK:    Continue Venlafaxine and Bupropion  Pt would most benefit from rehab and intense PT/OT  Able to get to bedside chair 3/11 and will attempt again today.    PULMONOLOGY:   Extubated on , reintubated on   ENT placed trach on   Recurrent mucus plugging, s/p multiple intermittent mucolytic regimens   Continue nebs, budesonide q12h, duonebs q6h  Lung aeration improved s/p bronchoscopy  and   Continue trach collar trial for as long as possible  Pt would most benefit from acute rehab to best address her deconditioning and long-term ventilator  weaning    CARDIOVASCULAR:   S/p AUGUSTIN/DCCV on 2/6  LVEF 20-30%, right ventricle overload, moderate TR   Diuresis as needed  LHC (2/3/25): nonobstructive CAD; luminal RCA, Lcx; 40% mLAD   Continue ASA and Statin; GDMT for chronic HFrEF as tolerated  S/p Leadless pacemaker 2/12  Midodrine discontinued  Concern for missed paced beats and EKG ordered without evidence of issue and cardiology notified    GASTROINTESTINAL:   Dark stools on 1/22 w/ FOBT positive. However CTA abdomen/pelvis without evidence of bleeding. Prior anemia responded to PRBCs. No recurrence of dark stools since restarting eliquis.   Bowel regimen  Continue tube feeds as tolerated via PEG; continue FWF  Fiber added to help bulk stools with patient complaint of diarrhea    RENAL/ELECTROLYTE:   KIZZY d/t ATN from shock that has since improved.  Diuresis as needed  Of note, multiple fallopian/uterine cysts making bladder scan difficult for interpretation  Nephrology following    ENDOCRINE:   Insulin sliding scale  Keep blood glucose 140-180    HEMATOLOGY/ONCOLOGY:   Eliquis for A-fib   Monitor for dark stools     ID/MICRO:   Initially completed a course of Vanco and Zosyn for CAP  Completed 7 day course of Cefepime on 1/27 for possible HAP  MRSA PCR negative on 1/28    ICU DAILY CHECKLIST     Code Status: DNR  DVT Prophylaxis: eliquis  SUP: Pepcid  T/L/D: PIV  Diet: Tube feeds  Activity Level: Bedrest  ABCDEF Bundle/Checklist Completed: Yes  Disposition: Stay in ICU  Multidisciplinary Rounds Completed:  Yes    OBJECTIVE:     Blood pressure 110/75, pulse (!) 105, temperature 97.9 °F (36.6 °C), temperature source Oral, resp. rate 26, height 1.702 m (5' 7.01\"), weight 104.2 kg (229 lb 11.5 oz), SpO2 99%.  Physical Exam  Gen: On trach collar, NAD, A&Ox3  HEENT: NC/AT, supple  Cardiac: Paced at 60bpm  Pulm: minimal rhonchi  ABD: Soft, mildly distended, non tender, PEG tube in place  Extremities: Mild LE edema noted  Neuro: A&Ox3. Following commands    Labs and

## 2025-03-13 NOTE — PLAN OF CARE
PMV evaluation  Patient: Nida Graves (49 y.o. female)  Date: 3/13/2025  Primary Diagnosis: Atrial fibrillation, unspecified type (HCC) [I48.91]  Atrial flutter, unspecified type (HCC) [I48.92]  Congestive heart failure, unspecified HF chronicity, unspecified heart failure type (HCC) [I50.9]  Acute hypoxic respiratory failure (HCC) [J96.01]  Procedure(s) (LRB):  ESOPHAGOGASTRODUODENOSCOPY PERCUTANEOUS ENDOSCOPIC GASTROSTOMY TUBE PLACEMENT (N/A) 28 Days Post-Op   Precautions: Aspiration fall       ASSESSMENT :  Patient w/ Shiley 6 cuffed trach on   in-line trach collar 15L 28% w/ cuff partially inflated.  Vitals stable: 02: 97%, HR:67, RR 22.  Patient tolerated cuff deflation w/ tracheal suctioning w/ minimal clear secretions. Initially cough appears weak this improved w/ verbal and tactile cues.    In-line t-piece removed and patient able to produce voicing w/ finger occlusion. Education of PMV provided to patient prior to donning PMV.   PMV donned to trach hub w/ no evidence of air stacking and free air movement is noted.   There was decrease in O2 on RA to 91%, patient placed back on trach collar 15L 28% humidified air w/ tracheal mask. O2 rebounded to 97-95%.   Patient denies SOB or increased WOB. Vocal quality is strong and patient able to effective meet wants/needs.  Patient able to speak with daughter via Facetime during session.  W/ mirror work patient identified trach components and PMV. Mild coordination impairments w/ LUE w/ fine motor tasks. Will hold on patient I donning/doffing PMV.   Patient w/ mild reduction on HLE w/ dry swallow. Rec bedside swallow evaluation.  Patient highly motivated and engaged during session.   Patient will benefit from skilled intervention to address the above impairments.       PLAN :  Recommendations and Planned Interventions:  Wear PMV as tolerated. Rec speech language evaluation  Speaking Valve Placement:  Medical staff  Recommended Speaking Valve Wearing Schedule:  [x]

## 2025-03-13 NOTE — CASE COMMUNICATION
Respiratory Flowsheet Information    Per respiratory flowsheet evaluation  Ms. Graves is currently supported with trach collar 15L, 28% FIO2 with a Pasy-david valve in place    She was started on trach collar 3/10/25 and has remained without need for ventilator support since the am of 3/11/2025. She has not required FIO2>30% since 3/6/2025. And she has not required trach collar support higher than 20L since 3/11/2025 throughout the day.    Shilpa Estevez MD  [facility]

## 2025-03-13 NOTE — PROGRESS NOTES
Spiritual Health History and Assessment/Progress Note  [facility]    Follow-up, Attempted Encounter,  ,  ,      Name: Nida Graves MRN: [id number]    Age: 49 y.o.     Sex: female   Language: English   Rastafarian: Pentecostalism   Acute on chronic hypoxic respiratory failure (HCC)     Date: 3/13/2025            Total Time Calculated: 5 min              Spiritual Assessment continued in [facility] [place] [facility]        Referral/Consult From: Nurse   Encounter Overview/Reason: Follow-up, Attempted Encounter  Service Provided For: Patient not available    Nikki, Belief, Meaning:   Patient unable to assess at this time  Family/Friends No family/friends present      Importance and Influence:  Patient unable to assess at this time  Family/Friends No family/friends present    Community:  Patient Other: unable to assess at this time  Family/Friends No family/friends present    Assessment and Plan of Care:     Patient Interventions include: Other: n/a  Family/Friends Interventions include: No family/friends present    Patient Plan of Care: Spiritual Care available upon further referral  Family/Friends Plan of Care: Spiritual Care available upon further referral    Attempted a follow-up check-in with patient. She was in conversation with care team member at time of attempt; follow-up deferred for now.    Electronically signed by  Rylan Tirado MDiv  Chaplain Resident   on 3/13/2025 at 10:29 AM

## 2025-03-13 NOTE — CARE COORDINATION
CM [facility] medical[facility] to [facility].    Hoping for D/C today.      Per IDR  Pt can stand and get in the chair. Pt is on 15L since yesterday morning, brief period Pt was on 20L, Pt has not been on the vent for 48 hours.

## 2025-03-13 NOTE — PROGRESS NOTES
Patient is currently on 28% trach collar with Pasy-david valve currently in place. She is tolerating well with oxygen saturations 95-99%.

## 2025-03-13 NOTE — CONSULTS
Palliative Medicine  Patient Name: Nida Graves  YOB: 1975  MRN: [id number]  Age: 49 y.o.  Gender: female    Date of Initial Consult: 2025  Date of Service: 3/13/2025  Time: 1:55 PM  Provider: GINNY Chaparro CNP  Hospital Day: 61  Admit Date: 2025  Referring Provider: Dr. Shahid       Reasons for Consultation:  Goals of Care    HISTORY OF PRESENT ILLNESS (HPI):   Nida Graves is a 49 y.o. female with a past medical history of obesity was 270# now down to 240#/ BMI 37),DM with peripheral neuropathy, asthma (previously followed by Dr. Johnson with [facility]), chronic back pain, debility, who was admitted on 2025 from home  with a diagnosis of Shortness of breath, hypoxia.   CXR: LLL airspace disease, interstitial edema  ECHO : EF 25-30%, decrease RV function, mod TR, LA dilation.       Psychosocial: patient is  to spouse Nannette Valle [phone]  She has a dtr age 21 who is graduating from [employer] this Spring.  Her nephew lives with her (autism, age 27)  Patient's spouse does all the cooking, cleaning, household chores and works full time.   Patient has chronic debility, ambulates with walker for past 3 years, able to get around house, watches TV., independent with dressing/ bathing but fall risk., (spouse just purchased walk in tub)   Patient's mother and brother both  in .   No AMD documents: patient would trust her spouse to make medical decisions if unable.     PALLIATIVE DIAGNOSES:    Acute on chronic respiratory failure, multifactorial  Aspiration, asthma, pulmonary edema, weak muscles/ deconditioned  Suspected PE, on tx (unable to lie flat for CTA)   HFrEF (EF 25-30%)   Aflutter with slow ventricular response  Intermittent bradycardia   Possible NSTEMI  Obesity, diabetes, peripheral neuropathy, chronic Bilateral LE below knee leg pain  Chronic back pain  Mood disorder, on medication  Physical debility  Hypoxia   Palliative medicine encounter  and billing time based rather than MDM  [x] The total encounter time on this service date was __35__ minutes which was spent performing a face-to-face encounter and personally completing the provider-level activities documented in the note. This includes time spent prior to the visit and after the visit in direct care of the patient. This time does not include time spent in any separately reportable services.    Electronically signed by   GINNY Chaparro CNP  Palliative Care Team  on 3/13/2025 at 1:55 PM

## 2025-03-13 NOTE — ACP (ADVANCE CARE PLANNING)
Advance Care Planning     General Advance Care Planning (ACP) Conversation    Date of Conversation: 3/13/2025  Conducted with: Patient with Decision Making Capacity  Other persons present:  bedside nurse    Healthcare Decision Maker:   Primary Decision Maker: Sabra Graves - Juan - [phone]     Today we documented Decision Maker(s) consistent with ACP documents on file.  Content/Action Overview:  Has ACP document(s) on file - reflects the patient's care preferences  Reviewed DNR/DNI and patient elects Full Code (Attempt Resuscitation)  treatment goals, benefit/burden of treatment options, artificial nutrition, ventilation preferences, hospitalization preferences, and resuscitation preferences       Length of Voluntary ACP Conversation in minutes:  20 minutes    Mary Estevez MD

## 2025-03-13 NOTE — PLAN OF CARE
PHYSICAL THERAPY TREATMENT     Patient: Nida Graves (49 y.o. female)  Date: 3/13/2025  Diagnosis: Atrial fibrillation, unspecified type (HCC) [I48.91]  Atrial flutter, unspecified type (HCC) [I48.92]  Congestive heart failure, unspecified HF chronicity, unspecified heart failure type (HCC) [I50.9]  Acute hypoxic respiratory failure (HCC) [J96.01] Acute on chronic hypoxic respiratory failure (HCC)  Procedure(s) (LRB):  ESOPHAGOGASTRODUODENOSCOPY PERCUTANEOUS ENDOSCOPIC GASTROSTOMY TUBE PLACEMENT (N/A) 28 Days Post-Op  Precautions: Fall Risk, General Precautions                      Recommendations for nursing mobility: Out of bed to chair for meals, Encourage HEP in prep for ADLs/mobility; see handout for details, Frequent repositioning to prevent skin breakdown, Maxi Move for bed to chair transfers, and Assist x2    In place during session: Tracheostomy with valve, EKG/telemetry , PEG Tube, and ICU lines/leads  Chart, physical therapy assessment, plan of care and goals were reviewed.  ASSESSMENT  Patient continues with skilled PT services and is progressing towards goals. Pt in the recliner upon PT arrival, agreeable to session. Pt A&O x 4. (See below for objective details and assist levels).     Overall pt tolerated session fair today. Worked on standing in the keiry stedy today however pt unable to get upright in stand. Patient used UE support on keiry stedy and still working on LE strength and core stability while using keiry stedy even through unable to come upright. Patient leaning forward, getting slightly higher off recliner with second attempt. Pt appeared very fatigued from recently getting up to the chair. Tolerated some LE therex seated in recliner. Noted to require some encouragement today with mobility and to progress with functional transfers. Will continue to benefit from skilled PT services, and will continue to progress as tolerated. Current PT DC recommendation High intensity and

## 2025-03-13 NOTE — PROGRESS NOTES
OCCUPATIONAL THERAPY TREATMENT  Patient: Nida Graves (49 y.o. female)  Date: 3/13/2025  Primary Diagnosis: Atrial fibrillation, unspecified type (HCC) [I48.91]  Atrial flutter, unspecified type (HCC) [I48.92]  Congestive heart failure, unspecified HF chronicity, unspecified heart failure type (HCC) [I50.9]  Acute hypoxic respiratory failure (HCC) [J96.01]  Procedure(s) (LRB):  ESOPHAGOGASTRODUODENOSCOPY PERCUTANEOUS ENDOSCOPIC GASTROSTOMY TUBE PLACEMENT (N/A) 28 Days Post-Op   Precautions: Fall Risk, General Precautions                Recommendations for nursing mobility: Out of bed to chair for meals, Encourage HEP in prep for ADLs/mobility; see handout for details, Frequent repositioning to prevent skin breakdown, Maxi Move for bed to chair transfers, and Assist x2    In place during session: Tracheostomy 10L 28%, External Catheter, EKG/telemetry , and Pulse ox  Chart, occupational therapy assessment, plan of care, and goals were reviewed.  ASSESSMENT  Patient continues with skilled OT services and is progressing towards goals. Pt presented up in recliner upon HENDERSON & PTA arrival, agreeable to session. Pt A&O x 4. Pt presented with neck hyper extended and with a heavy L lateral lean.  Therapists demonstrated how the keiry stedy worked.  Pt educated on importance of working on standing, even if she couldn't get upright.  Pt required max encouragement.  Pt attempted several sit to stand but unable to clear bottom from recliner.  Pt worked on sitting balance and flexing forward holding onto the keiry stedy for support.  Pt fatigues easily and requires frequent rest breaks and increase time.  Pt max Ax2 to scoot back in the recliner.  Activity completed in prep for BSC transfers, strengthening core to assist with basic ADLs. Therapist placed pillow behind neck (not head) to maintain head in neutral and wedge placed behind L shoulder to assist maintaining trunk in neutral. Pt left in recliner with all known needs met.  Intervention(s):   pain is at a level acceptable to the patient    Activity Tolerance:   Fair  and requires frequent rest breaks    After treatment patient left in no apparent distress:   Bed locked and returned to lowest position, Patient left in no apparent distress sitting up in chair, Call bell within reach, and Updated patient's board on functional status and mobility recommendations, and nsg updated     COMMUNICATION/EDUCATION:   The patient’s plan of care was discussed with: Physical therapy assistant and Registered nurse    Patient Education  Education Given To: Patient  Education Provided: Plan of Care;Transfer Training;Fall Prevention Strategies  Education Provided Comments: the importance of attempting and how it benefits  Education Method: Verbal;Demonstration  Barriers to Learning: Other (Comment) (fear of falling)  Education Outcome: Verbalized understanding;Continued education needed    Thank you for this referral.  RHETT Stark  Minutes: 33

## 2025-03-13 NOTE — CARE COORDINATION
CM received notification from [facility] Liaison [facility] [phone] that they can accept patient tomorrow morning.  CM messaged attending to see if CM can set up transportation.  Attending is agreeable.  CM called [facility] [phone] and spoke with Hope.  Transportation has been set for 1000 to transfer patient to [facility] located at [address] [place] [address], Trip ID: [id number].

## 2025-03-14 VITALS
TEMPERATURE: 98.1 F | RESPIRATION RATE: 28 BRPM | DIASTOLIC BLOOD PRESSURE: 34 MMHG | BODY MASS INDEX: 36.06 KG/M2 | OXYGEN SATURATION: 95 % | WEIGHT: 229.72 LBS | HEIGHT: 67 IN | HEART RATE: 82 BPM | SYSTOLIC BLOOD PRESSURE: 138 MMHG

## 2025-03-14 LAB
GLUCOSE BLD STRIP.AUTO-MCNC: 163 MG/DL (ref 65–100)
GLUCOSE BLD STRIP.AUTO-MCNC: 98 MG/DL (ref 65–100)
PERFORMED BY:: ABNORMAL
PERFORMED BY:: NORMAL

## 2025-03-14 PROCEDURE — 82962 GLUCOSE BLOOD TEST: CPT

## 2025-03-14 PROCEDURE — 94640 AIRWAY INHALATION TREATMENT: CPT

## 2025-03-14 PROCEDURE — G0008 ADMIN INFLUENZA VIRUS VAC: HCPCS

## 2025-03-14 PROCEDURE — 2500000003 HC RX 250 WO HCPCS: Performed by: NURSE PRACTITIONER

## 2025-03-14 PROCEDURE — 6370000000 HC RX 637 (ALT 250 FOR IP): Performed by: STUDENT IN AN ORGANIZED HEALTH CARE EDUCATION/TRAINING PROGRAM

## 2025-03-14 PROCEDURE — 6360000002 HC RX W HCPCS: Performed by: STUDENT IN AN ORGANIZED HEALTH CARE EDUCATION/TRAINING PROGRAM

## 2025-03-14 PROCEDURE — 2700000000 HC OXYGEN THERAPY PER DAY

## 2025-03-14 PROCEDURE — 6360000002 HC RX W HCPCS

## 2025-03-14 PROCEDURE — 90656 IIV3 VACC NO PRSV 0.5 ML IM: CPT

## 2025-03-14 PROCEDURE — 97530 THERAPEUTIC ACTIVITIES: CPT

## 2025-03-14 PROCEDURE — 6370000000 HC RX 637 (ALT 250 FOR IP): Performed by: NURSE PRACTITIONER

## 2025-03-14 PROCEDURE — 94668 MNPJ CHEST WALL SBSQ: CPT

## 2025-03-14 PROCEDURE — 2500000003 HC RX 250 WO HCPCS: Performed by: STUDENT IN AN ORGANIZED HEALTH CARE EDUCATION/TRAINING PROGRAM

## 2025-03-14 PROCEDURE — 94761 N-INVAS EAR/PLS OXIMETRY MLT: CPT

## 2025-03-14 RX ORDER — FOLIC ACID 1 MG/1
1 TABLET ORAL DAILY
Qty: 30 TABLET | Refills: 3 | Status: SHIPPED | OUTPATIENT
Start: 2025-03-14

## 2025-03-14 RX ORDER — SENNOSIDES A AND B 8.6 MG/1
1 TABLET, FILM COATED ORAL DAILY PRN
Qty: 30 TABLET | Refills: 0 | Status: SHIPPED | OUTPATIENT
Start: 2025-03-14 | End: 2025-04-13

## 2025-03-14 RX ORDER — ASPIRIN 81 MG/1
81 TABLET, CHEWABLE ORAL DAILY
Qty: 30 TABLET | Refills: 3 | Status: SHIPPED | OUTPATIENT
Start: 2025-03-14

## 2025-03-14 RX ORDER — BISACODYL 10 MG
10 SUPPOSITORY, RECTAL RECTAL DAILY PRN
Qty: 30 SUPPOSITORY | Refills: 0 | Status: SHIPPED | OUTPATIENT
Start: 2025-03-14 | End: 2025-04-13

## 2025-03-14 RX ORDER — CINACALCET 30 MG/1
30 TABLET, FILM COATED ORAL DAILY
Qty: 30 TABLET | Refills: 3 | Status: SHIPPED | OUTPATIENT
Start: 2025-03-14

## 2025-03-14 RX ORDER — LANSOPRAZOLE 30 MG/1
30 TABLET, ORALLY DISINTEGRATING, DELAYED RELEASE ORAL
Qty: 30 TABLET | Refills: 0 | Status: SHIPPED | OUTPATIENT
Start: 2025-03-14

## 2025-03-14 RX ORDER — BUDESONIDE 0.5 MG/2ML
0.5 INHALANT ORAL
Qty: 60 EACH | Refills: 3 | Status: SHIPPED | OUTPATIENT
Start: 2025-03-14

## 2025-03-14 RX ORDER — IPRATROPIUM BROMIDE AND ALBUTEROL SULFATE 2.5; .5 MG/3ML; MG/3ML
3 SOLUTION RESPIRATORY (INHALATION)
Qty: 360 ML | Refills: 1 | Status: SHIPPED | OUTPATIENT
Start: 2025-03-14 | End: 2025-04-13

## 2025-03-14 RX ORDER — ATORVASTATIN CALCIUM 20 MG/1
20 TABLET, FILM COATED ORAL NIGHTLY
Qty: 30 TABLET | Refills: 3 | Status: SHIPPED | OUTPATIENT
Start: 2025-03-14

## 2025-03-14 RX ORDER — ACETYLCYSTEINE 200 MG/ML
600 SOLUTION ORAL; RESPIRATORY (INHALATION)
Qty: 30 EACH | Refills: 1 | Status: SHIPPED | OUTPATIENT
Start: 2025-03-14

## 2025-03-14 RX ORDER — VENLAFAXINE 75 MG/1
75 TABLET ORAL 2 TIMES DAILY WITH MEALS
Qty: 90 TABLET | Refills: 3 | Status: SHIPPED | OUTPATIENT
Start: 2025-03-14

## 2025-03-14 RX ORDER — BUPROPION HYDROCHLORIDE 100 MG/1
100 TABLET ORAL 3 TIMES DAILY
Qty: 60 TABLET | Refills: 3 | Status: SHIPPED | OUTPATIENT
Start: 2025-03-14

## 2025-03-14 RX ADMIN — ACETYLCYSTEINE 600 MG: 200 SOLUTION ORAL; RESPIRATORY (INHALATION) at 07:46

## 2025-03-14 RX ADMIN — IPRATROPIUM BROMIDE AND ALBUTEROL SULFATE 1 DOSE: 2.5; .5 SOLUTION RESPIRATORY (INHALATION) at 00:44

## 2025-03-14 RX ADMIN — CINACALCET HYDROCHLORIDE 30 MG: 30 TABLET, FILM COATED ORAL at 08:33

## 2025-03-14 RX ADMIN — INFLUENZA A VIRUS A/VICTORIA/4897/2022 IVR-238 (H1N1) ANTIGEN (PROPIOLACTONE INACTIVATED), INFLUENZA A VIRUS A/THAILAND/8/2022 IVR-237 (H3N2) ANTIGEN (PROPIOLACTONE INACTIVATED), INFLUENZA B VIRUS B/AUSTRIA/1359417/2021 BVR-26 ANTIGEN (PROPIOLACTONE INACTIVATED) 0.5 ML: 15; 15; 15 INJECTION, SUSPENSION INTRAMUSCULAR at 09:49

## 2025-03-14 RX ADMIN — FOLIC ACID 1 MG: 1 TABLET ORAL at 08:32

## 2025-03-14 RX ADMIN — GUAIFENESIN 400 MG: 200 SOLUTION ORAL at 08:32

## 2025-03-14 RX ADMIN — VENLAFAXINE 75 MG: 25 TABLET ORAL at 08:32

## 2025-03-14 RX ADMIN — WHITE PETROLATUM,ZINC OXIDE: 57; 17 PASTE TOPICAL at 08:34

## 2025-03-14 RX ADMIN — APIXABAN 5 MG: 5 TABLET, FILM COATED ORAL at 08:32

## 2025-03-14 RX ADMIN — ASPIRIN 81 MG 81 MG: 81 TABLET ORAL at 08:32

## 2025-03-14 RX ADMIN — LANSOPRAZOLE 30 MG: 30 TABLET, ORALLY DISINTEGRATING ORAL at 08:31

## 2025-03-14 RX ADMIN — BUPROPION HYDROCHLORIDE 100 MG: 100 TABLET, FILM COATED ORAL at 08:32

## 2025-03-14 RX ADMIN — SODIUM CHLORIDE, PRESERVATIVE FREE 10 ML: 5 INJECTION INTRAVENOUS at 08:32

## 2025-03-14 RX ADMIN — IPRATROPIUM BROMIDE AND ALBUTEROL SULFATE 1 DOSE: 2.5; .5 SOLUTION RESPIRATORY (INHALATION) at 07:46

## 2025-03-14 RX ADMIN — Medication 1 PACKET: at 08:32

## 2025-03-14 RX ADMIN — BUDESONIDE 500 MCG: 0.5 INHALANT ORAL at 07:47

## 2025-03-14 ASSESSMENT — PAIN SCALES - GENERAL: PAINLEVEL_OUTOF10: 0

## 2025-03-14 NOTE — PROGRESS NOTES
CRITICAL CARE PROGRESS NOTE    Name: Nida Graves   : 1975   MRN: [id number]   Date: 3/14/2025      Diagnoses/problem list:   Chronic hypoxic and hypercapnic respiratory failure  Atrial flutter with slow ventricular response  Complete heart block s/p pacemaker  GI bleed  Biventricular failure  Acute HFrEF, 25 to 30%  Diabetes  Morbid obesity  Deconditioning    24-hour events:   PS today. Reports willingness to work with PT/OT today. Concerned about diarrhea and added fiber to Tfs  3/11: hoping to work with PT/OT today and work towards  trach collar  3/12: tolerated trach collar overnight. Plan for possible discharge 3/13  3/13: awaiting transfer to SNF potentially today. Able to stay on trach collar >24 hours  3/14: did well on PMV and changed code status to full code after full discussion    Assessment and plan:   Ms. Graves is a 49-year-old female with PMH chronic debility and bedbound status, diabetes, asthma, obesity, who presented for shortness of breath.  Shortness of breath worsening over the past few days.  Upon presentation ED patient found to be hypoxic necessitating O2 via NC.  She was also found to be in A-fib with slow ventricular response.  She was transferred out of ICU on . Early morning  upgraded to ICU again due to severe hypoxia and hypercapnia requiring intubation. Now s/p trach on . Leadless pacemaker . PEG . Improved lung white-out s/p bronchoscopy     CNS / MSK:    Continue Venlafaxine and Bupropion  Pt would most benefit from rehab and intense PT/OT  Able to get to bedside chair 3/11 and 3/13    PULMONOLOGY:   Extubated on , reintubated on   ENT placed trach on   Recurrent mucus plugging, s/p multiple intermittent mucolytic regimens   Continue nebs, budesonide q12h, duonebs q6h  Lung aeration improved s/p bronchoscopy  and   Continue trach collar trial for as long as possible  Pt would most benefit from acute rehab to best address her

## 2025-03-14 NOTE — PLAN OF CARE
PHYSICAL THERAPY TREATMENT     Patient: Nida Graves (49 y.o. female)  Date: 3/14/2025  Diagnosis: Atrial fibrillation, unspecified type (HCC) [I48.91]  Atrial flutter, unspecified type (HCC) [I48.92]  Congestive heart failure, unspecified HF chronicity, unspecified heart failure type (HCC) [I50.9]  Acute hypoxic respiratory failure (HCC) [J96.01] Acute on chronic hypoxic respiratory failure (HCC)  Procedure(s) (LRB):  ESOPHAGOGASTRODUODENOSCOPY PERCUTANEOUS ENDOSCOPIC GASTROSTOMY TUBE PLACEMENT (N/A) 29 Days Post-Op  Precautions: Fall Risk, Bed Alarm                      Recommendations for nursing mobility: Out of bed to chair for meals, Encourage HEP in prep for ADLs/mobility; see handout for details, and Assist x2    In place during session: Tracheostomy on trach collar with speaking valve, External Catheter, EKG/telemetry , Pulse ox, and PEG Tube  Chart, physical therapy assessment, plan of care and goals were reviewed.  ASSESSMENT  Patient continues with skilled PT services and is progressing towards goals. Pt semi-supine upon PT arrival, agreeable to session. Pt A&O x 4. (See below for objective details and assist levels).     Overall pt tolerated session well today with no reports of pain.  Pt is scheduled to leave today at 10 am to go to [facility] and requested help getting dressed.  Co-treat performed with OT for extra assistance. Pt was mod-max A x 2 to sit EOB and demos fair sitting balance, still with occasional posterior/right lean.  OT assisted pt with donning underwear and pants and then pt attempted sit to stand transfer in order to pull up clothing.  Even with max A x 2, pt unable to clear her buttocks from the bed. Once sitting back on the EOB, pt was very close to the edge and was not safe to keep at the edge of bed, so she was assisted back to  bed with max A x 2.  At this time, noticed, pt had a small BM and pt was rolled side to side to get cleaned up and have new  support)  Standing: Impaired  Standing - Static: Poor;Constant support  Wheelchair Mobility:     Ambulation/Gait Training:                                                           Pain Ratin/10  reported  Pain Intervention(s):       Activity Tolerance:   Good and requires rest breaks    After treatment patient left in no apparent distress:   Bed locked and returned to lowest position, Patient left in no apparent distress in bed, Call bell within reach, and Side rails x3, and nsg updated     COMMUNICATION/COLLABORATION:   The patient’s plan of care was discussed with: Occupational therapist and Registered nurse    Patient Education  Education Given To: Patient  Education Provided: Role of Therapy;Plan of Care;Home Exercise Program;Precautions;Mobility Training;Equipment;Energy Conservation;Transfer Training  Education Method: Demonstration;Verbal  Barriers to Learning: None  Education Outcome: Verbalized understanding;Demonstrated understanding;Continued education needed    PT/OT sessions occurred together for increased safety of pt and clinician.     Shanice Norton, PT  Minutes: 45

## 2025-03-14 NOTE — PROGRESS NOTES
Discharge paperwork reviewed with patient. All patient questions answered. Patient transfer report given to [facility] personnel. All patient belongings with patient on stretcher at time of discharge. Patient's daughter, Sabra Graves, notified of patient's discharge to [facility].

## 2025-03-14 NOTE — PLAN OF CARE
OCCUPATIONAL THERAPY TREATMENT: WEEKLY REASSESSMENT    Patient: Nida Graves (49 y.o. female)  Date: 3/14/2025  Primary Diagnosis: Atrial fibrillation, unspecified type (HCC) [I48.91]  Atrial flutter, unspecified type (HCC) [I48.92]  Congestive heart failure, unspecified HF chronicity, unspecified heart failure type (HCC) [I50.9]  Acute hypoxic respiratory failure (HCC) [J96.01]  Procedure(s) (LRB):  ESOPHAGOGASTRODUODENOSCOPY PERCUTANEOUS ENDOSCOPIC GASTROSTOMY TUBE PLACEMENT (N/A) 29 Days Post-Op   Precautions: Ax2, Fall Risk, Bed Alarm                Recommendations for nursing mobility: Encourage HEP in prep for ADLs/mobility; see handout for details, Frequent repositioning to prevent skin breakdown, Use of bed/chair alarm for safety, LE elevation for management of edema, Dianne Plus for bed to chair transfers, and Assist x2    In place during session: Tracheostomy 15L 25 FiO2, EKG/telemetry , Pulse ox, PEG Tube, and continuous ICU monitoring  Chart, occupational therapy assessment, plan of care, and goals were reviewed.  ASSESSMENT  Patient initially seen for OT evaluation 2/22/25 and 12 skilled OT sessions since evalution. Patient seen today for OT reevaluation s/t LOS. Patient A&O x 4. Pt semi supine upon arrival, agreeable to session.      Based on the objective data described, the patient currently presents with impaired functional mobility, decreased independence in ADLs, impaired ability to perform high-level IADLs, decreased ROM, impaired strength, poor body mechanics, decreased activity tolerance, decreased coordination, impaired balance, decreased proprioception, impaired posture, and decreased fine-motor control. (See below for objective details and assist levels).    Overall pt tolerated session fair today, currently with no c/o pain. Pt continuing to req heavy Ax2 for functional mobility/transfers in prep for dressing (see objective details below). Pt req'ing max A for LB and UB dressing s/t

## 2025-03-14 NOTE — PROGRESS NOTES
Transfer report given Tracy on the [place] unit of [facility]. Patient receiving room assignment [address]. Receiving phone# [phone].

## 2025-03-14 NOTE — DISCHARGE SUMMARY
Discharge Summary    Date: 3/14/2025  Patient Name: Nida Graves    YOB: 1975     Age: 49 y.o.    Admit Date: 1/12/2025  Discharge Date: 3/14/2025  Discharge Condition: Good    Admission Diagnosis  Atrial fibrillation, unspecified type (HCC) [I48.91];Atrial flutter, unspecified type (HCC) [I48.92];Congestive heart failure, unspecified HF chronicity, unspecified heart failure type (HCC) [I50.9];Acute hypoxic respiratory failure (HCC) [J96.01]      Discharge Diagnosis  Principal Problem:    Acute on chronic hypoxic respiratory failure (HCC)  Active Problems:    Palliative care by specialist    Chronic midline low back pain    Atrial flutter (HCC)    Congestive heart failure (HCC)    Debility    Atrial fibrillation (HCC)    Suspected deep vein thrombosis (DVT)  Resolved Problems:    * No resolved hospital problems. *      Hospital Stay  Narrative of Hospital Course:  Ms. Graves is a 49-year-old female with PMH chronic disability and bedbound status, diabetes, asthma, obesity, who presented for shortness of breath.  Shortness of breath worsening over the past few days.  Upon presentation ED patient found to be hypoxic necessitating O2 via NC.  Otherwise workup significant for arrhythmia.  Concerns for slow conducting A-fib.  Cardiology subsequently consulted and noted overall concern for A-fib with possible complete heart block versus slow ventricular response.  Patient supplemented to ICU for further evaluation and management.     Assessment:  A flutter with slow ventricular response  Possible complete heart block  Acute hypoxic respiratory failure  Possible NSTEMI  Acute HFrEF  Asthma  Diabetes  She has had a very prolonged, complicated hospital course.   Ms. Graves is a 49-year-old female with PMH chronic debility and bedbound status, diabetes, asthma, obesity, who presented for shortness of breath.  Shortness of breath worsening over the past few days.  Upon presentation ED patient found to be  Medication List    CONTINUE these medications which have CHANGED    atorvastatin (LIPITOR) 20 MG tablet  1 tablet by Per G Tube route nightly  Qty: 30 tablet Refills: 3          Current Discharge Medication List    CONTINUE these medications which have NOT CHANGED    budesonide (PULMICORT FLEXHALER) 180 MCG/ACT AEPB inhaler  TAKE 1 PUFF BY MOUTH TWICE A DAY          Current Discharge Medication List    STOP taking these medications    buPROPion (WELLBUTRIN XL) 150 MG extended release tablet  Comments:  Reason for Stopping:    etodolac (LODINE) 400 MG tablet  Comments:  Reason for Stopping:    traZODone (DESYREL) 50 MG tablet  Comments:  Reason for Stopping:    DULoxetine (CYMBALTA) 30 MG extended release capsule  Comments:  Reason for Stopping:    albuterol (PROVENTIL) (5 MG/ML) 0.5% nebulizer solution  Comments:  Reason for Stopping:    benzonatate (TESSALON) 100 MG capsule  Comments:  Reason for Stopping:    diclofenac sodium (VOLTAREN) 1 % GEL  Comments:  Reason for Stopping:    DULoxetine (CYMBALTA) 20 MG extended release capsule  Comments:  Reason for Stopping:    fluticasone-salmeterol (WIXELA INHUB) 500-50 MCG/ACT AEPB diskus inhaler  Comments:  Reason for Stopping:    losartan (COZAAR) 25 MG tablet  Comments:  Reason for Stopping:    tiotropium (SPIRIVA HANDIHALER) 18 MCG inhalation capsule  Comments:  Reason for Stopping:          Time Spent on Discharge:  minutes were spent in patient examination, evaluation, counseling as well as medication reconciliation, prescriptions for required medications, discharge plan, and follow up.    Electronically signed by Mary Estevez MD on 3/14/25 at 7:46 AM EDT

## 2025-03-14 NOTE — CARE COORDINATION
CM reviewed Pt medicals, [facility] has accepted Pt.    [facility] will [facility] Pt up at 10:00 am.    CM called Pt daughter to let her know that Pt is being picked up at 10:00 am, she is ok with transfer.    CM sent updated medicals.     Transition of Care Plan:    RUR: 18%  Prior Level of Functioning: IND  Disposition: [facility]   SELAM: 3/14/2025  If SNF or IPR: Date FOC offered: 2/6/2025  Date FOC received: 2/6/2025  Accepting facility: [facility]  Date authorization started with reference number: [id number]/11  Date authorization received and expires:   Follow up appointments: Nursing  DME needed: No  Transportation at discharge: [facility]  IM/IMM Medicare/[employer] letter given: No    Caregiver Contact: Yes  Discharge Caregiver contacted prior to discharge? Yes  Care Conference needed? No  Barriers to discharge: None

## 2025-03-21 NOTE — ANESTHESIA POSTPROCEDURE EVALUATION
Department of Anesthesiology  Postprocedure Note    Patient: Nida Graves  MRN: [id number]  YOB: 1975    Procedure Summary       Date: 01/30/25 Room / Location: [facility] MAIN OR 02 / [facility] MAIN OR    Anesthesia Start: 1510 Anesthesia Stop: 1548    Procedure: TRACHEOSTOMY PERCUTANEOUS (Throat) Diagnosis:       Acute respiratory failure, unspecified whether with hypoxia or hypercapnia      (Acute respiratory failure, unspecified whether with hypoxia or hypercapnia [J96.00])    Surgeons: Jack Badillo MD Responsible Provider: Vic Schuster MD    Anesthesia Type: General ASA Status: 4            Anesthesia Type: General    Caroline Phase I:      Caroline Phase II:      Anesthesia Post Evaluation    Patient location during evaluation: PACU  Patient participation: complete - patient cannot participate  Level of consciousness: awake  Pain score: 0  Airway patency: patent  Nausea & Vomiting: no nausea and no vomiting  Cardiovascular status: hemodynamically stable  Respiratory status: acceptable  Hydration status: stable  Multimodal analgesia pain management approach        No notable events documented.

## (undated) DEVICE — SC 3W MP RA OFF PB - PG: Brand: NAMIC

## (undated) DEVICE — GUIDEWIRE VASC L260CM DIA0.035IN TIP L3MM STD EXCHG PTFE J

## (undated) DEVICE — GLOVE SURG SZ 75 L12IN FNGR THK94MIL STD WHT LTX FREE

## (undated) DEVICE — CATHETER ANGIO 5FR L100CM GRY S STL NYL JL3.5 3 SEG BRAID L

## (undated) DEVICE — DILATOR COONS TAPER: Brand: COONS

## (undated) DEVICE — CATHETER DIAG 5FR L100CM LUMN ID0.047IN JL4 CRV 0 SIDE H

## (undated) DEVICE — INTRODUCER SHTH W51XH73XL557MM D13MM DIA7.8MM HYDRPHLC

## (undated) DEVICE — ENDOVIVE SFT PEG KIT PULL WENFIT 20F BX2

## (undated) DEVICE — AMPLATZ ULTRA STIFF WIRE GUIDE: Brand: AMPLATZ

## (undated) DEVICE — PINNACLE INTRODUCER SHEATH: Brand: PINNACLE

## (undated) DEVICE — Device

## (undated) DEVICE — GUIDEWIRE VASC L150CM DIA0035IN PTFE STIFF FIX COR 3MM J TIP

## (undated) DEVICE — DEVICE VASC CLSR 5FR GRY W/ INTEGR SEAL 10ML LOK SYR

## (undated) DEVICE — 3M™ STERI-DRAPE™ SMALL DRAPE WITH ADHESIVE APERTURE 1092 25/BX,4/CS&#X20;: Brand: STERI-DRAPE™

## (undated) DEVICE — SYRINGE MED 10ML RED POLYCARB BRL FIX M LUER CONN FLAT GRP

## (undated) DEVICE — CATHETER ANGIO JR4 STD 5 FRX100 CM ST INFIN

## (undated) DEVICE — 3M™ TEGADERM™ TRANSPARENT FILM DRESSING FRAME STYLE, 1626W, 4 IN X 4-3/4 IN (10 CM X 12 CM), 50/CT 4CT/CASE: Brand: 3M™ TEGADERM™

## (undated) DEVICE — 48" PROBE COVER W/GEL, ULTRASOUND, STERILE: Brand: SITE-RITE

## (undated) DEVICE — ASCOPE 4 BRONCHO SLIM: Brand: ASCOPE™ 4 BRONCHO SLIM 3.8/1.2

## (undated) DEVICE — COVER PRB INTOP 96X6 IN LF

## (undated) DEVICE — BOWL MED M 16OZ PLAS CAP GRAD

## (undated) DEVICE — BLUE RHINO G2-MULTI PERCUTANEOUS TRACHEOSTOMY INTRODUCER TRAY: Brand: BLUE RHINO

## (undated) DEVICE — WASTEBAG DRIP/ADAPTER: Brand: MEDLINE INDUSTRIES, INC.

## (undated) DEVICE — TRAY SURG CUST CRD CATH 3 PRT SSR

## (undated) DEVICE — GUIDEWIRE VASC 0.018 INX60 CM PLAT TIP INTRO KT SS MAKNV

## (undated) DEVICE — SYRINGE MED 10ML PUR GAM COMPATIBLE POLYCARB FIX M LUER CONN

## (undated) DEVICE — GLIDESHEATH SLENDER STAINLESS STEEL KIT: Brand: GLIDESHEATH SLENDER